# Patient Record
Sex: FEMALE | Race: WHITE | Employment: OTHER | URBAN - METROPOLITAN AREA
[De-identification: names, ages, dates, MRNs, and addresses within clinical notes are randomized per-mention and may not be internally consistent; named-entity substitution may affect disease eponyms.]

---

## 2017-02-09 PROBLEM — D64.9 ANEMIA: Status: ACTIVE | Noted: 2017-02-09

## 2017-05-27 PROBLEM — K86.2 PANCREATIC CYST: Status: ACTIVE | Noted: 2017-05-27

## 2017-05-27 PROBLEM — M54.9 PAIN IN BACK: Chronic | Status: ACTIVE | Noted: 2017-05-27

## 2017-05-28 PROBLEM — D51.9 ANEMIA DUE TO VITAMIN B12 DEFICIENCY: Status: ACTIVE | Noted: 2017-02-09

## 2017-09-26 PROBLEM — M54.9 CHRONIC BACK PAIN: Chronic | Status: ACTIVE | Noted: 2017-09-26

## 2017-09-26 PROBLEM — F41.9 ANXIETY DISORDER: Chronic | Status: ACTIVE | Noted: 2017-09-26

## 2017-09-26 PROBLEM — G89.29 CHRONIC BACK PAIN: Chronic | Status: ACTIVE | Noted: 2017-09-26

## 2017-09-26 PROBLEM — R50.9 ACUTE FEBRILE ILLNESS: Status: ACTIVE | Noted: 2017-09-26

## 2018-03-13 ENCOUNTER — HOSPITAL ENCOUNTER (OUTPATIENT)
Age: 73
Discharge: HOME | End: 2018-03-15

## 2018-03-13 PROCEDURE — 88342 IMHCHEM/IMCYTCHM 1ST ANTB: CPT

## 2018-03-13 PROCEDURE — 88305 TISSUE EXAM BY PATHOLOGIST: CPT

## 2018-06-30 ENCOUNTER — HOSPITAL ENCOUNTER (INPATIENT)
Age: 73
LOS: 4 days | Discharge: HOME | DRG: 871 | End: 2018-07-04
Attending: EMERGENCY MEDICINE | Admitting: INTERNAL MEDICINE
Payer: COMMERCIAL

## 2018-06-30 ENCOUNTER — APPOINTMENT (OUTPATIENT)
Dept: GENERAL RADIOLOGY | Age: 73
DRG: 871 | End: 2018-06-30
Payer: COMMERCIAL

## 2018-06-30 DIAGNOSIS — A41.9 SEPSIS DUE TO PNEUMONIA (HCC): Primary | ICD-10-CM

## 2018-06-30 DIAGNOSIS — J18.9 SEPSIS DUE TO PNEUMONIA (HCC): Primary | ICD-10-CM

## 2018-06-30 PROBLEM — J18.1 LOBAR PNEUMONIA (HCC): Status: ACTIVE | Noted: 2018-06-30

## 2018-06-30 LAB
ALBUMIN SERPL-MCNC: 3.7 G/DL (ref 3.5–5.2)
ALP BLD-CCNC: 59 U/L (ref 35–104)
ALT SERPL-CCNC: 13 U/L (ref 0–32)
ANION GAP SERPL CALCULATED.3IONS-SCNC: 14 MMOL/L (ref 7–16)
AST SERPL-CCNC: 23 U/L (ref 0–31)
BACTERIA: NORMAL /HPF
BASOPHILS ABSOLUTE: 0 E9/L (ref 0–0.2)
BASOPHILS RELATIVE PERCENT: 0.6 % (ref 0–2)
BILIRUB SERPL-MCNC: 0.7 MG/DL (ref 0–1.2)
BILIRUBIN URINE: NEGATIVE
BLOOD, URINE: NEGATIVE
BUN BLDV-MCNC: 17 MG/DL (ref 8–23)
CALCIUM SERPL-MCNC: 8.2 MG/DL (ref 8.6–10.2)
CHLORIDE BLD-SCNC: 101 MMOL/L (ref 98–107)
CLARITY: CLEAR
CO2: 23 MMOL/L (ref 22–29)
COLOR: YELLOW
CREAT SERPL-MCNC: 1 MG/DL (ref 0.5–1)
EOSINOPHILS ABSOLUTE: 0.49 E9/L (ref 0.05–0.5)
EOSINOPHILS RELATIVE PERCENT: 2.6 % (ref 0–6)
GFR AFRICAN AMERICAN: >60
GFR NON-AFRICAN AMERICAN: 55 ML/MIN/1.73
GLUCOSE BLD-MCNC: 94 MG/DL (ref 74–109)
GLUCOSE URINE: NEGATIVE MG/DL
HCT VFR BLD CALC: 37.5 % (ref 34–48)
HEMOGLOBIN: 12.3 G/DL (ref 11.5–15.5)
INFLUENZA A BY PCR: NOT DETECTED
INFLUENZA B BY PCR: NOT DETECTED
KETONES, URINE: NEGATIVE MG/DL
LACTIC ACID: 1.4 MMOL/L (ref 0.5–2.2)
LEUKOCYTE ESTERASE, URINE: ABNORMAL
LIPASE: 43 U/L (ref 13–60)
LYMPHOCYTES ABSOLUTE: 2.27 E9/L (ref 1.5–4)
LYMPHOCYTES RELATIVE PERCENT: 12.1 % (ref 20–42)
MCH RBC QN AUTO: 28.3 PG (ref 26–35)
MCHC RBC AUTO-ENTMCNC: 32.8 % (ref 32–34.5)
MCV RBC AUTO: 86.2 FL (ref 80–99.9)
MONOCYTES ABSOLUTE: 1.89 E9/L (ref 0.1–0.95)
MONOCYTES RELATIVE PERCENT: 10.3 % (ref 2–12)
NEUTROPHILS ABSOLUTE: 14.18 E9/L (ref 1.8–7.3)
NEUTROPHILS RELATIVE PERCENT: 75 % (ref 43–80)
NITRITE, URINE: NEGATIVE
OVALOCYTES: ABNORMAL
PDW BLD-RTO: 15 FL (ref 11.5–15)
PH UA: 6 (ref 5–9)
PLATELET # BLD: 268 E9/L (ref 130–450)
PMV BLD AUTO: 11.1 FL (ref 7–12)
POIKILOCYTES: ABNORMAL
POTASSIUM SERPL-SCNC: 3.4 MMOL/L (ref 3.5–5)
PROTEIN UA: NEGATIVE MG/DL
RBC # BLD: 4.35 E12/L (ref 3.5–5.5)
RBC UA: NORMAL /HPF (ref 0–2)
SCHISTOCYTES: ABNORMAL
SODIUM BLD-SCNC: 138 MMOL/L (ref 132–146)
SPECIFIC GRAVITY UA: <=1.005 (ref 1–1.03)
TOTAL PROTEIN: 6.3 G/DL (ref 6.4–8.3)
TROPONIN: <0.01 NG/ML (ref 0–0.03)
UROBILINOGEN, URINE: 0.2 E.U./DL
WBC # BLD: 18.9 E9/L (ref 4.5–11.5)
WBC UA: NORMAL /HPF (ref 0–5)

## 2018-06-30 PROCEDURE — 83690 ASSAY OF LIPASE: CPT

## 2018-06-30 PROCEDURE — 83605 ASSAY OF LACTIC ACID: CPT

## 2018-06-30 PROCEDURE — 2580000003 HC RX 258: Performed by: PHYSICIAN ASSISTANT

## 2018-06-30 PROCEDURE — 85025 COMPLETE CBC W/AUTO DIFF WBC: CPT

## 2018-06-30 PROCEDURE — 6370000000 HC RX 637 (ALT 250 FOR IP): Performed by: INTERNAL MEDICINE

## 2018-06-30 PROCEDURE — 87502 INFLUENZA DNA AMP PROBE: CPT

## 2018-06-30 PROCEDURE — 87503 INFLUENZA DNA AMP PROB ADDL: CPT

## 2018-06-30 PROCEDURE — 87088 URINE BACTERIA CULTURE: CPT

## 2018-06-30 PROCEDURE — 99285 EMERGENCY DEPT VISIT HI MDM: CPT

## 2018-06-30 PROCEDURE — 6360000002 HC RX W HCPCS: Performed by: PHYSICIAN ASSISTANT

## 2018-06-30 PROCEDURE — 2140000000 HC CCU INTERMEDIATE R&B

## 2018-06-30 PROCEDURE — 87486 CHLMYD PNEUM DNA AMP PROBE: CPT

## 2018-06-30 PROCEDURE — 87040 BLOOD CULTURE FOR BACTERIA: CPT

## 2018-06-30 PROCEDURE — 2500000003 HC RX 250 WO HCPCS: Performed by: PHYSICIAN ASSISTANT

## 2018-06-30 PROCEDURE — 87581 M.PNEUMON DNA AMP PROBE: CPT

## 2018-06-30 PROCEDURE — 6360000002 HC RX W HCPCS: Performed by: INTERNAL MEDICINE

## 2018-06-30 PROCEDURE — 81001 URINALYSIS AUTO W/SCOPE: CPT

## 2018-06-30 PROCEDURE — 2580000003 HC RX 258: Performed by: INTERNAL MEDICINE

## 2018-06-30 PROCEDURE — 36415 COLL VENOUS BLD VENIPUNCTURE: CPT

## 2018-06-30 PROCEDURE — 84484 ASSAY OF TROPONIN QUANT: CPT

## 2018-06-30 PROCEDURE — 80053 COMPREHEN METABOLIC PANEL: CPT

## 2018-06-30 PROCEDURE — 87798 DETECT AGENT NOS DNA AMP: CPT

## 2018-06-30 PROCEDURE — 71045 X-RAY EXAM CHEST 1 VIEW: CPT

## 2018-06-30 PROCEDURE — 96374 THER/PROPH/DIAG INJ IV PUSH: CPT

## 2018-06-30 PROCEDURE — 87450 HC DIRECT STREP B ANTIGEN: CPT

## 2018-06-30 RX ORDER — DILTIAZEM HYDROCHLORIDE 120 MG/1
120 CAPSULE, COATED, EXTENDED RELEASE ORAL 2 TIMES DAILY
Status: DISCONTINUED | OUTPATIENT
Start: 2018-06-30 | End: 2018-07-04 | Stop reason: HOSPADM

## 2018-06-30 RX ORDER — TRAZODONE HYDROCHLORIDE 50 MG/1
75 TABLET ORAL NIGHTLY
Status: DISCONTINUED | OUTPATIENT
Start: 2018-06-30 | End: 2018-07-04 | Stop reason: HOSPADM

## 2018-06-30 RX ORDER — SODIUM CHLORIDE 9 MG/ML
INJECTION, SOLUTION INTRAVENOUS CONTINUOUS
Status: DISCONTINUED | OUTPATIENT
Start: 2018-06-30 | End: 2018-07-02

## 2018-06-30 RX ORDER — BUDESONIDE 0.5 MG/2ML
500 INHALANT ORAL 2 TIMES DAILY
Status: DISCONTINUED | OUTPATIENT
Start: 2018-06-30 | End: 2018-07-04 | Stop reason: HOSPADM

## 2018-06-30 RX ORDER — ASPIRIN 81 MG/1
81 TABLET, CHEWABLE ORAL DAILY
Status: DISCONTINUED | OUTPATIENT
Start: 2018-06-30 | End: 2018-07-04 | Stop reason: HOSPADM

## 2018-06-30 RX ORDER — ONDANSETRON 2 MG/ML
4 INJECTION INTRAMUSCULAR; INTRAVENOUS ONCE
Status: COMPLETED | OUTPATIENT
Start: 2018-06-30 | End: 2018-06-30

## 2018-06-30 RX ORDER — ATORVASTATIN CALCIUM 10 MG/1
10 TABLET, FILM COATED ORAL NIGHTLY
Status: DISCONTINUED | OUTPATIENT
Start: 2018-06-30 | End: 2018-07-04 | Stop reason: HOSPADM

## 2018-06-30 RX ORDER — SODIUM CHLORIDE 0.9 % (FLUSH) 0.9 %
10 SYRINGE (ML) INJECTION EVERY 12 HOURS SCHEDULED
Status: DISCONTINUED | OUTPATIENT
Start: 2018-06-30 | End: 2018-07-04 | Stop reason: HOSPADM

## 2018-06-30 RX ORDER — GABAPENTIN 100 MG/1
200 CAPSULE ORAL NIGHTLY
COMMUNITY
End: 2018-11-15 | Stop reason: SINTOL

## 2018-06-30 RX ORDER — ASCORBIC ACID 500 MG
2000 TABLET ORAL DAILY
Status: ON HOLD | COMMUNITY
End: 2018-07-04

## 2018-06-30 RX ORDER — PREDNISONE 10 MG/1
10 TABLET ORAL DAILY
Status: ON HOLD | COMMUNITY
End: 2018-07-04

## 2018-06-30 RX ORDER — GABAPENTIN 100 MG/1
200 CAPSULE ORAL NIGHTLY
Status: DISCONTINUED | OUTPATIENT
Start: 2018-06-30 | End: 2018-07-04 | Stop reason: HOSPADM

## 2018-06-30 RX ORDER — POTASSIUM CHLORIDE 20 MEQ/1
40 TABLET, EXTENDED RELEASE ORAL DAILY
Status: DISCONTINUED | OUTPATIENT
Start: 2018-06-30 | End: 2018-07-04 | Stop reason: HOSPADM

## 2018-06-30 RX ORDER — IPRATROPIUM BROMIDE AND ALBUTEROL SULFATE 2.5; .5 MG/3ML; MG/3ML
1 SOLUTION RESPIRATORY (INHALATION)
Status: DISCONTINUED | OUTPATIENT
Start: 2018-06-30 | End: 2018-07-04 | Stop reason: HOSPADM

## 2018-06-30 RX ORDER — ACETAMINOPHEN 325 MG/1
650 TABLET ORAL EVERY 4 HOURS PRN
Status: DISCONTINUED | OUTPATIENT
Start: 2018-06-30 | End: 2018-07-04 | Stop reason: HOSPADM

## 2018-06-30 RX ORDER — 0.9 % SODIUM CHLORIDE 0.9 %
1000 INTRAVENOUS SOLUTION INTRAVENOUS ONCE
Status: COMPLETED | OUTPATIENT
Start: 2018-06-30 | End: 2018-06-30

## 2018-06-30 RX ORDER — LORAZEPAM 2 MG/1
2 TABLET ORAL 3 TIMES DAILY
COMMUNITY

## 2018-06-30 RX ORDER — FORMOTEROL FUMARATE 20 UG/2ML
20 SOLUTION RESPIRATORY (INHALATION) EVERY 12 HOURS
Status: DISCONTINUED | OUTPATIENT
Start: 2018-06-30 | End: 2018-07-04 | Stop reason: HOSPADM

## 2018-06-30 RX ORDER — SODIUM CHLORIDE 0.9 % (FLUSH) 0.9 %
10 SYRINGE (ML) INJECTION PRN
Status: DISCONTINUED | OUTPATIENT
Start: 2018-06-30 | End: 2018-07-04 | Stop reason: HOSPADM

## 2018-06-30 RX ORDER — LORAZEPAM 1 MG/1
2 TABLET ORAL 3 TIMES DAILY
Status: DISCONTINUED | OUTPATIENT
Start: 2018-06-30 | End: 2018-07-04 | Stop reason: HOSPADM

## 2018-06-30 RX ORDER — GUAIFENESIN/DEXTROMETHORPHAN 100-10MG/5
5 SYRUP ORAL EVERY 4 HOURS PRN
Status: DISCONTINUED | OUTPATIENT
Start: 2018-06-30 | End: 2018-07-04 | Stop reason: HOSPADM

## 2018-06-30 RX ORDER — ESCITALOPRAM OXALATE 10 MG/1
10 TABLET ORAL NIGHTLY
Status: DISCONTINUED | OUTPATIENT
Start: 2018-06-30 | End: 2018-07-04 | Stop reason: HOSPADM

## 2018-06-30 RX ORDER — FAMOTIDINE 20 MG/1
20 TABLET, FILM COATED ORAL 2 TIMES DAILY
Status: DISCONTINUED | OUTPATIENT
Start: 2018-06-30 | End: 2018-07-04 | Stop reason: HOSPADM

## 2018-06-30 RX ORDER — POTASSIUM CHLORIDE 750 MG/1
10 TABLET, FILM COATED, EXTENDED RELEASE ORAL 2 TIMES DAILY
COMMUNITY

## 2018-06-30 RX ORDER — PREDNISONE 10 MG/1
10 TABLET ORAL DAILY
Status: DISCONTINUED | OUTPATIENT
Start: 2018-06-30 | End: 2018-07-01

## 2018-06-30 RX ADMIN — POTASSIUM CHLORIDE 40 MEQ: 20 TABLET, EXTENDED RELEASE ORAL at 17:53

## 2018-06-30 RX ADMIN — ONDANSETRON 4 MG: 2 INJECTION INTRAMUSCULAR; INTRAVENOUS at 13:40

## 2018-06-30 RX ADMIN — SODIUM CHLORIDE 1000 ML: 9 INJECTION, SOLUTION INTRAVENOUS at 14:25

## 2018-06-30 RX ADMIN — CEFTRIAXONE SODIUM 1 G: 1 INJECTION, POWDER, FOR SOLUTION INTRAMUSCULAR; INTRAVENOUS at 14:20

## 2018-06-30 RX ADMIN — DILTIAZEM HYDROCHLORIDE 120 MG: 120 CAPSULE, COATED, EXTENDED RELEASE ORAL at 20:52

## 2018-06-30 RX ADMIN — DOXYCYCLINE 100 MG: 100 INJECTION, POWDER, LYOPHILIZED, FOR SOLUTION INTRAVENOUS at 15:06

## 2018-06-30 RX ADMIN — LORAZEPAM 2 MG: 1 TABLET ORAL at 20:53

## 2018-06-30 RX ADMIN — SODIUM CHLORIDE 1000 ML: 9 INJECTION, SOLUTION INTRAVENOUS at 13:40

## 2018-06-30 RX ADMIN — FAMOTIDINE 20 MG: 20 TABLET, FILM COATED ORAL at 20:52

## 2018-06-30 RX ADMIN — Medication 10 ML: at 20:53

## 2018-06-30 RX ADMIN — SODIUM CHLORIDE: 9 INJECTION, SOLUTION INTRAVENOUS at 17:32

## 2018-06-30 RX ADMIN — CEFEPIME HYDROCHLORIDE 2 G: 2 INJECTION, POWDER, FOR SOLUTION INTRAVENOUS at 20:34

## 2018-06-30 RX ADMIN — GABAPENTIN 200 MG: 100 CAPSULE ORAL at 20:52

## 2018-06-30 RX ADMIN — ATORVASTATIN CALCIUM 10 MG: 10 TABLET, FILM COATED ORAL at 20:54

## 2018-06-30 RX ADMIN — ESCITALOPRAM OXALATE 10 MG: 10 TABLET, FILM COATED ORAL at 20:52

## 2018-06-30 ASSESSMENT — PAIN SCALES - GENERAL
PAINLEVEL_OUTOF10: 0

## 2018-06-30 NOTE — ED PROVIDER NOTES
12/6/2013, 2/20/2013    [facility] with lens implants    CHOLECYSTECTOMY      JOINT REPLACEMENT  12/16/2016    SI joint fusion Right    OTHER SURGICAL HISTORY  02/09/2017    MRI -     SPINAL FIXATION SURGERY WITH IMPLANT  2013    in [address] Right     UPPER GASTROINTESTINAL ENDOSCOPY  07/10/2017   Social History:  reports that she has never smoked. She has never used smokeless tobacco. She reports that she does not drink alcohol or use drugs. Family History: family history includes Arthritis in her father; Asthma in her daughter; Diabetes in her father; Heart Disease in her father and mother; Hypertension in her father and mother; Other in her mother; Stroke in her mother. Allergies: Percocet [oxycodone-acetaminophen]    Physical Exam           ED Triage Vitals [06/30/18 1303]   BP Temp Temp src Pulse Resp SpO2 Height Weight   94/66 97.9 °F (36.6 °C) -- 119 -- 95 % 5' 4\" (1.626 m) 145 lb (65.8 kg)     Oxygen Saturation Interpretation: Normal.    Constitutional:  Alert, development consistent with age. HEENT:  NC/NT. Airway patent. Neck:  Normal ROM. Supple. Respiratory:  Clear to auscultation and breath sounds equal.  CV:  Regular rate and rhythm, normal heart sounds, without pathological murmurs, ectopy, gallops, or rubs. GI: Abdomen Soft, nontender, good bowel sounds. No firm or pulsatile mass. Integument:  Normal turgor. Warm, dry, without visible rash, unless noted elsewhere. Lymphatic: no lymphadenopathy noted  Neurological:  Oriented. Motor functions intact.     Lab / Imaging Results   (All laboratory and radiology results have been personally reviewed by myself)  Labs:  Results for orders placed or performed during the hospital encounter of 06/30/18   CBC Auto Differential   Result Value Ref Range    WBC 18.9 (H) 4.5 - 11.5 E9/L    RBC 4.35 3.50 - 5.50 E12/L    Hemoglobin 12.3 11.5 - 15.5 g/dL    Hematocrit 37.5 34.0 - 48.0 %    MCV 86.2 80.0 - 99.9 fL    [facility] to previous EKG. ED Course / Medical Decision Making     Medications   doxycycline (VIBRAMYCIN) 100 mg in dextrose 5 % 100 mL IVPB (not administered)   cefTRIAXone (ROCEPHIN) 1 g in dextrose 5 % 50 mL IVPB (vial-mate) (1 g Intravenous New Bag 6/30/18 1420)   0.9 % sodium chloride bolus (1,000 mLs Intravenous New Bag 6/30/18 1425)   0.9 % sodium chloride bolus (0 mLs Intravenous Stopped 6/30/18 1418)   ondansetron (ZOFRAN) injection 4 mg (4 mg Intravenous Given 6/30/18 1340)      Re-Evaluations:  6/30/18      Time: 7045    Patients symptoms are improving slightly. The plan has been discussed. Consultations:             IP CONSULT TO HOSPITALIST I spoke with Dr. Elizabeth Orellana and He'll place his own admission orders. Procedures:   none    MDM:  Patient presents to the emergency department for flulike symptoms, cough, and shortness of breath. Chest x-ray suggests pneumonia. Patient with blood cell count of 18.9 and met sepsis criteria. She received IV doxycycline, ceftriaxone, and IV fluids in the emergency provider. She will be admitted for further evaluation treatment. Counseling:   I have spoken with the patient and SO and discussed todays results, in addition to providing specific details for the plan of care and counseling regarding the diagnosis and prognosis and are agreeable with the plan. Assessment      1. Sepsis due to pneumonia [facility])      This patient's ED course included: a personal history and physicial examination, re-evaluation prior to disposition, IV medications, cardiac monitoring and continuous pulse oximetry    This patient has remained hemodynamically stable, improved and been closely monitored during their ED course. Plan   Admit to telemetry. Patient condition is fair. New Medications     New Prescriptions    No medications on file     Electronically signed by Fadi Tang PA-C   DD: 6/30/18  **This report was transcribed using voice recognition software.  Every effort was

## 2018-07-01 LAB
ANION GAP SERPL CALCULATED.3IONS-SCNC: 9 MMOL/L (ref 7–16)
BASOPHILS ABSOLUTE: 0.06 E9/L (ref 0–0.2)
BASOPHILS RELATIVE PERCENT: 0.5 % (ref 0–2)
BUN BLDV-MCNC: 12 MG/DL (ref 8–23)
CALCIUM SERPL-MCNC: 7.5 MG/DL (ref 8.6–10.2)
CHLORIDE BLD-SCNC: 110 MMOL/L (ref 98–107)
CO2: 22 MMOL/L (ref 22–29)
CREAT SERPL-MCNC: 0.8 MG/DL (ref 0.5–1)
EOSINOPHILS ABSOLUTE: 0.54 E9/L (ref 0.05–0.5)
EOSINOPHILS RELATIVE PERCENT: 4.3 % (ref 0–6)
FILM ARRAY ADENOVIRUS: NORMAL
FILM ARRAY BORDETELLA PERTUSSIS: NORMAL
FILM ARRAY CHLAMYDOPHILIA PNEUMONIAE: NORMAL
FILM ARRAY CORONAVIRUS 229E: NORMAL
FILM ARRAY CORONAVIRUS HKU1: NORMAL
FILM ARRAY CORONAVIRUS NL63: NORMAL
FILM ARRAY CORONAVIRUS OC43: NORMAL
FILM ARRAY INFLUENZA A VIRUS 09H1: NORMAL
FILM ARRAY INFLUENZA A VIRUS H1: NORMAL
FILM ARRAY INFLUENZA A VIRUS H3: NORMAL
FILM ARRAY INFLUENZA A VIRUS: NORMAL
FILM ARRAY INFLUENZA B: NORMAL
FILM ARRAY METAPNEUMOVIRUS: NORMAL
FILM ARRAY MYCOPLASMA PNEUMONIAE: NORMAL
FILM ARRAY PARAINFLUENZA VIRUS 1: NORMAL
FILM ARRAY PARAINFLUENZA VIRUS 2: NORMAL
FILM ARRAY PARAINFLUENZA VIRUS 3: NORMAL
FILM ARRAY PARAINFLUENZA VIRUS 4: NORMAL
FILM ARRAY RESPIRATORY SYNCITIAL VIRUS: NORMAL
FILM ARRAY RHINOVIRUS/ENTEROVIRUS: NORMAL
GFR AFRICAN AMERICAN: >60
GFR NON-AFRICAN AMERICAN: >60 ML/MIN/1.73
GLUCOSE BLD-MCNC: 85 MG/DL (ref 74–109)
HCT VFR BLD CALC: 32.6 % (ref 34–48)
HEMOGLOBIN: 10.3 G/DL (ref 11.5–15.5)
IMMATURE GRANULOCYTES #: 0.1 E9/L
IMMATURE GRANULOCYTES %: 0.8 % (ref 0–5)
L. PNEUMOPHILA SEROGP 1 UR AG: NORMAL
LYMPHOCYTES ABSOLUTE: 2.18 E9/L (ref 1.5–4)
LYMPHOCYTES RELATIVE PERCENT: 17.5 % (ref 20–42)
MCH RBC QN AUTO: 28 PG (ref 26–35)
MCHC RBC AUTO-ENTMCNC: 31.6 % (ref 32–34.5)
MCV RBC AUTO: 88.6 FL (ref 80–99.9)
MONOCYTES ABSOLUTE: 1.15 E9/L (ref 0.1–0.95)
MONOCYTES RELATIVE PERCENT: 9.2 % (ref 2–12)
NEUTROPHILS ABSOLUTE: 8.46 E9/L (ref 1.8–7.3)
NEUTROPHILS RELATIVE PERCENT: 67.7 % (ref 43–80)
PDW BLD-RTO: 15.2 FL (ref 11.5–15)
PLATELET # BLD: 213 E9/L (ref 130–450)
PMV BLD AUTO: 11.7 FL (ref 7–12)
POTASSIUM SERPL-SCNC: 3.8 MMOL/L (ref 3.5–5)
RBC # BLD: 3.68 E12/L (ref 3.5–5.5)
SODIUM BLD-SCNC: 141 MMOL/L (ref 132–146)
STREP PNEUMONIAE ANTIGEN, URINE: NORMAL
WBC # BLD: 12.5 E9/L (ref 4.5–11.5)

## 2018-07-01 PROCEDURE — 85025 COMPLETE CBC W/AUTO DIFF WBC: CPT

## 2018-07-01 PROCEDURE — 80048 BASIC METABOLIC PNL TOTAL CA: CPT

## 2018-07-01 PROCEDURE — 6360000002 HC RX W HCPCS: Performed by: INTERNAL MEDICINE

## 2018-07-01 PROCEDURE — 36415 COLL VENOUS BLD VENIPUNCTURE: CPT

## 2018-07-01 PROCEDURE — 2140000000 HC CCU INTERMEDIATE R&B

## 2018-07-01 PROCEDURE — 6370000000 HC RX 637 (ALT 250 FOR IP): Performed by: INTERNAL MEDICINE

## 2018-07-01 PROCEDURE — 94640 AIRWAY INHALATION TREATMENT: CPT

## 2018-07-01 PROCEDURE — 94664 DEMO&/EVAL PT USE INHALER: CPT

## 2018-07-01 PROCEDURE — 2580000003 HC RX 258

## 2018-07-01 PROCEDURE — 2580000003 HC RX 258: Performed by: INTERNAL MEDICINE

## 2018-07-01 RX ORDER — METHYLPREDNISOLONE SODIUM SUCCINATE 40 MG/ML
40 INJECTION, POWDER, LYOPHILIZED, FOR SOLUTION INTRAMUSCULAR; INTRAVENOUS EVERY 8 HOURS
Status: DISCONTINUED | OUTPATIENT
Start: 2018-07-01 | End: 2018-07-03

## 2018-07-01 RX ORDER — ONDANSETRON 2 MG/ML
4 INJECTION INTRAMUSCULAR; INTRAVENOUS EVERY 6 HOURS PRN
Status: DISCONTINUED | OUTPATIENT
Start: 2018-07-01 | End: 2018-07-04 | Stop reason: HOSPADM

## 2018-07-01 RX ADMIN — Medication 10 ML: at 21:25

## 2018-07-01 RX ADMIN — DILTIAZEM HYDROCHLORIDE 120 MG: 120 CAPSULE, COATED, EXTENDED RELEASE ORAL at 20:31

## 2018-07-01 RX ADMIN — BUDESONIDE 500 MCG: 0.5 SUSPENSION RESPIRATORY (INHALATION) at 08:10

## 2018-07-01 RX ADMIN — FORMOTEROL FUMARATE DIHYDRATE 20 MCG: 20 SOLUTION RESPIRATORY (INHALATION) at 08:10

## 2018-07-01 RX ADMIN — HYDROCORTISONE: 25 CREAM TOPICAL at 20:31

## 2018-07-01 RX ADMIN — GABAPENTIN 200 MG: 100 CAPSULE ORAL at 20:31

## 2018-07-01 RX ADMIN — FAMOTIDINE 20 MG: 20 TABLET, FILM COATED ORAL at 20:31

## 2018-07-01 RX ADMIN — ATORVASTATIN CALCIUM 10 MG: 10 TABLET, FILM COATED ORAL at 20:31

## 2018-07-01 RX ADMIN — METHYLPREDNISOLONE SODIUM SUCCINATE 40 MG: 40 INJECTION, POWDER, LYOPHILIZED, FOR SOLUTION INTRAMUSCULAR; INTRAVENOUS at 16:15

## 2018-07-01 RX ADMIN — PREDNISONE 10 MG: 10 TABLET ORAL at 08:26

## 2018-07-01 RX ADMIN — TRIMETHOBENZAMIDE HYDROCHLORIDE 200 MG: 100 INJECTION INTRAMUSCULAR at 07:58

## 2018-07-01 RX ADMIN — BUDESONIDE 500 MCG: 0.5 SUSPENSION RESPIRATORY (INHALATION) at 21:17

## 2018-07-01 RX ADMIN — FAMOTIDINE 20 MG: 20 TABLET, FILM COATED ORAL at 08:26

## 2018-07-01 RX ADMIN — LORAZEPAM 2 MG: 1 TABLET ORAL at 13:39

## 2018-07-01 RX ADMIN — ASPIRIN 81 MG CHEWABLE TABLET 81 MG: 81 TABLET CHEWABLE at 08:26

## 2018-07-01 RX ADMIN — WATER 1 ML: 1 INJECTION INTRAMUSCULAR; INTRAVENOUS; SUBCUTANEOUS at 16:15

## 2018-07-01 RX ADMIN — SODIUM CHLORIDE: 9 INJECTION, SOLUTION INTRAVENOUS at 12:56

## 2018-07-01 RX ADMIN — CEFEPIME HYDROCHLORIDE 2 G: 2 INJECTION, POWDER, FOR SOLUTION INTRAVENOUS at 08:26

## 2018-07-01 RX ADMIN — SODIUM CHLORIDE: 9 INJECTION, SOLUTION INTRAVENOUS at 06:50

## 2018-07-01 RX ADMIN — DILTIAZEM HYDROCHLORIDE 120 MG: 120 CAPSULE, COATED, EXTENDED RELEASE ORAL at 08:26

## 2018-07-01 RX ADMIN — ENOXAPARIN SODIUM 40 MG: 40 INJECTION SUBCUTANEOUS at 08:25

## 2018-07-01 RX ADMIN — LORAZEPAM 2 MG: 1 TABLET ORAL at 08:26

## 2018-07-01 RX ADMIN — LORAZEPAM 2 MG: 1 TABLET ORAL at 20:31

## 2018-07-01 RX ADMIN — FORMOTEROL FUMARATE DIHYDRATE 20 MCG: 20 SOLUTION RESPIRATORY (INHALATION) at 21:17

## 2018-07-01 RX ADMIN — ESCITALOPRAM OXALATE 10 MG: 10 TABLET, FILM COATED ORAL at 20:31

## 2018-07-01 RX ADMIN — CEFEPIME HYDROCHLORIDE 2 G: 2 INJECTION, POWDER, FOR SOLUTION INTRAVENOUS at 20:30

## 2018-07-01 RX ADMIN — POTASSIUM CHLORIDE 40 MEQ: 20 TABLET, EXTENDED RELEASE ORAL at 08:26

## 2018-07-01 RX ADMIN — IPRATROPIUM BROMIDE AND ALBUTEROL SULFATE 1 AMPULE: 2.5; .5 SOLUTION RESPIRATORY (INHALATION) at 16:16

## 2018-07-01 RX ADMIN — IPRATROPIUM BROMIDE AND ALBUTEROL SULFATE 1 AMPULE: 2.5; .5 SOLUTION RESPIRATORY (INHALATION) at 12:04

## 2018-07-01 ASSESSMENT — PAIN SCALES - GENERAL
PAINLEVEL_OUTOF10: 0
PAINLEVEL_OUTOF10: 0

## 2018-07-01 NOTE — PLAN OF CARE
Problem: Fluid Volume - Deficit:  Goal: Achieves intake and output within specified parameters  Achieves intake and output within specified parameters  Outcome: Met This Shift      Problem: Gas Exchange - Impaired:  Goal: Levels of oxygenation will improve  Levels of oxygenation will improve  Outcome: Met This Shift      Problem: Hyperthermia:  Goal: Ability to maintain a body temperature in the normal range will improve  Ability to maintain a body temperature in the normal range will improve  Outcome: Not Met This Shift

## 2018-07-01 NOTE — PLAN OF CARE
Problem: Falls - Risk of:  Goal: Will remain free from falls  Will remain free from falls   Outcome: Met This Shift  Pt will remain safe on IMCU

## 2018-07-01 NOTE — H&P
[address] [facility]  History and Physical      CHIEF COMPLAINT:  Cough, chills    History of Present Illness: the patient is a 72 y.o. white woman followed by DR Irma Saldaña who presents secondary to cough and chills. She was in her usual state of health until yesterday AM.  She awoke feeing achy and had shaking chills. She felt \"hot and cold\" all day. She had been developing increased wheeze the last few days, but has a history of asthma. She did not feel better despite home inhaler use. She presented to the ER yesterday and was noted to have fever and tachycardia. Imaging demonstrated possible development of bibasilar pneumonia. She was given IV fluids, aerosols and IV antibiotics and admitted for further care. Presently she is awake/alert. No current distress, but she continues to have intermittent rigors. No chest pain/angina. No vomiting but she has been nauseous.         Past Medical History:   Diagnosis Date    Anxiety     Arthritis     Asthma     Dermatitis 02/2017    bilateral legs knees to ankle; [facility]    Fracture 02/2017    \"in Spine\" compression T10 per pt; difficulty laying flat    GERD (gastroesophageal reflux disease)     Hiatal hernia     HTN (hypertension) 4/22/2013    Hyperlipidemia     Pancreatic cyst 5/27/2017         Past Surgical History:   Procedure Laterality Date    BACK SURGERY  8/2014    has screws in back   [address]  12/6/2013, 2/20/2013    [facility] with lens implants    CHOLECYSTECTOMY      JOINT REPLACEMENT  12/16/2016    SI joint fusion Right    OTHER SURGICAL HISTORY  02/09/2017    MRI -     SPINAL FIXATION SURGERY WITH IMPLANT  2013    in [address] Right     UPPER GASTROINTESTINAL ENDOSCOPY  07/10/2017       Medications Prior to Admission:    Prescriptions Prior to Admission: potassium chloride (KLOR-CON) 10 MEQ extended release tablet, Take 10 mEq by mouth daily  gabapentin (NEURONTIN) 100 MG capsule, Take 200 mg by mouth nightly. .  vitamin C (ASCORBIC ACID) 500 MG tablet, Take 2,000 mg by mouth daily  LORazepam (ATIVAN) 2 MG tablet, Take 2 mg by mouth 3 times daily. .  predniSONE (DELTASONE) 10 MG tablet, Take 10 mg by mouth daily  diltiazem (CARDIZEM CD) 240 MG extended release capsule, TAKE 1 CAPSULE TWICE DAILY  aspirin 81 MG tablet, Take 81 mg by mouth daily Pt to hold 5 days prior per Dr. Jovanny Villatoro  escitalopram (LEXAPRO) 10 MG tablet, Take 10 mg by mouth nightly   fluticasone (FLOVENT HFA) 110 MCG/ACT inhaler, Inhale 1 puff into the lungs 2 times daily as needed Instructed to take am of procedure if needed and bring dos  traZODone (DESYREL) 150 MG tablet, Take 75 mg by mouth nightly   famotidine (PEPCID) 20 MG tablet, Take 20 mg by mouth 2 times daily Instructed to take am of procedure  Cholecalciferol (VITAMIN D) 2000 UNITS CAPS capsule, Take 2,000 Units by mouth daily Last dose 7/4/2017  Cyanocobalamin (VITAMIN B-12 IJ), Inject as directed   docusate sodium (RA COL-RITE) 100 MG capsule, Take 100 mg by mouth 2 times daily. atorvastatin (LIPITOR) 10 MG tablet, Take 10 mg by mouth nightly   albuterol (PROVENTIL HFA;VENTOLIN HFA) 108 (90 BASE) MCG/ACT inhaler, Inhale 2 puffs into the lungs 2 times daily as needed Instructed to take am of procedure if needed and bring dos    Note that the patient's home medications were reviewed and the above list is accurate to the best of my knowledge at the time of the exam.    Allergies:    Percocet [oxycodone-acetaminophen]    Social History:    reports that she has never smoked. She has never used smokeless tobacco. She reports that she does not drink alcohol or use drugs. Family History:   family history includes Arthritis in her father; Asthma in her daughter; Diabetes in her father; Heart Disease in her father and mother; Hypertension in her father and mother; Other in her mother; Stroke in her mother.     REVIEW OF SYSTEMS:  As

## 2018-07-02 LAB
ANION GAP SERPL CALCULATED.3IONS-SCNC: 17 MMOL/L (ref 7–16)
BASOPHILS ABSOLUTE: 0.03 E9/L (ref 0–0.2)
BASOPHILS RELATIVE PERCENT: 0.4 % (ref 0–2)
BUN BLDV-MCNC: 7 MG/DL (ref 8–23)
CALCIUM SERPL-MCNC: 8.3 MG/DL (ref 8.6–10.2)
CHLORIDE BLD-SCNC: 106 MMOL/L (ref 98–107)
CO2: 18 MMOL/L (ref 22–29)
CREAT SERPL-MCNC: 0.6 MG/DL (ref 0.5–1)
EOSINOPHILS ABSOLUTE: 0 E9/L (ref 0.05–0.5)
EOSINOPHILS RELATIVE PERCENT: 0 % (ref 0–6)
GFR AFRICAN AMERICAN: >60
GFR NON-AFRICAN AMERICAN: >60 ML/MIN/1.73
GLUCOSE BLD-MCNC: 147 MG/DL (ref 74–109)
HCT VFR BLD CALC: 34.5 % (ref 34–48)
HEMOGLOBIN: 10.9 G/DL (ref 11.5–15.5)
IMMATURE GRANULOCYTES #: 0.09 E9/L
IMMATURE GRANULOCYTES %: 1.3 % (ref 0–5)
LYMPHOCYTES ABSOLUTE: 0.66 E9/L (ref 1.5–4)
LYMPHOCYTES RELATIVE PERCENT: 9.6 % (ref 20–42)
MCH RBC QN AUTO: 27.7 PG (ref 26–35)
MCHC RBC AUTO-ENTMCNC: 31.6 % (ref 32–34.5)
MCV RBC AUTO: 87.8 FL (ref 80–99.9)
MONOCYTES ABSOLUTE: 0.09 E9/L (ref 0.1–0.95)
MONOCYTES RELATIVE PERCENT: 1.3 % (ref 2–12)
NEUTROPHILS ABSOLUTE: 5.99 E9/L (ref 1.8–7.3)
NEUTROPHILS RELATIVE PERCENT: 87.4 % (ref 43–80)
PDW BLD-RTO: 15 FL (ref 11.5–15)
PLATELET # BLD: 218 E9/L (ref 130–450)
PMV BLD AUTO: 11.8 FL (ref 7–12)
POTASSIUM SERPL-SCNC: 3.9 MMOL/L (ref 3.5–5)
RBC # BLD: 3.93 E12/L (ref 3.5–5.5)
SODIUM BLD-SCNC: 141 MMOL/L (ref 132–146)
URINE CULTURE, ROUTINE: NORMAL
WBC # BLD: 6.9 E9/L (ref 4.5–11.5)

## 2018-07-02 PROCEDURE — G8978 MOBILITY CURRENT STATUS: HCPCS

## 2018-07-02 PROCEDURE — 2580000003 HC RX 258: Performed by: INTERNAL MEDICINE

## 2018-07-02 PROCEDURE — 2140000000 HC CCU INTERMEDIATE R&B

## 2018-07-02 PROCEDURE — 36415 COLL VENOUS BLD VENIPUNCTURE: CPT

## 2018-07-02 PROCEDURE — 80048 BASIC METABOLIC PNL TOTAL CA: CPT

## 2018-07-02 PROCEDURE — 85025 COMPLETE CBC W/AUTO DIFF WBC: CPT

## 2018-07-02 PROCEDURE — 6360000002 HC RX W HCPCS: Performed by: INTERNAL MEDICINE

## 2018-07-02 PROCEDURE — 97161 PT EVAL LOW COMPLEX 20 MIN: CPT

## 2018-07-02 PROCEDURE — 94640 AIRWAY INHALATION TREATMENT: CPT

## 2018-07-02 PROCEDURE — G8980 MOBILITY D/C STATUS: HCPCS

## 2018-07-02 PROCEDURE — 6370000000 HC RX 637 (ALT 250 FOR IP): Performed by: INTERNAL MEDICINE

## 2018-07-02 PROCEDURE — G8988 SELF CARE GOAL STATUS: HCPCS

## 2018-07-02 PROCEDURE — G8987 SELF CARE CURRENT STATUS: HCPCS

## 2018-07-02 PROCEDURE — G8989 SELF CARE D/C STATUS: HCPCS

## 2018-07-02 RX ADMIN — METHYLPREDNISOLONE SODIUM SUCCINATE 40 MG: 40 INJECTION, POWDER, LYOPHILIZED, FOR SOLUTION INTRAMUSCULAR; INTRAVENOUS at 16:06

## 2018-07-02 RX ADMIN — DILTIAZEM HYDROCHLORIDE 120 MG: 120 CAPSULE, COATED, EXTENDED RELEASE ORAL at 20:35

## 2018-07-02 RX ADMIN — Medication 10 ML: at 20:35

## 2018-07-02 RX ADMIN — METHYLPREDNISOLONE SODIUM SUCCINATE 40 MG: 40 INJECTION, POWDER, LYOPHILIZED, FOR SOLUTION INTRAMUSCULAR; INTRAVENOUS at 08:47

## 2018-07-02 RX ADMIN — POTASSIUM CHLORIDE 40 MEQ: 20 TABLET, EXTENDED RELEASE ORAL at 08:47

## 2018-07-02 RX ADMIN — LORAZEPAM 2 MG: 1 TABLET ORAL at 14:30

## 2018-07-02 RX ADMIN — FORMOTEROL FUMARATE DIHYDRATE 20 MCG: 20 SOLUTION RESPIRATORY (INHALATION) at 07:48

## 2018-07-02 RX ADMIN — IPRATROPIUM BROMIDE AND ALBUTEROL SULFATE 1 AMPULE: 2.5; .5 SOLUTION RESPIRATORY (INHALATION) at 11:20

## 2018-07-02 RX ADMIN — ESCITALOPRAM OXALATE 10 MG: 10 TABLET, FILM COATED ORAL at 20:35

## 2018-07-02 RX ADMIN — ASPIRIN 81 MG CHEWABLE TABLET 81 MG: 81 TABLET CHEWABLE at 08:47

## 2018-07-02 RX ADMIN — HYDROCORTISONE: 25 CREAM TOPICAL at 20:39

## 2018-07-02 RX ADMIN — CEFEPIME HYDROCHLORIDE 2 G: 2 INJECTION, POWDER, FOR SOLUTION INTRAVENOUS at 20:34

## 2018-07-02 RX ADMIN — IPRATROPIUM BROMIDE AND ALBUTEROL SULFATE 1 AMPULE: 2.5; .5 SOLUTION RESPIRATORY (INHALATION) at 16:18

## 2018-07-02 RX ADMIN — CEFEPIME HYDROCHLORIDE 2 G: 2 INJECTION, POWDER, FOR SOLUTION INTRAVENOUS at 10:45

## 2018-07-02 RX ADMIN — FAMOTIDINE 20 MG: 20 TABLET, FILM COATED ORAL at 08:47

## 2018-07-02 RX ADMIN — BUDESONIDE 500 MCG: 0.5 SUSPENSION RESPIRATORY (INHALATION) at 20:18

## 2018-07-02 RX ADMIN — SODIUM CHLORIDE: 9 INJECTION, SOLUTION INTRAVENOUS at 00:33

## 2018-07-02 RX ADMIN — GUAIFENESIN AND DEXTROMETHORPHAN 5 ML: 100; 10 SYRUP ORAL at 14:30

## 2018-07-02 RX ADMIN — LORAZEPAM 2 MG: 1 TABLET ORAL at 08:47

## 2018-07-02 RX ADMIN — FAMOTIDINE 20 MG: 20 TABLET, FILM COATED ORAL at 20:35

## 2018-07-02 RX ADMIN — DILTIAZEM HYDROCHLORIDE 120 MG: 120 CAPSULE, COATED, EXTENDED RELEASE ORAL at 08:47

## 2018-07-02 RX ADMIN — GUAIFENESIN AND DEXTROMETHORPHAN 5 ML: 100; 10 SYRUP ORAL at 18:41

## 2018-07-02 RX ADMIN — ENOXAPARIN SODIUM 40 MG: 40 INJECTION SUBCUTANEOUS at 08:47

## 2018-07-02 RX ADMIN — LORAZEPAM 2 MG: 1 TABLET ORAL at 20:37

## 2018-07-02 RX ADMIN — BUDESONIDE 500 MCG: 0.5 SUSPENSION RESPIRATORY (INHALATION) at 07:49

## 2018-07-02 RX ADMIN — FORMOTEROL FUMARATE DIHYDRATE 20 MCG: 20 SOLUTION RESPIRATORY (INHALATION) at 20:16

## 2018-07-02 RX ADMIN — METHYLPREDNISOLONE SODIUM SUCCINATE 40 MG: 40 INJECTION, POWDER, LYOPHILIZED, FOR SOLUTION INTRAMUSCULAR; INTRAVENOUS at 00:33

## 2018-07-02 RX ADMIN — GABAPENTIN 200 MG: 100 CAPSULE ORAL at 20:35

## 2018-07-02 RX ADMIN — ATORVASTATIN CALCIUM 10 MG: 10 TABLET, FILM COATED ORAL at 20:35

## 2018-07-02 RX ADMIN — SODIUM CHLORIDE: 9 INJECTION, SOLUTION INTRAVENOUS at 10:48

## 2018-07-02 ASSESSMENT — PAIN SCALES - GENERAL
PAINLEVEL_OUTOF10: 0

## 2018-07-02 NOTE — PROGRESS NOTES
home/rehab/assisted living - phone call      Category Comments  (Include Pharmacist Initials and Date)   Change in Outpatient Medication  (Dosage Form, Route,   Dose, or Frequency) 1. Increase in KCl to 40mEq  2. Decrease diltiazem CD to 120mg PO BID         New Medication Started  1. Acetaminophen 650mg PO PRN   2. Budesonide 500mcg neb 2 times daily    3. Formoterol neb 20mcg 2 times daily    4. duoneb 1 inhalation Q4hrs WA   5. lovenox 40mg subcut daily    6. zofran IV 4mg PRN   7. Cefepime 2grams IVPB Q12hrs   8. Methylprednisolone sodium 40mg IV Q8hrs   9. Robitussin DM PRN   10. Hydrocortisone cream daily          Outpatient Medication Discontinued   (or on Hold During Admission) 1. Other 1. Pharmacist Patient Education:    Pharmacist Education Log:   Date  Person Educated Content of Education and   Printed Materials Provided     (Include Pharmacist Initials)                                 If applicable:   []  Unable to be complete education at this time due to: **       []  Barriers to counseling were identified: **    []  Follow-up education is required: **     []  In addition to the above, the patient was provided with the following additional        compliance tools: **     Documentation of Pharmacist Interventions and Follow-up Plan:   The following Pharmacist Transition of Care Services were completed during the admission:  []  Entire home medication list was reviewed for accuracy (best possible medication        history was obtained)   []  Home medication list was updated or corrected     []  Reviewed and summarized home medication changes and new inpatient         medications    []  Patient education was provided on home medication changes  []  Patient education was provided on new inpatient medications    The following Pharmacist Transition of Care Services were completed as part of the discharge process:  []  Reviewed and/or assisted with discharge medication reconciliation  [] Discharge patient medication education was provided   []  Reviewed the After Visit Summary (AVS) with patient      Additional Interventions:  []  Inpatient prescriber was contacted and the following pharmacy recommendations        were accepted: **    [] Outpatient primary care physician was informed of medication changes that were       made during admission. **    [] [facility] Clinical Pharmacist was contacted to arrange post-discharge       review of medications. **     [] Other - see below:  1.             Pharmacist: Michelle Parra PharmD, [facility]  Date:  7/2/2018 11:06 AM

## 2018-07-03 PROCEDURE — 6360000002 HC RX W HCPCS: Performed by: INTERNAL MEDICINE

## 2018-07-03 PROCEDURE — 2580000003 HC RX 258: Performed by: INTERNAL MEDICINE

## 2018-07-03 PROCEDURE — 2140000000 HC CCU INTERMEDIATE R&B

## 2018-07-03 PROCEDURE — 6370000000 HC RX 637 (ALT 250 FOR IP): Performed by: INTERNAL MEDICINE

## 2018-07-03 PROCEDURE — 94640 AIRWAY INHALATION TREATMENT: CPT

## 2018-07-03 RX ORDER — METHYLPREDNISOLONE SODIUM SUCCINATE 40 MG/ML
40 INJECTION, POWDER, LYOPHILIZED, FOR SOLUTION INTRAMUSCULAR; INTRAVENOUS EVERY 12 HOURS
Status: DISCONTINUED | OUTPATIENT
Start: 2018-07-03 | End: 2018-07-04 | Stop reason: HOSPADM

## 2018-07-03 RX ORDER — ACYCLOVIR 800 MG/1
800 TABLET ORAL
Status: DISCONTINUED | OUTPATIENT
Start: 2018-07-03 | End: 2018-07-04

## 2018-07-03 RX ADMIN — POTASSIUM CHLORIDE 40 MEQ: 20 TABLET, EXTENDED RELEASE ORAL at 08:39

## 2018-07-03 RX ADMIN — GUAIFENESIN AND DEXTROMETHORPHAN 5 ML: 100; 10 SYRUP ORAL at 13:19

## 2018-07-03 RX ADMIN — METHYLPREDNISOLONE SODIUM SUCCINATE 40 MG: 40 INJECTION, POWDER, LYOPHILIZED, FOR SOLUTION INTRAMUSCULAR; INTRAVENOUS at 00:16

## 2018-07-03 RX ADMIN — LORAZEPAM 2 MG: 1 TABLET ORAL at 08:50

## 2018-07-03 RX ADMIN — FORMOTEROL FUMARATE DIHYDRATE 20 MCG: 20 SOLUTION RESPIRATORY (INHALATION) at 21:43

## 2018-07-03 RX ADMIN — LORAZEPAM 2 MG: 1 TABLET ORAL at 13:19

## 2018-07-03 RX ADMIN — FORMOTEROL FUMARATE DIHYDRATE 20 MCG: 20 SOLUTION RESPIRATORY (INHALATION) at 08:32

## 2018-07-03 RX ADMIN — HYDROCORTISONE: 25 CREAM TOPICAL at 21:11

## 2018-07-03 RX ADMIN — TRAZODONE HYDROCHLORIDE 75 MG: 50 TABLET ORAL at 21:16

## 2018-07-03 RX ADMIN — LORAZEPAM 2 MG: 1 TABLET ORAL at 21:16

## 2018-07-03 RX ADMIN — IPRATROPIUM BROMIDE AND ALBUTEROL SULFATE 1 AMPULE: 2.5; .5 SOLUTION RESPIRATORY (INHALATION) at 14:13

## 2018-07-03 RX ADMIN — Medication 10 ML: at 21:23

## 2018-07-03 RX ADMIN — DILTIAZEM HYDROCHLORIDE 120 MG: 120 CAPSULE, COATED, EXTENDED RELEASE ORAL at 21:16

## 2018-07-03 RX ADMIN — GUAIFENESIN AND DEXTROMETHORPHAN 5 ML: 100; 10 SYRUP ORAL at 08:45

## 2018-07-03 RX ADMIN — IPRATROPIUM BROMIDE AND ALBUTEROL SULFATE 1 AMPULE: 2.5; .5 SOLUTION RESPIRATORY (INHALATION) at 21:43

## 2018-07-03 RX ADMIN — CEFEPIME HYDROCHLORIDE 2 G: 2 INJECTION, POWDER, FOR SOLUTION INTRAVENOUS at 08:39

## 2018-07-03 RX ADMIN — HYDROCORTISONE: 25 CREAM TOPICAL at 08:50

## 2018-07-03 RX ADMIN — ASPIRIN 81 MG CHEWABLE TABLET 81 MG: 81 TABLET CHEWABLE at 08:40

## 2018-07-03 RX ADMIN — FAMOTIDINE 20 MG: 20 TABLET, FILM COATED ORAL at 08:39

## 2018-07-03 RX ADMIN — ESCITALOPRAM OXALATE 10 MG: 10 TABLET, FILM COATED ORAL at 21:15

## 2018-07-03 RX ADMIN — ATORVASTATIN CALCIUM 10 MG: 10 TABLET, FILM COATED ORAL at 21:15

## 2018-07-03 RX ADMIN — Medication 10 ML: at 08:40

## 2018-07-03 RX ADMIN — GUAIFENESIN AND DEXTROMETHORPHAN 5 ML: 100; 10 SYRUP ORAL at 21:15

## 2018-07-03 RX ADMIN — METHYLPREDNISOLONE SODIUM SUCCINATE 40 MG: 40 INJECTION, POWDER, FOR SOLUTION INTRAMUSCULAR; INTRAVENOUS at 21:16

## 2018-07-03 RX ADMIN — FAMOTIDINE 20 MG: 20 TABLET, FILM COATED ORAL at 21:15

## 2018-07-03 RX ADMIN — ACYCLOVIR 800 MG: 800 TABLET ORAL at 21:16

## 2018-07-03 RX ADMIN — BUDESONIDE 500 MCG: 0.5 SUSPENSION RESPIRATORY (INHALATION) at 21:42

## 2018-07-03 RX ADMIN — DILTIAZEM HYDROCHLORIDE 120 MG: 120 CAPSULE, COATED, EXTENDED RELEASE ORAL at 08:39

## 2018-07-03 RX ADMIN — BUDESONIDE 500 MCG: 0.5 SUSPENSION RESPIRATORY (INHALATION) at 08:32

## 2018-07-03 RX ADMIN — ENOXAPARIN SODIUM 40 MG: 40 INJECTION SUBCUTANEOUS at 08:39

## 2018-07-03 RX ADMIN — GABAPENTIN 200 MG: 100 CAPSULE ORAL at 21:15

## 2018-07-03 RX ADMIN — CEFEPIME HYDROCHLORIDE 2 G: 2 INJECTION, POWDER, FOR SOLUTION INTRAVENOUS at 21:15

## 2018-07-03 ASSESSMENT — PAIN SCALES - GENERAL
PAINLEVEL_OUTOF10: 0

## 2018-07-03 NOTE — PROGRESS NOTES
(PERFOROMIST) nebulizer solution 20 mcg, 20 mcg, Nebulization, Q12H, Damaris Paz MD, 20 mcg at 07/03/18 0832    budesonide (PULMICORT) nebulizer suspension 500 mcg, 500 mcg, Nebulization, BID, Damaris Paz MD, 500 mcg at 07/03/18 0832    guaiFENesin-dextromethorphan (ROBITUSSIN DM) 100-10 MG/5ML syrup 5 mL, 5 mL, Oral, Q4H PRN, Damaris Paz MD, 5 mL at 07/03/18 1319    cefepime (MAXIPIME) 2 g IVPB minibag, 2 g, Intravenous, Q12H, Damaris Paz MD, Stopped at 07/03/18 1028    Objective:    /70   Pulse 101   Temp 98.2 °F (36.8 °C)   Resp 19   Ht 5' 4\" (1.626 m)   Wt 145 lb (65.8 kg)   SpO2 94%   BMI 24.89 kg/m²     Heart:  Reg  Lungs:  Min wheeze/rhonchi  Abd: bowel sounds present, nontender, nondistended, no masses  Extrem:  No clubbing, cyanosis, or edema    CBC with Differential:    Lab Results   Component Value Date    WBC 6.9 07/02/2018    RBC 3.93 07/02/2018    HGB 10.9 07/02/2018    HCT 34.5 07/02/2018     07/02/2018    MCV 87.8 07/02/2018    MCH 27.7 07/02/2018    MCHC 31.6 07/02/2018    RDW 15.0 07/02/2018    NRBC 0.9 05/28/2017    SEGSPCT 61 04/02/2012    LYMPHOPCT 9.6 07/02/2018    MONOPCT 1.3 07/02/2018    BASOPCT 0.4 07/02/2018    MONOSABS 0.09 07/02/2018    LYMPHSABS 0.66 07/02/2018    EOSABS 0.00 07/02/2018    BASOSABS 0.03 07/02/2018     CMP:    Lab Results   Component Value Date     07/02/2018    K 3.9 07/02/2018     07/02/2018    CO2 18 07/02/2018    BUN 7 07/02/2018    CREATININE 0.6 07/02/2018    GFRAA >60 07/02/2018    LABGLOM >60 07/02/2018    GLUCOSE 147 07/02/2018    GLUCOSE 85 04/02/2012    PROT 6.3 06/30/2018    LABALBU 3.7 06/30/2018    LABALBU 3.6 04/02/2012    CALCIUM 8.3 07/02/2018    BILITOT 0.7 06/30/2018    ALKPHOS 59 06/30/2018    AST 23 06/30/2018    ALT 13 06/30/2018     Warfarin PT/INR:    Lab Results   Component Value Date    INR 1.2 05/28/2017    INR 0.9 09/23/2015    INR 1.0 06/30/2014    PROTIME 13.2 (H) 05/28/2017    PROTIME

## 2018-07-04 VITALS
WEIGHT: 145 LBS | OXYGEN SATURATION: 99 % | DIASTOLIC BLOOD PRESSURE: 78 MMHG | SYSTOLIC BLOOD PRESSURE: 138 MMHG | HEIGHT: 64 IN | RESPIRATION RATE: 16 BRPM | TEMPERATURE: 96.6 F | BODY MASS INDEX: 24.75 KG/M2 | HEART RATE: 106 BPM

## 2018-07-04 PROCEDURE — 2580000003 HC RX 258: Performed by: INTERNAL MEDICINE

## 2018-07-04 PROCEDURE — 6360000002 HC RX W HCPCS: Performed by: INTERNAL MEDICINE

## 2018-07-04 PROCEDURE — 6370000000 HC RX 637 (ALT 250 FOR IP): Performed by: INTERNAL MEDICINE

## 2018-07-04 PROCEDURE — 94640 AIRWAY INHALATION TREATMENT: CPT

## 2018-07-04 RX ORDER — ACYCLOVIR 50 MG/G
OINTMENT TOPICAL
Status: DISCONTINUED | OUTPATIENT
Start: 2018-07-04 | End: 2018-07-04 | Stop reason: CLARIF

## 2018-07-04 RX ORDER — CEFDINIR 300 MG/1
300 CAPSULE ORAL 2 TIMES DAILY
Qty: 14 CAPSULE | Refills: 0 | Status: SHIPPED | OUTPATIENT
Start: 2018-07-04 | End: 2018-07-11

## 2018-07-04 RX ORDER — PREDNISONE 10 MG/1
TABLET ORAL
Qty: 18 TABLET | Refills: 0 | Status: SHIPPED | OUTPATIENT
Start: 2018-07-04 | End: 2018-11-15 | Stop reason: ALTCHOICE

## 2018-07-04 RX ORDER — GUAIFENESIN/DEXTROMETHORPHAN 100-10MG/5
10 SYRUP ORAL 4 TIMES DAILY PRN
Qty: 120 ML | COMMUNITY
Start: 2018-07-04 | End: 2018-07-10

## 2018-07-04 RX ORDER — DILTIAZEM HYDROCHLORIDE 120 MG/1
120 CAPSULE, COATED, EXTENDED RELEASE ORAL 2 TIMES DAILY
Qty: 60 CAPSULE | Refills: 0
Start: 2018-07-04 | End: 2018-07-10 | Stop reason: SDUPTHER

## 2018-07-04 RX ORDER — ACYCLOVIR 50 MG/G
OINTMENT TOPICAL
Qty: 1 TUBE | Refills: 1 | Status: SHIPPED | OUTPATIENT
Start: 2018-07-04 | End: 2018-07-11

## 2018-07-04 RX ORDER — FLUTICASONE PROPIONATE 110 UG/1
1 AEROSOL, METERED RESPIRATORY (INHALATION) 2 TIMES DAILY
Qty: 1 INHALER | Refills: 3
Start: 2018-07-04 | End: 2018-11-15 | Stop reason: ALTCHOICE

## 2018-07-04 RX ORDER — ASCORBIC ACID 500 MG
500 TABLET ORAL DAILY
Qty: 30 TABLET | Refills: 3 | COMMUNITY
Start: 2018-07-04

## 2018-07-04 RX ADMIN — CEFEPIME HYDROCHLORIDE 2 G: 2 INJECTION, POWDER, FOR SOLUTION INTRAVENOUS at 08:04

## 2018-07-04 RX ADMIN — FORMOTEROL FUMARATE DIHYDRATE 20 MCG: 20 SOLUTION RESPIRATORY (INHALATION) at 11:29

## 2018-07-04 RX ADMIN — ACYCLOVIR 800 MG: 800 TABLET ORAL at 00:38

## 2018-07-04 RX ADMIN — LORAZEPAM 2 MG: 1 TABLET ORAL at 08:04

## 2018-07-04 RX ADMIN — BUDESONIDE 500 MCG: 0.5 SUSPENSION RESPIRATORY (INHALATION) at 11:32

## 2018-07-04 RX ADMIN — ENOXAPARIN SODIUM 40 MG: 40 INJECTION SUBCUTANEOUS at 08:03

## 2018-07-04 RX ADMIN — POTASSIUM CHLORIDE 40 MEQ: 20 TABLET, EXTENDED RELEASE ORAL at 08:04

## 2018-07-04 RX ADMIN — METHYLPREDNISOLONE SODIUM SUCCINATE 40 MG: 40 INJECTION, POWDER, FOR SOLUTION INTRAMUSCULAR; INTRAVENOUS at 08:03

## 2018-07-04 RX ADMIN — ASPIRIN 81 MG CHEWABLE TABLET 81 MG: 81 TABLET CHEWABLE at 08:04

## 2018-07-04 RX ADMIN — DILTIAZEM HYDROCHLORIDE 120 MG: 120 CAPSULE, COATED, EXTENDED RELEASE ORAL at 08:04

## 2018-07-04 RX ADMIN — Medication 10 ML: at 08:04

## 2018-07-04 RX ADMIN — FAMOTIDINE 20 MG: 20 TABLET, FILM COATED ORAL at 08:04

## 2018-07-04 ASSESSMENT — PAIN SCALES - GENERAL
PAINLEVEL_OUTOF10: 0
PAINLEVEL_OUTOF10: 0

## 2018-07-04 NOTE — PLAN OF CARE
Problem: Respiratory:  Goal: Ability to maintain normal respiratory secretions will improve  Ability to maintain normal respiratory secretions will improve   Outcome: Met This Shift

## 2018-07-04 NOTE — PROGRESS NOTES
Hospital Medicine    Subjective:  Pt breathing without difficulty wants to go home      Current Facility-Administered Medications:     methylPREDNISolone sodium (SOLU-MEDROL) injection 40 mg, 40 mg, Intravenous, Q12H, Kylie Mueller DO, 40 mg at 07/04/18 0803    ondansetron (ZOFRAN) injection 4 mg, 4 mg, Intravenous, Q6H PRN, Damaris Paz MD    hydrocortisone 2.5 % cream, , Topical, BID, Damaris Paz MD    aspirin chewable tablet 81 mg, 81 mg, Oral, Daily, Damaris Paz MD, 81 mg at 07/04/18 0804    atorvastatin (LIPITOR) tablet 10 mg, 10 mg, Oral, Nightly, Damaris Paz MD, 10 mg at 07/03/18 2115    diltiazem (CARDIZEM CD) extended release capsule 120 mg, 120 mg, Oral, BID, Damaris Paz MD, 120 mg at 07/04/18 0804    escitalopram (LEXAPRO) tablet 10 mg, 10 mg, Oral, Nightly, Damaris Paz MD, 10 mg at 07/03/18 2115    famotidine (PEPCID) tablet 20 mg, 20 mg, Oral, BID, Damaris Paz MD, 20 mg at 07/04/18 9085    gabapentin (NEURONTIN) capsule 200 mg, 200 mg, Oral, Nightly, Damaris Paz MD, 200 mg at 07/03/18 2115    LORazepam (ATIVAN) tablet 2 mg, 2 mg, Oral, TID, Damaris Paz MD, 2 mg at 07/04/18 0804    potassium chloride (KLOR-CON M) extended release tablet 40 mEq, 40 mEq, Oral, Daily, Damaris Paz MD, 40 mEq at 07/04/18 0804    traZODone (DESYREL) tablet 75 mg, 75 mg, Oral, Nightly, Damaris Paz MD, 75 mg at 07/03/18 2116    sodium chloride flush 0.9 % injection 10 mL, 10 mL, Intravenous, 2 times per day, Damaris Paz MD, 10 mL at 07/04/18 0804    sodium chloride flush 0.9 % injection 10 mL, 10 mL, Intravenous, PRN, Damaris Paz MD, 10 mL at 07/01/18 2125    enoxaparin (LOVENOX) injection 40 mg, 40 mg, Subcutaneous, Daily, Damaris Paz MD, 40 mg at 07/04/18 0803    acetaminophen (TYLENOL) tablet 650 mg, 650 mg, Oral, Q4H PRN, Damaris Paz MD    ipratropium-albuterol (DUONEB) nebulizer solution 1 ampule, 1 ampule, Inhalation, Q4H WA, Maria Eugenia Landers MD, 1 ampule at 07/03/18 2143    formoterol (PERFOROMIST) nebulizer solution 20 mcg, 20 mcg, Nebulization, Q12H, Maria Eugenia Landers MD, 20 mcg at 07/03/18 2143    budesonide (PULMICORT) nebulizer suspension 500 mcg, 500 mcg, Nebulization, BID, Maria Eugenia Landers MD, 500 mcg at 07/03/18 2142    guaiFENesin-dextromethorphan (ROBITUSSIN DM) 100-10 MG/5ML syrup 5 mL, 5 mL, Oral, Q4H PRN, Maria Eugenia Landers MD, 5 mL at 07/03/18 2115    cefepime (MAXIPIME) 2 g IVPB minibag, 2 g, Intravenous, Q12H, Maria Eugenia Landers MD, Last Rate: 100 mL/hr at 07/04/18 0804, 2 g at 07/04/18 0804    Objective:    /78   Pulse 106   Temp 96.6 °F (35.9 °C) (Temporal)   Resp 16   Ht 5' 4\" (1.626 m)   Wt 145 lb (65.8 kg)   SpO2 96%   BMI 24.89 kg/m²     Heart:  Reg  Lungs:  CTA bilaterally, no wheeze, rales or rhonchi  Abd: bowel sounds present, nontender, nondistended, no masses  Extrem:  No clubbing, cyanosis, or edema    CBC with Differential:    Lab Results   Component Value Date    WBC 6.9 07/02/2018    RBC 3.93 07/02/2018    HGB 10.9 07/02/2018    HCT 34.5 07/02/2018     07/02/2018    MCV 87.8 07/02/2018    MCH 27.7 07/02/2018    MCHC 31.6 07/02/2018    RDW 15.0 07/02/2018    NRBC 0.9 05/28/2017    SEGSPCT 61 04/02/2012    LYMPHOPCT 9.6 07/02/2018    MONOPCT 1.3 07/02/2018    BASOPCT 0.4 07/02/2018    MONOSABS 0.09 07/02/2018    LYMPHSABS 0.66 07/02/2018    EOSABS 0.00 07/02/2018    BASOSABS 0.03 07/02/2018     CMP:    Lab Results   Component Value Date     07/02/2018    K 3.9 07/02/2018     07/02/2018    CO2 18 07/02/2018    BUN 7 07/02/2018    CREATININE 0.6 07/02/2018    GFRAA >60 07/02/2018    LABGLOM >60 07/02/2018    GLUCOSE 147 07/02/2018    GLUCOSE 85 04/02/2012    PROT 6.3 06/30/2018    LABALBU 3.7 06/30/2018    LABALBU 3.6 04/02/2012    CALCIUM 8.3 07/02/2018    BILITOT 0.7 06/30/2018    ALKPHOS 59 06/30/2018    AST 23 06/30/2018    ALT 13 06/30/2018     Warfarin PT/INR:    Lab Results   Component Value Date    INR 1.2 05/28/2017    INR 0.9 09/23/2015    INR 1.0 06/30/2014    PROTIME 13.2 (H) 05/28/2017    PROTIME 10.1 09/23/2015    PROTIME 10.0 06/30/2014       Assessment:    Principal Problem:    Lobar pneumonia (HCC)  Active Problems:    Asthma    Mixed hyperlipidemia    HTN (hypertension), benign    Vitamin B12 deficiency  Resolved Problems:    * No resolved hospital problems.  *      Plan:  Dc home on po atb steroid taper inhalers outpt f/u        Shweta Coleamn  9:30 AM  7/4/2018

## 2018-07-04 NOTE — PROGRESS NOTES
Pharmacy Note    [facility] 5% [facility]. Per the [address], this medication is non-formulary and not stocked by pharmacy for the reason indicated below. The medication can be reordered at discharge. Medication that lacks necessity during an acute hospital stay:      -  acyclovir topical cream/ointment orders for herpes labialis (cold sores).     Thank you,  Kari Cornejo, PharmD 7/4/2018 8:00 AM

## 2018-07-04 NOTE — DISCHARGE SUMMARY
[address]                   [address] [facility],  [facility]                                 DISCHARGE SUMMARY    PATIENT NAME: Shannan Templeton                    :        1945  MED REC NO:   [id number]                            ROOM:       6420  ACCOUNT NO:   [de-identified]                           ADMIT DATE: 2018  PROVIDER:     Piotr Wayne DO                  DISCHARGE DATE:  2018      DISCHARGE DIAGNOSES:  Pneumonia, sepsis, hyperlipidemia, hypertension,  asthma with acute exacerbation, chronic dermatitis of legs. HOSPITAL COURSE:  The patient is a 79-year-old  female who  presented to the hospital with cough and chills. She was felt to have  pneumonia with acute asthma exacerbation. She was treated with IV  steroids, aerosols, antibiotics. She had improvement in symptoms, and she  was discharged home in stable condition on 2018. DISCHARGE MEDS:  As per discharge med rec which include Zovirax ointment,  Omnicef 300 mg b.i.d. for seven days, Robitussin DM p.r.n., diltiazem,  prednisone taper as directed, albuterol inhaler p.r.n., aspirin, Lexapro,  Pepcid, Flovent inhaler, gabapentin, Lipitor, lorazepam, potassium  chloride, Colace, trazodone, vitamin B12, vitamin C, vitamin D. The patient is instructed to follow up with Dr. Jana Barthel in two days. Call office for appointment.         Cristopher Sharpe DO    D: 2018 9:39:19       T: 2018 9:40:31     [id number]  Job#: [id number]     Doc#: [id number]    CC:  Franklin Servin MD

## 2018-07-05 LAB
BLOOD CULTURE, ROUTINE: NORMAL
CULTURE, BLOOD 2: NORMAL

## 2018-07-07 LAB
EKG ATRIAL RATE: 114 BPM
EKG P AXIS: 34 DEGREES
EKG P-R INTERVAL: 126 MS
EKG Q-T INTERVAL: 334 MS
EKG QRS DURATION: 68 MS
EKG QTC CALCULATION (BAZETT): 460 MS
EKG R AXIS: -8 DEGREES
EKG T AXIS: 51 DEGREES
EKG VENTRICULAR RATE: 114 BPM

## 2018-07-10 ENCOUNTER — OFFICE VISIT (OUTPATIENT)
Dept: CARDIOLOGY CLINIC | Age: 73
End: 2018-07-10
Payer: COMMERCIAL

## 2018-07-10 VITALS
HEART RATE: 83 BPM | SYSTOLIC BLOOD PRESSURE: 112 MMHG | DIASTOLIC BLOOD PRESSURE: 74 MMHG | WEIGHT: 146 LBS | HEIGHT: 64 IN | BODY MASS INDEX: 24.92 KG/M2 | RESPIRATION RATE: 16 BRPM

## 2018-07-10 DIAGNOSIS — I10 HTN (HYPERTENSION), BENIGN: Primary | Chronic | ICD-10-CM

## 2018-07-10 PROCEDURE — 99214 OFFICE O/P EST MOD 30 MIN: CPT | Performed by: INTERNAL MEDICINE

## 2018-07-10 PROCEDURE — 93000 ELECTROCARDIOGRAM COMPLETE: CPT | Performed by: INTERNAL MEDICINE

## 2018-07-10 RX ORDER — DILTIAZEM HYDROCHLORIDE 120 MG/1
120 CAPSULE, COATED, EXTENDED RELEASE ORAL 2 TIMES DAILY
Qty: 180 CAPSULE | Refills: 3 | Status: SHIPPED | OUTPATIENT
Start: 2018-07-10 | End: 2018-12-27 | Stop reason: SDUPTHER

## 2018-07-10 NOTE — PROGRESS NOTES
CHIEF COMPLAINT: SVT/Tachycardia    HISTORY OF PRESENT ILLNESS: Patient is a 72 y.o. female seen at the request of Edie Van MD.      Patient presents in follow up. Some baseline FORD. No CP. Tolerate knee replacement with Dr. Yancy Vera 12/15. Also tolerated spinal surgery with Dr. Aramis Bautista. Past Medical History:   Diagnosis Date    Anxiety     Arthritis     Asthma     Claustrophobia     Dermatitis 02/2017    bilateral legs knees to ankle; [facility]    Fracture 02/2017    \"in Spine\" compression T10 per pt; difficulty laying flat    GERD (gastroesophageal reflux disease)     Hiatal hernia     History of blood transfusion     HTN (hypertension) 4/22/2013    Hyperlipidemia     On aspirin at home     for age per pcp    Pancreatic cyst 5/27/2017    Pneumonia 07/2018    SOB (shortness of breath) 4/22/2013    Tachycardia 04/22/2013    follows with Dr Andrea Tucker       Patient Active Problem List   Diagnosis    Mixed hyperlipidemia    HTN (hypertension), benign    Asthma    Vitamin B12 deficiency    Pancreatic cyst    Anxiety disorder    Chronic back pain    Lobar pneumonia (HCC)       Allergies   Allergen Reactions    Percocet [Oxycodone-Acetaminophen]      \"Makes me go crazy\"       Current Outpatient Prescriptions   Medication Sig Dispense Refill    predniSONE (DELTASONE) 10 MG tablet 3 qd x 3d 2 qd x 3d 1 qd x 3d po 18 tablet 0    acyclovir (ZOVIRAX) 5 % ointment Apply topically 5 times daily.  1 Tube 1    cefdinir (OMNICEF) 300 MG capsule Take 1 capsule by mouth 2 times daily for 7 days 14 capsule 0    diltiazem (CARDIZEM CD) 120 MG extended release capsule Take 1 capsule by mouth 2 times daily 60 capsule 0    vitamin C (ASCORBIC ACID) 500 MG tablet Take 1 tablet by mouth daily 30 tablet 3    fluticasone (FLOVENT HFA) 110 MCG/ACT inhaler Inhale 1 puff into the lungs 2 times daily 1 Inhaler 3    potassium chloride (KLOR-CON) 10 MEQ extended release Arthritis Father     Asthma Daughter      Review of Systems:  Heart: as above   Lungs: as above   Eyes: denies changes in vision or discharge. Ears: denies changes in hearing or pain. Nose: denies epistaxis or masses   Throat: denies sore throat or trouble swallowing. Neuro: denies numbness, tingling, tremors. Skin: denies rashes or itching. : denies hematuria, dysuria   GI: denies vomiting, diarrhea   Psych: denies mood changed, anxiety, depression. all others negative. Physical Exam   /74   Pulse 83   Resp 16   Ht 5' 4\" (1.626 m)   Wt 146 lb (66.2 kg)   BMI 25.06 kg/m²   Constitutional: Oriented to person, place, and time. Well-developed and well-nourished. No distress. Head: Normocephalic and atraumatic. Eyes: EOM are normal. Pupils are equal, round, and reactive to light. Neck: Normal range of motion. Neck supple. No hepatojugular reflux and no JVD present. Carotid bruit is not present. No tracheal deviation present. No thyromegaly present. Cardiovascular: Normal rate, regular rhythm, normal heart sounds and intact distal pulses. Exam reveals no gallop and no friction rub. No murmur heard. Pulmonary/Chest: Effort normal and breath sounds normal. No respiratory distress. No wheezes. No rales. No tenderness. Abdominal: Soft. Bowel sounds are normal. No distension and no mass. No tenderness. No rebound and no guarding. Musculoskeletal: Normal range of motion. No edema and no tenderness. Lymphadenopathy:   No cervical adenopathy. No groin adenopathy. Neurological: Alert and oriented to person, place, and time. Skin: Skin is warm and dry. No rash noted. Not diaphoretic. No erythema. Psychiatric: Normal mood and affect. Behavior is normal.     EKG:  NSR, nonspecific ST and T waves changes.     ASSESSMENT AND PLAN:  Patient Active Problem List   Diagnosis    Mixed hyperlipidemia    HTN (hypertension), benign    Asthma    Vitamin B12 deficiency    Pancreatic cyst  Anxiety disorder    Chronic back pain    Lobar pneumonia ([facility] [address]     1. Tachycardia: Stable. 2. Lipids: Statin. 3. HTN: Observe. Katey Arora D.O.   Cardiologist  Cardiology, [facility]

## 2018-07-18 ENCOUNTER — HOSPITAL ENCOUNTER (OUTPATIENT)
Age: 73
Discharge: HOME | End: 2018-07-20
Payer: COMMERCIAL

## 2018-07-18 ENCOUNTER — HOSPITAL ENCOUNTER (OUTPATIENT)
Dept: GENERAL RADIOLOGY | Age: 73
Discharge: HOME | End: 2018-07-20
Payer: COMMERCIAL

## 2018-07-18 DIAGNOSIS — J18.9 PNEUMONIA DUE TO INFECTIOUS ORGANISM, UNSPECIFIED LATERALITY, UNSPECIFIED PART OF LUNG: ICD-10-CM

## 2018-07-18 PROCEDURE — 71046 X-RAY EXAM CHEST 2 VIEWS: CPT

## 2018-10-31 ENCOUNTER — HOSPITAL ENCOUNTER (OUTPATIENT)
Dept: CT IMAGING | Age: 73
Discharge: HOME | End: 2018-11-02
Payer: COMMERCIAL

## 2018-10-31 DIAGNOSIS — M54.5 LOW BACK PAIN, UNSPECIFIED BACK PAIN LATERALITY, UNSPECIFIED CHRONICITY, WITH SCIATICA PRESENCE UNSPECIFIED: ICD-10-CM

## 2018-10-31 DIAGNOSIS — M53.3 PAIN, COCCYX: ICD-10-CM

## 2018-10-31 DIAGNOSIS — M51.36 DISC DEGENERATION, LUMBAR: ICD-10-CM

## 2018-10-31 PROCEDURE — 72192 CT PELVIS W/O DYE: CPT

## 2018-11-15 ENCOUNTER — HOSPITAL ENCOUNTER (OUTPATIENT)
Age: 73
Discharge: HOME | End: 2018-11-17
Payer: COMMERCIAL

## 2018-11-15 DIAGNOSIS — J45.40 MODERATE PERSISTENT ASTHMA WITHOUT COMPLICATION: Chronic | ICD-10-CM

## 2018-11-15 LAB — EOSINOPHILS ABSOLUTE COUNT: 370 /UL (ref 50–250)

## 2018-11-15 PROCEDURE — 86003 ALLG SPEC IGE CRUDE XTRC EA: CPT

## 2018-11-15 PROCEDURE — 82785 ASSAY OF IGE: CPT

## 2018-11-15 PROCEDURE — 85048 AUTOMATED LEUKOCYTE COUNT: CPT

## 2018-11-19 LAB
Lab: NORMAL
REPORT: NORMAL
THIS TEST SENT TO: NORMAL

## 2018-12-05 ENCOUNTER — HOSPITAL ENCOUNTER (OUTPATIENT)
Dept: NUCLEAR MEDICINE | Age: 73
Discharge: HOME | End: 2018-12-07
Payer: COMMERCIAL

## 2018-12-05 DIAGNOSIS — M53.3 DISORDER OF SACRUM: ICD-10-CM

## 2018-12-05 PROCEDURE — 78306 BONE IMAGING WHOLE BODY: CPT

## 2018-12-05 PROCEDURE — 3430000000 HC RX DIAGNOSTIC RADIOPHARMACEUTICAL: Performed by: RADIOLOGY

## 2018-12-05 PROCEDURE — A9503 TC99M MEDRONATE: HCPCS | Performed by: RADIOLOGY

## 2018-12-05 RX ORDER — TC 99M MEDRONATE 20 MG/10ML
25 INJECTION, POWDER, LYOPHILIZED, FOR SOLUTION INTRAVENOUS
Status: COMPLETED | OUTPATIENT
Start: 2018-12-05 | End: 2018-12-05

## 2018-12-05 RX ADMIN — Medication 25 MILLICURIE: at 09:45

## 2018-12-06 ENCOUNTER — APPOINTMENT (OUTPATIENT)
Dept: CT IMAGING | Age: 73
End: 2018-12-06
Payer: COMMERCIAL

## 2018-12-06 ENCOUNTER — HOSPITAL ENCOUNTER (OUTPATIENT)
Age: 73
Setting detail: OBSERVATION
Discharge: HOME | End: 2018-12-06
Attending: EMERGENCY MEDICINE
Payer: COMMERCIAL

## 2018-12-06 ENCOUNTER — APPOINTMENT (OUTPATIENT)
Dept: GENERAL RADIOLOGY | Age: 73
End: 2018-12-06
Payer: COMMERCIAL

## 2018-12-06 VITALS
TEMPERATURE: 98.6 F | HEART RATE: 91 BPM | WEIGHT: 147 LBS | BODY MASS INDEX: 29.64 KG/M2 | RESPIRATION RATE: 18 BRPM | SYSTOLIC BLOOD PRESSURE: 101 MMHG | DIASTOLIC BLOOD PRESSURE: 59 MMHG | OXYGEN SATURATION: 98 % | HEIGHT: 59 IN

## 2018-12-06 DIAGNOSIS — R68.83 CHILLS: ICD-10-CM

## 2018-12-06 DIAGNOSIS — M79.10 MYALGIA: ICD-10-CM

## 2018-12-06 DIAGNOSIS — R11.11 VOMITING WITHOUT NAUSEA, INTRACTABILITY OF VOMITING NOT SPECIFIED, UNSPECIFIED VOMITING TYPE: Primary | ICD-10-CM

## 2018-12-06 PROBLEM — R00.0 TACHYCARDIA: Chronic | Status: ACTIVE | Noted: 2018-12-06

## 2018-12-06 PROBLEM — J18.1 LOBAR PNEUMONIA (HCC): Status: RESOLVED | Noted: 2018-06-30 | Resolved: 2018-12-06

## 2018-12-06 PROBLEM — R00.0 TACHYCARDIA: Status: ACTIVE | Noted: 2018-12-06

## 2018-12-06 PROBLEM — R11.10 VOMITING: Status: ACTIVE | Noted: 2018-12-06

## 2018-12-06 LAB
ALBUMIN SERPL-MCNC: 3.5 G/DL (ref 3.5–5.2)
ALP BLD-CCNC: 52 U/L (ref 35–104)
ALT SERPL-CCNC: 14 U/L (ref 0–32)
ANION GAP SERPL CALCULATED.3IONS-SCNC: 12 MMOL/L (ref 7–16)
AST SERPL-CCNC: 20 U/L (ref 0–31)
BACTERIA: NORMAL /HPF
BASOPHILS ABSOLUTE: 0.07 E9/L (ref 0–0.2)
BASOPHILS RELATIVE PERCENT: 0.5 % (ref 0–2)
BILIRUB SERPL-MCNC: 0.8 MG/DL (ref 0–1.2)
BILIRUBIN URINE: ABNORMAL
BLOOD, URINE: NEGATIVE
BUN BLDV-MCNC: 11 MG/DL (ref 8–23)
CALCIUM SERPL-MCNC: 8.9 MG/DL (ref 8.6–10.2)
CHLORIDE BLD-SCNC: 107 MMOL/L (ref 98–107)
CLARITY: CLEAR
CO2: 20 MMOL/L (ref 22–29)
COLOR: YELLOW
CREAT SERPL-MCNC: 0.9 MG/DL (ref 0.5–1)
EOSINOPHILS ABSOLUTE: 0.04 E9/L (ref 0.05–0.5)
EOSINOPHILS RELATIVE PERCENT: 0.3 % (ref 0–6)
GFR AFRICAN AMERICAN: >60
GFR NON-AFRICAN AMERICAN: >60 ML/MIN/1.73
GLUCOSE BLD-MCNC: 83 MG/DL (ref 74–99)
GLUCOSE URINE: NEGATIVE MG/DL
HCT VFR BLD CALC: 36.5 % (ref 34–48)
HEMOGLOBIN: 11.6 G/DL (ref 11.5–15.5)
IMMATURE GRANULOCYTES #: 0.07 E9/L
IMMATURE GRANULOCYTES %: 0.5 % (ref 0–5)
KETONES, URINE: 15 MG/DL
LACTIC ACID: 1.6 MMOL/L (ref 0.5–2.2)
LEUKOCYTE ESTERASE, URINE: NEGATIVE
LIPASE: 29 U/L (ref 13–60)
LYMPHOCYTES ABSOLUTE: 1.35 E9/L (ref 1.5–4)
LYMPHOCYTES RELATIVE PERCENT: 9.4 % (ref 20–42)
MCH RBC QN AUTO: 26.5 PG (ref 26–35)
MCHC RBC AUTO-ENTMCNC: 31.8 % (ref 32–34.5)
MCV RBC AUTO: 83.3 FL (ref 80–99.9)
MONOCYTES ABSOLUTE: 0.93 E9/L (ref 0.1–0.95)
MONOCYTES RELATIVE PERCENT: 6.4 % (ref 2–12)
NEUTROPHILS ABSOLUTE: 11.96 E9/L (ref 1.8–7.3)
NEUTROPHILS RELATIVE PERCENT: 82.9 % (ref 43–80)
NITRITE, URINE: NEGATIVE
PDW BLD-RTO: 15 FL (ref 11.5–15)
PH UA: 6 (ref 5–9)
PLATELET # BLD: 284 E9/L (ref 130–450)
PMV BLD AUTO: 11.3 FL (ref 7–12)
POTASSIUM SERPL-SCNC: 4 MMOL/L (ref 3.5–5)
PROTEIN UA: NEGATIVE MG/DL
RBC # BLD: 4.38 E12/L (ref 3.5–5.5)
RBC UA: NORMAL /HPF (ref 0–2)
SODIUM BLD-SCNC: 139 MMOL/L (ref 132–146)
SPECIFIC GRAVITY UA: 1.01 (ref 1–1.03)
TOTAL PROTEIN: 6.3 G/DL (ref 6.4–8.3)
TROPONIN: <0.01 NG/ML (ref 0–0.03)
UROBILINOGEN, URINE: 0.2 E.U./DL
WBC # BLD: 14.4 E9/L (ref 4.5–11.5)
WBC UA: NORMAL /HPF (ref 0–5)

## 2018-12-06 PROCEDURE — 83690 ASSAY OF LIPASE: CPT

## 2018-12-06 PROCEDURE — 93005 ELECTROCARDIOGRAM TRACING: CPT | Performed by: EMERGENCY MEDICINE

## 2018-12-06 PROCEDURE — 99285 EMERGENCY DEPT VISIT HI MDM: CPT

## 2018-12-06 PROCEDURE — 87040 BLOOD CULTURE FOR BACTERIA: CPT

## 2018-12-06 PROCEDURE — 6360000004 HC RX CONTRAST MEDICATION: Performed by: RADIOLOGY

## 2018-12-06 PROCEDURE — 80053 COMPREHEN METABOLIC PANEL: CPT

## 2018-12-06 PROCEDURE — 36415 COLL VENOUS BLD VENIPUNCTURE: CPT

## 2018-12-06 PROCEDURE — 2580000003 HC RX 258: Performed by: EMERGENCY MEDICINE

## 2018-12-06 PROCEDURE — 6370000000 HC RX 637 (ALT 250 FOR IP): Performed by: EMERGENCY MEDICINE

## 2018-12-06 PROCEDURE — 87088 URINE BACTERIA CULTURE: CPT

## 2018-12-06 PROCEDURE — G0378 HOSPITAL OBSERVATION PER HR: HCPCS

## 2018-12-06 PROCEDURE — 84484 ASSAY OF TROPONIN QUANT: CPT

## 2018-12-06 PROCEDURE — 71045 X-RAY EXAM CHEST 1 VIEW: CPT

## 2018-12-06 PROCEDURE — 83605 ASSAY OF LACTIC ACID: CPT

## 2018-12-06 PROCEDURE — 85025 COMPLETE CBC W/AUTO DIFF WBC: CPT

## 2018-12-06 PROCEDURE — 96374 THER/PROPH/DIAG INJ IV PUSH: CPT

## 2018-12-06 PROCEDURE — 96361 HYDRATE IV INFUSION ADD-ON: CPT

## 2018-12-06 PROCEDURE — 6370000000 HC RX 637 (ALT 250 FOR IP): Performed by: INTERNAL MEDICINE

## 2018-12-06 PROCEDURE — 81001 URINALYSIS AUTO W/SCOPE: CPT

## 2018-12-06 PROCEDURE — 6360000002 HC RX W HCPCS: Performed by: EMERGENCY MEDICINE

## 2018-12-06 PROCEDURE — 74177 CT ABD & PELVIS W/CONTRAST: CPT

## 2018-12-06 RX ORDER — FAMOTIDINE 20 MG/1
20 TABLET, FILM COATED ORAL 2 TIMES DAILY
Status: CANCELLED | OUTPATIENT
Start: 2018-12-06

## 2018-12-06 RX ORDER — TRAMADOL HYDROCHLORIDE 50 MG/1
25 TABLET ORAL EVERY 6 HOURS PRN
Status: DISCONTINUED | OUTPATIENT
Start: 2018-12-06 | End: 2018-12-06 | Stop reason: HOSPADM

## 2018-12-06 RX ORDER — ACETAMINOPHEN 500 MG
1000 TABLET ORAL ONCE
Status: COMPLETED | OUTPATIENT
Start: 2018-12-06 | End: 2018-12-06

## 2018-12-06 RX ORDER — PREDNISONE 20 MG/1
20 TABLET ORAL EVERY OTHER DAY
Status: CANCELLED | OUTPATIENT
Start: 2018-12-06

## 2018-12-06 RX ORDER — SODIUM CHLORIDE 0.9 % (FLUSH) 0.9 %
10 SYRINGE (ML) INJECTION PRN
Status: CANCELLED | OUTPATIENT
Start: 2018-12-06

## 2018-12-06 RX ORDER — MONTELUKAST SODIUM 10 MG/1
10 TABLET ORAL NIGHTLY
Status: CANCELLED | OUTPATIENT
Start: 2018-12-06

## 2018-12-06 RX ORDER — SODIUM CHLORIDE 9 MG/ML
INJECTION, SOLUTION INTRAVENOUS ONCE
Status: COMPLETED | OUTPATIENT
Start: 2018-12-06 | End: 2018-12-06

## 2018-12-06 RX ORDER — ACETAMINOPHEN 325 MG/1
650 TABLET ORAL EVERY 4 HOURS PRN
Status: CANCELLED | OUTPATIENT
Start: 2018-12-06

## 2018-12-06 RX ORDER — SODIUM CHLORIDE 0.9 % (FLUSH) 0.9 %
10 SYRINGE (ML) INJECTION EVERY 12 HOURS SCHEDULED
Status: CANCELLED | OUTPATIENT
Start: 2018-12-06

## 2018-12-06 RX ORDER — ONDANSETRON 2 MG/ML
4 INJECTION INTRAMUSCULAR; INTRAVENOUS ONCE
Status: COMPLETED | OUTPATIENT
Start: 2018-12-06 | End: 2018-12-06

## 2018-12-06 RX ORDER — FLUTICASONE FUROATE AND VILANTEROL 100; 25 UG/1; UG/1
1 POWDER RESPIRATORY (INHALATION) DAILY
Status: CANCELLED | OUTPATIENT
Start: 2018-12-06

## 2018-12-06 RX ORDER — ATORVASTATIN CALCIUM 10 MG/1
10 TABLET, FILM COATED ORAL NIGHTLY
Status: CANCELLED | OUTPATIENT
Start: 2018-12-06

## 2018-12-06 RX ORDER — SODIUM CHLORIDE 0.9 % (FLUSH) 0.9 %
10 SYRINGE (ML) INJECTION ONCE
Status: DISCONTINUED | OUTPATIENT
Start: 2018-12-06 | End: 2018-12-06 | Stop reason: HOSPADM

## 2018-12-06 RX ORDER — DILTIAZEM HYDROCHLORIDE 120 MG/1
120 CAPSULE, COATED, EXTENDED RELEASE ORAL 2 TIMES DAILY
Status: CANCELLED | OUTPATIENT
Start: 2018-12-06

## 2018-12-06 RX ORDER — ESCITALOPRAM OXALATE 10 MG/1
10 TABLET ORAL NIGHTLY
Status: CANCELLED | OUTPATIENT
Start: 2018-12-06

## 2018-12-06 RX ORDER — ONDANSETRON 2 MG/ML
4 INJECTION INTRAMUSCULAR; INTRAVENOUS EVERY 6 HOURS PRN
Status: CANCELLED | OUTPATIENT
Start: 2018-12-06

## 2018-12-06 RX ORDER — SODIUM CHLORIDE 9 MG/ML
INJECTION, SOLUTION INTRAVENOUS CONTINUOUS
Status: CANCELLED | OUTPATIENT
Start: 2018-12-06

## 2018-12-06 RX ORDER — POTASSIUM CHLORIDE 750 MG/1
10 TABLET, FILM COATED, EXTENDED RELEASE ORAL DAILY
Status: CANCELLED | OUTPATIENT
Start: 2018-12-06

## 2018-12-06 RX ORDER — ALBUTEROL SULFATE 90 UG/1
2 AEROSOL, METERED RESPIRATORY (INHALATION) EVERY 6 HOURS PRN
Status: CANCELLED | OUTPATIENT
Start: 2018-12-06

## 2018-12-06 RX ORDER — LORAZEPAM 1 MG/1
2 TABLET ORAL 3 TIMES DAILY
Status: CANCELLED | OUTPATIENT
Start: 2018-12-06

## 2018-12-06 RX ADMIN — ACETAMINOPHEN 1000 MG: 500 TABLET ORAL at 13:52

## 2018-12-06 RX ADMIN — ONDANSETRON 4 MG: 2 INJECTION INTRAMUSCULAR; INTRAVENOUS at 13:52

## 2018-12-06 RX ADMIN — SODIUM CHLORIDE: 9 INJECTION, SOLUTION INTRAVENOUS at 13:58

## 2018-12-06 RX ADMIN — TRAMADOL HYDROCHLORIDE 25 MG: 50 TABLET, FILM COATED ORAL at 17:34

## 2018-12-06 RX ADMIN — IOPAMIDOL 110 ML: 755 INJECTION, SOLUTION INTRAVENOUS at 14:45

## 2018-12-06 ASSESSMENT — PAIN DESCRIPTION - PAIN TYPE: TYPE: ACUTE PAIN

## 2018-12-06 ASSESSMENT — PAIN SCALES - GENERAL
PAINLEVEL_OUTOF10: 10

## 2018-12-06 ASSESSMENT — PAIN DESCRIPTION - LOCATION: LOCATION: GENERALIZED

## 2018-12-06 NOTE — Clinical Note
Patient Class: Observation [104]   REQUIRED: Diagnosis: Vomiting [341764]   Estimated Length of Stay: Estimated stay of less than 2 midnights   Future Attending Provider: Sugar Mendez [[id number]]

## 2018-12-07 LAB
EKG ATRIAL RATE: 112 BPM
EKG P AXIS: 46 DEGREES
EKG P-R INTERVAL: 136 MS
EKG Q-T INTERVAL: 320 MS
EKG QRS DURATION: 64 MS
EKG QTC CALCULATION (BAZETT): 436 MS
EKG R AXIS: 5 DEGREES
EKG T AXIS: 38 DEGREES
EKG VENTRICULAR RATE: 112 BPM

## 2018-12-08 LAB — URINE CULTURE, ROUTINE: NORMAL

## 2018-12-11 ENCOUNTER — APPOINTMENT (OUTPATIENT)
Dept: GENERAL RADIOLOGY | Age: 73
End: 2018-12-11
Payer: COMMERCIAL

## 2018-12-11 ENCOUNTER — HOSPITAL ENCOUNTER (EMERGENCY)
Age: 73
Discharge: HOME | End: 2018-12-11
Attending: EMERGENCY MEDICINE
Payer: COMMERCIAL

## 2018-12-11 VITALS
RESPIRATION RATE: 16 BRPM | OXYGEN SATURATION: 99 % | DIASTOLIC BLOOD PRESSURE: 70 MMHG | HEIGHT: 59 IN | HEART RATE: 88 BPM | SYSTOLIC BLOOD PRESSURE: 118 MMHG | BODY MASS INDEX: 30.04 KG/M2 | WEIGHT: 149 LBS | TEMPERATURE: 97.2 F

## 2018-12-11 DIAGNOSIS — R00.2 PALPITATIONS: Primary | ICD-10-CM

## 2018-12-11 DIAGNOSIS — K44.9 HIATAL HERNIA: ICD-10-CM

## 2018-12-11 LAB
ALBUMIN SERPL-MCNC: 3.5 G/DL (ref 3.5–5.2)
ALP BLD-CCNC: 56 U/L (ref 35–104)
ALT SERPL-CCNC: 11 U/L (ref 0–32)
ANION GAP SERPL CALCULATED.3IONS-SCNC: 12 MMOL/L (ref 7–16)
AST SERPL-CCNC: 18 U/L (ref 0–31)
BASOPHILS ABSOLUTE: 0.09 E9/L (ref 0–0.2)
BASOPHILS RELATIVE PERCENT: 1.3 % (ref 0–2)
BILIRUB SERPL-MCNC: 0.4 MG/DL (ref 0–1.2)
BLOOD CULTURE, ROUTINE: NORMAL
BUN BLDV-MCNC: 8 MG/DL (ref 8–23)
CALCIUM SERPL-MCNC: 8.5 MG/DL (ref 8.6–10.2)
CHLORIDE BLD-SCNC: 107 MMOL/L (ref 98–107)
CO2: 22 MMOL/L (ref 22–29)
CREAT SERPL-MCNC: 0.7 MG/DL (ref 0.5–1)
CULTURE, BLOOD 2: NORMAL
EKG ATRIAL RATE: 92 BPM
EKG P AXIS: 64 DEGREES
EKG P-R INTERVAL: 148 MS
EKG Q-T INTERVAL: 362 MS
EKG QRS DURATION: 72 MS
EKG QTC CALCULATION (BAZETT): 447 MS
EKG R AXIS: 45 DEGREES
EKG T AXIS: 46 DEGREES
EKG VENTRICULAR RATE: 92 BPM
EOSINOPHILS ABSOLUTE: 0.12 E9/L (ref 0.05–0.5)
EOSINOPHILS RELATIVE PERCENT: 1.7 % (ref 0–6)
GFR AFRICAN AMERICAN: >60
GFR NON-AFRICAN AMERICAN: >60 ML/MIN/1.73
GLUCOSE BLD-MCNC: 92 MG/DL (ref 74–99)
HCT VFR BLD CALC: 35.3 % (ref 34–48)
HEMOGLOBIN: 11 G/DL (ref 11.5–15.5)
IMMATURE GRANULOCYTES #: 0.07 E9/L
IMMATURE GRANULOCYTES %: 1 % (ref 0–5)
LYMPHOCYTES ABSOLUTE: 2.11 E9/L (ref 1.5–4)
LYMPHOCYTES RELATIVE PERCENT: 29.8 % (ref 20–42)
MAGNESIUM: 1.9 MG/DL (ref 1.6–2.6)
MCH RBC QN AUTO: 25.9 PG (ref 26–35)
MCHC RBC AUTO-ENTMCNC: 31.2 % (ref 32–34.5)
MCV RBC AUTO: 83.3 FL (ref 80–99.9)
MONOCYTES ABSOLUTE: 1.17 E9/L (ref 0.1–0.95)
MONOCYTES RELATIVE PERCENT: 16.5 % (ref 2–12)
NEUTROPHILS ABSOLUTE: 3.51 E9/L (ref 1.8–7.3)
NEUTROPHILS RELATIVE PERCENT: 49.7 % (ref 43–80)
PDW BLD-RTO: 14.6 FL (ref 11.5–15)
PLATELET # BLD: 348 E9/L (ref 130–450)
PMV BLD AUTO: 11.7 FL (ref 7–12)
POTASSIUM SERPL-SCNC: 3.5 MMOL/L (ref 3.5–5)
RBC # BLD: 4.24 E12/L (ref 3.5–5.5)
SODIUM BLD-SCNC: 141 MMOL/L (ref 132–146)
TOTAL PROTEIN: 6.5 G/DL (ref 6.4–8.3)
TROPONIN: <0.01 NG/ML (ref 0–0.03)
TSH SERPL DL<=0.05 MIU/L-ACNC: 3.7 UIU/ML (ref 0.27–4.2)
WBC # BLD: 7.1 E9/L (ref 4.5–11.5)

## 2018-12-11 PROCEDURE — 85025 COMPLETE CBC W/AUTO DIFF WBC: CPT

## 2018-12-11 PROCEDURE — 84443 ASSAY THYROID STIM HORMONE: CPT

## 2018-12-11 PROCEDURE — 84484 ASSAY OF TROPONIN QUANT: CPT

## 2018-12-11 PROCEDURE — 83735 ASSAY OF MAGNESIUM: CPT

## 2018-12-11 PROCEDURE — 36415 COLL VENOUS BLD VENIPUNCTURE: CPT

## 2018-12-11 PROCEDURE — 80053 COMPREHEN METABOLIC PANEL: CPT

## 2018-12-11 PROCEDURE — 71046 X-RAY EXAM CHEST 2 VIEWS: CPT

## 2018-12-11 PROCEDURE — 2580000003 HC RX 258: Performed by: EMERGENCY MEDICINE

## 2018-12-11 PROCEDURE — 93005 ELECTROCARDIOGRAM TRACING: CPT | Performed by: PHYSICIAN ASSISTANT

## 2018-12-11 PROCEDURE — 99285 EMERGENCY DEPT VISIT HI MDM: CPT

## 2018-12-11 RX ORDER — 0.9 % SODIUM CHLORIDE 0.9 %
1000 INTRAVENOUS SOLUTION INTRAVENOUS ONCE
Status: COMPLETED | OUTPATIENT
Start: 2018-12-11 | End: 2018-12-11

## 2018-12-11 RX ADMIN — SODIUM CHLORIDE 1000 ML: 9 INJECTION, SOLUTION INTRAVENOUS at 17:30

## 2018-12-11 ASSESSMENT — ENCOUNTER SYMPTOMS
SHORTNESS OF BREATH: 0
COLOR CHANGE: 0
WHEEZING: 0
VOMITING: 0
CONSTIPATION: 0
DIARRHEA: 0
NAUSEA: 0
COUGH: 0
ABDOMINAL PAIN: 0

## 2018-12-11 ASSESSMENT — PAIN DESCRIPTION - LOCATION: LOCATION: BACK

## 2018-12-11 ASSESSMENT — PAIN SCALES - GENERAL: PAINLEVEL_OUTOF10: 4

## 2018-12-11 ASSESSMENT — PAIN DESCRIPTION - PAIN TYPE: TYPE: CHRONIC PAIN

## 2018-12-11 NOTE — ED NOTES
FIRST PROVIDER CONTACT ASSESSMENT NOTE      Department of Emergency Medicine   12/11/18  12:32 PM    Chief Complaint: Palpitations (STSAES THAT SHE FEELS FLUTTERING IN HER CHEST STARTED TWO NIGHTS AGO. )      History of Present Illness:    Caroline Christian is a 72 y.o. female who presents to the ED by private car for palpitations that started last night. +shortness of breath and dizziness. Focused Screening Exam:  Constitutional:  Alert, appears stated age and is in no distress.       *ALLERGIES*     Percocet [oxycodone-acetaminophen]     ED Triage Vitals [12/11/18 1226]   BP Temp Temp Source Pulse Resp SpO2 Height Weight   96/69 97.2 °F (36.2 °C) Temporal 94 16 98 % 4' 11\" (1.499 m) 149 lb (67.6 kg)        Initial Plan of Care:  Initiate Treatment-Testing, Proceed toTreatment Area When Bed Available for ED Attending/MLP to Continue Care    -----------------END OF FIRST PROVIDER CONTACT ASSESSMENT NOTE--------------  Electronically signed by Duane Chiquito, PA   DD: 12/11/18       Duane Chiquito, PA  12/11/18 3412

## 2018-12-11 NOTE — ED PROVIDER NOTES
(07/10/2017). Social History:  reports that she has never smoked. She has never used smokeless tobacco. She reports that she does not drink alcohol or use drugs. Family History: family history includes Arthritis in her father; Asthma in her daughter; Diabetes in her father; Heart Disease in her father and mother; Hypertension in her father and mother; Other in her mother; Stroke in her mother. The patients home medications have been reviewed.     Allergies: Percocet [oxycodone-acetaminophen]    -------------------------------------------------- RESULTS -------------------------------------------------  Labs:  Results for orders placed or performed during the hospital encounter of 12/11/18   CBC Auto Differential   Result Value Ref Range    WBC 7.1 4.5 - 11.5 E9/L    RBC 4.24 3.50 - 5.50 E12/L    Hemoglobin 11.0 (L) 11.5 - 15.5 g/dL    Hematocrit 35.3 34.0 - 48.0 %    MCV 83.3 80.0 - 99.9 fL    MCH 25.9 (L) 26.0 - 35.0 pg    MCHC 31.2 (L) 32.0 - 34.5 %    RDW 14.6 11.5 - 15.0 fL    Platelets 038 086 - 580 E9/L    MPV 11.7 7.0 - 12.0 fL    Neutrophils % 49.7 43.0 - 80.0 %    Immature Granulocytes % 1.0 0.0 - 5.0 %    Lymphocytes % 29.8 20.0 - 42.0 %    Monocytes % 16.5 (H) 2.0 - 12.0 %    Eosinophils % 1.7 0.0 - 6.0 %    Basophils % 1.3 0.0 - 2.0 %    Neutrophils # 3.51 1.80 - 7.30 E9/L    Immature Granulocytes # 0.07 E9/L    Lymphocytes # 2.11 1.50 - 4.00 E9/L    Monocytes # 1.17 (H) 0.10 - 0.95 E9/L    Eosinophils # 0.12 0.05 - 0.50 E9/L    Basophils # 0.09 0.00 - 0.20 E9/L   Comprehensive Metabolic Panel   Result Value Ref Range    Sodium 141 132 - 146 mmol/L    Potassium 3.5 3.5 - 5.0 mmol/L    Chloride 107 98 - 107 mmol/L    CO2 22 22 - 29 mmol/L    Anion Gap 12 7 - 16 mmol/L    Glucose 92 74 - 99 mg/dL    BUN 8 8 - 23 mg/dL    CREATININE 0.7 0.5 - 1.0 mg/dL    GFR Non-African American >60 >=60 mL/min/1.73    GFR African American >60     Calcium 8.5 (L) 8.6 - 10.2 mg/dL    Total Protein 6.5 6.4 - 8.3

## 2018-12-28 RX ORDER — DILTIAZEM HYDROCHLORIDE 240 MG/1
240 CAPSULE, COATED, EXTENDED RELEASE ORAL 2 TIMES DAILY
Qty: 180 CAPSULE | Refills: 3 | Status: SHIPPED | OUTPATIENT
Start: 2018-12-28 | End: 2019-08-15 | Stop reason: SDUPTHER

## 2019-01-12 ENCOUNTER — HOSPITAL ENCOUNTER (OUTPATIENT)
Age: 74
Discharge: HOME | End: 2019-01-14

## 2019-01-12 LAB
ANION GAP SERPL CALCULATED.3IONS-SCNC: 14 MMOL/L (ref 7–16)
BUN BLDV-MCNC: 14 MG/DL (ref 8–23)
CALCIUM SERPL-MCNC: 9.1 MG/DL (ref 8.6–10.2)
CHLORIDE BLD-SCNC: 102 MMOL/L (ref 98–107)
CO2: 21 MMOL/L (ref 22–29)
CREAT SERPL-MCNC: 0.7 MG/DL (ref 0.5–1)
GFR AFRICAN AMERICAN: >60
GFR NON-AFRICAN AMERICAN: >60 ML/MIN/1.73
GLUCOSE BLD-MCNC: 170 MG/DL (ref 74–99)
HCT VFR BLD CALC: 36.2 % (ref 34–48)
HEMOGLOBIN: 11.1 G/DL (ref 11.5–15.5)
MCH RBC QN AUTO: 26.3 PG (ref 26–35)
MCHC RBC AUTO-ENTMCNC: 30.7 % (ref 32–34.5)
MCV RBC AUTO: 85.8 FL (ref 80–99.9)
PDW BLD-RTO: 14.5 FL (ref 11.5–15)
PLATELET # BLD: 302 E9/L (ref 130–450)
PMV BLD AUTO: 12.6 FL (ref 7–12)
POTASSIUM SERPL-SCNC: 4.6 MMOL/L (ref 3.5–5)
RBC # BLD: 4.22 E12/L (ref 3.5–5.5)
SODIUM BLD-SCNC: 137 MMOL/L (ref 132–146)
WBC # BLD: 12.9 E9/L (ref 4.5–11.5)

## 2019-01-12 PROCEDURE — 85027 COMPLETE CBC AUTOMATED: CPT

## 2019-01-12 PROCEDURE — 80048 BASIC METABOLIC PNL TOTAL CA: CPT

## 2019-08-15 ENCOUNTER — OFFICE VISIT (OUTPATIENT)
Dept: CARDIOLOGY CLINIC | Age: 74
End: 2019-08-15
Payer: COMMERCIAL

## 2019-08-15 VITALS
WEIGHT: 155 LBS | DIASTOLIC BLOOD PRESSURE: 62 MMHG | SYSTOLIC BLOOD PRESSURE: 106 MMHG | BODY MASS INDEX: 28.52 KG/M2 | HEART RATE: 110 BPM | HEIGHT: 62 IN | RESPIRATION RATE: 16 BRPM

## 2019-08-15 DIAGNOSIS — I10 HTN (HYPERTENSION), BENIGN: Primary | Chronic | ICD-10-CM

## 2019-08-15 PROCEDURE — 93000 ELECTROCARDIOGRAM COMPLETE: CPT | Performed by: INTERNAL MEDICINE

## 2019-08-15 PROCEDURE — 99214 OFFICE O/P EST MOD 30 MIN: CPT | Performed by: INTERNAL MEDICINE

## 2019-08-15 RX ORDER — DILTIAZEM HYDROCHLORIDE 240 MG/1
240 CAPSULE, COATED, EXTENDED RELEASE ORAL 2 TIMES DAILY
Qty: 180 CAPSULE | Refills: 3 | Status: SHIPPED
Start: 2019-08-15 | End: 2020-08-10

## 2019-08-15 NOTE — PROGRESS NOTES
vitamin C (ASCORBIC ACID) 500 MG tablet Take 1 tablet by mouth daily 30 tablet 3    potassium chloride (KLOR-CON) 10 MEQ extended release tablet Take 10 mEq by mouth 2 times daily       LORazepam (ATIVAN) 2 MG tablet Take 2 mg by mouth 3 times daily. [place] aspirin 81 MG tablet Take 81 mg by mouth daily Pt to hold 5 days prior per Dr. Sood Post      escitalopram (LEXAPRO) 10 MG tablet Take 10 mg by mouth nightly       famotidine (PEPCID) 20 MG tablet Take 20 mg by mouth 2 times daily Instructed to take am of procedure      Cholecalciferol (VITAMIN D) 2000 UNITS CAPS capsule Take 2,000 Units by mouth daily Last dose 7/4/2017      Cyanocobalamin (VITAMIN B-12 IJ) Inject as directed       atorvastatin (LIPITOR) 10 MG tablet Take 10 mg by mouth nightly        No current facility-administered medications for this visit.         Social History     Socioeconomic History    Marital status:      Spouse name: Not on file    Number of children: Not on file    Years of education: Not on file    Highest education level: Not on file   Occupational History    Occupation: retired-     Social Needs    Financial resource strain: Not on file    Food insecurity:     Worry: Not on file     Inability: Not on file   [place] needs:     Medical: Not on file     Non-medical: Not on file   Tobacco Use    Smoking status: Never Smoker    Smokeless tobacco: Never Used   Substance and Sexual Activity    Alcohol use: No    Drug use: No    Sexual activity: Never   Lifestyle    Physical activity:     Days per week: Not on file     Minutes per session: Not on file    Stress: Not on file   Relationships    Social connections:     Talks on phone: Not on file     Gets together: Not on file     Attends Amish service: Not on file     Active member of club or organization: Not on file     Attends meetings of clubs or organizations: Not on file     Relationship status: Not on file    Intimate

## 2020-04-30 ENCOUNTER — HOSPITAL ENCOUNTER (OUTPATIENT)
Age: 75
Discharge: HOME | End: 2020-05-02
Payer: MEDICARE

## 2020-04-30 PROCEDURE — 87070 CULTURE OTHR SPECIMN AEROBIC: CPT

## 2020-04-30 PROCEDURE — 87205 SMEAR GRAM STAIN: CPT

## 2020-05-01 LAB — GRAM STAIN ORDERABLE: NORMAL

## 2020-05-04 LAB — WOUND/ABSCESS: NORMAL

## 2020-08-10 RX ORDER — DILTIAZEM HYDROCHLORIDE 240 MG/1
CAPSULE, COATED, EXTENDED RELEASE ORAL
Qty: 180 CAPSULE | Refills: 0 | Status: SHIPPED
Start: 2020-08-10 | End: 2020-08-14 | Stop reason: SDUPTHER

## 2020-08-14 RX ORDER — DILTIAZEM HYDROCHLORIDE 240 MG/1
CAPSULE, COATED, EXTENDED RELEASE ORAL
Qty: 180 CAPSULE | Refills: 0 | Status: SHIPPED
Start: 2020-08-14 | End: 2020-09-14 | Stop reason: SDUPTHER

## 2020-09-14 ENCOUNTER — OFFICE VISIT (OUTPATIENT)
Dept: CARDIOLOGY CLINIC | Age: 75
End: 2020-09-14
Payer: MEDICARE

## 2020-09-14 VITALS
WEIGHT: 158.8 LBS | BODY MASS INDEX: 28.14 KG/M2 | DIASTOLIC BLOOD PRESSURE: 60 MMHG | RESPIRATION RATE: 16 BRPM | SYSTOLIC BLOOD PRESSURE: 124 MMHG | HEIGHT: 63 IN | HEART RATE: 85 BPM

## 2020-09-14 PROCEDURE — 93000 ELECTROCARDIOGRAM COMPLETE: CPT | Performed by: INTERNAL MEDICINE

## 2020-09-14 PROCEDURE — 99214 OFFICE O/P EST MOD 30 MIN: CPT | Performed by: INTERNAL MEDICINE

## 2020-09-14 RX ORDER — DILTIAZEM HYDROCHLORIDE 240 MG/1
CAPSULE, COATED, EXTENDED RELEASE ORAL
Qty: 180 CAPSULE | Refills: 3 | Status: SHIPPED
Start: 2020-09-14 | End: 2021-09-20

## 2020-09-14 NOTE — PROGRESS NOTES
CHIEF COMPLAINT: SVT/Tachycardia    HISTORY OF PRESENT ILLNESS: Patient is a 70 y.o. female seen at the request of Shameka Lima MD.      Patient presents in follow up. Some baseline FORD. No CP.     Past Medical History:   Diagnosis Date    Anxiety     Arthritis     Asthma     Claustrophobia     Dermatitis 02/2017    bilateral legs knees to ankle; [facility]    Fracture 02/2017    \"in Spine\" compression T10 per pt; difficulty laying flat    GERD (gastroesophageal reflux disease)     Hiatal hernia     History of blood transfusion     HTN (hypertension) 4/22/2013    Hyperlipidemia     On aspirin at home     for age per pcp    Pancreatic cyst 5/27/2017    Pneumonia 07/2018    SOB (shortness of breath) 4/22/2013    Tachycardia 04/22/2013    follows with Dr Jefe Hodges       Patient Active Problem List   Diagnosis    Mixed hyperlipidemia    HTN (hypertension), benign    Asthma    Vitamin B12 deficiency    Pancreatic cyst    Anxiety disorder    Chronic back pain    Tachycardia    Vomiting       Allergies   Allergen Reactions    Percocet [Oxycodone-Acetaminophen]      \"Makes me go crazy\"       Current Outpatient Medications   Medication Sig Dispense Refill    montelukast (SINGULAIR) 10 MG tablet Take 1 tablet by mouth daily 90 tablet 1    dilTIAZem (CARDIZEM CD) 240 MG extended release capsule TAKE 1 CAPSULE BY MOUTH TWICE DAILY 180 capsule 0    Fluticasone furoate-vilanterol (BREO ELLIPTA) 200-25 MCG/INH AEPB inhaler Inhale 1 puff into the lungs daily 180 each 5    predniSONE (DELTASONE) 10 MG tablet Take 10 mg by mouth daily  1    albuterol sulfate  (90 Base) MCG/ACT inhaler INHALE 2 PUFFS INTO THE LUNGS EVERY 6 HOURS AS NEEDED FOR WHEEZING 54 g 4    denosumab (PROLIA) 60 MG/ML SOLN SC injection Inject 60 mg into the skin every 6 months       dupilumab (DUPIXENT) 300 MG/2ML SOSY injection Inject 300 mg into the skin every 14 days       vitamin C (ASCORBIC ACID) 500 MG tablet Take 1 tablet by mouth daily 30 tablet 3    potassium chloride (KLOR-CON) 10 MEQ extended release tablet Take 10 mEq by mouth 2 times daily       LORazepam (ATIVAN) 2 MG tablet Take 2 mg by mouth 3 times daily. [place]annon [place] aspirin 81 MG tablet Take 81 mg by mouth daily Pt to hold 5 days prior per Dr. Clarisa Oro      escitalopram (LEXAPRO) 10 MG tablet Take 10 mg by mouth nightly       famotidine (PEPCID) 20 MG tablet Take 20 mg by mouth 2 times daily Instructed to take am of procedure      Cholecalciferol (VITAMIN D) 2000 UNITS CAPS capsule Take 2,000 Units by mouth daily Last dose 7/4/2017      Cyanocobalamin (VITAMIN B-12 IJ) Inject as directed       atorvastatin (LIPITOR) 10 MG tablet Take 10 mg by mouth nightly        No current facility-administered medications for this visit.         Social History     Socioeconomic History    Marital status:      Spouse name: Not on file    Number of children: Not on file    Years of education: Not on file    Highest education level: Not on file   Occupational History    Occupation: retired-     Social Needs    Financial resource strain: Not on file    Food insecurity     Worry: Not on file     Inability: Not on file   [employer] needs     Medical: Not on file     Non-medical: Not on file   Tobacco Use    Smoking status: Never Smoker    Smokeless tobacco: Never Used   Substance and Sexual Activity    Alcohol use: No    Drug use: Never    Sexual activity: Not Currently   Lifestyle    Physical activity     Days per week: Not on file     Minutes per session: Not on file    Stress: Not on file   Relationships    Social connections     Talks on phone: Not on file     Gets together: Not on file     Attends Mormon service: Not on file     Active member of club or organization: Not on file     Attends meetings of clubs or organizations: Not on file     Relationship status: Not on file    Intimate partner violence     Fear of current or ex partner: Not on file     Emotionally abused: Not on file     Physically abused: Not on file     Forced sexual activity: Not on file   Other Topics Concern    Not on file   Social History Narrative    ** Merged History Encounter **            Family History   Problem Relation Age of Onset    Stroke Mother     Heart Disease Mother     Hypertension Mother     Other Mother         CHOLECYSTECTOMY; OSTEOPENIA    Heart Disease Father     Hypertension Father     Diabetes Father     Arthritis Father     Asthma Daughter      Review of Systems:  Heart: as above   Lungs: as above   Eyes: denies changes in vision or discharge. Ears: denies changes in hearing or pain. Nose: denies epistaxis or masses   Throat: denies sore throat or trouble swallowing. Neuro: denies numbness, tingling, tremors. Skin: denies rashes or itching. : denies hematuria, dysuria   GI: denies vomiting, diarrhea   Psych: denies mood changed, anxiety, depression. all others negative. Physical Exam   /60   Pulse 85   Resp 16   Ht 5' 3\" (1.6 m)   Wt 158 lb 12.8 oz (72 kg)   BMI 28.13 kg/m²   Constitutional: Oriented to person, place, and time. Well-developed and well-nourished. No distress. Head: Normocephalic and atraumatic. Eyes: EOM are normal. Pupils are equal, round, and reactive to light. Neck: Normal range of motion. Neck supple. No hepatojugular reflux and no JVD present. Carotid bruit is not present. No tracheal deviation present. No thyromegaly present. Cardiovascular: Normal rate, regular rhythm, normal heart sounds and intact distal pulses. Exam reveals no gallop and no friction rub. No murmur heard. Pulmonary/Chest: Effort normal and breath sounds normal. No respiratory distress. No wheezes. No rales. No tenderness. Abdominal: Soft. Bowel sounds are normal. No distension and no mass. No tenderness. No rebound and no guarding.    Musculoskeletal: Normal range of motion. No edema and no tenderness. Lymphadenopathy:   No cervical adenopathy. No groin adenopathy. Neurological: Alert and oriented to person, place, and time. Skin: Skin is warm and dry. No rash noted. Not diaphoretic. No erythema. Psychiatric: Normal mood and affect. Behavior is normal.     EKG:  Sinus rhythm, nonspecific ST and T waves changes. ASSESSMENT AND PLAN:  Patient Active Problem List   Diagnosis    Mixed hyperlipidemia    HTN (hypertension), benign    Asthma    Vitamin B12 deficiency    Pancreatic cyst    Anxiety disorder    Chronic back pain    Tachycardia    Vomiting     1. Tachycardia: Stable. 2. Lipids: Statin. 3. HTN: Observe. 4. CORRINE: CPAP. Shraddha Koroma D.O.   Cardiologist  Cardiology, [facility]

## 2021-09-20 RX ORDER — DILTIAZEM HYDROCHLORIDE 240 MG/1
CAPSULE, COATED, EXTENDED RELEASE ORAL
Qty: 180 CAPSULE | Refills: 0 | Status: SHIPPED
Start: 2021-09-20 | End: 2021-12-03 | Stop reason: SDUPTHER

## 2021-12-03 ENCOUNTER — OFFICE VISIT (OUTPATIENT)
Dept: CARDIOLOGY CLINIC | Age: 76
End: 2021-12-03
Payer: MEDICARE

## 2021-12-03 VITALS
RESPIRATION RATE: 16 BRPM | HEIGHT: 64 IN | BODY MASS INDEX: 26.05 KG/M2 | SYSTOLIC BLOOD PRESSURE: 118 MMHG | DIASTOLIC BLOOD PRESSURE: 64 MMHG | WEIGHT: 152.6 LBS | HEART RATE: 88 BPM

## 2021-12-03 DIAGNOSIS — R00.0 TACHYCARDIA: ICD-10-CM

## 2021-12-03 DIAGNOSIS — E78.2 MIXED HYPERLIPIDEMIA: Primary | ICD-10-CM

## 2021-12-03 DIAGNOSIS — I10 HTN (HYPERTENSION), BENIGN: ICD-10-CM

## 2021-12-03 PROCEDURE — 99214 OFFICE O/P EST MOD 30 MIN: CPT | Performed by: INTERNAL MEDICINE

## 2021-12-03 PROCEDURE — 93000 ELECTROCARDIOGRAM COMPLETE: CPT | Performed by: INTERNAL MEDICINE

## 2021-12-03 RX ORDER — DILTIAZEM HYDROCHLORIDE 240 MG/1
CAPSULE, COATED, EXTENDED RELEASE ORAL
Qty: 180 CAPSULE | Refills: 3 | Status: SHIPPED
Start: 2021-12-03 | End: 2021-12-03 | Stop reason: SDUPTHER

## 2021-12-03 NOTE — PROGRESS NOTES
CHIEF COMPLAINT: SVT/Tachycardia    HISTORY OF PRESENT ILLNESS: Patient is a 78 y.o. female seen at the request of Margreta Landau, MD.      Patient presents in follow up. Some baseline FORD. No CP.     Past Medical History:   Diagnosis Date    Anxiety     Arthritis     Asthma     Claustrophobia     Dermatitis 02/2017    bilateral legs knees to ankle; [facility]    Fracture 02/2017    \"in Spine\" compression T10 per pt; difficulty laying flat    GERD (gastroesophageal reflux disease)     Hiatal hernia     History of blood transfusion     HTN (hypertension) 4/22/2013    Hyperlipidemia     On aspirin at home     for age per pcp    Pancreatic cyst 5/27/2017    Pneumonia 07/2018    Sleep apnea     SOB (shortness of breath) 4/22/2013    Tachycardia 04/22/2013    follows with Dr Jocelyn Gallagher       Patient Active Problem List   Diagnosis    Mixed hyperlipidemia    HTN (hypertension), benign    Asthma    Vitamin B12 deficiency    Pancreatic cyst    Anxiety disorder    Chronic back pain    Tachycardia    Vomiting       Allergies   Allergen Reactions    Percocet [Oxycodone-Acetaminophen]      \"Makes me go crazy\"       Current Outpatient Medications   Medication Sig Dispense Refill    dilTIAZem (CARDIZEM CD) 240 MG extended release capsule TAKE 1 CAPSULE TWICE A  capsule 0    Fluticasone furoate-vilanterol (BREO ELLIPTA) 200-25 MCG/INH AEPB inhaler Inhale 1 puff into the lungs daily 180 each 5    montelukast (SINGULAIR) 10 MG tablet TAKE 1 TABLET DAILY 90 tablet 3    predniSONE (DELTASONE) 10 MG tablet Take 10 mg by mouth daily  1    albuterol sulfate  (90 Base) MCG/ACT inhaler INHALE 2 PUFFS INTO THE LUNGS EVERY 6 HOURS AS NEEDED FOR WHEEZING 54 g 4    denosumab (PROLIA) 60 MG/ML SOLN SC injection Inject 60 mg into the skin every 6 months       dupilumab (DUPIXENT) 300 MG/2ML SOSY injection Inject 300 mg into the skin every 14 days       vitamin C (ASCORBIC ACID) 500 MG tablet Take 1 tablet by mouth daily (Patient taking differently: Take 2,000 mg by mouth daily ) 30 tablet 3    potassium chloride (KLOR-CON) 10 MEQ extended release tablet Take 10 mEq by mouth 2 times daily       LORazepam (ATIVAN) 2 MG tablet Take 2 mg by mouth 3 times daily. [place]ila [place]on aspirin 81 MG tablet Take 81 mg by mouth daily Pt to hold 5 days prior per Dr. Suri Crane      escitalopram (LEXAPRO) 10 MG tablet Take 10 mg by mouth nightly       famotidine (PEPCID) 20 MG tablet Take 20 mg by mouth 2 times daily Instructed to take am of procedure      Cholecalciferol (VITAMIN D) 2000 UNITS CAPS capsule Take 2,000 Units by mouth daily Last dose 7/4/2017      Cyanocobalamin (VITAMIN B-12 IJ) Inject as directed every 3 months Indications: every  month       atorvastatin (LIPITOR) 10 MG tablet Take 10 mg by mouth nightly        No current facility-administered medications for this visit. Social History     Socioeconomic History    Marital status:      Spouse name: Not on file    Number of children: Not on file    Years of education: Not on file    Highest education level: Not on file   Occupational History    Occupation: retired-     Tobacco Use    Smoking status: Never Smoker    Smokeless tobacco: Never Used   Vaping Use    Vaping Use: Never used   Substance and Sexual Activity    Alcohol use: No    Drug use: Never    Sexual activity: Not Currently   Other Topics Concern    Not on file   Social History Narrative    ** Merged History Encounter **          Social Determinants of Health     Financial Resource Strain:     Difficulty of Paying Living Expenses: Not on file   Food Insecurity:     Worried About [address] in the Last Year: Not on file    Low of Food in the Last Year: Not on file   Transportation Needs:     Lack of Transportation (Medical): Not on file    Lack of Transportation (Non-Medical):  Not on file   Physical Activity:     Days of Exercise per Week: Not on file    Minutes of Exercise per Session: Not on file   Stress:     Feeling of Stress : Not on file   Social Connections:     Frequency of Communication with Friends and Family: Not on file    Frequency of Social Gatherings with Friends and Family: Not on file    Attends Tenriism Services: Not on file    Active Member of [address] [place] [address] [employer] or Organizations: Not on file    Attends Club or Organization Meetings: Not on file    Marital Status: Not on file   Intimate Partner Violence:     Fear of Current or Ex-Partner: Not on file    Emotionally Abused: Not on file    Physically Abused: Not on file    Sexually Abused: Not on file   Housing Stability:     Unable to Pay for Housing in the Last Year: Not on file    Number of [place] in the Last Year: Not on file    Unstable Housing in the Last Year: Not on file       Family History   Problem Relation Age of Onset    Stroke Mother     Heart Disease Mother     Hypertension Mother     Other Mother         CHOLECYSTECTOMY; OSTEOPENIA    Heart Disease Father     Hypertension Father     Diabetes Father     Arthritis Father     Asthma Daughter      Review of Systems:  Heart: as above   Lungs: as above   Eyes: denies changes in vision or discharge. Ears: denies changes in hearing or pain. Nose: denies epistaxis or masses   Throat: denies sore throat or trouble swallowing. Neuro: denies numbness, tingling, tremors. Skin: denies rashes or itching. : denies hematuria, dysuria   GI: denies vomiting, diarrhea   Psych: denies mood changed, anxiety, depression. all others negative. Physical Exam   /64   Pulse 88   Resp 16   Ht 5' 4\" (1.626 m)   Wt 152 lb 9.6 oz (69.2 kg)   BMI 26.19 kg/m²   Constitutional: Oriented to person, place, and time. Well-developed and well-nourished. No distress. Head: Normocephalic and atraumatic.    Eyes: EOM are normal. Pupils are equal, round, and reactive to light.   Neck: Normal range of motion. Neck supple. No hepatojugular reflux and no JVD present. Carotid bruit is not present. No tracheal deviation present. No thyromegaly present. Cardiovascular: Normal rate, regular rhythm, normal heart sounds and intact distal pulses. Exam reveals no gallop and no friction rub. No murmur heard. Pulmonary/Chest: Effort normal and breath sounds normal. No respiratory distress. No wheezes. No rales. No tenderness. Abdominal: Soft. Bowel sounds are normal. No distension and no mass. No tenderness. No rebound and no guarding. Musculoskeletal: Normal range of motion. No edema and no tenderness. Lymphadenopathy:   No cervical adenopathy. No groin adenopathy. Neurological: Alert and oriented to person, place, and time. Skin: Skin is warm and dry. No rash noted. Not diaphoretic. No erythema. Psychiatric: Normal mood and affect. Behavior is normal.     EKG:  Sinus rhythm, nonspecific ST and T waves changes. ASSESSMENT AND PLAN:  Patient Active Problem List   Diagnosis    Mixed hyperlipidemia    HTN (hypertension), benign    Asthma    Vitamin B12 deficiency    Pancreatic cyst    Anxiety disorder    Chronic back pain    Tachycardia    Vomiting     1. Tachycardia: Stable. 2. Lipids: Statin. 3. HTN: Observe. 4. CORRINE: CPAP. Pasqual Barthel, D.O.   Cardiologist  Cardiology, [facility]

## 2021-12-04 RX ORDER — DILTIAZEM HYDROCHLORIDE 240 MG/1
CAPSULE, COATED, EXTENDED RELEASE ORAL
Qty: 180 CAPSULE | Refills: 3 | Status: SHIPPED | OUTPATIENT
Start: 2021-12-04

## 2022-08-04 ENCOUNTER — TELEPHONE (OUTPATIENT)
Dept: CARDIOLOGY CLINIC | Age: 77
End: 2022-08-04

## 2022-08-04 ENCOUNTER — TELEPHONE (OUTPATIENT)
Dept: ADMINISTRATIVE | Age: 77
End: 2022-08-04

## 2022-08-04 NOTE — TELEPHONE ENCOUNTER
Patient states that she was seen by pulmonary yesterday, she was told she has an irregular heart rhythm and she needs an EKG, patient states she has been feeling flutter in her chest for about a month, please advise

## 2022-08-04 NOTE — TELEPHONE ENCOUNTER
Pt calling at the request of her Pulmonologist Dr. Kan Rust - she was at his office yesterday and he detected an irregular heart beat - and he suggested she come in to have an EKG. Please return her call with apt/recommendtaions. Thank you.

## 2022-08-05 ENCOUNTER — TELEPHONE (OUTPATIENT)
Dept: CARDIOLOGY CLINIC | Age: 77
End: 2022-08-05

## 2022-08-05 ENCOUNTER — NURSE ONLY (OUTPATIENT)
Dept: CARDIOLOGY CLINIC | Age: 77
End: 2022-08-05
Payer: MEDICARE

## 2022-08-05 DIAGNOSIS — I10 HTN (HYPERTENSION), BENIGN: Primary | ICD-10-CM

## 2022-08-05 PROCEDURE — 93000 ELECTROCARDIOGRAM COMPLETE: CPT | Performed by: INTERNAL MEDICINE

## 2022-08-05 NOTE — PROGRESS NOTES
Patient was seen in the office today for an EKG per     Pt just complaining of a lot of SOB and heart racing in mornings.

## 2022-08-05 NOTE — TELEPHONE ENCOUNTER
DO Norma Huddleston A [facility]  Caller: Unspecified (Today,  1:06 PM)  EKG okay. Could it be her asthma? Make sure she uses her inhalers thru weekend and update us on Monday. If still SOB then OV next week. Jerel Bran D.O. Cardiologist   Cardiology, [address] [place] [address] Rd             Contacted patient with EKG results dn recommendations per Dr. Leah Davis. She verbalized understanding and will contact our office on Monday.

## 2022-11-21 RX ORDER — DILTIAZEM HYDROCHLORIDE 240 MG/1
CAPSULE, COATED, EXTENDED RELEASE ORAL
Qty: 180 CAPSULE | Refills: 0 | Status: SHIPPED | OUTPATIENT
Start: 2022-11-21

## 2022-12-04 ENCOUNTER — APPOINTMENT (OUTPATIENT)
Dept: GENERAL RADIOLOGY | Age: 77
End: 2022-12-04
Payer: MEDICARE

## 2022-12-04 ENCOUNTER — HOSPITAL ENCOUNTER (EMERGENCY)
Age: 77
Discharge: HOME | End: 2022-12-04
Attending: EMERGENCY MEDICINE
Payer: MEDICARE

## 2022-12-04 VITALS
RESPIRATION RATE: 17 BRPM | WEIGHT: 138 LBS | OXYGEN SATURATION: 96 % | HEIGHT: 61 IN | HEART RATE: 88 BPM | DIASTOLIC BLOOD PRESSURE: 72 MMHG | SYSTOLIC BLOOD PRESSURE: 114 MMHG | BODY MASS INDEX: 26.06 KG/M2 | TEMPERATURE: 98.3 F

## 2022-12-04 DIAGNOSIS — S32.010A COMPRESSION FRACTURE OF L1 VERTEBRA, INITIAL ENCOUNTER (HCC): Primary | ICD-10-CM

## 2022-12-04 LAB
ALBUMIN SERPL-MCNC: 3.3 G/DL (ref 3.5–5.2)
ALP BLD-CCNC: 87 U/L (ref 35–104)
ALT SERPL-CCNC: 14 U/L (ref 0–32)
ANION GAP SERPL CALCULATED.3IONS-SCNC: 15 MMOL/L (ref 7–16)
AST SERPL-CCNC: 31 U/L (ref 0–31)
BACTERIA: ABNORMAL /HPF
BASOPHILS ABSOLUTE: 0.12 E9/L (ref 0–0.2)
BASOPHILS RELATIVE PERCENT: 2.1 % (ref 0–2)
BILIRUB SERPL-MCNC: <0.2 MG/DL (ref 0–1.2)
BILIRUBIN URINE: ABNORMAL
BLOOD, URINE: ABNORMAL
BUN BLDV-MCNC: 14 MG/DL (ref 6–23)
CALCIUM SERPL-MCNC: 8.7 MG/DL (ref 8.6–10.2)
CHLORIDE BLD-SCNC: 104 MMOL/L (ref 98–107)
CLARITY: CLEAR
CO2: 20 MMOL/L (ref 22–29)
COLOR: YELLOW
CREAT SERPL-MCNC: 0.7 MG/DL (ref 0.5–1)
EOSINOPHILS ABSOLUTE: 0.25 E9/L (ref 0.05–0.5)
EOSINOPHILS RELATIVE PERCENT: 4.3 % (ref 0–6)
EPITHELIAL CELLS, UA: ABNORMAL /HPF
GFR SERPL CREATININE-BSD FRML MDRD: >60 ML/MIN/1.73
GLUCOSE BLD-MCNC: 64 MG/DL (ref 74–99)
GLUCOSE URINE: NEGATIVE MG/DL
HCT VFR BLD CALC: 35.2 % (ref 34–48)
HEMOGLOBIN: 10.2 G/DL (ref 11.5–15.5)
IMMATURE GRANULOCYTES #: 0.28 E9/L
IMMATURE GRANULOCYTES %: 4.8 % (ref 0–5)
KETONES, URINE: >=80 MG/DL
LACTIC ACID: 1.1 MMOL/L (ref 0.5–2.2)
LEUKOCYTE ESTERASE, URINE: NEGATIVE
LYMPHOCYTES ABSOLUTE: 1.07 E9/L (ref 1.5–4)
LYMPHOCYTES RELATIVE PERCENT: 18.4 % (ref 20–42)
MCH RBC QN AUTO: 24.1 PG (ref 26–35)
MCHC RBC AUTO-ENTMCNC: 29 % (ref 32–34.5)
MCV RBC AUTO: 83.2 FL (ref 80–99.9)
MONOCYTES ABSOLUTE: 0.92 E9/L (ref 0.1–0.95)
MONOCYTES RELATIVE PERCENT: 15.9 % (ref 2–12)
NEUTROPHILS ABSOLUTE: 3.16 E9/L (ref 1.8–7.3)
NEUTROPHILS RELATIVE PERCENT: 54.5 % (ref 43–80)
NITRITE, URINE: NEGATIVE
PDW BLD-RTO: 18.3 FL (ref 11.5–15)
PH UA: 5.5 (ref 5–9)
PLATELET # BLD: 457 E9/L (ref 130–450)
PMV BLD AUTO: 9.9 FL (ref 7–12)
POTASSIUM SERPL-SCNC: 4 MMOL/L (ref 3.5–5)
PROTEIN UA: ABNORMAL MG/DL
RBC # BLD: 4.23 E12/L (ref 3.5–5.5)
RBC UA: ABNORMAL /HPF (ref 0–2)
SODIUM BLD-SCNC: 139 MMOL/L (ref 132–146)
SPECIFIC GRAVITY UA: >=1.03 (ref 1–1.03)
TOTAL PROTEIN: 5.9 G/DL (ref 6.4–8.3)
UROBILINOGEN, URINE: 0.2 E.U./DL
WBC # BLD: 5.8 E9/L (ref 4.5–11.5)
WBC UA: ABNORMAL /HPF (ref 0–5)

## 2022-12-04 PROCEDURE — 2580000003 HC RX 258: Performed by: EMERGENCY MEDICINE

## 2022-12-04 PROCEDURE — 6360000002 HC RX W HCPCS: Performed by: EMERGENCY MEDICINE

## 2022-12-04 PROCEDURE — 73502 X-RAY EXAM HIP UNI 2-3 VIEWS: CPT

## 2022-12-04 PROCEDURE — 96374 THER/PROPH/DIAG INJ IV PUSH: CPT

## 2022-12-04 PROCEDURE — 80053 COMPREHEN METABOLIC PANEL: CPT

## 2022-12-04 PROCEDURE — 99284 EMERGENCY DEPT VISIT MOD MDM: CPT

## 2022-12-04 PROCEDURE — 96375 TX/PRO/DX INJ NEW DRUG ADDON: CPT

## 2022-12-04 PROCEDURE — 81001 URINALYSIS AUTO W/SCOPE: CPT

## 2022-12-04 PROCEDURE — 72100 X-RAY EXAM L-S SPINE 2/3 VWS: CPT

## 2022-12-04 PROCEDURE — 85025 COMPLETE CBC W/AUTO DIFF WBC: CPT

## 2022-12-04 PROCEDURE — 83605 ASSAY OF LACTIC ACID: CPT

## 2022-12-04 RX ORDER — ONDANSETRON 2 MG/ML
4 INJECTION INTRAMUSCULAR; INTRAVENOUS ONCE
Status: COMPLETED | OUTPATIENT
Start: 2022-12-04 | End: 2022-12-04

## 2022-12-04 RX ORDER — ONDANSETRON 4 MG/1
4 TABLET, ORALLY DISINTEGRATING ORAL 3 TIMES DAILY PRN
Qty: 21 TABLET | Refills: 0 | Status: ON HOLD | OUTPATIENT
Start: 2022-12-04

## 2022-12-04 RX ORDER — 0.9 % SODIUM CHLORIDE 0.9 %
500 INTRAVENOUS SOLUTION INTRAVENOUS ONCE
Status: COMPLETED | OUTPATIENT
Start: 2022-12-04 | End: 2022-12-04

## 2022-12-04 RX ORDER — HYDROCODONE BITARTRATE AND ACETAMINOPHEN 5; 325 MG/1; MG/1
1 TABLET ORAL EVERY 4 HOURS PRN
Qty: 18 TABLET | Refills: 0 | Status: SHIPPED | OUTPATIENT
Start: 2022-12-04 | End: 2022-12-07

## 2022-12-04 RX ORDER — FENTANYL CITRATE 50 UG/ML
50 INJECTION, SOLUTION INTRAMUSCULAR; INTRAVENOUS ONCE
Status: COMPLETED | OUTPATIENT
Start: 2022-12-04 | End: 2022-12-04

## 2022-12-04 RX ORDER — MORPHINE SULFATE 4 MG/ML
4 INJECTION, SOLUTION INTRAMUSCULAR; INTRAVENOUS ONCE
Status: COMPLETED | OUTPATIENT
Start: 2022-12-04 | End: 2022-12-04

## 2022-12-04 RX ADMIN — FENTANYL CITRATE 50 MCG: 50 INJECTION INTRAMUSCULAR; INTRAVENOUS at 13:50

## 2022-12-04 RX ADMIN — SODIUM CHLORIDE 500 ML: 9 INJECTION, SOLUTION INTRAVENOUS at 13:48

## 2022-12-04 RX ADMIN — MORPHINE SULFATE 4 MG: 4 INJECTION, SOLUTION INTRAMUSCULAR; INTRAVENOUS at 15:11

## 2022-12-04 RX ADMIN — ONDANSETRON 4 MG: 2 INJECTION INTRAMUSCULAR; INTRAVENOUS at 13:50

## 2022-12-04 ASSESSMENT — PAIN DESCRIPTION - PAIN TYPE
TYPE: ACUTE PAIN
TYPE: CHRONIC PAIN

## 2022-12-04 ASSESSMENT — PAIN SCALES - GENERAL
PAINLEVEL_OUTOF10: 10
PAINLEVEL_OUTOF10: 9
PAINLEVEL_OUTOF10: 10

## 2022-12-04 ASSESSMENT — PAIN DESCRIPTION - DESCRIPTORS: DESCRIPTORS: ACHING

## 2022-12-04 ASSESSMENT — PAIN DESCRIPTION - ORIENTATION
ORIENTATION: LOWER
ORIENTATION: LEFT

## 2022-12-04 ASSESSMENT — PAIN DESCRIPTION - LOCATION
LOCATION: BACK
LOCATION: BACK;ABDOMEN

## 2022-12-04 ASSESSMENT — PAIN DESCRIPTION - FREQUENCY: FREQUENCY: CONTINUOUS

## 2022-12-04 ASSESSMENT — PAIN - FUNCTIONAL ASSESSMENT: PAIN_FUNCTIONAL_ASSESSMENT: 0-10

## 2022-12-04 NOTE — ED PROVIDER NOTES
HPI:  12/4/22,   Time: 4:13 PM JAEL Herndon is a 78 y.o. female presenting to the ED for lower back discomfort as well as difficulty urinating, beginning months ago. The complaint has been persistent, severe in severity, and worsened by changing position. No numbness or weakness in the lower extremities. Patient was recently taken off of fentanyl patches and has been vomiting since that time. ROS:   Pertinent positives and negatives are stated within HPI, all other systems reviewed and are negative.  --------------------------------------------- PAST HISTORY ---------------------------------------------  Past Medical History:  has a past medical history of Anxiety, Arthritis, Asthma, Claustrophobia, Dermatitis, Fracture, GERD (gastroesophageal reflux disease), Hiatal hernia, History of blood transfusion, HTN (hypertension), Hyperlipidemia, On aspirin at home, Pancreatic cyst, Pneumonia, Sleep apnea, SOB (shortness of breath), and Tachycardia. Past Surgical History:  has a past surgical history that includes Cholecystectomy; Cataract removal (12/6/2013, 2/20/2013); back surgery (8/2014); joint replacement (12/16/2016); Total knee arthroplasty (Right); other surgical history (02/09/2017); Spinal fixation surgery with implant (2013); and Upper gastrointestinal endoscopy (07/10/2017). Social History:  reports that she has never smoked. She has never used smokeless tobacco. She reports that she does not drink alcohol and does not use drugs. Family History: family history includes Arthritis in her father; Asthma in her daughter; Diabetes in her father; Heart Disease in her father and mother; Hypertension in her father and mother; Other in her mother; Stroke in her mother. The patients home medications have been reviewed.     Allergies: Percocet [oxycodone-acetaminophen]    -------------------------------------------------- RESULTS -------------------------------------------------  All laboratory and radiology results have been personally reviewed by myself   LABS:  Results for orders placed or performed during the hospital encounter of 12/04/22   CBC with Auto Differential   Result Value Ref Range    WBC 5.8 4.5 - 11.5 E9/L    RBC 4.23 3.50 - 5.50 E12/L    Hemoglobin 10.2 (L) 11.5 - 15.5 g/dL    Hematocrit 35.2 34.0 - 48.0 %    MCV 83.2 80.0 - 99.9 fL    MCH 24.1 (L) 26.0 - 35.0 pg    MCHC 29.0 (L) 32.0 - 34.5 %    RDW 18.3 (H) 11.5 - 15.0 fL    Platelets 103 (H) 068 - 450 E9/L    MPV 9.9 7.0 - 12.0 fL    Neutrophils % 54.5 43.0 - 80.0 %    Immature Granulocytes % 4.8 0.0 - 5.0 %    Lymphocytes % 18.4 (L) 20.0 - 42.0 %    Monocytes % 15.9 (H) 2.0 - 12.0 %    Eosinophils % 4.3 0.0 - 6.0 %    Basophils % 2.1 (H) 0.0 - 2.0 %    Neutrophils Absolute 3.16 1.80 - 7.30 E9/L    Immature Granulocytes # 0.28 E9/L    Lymphocytes Absolute 1.07 (L) 1.50 - 4.00 E9/L    Monocytes Absolute 0.92 0.10 - 0.95 E9/L    Eosinophils Absolute 0.25 0.05 - 0.50 E9/L    Basophils Absolute 0.12 0.00 - 0.20 E9/L   CMP   Result Value Ref Range    Sodium 139 132 - 146 mmol/L    Potassium 4.0 3.5 - 5.0 mmol/L    Chloride 104 98 - 107 mmol/L    CO2 20 (L) 22 - 29 mmol/L    Anion Gap 15 7 - 16 mmol/L    Glucose 64 (L) 74 - 99 mg/dL    BUN 14 6 - 23 mg/dL    Creatinine 0.7 0.5 - 1.0 mg/dL    Est, Glom Filt Rate >60 >=60 mL/min/1.73    Calcium 8.7 8.6 - 10.2 mg/dL    Total Protein 5.9 (L) 6.4 - 8.3 g/dL    Albumin 3.3 (L) 3.5 - 5.2 g/dL    Total Bilirubin <0.2 0.0 - 1.2 mg/dL    Alkaline Phosphatase 87 35 - 104 U/L    ALT 14 0 - 32 U/L    AST 31 0 - 31 U/L   Urinalysis   Result Value Ref Range    Color, UA Yellow Straw/Yellow    Clarity, UA Clear Clear    Glucose, Ur Negative Negative mg/dL    Bilirubin Urine SMALL (A) Negative    Ketones, Urine >=80 (A) Negative mg/dL    Specific Gravity, UA >=1.030 1.005 - 1.030    Blood, Urine TRACE-INTACT Negative    pH, UA 5.5 5.0 - 9.0    Protein, UA TRACE Negative mg/dL    Urobilinogen, Urine 0.2 <2.0 E.U./dL    Nitrite, Urine Negative Negative    Leukocyte Esterase, Urine Negative Negative   Lactic Acid   Result Value Ref Range    Lactic Acid 1.1 0.5 - 2.2 mmol/L   Microscopic Urinalysis   Result Value Ref Range    WBC, UA 1-3 0 - 5 /HPF    RBC, UA 0-1 0 - 2 /HPF    Epithelial Cells, UA FEW /HPF    Bacteria, UA FEW (A) None Seen /HPF       RADIOLOGY:  Interpreted by Radiologist.  XR HIP 2-3 VW W PELVIS LEFT   Final Result   1. No fracture or acute disease. Bilateral hips appear normal for age. No   significant arthritis. 2.  Postsurgical changes, as above. XR LUMBAR SPINE (2-3 VIEWS)   Final Result   1. Mild to moderate compression fracture of the L1 vertebra. This fracture   is new compared to CT exam in 2018 but does not appear acute. Degenerative   grade 1 retrolisthesis of T12 on L1.      2.  T12-L1 through L2-3 degenerative disc disease. 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 3.  L3-4 through L5-S1 surgical fusion and additional postsurgical changes,   as above.             ------------------------- NURSING NOTES AND VITALS REVIEWED ---------------------------   The nursing notes within the ED encounter and vital signs as below have been reviewed. /72   Pulse 88   Temp 98.3 °F (36.8 °C) (Oral)   Resp 17   Ht 5' 1\" (1.549 m)   Wt 138 lb (62.6 kg)   SpO2 96%   BMI 26.07 kg/m²   Oxygen Saturation Interpretation: Normal      ---------------------------------------------------PHYSICAL EXAM--------------------------------------      Constitutional/General: Alert and oriented x3, well appearing, non toxic in NAD  Head: NC/AT  Eyes: PERRL, EOMI  Mouth: Oropharynx clear, handling secretions, no trismus  Neck: Supple, full ROM, no meningeal signs  Pulmonary: Lungs clear to auscultation bilaterally, no wheezes, rales, or rhonchi.  Not in respiratory distress  Cardiovascular:  Regular rate and rhythm, no murmurs, gallops, or rubs. 2+ distal pulses  Abdomen: Soft, non tender, non distended,   Extremities: Moves all extremities x 4. Warm and well perfused tender left hip  Skin: warm and dry without rash  Neurologic: GCS 15, plus strength bilateral lower extremities with normal sensation  Psych: Normal Affect  Back: Tender left paraspinal region    ------------------------------ ED COURSE/MEDICAL DECISION MAKING----------------------  Medications   0.9 % sodium chloride bolus (0 mLs IntraVENous Stopped 12/4/22 1544)   ondansetron (ZOFRAN) injection 4 mg (4 mg IntraVENous Given 12/4/22 1350)   fentaNYL (SUBLIMAZE) injection 50 mcg (50 mcg IntraVENous Given 12/4/22 1350)   morphine sulfate (PF) injection 4 mg (4 mg IntraVENous Given 12/4/22 1511)         Medical Decision Making:    Patient was medicated for pain and improved. She was discussed with Dr. Hui Posada given her compression fracture and will be followed up as an outpatient. Patient was referred back to her pain medication doctor. Patient was given a prescription for Norco and Zofran. Patient was improved at time of discharge    Counseling: The emergency provider has spoken with the patient and family member patient and sister and discussed todays results, in addition to providing specific details for the plan of care and counseling regarding the diagnosis and prognosis. Questions are answered at this time and they are agreeable with the plan.      --------------------------------- IMPRESSION AND DISPOSITION ---------------------------------    IMPRESSION  1.  Compression fracture of L1 vertebra, initial encounter [facility])        DISPOSITION  Disposition: Discharge to home  Patient condition is stable                  Nikki Reilly MD  12/04/22 3773

## 2022-12-04 NOTE — ED NOTES
Department of Emergency Medicine  FIRST PROVIDER TRIAGE NOTE             Independent MLP           12/4/22  10:47 AM EST    Date of Encounter: 12/4/22   MRN: [id number]      HPI: Mercy Price is a 78 y.o. female who presents to the ED for Back Pain (Left lower back pain with urinary difficulty, emesis with increased pain last night )   Left hip/back pain x several weeks. Difficulty urinating x 2-3 days. Nausea and vomiting 2-3 days. ROS: Negative for cp or sob. PE: Gen Appearance/Constitutional: alert  Musculoskeletal: moves all extremities x 4     Initial Plan of Care: All treatment areas with department are currently occupied. Plan to order/Initiate the following while awaiting opening in ED: labs and imaging studies.   Initiate Treatment-Testing, Proceed toTreatment Area When Bed Available for ED Attending/MLP to Continue Care    Electronically signed by JUVENAL Christianson   DD: 12/4/22       Kinjal Christianson  12/04/22 1055

## 2022-12-08 ENCOUNTER — HOSPITAL ENCOUNTER (INPATIENT)
Age: 77
LOS: 7 days | Discharge: OTHER INSTITUTION | End: 2022-12-15
Attending: INTERNAL MEDICINE | Admitting: INTERNAL MEDICINE
Payer: MEDICARE

## 2022-12-08 ENCOUNTER — APPOINTMENT (OUTPATIENT)
Dept: CT IMAGING | Age: 77
End: 2022-12-08
Payer: MEDICARE

## 2022-12-08 DIAGNOSIS — R26.2 AMBULATORY DYSFUNCTION: ICD-10-CM

## 2022-12-08 DIAGNOSIS — S32.010A CLOSED COMPRESSION FRACTURE OF BODY OF L1 VERTEBRA (HCC): ICD-10-CM

## 2022-12-08 DIAGNOSIS — M54.59 INTRACTABLE LOW BACK PAIN: ICD-10-CM

## 2022-12-08 DIAGNOSIS — S22.080A COMPRESSION FRACTURE OF T12 VERTEBRA, INITIAL ENCOUNTER (HCC): Primary | ICD-10-CM

## 2022-12-08 PROBLEM — M48.56XA COMPRESSION FRACTURE OF LUMBAR SPINE, NON-TRAUMATIC, INITIAL ENCOUNTER (HCC): Status: ACTIVE | Noted: 2022-12-08

## 2022-12-08 LAB
ALBUMIN SERPL-MCNC: 3 G/DL (ref 3.5–5.2)
ALP BLD-CCNC: 98 U/L (ref 35–104)
ALT SERPL-CCNC: 13 U/L (ref 0–32)
ANION GAP SERPL CALCULATED.3IONS-SCNC: 14 MMOL/L (ref 7–16)
AST SERPL-CCNC: 30 U/L (ref 0–31)
BACTERIA: ABNORMAL /HPF
BASOPHILS ABSOLUTE: 0.06 E9/L (ref 0–0.2)
BASOPHILS RELATIVE PERCENT: 1 % (ref 0–2)
BILIRUB SERPL-MCNC: <0.2 MG/DL (ref 0–1.2)
BILIRUBIN URINE: ABNORMAL
BLOOD, URINE: NEGATIVE
BUN BLDV-MCNC: 8 MG/DL (ref 6–23)
CALCIUM SERPL-MCNC: 8.7 MG/DL (ref 8.6–10.2)
CHLORIDE BLD-SCNC: 102 MMOL/L (ref 98–107)
CLARITY: ABNORMAL
CO2: 22 MMOL/L (ref 22–29)
COLOR: YELLOW
CREAT SERPL-MCNC: 0.6 MG/DL (ref 0.5–1)
EOSINOPHILS ABSOLUTE: 0.41 E9/L (ref 0.05–0.5)
EOSINOPHILS RELATIVE PERCENT: 7.1 % (ref 0–6)
EPITHELIAL CELLS, UA: ABNORMAL /HPF
GFR SERPL CREATININE-BSD FRML MDRD: >60 ML/MIN/1.73
GLUCOSE BLD-MCNC: 104 MG/DL (ref 74–99)
GLUCOSE URINE: NEGATIVE MG/DL
HCT VFR BLD CALC: 33.6 % (ref 34–48)
HEMOGLOBIN: 10 G/DL (ref 11.5–15.5)
IMMATURE GRANULOCYTES #: 0.19 E9/L
IMMATURE GRANULOCYTES %: 3.3 % (ref 0–5)
INFLUENZA A BY PCR: NOT DETECTED
INFLUENZA B BY PCR: NOT DETECTED
KETONES, URINE: 15 MG/DL
LACTIC ACID, SEPSIS: 1.5 MMOL/L (ref 0.5–1.9)
LACTIC ACID, SEPSIS: 1.5 MMOL/L (ref 0.5–1.9)
LEUKOCYTE ESTERASE, URINE: NEGATIVE
LIPASE: 67 U/L (ref 13–60)
LYMPHOCYTES ABSOLUTE: 1.33 E9/L (ref 1.5–4)
LYMPHOCYTES RELATIVE PERCENT: 22.9 % (ref 20–42)
MAGNESIUM: 1.6 MG/DL (ref 1.6–2.6)
MCH RBC QN AUTO: 23.9 PG (ref 26–35)
MCHC RBC AUTO-ENTMCNC: 29.8 % (ref 32–34.5)
MCV RBC AUTO: 80.2 FL (ref 80–99.9)
MONOCYTES ABSOLUTE: 0.86 E9/L (ref 0.1–0.95)
MONOCYTES RELATIVE PERCENT: 14.8 % (ref 2–12)
NEUTROPHILS ABSOLUTE: 2.95 E9/L (ref 1.8–7.3)
NEUTROPHILS RELATIVE PERCENT: 50.9 % (ref 43–80)
NITRITE, URINE: NEGATIVE
PDW BLD-RTO: 18.7 FL (ref 11.5–15)
PH UA: 5.5 (ref 5–9)
PLATELET # BLD: 510 E9/L (ref 130–450)
PMV BLD AUTO: 10.3 FL (ref 7–12)
POTASSIUM REFLEX MAGNESIUM: 3.5 MMOL/L (ref 3.5–5)
PROTEIN UA: NEGATIVE MG/DL
RBC # BLD: 4.19 E12/L (ref 3.5–5.5)
RBC UA: ABNORMAL /HPF (ref 0–2)
SARS-COV-2, NAAT: NOT DETECTED
SODIUM BLD-SCNC: 138 MMOL/L (ref 132–146)
SPECIFIC GRAVITY UA: >=1.03 (ref 1–1.03)
TOTAL PROTEIN: 6.1 G/DL (ref 6.4–8.3)
TROPONIN, HIGH SENSITIVITY: 26 NG/L (ref 0–9)
TROPONIN, HIGH SENSITIVITY: 29 NG/L (ref 0–9)
UROBILINOGEN, URINE: 0.2 E.U./DL
WBC # BLD: 5.8 E9/L (ref 4.5–11.5)
WBC UA: ABNORMAL /HPF (ref 0–5)

## 2022-12-08 PROCEDURE — 87502 INFLUENZA DNA AMP PROBE: CPT

## 2022-12-08 PROCEDURE — 72131 CT LUMBAR SPINE W/O DYE: CPT

## 2022-12-08 PROCEDURE — 84484 ASSAY OF TROPONIN QUANT: CPT

## 2022-12-08 PROCEDURE — 85025 COMPLETE CBC W/AUTO DIFF WBC: CPT

## 2022-12-08 PROCEDURE — 99285 EMERGENCY DEPT VISIT HI MDM: CPT

## 2022-12-08 PROCEDURE — 36415 COLL VENOUS BLD VENIPUNCTURE: CPT

## 2022-12-08 PROCEDURE — 80053 COMPREHEN METABOLIC PANEL: CPT

## 2022-12-08 PROCEDURE — 81001 URINALYSIS AUTO W/SCOPE: CPT

## 2022-12-08 PROCEDURE — 93005 ELECTROCARDIOGRAM TRACING: CPT | Performed by: NURSE PRACTITIONER

## 2022-12-08 PROCEDURE — 83690 ASSAY OF LIPASE: CPT

## 2022-12-08 PROCEDURE — 2060000000 HC ICU INTERMEDIATE R&B

## 2022-12-08 PROCEDURE — 87635 SARS-COV-2 COVID-19 AMP PRB: CPT

## 2022-12-08 PROCEDURE — 6370000000 HC RX 637 (ALT 250 FOR IP): Performed by: NURSE PRACTITIONER

## 2022-12-08 PROCEDURE — 83735 ASSAY OF MAGNESIUM: CPT

## 2022-12-08 PROCEDURE — 83605 ASSAY OF LACTIC ACID: CPT

## 2022-12-08 RX ORDER — TRAMADOL HYDROCHLORIDE 50 MG/1
50 TABLET ORAL ONCE
Status: COMPLETED | OUTPATIENT
Start: 2022-12-08 | End: 2022-12-08

## 2022-12-08 RX ORDER — TRAMADOL HYDROCHLORIDE 50 MG/1
50 TABLET ORAL EVERY 6 HOURS PRN
Status: DISCONTINUED | OUTPATIENT
Start: 2022-12-08 | End: 2022-12-10 | Stop reason: SDUPTHER

## 2022-12-08 RX ORDER — MORPHINE SULFATE 2 MG/ML
2 INJECTION, SOLUTION INTRAMUSCULAR; INTRAVENOUS EVERY 4 HOURS PRN
Status: DISCONTINUED | OUTPATIENT
Start: 2022-12-08 | End: 2022-12-10

## 2022-12-08 RX ADMIN — TRAMADOL HYDROCHLORIDE 50 MG: 50 TABLET, COATED ORAL at 23:14

## 2022-12-08 ASSESSMENT — LIFESTYLE VARIABLES: HOW OFTEN DO YOU HAVE A DRINK CONTAINING ALCOHOL: NEVER

## 2022-12-08 NOTE — LETTER
[address] Medicaid  CERTIFICATION OF NECESSITY  FOR TRANSPORTATION   BY WHEELCHAIR VAN     Individual Information   1. Name: Jennifer Velazco 2. [place] Medicaid Billing Number:    3. Address: [address] [place] [address]      Transportation Provider Information   4. Provider Name:    5. [place] Medicaid Provider Number:  National Provider Identifier (NPI):      Certification  7. Criteria:  By signing this document, the practitioner certifies that two statements are true:  A. This individual must be accompanied by a mobility-related assistive device from the point of pick-up to the point of drop-off. B. Transport of this individual by standard passenger vehicle or common carrier is precluded or contraindicated. 8. Period Beginning Date: 12/13/22   9. Length  [x] Not more than 1 day(s)  [] One Year     Additional Information Relevant to Certification   10. Comments or Explanations, If Necessary or Appropriate     T12 compression fx, TLSO brace     Certifying Practitioner Information   11. Name of Practitioner: Angelica Adams DO   12. [place] Medicaid Provider Number, If Applicable:  [address] Provider Identifier (NPI):      Signature Information   14. Date of Signature: 12/13/22 15. Name of Person Signing: [facility]   40. Signature and Professional Designation: Electronically signed by CHELSEY Landaverde on 12/13/2022 at 2:20 PM  Discharge planner     [facility] [id number]  Rev. 7/2015    [address] [place] [address] Encounter Date/Time: 12/8/2022 2057    Hospital Account: [de-identified]    MRN: [id number]    Patient: Jennifer Velazco    Contact Serial #: [id number]      ENCOUNTER          Patient Class: I Private Enc? No Unit RM BD: SEYZ 4S PICU 4502/4502-B   Hospital Service:  INM   Encounter DX: Intractable low back bashir*   ADM Provider: Jose Falcon DO   Procedure:     ATT Provider: Jose Falcon DO   REF Provider:        Admission DX: Intractable low back pain, Compression fracture of lumbar spine, non-traumatic, initial encounter [facility]), Compression fracture of T12 vertebra, initial encounter [facility]), Ambulatory dysfunction, Closed compression fracture of body of L1 vertebra ([facility] and DX codes: M54.59, M48.56XA, S22.080A, R26.2, S32.010A      PATIENT                 Name: Radha Dennison : 1945 (77 yrs)   Address: [address] [place] [address] Sex: Female   [place] city: [address] [place] [address]         Marital Status:    Employer: RETIRED         [employer]odoxy: [address]   Primary Care Provider: Andrei Jarquin,Akila         Primary Phone: [phone]   EMERGENCY CONTACT   Contact Name Legal Guardian? Relationship to Patient Home Phone Work Phone   1. Chantelle Stover  2. Gomez Dominguez No  No Child  Spouse [phone]              GUARANTOR            Guarantor: Radha Dennison     : 1945   Address: [address] [place] [address] Sex: Female   [place] [id number]     Relation to Patient: Self       Home Phone: [phone]   Guarantor ID: [id number]       Work Phone:     Guarantor Employer: RETIRED         Status: RETIRED      COVERAGE        PRIMARY INSURANCE   Payor: [facility] MEDICARE Plan: [facility] LOC*   Payor Address: [facility] [id number]       Group Number: [id number] Insurance Type: [address] Name: Dami Murcia : 1945   Subscriber ID: [id number] Pat. Rel. to Sub: Self   SECONDARY INSURANCE   Payor:   Plan:     Payor Address:  ,           Group Number:   Insurance Type:     Subscriber Name:   Subscriber :     Subscriber ID:   Pat.  Rel. to Sub:           CSN: [id number]

## 2022-12-08 NOTE — ED PROVIDER NOTES
ED Attending shared visit  CC: Mattie Hayes [phone]  Department of Emergency Medicine   ED  Encounter Note  Admit Date/RoomTime: 2022  8:57 PM  ED Room: [place] [address]    NAME: Aminata Jasmine  : 1945  MRN: [id number]     Chief Complaint:  Hip Pain (Patient was going to her orthopaedic appointment today but was is too much pain. C/O lower back and left hip pain. ), Fatigue (For 3 days), Nausea (For 3-4 days), and Emesis (For 3 days )    History of Present Illness       Aminata Jasmine is a 78 y.o. old female who presents to the emergency department by private vehicle, for persistent generalized weakness and fatigue that has been ongoing for the last 3 to 4 days. She is also complaining of low back pain and left hip pain. She was evaluated here on  for low back pain and was found to have a new L1 compression fracture without a known cause. She was supposed to go to her orthopedic appointment today but she said she has been too weak to go. She states she was prescribed opioids when she was here and they make her sick. She has been unable to eat anything for the last 3 days. She denies fevers, chest pain, dizziness, shortness of breath. She denies lower extremity numbness/tingling. She denies loss of bowel/bladder control. She continues to ambulate with her walker at home. ROS   Pertinent positives and negatives are stated within HPI, all other systems reviewed and are negative. Past Medical History:  has a past medical history of Anxiety, Arthritis, Asthma, Claustrophobia, Dermatitis, Fracture, GERD (gastroesophageal reflux disease), Hiatal hernia, History of blood transfusion, HTN (hypertension), Hyperlipidemia, On aspirin at home, Pancreatic cyst, Pneumonia, Sleep apnea, SOB (shortness of breath), and Tachycardia. Surgical History:  has a past surgical history that includes Cholecystectomy;  Cataract removal (2013, 2013); back surgery (2014); joint replacement (12/16/2016); Total knee arthroplasty (Right); other surgical history (02/09/2017); Spinal fixation surgery with implant (2013); and Upper gastrointestinal endoscopy (07/10/2017). Social History:  reports that she has never smoked. She has never used smokeless tobacco. She reports that she does not drink alcohol and does not use drugs. Family History: family history includes Arthritis in her father; Asthma in her daughter; Diabetes in her father; Heart Disease in her father and mother; Hypertension in her father and mother; Other in her mother; Stroke in her mother. Allergies: Percocet [oxycodone-acetaminophen]    Physical Exam   Oxygen Saturation Interpretation: Normal.        ED Triage Vitals [12/08/22 1413]   BP Temp Temp Source Heart Rate Resp SpO2 Height Weight   126/80 97.9 °F (36.6 °C) Oral 100 16 99 % 5' 1\" (1.549 m) 150 lb (68 kg)         Constitutional:  Alert, development consistent with age. HEENT:  NC/NT. Airway patent. Neck:  Normal ROM. Supple. Respiratory:  Clear to auscultation and breath sounds equal. Regular, non-labored, no tachypnea. CV:  Regular rate and rhythm, normal heart sounds, without pathological murmurs, ectopy, gallops, or rubs. GI:  AbdomenSoft, nontender, good bowel sounds. No firm or pulsatile mass. Back:  No costovertebral tenderness. Lumbar spine tenderness to palpation. No step offs palpated. Musculoskeletal: No tenderness or edema noted. 5/5 BUE  strengths, 4/5 BLE  strengths. Integument:  Normal turgor. Warm, dry, without visible rash, unless noted elsewhere. Lymphatics: No lymphangitis or adenopathy noted. Neurological:  Oriented. Motor functions intact. GCS 15, alert/oriented x3. Fluent speech. Gait: ambulates with need of walker.     Lab / Imaging Results   (All laboratory and radiology results have been personally reviewed by myself)  Labs:  Results for orders placed or performed during the hospital encounter of 12/08/22 Rapid influenza A/B antigens    Specimen: Nasopharyngeal; Nose   Result Value Ref Range    Influenza A by PCR Not Detected Not Detected    Influenza B by PCR Not Detected Not Detected   COVID-19, Rapid    Specimen: Nasopharyngeal Swab   Result Value Ref Range    SARS-CoV-2, NAAT Not Detected Not Detected   CBC with Auto Differential   Result Value Ref Range    WBC 5.8 4.5 - 11.5 E9/L    RBC 4.19 3.50 - 5.50 E12/L    Hemoglobin 10.0 (L) 11.5 - 15.5 g/dL    Hematocrit 33.6 (L) 34.0 - 48.0 %    MCV 80.2 80.0 - 99.9 fL    MCH 23.9 (L) 26.0 - 35.0 pg    MCHC 29.8 (L) 32.0 - 34.5 %    RDW 18.7 (H) 11.5 - 15.0 fL    Platelets 519 (H) 209 - 450 E9/L    MPV 10.3 7.0 - 12.0 fL    Neutrophils % 50.9 43.0 - 80.0 %    Immature Granulocytes % 3.3 0.0 - 5.0 %    Lymphocytes % 22.9 20.0 - 42.0 %    Monocytes % 14.8 (H) 2.0 - 12.0 %    Eosinophils % 7.1 (H) 0.0 - 6.0 %    Basophils % 1.0 0.0 - 2.0 %    Neutrophils Absolute 2.95 1.80 - 7.30 E9/L    Immature Granulocytes # 0.19 E9/L    Lymphocytes Absolute 1.33 (L) 1.50 - 4.00 E9/L    Monocytes Absolute 0.86 0.10 - 0.95 E9/L    Eosinophils Absolute 0.41 0.05 - 0.50 E9/L    Basophils Absolute 0.06 0.00 - 0.20 E9/L   Comprehensive Metabolic Panel w/ Reflex to MG   Result Value Ref Range    Sodium 138 132 - 146 mmol/L    Potassium reflex Magnesium 3.5 3.5 - 5.0 mmol/L    Chloride 102 98 - 107 mmol/L    CO2 22 22 - 29 mmol/L    Anion Gap 14 7 - 16 mmol/L    Glucose 104 (H) 74 - 99 mg/dL    BUN 8 6 - 23 mg/dL    Creatinine 0.6 0.5 - 1.0 mg/dL    Est, Glom Filt Rate >60 >=60 mL/min/1.73    Calcium 8.7 8.6 - 10.2 mg/dL    Total Protein 6.1 (L) 6.4 - 8.3 g/dL    Albumin 3.0 (L) 3.5 - 5.2 g/dL    Total Bilirubin <0.2 0.0 - 1.2 mg/dL    Alkaline Phosphatase 98 35 - 104 U/L    ALT 13 0 - 32 U/L    AST 30 0 - 31 U/L   Lipase   Result Value Ref Range    Lipase 67 (H) 13 - 60 U/L   Troponin   Result Value Ref Range    Troponin, High Sensitivity 29 (H) 0 - 9 ng/L   Urinalysis with Microscopic Result Value Ref Range    Color, UA Yellow Straw/Yellow    Clarity, UA SL CLOUDY Clear    Glucose, Ur Negative Negative mg/dL    Bilirubin Urine SMALL (A) Negative    Ketones, Urine 15 (A) Negative mg/dL    Specific Gravity, UA >=1.030 1.005 - 1.030    Blood, Urine Negative Negative    pH, UA 5.5 5.0 - 9.0    Protein, UA Negative Negative mg/dL    Urobilinogen, Urine 0.2 <2.0 E.U./dL    Nitrite, Urine Negative Negative    Leukocyte Esterase, Urine Negative Negative    WBC, UA 0-1 0 - 5 /HPF    RBC, UA 0-1 0 - 2 /HPF    Epithelial Cells, UA FEW /HPF    Bacteria, UA FEW (A) None Seen /HPF   Lactate, Sepsis   Result Value Ref Range    Lactic Acid, Sepsis 1.5 0.5 - 1.9 mmol/L   Lactate, Sepsis   Result Value Ref Range    Lactic Acid, Sepsis 1.5 0.5 - 1.9 mmol/L   Magnesium   Result Value Ref Range    Magnesium 1.6 1.6 - 2.6 mg/dL   Troponin   Result Value Ref Range    Troponin, High Sensitivity 26 (H) 0 - 9 ng/L   EKG 12 Lead   Result Value Ref Range    Ventricular Rate 100 BPM    Atrial Rate 100 BPM    P-R Interval 172 ms    QRS Duration 58 ms    Q-T Interval 346 ms    QTc Calculation (Bazett) 446 ms    P Axis 31 degrees    R Axis 19 degrees    T Axis 28 degrees     Imaging: All Radiology results interpreted by Radiologist unless otherwise noted. CT LUMBAR SPINE WO CONTRAST   Final Result   No definite change compared to the prior recent exam on 12/04/2022, again   demonstrating moderate L1 and mild T12 compression fractures. Additional advanced degenerative change, prior fracture status post   vertebroplasty and postsurgical changes, as described above. EKG #1:  Interpreted by emergency department attending physician unless otherwise noted.     12/8/22  Time: 1623    Rhythm: normal sinus   Rate: 100  Axis: normal  Conduction: normal  ST Segments: no acute change  T Waves: no acute change    Clinical Impression: no acute changes  Comparison to Prior tracings:  Today's ECG is unchanged from previous tracings. ED Course / Medical Decision Making     Medications   traMADol (ULTRAM) tablet 50 mg (has no administration in time range)   morphine (PF) injection 2 mg (has no administration in time range)   traMADol (ULTRAM) tablet 50 mg (has no administration in time range)        Re-Evaluations:  12/8/22      Time: 845pm    Patient brought back for repeat troponin. Continues to c/o low back pain and fatigue. Informed her we are waiting for CT scan results. 915pm. Reviewed findings with patient and her daughter. They expressed concerns about ambulating at home and pain control. Discussed admission as an option and they did request to be admitted. 1000pm:  Admitted to Dr. Kasey Weaver. He requested I place a diet order and get the patient started on pain medications for tonight. Consultations:             Dr. Kasey Weaver for admission. Procedures:   none    MDM:  Presented to ER for low back pain, fatigue and unable to sleep due to pain. Unable to control pain at home due to narcotics causing nausea and vomiting. She was evaluated here on 12/4 and found to have lumbar spine compression fractures. No known cause. She was discharged home with Norco and Zofran. Was supposed to f/u with orthopedic surgery today but was unable to due to pain. Came to the ER via EMS. Due to multiple complaints, she was tested for flu and COVID which were negative. Her CBC and CMP were within normal limits as well. Lactate was 1.5. UA was negative for any infectious processes. No concerns for ischemia on her EKG. CT of her lumbar spine was performed that revealed no definite change comparing with XR of lumbar spine on 12/4. Moderate L1 and mild T12 compression fractures. Patient unable to ambulate at home and achieve pain management. Will admit to Dr. Kasey Weaver for pain management and evaluation of her compression fractures.      Plan of Care/Counseling:  STEVE Matute CNP and ED attending reviewed today's visit with the patient and daugher in addition to providing specific details for the plan of care and counseling regarding the diagnosis and prognosis. Questions are answered at this time and are agreeable with the plan. Assessment      1. Compression fracture of T12 vertebra, initial encounter ([facility] [address] [facility]    2. Closed compression fracture of body of L1 vertebra (HCC)    3. Ambulatory dysfunction    4. Intractable low back pain      This patient's ED course included: a personal history and physicial examination and multiple bedside re-evaluations  This patient has remained hemodynamically stable and remained unchanged during their ED course. Plan   Admit to General Medical Floor. Patient condition is stable. New Medications     New Prescriptions    No medications on file     Electronically signed by STEVE Landers CNP   DD: 12/8/22  **This report was transcribed using voice recognition software. Every effort was made to ensure accuracy; however, inadvertent computerized transcription errors may be present.   END OF PROVIDER NOTE       STEVE Landers CNP  12/08/22 2909

## 2022-12-09 PROBLEM — S22.080A COMPRESSION FRACTURE OF T12 VERTEBRA (HCC): Status: ACTIVE | Noted: 2022-12-09

## 2022-12-09 LAB
EKG ATRIAL RATE: 100 BPM
EKG P AXIS: 31 DEGREES
EKG P-R INTERVAL: 172 MS
EKG Q-T INTERVAL: 346 MS
EKG QRS DURATION: 58 MS
EKG QTC CALCULATION (BAZETT): 446 MS
EKG R AXIS: 19 DEGREES
EKG T AXIS: 28 DEGREES
EKG VENTRICULAR RATE: 100 BPM

## 2022-12-09 PROCEDURE — 6360000002 HC RX W HCPCS: Performed by: INTERNAL MEDICINE

## 2022-12-09 PROCEDURE — 99222 1ST HOSP IP/OBS MODERATE 55: CPT | Performed by: NEUROLOGICAL SURGERY

## 2022-12-09 PROCEDURE — 93010 ELECTROCARDIOGRAM REPORT: CPT | Performed by: INTERNAL MEDICINE

## 2022-12-09 PROCEDURE — 2700000000 HC OXYGEN THERAPY PER DAY

## 2022-12-09 PROCEDURE — 6370000000 HC RX 637 (ALT 250 FOR IP): Performed by: INTERNAL MEDICINE

## 2022-12-09 PROCEDURE — 94640 AIRWAY INHALATION TREATMENT: CPT

## 2022-12-09 PROCEDURE — 2580000003 HC RX 258: Performed by: INTERNAL MEDICINE

## 2022-12-09 PROCEDURE — 2060000000 HC ICU INTERMEDIATE R&B

## 2022-12-09 RX ORDER — ARFORMOTEROL TARTRATE 15 UG/2ML
15 SOLUTION RESPIRATORY (INHALATION) 2 TIMES DAILY
Status: DISCONTINUED | OUTPATIENT
Start: 2022-12-09 | End: 2022-12-15 | Stop reason: HOSPADM

## 2022-12-09 RX ORDER — MONTELUKAST SODIUM 10 MG/1
10 TABLET ORAL DAILY
Status: DISCONTINUED | OUTPATIENT
Start: 2022-12-09 | End: 2022-12-15 | Stop reason: HOSPADM

## 2022-12-09 RX ORDER — ENOXAPARIN SODIUM 100 MG/ML
40 INJECTION SUBCUTANEOUS DAILY
Status: DISCONTINUED | OUTPATIENT
Start: 2022-12-09 | End: 2022-12-15 | Stop reason: HOSPADM

## 2022-12-09 RX ORDER — POLYETHYLENE GLYCOL 3350 17 G/17G
17 POWDER, FOR SOLUTION ORAL DAILY PRN
Status: DISCONTINUED | OUTPATIENT
Start: 2022-12-09 | End: 2022-12-15 | Stop reason: HOSPADM

## 2022-12-09 RX ORDER — ALBUTEROL SULFATE 90 UG/1
2 AEROSOL, METERED RESPIRATORY (INHALATION) EVERY 6 HOURS PRN
Status: DISCONTINUED | OUTPATIENT
Start: 2022-12-09 | End: 2022-12-09 | Stop reason: CLARIF

## 2022-12-09 RX ORDER — ONDANSETRON 2 MG/ML
4 INJECTION INTRAMUSCULAR; INTRAVENOUS EVERY 6 HOURS PRN
Status: DISCONTINUED | OUTPATIENT
Start: 2022-12-09 | End: 2022-12-15 | Stop reason: HOSPADM

## 2022-12-09 RX ORDER — FLUTICASONE PROPIONATE 50 MCG
1 SPRAY, SUSPENSION (ML) NASAL DAILY
Status: DISCONTINUED | OUTPATIENT
Start: 2022-12-09 | End: 2022-12-15 | Stop reason: HOSPADM

## 2022-12-09 RX ORDER — FAMOTIDINE 20 MG/1
20 TABLET, FILM COATED ORAL 2 TIMES DAILY
Status: DISCONTINUED | OUTPATIENT
Start: 2022-12-09 | End: 2022-12-15 | Stop reason: HOSPADM

## 2022-12-09 RX ORDER — ONDANSETRON 4 MG/1
4 TABLET, ORALLY DISINTEGRATING ORAL EVERY 8 HOURS PRN
Status: DISCONTINUED | OUTPATIENT
Start: 2022-12-09 | End: 2022-12-15 | Stop reason: HOSPADM

## 2022-12-09 RX ORDER — POTASSIUM CHLORIDE 750 MG/1
10 TABLET, EXTENDED RELEASE ORAL 2 TIMES DAILY
Status: DISCONTINUED | OUTPATIENT
Start: 2022-12-09 | End: 2022-12-15 | Stop reason: HOSPADM

## 2022-12-09 RX ORDER — SODIUM CHLORIDE 9 MG/ML
INJECTION, SOLUTION INTRAVENOUS PRN
Status: DISCONTINUED | OUTPATIENT
Start: 2022-12-09 | End: 2022-12-15 | Stop reason: HOSPADM

## 2022-12-09 RX ORDER — LORAZEPAM 1 MG/1
2 TABLET ORAL 3 TIMES DAILY
Status: DISCONTINUED | OUTPATIENT
Start: 2022-12-09 | End: 2022-12-15 | Stop reason: HOSPADM

## 2022-12-09 RX ORDER — ACETAMINOPHEN 650 MG/1
650 SUPPOSITORY RECTAL EVERY 6 HOURS PRN
Status: DISCONTINUED | OUTPATIENT
Start: 2022-12-09 | End: 2022-12-15 | Stop reason: HOSPADM

## 2022-12-09 RX ORDER — ASPIRIN 81 MG/1
81 TABLET ORAL DAILY
Status: DISCONTINUED | OUTPATIENT
Start: 2022-12-09 | End: 2022-12-15 | Stop reason: HOSPADM

## 2022-12-09 RX ORDER — SODIUM CHLORIDE 0.9 % (FLUSH) 0.9 %
5-40 SYRINGE (ML) INJECTION EVERY 12 HOURS SCHEDULED
Status: DISCONTINUED | OUTPATIENT
Start: 2022-12-09 | End: 2022-12-15 | Stop reason: HOSPADM

## 2022-12-09 RX ORDER — SODIUM CHLORIDE 0.9 % (FLUSH) 0.9 %
5-40 SYRINGE (ML) INJECTION PRN
Status: DISCONTINUED | OUTPATIENT
Start: 2022-12-09 | End: 2022-12-15 | Stop reason: HOSPADM

## 2022-12-09 RX ORDER — ALBUTEROL SULFATE 2.5 MG/3ML
2.5 SOLUTION RESPIRATORY (INHALATION) EVERY 6 HOURS PRN
Status: DISCONTINUED | OUTPATIENT
Start: 2022-12-09 | End: 2022-12-15 | Stop reason: HOSPADM

## 2022-12-09 RX ORDER — ACETAMINOPHEN 325 MG/1
650 TABLET ORAL EVERY 6 HOURS PRN
Status: DISCONTINUED | OUTPATIENT
Start: 2022-12-09 | End: 2022-12-15 | Stop reason: HOSPADM

## 2022-12-09 RX ORDER — PREDNISONE 20 MG/1
20 TABLET ORAL DAILY
Status: DISCONTINUED | OUTPATIENT
Start: 2022-12-09 | End: 2022-12-15 | Stop reason: HOSPADM

## 2022-12-09 RX ORDER — ATORVASTATIN CALCIUM 10 MG/1
10 TABLET, FILM COATED ORAL NIGHTLY
Status: DISCONTINUED | OUTPATIENT
Start: 2022-12-09 | End: 2022-12-15 | Stop reason: HOSPADM

## 2022-12-09 RX ORDER — BUDESONIDE 0.5 MG/2ML
0.5 INHALANT ORAL 2 TIMES DAILY
Status: DISCONTINUED | OUTPATIENT
Start: 2022-12-09 | End: 2022-12-15 | Stop reason: HOSPADM

## 2022-12-09 RX ORDER — DILTIAZEM HYDROCHLORIDE 240 MG/1
240 CAPSULE, COATED, EXTENDED RELEASE ORAL 2 TIMES DAILY
Status: DISCONTINUED | OUTPATIENT
Start: 2022-12-09 | End: 2022-12-15 | Stop reason: HOSPADM

## 2022-12-09 RX ORDER — ESCITALOPRAM OXALATE 10 MG/1
10 TABLET ORAL NIGHTLY
Status: DISCONTINUED | OUTPATIENT
Start: 2022-12-09 | End: 2022-12-15 | Stop reason: HOSPADM

## 2022-12-09 RX ADMIN — MORPHINE SULFATE 2 MG: 2 INJECTION, SOLUTION INTRAMUSCULAR; INTRAVENOUS at 19:22

## 2022-12-09 RX ADMIN — MORPHINE SULFATE 2 MG: 2 INJECTION, SOLUTION INTRAMUSCULAR; INTRAVENOUS at 09:31

## 2022-12-09 RX ADMIN — SODIUM CHLORIDE, PRESERVATIVE FREE 10 ML: 5 INJECTION INTRAVENOUS at 21:49

## 2022-12-09 RX ADMIN — ESCITALOPRAM OXALATE 10 MG: 10 TABLET ORAL at 21:49

## 2022-12-09 RX ADMIN — BUDESONIDE 500 MCG: 0.5 SUSPENSION RESPIRATORY (INHALATION) at 19:54

## 2022-12-09 RX ADMIN — MORPHINE SULFATE 2 MG: 2 INJECTION, SOLUTION INTRAMUSCULAR; INTRAVENOUS at 15:08

## 2022-12-09 RX ADMIN — POTASSIUM CHLORIDE 10 MEQ: 750 TABLET, EXTENDED RELEASE ORAL at 21:49

## 2022-12-09 RX ADMIN — MONTELUKAST 10 MG: 10 TABLET, FILM COATED ORAL at 16:13

## 2022-12-09 RX ADMIN — ATORVASTATIN CALCIUM 10 MG: 10 TABLET, FILM COATED ORAL at 21:49

## 2022-12-09 RX ADMIN — TRAMADOL HYDROCHLORIDE 50 MG: 50 TABLET, COATED ORAL at 08:24

## 2022-12-09 RX ADMIN — LORAZEPAM 2 MG: 1 TABLET ORAL at 21:49

## 2022-12-09 RX ADMIN — FAMOTIDINE 20 MG: 20 TABLET, FILM COATED ORAL at 21:49

## 2022-12-09 RX ADMIN — ARFORMOTEROL TARTRATE 15 MCG: 15 SOLUTION RESPIRATORY (INHALATION) at 19:54

## 2022-12-09 RX ADMIN — ASPIRIN 81 MG: 81 TABLET, COATED ORAL at 16:13

## 2022-12-09 RX ADMIN — MORPHINE SULFATE 2 MG: 2 INJECTION, SOLUTION INTRAMUSCULAR; INTRAVENOUS at 03:32

## 2022-12-09 RX ADMIN — DILTIAZEM HYDROCHLORIDE 240 MG: 240 CAPSULE, COATED, EXTENDED RELEASE ORAL at 21:50

## 2022-12-09 RX ADMIN — ENOXAPARIN SODIUM 40 MG: 100 INJECTION SUBCUTANEOUS at 16:13

## 2022-12-09 RX ADMIN — TRAMADOL HYDROCHLORIDE 50 MG: 50 TABLET, COATED ORAL at 21:49

## 2022-12-09 ASSESSMENT — PAIN DESCRIPTION - ORIENTATION
ORIENTATION: RIGHT;LEFT
ORIENTATION: LOWER
ORIENTATION: LEFT;RIGHT

## 2022-12-09 ASSESSMENT — PAIN DESCRIPTION - DESCRIPTORS
DESCRIPTORS: THROBBING
DESCRIPTORS: THROBBING

## 2022-12-09 ASSESSMENT — PAIN DESCRIPTION - LOCATION
LOCATION: BACK;LEG;HIP
LOCATION: BACK;LEG
LOCATION: BACK

## 2022-12-09 ASSESSMENT — PAIN SCALES - GENERAL
PAINLEVEL_OUTOF10: 10

## 2022-12-09 NOTE — PLAN OF CARE
Problem: Discharge Planning  Goal: Discharge to home or other facility with appropriate resources  Outcome: Progressing  Flowsheets (Taken 12/9/2022 1440)  Discharge to home or other facility with appropriate resources: Identify barriers to discharge with patient and caregiver     Problem: Pain  Goal: Verbalizes/displays adequate comfort level or baseline comfort level  Outcome: Not Progressing     Problem: Skin/Tissue Integrity  Goal: Absence of new skin breakdown  Description: 1. Monitor for areas of redness and/or skin breakdown  2. Assess vascular access sites hourly  3. Every 4-6 hours minimum:  Change oxygen saturation probe site  4. Every 4-6 hours:  If on nasal continuous positive airway pressure, respiratory therapy assess nares and determine need for appliance change or resting period.   Outcome: Progressing     Problem: Safety - Adult  Goal: Free from fall injury  Outcome: Progressing     Problem: Pain  Goal: Verbalizes/displays adequate comfort level or baseline comfort level  Outcome: Not Progressing

## 2022-12-09 NOTE — H&P
[address]                  [address] [facility]  [address]                              HISTORY AND PHYSICAL    PATIENT NAME: Danna Ribeiro                    :        1945  MED REC NO:   [id number]                            ROOM:       [facility]  ACCOUNT NO:   [de-identified]                           ADMIT DATE: 2022  PROVIDER:     Zulema Golden DO    CHIEF COMPLAINT:  Back pain. HISTORY OF PRESENT ILLNESS:  The patient is a 30-year-old   female who presented to the emergency room complaining of pain in low  back x2 weeks. She denies injury. She had onset of pain. She was seen  in the emergency room. She was diagnosed with compression fracture,  discharged home on pain meds. She then had nausea, decreased appetite,  weakness, left hip pain, low back pain. She presented again to the  emergency room and was admitted for further evaluation and treatment. MEDICATIONS PRIOR TO ADMISSION:  Zofran, Cardizem, Flonase, Breo,  Singulair, Ventolin inhaler, prednisone, Dupixent, vitamin C, potassium  chloride, Ativan, aspirin, Lexapro, Pepcid, vitamin D, vitamin B12,  Lipitor. PAST MEDICAL HISTORY:  Anxiety, asthma, dermatitis, elevated  cholesterol, chronic back pain, tachycardia. PAST SURGICAL HISTORY:  Cholecystectomy, back surgery, SI joint surgery,  right total knee replacement, spinal nerve stimulator, bilateral eye  cataract surgery, cholecystectomy. SOCIAL HISTORY:  Denies tobacco or alcohol. REVIEW OF SYSTEMS:  Remarkable for above-stated chief complaint plus  chronic dermatitis changes of the skin. ALLERGIES:  PERCOCET. PRIMARY CARE PHYSICIAN:  Liberty Last MD.    PHYSICAL EXAMINATION:  GENERAL APPEARANCE:  Reveals a 30-year-old  female who is alert  and oriented x3, cooperative, and a good historian.   VITAL SIGNS:  On admission, temperature 97.9, pulse 100, respirations  16, blood pressure 126/80. HEENT:  Head:  Normocephalic, atraumatic. Eyes:  Pupils equal and  reactive to light. Extraocular muscles intact. Fundi not well  visualized. Nose:  No obstruction, polyp or discharge noted. Mouth:   Mucosa without lesion. Pharynx noninjected without exudate. NECK:  Supple. No JVD. No thyromegaly. No carotid bruits. HEART:  Regular rate and rhythm without murmur. LUNGS:  Clear to auscultation bilaterally. ABDOMEN:  Positive bowel sounds. Soft, nontender. No rebound or  guarding. No hepatosplenomegaly. No masses. BACK:  With increased thoracic kyphosis. EXTREMITIES:  With minimal edema in the bilateral lower extremities. LYMPH NODES:  No adenopathy noted. SKIN:  With dermatitis changes of the lower extremities. IMPRESSION:  Intractable back pain, inability to ambulate, vertebral  compression fractures, anxiety disorder, chronic dermatitis, asthma,  elevated cholesterol. PLAN:  Admit. Pain control. PT, OT eval.   for  discharge planning. Neurosurgery to see. Discharge plan home versus  ECF as per the patient's progress.         Marcio Monroy DO    D: 12/09/2022 8:11:13       T: 12/09/2022 8:13:38     [id number]  Job#: [id number]     Doc#: [id number]    CC:

## 2022-12-09 NOTE — ED NOTES
Writer is extended triage nurse, with assessment and treatment for this patient primarily being performed by assigned provider and attending physician.  Extended triage nurse will be performing assigned tasks as directed by the treatment team.         Ramiro Hanks RN  12/08/22 [id number]

## 2022-12-09 NOTE — CONSULTS
[address]                  [address] [facility], [address] [facility]                                  CONSULTATION    PATIENT NAME: Shraddha Li                    :        1945  MED REC NO:   [id number]                            ROOM:       [facility]  ACCOUNT NO:   [de-identified]                           ADMIT DATE: 2022  PROVIDER:     Leni Manuel MD    CONSULT DATE:  2022    REASON FOR CONSULTATION:  Back pain and compression fracture. HISTORY OF PRESENT ILLNESS:  The patient is a 79-year-old lady with a  history of chronic back pain. She has had multiple spinal surgeries and  sacroiliac joint effusion. She states she has a spinal cord stimulator  in place. She presented initially to emergency room five days ago with  worsening back pain and difficulty ambulating. She was treated  expectantly and she was ultimately discharged. However, she  re-presented again to the emergency room yesterday complaining of the  hip pain at this time given her prior history of back pain and some  compression fractures. Neurosurgery service was consulted. At this  time she describes pain as a combination of sharp, stabbing, aching  pain. It is in her back and into her hip. She states that her hip pain  is worse than her back pain. PAST MEDICAL HISTORY:  Positive for anxiety, arthritis, asthma,  claustrophobia, dermatitis, hypertension, obstructive sleep apnea and  tachycardia. PAST SURGICAL HISTORY:  Positive for lumbar fusion, cataract removal,  cholecystectomy, SI joint fusion on the right, total knee arthroplasty  on the right. FAMILY HISTORY:  Positive for hypertension in her mother and father. SOCIAL HISTORY:  Negative for tobacco or alcohol use. ALLERGIES:  Include PERCOCET. REVIEW OF SYSTEMS:  HEENT:  Negative for headache, double vision, blurry  vision. CARDIOVASCULAR:  Negative for chest pain, arrhythmia or  palpitations. RESPIRATORY:  Negative for shortness of breath, asthma,  bronchitis or pneumonia. GASTROINTESTINAL:  Negative for heartburn,  nausea, vomiting, diarrhea, or constipation. GENITOURINARY:  Negative  for hematuria or dysuria. HEMATOLOGIC:  Positive for easy bruising. INFECTIOUS:  Negative for any recent infection. MUSCULOSKELETAL:   Positive for back pain. PSYCHIATRIC:  Positive for some anxiety and  depression. NEUROLOGIC:  Negative for seizure or stroke. ENDOCRINE:   Negative for thyroid disorder or diabetes. PHYSICAL EXAMINATION:  VITAL SIGNS:  She is currently afebrile with a T-current of 36.6 degrees  Celsius, pulse 97, blood pressure 126/70, respiratory rate 18. GENERAL:  She is resting in bed. Appears to be in mild-to-moderate  distress secondary to pain. She appears her stated age. HEENT:  Her head is normocephalic and atraumatic. Pupils are 3-2 mm and  reactive. She has no drainage out of her eyes, ears, nose or throat. SKIN:  Warm and dry. MUSCULOSKELETAL:  She had good range of motion of bilateral upper and  lower extremities with tenderness to palpation along her spine. ABDOMEN:  Again is soft, nontender, nondistended. RESPIRATORY:  She is not using any accessory muscles of respiration. NEUROLOGIC:  Rest of her neurologic exam, she is awake, alert, oriented  x3. Cranial nerves II through XII intact bilaterally. Motor exam  reveals 4+/5 strength in her bilateral upper and lower extremities. Sensation is grossly intact to light touch. Reflexes are 1+ and  symmetric. Toes are going down. LABORATORY DATA:  Sodium 138, potassium 3.5, BUN 8, creatinine 0.6. On  review of imaging, she had a CT scan of her lumbar spine that shows T12  and L1 compression fractures. ASSESSMENT AND PLAN:  The patient is a 79-year-old lady with T12 and L1  compression fractures with increasing back pain. Given the fact that  her back pain is worsened, I am recommending a TLSO brace.   She has been  seen in the past by Dr. Abhilash Black. Once she is discharged from the  hospital she can follow up with Dr. Abhilash Black as it is unclear as to  whether or not Dr. Abhilash Black comes to [facility].         Zain Knight MD    D: 12/09/2022 9:44:30       T: 12/09/2022 9:47:31     [email or web address]  Job#: [id number]     Doc#: [id number]    CC:

## 2022-12-09 NOTE — CARE COORDINATION
CM transition of care. Pt presented to [facility] ER secondary to lower back and hip pain. Admitted inpatient with compression fracture. NS on consult. Pt has  T12 and L1 compression fractures. TLSO brace ordered and delivered. Pt to follow up with Dr Graciela Bennett once she is d/c. Met with pt at bedside to discuss d/c planning. Pt lives alone in a ranch style home, 3 steps to enter, stair lift for the basement. PTA pt reports she is mostly independent. She has several family members that live close and they cook, clean, and do yard work. Her daughter lives in [place] and her son is in the service and currently in [facility]. Pt has a lift chair, cane, rollator, shower seat, toilet riser, life alert, and CPAP. Reports no hx of HHC or JAIR. Pt wants to return to home. Declines JAIR. She is open to [facility] and therapy at home if needed. Pt/Ot evals are ordered to assist with d/c planning. CM/SW to follow. Burnell Goldberg RN CM.

## 2022-12-10 LAB
ANION GAP SERPL CALCULATED.3IONS-SCNC: 13 MMOL/L (ref 7–16)
BUN BLDV-MCNC: 8 MG/DL (ref 6–23)
CALCIUM SERPL-MCNC: 8.2 MG/DL (ref 8.6–10.2)
CHLORIDE BLD-SCNC: 98 MMOL/L (ref 98–107)
CO2: 21 MMOL/L (ref 22–29)
CREAT SERPL-MCNC: 0.6 MG/DL (ref 0.5–1)
FERRITIN: 86 NG/ML
GFR SERPL CREATININE-BSD FRML MDRD: >60 ML/MIN/1.73
GLUCOSE BLD-MCNC: 95 MG/DL (ref 74–99)
HCT VFR BLD CALC: 30.8 % (ref 34–48)
HEMOGLOBIN: 9.2 G/DL (ref 11.5–15.5)
IRON SATURATION: 7 % (ref 15–50)
IRON: 14 MCG/DL (ref 37–145)
MAGNESIUM: 1.4 MG/DL (ref 1.6–2.6)
MCH RBC QN AUTO: 24.2 PG (ref 26–35)
MCHC RBC AUTO-ENTMCNC: 29.9 % (ref 32–34.5)
MCV RBC AUTO: 81.1 FL (ref 80–99.9)
PDW BLD-RTO: 18.2 FL (ref 11.5–15)
PLATELET # BLD: 386 E9/L (ref 130–450)
PMV BLD AUTO: 10.4 FL (ref 7–12)
POTASSIUM SERPL-SCNC: 3.7 MMOL/L (ref 3.5–5)
RBC # BLD: 3.8 E12/L (ref 3.5–5.5)
SODIUM BLD-SCNC: 132 MMOL/L (ref 132–146)
TOTAL IRON BINDING CAPACITY: 200 MCG/DL (ref 250–450)
TSH SERPL DL<=0.05 MIU/L-ACNC: 1.46 UIU/ML (ref 0.27–4.2)
WBC # BLD: 12 E9/L (ref 4.5–11.5)

## 2022-12-10 PROCEDURE — 6370000000 HC RX 637 (ALT 250 FOR IP): Performed by: INTERNAL MEDICINE

## 2022-12-10 PROCEDURE — 84443 ASSAY THYROID STIM HORMONE: CPT

## 2022-12-10 PROCEDURE — 94640 AIRWAY INHALATION TREATMENT: CPT

## 2022-12-10 PROCEDURE — 83540 ASSAY OF IRON: CPT

## 2022-12-10 PROCEDURE — 2060000000 HC ICU INTERMEDIATE R&B

## 2022-12-10 PROCEDURE — 82728 ASSAY OF FERRITIN: CPT

## 2022-12-10 PROCEDURE — 6360000002 HC RX W HCPCS: Performed by: INTERNAL MEDICINE

## 2022-12-10 PROCEDURE — 83550 IRON BINDING TEST: CPT

## 2022-12-10 PROCEDURE — 80048 BASIC METABOLIC PNL TOTAL CA: CPT

## 2022-12-10 PROCEDURE — 83735 ASSAY OF MAGNESIUM: CPT

## 2022-12-10 PROCEDURE — 2700000000 HC OXYGEN THERAPY PER DAY

## 2022-12-10 PROCEDURE — 36415 COLL VENOUS BLD VENIPUNCTURE: CPT

## 2022-12-10 PROCEDURE — 2580000003 HC RX 258: Performed by: INTERNAL MEDICINE

## 2022-12-10 PROCEDURE — 85027 COMPLETE CBC AUTOMATED: CPT

## 2022-12-10 RX ORDER — OXYCODONE HYDROCHLORIDE 5 MG/1
2.5 TABLET ORAL EVERY 6 HOURS PRN
Status: DISCONTINUED | OUTPATIENT
Start: 2022-12-10 | End: 2022-12-13

## 2022-12-10 RX ORDER — MORPHINE SULFATE 2 MG/ML
2 INJECTION, SOLUTION INTRAMUSCULAR; INTRAVENOUS EVERY 4 HOURS PRN
Status: DISCONTINUED | OUTPATIENT
Start: 2022-12-10 | End: 2022-12-13

## 2022-12-10 RX ORDER — OXYCODONE HYDROCHLORIDE 5 MG/1
5 TABLET ORAL EVERY 6 HOURS PRN
Status: DISCONTINUED | OUTPATIENT
Start: 2022-12-10 | End: 2022-12-13

## 2022-12-10 RX ORDER — ACETAMINOPHEN 500 MG
1000 TABLET ORAL EVERY 8 HOURS SCHEDULED
Status: DISCONTINUED | OUTPATIENT
Start: 2022-12-10 | End: 2022-12-13

## 2022-12-10 RX ADMIN — ACETAMINOPHEN 1000 MG: 500 TABLET ORAL at 21:03

## 2022-12-10 RX ADMIN — MORPHINE SULFATE 2 MG: 2 INJECTION, SOLUTION INTRAMUSCULAR; INTRAVENOUS at 13:25

## 2022-12-10 RX ADMIN — PREDNISONE 20 MG: 20 TABLET ORAL at 11:49

## 2022-12-10 RX ADMIN — FAMOTIDINE 20 MG: 20 TABLET, FILM COATED ORAL at 20:58

## 2022-12-10 RX ADMIN — ARFORMOTEROL TARTRATE 15 MCG: 15 SOLUTION RESPIRATORY (INHALATION) at 19:44

## 2022-12-10 RX ADMIN — LORAZEPAM 2 MG: 1 TABLET ORAL at 20:57

## 2022-12-10 RX ADMIN — SODIUM CHLORIDE, PRESERVATIVE FREE 10 ML: 5 INJECTION INTRAVENOUS at 13:27

## 2022-12-10 RX ADMIN — ARFORMOTEROL TARTRATE 15 MCG: 15 SOLUTION RESPIRATORY (INHALATION) at 09:02

## 2022-12-10 RX ADMIN — POTASSIUM CHLORIDE 10 MEQ: 750 TABLET, EXTENDED RELEASE ORAL at 20:58

## 2022-12-10 RX ADMIN — POTASSIUM CHLORIDE 10 MEQ: 750 TABLET, EXTENDED RELEASE ORAL at 11:50

## 2022-12-10 RX ADMIN — ASPIRIN 81 MG: 81 TABLET, COATED ORAL at 11:49

## 2022-12-10 RX ADMIN — ACETAMINOPHEN 1000 MG: 500 TABLET ORAL at 15:41

## 2022-12-10 RX ADMIN — OXYCODONE 5 MG: 5 TABLET ORAL at 15:41

## 2022-12-10 RX ADMIN — DILTIAZEM HYDROCHLORIDE 240 MG: 240 CAPSULE, COATED, EXTENDED RELEASE ORAL at 11:50

## 2022-12-10 RX ADMIN — MORPHINE SULFATE 2 MG: 2 INJECTION, SOLUTION INTRAMUSCULAR; INTRAVENOUS at 08:45

## 2022-12-10 RX ADMIN — MONTELUKAST 10 MG: 10 TABLET, FILM COATED ORAL at 11:50

## 2022-12-10 RX ADMIN — FAMOTIDINE 20 MG: 20 TABLET, FILM COATED ORAL at 11:49

## 2022-12-10 RX ADMIN — ESCITALOPRAM OXALATE 10 MG: 10 TABLET ORAL at 20:58

## 2022-12-10 RX ADMIN — SODIUM CHLORIDE, PRESERVATIVE FREE 10 ML: 5 INJECTION INTRAVENOUS at 03:01

## 2022-12-10 RX ADMIN — SODIUM CHLORIDE, PRESERVATIVE FREE 10 ML: 5 INJECTION INTRAVENOUS at 20:57

## 2022-12-10 RX ADMIN — ENOXAPARIN SODIUM 40 MG: 100 INJECTION SUBCUTANEOUS at 11:48

## 2022-12-10 RX ADMIN — LORAZEPAM 2 MG: 1 TABLET ORAL at 11:50

## 2022-12-10 RX ADMIN — BUDESONIDE 500 MCG: 0.5 SUSPENSION RESPIRATORY (INHALATION) at 09:02

## 2022-12-10 RX ADMIN — SODIUM CHLORIDE, PRESERVATIVE FREE 10 ML: 5 INJECTION INTRAVENOUS at 08:45

## 2022-12-10 RX ADMIN — DILTIAZEM HYDROCHLORIDE 240 MG: 240 CAPSULE, COATED, EXTENDED RELEASE ORAL at 20:58

## 2022-12-10 RX ADMIN — BUDESONIDE 500 MCG: 0.5 SUSPENSION RESPIRATORY (INHALATION) at 19:44

## 2022-12-10 RX ADMIN — ATORVASTATIN CALCIUM 10 MG: 10 TABLET, FILM COATED ORAL at 20:58

## 2022-12-10 RX ADMIN — MORPHINE SULFATE 2 MG: 2 INJECTION, SOLUTION INTRAMUSCULAR; INTRAVENOUS at 03:01

## 2022-12-10 ASSESSMENT — PAIN - FUNCTIONAL ASSESSMENT
PAIN_FUNCTIONAL_ASSESSMENT: PREVENTS OR INTERFERES WITH MANY ACTIVE NOT PASSIVE ACTIVITIES

## 2022-12-10 ASSESSMENT — PAIN DESCRIPTION - ORIENTATION
ORIENTATION: POSTERIOR
ORIENTATION: MID;LOWER

## 2022-12-10 ASSESSMENT — PAIN DESCRIPTION - ONSET
ONSET: ON-GOING

## 2022-12-10 ASSESSMENT — PAIN DESCRIPTION - FREQUENCY
FREQUENCY: CONTINUOUS

## 2022-12-10 ASSESSMENT — PAIN DESCRIPTION - DESCRIPTORS
DESCRIPTORS: ACHING;SHARP;SHOOTING;SORE
DESCRIPTORS: ACHING
DESCRIPTORS: ACHING;SHARP;SHOOTING;SORE

## 2022-12-10 ASSESSMENT — PAIN DESCRIPTION - LOCATION
LOCATION: BACK

## 2022-12-10 ASSESSMENT — PAIN SCALES - GENERAL
PAINLEVEL_OUTOF10: 10
PAINLEVEL_OUTOF10: 10
PAINLEVEL_OUTOF10: 8
PAINLEVEL_OUTOF10: 10
PAINLEVEL_OUTOF10: 5
PAINLEVEL_OUTOF10: 7
PAINLEVEL_OUTOF10: 6
PAINLEVEL_OUTOF10: 10

## 2022-12-10 ASSESSMENT — PAIN DESCRIPTION - PAIN TYPE
TYPE: ACUTE PAIN

## 2022-12-10 NOTE — PROGRESS NOTES
Neurosurgery Attending    Discussed patient's condition with the daughter and She wants her mother to have kyphoplasty. I advised her that we need to try conservative therapy first and she has TLSO brace and pain meds ordered. The patient is know to Dr. Nica Maldonado and I will put in a consult for Dr. Nica Maldonado to see her for continuity.   Will order bone scan to determine acuity of fracture as she cant' have MRI due to spinal cord stimulator    Shakeel Levine MD

## 2022-12-10 NOTE — PROGRESS NOTES
Spoke with nuc med, pt to have bone scan tomorrow, she does not need to be npo and no prep is needed per nuc med.

## 2022-12-10 NOTE — PROGRESS NOTES
Hospitalist Progress Note      Synopsis: Patient admitted on 158/2022  70-year-old  female who presented to the emergency room complaining of pain in low back x2 weeks. She denies injury. She had onset of pain. She was seen in the emergency room. She was diagnosed with compression fracture, discharged home on pain meds. She then had nausea, decreased appetite, weakness, left hip pain, low back pain. She presented again to the emergency room and was admitted for further evaluation and treatment. Subjective    Poorly reports continued back pain has not tried TLSO brace yet  No CP or SOB  No fever or chills   + Uncontrolled pain  No vomiting or diarrhea   No events reported overnight     Exam:  /60   Pulse (!) 112   Temp 99.9 °F (37.7 °C) (Axillary)   Resp 18   Ht 5' 1\" (1.549 m)   Wt 150 lb (68 kg)   SpO2 99%   BMI 28.34 kg/m²   General appearance: No apparent distress, appears stated age and cooperative. HEENT: Pupils equal, round, and reactive to light. Conjunctivae/corneas clear. Neck: Supple. No jugular venous distention. Trachea midline. Respiratory:  Normal respiratory effort. Clear to auscultation, bilaterally without Rales/Wheezes/Rhonchi. Cardiovascular: Regular rate and rhythm with normal S1/S2 without murmurs, rubs or gallops. Abdomen: Soft, non-tender, non-distended with normal bowel sounds. Musculoskeletal: No clubbing, cyanosis or edema bilaterally. Brisk capillary refill. 2+radial pulses.    Skin:  No rashes    Neurologic: awake, alert and following commands    Medications:  Reviewed    Infusion Medications    sodium chloride       Scheduled Medications    aspirin  81 mg Oral Daily    atorvastatin  10 mg Oral Nightly    dilTIAZem  240 mg Oral BID    escitalopram  10 mg Oral Nightly    famotidine  20 mg Oral BID    fluticasone  1 spray Each Nostril Daily    LORazepam  2 mg Oral TID    montelukast  10 mg Oral Daily    potassium chloride  10 mEq Oral BID    predniSONE 20 mg Oral Daily    sodium chloride flush  5-40 mL IntraVENous 2 times per day    enoxaparin  40 mg SubCUTAneous Daily    budesonide  0.5 mg Nebulization BID    Arformoterol Tartrate  15 mcg Nebulization BID     PRN Meds: sodium chloride flush, sodium chloride, ondansetron **OR** ondansetron, polyethylene glycol, acetaminophen **OR** acetaminophen, albuterol, morphine, traMADol    I/O    Intake/Output Summary (Last 24 hours) at 12/10/2022 1128  Last data filed at 12/9/2022 1641  Gross per 24 hour   Intake 120 ml   Output --   Net 120 ml       Labs:   Recent Labs     12/08/22  1605   WBC 5.8   HGB 10.0*   HCT 33.6*   *       Recent Labs     12/08/22  1605      K 3.5      CO2 22   BUN 8   CREATININE 0.6   CALCIUM 8.7       Recent Labs     12/08/22  1605   PROT 6.1*   ALKPHOS 98   ALT 13   AST 30   BILITOT <0.2   LIPASE 67*       No results for input(s): INR in the last 72 hours. No results for input(s): Sangeetha Bigness in the last 72 hours. Chronic labs:  Lab Results   Component Value Date    TSH 3.700 12/11/2018    INR 1.2 05/28/2017       Radiology:  Imaging studies reviewed today. ASSESSMENT:    Principal Problem:    Compression fracture of lumbar spine, non-traumatic, initial encounter ([facility] [address]  Active Problems:    Asthma    Compression fracture of T12 vertebra (HCC)    Mixed hyperlipidemia    HTN (hypertension), benign    Anxiety disorder    Chronic back pain  Resolved Problems:    * No resolved hospital problems. *       PLAN:  Admit  Pain control-medications adjusted  Nsurg consult appreciated-recommending TLSO brace  Medications for other co morbidities cont as appropriate w dosage adjustments as necessary  Diet: ADULT DIET;  Regular  ADULT ORAL NUTRITION SUPPLEMENT; Breakfast, Lunch, Dinner; Standard High Calorie/High Protein Oral Supplement  Code Status: Full Code  PT/OT Eval Status:     DVT Prophylaxis:     Recommended disposition at discharge:  home versus  ECF as per the patient's progress.    +++++++++++++++++++++++++++++++++++++++++++++++++  Ky Young MD   [facility].  +++++++++++++++++++++++++++++++++++++++++++++++++  NOTE: This report was transcribed using voice recognition software. Every effort was made to ensure accuracy; however, inadvertent computerized transcription errors may be present.

## 2022-12-10 NOTE — PROGRESS NOTES
Physical Therapy    PT consult to evaluate/treat received and appreciated. Pt chart reviewed and evaluation attempted. Pt is currently refusing d/t pain. Spent quite a bit of time with patient and family going over spinal precautions, use of TLSO brace, and proper positioning in bed. Pt declines ability to participate. Yelling out and tearful d/t pain with PT even trying to adjust her in bed. Will check back as able. Thank you.         Anna Styles, PT, DPT   [id number]

## 2022-12-11 ENCOUNTER — APPOINTMENT (OUTPATIENT)
Dept: NUCLEAR MEDICINE | Age: 77
End: 2022-12-11
Payer: MEDICARE

## 2022-12-11 PROCEDURE — 94640 AIRWAY INHALATION TREATMENT: CPT

## 2022-12-11 PROCEDURE — 78306 BONE IMAGING WHOLE BODY: CPT | Performed by: NEUROLOGICAL SURGERY

## 2022-12-11 PROCEDURE — 2060000000 HC ICU INTERMEDIATE R&B

## 2022-12-11 PROCEDURE — 2700000000 HC OXYGEN THERAPY PER DAY

## 2022-12-11 PROCEDURE — 6360000002 HC RX W HCPCS: Performed by: INTERNAL MEDICINE

## 2022-12-11 PROCEDURE — A9503 TC99M MEDRONATE: HCPCS | Performed by: RADIOLOGY

## 2022-12-11 PROCEDURE — 78306 BONE IMAGING WHOLE BODY: CPT | Performed by: RADIOLOGY

## 2022-12-11 PROCEDURE — 2580000003 HC RX 258: Performed by: INTERNAL MEDICINE

## 2022-12-11 PROCEDURE — 6370000000 HC RX 637 (ALT 250 FOR IP): Performed by: INTERNAL MEDICINE

## 2022-12-11 PROCEDURE — 3430000000 HC RX DIAGNOSTIC RADIOPHARMACEUTICAL: Performed by: RADIOLOGY

## 2022-12-11 RX ORDER — MAGNESIUM SULFATE IN WATER 40 MG/ML
2000 INJECTION, SOLUTION INTRAVENOUS ONCE
Status: COMPLETED | OUTPATIENT
Start: 2022-12-11 | End: 2022-12-11

## 2022-12-11 RX ORDER — TC 99M MEDRONATE 20 MG/10ML
28 INJECTION, POWDER, LYOPHILIZED, FOR SOLUTION INTRAVENOUS
Status: COMPLETED | OUTPATIENT
Start: 2022-12-11 | End: 2022-12-11

## 2022-12-11 RX ADMIN — TC 99M MEDRONATE 28 MILLICURIE: 20 INJECTION, POWDER, LYOPHILIZED, FOR SOLUTION INTRAVENOUS at 08:39

## 2022-12-11 RX ADMIN — OXYCODONE 5 MG: 5 TABLET ORAL at 13:20

## 2022-12-11 RX ADMIN — LORAZEPAM 2 MG: 1 TABLET ORAL at 21:16

## 2022-12-11 RX ADMIN — POTASSIUM CHLORIDE 10 MEQ: 750 TABLET, EXTENDED RELEASE ORAL at 10:41

## 2022-12-11 RX ADMIN — ARFORMOTEROL TARTRATE 15 MCG: 15 SOLUTION RESPIRATORY (INHALATION) at 19:42

## 2022-12-11 RX ADMIN — SODIUM CHLORIDE, PRESERVATIVE FREE 10 ML: 5 INJECTION INTRAVENOUS at 21:16

## 2022-12-11 RX ADMIN — PREDNISONE 20 MG: 20 TABLET ORAL at 10:41

## 2022-12-11 RX ADMIN — DILTIAZEM HYDROCHLORIDE 240 MG: 240 CAPSULE, COATED, EXTENDED RELEASE ORAL at 21:16

## 2022-12-11 RX ADMIN — OXYCODONE 5 MG: 5 TABLET ORAL at 22:35

## 2022-12-11 RX ADMIN — BUDESONIDE 500 MCG: 0.5 SUSPENSION RESPIRATORY (INHALATION) at 19:42

## 2022-12-11 RX ADMIN — ACETAMINOPHEN 1000 MG: 500 TABLET ORAL at 06:20

## 2022-12-11 RX ADMIN — ATORVASTATIN CALCIUM 10 MG: 10 TABLET, FILM COATED ORAL at 21:16

## 2022-12-11 RX ADMIN — FAMOTIDINE 20 MG: 20 TABLET, FILM COATED ORAL at 10:42

## 2022-12-11 RX ADMIN — SODIUM CHLORIDE, PRESERVATIVE FREE 10 ML: 5 INJECTION INTRAVENOUS at 10:42

## 2022-12-11 RX ADMIN — ESCITALOPRAM OXALATE 10 MG: 10 TABLET ORAL at 21:16

## 2022-12-11 RX ADMIN — LORAZEPAM 2 MG: 1 TABLET ORAL at 16:13

## 2022-12-11 RX ADMIN — MAGNESIUM SULFATE HEPTAHYDRATE 2000 MG: 40 INJECTION, SOLUTION INTRAVENOUS at 16:19

## 2022-12-11 RX ADMIN — ACETAMINOPHEN 1000 MG: 500 TABLET ORAL at 21:16

## 2022-12-11 RX ADMIN — ACETAMINOPHEN 1000 MG: 500 TABLET ORAL at 16:13

## 2022-12-11 RX ADMIN — DILTIAZEM HYDROCHLORIDE 240 MG: 240 CAPSULE, COATED, EXTENDED RELEASE ORAL at 10:41

## 2022-12-11 RX ADMIN — MONTELUKAST 10 MG: 10 TABLET, FILM COATED ORAL at 10:42

## 2022-12-11 RX ADMIN — POTASSIUM CHLORIDE 10 MEQ: 750 TABLET, EXTENDED RELEASE ORAL at 21:16

## 2022-12-11 RX ADMIN — ASPIRIN 81 MG: 81 TABLET, COATED ORAL at 10:41

## 2022-12-11 RX ADMIN — BUDESONIDE 500 MCG: 0.5 SUSPENSION RESPIRATORY (INHALATION) at 08:01

## 2022-12-11 RX ADMIN — ENOXAPARIN SODIUM 40 MG: 100 INJECTION SUBCUTANEOUS at 10:42

## 2022-12-11 RX ADMIN — ARFORMOTEROL TARTRATE 15 MCG: 15 SOLUTION RESPIRATORY (INHALATION) at 08:01

## 2022-12-11 RX ADMIN — FAMOTIDINE 20 MG: 20 TABLET, FILM COATED ORAL at 21:16

## 2022-12-11 RX ADMIN — LORAZEPAM 2 MG: 1 TABLET ORAL at 10:46

## 2022-12-11 ASSESSMENT — PAIN SCALES - GENERAL
PAINLEVEL_OUTOF10: 5
PAINLEVEL_OUTOF10: 6
PAINLEVEL_OUTOF10: 7
PAINLEVEL_OUTOF10: 6
PAINLEVEL_OUTOF10: 10
PAINLEVEL_OUTOF10: 7

## 2022-12-11 ASSESSMENT — PAIN DESCRIPTION - ONSET
ONSET: ON-GOING

## 2022-12-11 ASSESSMENT — PAIN DESCRIPTION - DESCRIPTORS
DESCRIPTORS: SORE;SHARP;ACHING
DESCRIPTORS: ACHING;DISCOMFORT;SORE
DESCRIPTORS: ACHING
DESCRIPTORS: ACHING;DISCOMFORT;SORE
DESCRIPTORS: ACHING
DESCRIPTORS: ACHING;SHARP;SORE

## 2022-12-11 ASSESSMENT — PAIN DESCRIPTION - FREQUENCY
FREQUENCY: CONTINUOUS

## 2022-12-11 ASSESSMENT — PAIN - FUNCTIONAL ASSESSMENT
PAIN_FUNCTIONAL_ASSESSMENT: PREVENTS OR INTERFERES SOME ACTIVE ACTIVITIES AND ADLS

## 2022-12-11 ASSESSMENT — PAIN DESCRIPTION - ORIENTATION
ORIENTATION: POSTERIOR
ORIENTATION: POSTERIOR
ORIENTATION: LEFT
ORIENTATION: RIGHT;LEFT

## 2022-12-11 ASSESSMENT — PAIN DESCRIPTION - PAIN TYPE
TYPE: ACUTE PAIN

## 2022-12-11 ASSESSMENT — PAIN DESCRIPTION - LOCATION
LOCATION: BACK;HIP
LOCATION: BACK
LOCATION: BACK
LOCATION: BACK;HIP
LOCATION: BACK;HIP
LOCATION: HIP

## 2022-12-11 NOTE — PROGRESS NOTES
Physical Therapy    PT consult to evaluate/treat received and appreciated. Pt chart reviewed and evaluation attempted. Pt off unit following three separate attempts at PT evaluation this date. Also of note, pt awaiting results of bone scan and consult to Dr. Deven Anna with questionable surgical intervention. Will check back as able. Thank you.         Patricia Pichardo, PT, DPT   [id number]

## 2022-12-11 NOTE — PROGRESS NOTES
Occupational Therapy  Attempted OT eval 2x this date, however, pt remains off unit for bone scan. Will Await results of Bone scan and any subsequent recommendations from NS prior to engaging pt in upright ax.   Thank you for this referral JOE Howard, OTR/L  # [id number]

## 2022-12-11 NOTE — PROGRESS NOTES
Hospital Medicine    Subjective:  pt states pain better today scheduled for bone scan today      Current Facility-Administered Medications:     magnesium sulfate 2000 mg in 50 mL IVPB premix, 2,000 mg, IntraVENous, Once, Fawn Angel DO    acetaminophen (TYLENOL) tablet 1,000 mg, 1,000 mg, Oral, 3 times per day, Yusuf Bella MD, 1,000 mg at 12/11/22 9296    oxyCODONE (ROXICODONE) immediate release tablet 2.5 mg, 2.5 mg, Oral, Q6H PRN **OR** oxyCODONE (ROXICODONE) immediate release tablet 5 mg, 5 mg, Oral, Q6H PRN, Yusuf Bella MD, 5 mg at 12/10/22 1541    morphine (PF) injection 2 mg, 2 mg, IntraVENous, Q4H PRN, Yusuf Bella MD    aspirin EC tablet 81 mg, 81 mg, Oral, Daily, Ky Rivers, DO, 81 mg at 12/10/22 1149    atorvastatin (LIPITOR) tablet 10 mg, 10 mg, Oral, Nightly, Ky Carballoilling Malmer, DO, 10 mg at 12/10/22 2058    dilTIAZem (CARDIZEM CD) extended release capsule 240 mg, 240 mg, Oral, BID, Ky Hutchinsonmer, DO, 240 mg at 12/10/22 2058    escitalopram (LEXAPRO) tablet 10 mg, 10 mg, Oral, Nightly, Lorosey Hutchinsonmer, DO, 10 mg at 12/10/22 2058    famotidine (PEPCID) tablet 20 mg, 20 mg, Oral, BID, Lorosey Hutchinsonmer, DO, 20 mg at 12/10/22 2058    fluticasone (FLONASE) 50 MCG/ACT nasal spray 1 spray, 1 spray, Each Nostril, Daily, Ky Rivers DO    LORazepam (ATIVAN) tablet 2 mg, 2 mg, Oral, TID, Lorosey Rivers, DO, 2 mg at 12/10/22 2057    montelukast (SINGULAIR) tablet 10 mg, 10 mg, Oral, Daily, Ky Rivers, DO, 10 mg at 12/10/22 1150    potassium chloride (KLOR-CON M) extended release tablet 10 mEq, 10 mEq, Oral, BID, Ky Rivers, DO, 10 mEq at 12/10/22 2058    predniSONE (DELTASONE) tablet 20 mg, 20 mg, Oral, Daily, Ky Rivers, DO, 20 mg at 12/10/22 1149    sodium chloride flush 0.9 % injection 5-40 mL, 5-40 mL, IntraVENous, 2 times per day, Ky Rivers, DO, 10 mL at 12/10/22 2057    sodium chloride flush 0.9 % injection 5-40 mL, 5-40 mL, IntraVENous, PRN, Joana Glassing Malmer, DO, 10 mL at 12/10/22 1327    0.9 % sodium chloride infusion, , IntraVENous, PRN, Joana Glassing Malmer, DO    enoxaparin (LOVENOX) injection 40 mg, 40 mg, SubCUTAneous, Daily, Joana Glassing Malmer, DO, 40 mg at 12/10/22 1148    ondansetron (ZOFRAN-ODT) disintegrating tablet 4 mg, 4 mg, Oral, Q8H PRN **OR** ondansetron (ZOFRAN) injection 4 mg, 4 mg, IntraVENous, Q6H PRN, Joana Glassing Malmer, DO    polyethylene glycol (GLYCOLAX) packet 17 g, 17 g, Oral, Daily PRN, Joana Glassing Malmer, DO    acetaminophen (TYLENOL) tablet 650 mg, 650 mg, Oral, Q6H PRN **OR** acetaminophen (TYLENOL) suppository 650 mg, 650 mg, Rectal, Q6H PRN, Joana Glassing Malmer, DO    budesonide (PULMICORT) nebulizer suspension 500 mcg, 0.5 mg, Nebulization, BID, Joana Glassing Malmer, DO, 500 mcg at 12/11/22 0801    Arformoterol Tartrate (BROVANA) nebulizer solution 15 mcg, 15 mcg, Nebulization, BID, Joana Glassing Malmer, DO, 15 mcg at 12/11/22 0801    albuterol (PROVENTIL) nebulizer solution 2.5 mg, 2.5 mg, Nebulization, Q6H PRN, Joana Glassing Malmer, DO    Objective:    /62   Pulse 80   Temp 98 °F (36.7 °C) (Axillary)   Resp 18   Ht 5' 1\" (1.549 m)   Wt 150 lb (68 kg)   SpO2 93%   BMI 28.34 kg/m²     Heart:  reg  Lungs:  ctab  Abd: + bs soft nontender  Extrem:  min edema legs    CBC with Differential:    Lab Results   Component Value Date/Time    WBC 12.0 12/10/2022 03:24 PM    RBC 3.80 12/10/2022 03:24 PM    HGB 9.2 12/10/2022 03:24 PM    HCT 30.8 12/10/2022 03:24 PM     12/10/2022 03:24 PM    MCV 81.1 12/10/2022 03:24 PM    MCH 24.2 12/10/2022 03:24 PM    MCHC 29.9 12/10/2022 03:24 PM    RDW 18.2 12/10/2022 03:24 PM    NRBC 0.9 05/28/2017 04:10 AM    SEGSPCT 61 04/02/2012 08:30 AM    LYMPHOPCT 22.9 12/08/2022 04:05 PM    MONOPCT 14.8 12/08/2022 04:05 PM    BASOPCT 1.0 12/08/2022 04:05 PM    MONOSABS 0.86 12/08/2022 04:05 PM    LYMPHSABS 1.33 12/08/2022 04:05 PM    EOSABS 0.41 12/08/2022 04:05 PM    BASOSABS 0.06 12/08/2022 04:05 PM CMP:    Lab Results   Component Value Date/Time     12/10/2022 03:24 PM    K 3.7 12/10/2022 03:24 PM    K 3.5 12/08/2022 04:05 PM    CL 98 12/10/2022 03:24 PM    CO2 21 12/10/2022 03:24 PM    BUN 8 12/10/2022 03:24 PM    CREATININE 0.6 12/10/2022 03:24 PM    GFRAA >60 01/12/2019 04:00 AM    LABGLOM >60 12/10/2022 03:24 PM    GLUCOSE 95 12/10/2022 03:24 PM    GLUCOSE 85 04/02/2012 08:30 AM    PROT 6.1 12/08/2022 04:05 PM    LABALBU 3.0 12/08/2022 04:05 PM    LABALBU 3.6 04/02/2012 08:30 AM    CALCIUM 8.2 12/10/2022 03:24 PM    BILITOT <0.2 12/08/2022 04:05 PM    ALKPHOS 98 12/08/2022 04:05 PM    AST 30 12/08/2022 04:05 PM    ALT 13 12/08/2022 04:05 PM     Warfarin PT/INR:    Lab Results   Component Value Date    INR 1.2 05/28/2017    INR 0.9 09/23/2015    INR 1.0 06/30/2014    PROTIME 13.2 (H) 05/28/2017    PROTIME 10.1 09/23/2015    PROTIME 10.0 06/30/2014       Assessment:    Principal Problem:    Compression [facility] of lumbar spine, non[facility]traumatic[facility]  Active Problems:    Asthma    Compression fracture of T12 vertebra (HCC)    Mixed hyperlipidemia    HTN (hypertension), benign    Anxiety disorder    Chronic back pain  Resolved Problems:    * No resolved hospital problems. *      Plan:   For bone scan        Estela Georges DO  10:28 AM  12/11/2022

## 2022-12-12 LAB
ANION GAP SERPL CALCULATED.3IONS-SCNC: 10 MMOL/L (ref 7–16)
BUN BLDV-MCNC: 20 MG/DL (ref 6–23)
CALCIUM SERPL-MCNC: 8.5 MG/DL (ref 8.6–10.2)
CHLORIDE BLD-SCNC: 102 MMOL/L (ref 98–107)
CO2: 23 MMOL/L (ref 22–29)
CREAT SERPL-MCNC: 0.7 MG/DL (ref 0.5–1)
GFR SERPL CREATININE-BSD FRML MDRD: >60 ML/MIN/1.73
GLUCOSE BLD-MCNC: 175 MG/DL (ref 74–99)
MAGNESIUM: 2.5 MG/DL (ref 1.6–2.6)
POTASSIUM SERPL-SCNC: 4.2 MMOL/L (ref 3.5–5)
SODIUM BLD-SCNC: 135 MMOL/L (ref 132–146)

## 2022-12-12 PROCEDURE — 80048 BASIC METABOLIC PNL TOTAL CA: CPT

## 2022-12-12 PROCEDURE — 2580000003 HC RX 258: Performed by: INTERNAL MEDICINE

## 2022-12-12 PROCEDURE — 2060000000 HC ICU INTERMEDIATE R&B

## 2022-12-12 PROCEDURE — 6370000000 HC RX 637 (ALT 250 FOR IP): Performed by: INTERNAL MEDICINE

## 2022-12-12 PROCEDURE — 94640 AIRWAY INHALATION TREATMENT: CPT

## 2022-12-12 PROCEDURE — 2700000000 HC OXYGEN THERAPY PER DAY

## 2022-12-12 PROCEDURE — 6360000002 HC RX W HCPCS: Performed by: INTERNAL MEDICINE

## 2022-12-12 PROCEDURE — 83735 ASSAY OF MAGNESIUM: CPT

## 2022-12-12 PROCEDURE — 36415 COLL VENOUS BLD VENIPUNCTURE: CPT

## 2022-12-12 RX ORDER — DOCUSATE SODIUM 100 MG/1
100 CAPSULE, LIQUID FILLED ORAL 2 TIMES DAILY
Status: DISCONTINUED | OUTPATIENT
Start: 2022-12-12 | End: 2022-12-15 | Stop reason: HOSPADM

## 2022-12-12 RX ADMIN — POTASSIUM CHLORIDE 10 MEQ: 750 TABLET, EXTENDED RELEASE ORAL at 10:31

## 2022-12-12 RX ADMIN — DOCUSATE SODIUM 100 MG: 100 CAPSULE, LIQUID FILLED ORAL at 10:31

## 2022-12-12 RX ADMIN — DILTIAZEM HYDROCHLORIDE 240 MG: 240 CAPSULE, COATED, EXTENDED RELEASE ORAL at 20:43

## 2022-12-12 RX ADMIN — LORAZEPAM 2 MG: 1 TABLET ORAL at 15:20

## 2022-12-12 RX ADMIN — LORAZEPAM 1 MG: 1 TABLET ORAL at 20:27

## 2022-12-12 RX ADMIN — OXYCODONE 2.5 MG: 5 TABLET ORAL at 20:29

## 2022-12-12 RX ADMIN — POTASSIUM CHLORIDE 10 MEQ: 750 TABLET, EXTENDED RELEASE ORAL at 20:27

## 2022-12-12 RX ADMIN — FAMOTIDINE 20 MG: 20 TABLET, FILM COATED ORAL at 10:31

## 2022-12-12 RX ADMIN — ACETAMINOPHEN 1000 MG: 500 TABLET ORAL at 06:07

## 2022-12-12 RX ADMIN — LORAZEPAM 2 MG: 1 TABLET ORAL at 10:31

## 2022-12-12 RX ADMIN — ACETAMINOPHEN 1000 MG: 500 TABLET ORAL at 20:26

## 2022-12-12 RX ADMIN — PREDNISONE 20 MG: 20 TABLET ORAL at 10:31

## 2022-12-12 RX ADMIN — SODIUM CHLORIDE, PRESERVATIVE FREE 10 ML: 5 INJECTION INTRAVENOUS at 20:32

## 2022-12-12 RX ADMIN — BUDESONIDE 500 MCG: 0.5 SUSPENSION RESPIRATORY (INHALATION) at 08:47

## 2022-12-12 RX ADMIN — ACETAMINOPHEN 1000 MG: 500 TABLET ORAL at 15:20

## 2022-12-12 RX ADMIN — ENOXAPARIN SODIUM 40 MG: 100 INJECTION SUBCUTANEOUS at 10:31

## 2022-12-12 RX ADMIN — OXYCODONE 5 MG: 5 TABLET ORAL at 12:36

## 2022-12-12 RX ADMIN — SODIUM CHLORIDE, PRESERVATIVE FREE 10 ML: 5 INJECTION INTRAVENOUS at 10:33

## 2022-12-12 RX ADMIN — ESCITALOPRAM OXALATE 10 MG: 10 TABLET ORAL at 20:32

## 2022-12-12 RX ADMIN — BUDESONIDE 500 MCG: 0.5 SUSPENSION RESPIRATORY (INHALATION) at 19:44

## 2022-12-12 RX ADMIN — MONTELUKAST 10 MG: 10 TABLET, FILM COATED ORAL at 10:31

## 2022-12-12 RX ADMIN — DOCUSATE SODIUM 100 MG: 100 CAPSULE, LIQUID FILLED ORAL at 20:31

## 2022-12-12 RX ADMIN — FAMOTIDINE 20 MG: 20 TABLET, FILM COATED ORAL at 20:29

## 2022-12-12 RX ADMIN — DILTIAZEM HYDROCHLORIDE 240 MG: 240 CAPSULE, COATED, EXTENDED RELEASE ORAL at 10:31

## 2022-12-12 RX ADMIN — FLUTICASONE PROPIONATE 1 SPRAY: 50 SPRAY, METERED NASAL at 10:33

## 2022-12-12 RX ADMIN — ARFORMOTEROL TARTRATE 15 MCG: 15 SOLUTION RESPIRATORY (INHALATION) at 08:47

## 2022-12-12 RX ADMIN — ASPIRIN 81 MG: 81 TABLET, COATED ORAL at 10:31

## 2022-12-12 RX ADMIN — ARFORMOTEROL TARTRATE 15 MCG: 15 SOLUTION RESPIRATORY (INHALATION) at 19:44

## 2022-12-12 RX ADMIN — ATORVASTATIN CALCIUM 10 MG: 10 TABLET, FILM COATED ORAL at 20:29

## 2022-12-12 ASSESSMENT — PAIN DESCRIPTION - ORIENTATION
ORIENTATION: POSTERIOR
ORIENTATION: LOWER
ORIENTATION: POSTERIOR

## 2022-12-12 ASSESSMENT — PAIN DESCRIPTION - LOCATION
LOCATION: BACK
LOCATION: BACK
LOCATION: OTHER (COMMENT)
LOCATION: OTHER (COMMENT)
LOCATION: BACK

## 2022-12-12 ASSESSMENT — PAIN SCALES - GENERAL
PAINLEVEL_OUTOF10: 6
PAINLEVEL_OUTOF10: 7
PAINLEVEL_OUTOF10: 0
PAINLEVEL_OUTOF10: 10
PAINLEVEL_OUTOF10: 10
PAINLEVEL_OUTOF10: 7

## 2022-12-12 ASSESSMENT — PAIN - FUNCTIONAL ASSESSMENT
PAIN_FUNCTIONAL_ASSESSMENT: ACTIVITIES ARE NOT PREVENTED
PAIN_FUNCTIONAL_ASSESSMENT: ACTIVITIES ARE NOT PREVENTED

## 2022-12-12 ASSESSMENT — PAIN DESCRIPTION - DESCRIPTORS
DESCRIPTORS: ACHING
DESCRIPTORS: DISCOMFORT;ACHING
DESCRIPTORS: STABBING
DESCRIPTORS: OTHER (COMMENT)
DESCRIPTORS: STABBING

## 2022-12-12 NOTE — CARE COORDINATION
Spoke with patient, she is asking to get up today with therapy. Attempted to reach Dr. Clementina Lerma, he is out to Dr. Jana Harmon. Spoke with her, she will review bone scan when she gets to her office and let the floor know if patient is able to participate with therapy. Patient is hopeful to return home. Will follow up to confirm plan after therapy evals. For questions I can be reached at [phone].  Susette Closs, [place]

## 2022-12-12 NOTE — PROGRESS NOTES
Comprehensive Nutrition Assessment    Type and Reason for Visit:  Initial, Positive Nutrition Screen    Nutrition Recommendations/Plan:   Continue current diet order   Discontinue ONS Ensure Plus HP  Continue to encourage PO intake > 50% of meals      Malnutrition Assessment:  Malnutrition Status: At risk for malnutrition (12/12/22 1139)    Context:  Chronic Illness     Findings of the 6 clinical characteristics of malnutrition:  Energy Intake:  Mild decrease in energy intake   Weight Loss:  Unable to assess (pt reports weight loss, unable to verify per EMR)     Body Fat Loss:  No significant body fat loss   Muscle Mass Loss:  No significant muscle mass loss   Fluid Accumulation:  Unable to assess    Strength:  Not Performed    Nutrition Assessment:    Pt adm w/ lower back/Lt hip pain x ~2wks and noted fatigue w/ N/V x ~3d pta. PMHx HTN, HLD, HH, pancreatic cyst, GERD. Adm w/ L1/T12 Comp. Frx's, awaiting further neuro planning. Pt at risk d/t noted poor appetite/intake pta as well as on admission, improved and consuming 50-75%. Pt states dislike for ONS, requests they be DC'd, not aggreeable to others currently. Will continue current diet, encourage PO intake, Continue to monitor. Nutrition Related Findings:    abd/GI WDL, pt c/o constipation, missing teeth, A&Ox4, BLE +1 non-pitting, I/O's +1.3L Wound Type: None       Current Nutrition Intake & Therapies:    Average Meal Intake: 51-75% (Pt states she orders too much food, cannot finish tray)  Average Supplements Intake: 0% (Pt does not like ONS drinks, not consuming)  ADULT DIET; Regular    Anthropometric Measures:  Height: 5' 1\" (154.9 cm)  Ideal Body Weight (IBW): 105 lbs (48 kg)    Admission Body Weight: 138 lb (62.6 kg) (actual 12/12)  Current Body Weight: 136 lb 9.6 oz (62 kg) (actual 12/12), 130.1 % IBW.  Weight Source: Bed Scale  Current BMI (kg/m2): 25.8  Usual Body Weight: 152 lb (68.9 kg) (actual 12/3/21 per EMR; ~11% loss x ~1yr, non-significant amount per time frame currently)  Weight Adjustment For: No Adjustment  BMI Categories: Overweight (BMI 25.0-29. 9)    Estimated Daily Nutrient Needs:  Energy Requirements Based On: Formula  Weight Used for Energy Requirements: Current  Energy (kcal/day): 1300-1500kcal/day (MSJ 1.2-1.4)  Weight Used for Protein Requirements: Ideal  Protein (g/day): 65-75g/day ( 1.3-1.5g/kg IBW)  Method Used for Fluid Requirements: 1 ml/kcal  Fluid (ml/day): 1300-1500ml/day or 1ml/kcal    Nutrition Diagnosis:   Inadequate oral intake related to acute injury/trauma as evidenced by intake 51-75%, poor intake prior to admission, weight loss (pt reports 20lb weight loss PTA, unable to verify)    Nutrition Interventions:   Food and/or Nutrient Delivery: Continue Current Diet, Discontinue Oral Nutrition Supplement  Nutrition Education/Counseling: No recommendation at this time  Coordination of Nutrition Care: Continue to monitor while inpatient    Goals:  Goals: PO intake 75% or greater, by next RD assessment      Nutrition Monitoring and Evaluation:   Behavioral-Environmental Outcomes: None Identified  Food/Nutrient Intake Outcomes: Food and Nutrient Intake  Physical Signs/Symptoms Outcomes: Biochemical Data, Chewing or Swallowing, GI Status, Constipation, Fluid Status or Edema, Nutrition Focused Physical Findings, Skin, Weight    Discharge Planning:     Too soon to determine     Basim Young, [address]  Contact: [phone]

## 2022-12-12 NOTE — PROGRESS NOTES
[facility]    Subjective:  pt alert conversive c/o constipation      Current Facility-Administered Medications:     docusate sodium (COLACE) capsule 100 mg, 100 mg, Oral, BID, Michelle Ferraris Malmer, DO    acetaminophen (TYLENOL) tablet 1,000 mg, 1,000 mg, Oral, 3 times per day, Breezy Cao MD, 1,000 mg at 12/12/22 9284    oxyCODONE (ROXICODONE) immediate release tablet 2.5 mg, 2.5 mg, Oral, Q6H PRN **OR** oxyCODONE (ROXICODONE) immediate release tablet 5 mg, 5 mg, Oral, Q6H PRN, Breezy Cao MD, 5 mg at 12/11/22 2235    morphine (PF) injection 2 mg, 2 mg, IntraVENous, Q4H PRN, Breezy Cao MD    aspirin EC tablet 81 mg, 81 mg, Oral, Daily, Michelle Ferraris Malmer, DO, 81 mg at 12/11/22 1041    atorvastatin (LIPITOR) tablet 10 mg, 10 mg, Oral, Nightly, Michelle Ferraris Malmer, DO, 10 mg at 12/11/22 2116    dilTIAZem (CARDIZEM CD) extended release capsule 240 mg, 240 mg, Oral, BID, Michelle Ferraris Malmer, DO, 240 mg at 12/11/22 2116    escitalopram (LEXAPRO) tablet 10 mg, 10 mg, Oral, Nightly, Michelle Ferraris Malmer, DO, 10 mg at 12/11/22 2116    famotidine (PEPCID) tablet 20 mg, 20 mg, Oral, BID, Michelle Ferraris Malmer, DO, 20 mg at 12/11/22 2116    fluticasone (FLONASE) 50 MCG/ACT nasal spray 1 spray, 1 spray, Each Nostril, Daily, Michelle Ferraris Malmer, DO    LORazepam (ATIVAN) tablet 2 mg, 2 mg, Oral, TID, Michelle Ferraris Malmer, DO, 2 mg at 12/11/22 2116    montelukast (SINGULAIR) tablet 10 mg, 10 mg, Oral, Daily, Michelle Ferraris Malmer, DO, 10 mg at 12/11/22 1042    potassium chloride (KLOR-CON M) extended release tablet 10 mEq, 10 mEq, Oral, BID, Michelle Ferraris Malmer, DO, 10 mEq at 12/11/22 2116    predniSONE (DELTASONE) tablet 20 mg, 20 mg, Oral, Daily, Michelle Ferraris Malmer, DO, 20 mg at 12/11/22 1041    sodium chloride flush 0.9 % injection 5-40 mL, 5-40 mL, IntraVENous, 2 times per day, Vitaliy Robbins DO, 10 mL at 12/11/22 2116    sodium chloride flush 0.9 % injection 5-40 mL, 5-40 mL, IntraVENous, PRN, Michelle Ferraris Malmer, DO, 10 mL at 12/10/22 1327    0.9 % sodium chloride infusion, , IntraVENous, PRN, Jv Rivers, DO    enoxaparin (LOVENOX) injection 40 mg, 40 mg, SubCUTAneous, Daily, Jv Rivers, DO, 40 mg at 12/11/22 1042    ondansetron (ZOFRAN-ODT) disintegrating tablet 4 mg, 4 mg, Oral, Q8H PRN **OR** ondansetron (ZOFRAN) injection 4 mg, 4 mg, IntraVENous, Q6H PRN, Jv Rivers, DO    polyethylene glycol (GLYCOLAX) packet 17 g, 17 g, Oral, Daily PRN, Jv Rivers DO    acetaminophen (TYLENOL) tablet 650 mg, 650 mg, Oral, Q6H PRN **OR** acetaminophen (TYLENOL) suppository 650 mg, 650 mg, Rectal, Q6H PRN, Jv Rivers, DO    budesonide (PULMICORT) nebulizer suspension 500 mcg, 0.5 mg, Nebulization, BID, Jv Rivers, DO, 500 mcg at 12/12/22 0847    Arformoterol Tartrate (BROVANA) nebulizer solution 15 mcg, 15 mcg, Nebulization, BID, Jv Rivers, DO, 15 mcg at 12/12/22 0847    albuterol (PROVENTIL) nebulizer solution 2.5 mg, 2.5 mg, Nebulization, Q6H PRN, Jv Rivers, DO    Objective:    /61   Pulse 88   Temp 97.5 °F (36.4 °C) (Oral)   Resp 16   Ht 5' 1\" (1.549 m)   Wt 150 lb (68 kg)   SpO2 94%   BMI 28.34 kg/m²     Heart:  reg  Lungs:  ctab  Abd: + bs soft nontender  Extrem:  edema legs    CBC with Differential:    Lab Results   Component Value Date/Time    WBC 12.0 12/10/2022 03:24 PM    RBC 3.80 12/10/2022 03:24 PM    HGB 9.2 12/10/2022 03:24 PM    HCT 30.8 12/10/2022 03:24 PM     12/10/2022 03:24 PM    MCV 81.1 12/10/2022 03:24 PM    MCH 24.2 12/10/2022 03:24 PM    MCHC 29.9 12/10/2022 03:24 PM    RDW 18.2 12/10/2022 03:24 PM    NRBC 0.9 05/28/2017 04:10 AM    SEGSPCT 61 04/02/2012 08:30 AM    LYMPHOPCT 22.9 12/08/2022 04:05 PM    MONOPCT 14.8 12/08/2022 04:05 PM    BASOPCT 1.0 12/08/2022 04:05 PM    MONOSABS 0.86 12/08/2022 04:05 PM    LYMPHSABS 1.33 12/08/2022 04:05 PM    EOSABS 0.41 12/08/2022 04:05 PM    BASOSABS 0.06 12/08/2022 04:05 PM     CMP:    Lab Results   Component Value Date/Time     12/12/2022 04:53 AM    K 4.2 12/12/2022 04:53 AM    K 3.5 12/08/2022 04:05 PM     12/12/2022 04:53 AM    CO2 23 12/12/2022 04:53 AM    BUN 20 12/12/2022 04:53 AM    CREATININE 0.7 12/12/2022 04:53 AM    GFRAA >60 01/12/2019 04:00 AM    LABGLOM >60 12/12/2022 04:53 AM    GLUCOSE 175 12/12/2022 04:53 AM    GLUCOSE 85 04/02/2012 08:30 AM    PROT 6.1 12/08/2022 04:05 PM    LABALBU 3.0 12/08/2022 04:05 PM    LABALBU 3.6 04/02/2012 08:30 AM    CALCIUM 8.5 12/12/2022 04:53 AM    BILITOT <0.2 12/08/2022 04:05 PM    ALKPHOS 98 12/08/2022 04:05 PM    AST 30 12/08/2022 04:05 PM    ALT 13 12/08/2022 04:05 PM     Warfarin PT/INR:    Lab Results   Component Value Date    INR 1.2 05/28/2017    INR 0.9 09/23/2015    INR 1.0 06/30/2014    PROTIME 13.2 (H) 05/28/2017    PROTIME 10.1 09/23/2015    PROTIME 10.0 06/30/2014       Assessment:    Principal Problem:    Compression fracture of lumbar spine, non-traumatic, initial encounter ([facility] [address]  Active Problems:    Asthma    Compression fracture of T12 vertebra (HCC)    Mixed hyperlipidemia    HTN (hypertension), benign    Anxiety disorder    Chronic back pain  Resolved Problems:    * No resolved hospital problems.  *      Plan:  Await input from neurosurgery re bone scan results pt and ot request clearance from neurosurgery prior to starting therapy [id number] [facility], DO  9:00 AM  12/12/2022

## 2022-12-12 NOTE — PROGRESS NOTES
Occupational Therapy  Attempted OT eval this date, however, awaiting NS recommendations s/p bone scan. TLSO at b/s - will attempt Assessment at a later time.   Thank you for this referral JOE Brian, OTR/L  # [id number]

## 2022-12-12 NOTE — PROGRESS NOTES
Physical Therapy    PT order received and medical chart reviewed 12/12. Awaiting recommendations/POC from Dr. Efren Samayoa regarding T12 and L1 compression fxs. Will re-attempt as able. SW notified. Thank you.     Lucie Worthy, PT, DPT  [id number]

## 2022-12-12 NOTE — CONSULTS
Orthopedic Spine Surgery  Consult Note    CHIEF COMPLAINT:  lower back pain, second opinion    HISTORY OF PRESENT ILLNESS:                The patient is a 78 y.o. female  complaining of chronic lower back patient. Patient is a patient of pain management at [facility], and is uncertain if she has an ongoing pain contract. Presently, patient reports back pain started approximately 2-3 weeks ago. Cannot recall inciting event. Patient initially reported to the emergency department for evaluation but later discharged with the diagnosis of T12 compression. Patient returned a few days ago due to persistent pain. Pain is nonradiating however she reports some numbness along the L3 nerve distribution of the right thigh. She also add left hip pain. Denies recent trauma. Denies saddle paresthesias. Denies acute fever, chills, nausea, vomiting , chest pain and shortness of breath. Patient is a previous patient of Dr. Geoffrey Lopez who performed SI joint fusion in 2016 and treatment of thoracic vertebrae compression fracture in 2017. Patient resides at home alone. Utilize cane and walker for ambulatory assistant. Denies significant past social history. Patient expresses that she would like Dr. Kimberly Robbins to follow up with her and determine definitive management regarding her compression fractures. Bone scan was ordered and TLSO brace recommended momentarily.          Past Medical History:        Diagnosis Date    Anxiety     Arthritis     Asthma     Claustrophobia     Dermatitis 02/2017    bilateral legs knees to ankle; follows with [facility]    Fracture 02/2017    \"in Spine\" compression T10 per pt; difficulty laying flat    GERD (gastroesophageal reflux disease)     Hiatal hernia     History of blood transfusion     HTN (hypertension) 4/22/2013    Hyperlipidemia     On aspirin at home     for age per pcp    Pancreatic cyst 5/27/2017    Pneumonia 07/2018    Sleep apnea     SOB (shortness of breath) 4/22/2013    Tachycardia 04/22/2013    follows with Dr Jj De Souza     Past Surgical History:        Procedure Laterality Date    BACK SURGERY  8/2014    has screws in back    CATARACT REMOVAL  12/6/2013, 2/20/2013    [facility] with lens implants    CHOLECYSTECTOMY      JOINT REPLACEMENT  12/16/2016    SI joint fusion Right    OTHER SURGICAL HISTORY  02/09/2017    MRI -     SPINAL FIXATION SURGERY WITH IMPLANT  2013    in [address] Right     UPPER GASTROINTESTINAL ENDOSCOPY  07/10/2017     Current Medications:   Current Facility-Administered Medications: docusate sodium (COLACE) capsule 100 mg, 100 mg, Oral, BID  acetaminophen (TYLENOL) tablet 1,000 mg, 1,000 mg, Oral, 3 times per day  oxyCODONE (ROXICODONE) immediate release tablet 2.5 mg, 2.5 mg, Oral, Q6H PRN **OR** oxyCODONE (ROXICODONE) immediate release tablet 5 mg, 5 mg, Oral, Q6H PRN  morphine (PF) injection 2 mg, 2 mg, IntraVENous, Q4H PRN  aspirin EC tablet 81 mg, 81 mg, Oral, Daily  atorvastatin (LIPITOR) tablet 10 mg, 10 mg, Oral, Nightly  dilTIAZem (CARDIZEM CD) extended release capsule 240 mg, 240 mg, Oral, BID  escitalopram (LEXAPRO) tablet 10 mg, 10 mg, Oral, Nightly  famotidine (PEPCID) tablet 20 mg, 20 mg, Oral, BID  fluticasone (FLONASE) 50 MCG/ACT nasal spray 1 spray, 1 spray, Each Nostril, Daily  LORazepam (ATIVAN) tablet 2 mg, 2 mg, Oral, TID  montelukast (SINGULAIR) tablet 10 mg, 10 mg, Oral, Daily  potassium chloride (KLOR-CON M) extended release tablet 10 mEq, 10 mEq, Oral, BID  predniSONE (DELTASONE) tablet 20 mg, 20 mg, Oral, Daily  sodium chloride flush 0.9 % injection 5-40 mL, 5-40 mL, IntraVENous, 2 times per day  sodium chloride flush 0.9 % injection 5-40 mL, 5-40 mL, IntraVENous, PRN  0.9 % sodium chloride infusion, , IntraVENous, PRN  enoxaparin (LOVENOX) injection 40 mg, 40 mg, SubCUTAneous, Daily  ondansetron (ZOFRAN-ODT) disintegrating tablet 4 mg, 4 mg, Oral, Q8H PRN **OR** ondansetron (ZOFRAN) injection 4 mg, 4 mg, IntraVENous, Q6H PRN  polyethylene glycol (GLYCOLAX) packet 17 g, 17 g, Oral, Daily PRN  acetaminophen (TYLENOL) tablet 650 mg, 650 mg, Oral, Q6H PRN **OR** acetaminophen (TYLENOL) suppository 650 mg, 650 mg, Rectal, Q6H PRN  budesonide (PULMICORT) nebulizer suspension 500 mcg, 0.5 mg, Nebulization, BID  Arformoterol Tartrate (BROVANA) nebulizer solution 15 mcg, 15 mcg, Nebulization, BID  albuterol (PROVENTIL) nebulizer solution 2.5 mg, 2.5 mg, Nebulization, Q6H PRN  Allergies:  Percocet [oxycodone-acetaminophen]    Social History:   TOBACCO:   reports that she has never smoked. She has never used smokeless tobacco.  ETOH:   reports no history of alcohol use. DRUGS:   reports no history of drug use. Family History:       Problem Relation Age of Onset    Stroke Mother     Heart Disease Mother     Hypertension Mother     Other Mother         CHOLECYSTECTOMY; OSTEOPENIA    Heart Disease Father     Hypertension Father     Diabetes Father     Arthritis Father     Asthma Daughter        REVIEW OF SYSTEMS:  CONSTITUTIONAL:  negative for  fevers, chills  EYES:  negative for blurred vision, visual disturbance  HEENT:  negative for  hearing loss, voice change  RESPIRATORY:  negative for  dyspnea, wheezing  CARDIOVASCULAR:  negative for  chest pain, palpitations  GASTROINTESTINAL:  negative for nausea, vomiting  GENITOURINARY:  negative for frequency, urinary incontinence  HEMATOLOGIC/LYMPHATIC:  negative for bleeding and petechiae  MUSCULOSKELETAL:  positive for  arthralgias and pain  NEUROLOGICAL:  negative for headaches, dizziness  BEHAVIOR/PSYCH:  negative for increased agitation and anxiety      PHYSICAL EXAM:    VITALS:  /63   Pulse 92   Temp 98.4 °F (36.9 °C) (Oral)   Resp 20   Ht 5' 1\" (1.549 m)   Wt 136 lb 9.6 oz (62 kg) Comment: Bed Scale 12/12/22  SpO2 93%   BMI 25.81 kg/m²   CONSTITUTIONAL:  Awake, alert, cooperative  Head: NC/AT  Eyes: EOMI  Neck: Trachea M/L  Chest: Symmetric expansion.   No audible wheezes. No accessory muscle use  Abdomen: Soft, non-tender, non-distended    Bilateral upper Extremity:  No evidence of acute skin injury. Skin intact circumferentially  -ve TTP digits, hand, wrist, forearm, elbow, humerus and shoulder Compartments soft and compressible  Full AROM at shoulder, elbow and wrist.   +AIN/PIN/Ulnar nerve function intact grossly  +Radial pulse, Brisk Cap refill, hand warm and perfused  Sensation intact to touch in radial/ulnar/median nerve distributions to hand    Bilateral lower Extremity:  -ve leg length discrepancy, lower extremities does not appear to be externally rotated  Skin intact with no signs of degloving, perineal hematoma swelling or ecchymosis. No lacerations or abrasions visualized. No flank hematoma noted. -ve Log roll and Heel strike  No Instability with external rotational stress  Compartments soft and compressible, calf non-tender  +DP & PT pulses, Brisk Cap refill, Toes warm and perfused  Sensation grossly intact superficial/deep peroneal,saphenous,sural,tibial n. distributions  +GS/TA/EHL. Spine and Pelvis  Tenderness to palpation to the thoracolumbar junction. -TTP at the sacroiliac joint.  PSIS/SI: +/- tenderness  Passive hip ER/IR without pain  GIOVANNY negative  Motor: Grossly intact Bilateral LE  Sensory Grossly intact Bilateral LE  Negative Hoffmans, No clonus, Negative Babinski    DATA:    CBC:   Lab Results   Component Value Date/Time    WBC 12.0 12/10/2022 03:24 PM    RBC 3.80 12/10/2022 03:24 PM    HGB 9.2 12/10/2022 03:24 PM    HCT 30.8 12/10/2022 03:24 PM    MCV 81.1 12/10/2022 03:24 PM    MCH 24.2 12/10/2022 03:24 PM    MCHC 29.9 12/10/2022 03:24 PM    RDW 18.2 12/10/2022 03:24 PM     12/10/2022 03:24 PM    MPV 10.4 12/10/2022 03:24 PM     PT/INR:    Lab Results   Component Value Date/Time    PROTIME 13.2 05/28/2017 04:10 AM    PROTIME 11.7 04/01/2012 03:16 PM    INR 1.2 05/28/2017 04:10 AM       Radiology  AP pelvis demonstrates s/p previous lumbar fusion with interbody fusion. Associated SI fixation with screw. Joint narrowing at the left hip. Possible osteophytes avulsed at the superior rim of the acetabulum. IMPRESSION:  T12 compression fracture  L1 compression fracture    PLAN:  WBAT - BLE per orthopedic standpoint. Will defer weightbearing status to neurosurgery  Recommend conservative treatment for the time being with TLSO brace until definitive treatment can be determined. Pain control per primary  PT/OT - Short Gait training only initially: 6-10 feet. May can progress as patient builds tolerance. No acute orthopedic intervention at this time  DVT prophylaxis: per admitting service Along with pneumatic compression device  We will follow with serial X-rays, and monitor for occult injuries.   Pt can follow up in office in 3-4 weeks, Call office to schedule an appointment  Review and discuss with Dr. Mariely Vegas

## 2022-12-12 NOTE — PLAN OF CARE
Problem: Discharge Planning  Goal: Discharge to home or other facility with appropriate resources  Outcome: Progressing     Problem: Pain  Goal: Verbalizes/displays adequate comfort level or baseline comfort level  Outcome: Progressing     Problem: Skin/Tissue Integrity  Goal: Absence of new skin breakdown  Description: 1. Monitor for areas of redness and/or skin breakdown  2. Assess vascular access sites hourly  3. Every 4-6 hours minimum:  Change oxygen saturation probe site  4. Every 4-6 hours:  If on nasal continuous positive airway pressure, respiratory therapy assess nares and determine need for appliance change or resting period.   Outcome: Progressing     Problem: Safety - Adult  Goal: Free from fall injury  Outcome: Progressing     Problem: ABCDS Injury Assessment  Goal: Absence of physical injury  Outcome: Progressing     Problem: Nutrition Deficit:  Goal: Optimize nutritional status  12/12/2022 1719 by Florian Lackey RN  Outcome: Progressing  12/12/2022 1548 by Nena Waite RD  Outcome: Progressing

## 2022-12-13 ENCOUNTER — APPOINTMENT (OUTPATIENT)
Dept: GENERAL RADIOLOGY | Age: 77
End: 2022-12-13
Payer: MEDICARE

## 2022-12-13 PROCEDURE — 97535 SELF CARE MNGMENT TRAINING: CPT

## 2022-12-13 PROCEDURE — 97530 THERAPEUTIC ACTIVITIES: CPT

## 2022-12-13 PROCEDURE — 2700000000 HC OXYGEN THERAPY PER DAY

## 2022-12-13 PROCEDURE — 2580000003 HC RX 258: Performed by: INTERNAL MEDICINE

## 2022-12-13 PROCEDURE — 6370000000 HC RX 637 (ALT 250 FOR IP): Performed by: INTERNAL MEDICINE

## 2022-12-13 PROCEDURE — 97161 PT EVAL LOW COMPLEX 20 MIN: CPT

## 2022-12-13 PROCEDURE — 6360000002 HC RX W HCPCS: Performed by: INTERNAL MEDICINE

## 2022-12-13 PROCEDURE — 94640 AIRWAY INHALATION TREATMENT: CPT

## 2022-12-13 PROCEDURE — 71110 X-RAY EXAM RIBS BIL 3 VIEWS: CPT

## 2022-12-13 PROCEDURE — 2060000000 HC ICU INTERMEDIATE R&B

## 2022-12-13 PROCEDURE — 97165 OT EVAL LOW COMPLEX 30 MIN: CPT

## 2022-12-13 RX ORDER — HYDROCODONE BITARTRATE AND ACETAMINOPHEN 5; 325 MG/1; MG/1
1 TABLET ORAL EVERY 6 HOURS PRN
Status: DISCONTINUED | OUTPATIENT
Start: 2022-12-13 | End: 2022-12-15 | Stop reason: HOSPADM

## 2022-12-13 RX ADMIN — SODIUM CHLORIDE, PRESERVATIVE FREE 10 ML: 5 INJECTION INTRAVENOUS at 20:40

## 2022-12-13 RX ADMIN — LORAZEPAM 2 MG: 1 TABLET ORAL at 20:35

## 2022-12-13 RX ADMIN — FLUTICASONE PROPIONATE 1 SPRAY: 50 SPRAY, METERED NASAL at 08:27

## 2022-12-13 RX ADMIN — BUDESONIDE 500 MCG: 0.5 SUSPENSION RESPIRATORY (INHALATION) at 20:15

## 2022-12-13 RX ADMIN — DOCUSATE SODIUM 100 MG: 100 CAPSULE, LIQUID FILLED ORAL at 08:24

## 2022-12-13 RX ADMIN — SODIUM CHLORIDE, PRESERVATIVE FREE 10 ML: 5 INJECTION INTRAVENOUS at 08:28

## 2022-12-13 RX ADMIN — LORAZEPAM 2 MG: 1 TABLET ORAL at 08:24

## 2022-12-13 RX ADMIN — ACETAMINOPHEN 1000 MG: 500 TABLET ORAL at 06:06

## 2022-12-13 RX ADMIN — POTASSIUM CHLORIDE 10 MEQ: 750 TABLET, EXTENDED RELEASE ORAL at 20:36

## 2022-12-13 RX ADMIN — DILTIAZEM HYDROCHLORIDE 240 MG: 240 CAPSULE, COATED, EXTENDED RELEASE ORAL at 08:23

## 2022-12-13 RX ADMIN — FAMOTIDINE 20 MG: 20 TABLET, FILM COATED ORAL at 08:25

## 2022-12-13 RX ADMIN — LORAZEPAM 2 MG: 1 TABLET ORAL at 14:30

## 2022-12-13 RX ADMIN — HYDROCODONE BITARTRATE AND ACETAMINOPHEN 1 TABLET: 5; 325 TABLET ORAL at 23:58

## 2022-12-13 RX ADMIN — FAMOTIDINE 20 MG: 20 TABLET, FILM COATED ORAL at 20:36

## 2022-12-13 RX ADMIN — MONTELUKAST 10 MG: 10 TABLET, FILM COATED ORAL at 08:25

## 2022-12-13 RX ADMIN — DILTIAZEM HYDROCHLORIDE 240 MG: 240 CAPSULE, COATED, EXTENDED RELEASE ORAL at 20:35

## 2022-12-13 RX ADMIN — ESCITALOPRAM OXALATE 10 MG: 10 TABLET ORAL at 20:36

## 2022-12-13 RX ADMIN — ATORVASTATIN CALCIUM 10 MG: 10 TABLET, FILM COATED ORAL at 20:35

## 2022-12-13 RX ADMIN — HYDROCODONE BITARTRATE AND ACETAMINOPHEN 1 TABLET: 5; 325 TABLET ORAL at 10:01

## 2022-12-13 RX ADMIN — ARFORMOTEROL TARTRATE 15 MCG: 15 SOLUTION RESPIRATORY (INHALATION) at 20:15

## 2022-12-13 RX ADMIN — ARFORMOTEROL TARTRATE 15 MCG: 15 SOLUTION RESPIRATORY (INHALATION) at 07:51

## 2022-12-13 RX ADMIN — ASPIRIN 81 MG: 81 TABLET, COATED ORAL at 08:25

## 2022-12-13 RX ADMIN — ENOXAPARIN SODIUM 40 MG: 100 INJECTION SUBCUTANEOUS at 08:26

## 2022-12-13 RX ADMIN — BUDESONIDE 500 MCG: 0.5 SUSPENSION RESPIRATORY (INHALATION) at 07:51

## 2022-12-13 RX ADMIN — DOCUSATE SODIUM 100 MG: 100 CAPSULE, LIQUID FILLED ORAL at 20:42

## 2022-12-13 RX ADMIN — POTASSIUM CHLORIDE 10 MEQ: 750 TABLET, EXTENDED RELEASE ORAL at 08:24

## 2022-12-13 RX ADMIN — PREDNISONE 20 MG: 20 TABLET ORAL at 08:24

## 2022-12-13 RX ADMIN — HYDROCODONE BITARTRATE AND ACETAMINOPHEN 1 TABLET: 5; 325 TABLET ORAL at 16:42

## 2022-12-13 ASSESSMENT — PAIN DESCRIPTION - LOCATION
LOCATION: HIP
LOCATION: OTHER (COMMENT)
LOCATION: HIP

## 2022-12-13 ASSESSMENT — PAIN DESCRIPTION - ORIENTATION
ORIENTATION: LEFT
ORIENTATION: OTHER (COMMENT)
ORIENTATION: LEFT

## 2022-12-13 ASSESSMENT — PAIN DESCRIPTION - DESCRIPTORS
DESCRIPTORS: ACHING;DISCOMFORT

## 2022-12-13 ASSESSMENT — PAIN SCALES - GENERAL
PAINLEVEL_OUTOF10: 10
PAINLEVEL_OUTOF10: 10
PAINLEVEL_OUTOF10: 5

## 2022-12-13 ASSESSMENT — PAIN DESCRIPTION - ONSET: ONSET: ON-GOING

## 2022-12-13 ASSESSMENT — PAIN - FUNCTIONAL ASSESSMENT
PAIN_FUNCTIONAL_ASSESSMENT: PREVENTS OR INTERFERES SOME ACTIVE ACTIVITIES AND ADLS
PAIN_FUNCTIONAL_ASSESSMENT: ACTIVITIES ARE NOT PREVENTED

## 2022-12-13 ASSESSMENT — PAIN DESCRIPTION - FREQUENCY: FREQUENCY: CONTINUOUS

## 2022-12-13 NOTE — PROGRESS NOTES
Hospital Medicine    Subjective:  pt alert conversive oseas diet c/o back pain      Current Facility-Administered Medications:     HYDROcodone-acetaminophen (NORCO) 5-325 MG per tablet 1 tablet, 1 tablet, Oral, Q6H PRN, Ketty Jameson Malmer, DO    docusate sodium (COLACE) capsule 100 mg, 100 mg, Oral, BID, Ketty Jameson Malmer, DO, 100 mg at 12/13/22 9177    acetaminophen (TYLENOL) tablet 1,000 mg, 1,000 mg, Oral, 3 times per day, Marisa Mehta MD, 1,000 mg at 12/13/22 2465    aspirin EC tablet 81 mg, 81 mg, Oral, Daily, Ketty Jameson Malmer, DO, 81 mg at 12/13/22 0825    atorvastatin (LIPITOR) tablet 10 mg, 10 mg, Oral, Nightly, Ketty Jameson Malmer, DO, 10 mg at 12/12/22 2029    dilTIAZem (CARDIZEM CD) extended release capsule 240 mg, 240 mg, Oral, BID, Ketty [place] Malmer, DO, 240 mg at 12/13/22 0325    escitalopram (LEXAPRO) tablet 10 mg, 10 mg, Oral, Nightly, Ktety Jameson Malmer, DO, 10 mg at 12/12/22 2032    famotidine (PEPCID) tablet 20 mg, 20 mg, Oral, BID, Ketty Jameson Malmer, DO, 20 mg at 12/13/22 0825    fluticasone (FLONASE) 50 MCG/ACT nasal spray 1 spray, 1 spray, Each Nostril, Daily, Ketty [place] Malmer, DO, 1 spray at 12/13/22 0827    LORazepam (ATIVAN) tablet 2 mg, 2 mg, Oral, TID, Ketty Jameson Malmer, DO, 2 mg at 12/13/22 0824    montelukast (SINGULAIR) tablet 10 mg, 10 mg, Oral, Daily, Ketty Jameson Malmer, DO, 10 mg at 12/13/22 0825    potassium chloride (KLOR-CON M) extended release tablet 10 mEq, 10 mEq, Oral, BID, Ketty Jameson Malmer, DO, 10 mEq at 12/13/22 9410    predniSONE (DELTASONE) tablet 20 mg, 20 mg, Oral, Daily, Ketty Rivers DO, 20 mg at 12/13/22 3711    sodium chloride flush 0.9 % injection 5-40 mL, 5-40 mL, IntraVENous, 2 times per day, Herson Clifton DO, 10 mL at 12/13/22 5743    sodium chloride flush 0.9 % injection 5-40 mL, 5-40 mL, IntraVENous, PRN, Ketty Rivers DO, 10 mL at 12/10/22 1327    0.9 % sodium chloride infusion, , IntraVENous, PRN, Ketty Rivers DO    enoxaparin (LOVENOX) injection 40 mg, 40 mg, SubCUTAneous, Daily, Joana Glassing Malmer, DO, 40 mg at 12/13/22 0826    ondansetron (ZOFRAN-ODT) disintegrating tablet 4 mg, 4 mg, Oral, Q8H PRN **OR** ondansetron (ZOFRAN) injection 4 mg, 4 mg, IntraVENous, Q6H PRN, Joana Glassing Malmer, DO    polyethylene glycol (GLYCOLAX) packet 17 g, 17 g, Oral, Daily PRN, Joana Glassing Malmer, DO    acetaminophen (TYLENOL) tablet 650 mg, 650 mg, Oral, Q6H PRN **OR** acetaminophen (TYLENOL) suppository 650 mg, 650 mg, Rectal, Q6H PRN, Joana Glassing Malmer, DO    budesonide (PULMICORT) nebulizer suspension 500 mcg, 0.5 mg, Nebulization, BID, Joana Glassing Malmer, DO, 500 mcg at 12/13/22 0751    Arformoterol Tartrate (BROVANA) nebulizer solution 15 mcg, 15 mcg, Nebulization, BID, Joana Glassing Malmer, DO, 15 mcg at 12/13/22 0751    albuterol (PROVENTIL) nebulizer solution 2.5 mg, 2.5 mg, Nebulization, Q6H PRN, Joana Glassing Malmer, DO    Objective:    /61   Pulse 97   Temp 98.1 °F (36.7 °C) (Oral)   Resp 14   Ht 5' 1\" (1.549 m)   Wt 136 lb 9.6 oz (62 kg) Comment: Bed Scale 12/12/22  SpO2 93%   BMI 25.81 kg/m²     Heart:  reg  Lungs:  ctab  Abd: + bs soft nontender  Extrem:  edema legs    CBC with Differential:    Lab Results   Component Value Date/Time    WBC 12.0 12/10/2022 03:24 PM    RBC 3.80 12/10/2022 03:24 PM    HGB 9.2 12/10/2022 03:24 PM    HCT 30.8 12/10/2022 03:24 PM     12/10/2022 03:24 PM    MCV 81.1 12/10/2022 03:24 PM    MCH 24.2 12/10/2022 03:24 PM    MCHC 29.9 12/10/2022 03:24 PM    RDW 18.2 12/10/2022 03:24 PM    NRBC 0.9 05/28/2017 04:10 AM    SEGSPCT 61 04/02/2012 08:30 AM    LYMPHOPCT 22.9 12/08/2022 04:05 PM    MONOPCT 14.8 12/08/2022 04:05 PM    BASOPCT 1.0 12/08/2022 04:05 PM    MONOSABS 0.86 12/08/2022 04:05 PM    LYMPHSABS 1.33 12/08/2022 04:05 PM    EOSABS 0.41 12/08/2022 04:05 PM    BASOSABS 0.06 12/08/2022 04:05 PM     CMP:    Lab Results   Component Value Date/Time     12/12/2022 04:53 AM    K 4.2 12/12/2022 04:53 AM    K 3.5 12/08/2022 04:05 PM     12/12/2022 04:53 AM    CO2 23 12/12/2022 04:53 AM    BUN 20 12/12/2022 04:53 AM    CREATININE 0.7 12/12/2022 04:53 AM    GFRAA >60 01/12/2019 04:00 AM    LABGLOM >60 12/12/2022 04:53 AM    GLUCOSE 175 12/12/2022 04:53 AM    GLUCOSE 85 04/02/2012 08:30 AM    PROT 6.1 12/08/2022 04:05 PM    LABALBU 3.0 12/08/2022 04:05 PM    LABALBU 3.6 04/02/2012 08:30 AM    CALCIUM 8.5 12/12/2022 04:53 AM    BILITOT <0.2 12/08/2022 04:05 PM    ALKPHOS 98 12/08/2022 04:05 PM    AST 30 12/08/2022 04:05 PM    ALT 13 12/08/2022 04:05 PM     Warfarin PT/INR:    Lab Results   Component Value Date    INR 1.2 05/28/2017    INR 0.9 09/23/2015    INR 1.0 06/30/2014    PROTIME 13.2 (H) 05/28/2017    PROTIME 10.1 09/23/2015    PROTIME 10.0 06/30/2014       Assessment:    Principal Problem:    Compression fracture of lumbar spine, non-traumatic, initial encounter ([facility] [address]  Active Problems:    Asthma    Compression fracture of T12 vertebra (HCC)    Mixed hyperlipidemia    HTN (hypertension), benign    Anxiety disorder    Chronic back pain  Resolved Problems:    * No resolved hospital problems.  *      Plan:  Increase activity dc planning xray ribs pt/ot eval check pulsox on rm air        Jacquiline Medici, DO  8:42 AM  12/13/2022

## 2022-12-13 NOTE — CARE COORDINATION
Spoke with patient after PT/OT. She feels too weak to return home. Discussed JAIR while on speaker phone with daughter. Choiced for [facility], referral pending, 2. [facility]. Called [facility] as well, they are willing to accept if [facility] cannot. Will have facility start auth once OT note available. Pasrr and ambulette on soft chart. For questions I can be reached at [phone].  [facility]

## 2022-12-13 NOTE — PROGRESS NOTES
[address]      [id number]                                                  Patient Name: Colby Spencer  MRN: [id number]  : 1945  Room: [address] [place] [address]    Evaluating OT: Florian RennerJOE, OTR/L  # [id number]    Referring Provider:  Bradford Díaz DO  Specific Provider Orders:  Henrietta Stanley and Treat\"  22    Diagnosis: Intractable low back pain [M54.59]  Compression fracture of lumbar spine, non-traumatic, initial encounter [facility]) [M48.56XA]  Compression fracture of T12 vertebra, initial encounter ([facility] [address] [A88.750D]  Ambulatory dysfunction [R26.2]  Closed compression fracture of body of L1 vertebra ([facility] [address] [S32.010A]    Pt was admitted w/ LBP, Left Hip Pain, Fatigue, Nausea/Vomiting. Found w/ T12, L1 Compression Fracture    Pertinent Medical History:  Pt has a past medical history of Anxiety, Arthritis, Asthma, Claustrophobia, Dermatitis, Fracture, GERD (gastroesophageal reflux disease), Hiatal hernia, History of blood transfusion, HTN (hypertension), Hyperlipidemia, On aspirin at home, Pancreatic cyst, Pneumonia, Sleep apnea, SOB (shortness of breath), and Tachycardia.,  has a past surgical history that includes Cholecystectomy; Cataract removal (2013, 2013); back surgery (2014); joint replacement (2016); Total knee arthroplasty (Right); other surgical history (2017); Spinal fixation surgery with implant (); and Upper gastrointestinal endoscopy (07/10/2017). Surgeries this admission: None     Precautions:  Fall Risk  Spinal Precautions - Soft TLSO when > 45*  WBAT Roge LE per Ortho  \"Short Gait training only initially: 6-10 feet. Can progress as patient builds tolerance. \"      Assessment of current deficits   [x] Functional mobility  [x]ADLs  [x] Strength               []Cognition   [x] Functional transfers   [x] IADLs         [x] Safety Awareness   [x]Endurance   [] Fine Coordination              [x] Balance     [] Vision/perception   []Sensation    []Gross Motor Coordination  [] ROM  [] Delirium                  [] Motor Control       OT PLAN OF CARE   OT POC based on physician orders, patient diagnosis and results of clinical assessment    Frequency/Duration 1-3 days/wk for 2 weeks PRN   Specific OT Treatment to include:     * Instruction/training on adapted ADL techniques and AE recommendations to increase functional independence within precautions       * Training on energy conservation strategies, correct breathing pattern and techniques to improve independence/tolerance for self-care routine  * Functional transfer/mobility training/DME recommendations for increased independence, safety, and fall prevention  * Patient/Family education to increase follow through with safety techniques and functional independence  * Recommendation of environmental modifications for increased safety with functional transfers/mobility and ADLs  * Cognitive retraining/development of therapeutic activities to improve problem solving, judgement, memory, and attention for increased safety/participation in ADL/IADL tasks  * Therapeutic exercise to improve motor endurance, ROM, and functional strength for ADLs/functional transfers  * Therapeutic activities to facilitate/challenge dynamic balance, stand tolerance for increased safety and independence with ADLs  * Therapeutic activities to facilitate gross/fine motor skills for increased independence with ADLs  * Neuro-muscular re-education: facilitation of righting/equilibrium reactions  * Positioning to improve skin integrity, interaction with environment and functional independence  * Delirium prevention/treatment  * Manual techniques for edema management  Other:    Recommended Adaptive Equipment: TBD as pt progresses - Adaptive equipment, BSC      Home Living:  Pt lives alone in a 1-story house.   Bed/bath on the main floor.  Stair Lift To Vincent Garcia. Bathroom setup:  Tub-Shower, Standard-height Commode   Equipment owned:  [facility], [employer], [employer]), Tub Bench, Raised Commode seat w/ hand-rails, Life-Alert/Emergency Call System    Available Family Assist:  Dtr lives out of [place];  son is in the [employer] and is currently overseas; Pt reported Niece and Sister can provide assist PRN - not 24/7, Hands-on Assist    Prior Level of Function:  Pt reported being IND with all ADL, Most IADLs - Meal Prep, Cleaning/Laudry - Family provides assist carrying Laundry/Groceries up/down steps/ IADLs. Uses Rollator for ambulation. Driving:  Yes - Shopping, outings, errands  Occupation:  None reported    Pain Level:  3/10 L Hip Pain, LBP;  Relief w/ Rest and Repositioning, Nsg Notified   Additional Complaints:  Mild Dizziness/lightheadedness w/ initial transfer to sitting/standing    Cognition: A & O x 4   Able to Follow Multi-Step Commands INDly   Memory:  good    Sequencing:  good    Problem solving:  good    Judgement/safety:  good   Additional Comments:  Pt was pleasant and cooperative.       Vitals/Lab Values:  WFL Room air         Functional Assessment:  AM-PAC Daily Activity Raw Score: 14/24     Initial Eval Status  Date: 12-13-22   Treatment Status  Date: STGs = LTGs  Time frame: 10-14 days   Feeding IND after set up      NA   Grooming Min A/Set up    Min A for hair-care, Able to wash hands/face - seated in chair    Unable to tolerate ambulating to or standing at sink    Mod I  Standing at the TapFwd Max A    Min A to don/doff gown in supine, Max A to don TLSO in supine  Pt ed/demo for safe techs w/in spinal precautions to don Brace    Min A     LB Dressing Max A    Donning socks seated EOB  Mod A to don pants in supine - within Spinal Prec  Pt ed for safe/adaptive techs, use of adaptive equip    Min A     Bathing NT    Pt ed re: Sponge bathing in supine w/o brace vs use of Tub Bench IF NS permits pt to doff brace for bathing while seated - will clarify w/ NS    Min A      Toileting Max A    Simulated - seated/standing    Min A     Bed Mobility  Rolling to facilitate Functional Ax: Min A  Supine to sit: Mod A   Sit to supine:  NT     VCs for safety/log-rolling/adherence to Spinal Precautions    Supine to sit: IND  Sit to supine: IND     Functional Transfers Mod A - EOB  Max A - Armed Chair    Pt ed for safety/hand placement    SUP     Functional Mobility Min A w/ [employer]    Short distances at b/s, limited by weakness/pain, Mild SOB  Pt ed for safety/improved safety awareness, walker safety    SUP     Balance Sitting:     Static:  SUP EOB, Remote SUP in chair    Dynamic:  Close SUP w/ functional ax EOB/Chair     Standing:     Static:  Min A w/ [employer]    Dynamic:  Min A w/ functional ax/mobility w/ [employer]    Sitting:     Static:  IND    Dynamic:  IND w/ functional ax    Standing:     Static:  SUP w/ AD PRN    Dynamic:  SUP w/ functional ax/mobility w/ AD PRN   Activity Tolerance Fair    Able to tolerate extended session - light ax seated EOB ~ 20 mins, Chair extended time, Standing ~ 1 min + 3 mins w/ seated rest break b/t trials    Fair(+)   Visual/  Perceptual    Hearing: WNL   Glasses: Yes - present    WFL   Hearing Aids:  No               Hand Dominance: Right   AROM Strength Additional Info:    RUE  WFL WFL Good ;   Good FMC/dexterity noted during ADL tasks     [facility] WFL Good ;    Good FMC/dexterity noted during ADL tasks       Sensation:  Denies numbness or tingling Roge UEs   Tone: WFL Roge UEs   Edema: None Noted Roge UEs     Comments: Upon arrival, patient was found in supine. She was agreeable to participate in therapeutic ax. No Family present during session. Received permission from RN prior to engaging pt in OT services. Educated pt on role of OT services. At the end of the session, patient was properly positioned in b/s chair. Call light and phone within reach, all lines and tubes intact.   Oriented pt to call bell. Made all appropriate Environmental Modifications to facilitate pt's level of IND and safety. All needs met. RN made aware of pt's position in chair/performance w/ therapy. Overall patient demonstrated decreased independence and safety during completion of ADL/functional transfer/mobility tasks. Pt would benefit from continued skilled OT to increase safety and independence with completion of ADL/IADL tasks for functional independence and quality of life. Treatment: OT treatment provided this date includes:   Instruction/training on safety and adapted techniques for completion of ADLs, use of DME/AD/Adaptive equip: within spinal precautions/donning-doffing TLSO   Instruction/training on safe functional mobility/transfer techniques, use of DME/AD: within spinal precautions   Instruction/training on energy conservation techs (EC)/Pursed-Lip Breathing (PLB)/work simplification for completion of ADLs:     Neuromuscular Reeducation to facilitate balance/righting reactions for increased function with ADLs:    Skilled positioning/alignment for Pain Mgmt, to maximize Pt's safety and ability to safely and INDly interact w/ his/her environment, maximize respiratory status  Activity tolerance - Sitting/Standing to improve endurance w/ functional ax   Cognitive retraining -  Cues for safety/safety awareness, sequencing, problem solving;  educated on Spinal Precautions    Skilled monitoring of Vitals during session and pt's response to tx ax      Pt/family ed re: benefits of participate in post-acute therapy program;      Consulted RN, SW/CM, Family, PT - Session completed in collaboration w/ PT for pt's safety    Made all appropriate Environmental Modifications to facilitate pt's level of IND and safety. Recommendations for Continued Participation in OT services during Hospitalization and at D/C - SNF    Pt and/or Family verbalized/demonstrated a Good understanding of education provided.   Will Review PRN. Rehab Potential: Good for established goals     Patient / Family Goal: Not stated at this time      Patient and/or family were instructed on functional diagnosis, prognosis/goals and OT plan of care. Demonstrated Good understanding. Eval Complexity: Low    Time In: 1120  Time Out: 1220  Total Treatment Time: 45 minutes    Min Units   OT Eval Low 97165  X  1   OT Eval Medium 79668      OT Eval High 94895      OT Re-Eval [id number]       Therapeutic Ex 62656       Therapeutic Activities 01317       ADL/Self Care 54168  45  3   Orthotic Management 80589       Manual 73535     Neuro Re-Ed 09371       Non-Billable Time              Evaluation Time additionally includes thorough review of current medical information, gathering information on past medical history/social history and prior level of function, completion of standardized testing/informal observation of tasks, assessment of data and education on plan of care and goals.             JOE Rodriguez, OTR/L  # [id number]

## 2022-12-13 NOTE — PROGRESS NOTES
Physical Therapy    Initial Assessment     Name: Xu Perry  : 1945  MRN: [id number]      Date of Service: 2022    Evaluating PT: Lucie Worthy PT, DPT [id number]      Room #:  7673/2218-W  Diagnosis:  Intractable low back pain [M54.59]  Compression fracture of lumbar spine, non-traumatic, initial encounter ([facility] [address] [M48.56XA]  [facility] T[facility], [facility] [B48.971L]  Ambulatory dysfunction [R26.2]  Closed compression fracture of body of L1 vertebra ([facility] [address] [W97.741Z]  PMHx/PSHx: Anxiety, asthma, tachycardia, hiatal hernia, OA  Precautions:  Fall risk, T12 and L1 compression fxs, spinal neutrality, soft TLSO    SUBJECTIVE:    Pt lives alone in a single story house with 3 stair(s) and 2 rail(s) to enter. Stair lift to basement laundry. Pt ambulated with rollator prior to admission. Pt owns x2 rollators, a [address], and a cane. OBJECTIVE:   Initial Evaluation  Date: 22 Treatment Date: Short Term/ Long Term   Goals   AM-PAC 6 Clicks 77/31     Was pt agreeable to Eval/treatment? Yes     Does pt have pain? 3/10 L hip pain; lower back pain with activity     Bed Mobility  Rolling: Min A  Supine to sit: Mod A  Sit to supine: NT  Scooting: Mod A to EOB  Rolling: Supervision  Supine to sit: Supervision  Sit to supine: Supervision  Scooting: Supervision   Transfers Sit to stand: Mod A from EOB, Max A from chair  Stand to sit: Mod A  Stand pivot: Min A with [address]  Sit to stand: SBA  Stand to sit: SBA  Stand pivot: SBA with [address]   Ambulation   4 feet forward/backward with [address] with Min A  >50 feet with [address] with SBA   Stair negotiation: ascended and descended NT  3 step(s) with 1 rail(s) with SBA   ROM BUE: Refer to OT note  BLE: WFL     Strength BUE: Refer to OT note  BLE: NT     Balance Sitting EOB: SBA  Dynamic Standing: Min A with [address]  Sitting EOB: Independent   Dynamic Standing: SBA with [address]     Pt is A & O x: 4 to person, place, month/year, and situation.   Sensation: Pt denies numbness and tingling of extremities. Edema: Unremarkable. Patient education  Pt educated on spinal neutrality. Patient response to education:   Pt verbalized understanding Pt demonstrated skill Pt requires further education in this area   Yes With cues Yes      ASSESSMENT:    Conditions Requiring Skilled Therapeutic Intervention:    [x]Decreased strength     []Decreased ROM  [x]Decreased functional mobility  [x]Decreased balance   [x]Decreased endurance   [x]Decreased posture  []Decreased sensation  []Decreased coordination   []Decreased vision  [x]Decreased safety awareness   [x]Increased pain       Comments:    Pt was in bed upon room entry; agreeable to PT evaluation. Pt was pleasant and cooperative. Pt was assisted with donning of TLSO while supine in bed. Pt does well with rolling. Pt used log roll during supine to sit transfer and required assistance for trunk mobility. Sitting balance was Fair. O2 sat was 95% on 2 L O2/min. Pt was removed from O2 for activity and saturation levels ranged from 93-95% on RA. Pt completed stand pivot to chair. Posture was flexed. Steps were small but fairly steady. Pt denied pain when standing. Pt was seated to rest. Pt then completed transfer from chair with increased assistance required. Pt ambulated several feet forward. Pt was limited by back pain and requested to sit. Pt was seated in chair. Pain improved with sitting. Discussed pt's performance and recommendation for rehab at discharge. Pt was reluctant but eventually agreeable to discuss with family. All questions and concerns were addressed. Pt verbalized understanding to call for assistance when she wishes to return to bed. Pt was left in chair with all needs met at conclusion of session. RN notified. Treatment:  Patient practiced and was instructed in the following treatment:    Therapeutic activities:  Bed mobility: Pt was cued for log roll during supine to sit transfer.   Transfers: Pt was cued for hand placement during sit <> stand transfers. Pt completed multiple transfers from various surfaces (EOB x1, chair x1). Ambulation: Pt ambulated with [employer] for short distance. Pt was cued for [employer] technique and posture. Vitals and symptoms were closely monitored throughout session. Education: Pt was educated on spinal neutrality. Pt's/family goals:  1. To return home. Prognosis is Good for reaching above PT goals. Patient and or family understand(s) diagnosis, prognosis, and plan of care. Yes. PHYSICAL THERAPY PLAN OF CARE:    PT POC is established based on physician order and patient diagnosis     Referring provider/PT Order:    Start   Ordering Provider    12/09/22 1515  PT eval and treat  Start:  12/09/22 1515,   End:  12/09/22 1515,   ONE TIME,   Standing Count:  1 Occurrences,   R         Aylin Rivers,       Diagnosis:  Intractable low back pain [M54.59]  Compression fracture of lumbar spine, non-traumatic, initial encounter ([facility] [address] [M48.56XA]  Compression fracture of T12 vertebra, initial encounter ([address] [Q33.915M]  Ambulatory dysfunction [R26.2]  Closed compression fracture of body of L1 vertebra ([address] [S32.010A]  Specific instructions for next treatment:  Progress activity.     Current Treatment Recommendations:     [x] Strengthening to improve independence with functional mobility   [x] ROM to improve independence with functional mobility   [x] Balance Training to improve static/dynamic balance and to reduce fall risk  [x] Endurance Training to improve activity tolerance during functional mobility   [x] Transfer Training to improve safety and independence with all functional transfers   [x] Gait Training to improve gait mechanics, endurance and assess need for appropriate assistive device  [x] Stair Training in preparation for safe discharge home and/or into the community   [x] Positioning to prevent skin breakdown and contractures  [x] Safety and Education Training   [x] Patient/Caregiver Education   [] HEP  [] Other     PT long term treatment goals are located in above grid    Frequency of treatments: 2-5x/week x 1-2 weeks. Time in  1130  Time out  1215    Total Treatment Time  23 minutes     Evaluation Time includes thorough review of current medical information, gathering information on past medical history/social history and prior level of function, completion of standardized testing/informal observation of tasks, assessment of data and education on plan of care and goals.     CPT codes:  [x] Low Complexity PT evaluation 70156  [] Moderate Complexity PT evaluation 16285  [] High Complexity PT evaluation 15182  [] PT Re-evaluation 21662  [] Gait training 37158 0 minutes  [] Manual therapy 29671 0 minutes  [x] Therapeutic activities 22448 23 minutes  [] Therapeutic exercises 57428 0 minutes  [] Neuromuscular reeducation 44690 0 minutes     Kat Diaz, PT, DPT  [id number]

## 2022-12-13 NOTE — PROGRESS NOTES
Daughter and patient at bedside inquiring about bone scan results/cxr results. Page was sent out to Ortho resident Dr. Adriana Goodman. Per Dr. Adriana Goodman, bone scan was ordered by neurosurgery and that team needs to explain the results of the bone scan. Page was then sent out to Dr. Sonal Jackson who is covering for Dr. Bryanna Flanagan. Per Dr. Sonal Jackson, pt follows with Dr. Lyndsey Hanson and bone scan was ordered to determine acuity of the compression fx. Dr. Sonal Jackson is not currently at the hospital at this time and away from a computer to review results. Dr. Sonal Jackson asked to reach out to attending on case if they are able to review and discuss results with patient, and if not, may contact Andra Licea NP or Justin Nguyen NP tomorrow to review and discuss with family results of bone scan. Page sent to Dr. Tyrell Otriz.

## 2022-12-14 ENCOUNTER — APPOINTMENT (OUTPATIENT)
Dept: CT IMAGING | Age: 77
End: 2022-12-14
Payer: MEDICARE

## 2022-12-14 PROCEDURE — 97535 SELF CARE MNGMENT TRAINING: CPT

## 2022-12-14 PROCEDURE — 2060000000 HC ICU INTERMEDIATE R&B

## 2022-12-14 PROCEDURE — 97530 THERAPEUTIC ACTIVITIES: CPT

## 2022-12-14 PROCEDURE — 94640 AIRWAY INHALATION TREATMENT: CPT

## 2022-12-14 PROCEDURE — 2580000003 HC RX 258: Performed by: INTERNAL MEDICINE

## 2022-12-14 PROCEDURE — 6360000002 HC RX W HCPCS: Performed by: INTERNAL MEDICINE

## 2022-12-14 PROCEDURE — 6360000004 HC RX CONTRAST MEDICATION: Performed by: RADIOLOGY

## 2022-12-14 PROCEDURE — 99232 SBSQ HOSP IP/OBS MODERATE 35: CPT | Performed by: NEUROLOGICAL SURGERY

## 2022-12-14 PROCEDURE — 71270 CT THORAX DX C-/C+: CPT

## 2022-12-14 PROCEDURE — 71270 CT THORAX DX C-/C+: CPT | Performed by: RADIOLOGY

## 2022-12-14 PROCEDURE — 74178 CT ABD&PLV WO CNTR FLWD CNTR: CPT | Performed by: RADIOLOGY

## 2022-12-14 PROCEDURE — 74178 CT ABD&PLV WO CNTR FLWD CNTR: CPT

## 2022-12-14 PROCEDURE — 6370000000 HC RX 637 (ALT 250 FOR IP): Performed by: INTERNAL MEDICINE

## 2022-12-14 RX ADMIN — LORAZEPAM 2 MG: 1 TABLET ORAL at 14:05

## 2022-12-14 RX ADMIN — SODIUM CHLORIDE, PRESERVATIVE FREE 10 ML: 5 INJECTION INTRAVENOUS at 20:16

## 2022-12-14 RX ADMIN — DOCUSATE SODIUM 100 MG: 100 CAPSULE, LIQUID FILLED ORAL at 20:15

## 2022-12-14 RX ADMIN — ASPIRIN 81 MG: 81 TABLET, COATED ORAL at 10:27

## 2022-12-14 RX ADMIN — ARFORMOTEROL TARTRATE 15 MCG: 15 SOLUTION RESPIRATORY (INHALATION) at 20:06

## 2022-12-14 RX ADMIN — ARFORMOTEROL TARTRATE 15 MCG: 15 SOLUTION RESPIRATORY (INHALATION) at 07:29

## 2022-12-14 RX ADMIN — HYDROCODONE BITARTRATE AND ACETAMINOPHEN 1 TABLET: 5; 325 TABLET ORAL at 06:08

## 2022-12-14 RX ADMIN — IOPAMIDOL 75 ML: 755 INJECTION, SOLUTION INTRAVENOUS at 21:20

## 2022-12-14 RX ADMIN — DILTIAZEM HYDROCHLORIDE 240 MG: 240 CAPSULE, COATED, EXTENDED RELEASE ORAL at 10:27

## 2022-12-14 RX ADMIN — LORAZEPAM 2 MG: 1 TABLET ORAL at 10:27

## 2022-12-14 RX ADMIN — ESCITALOPRAM OXALATE 10 MG: 10 TABLET ORAL at 20:15

## 2022-12-14 RX ADMIN — SODIUM CHLORIDE, PRESERVATIVE FREE 10 ML: 5 INJECTION INTRAVENOUS at 10:26

## 2022-12-14 RX ADMIN — ENOXAPARIN SODIUM 40 MG: 100 INJECTION SUBCUTANEOUS at 10:26

## 2022-12-14 RX ADMIN — BUDESONIDE 500 MCG: 0.5 SUSPENSION RESPIRATORY (INHALATION) at 20:06

## 2022-12-14 RX ADMIN — HYDROCODONE BITARTRATE AND ACETAMINOPHEN 1 TABLET: 5; 325 TABLET ORAL at 21:41

## 2022-12-14 RX ADMIN — DILTIAZEM HYDROCHLORIDE 240 MG: 240 CAPSULE, COATED, EXTENDED RELEASE ORAL at 20:15

## 2022-12-14 RX ADMIN — PREDNISONE 20 MG: 20 TABLET ORAL at 10:27

## 2022-12-14 RX ADMIN — FAMOTIDINE 20 MG: 20 TABLET, FILM COATED ORAL at 20:16

## 2022-12-14 RX ADMIN — POTASSIUM CHLORIDE 10 MEQ: 750 TABLET, EXTENDED RELEASE ORAL at 10:27

## 2022-12-14 RX ADMIN — FLUTICASONE PROPIONATE 1 SPRAY: 50 SPRAY, METERED NASAL at 10:30

## 2022-12-14 RX ADMIN — ATORVASTATIN CALCIUM 10 MG: 10 TABLET, FILM COATED ORAL at 20:16

## 2022-12-14 RX ADMIN — LORAZEPAM 2 MG: 1 TABLET ORAL at 20:16

## 2022-12-14 RX ADMIN — DOCUSATE SODIUM 100 MG: 100 CAPSULE, LIQUID FILLED ORAL at 10:27

## 2022-12-14 RX ADMIN — BUDESONIDE 500 MCG: 0.5 SUSPENSION RESPIRATORY (INHALATION) at 07:29

## 2022-12-14 RX ADMIN — POTASSIUM CHLORIDE 10 MEQ: 750 TABLET, EXTENDED RELEASE ORAL at 20:16

## 2022-12-14 RX ADMIN — HYDROCODONE BITARTRATE AND ACETAMINOPHEN 1 TABLET: 5; 325 TABLET ORAL at 14:05

## 2022-12-14 RX ADMIN — MONTELUKAST 10 MG: 10 TABLET, FILM COATED ORAL at 10:27

## 2022-12-14 RX ADMIN — FAMOTIDINE 20 MG: 20 TABLET, FILM COATED ORAL at 10:27

## 2022-12-14 ASSESSMENT — PAIN SCALES - GENERAL
PAINLEVEL_OUTOF10: 10
PAINLEVEL_OUTOF10: 8
PAINLEVEL_OUTOF10: 0
PAINLEVEL_OUTOF10: 8
PAINLEVEL_OUTOF10: 8

## 2022-12-14 ASSESSMENT — PAIN DESCRIPTION - DESCRIPTORS
DESCRIPTORS: ACHING;DISCOMFORT
DESCRIPTORS: ACHING;DISCOMFORT;JABBING
DESCRIPTORS: ACHING;DISCOMFORT;SORE

## 2022-12-14 ASSESSMENT — PAIN DESCRIPTION - ORIENTATION
ORIENTATION: LEFT
ORIENTATION: LEFT
ORIENTATION: MID
ORIENTATION: LEFT
ORIENTATION: LEFT

## 2022-12-14 ASSESSMENT — PAIN DESCRIPTION - PAIN TYPE
TYPE: ACUTE PAIN

## 2022-12-14 ASSESSMENT — PAIN DESCRIPTION - FREQUENCY
FREQUENCY: CONTINUOUS

## 2022-12-14 ASSESSMENT — PAIN DESCRIPTION - ONSET
ONSET: ON-GOING

## 2022-12-14 ASSESSMENT — PAIN - FUNCTIONAL ASSESSMENT
PAIN_FUNCTIONAL_ASSESSMENT: PREVENTS OR INTERFERES SOME ACTIVE ACTIVITIES AND ADLS
PAIN_FUNCTIONAL_ASSESSMENT: ACTIVITIES ARE NOT PREVENTED
PAIN_FUNCTIONAL_ASSESSMENT: PREVENTS OR INTERFERES SOME ACTIVE ACTIVITIES AND ADLS
PAIN_FUNCTIONAL_ASSESSMENT: PREVENTS OR INTERFERES SOME ACTIVE ACTIVITIES AND ADLS

## 2022-12-14 ASSESSMENT — PAIN DESCRIPTION - LOCATION
LOCATION: HIP

## 2022-12-14 NOTE — PROGRESS NOTES
Hospital Medicine    Subjective:  pt seen this am alert conversive sat up in chair yesterday      Current Facility-Administered Medications:     HYDROcodone-acetaminophen (NORCO) 5-325 MG per tablet 1 tablet, 1 tablet, Oral, Q6H PRN, Michelle Ferraris Malmer, DO, 1 tablet at 12/14/22 1405    docusate sodium (COLACE) capsule 100 mg, 100 mg, Oral, BID, Michelle Ferraris Malmer, DO, 100 mg at 12/14/22 1027    aspirin EC tablet 81 mg, 81 mg, Oral, Daily, Michelle Ferraris Malmer, DO, 81 mg at 12/14/22 1027    atorvastatin (LIPITOR) tablet 10 mg, 10 mg, Oral, Nightly, Michelle Ferraris Malmer, DO, 10 mg at 12/13/22 2035    dilTIAZem (CARDIZEM CD) extended release capsule 240 mg, 240 mg, Oral, BID, Michelle Ferraris Malmer, DO, 240 mg at 12/14/22 1027    escitalopram (LEXAPRO) tablet 10 mg, 10 mg, Oral, Nightly, Michelle Ferraris Malmer, DO, 10 mg at 12/13/22 2036    famotidine (PEPCID) tablet 20 mg, 20 mg, Oral, BID, Michelle Ferraris Malmer, DO, 20 mg at 12/14/22 1027    fluticasone (FLONASE) 50 MCG/ACT nasal spray 1 spray, 1 spray, Each Nostril, Daily, Michelle Ferraris Malmer, DO, 1 spray at 12/14/22 1030    LORazepam (ATIVAN) tablet 2 mg, 2 mg, Oral, TID, Michelle Ferraris Malmer, DO, 2 mg at 12/14/22 1405    montelukast (SINGULAIR) tablet 10 mg, 10 mg, Oral, Daily, Michelle Ferraris Malmer, DO, 10 mg at 12/14/22 1027    potassium chloride (KLOR-CON M) extended release tablet 10 mEq, 10 mEq, Oral, BID, Michelle Ferraris Malmer, DO, 10 mEq at 12/14/22 1027    predniSONE (DELTASONE) tablet 20 mg, 20 mg, Oral, Daily, Michelle Ferraris Malmer, DO, 20 mg at 12/14/22 1027    sodium chloride flush 0.9 % injection 5-40 mL, 5-40 mL, IntraVENous, 2 times per day, Vitaliy Robbins, DO, 10 mL at 12/14/22 1026    sodium chloride flush 0.9 % injection 5-40 mL, 5-40 mL, IntraVENous, PRN, Michelle Rivers DO, 10 mL at 12/10/22 1327    0.9 % sodium chloride infusion, , IntraVENous, PRN, Michelle Rivers,     enoxaparin (LOVENOX) injection 40 mg, 40 mg, SubCUTAneous, Daily, Michelle Rivers DO, 40 mg at 12/14/22 1026 ondansetron (ZOFRAN-ODT) disintegrating tablet 4 mg, 4 mg, Oral, Q8H PRN **OR** ondansetron (ZOFRAN) injection 4 mg, 4 mg, IntraVENous, Q6H PRN, Librado Rivers DO    polyethylene glycol (GLYCOLAX) packet 17 g, 17 g, Oral, Daily PRN, Librado Rivers DO    acetaminophen (TYLENOL) tablet 650 mg, 650 mg, Oral, Q6H PRN **OR** acetaminophen (TYLENOL) suppository 650 mg, 650 mg, Rectal, Q6H PRN, Librado Rivers DO    budesonide (PULMICORT) nebulizer suspension 500 mcg, 0.5 mg, Nebulization, BID, Librado Rivers DO, 500 mcg at 12/14/22 8542    Arformoterol Tartrate (BROVANA) nebulizer solution 15 mcg, 15 mcg, Nebulization, BID, Librado Rivers DO, 15 mcg at 12/14/22 0729    albuterol (PROVENTIL) nebulizer solution 2.5 mg, 2.5 mg, Nebulization, Q6H PRN, Librado Rivers DO    Objective:    /65   Pulse (!) 107   Temp 98.7 °F (37.1 °C)   Resp 26   Ht 5' 1\" (1.549 m)   Wt 136 lb 9.6 oz (62 kg) Comment: Bed Scale 12/12/22  SpO2 96%   BMI 25.81 kg/m²     Heart:  reg  Lungs:  ctab  Abd: + bs soft nontender  Extrem:  edema legs    CBC with Differential:    Lab Results   Component Value Date/Time    WBC 12.0 12/10/2022 03:24 PM    RBC 3.80 12/10/2022 03:24 PM    HGB 9.2 12/10/2022 03:24 PM    HCT 30.8 12/10/2022 03:24 PM     12/10/2022 03:24 PM    MCV 81.1 12/10/2022 03:24 PM    MCH 24.2 12/10/2022 03:24 PM    MCHC 29.9 12/10/2022 03:24 PM    RDW 18.2 12/10/2022 03:24 PM    NRBC 0.9 05/28/2017 04:10 AM    SEGSPCT 61 04/02/2012 08:30 AM    LYMPHOPCT 22.9 12/08/2022 04:05 PM    MONOPCT 14.8 12/08/2022 04:05 PM    BASOPCT 1.0 12/08/2022 04:05 PM    MONOSABS 0.86 12/08/2022 04:05 PM    LYMPHSABS 1.33 12/08/2022 04:05 PM    EOSABS 0.41 12/08/2022 04:05 PM    BASOSABS 0.06 12/08/2022 04:05 PM     CMP:    Lab Results   Component Value Date/Time     12/12/2022 04:53 AM    K 4.2 12/12/2022 04:53 AM    K 3.5 12/08/2022 04:05 PM     12/12/2022 04:53 AM    CO2 23 12/12/2022 04:53 AM    BUN 20 12/12/2022 04:53 AM    CREATININE 0.7 12/12/2022 04:53 AM    GFRAA >60 01/12/2019 04:00 AM    LABGLOM >60 12/12/2022 04:53 AM    GLUCOSE 175 12/12/2022 04:53 AM    GLUCOSE 85 04/02/2012 08:30 AM    PROT 6.1 12/08/2022 04:05 PM    LABALBU 3.0 12/08/2022 04:05 PM    LABALBU 3.6 04/02/2012 08:30 AM    CALCIUM 8.5 12/12/2022 04:53 AM    BILITOT <0.2 12/08/2022 04:05 PM    ALKPHOS 98 12/08/2022 04:05 PM    AST 30 12/08/2022 04:05 PM    ALT 13 12/08/2022 04:05 PM     Warfarin PT/INR:    Lab Results   Component Value Date    INR 1.2 05/28/2017    INR 0.9 09/23/2015    INR 1.0 06/30/2014    PROTIME 13.2 (H) 05/28/2017    PROTIME 10.1 09/23/2015    PROTIME 10.0 06/30/2014       Assessment:    Principal Problem:    Compression fracture of lumbar spine, non-traumatic, initial encounter ([facility] [address]  Active Problems:    Asthma    Compression fracture of T12 vertebra (HCC)    Mixed hyperlipidemia    HTN (hypertension), benign    Anxiety disorder    Chronic back pain  Resolved Problems:    * No resolved hospital problems.  *      Plan:  Discussed plan in detail with pt and pts daughter dc planning to rehab / discussed case with neurosurgery appreciate input plan ct chest/abd/pelvis        Carlos Donaldson DO  5:45 PM  12/14/2022

## 2022-12-14 NOTE — PROGRESS NOTES
Pt has an abdominal CT that needs done, pt last ate at 1500, CT scheduled for 1900, pt instructed to not eat or drink until that time.

## 2022-12-14 NOTE — CARE COORDINATION
[facility], they attempted to start precert and had trouble verifying benefits. Card faxed this am and they will attempt again today. Pasrr and ambulette on soft chart. Updated patient on acceptance. Precert pending for [employer]. For questions I can be reached at [phone].  [place], [place]

## 2022-12-14 NOTE — PROGRESS NOTES
Facility-Administered Medications: HYDROcodone-acetaminophen (NORCO) 5-325 MG per tablet 1 tablet, 1 tablet, Oral, Q6H PRN  docusate sodium (COLACE) capsule 100 mg, 100 mg, Oral, BID  aspirin EC tablet 81 mg, 81 mg, Oral, Daily  atorvastatin (LIPITOR) tablet 10 mg, 10 mg, Oral, Nightly  dilTIAZem (CARDIZEM CD) extended release capsule 240 mg, 240 mg, Oral, BID  escitalopram (LEXAPRO) tablet 10 mg, 10 mg, Oral, Nightly  famotidine (PEPCID) tablet 20 mg, 20 mg, Oral, BID  fluticasone (FLONASE) 50 MCG/ACT nasal spray 1 spray, 1 spray, Each Nostril, Daily  LORazepam (ATIVAN) tablet 2 mg, 2 mg, Oral, TID  montelukast (SINGULAIR) tablet 10 mg, 10 mg, Oral, Daily  potassium chloride (KLOR-CON M) extended release tablet 10 mEq, 10 mEq, Oral, BID  predniSONE (DELTASONE) tablet 20 mg, 20 mg, Oral, Daily  sodium chloride flush 0.9 % injection 5-40 mL, 5-40 mL, IntraVENous, 2 times per day  sodium chloride flush 0.9 % injection 5-40 mL, 5-40 mL, IntraVENous, PRN  0.9 % sodium chloride infusion, , IntraVENous, PRN  enoxaparin (LOVENOX) injection 40 mg, 40 mg, SubCUTAneous, Daily  ondansetron (ZOFRAN-ODT) disintegrating tablet 4 mg, 4 mg, Oral, Q8H PRN **OR** ondansetron (ZOFRAN) injection 4 mg, 4 mg, IntraVENous, Q6H PRN  polyethylene glycol (GLYCOLAX) packet 17 g, 17 g, Oral, Daily PRN  acetaminophen (TYLENOL) tablet 650 mg, 650 mg, Oral, Q6H PRN **OR** acetaminophen (TYLENOL) suppository 650 mg, 650 mg, Rectal, Q6H PRN  budesonide (PULMICORT) nebulizer suspension 500 mcg, 0.5 mg, Nebulization, BID  Arformoterol Tartrate (BROVANA) nebulizer solution 15 mcg, 15 mcg, Nebulization, BID  albuterol (PROVENTIL) nebulizer solution 2.5 mg, 2.5 mg, Nebulization, Q6H PRN    ASSESSMENT:   T12 L1 compression fx    PLAN:  I discussed the NM findings with both the patient and her daughter Sandra Newman at bedside. While likely related to her osteoporosis, given her rib findings will order CT C/A/P to r/o metastatic lesion.   D/w IM team  Continue TLSO    Thank you so much for allowing us to participate in the care of this patient.       Electronically signed by Felicitas Liao MD on 12/14/2022 at 1:32 PM

## 2022-12-14 NOTE — ACP (ADVANCE CARE PLANNING)
Advance Care Planning   Healthcare Decision Maker:    Primary Decision Maker: Chantelle Stover - Child - [phone]    Secondary Decision Maker: Gomez Dominguez - Spouse - [phone]    Secondary Decision Maker: DominguezGomez - Susanne - [phone]    Click here to complete Healthcare Decision Makers including selection of the Healthcare Decision Maker Relationship (ie \"Primary\").

## 2022-12-14 NOTE — PROGRESS NOTES
Occupational Therapy  OCCUPATIONAL THERAPY BEDSIDE TREATMENT NOTE   [address]     VVRV:  Patient Name: Ida Vernon  MRN: [id number]  : 1945  Room: [address] [place] [address]       Evaluating OT: Kendell Kussmaul, MOT, OTR/L  # [id number]     Referring Provider:  Ana Paula Leigh DO  Specific Provider Orders:  Aissatou Dominguez and Treat\"  22     Diagnosis: Intractable low back pain [M54.59]  Compression fracture of lumbar spine, non-traumatic, initial encounter [facility]) [M48.56XA]  Compression fracture of T12 vertebra, initial encounter ([facility] [address] [E41.274Y]  Ambulatory dysfunction [R26.2]  Closed compression fracture of body of L1 vertebra ([facility] [address] [S32.010A]     Pt was admitted w/ LBP, Left Hip Pain, Fatigue, Nausea/Vomiting. Found w/ T12, L1 Compression Fracture     Pertinent Medical History:  Pt has a past medical history of Anxiety, Arthritis, Asthma, Claustrophobia, Dermatitis, Fracture, GERD (gastroesophageal reflux disease), Hiatal hernia, History of blood transfusion, HTN (hypertension), Hyperlipidemia, On aspirin at home, Pancreatic cyst, Pneumonia, Sleep apnea, SOB (shortness of breath), and Tachycardia.,  has a past surgical history that includes Cholecystectomy; Cataract removal (2013, 2013); back surgery (2014); joint replacement (2016); Total knee arthroplasty (Right); other surgical history (2017); Spinal fixation surgery with implant (); and Upper gastrointestinal endoscopy (07/10/2017).      Surgeries this admission: None      Precautions:  Fall Risk  Spinal Precautions - Soft TLSO when > 45*  WBAT Roge LE per Ortho     Assessment of current deficits   [x] Functional mobility             [x]ADLs           [x] Strength                  []Cognition   [x] Functional transfers           [x] IADLs         [x] Safety Awareness   [x]Endurance   [] Fine Coordination              [x] Balance     [] Vision/perception    []Sensation     []Gross Motor Coordination  [] ROM           [] Delirium                  [] Motor Control         OT PLAN OF CARE   OT POC based on physician orders, patient diagnosis and results of clinical assessment     Frequency/Duration 1-3 days/wk for 2 weeks PRN   Specific OT Treatment to include:      * Instruction/training on adapted ADL techniques and AE recommendations to increase functional independence within precautions       * Training on energy conservation strategies, correct breathing pattern and techniques to improve independence/tolerance for self-care routine  * Functional transfer/mobility training/DME recommendations for increased independence, safety, and fall prevention  * Patient/Family education to increase follow through with safety techniques and functional independence  * Recommendation of environmental modifications for increased safety with functional transfers/mobility and ADLs  * Cognitive retraining/development of therapeutic activities to improve problem solving, judgement, memory, and attention for increased safety/participation in ADL/IADL tasks  * Therapeutic exercise to improve motor endurance, ROM, and functional strength for ADLs/functional transfers  * Therapeutic activities to facilitate/challenge dynamic balance, stand tolerance for increased safety and independence with ADLs  * Therapeutic activities to facilitate gross/fine motor skills for increased independence with ADLs  * Neuro-muscular re-education: facilitation of righting/equilibrium reactions  * Positioning to improve skin integrity, interaction with environment and functional independence  * Delirium prevention/treatment  * Manual techniques for edema management  Other:     Recommended Adaptive Equipment: TBD as pt progresses - Adaptive equipment, BSC     Home Living:  Pt lives alone in a 1-story house. Bed/bath on the main floor. Stair Lift To Vincent Garcia.    Bathroom setup:  Tub-Shower, [id number] [address] owned:  [facility], [employer], [employer]), Tub Bench, Raised Commode seat w/ hand-rails, Life-Alert/Emergency Call System     Available Family Assist:  Dtr lives out of [place];  son is in the [employer] and is currently overseas; Pt reported Niece and Sister can provide assist PRN - not 24/7, Hands-on Assist     Prior Level of Function:  Pt reported being IND with all ADL, Most IADLs - Meal Prep, Cleaning/Laudry - Family provides assist carrying Laundry/Groceries up/down steps/ IADLs. Uses Rollator for ambulation. Driving:  Yes - Shopping, outings, errands  Occupation:  None reported     Pain Level:  3/10 L Hip Pain, LBP;  Relief w/ Rest and Repositioning, Nsg Notified   Additional Complaints:  Mild Dizziness/lightheadedness w/ initial transfer to sitting/standing     Cognition: A & O x 4   Able to Follow Multi-Step Commands INDly              Memory:  good               Sequencing:  good               Problem solving:  good               Judgement/safety:  good   Additional Comments:  Pt was pleasant and cooperative. Vitals/Lab Values:  WFL Room air                 Functional Assessment:  AM-PAC Daily Activity Raw Score: 15/24       Initial Eval Status  Date: 12-13-22    Treatment Status  Date: 12/14/22 STGs = LTGs  Time frame: 10-14 days   Feeding IND after set up        Indep NA   Grooming Min A/Set up     Min A for hair-care, Able to wash hands/face - seated in chair     Unable to tolerate ambulating to or standing at sink     SBA;    Pt completed washing face and combing hair while sitting up in the chair.     Mod I  Standing at the Sink   UB Dressing Max A     Min A to don/doff gown in supine, Max A to don TLSO in supine  Pt ed/demo for safe techs w/in spinal precautions to don Brace     Min A - to arleen/doff gown     Max A - to arleen TLSO  Min A      LB Dressing Max A     Donning socks seated EOB  Mod A to don pants in supine - within Spinal Prec  Pt ed for safe/adaptive techs, use of adaptive equip     Dep A    To perform donning of socks while in supine with assistance. Min A      Bathing NT     Pt ed re: Sponge bathing in supine w/o brace vs use of Tub Bench IF NS permits pt to doff brace for bathing while seated - will clarify w/ NS     Max A ; To simulate bathing tasks with therapist to complete LB. Min A       Toileting Max A     Simulated - seated/standing     Min A; To transfer from sit to stand from commode with verbal prompting and use of grab bar. Min A      Bed Mobility  Rolling to facilitate Functional Ax: Min A  Supine to sit: Mod A   Sit to supine:  NT      VCs for safety/log-rolling/adherence to Spinal Precautions     Rolling: Min A   Sup to sit: Mod A  Sit to stand: Mod A from bed and Min A from commode. Supine to sit: IND  Sit to supine: IND      Functional Transfers Mod A - EOB  Max A - Armed Chair     Pt ed for safety/hand placement    Min /Mod A with verbal prompting for hand placement. SUP      Functional Mobility Min A w/ [employer]     Short distances at b/s, limited by weakness/pain, Mild SOB  Pt ed for safety/improved safety awareness, walker safety    Min A with w/w     To perform ambulation to/from bathroom and to/from doorway with use of w/w and verbal prompting for proper posture. Pt requires rest breaks in between ambulation.   SUP      Balance Sitting:     Static:  SUP EOB, Remote SUP in chair    Dynamic:  Close SUP w/ functional ax EOB/Chair      Standing:     Static:  Min A w/ [employer]    Dynamic:  Min A w/ functional ax/mobility w/ [employer]     Sitting: Sup      Standing: Min A w/w Sitting:     Static:  IND    Dynamic:  IND w/ functional ax     Standing:     Static:  SUP w/ AD PRN    Dynamic:  SUP w/ functional ax/mobility w/ AD PRN   Activity Tolerance Fair     Able to tolerate extended session - light ax seated EOB ~ 20 mins, Chair extended time, Standing ~ 1 min + 3 mins w/ seated rest break b/t trials    Fair  Fair(+) Visual/  Perceptual     Hearing: WNL   Glasses: Yes - present     WFL   Hearing Aids:  No     WNL                  Hand Dominance: Right    AROM Strength Additional Info:    RUE  WFL WFL Good ;   Good FMC/dexterity noted during ADL tasks      [facility] WFL Good ;    Good FMC/dexterity noted during ADL tasks         Sensation:  Denies numbness or tingling Roge UEs   Tone: WFL Roge UEs   Edema: None Noted Roge UEs      Comments: Upon arrival, patient was found in supine. She was agreeable to participate in therapeutic ax. No Family present during beginning of session and daughter came into session after ambulating to the bathroom. Received permission from RN prior to engaging pt in OT services. Educated pt on role of OT services. At the end of the session, patient was properly positioned in b/s chair. Call light and phone within reach, all lines and tubes intact. Oriented pt to call bell. Made all appropriate Environmental Modifications to facilitate pt's level of IND and safety. All needs met. Daughter in room when leaving patient up in the chair. Overall patient demonstrated improved independence and safety during completion of ADL/functional transfer/mobility tasks. Pt would benefit from continued skilled OT to increase safety and independence with completion of ADL/IADL tasks for functional independence and quality of life.       Time In: 1150  Time Out: 1215  Total Treatment Time: 25 minutes    Min Units   OT Eval Low 17975      OT Eval Medium 84924       OT Eval High 30608       OT Re-Eval [id number]       Therapeutic Ex 97068       Therapeutic Activities 49133 13 6    ADL/Self Care 23027 13 1   Orthotic Management 35048       Manual 33754       Neuro Re-Ed 39302       Non-Billable Time          Isa Ferguson [address], [address]

## 2022-12-15 VITALS
DIASTOLIC BLOOD PRESSURE: 66 MMHG | HEART RATE: 98 BPM | BODY MASS INDEX: 25.79 KG/M2 | HEIGHT: 61 IN | TEMPERATURE: 98.3 F | RESPIRATION RATE: 16 BRPM | SYSTOLIC BLOOD PRESSURE: 120 MMHG | OXYGEN SATURATION: 92 % | WEIGHT: 136.6 LBS

## 2022-12-15 LAB — SARS-COV-2, NAAT: NOT DETECTED

## 2022-12-15 PROCEDURE — 87635 SARS-COV-2 COVID-19 AMP PRB: CPT

## 2022-12-15 PROCEDURE — 2580000003 HC RX 258: Performed by: INTERNAL MEDICINE

## 2022-12-15 PROCEDURE — 6370000000 HC RX 637 (ALT 250 FOR IP): Performed by: INTERNAL MEDICINE

## 2022-12-15 PROCEDURE — 94640 AIRWAY INHALATION TREATMENT: CPT

## 2022-12-15 PROCEDURE — 99232 SBSQ HOSP IP/OBS MODERATE 35: CPT | Performed by: NEUROLOGICAL SURGERY

## 2022-12-15 PROCEDURE — 6360000002 HC RX W HCPCS: Performed by: INTERNAL MEDICINE

## 2022-12-15 RX ORDER — PSEUDOEPHEDRINE HCL 30 MG
100 TABLET ORAL 2 TIMES DAILY
Qty: 60 CAPSULE | Refills: 0 | COMMUNITY
Start: 2022-12-15

## 2022-12-15 RX ORDER — POLYETHYLENE GLYCOL 3350 17 G/17G
17 POWDER, FOR SOLUTION ORAL DAILY PRN
Qty: 527 G | Refills: 0
Start: 2022-12-15 | End: 2023-01-14

## 2022-12-15 RX ORDER — HYDROCODONE BITARTRATE AND ACETAMINOPHEN 5; 325 MG/1; MG/1
1 TABLET ORAL EVERY 6 HOURS PRN
Qty: 12 TABLET | Refills: 0 | Status: SHIPPED | OUTPATIENT
Start: 2022-12-15 | End: 2022-12-18

## 2022-12-15 RX ADMIN — ASPIRIN 81 MG: 81 TABLET, COATED ORAL at 09:35

## 2022-12-15 RX ADMIN — SODIUM CHLORIDE, PRESERVATIVE FREE 10 ML: 5 INJECTION INTRAVENOUS at 09:34

## 2022-12-15 RX ADMIN — LORAZEPAM 2 MG: 1 TABLET ORAL at 09:35

## 2022-12-15 RX ADMIN — HYDROCODONE BITARTRATE AND ACETAMINOPHEN 1 TABLET: 5; 325 TABLET ORAL at 15:12

## 2022-12-15 RX ADMIN — POTASSIUM CHLORIDE 10 MEQ: 750 TABLET, EXTENDED RELEASE ORAL at 09:36

## 2022-12-15 RX ADMIN — DOCUSATE SODIUM 100 MG: 100 CAPSULE, LIQUID FILLED ORAL at 09:35

## 2022-12-15 RX ADMIN — BUDESONIDE 500 MCG: 0.5 SUSPENSION RESPIRATORY (INHALATION) at 07:36

## 2022-12-15 RX ADMIN — ARFORMOTEROL TARTRATE 15 MCG: 15 SOLUTION RESPIRATORY (INHALATION) at 07:36

## 2022-12-15 RX ADMIN — LORAZEPAM 2 MG: 1 TABLET ORAL at 15:12

## 2022-12-15 RX ADMIN — DILTIAZEM HYDROCHLORIDE 240 MG: 240 CAPSULE, COATED, EXTENDED RELEASE ORAL at 09:35

## 2022-12-15 RX ADMIN — ENOXAPARIN SODIUM 40 MG: 100 INJECTION SUBCUTANEOUS at 09:35

## 2022-12-15 RX ADMIN — HYDROCODONE BITARTRATE AND ACETAMINOPHEN 1 TABLET: 5; 325 TABLET ORAL at 08:17

## 2022-12-15 RX ADMIN — MONTELUKAST 10 MG: 10 TABLET, FILM COATED ORAL at 09:36

## 2022-12-15 RX ADMIN — FAMOTIDINE 20 MG: 20 TABLET, FILM COATED ORAL at 09:36

## 2022-12-15 RX ADMIN — PREDNISONE 20 MG: 20 TABLET ORAL at 09:36

## 2022-12-15 ASSESSMENT — PAIN DESCRIPTION - DESCRIPTORS
DESCRIPTORS: ACHING;DISCOMFORT;JABBING
DESCRIPTORS: ACHING;DISCOMFORT;JABBING

## 2022-12-15 ASSESSMENT — PAIN DESCRIPTION - FREQUENCY
FREQUENCY: CONTINUOUS
FREQUENCY: CONTINUOUS

## 2022-12-15 ASSESSMENT — PAIN DESCRIPTION - PAIN TYPE
TYPE: CHRONIC PAIN
TYPE: CHRONIC PAIN

## 2022-12-15 ASSESSMENT — PAIN DESCRIPTION - LOCATION
LOCATION: HIP
LOCATION: HIP

## 2022-12-15 ASSESSMENT — PAIN DESCRIPTION - ONSET
ONSET: ON-GOING
ONSET: ON-GOING

## 2022-12-15 ASSESSMENT — PAIN SCALES - GENERAL
PAINLEVEL_OUTOF10: 0
PAINLEVEL_OUTOF10: 10
PAINLEVEL_OUTOF10: 0
PAINLEVEL_OUTOF10: 10

## 2022-12-15 ASSESSMENT — PAIN DESCRIPTION - ORIENTATION
ORIENTATION: LEFT
ORIENTATION: LEFT

## 2022-12-15 ASSESSMENT — PAIN - FUNCTIONAL ASSESSMENT
PAIN_FUNCTIONAL_ASSESSMENT: PREVENTS OR INTERFERES SOME ACTIVE ACTIVITIES AND ADLS
PAIN_FUNCTIONAL_ASSESSMENT: PREVENTS OR INTERFERES SOME ACTIVE ACTIVITIES AND ADLS

## 2022-12-15 NOTE — PROGRESS NOTES
Nsx Staff:  Updated patient's daughter re: CT chest abd and pelvis. Cleared for d/c from a Nsx standpoint.

## 2022-12-15 NOTE — CARE COORDINATION
[facility] available today if stable for discharge. Will need covid on day of discharge. Pasrr and ambulette on soft chart. For questions I can be reached at [phone].  [place], [place]

## 2022-12-15 NOTE — PROGRESS NOTES
Hospital Medicine    Subjective:  pt seen this afternoon alert conversive      Current Facility-Administered Medications:     HYDROcodone-acetaminophen (NORCO) 5-325 MG per tablet 1 tablet, 1 tablet, Oral, Q6H PRN, Lourena Chatters Malmer, DO, 1 tablet at 12/15/22 1512    docusate sodium (COLACE) capsule 100 mg, 100 mg, Oral, BID, Lourena Chatters Malmer, DO, 100 mg at 12/15/22 9834    aspirin EC tablet 81 mg, 81 mg, Oral, Daily, Lourena Chatters Malmer, DO, 81 mg at 12/15/22 0935    atorvastatin (LIPITOR) tablet 10 mg, 10 mg, Oral, Nightly, Lourena Chatters Malmer, DO, 10 mg at 12/14/22 2016    dilTIAZem (CARDIZEM CD) extended release capsule 240 mg, 240 mg, Oral, BID, Lourena Chatters Malmer, DO, 240 mg at 12/15/22 0935    escitalopram (LEXAPRO) tablet 10 mg, 10 mg, Oral, Nightly, Lourena Chatters Malmer, DO, 10 mg at 12/14/22 2015    famotidine (PEPCID) tablet 20 mg, 20 mg, Oral, BID, Lourena Chatters Malmer, DO, 20 mg at 12/15/22 0936    fluticasone (FLONASE) 50 MCG/ACT nasal spray 1 spray, 1 spray, Each Nostril, Daily, Lourena Chatters Malmer, DO, 1 spray at 12/14/22 1030    LORazepam (ATIVAN) tablet 2 mg, 2 mg, Oral, TID, Lourena Chatters Malmer, DO, 2 mg at 12/15/22 1512    montelukast (SINGULAIR) tablet 10 mg, 10 mg, Oral, Daily, Lourena Chatters Malmer, DO, 10 mg at 12/15/22 0936    potassium chloride (KLOR-CON M) extended release tablet 10 mEq, 10 mEq, Oral, BID, Lourena Chatters Malmer, DO, 10 mEq at 12/15/22 5858    predniSONE (DELTASONE) tablet 20 mg, 20 mg, Oral, Daily, Lourena Chatters Malmer, DO, 20 mg at 12/15/22 0584    sodium chloride flush 0.9 % injection 5-40 mL, 5-40 mL, IntraVENous, 2 times per day, Yancy Pepper, DO, 10 mL at 12/15/22 0934    sodium chloride flush 0.9 % injection 5-40 mL, 5-40 mL, IntraVENous, PRN, Nohemi Rivers DO, 10 mL at 12/10/22 1327    0.9 % sodium chloride infusion, , IntraVENous, PRN, Nohemi Rivers DO    enoxaparin (LOVENOX) injection 40 mg, 40 mg, SubCUTAneous, Daily, Nohemi Rivers DO, 40 mg at 12/15/22 0935    ondansetron (ZOFRAN-ODT) disintegrating tablet 4 mg, 4 mg, Oral, Q8H PRN **OR** ondansetron (ZOFRAN) injection 4 mg, 4 mg, IntraVENous, Q6H PRN, Claria Nim Malmer, DO    polyethylene glycol (GLYCOLAX) packet 17 g, 17 g, Oral, Daily PRN, Claria Nim Malmer, DO    acetaminophen (TYLENOL) tablet 650 mg, 650 mg, Oral, Q6H PRN **OR** acetaminophen (TYLENOL) suppository 650 mg, 650 mg, Rectal, Q6H PRN, Claria Nim Malmer, DO    budesonide (PULMICORT) nebulizer suspension 500 mcg, 0.5 mg, Nebulization, BID, Claria Nim Malmer, DO, 500 mcg at 12/15/22 3998    Arformoterol Tartrate (BROVANA) nebulizer solution 15 mcg, 15 mcg, Nebulization, BID, Claria Nim Malmer, DO, 15 mcg at 12/15/22 0736    albuterol (PROVENTIL) nebulizer solution 2.5 mg, 2.5 mg, Nebulization, Q6H PRN, Claria Nim Malmer, DO    Current Outpatient Medications:     HYDROcodone-acetaminophen (NORCO) 5-325 MG per tablet, Take 1 tablet by mouth every 6 hours as needed for Pain for up to 3 days. , Disp: 12 tablet, Rfl: 0    docusate sodium (COLACE, DULCOLAX) 100 MG CAPS, Take 100 mg by mouth 2 times daily, Disp: 60 capsule, Rfl: 0    polyethylene glycol (GLYCOLAX) 17 g packet, Take 17 g by mouth daily as needed for Constipation, Disp: 527 g, Rfl: 0    ondansetron (ZOFRAN-ODT) 4 MG disintegrating tablet, Take 1 tablet by mouth 3 times daily as needed for Nausea or Vomiting, Disp: 21 tablet, Rfl: 0    dilTIAZem (CARDIZEM CD) 240 MG extended release capsule, TAKE 1 CAPSULE TWICE A DAY, Disp: 180 capsule, Rfl: 0    fluticasone (FLONASE) 50 MCG/ACT nasal spray, USE 1 SPRAY IN EACH NOSTRIL DAILY, Disp: 16 g, Rfl: 5    Fluticasone furoate-vilanterol (BREO ELLIPTA) 200-25 MCG/INH AEPB inhaler, Inhale 1 puff into the lungs daily, Disp: 180 each, Rfl: 5    montelukast (SINGULAIR) 10 MG tablet, TAKE 1 TABLET DAILY, Disp: 90 tablet, Rfl: 3    albuterol sulfate HFA (VENTOLIN HFA) 108 (90 Base) MCG/ACT inhaler, Inhale 2 puffs into the lungs every 6 hours as needed for Wheezing, Disp: 18 g, Rfl: 3    predniSONE (DELTASONE) 10 MG tablet, Take 20 mg by mouth in the morning., Disp: , Rfl: 1    dupilumab (DUPIXENT) 300 MG/2ML SOSY injection, Inject 300 mg into the skin every 14 days , Disp: , Rfl:     vitamin C (ASCORBIC ACID) 500 MG tablet, Take 1 tablet by mouth daily, Disp: 30 tablet, Rfl: 3    potassium chloride (KLOR-CON) 10 MEQ extended release tablet, Take 10 mEq by mouth 2 times daily , Disp: , Rfl:     LORazepam (ATIVAN) 2 MG tablet, Take 2 mg by mouth 3 times daily. ., Disp: , Rfl:     aspirin 81 MG tablet, Take 81 mg by mouth daily Pt to hold 5 days prior per Dr. Kavitha Amaro, Disp: , Rfl:     escitalopram (LEXAPRO) 10 MG tablet, Take 10 mg by mouth nightly , Disp: , Rfl:     famotidine (PEPCID) 20 MG tablet, Take 20 mg by mouth 2 times daily Instructed to take am of procedure, Disp: , Rfl:     Cholecalciferol (VITAMIN D) 2000 UNITS CAPS capsule, Take 2,000 Units by mouth daily Last dose 7/4/2017, Disp: , Rfl:     Cyanocobalamin (VITAMIN B-12 IJ), Inject as directed every 3 months Indications: every  month , Disp: , Rfl:     atorvastatin (LIPITOR) 10 MG tablet, Take 10 mg by mouth nightly , Disp: , Rfl:     Objective:    /66   Pulse 98   Temp 98.3 °F (36.8 °C) (Oral)   Resp 16   Ht 5' 1\" (1.549 m)   Wt 136 lb 9.6 oz (62 kg) Comment: Bed Scale 12/12/22  SpO2 92%   BMI 25.81 kg/m²     Heart:  reg  Lungs:  ctab  Abd: + bs soft nontender  Extrem:  edema legs    CBC with Differential:    Lab Results   Component Value Date/Time    WBC 12.0 12/10/2022 03:24 PM    RBC 3.80 12/10/2022 03:24 PM    HGB 9.2 12/10/2022 03:24 PM    HCT 30.8 12/10/2022 03:24 PM     12/10/2022 03:24 PM    MCV 81.1 12/10/2022 03:24 PM    MCH 24.2 12/10/2022 03:24 PM    MCHC 29.9 12/10/2022 03:24 PM    RDW 18.2 12/10/2022 03:24 PM    NRBC 0.9 05/28/2017 04:10 AM    SEGSPCT 61 04/02/2012 08:30 AM    LYMPHOPCT 22.9 12/08/2022 04:05 PM    MONOPCT 14.8 12/08/2022 04:05 PM    BASOPCT 1.0 12/08/2022 04:05 PM    MONOSABS 0.86 12/08/2022 04:05 PM    LYMPHSABS 1.33 12/08/2022 04:05 PM    EOSABS 0.41 12/08/2022 04:05 PM    BASOSABS 0.06 12/08/2022 04:05 PM     CMP:    Lab Results   Component Value Date/Time     12/12/2022 04:53 AM    K 4.2 12/12/2022 04:53 AM    K 3.5 12/08/2022 04:05 PM     12/12/2022 04:53 AM    CO2 23 12/12/2022 04:53 AM    BUN 20 12/12/2022 04:53 AM    CREATININE 0.7 12/12/2022 04:53 AM    GFRAA >60 01/12/2019 04:00 AM    LABGLOM >60 12/12/2022 04:53 AM    GLUCOSE 175 12/12/2022 04:53 AM    GLUCOSE 85 04/02/2012 08:30 AM    PROT 6.1 12/08/2022 04:05 PM    LABALBU 3.0 12/08/2022 04:05 PM    LABALBU 3.6 04/02/2012 08:30 AM    CALCIUM 8.5 12/12/2022 04:53 AM    BILITOT <0.2 12/08/2022 04:05 PM    ALKPHOS 98 12/08/2022 04:05 PM    AST 30 12/08/2022 04:05 PM    ALT 13 12/08/2022 04:05 PM     Warfarin PT/INR:    Lab Results   Component Value Date    INR 1.2 05/28/2017    INR 0.9 09/23/2015    INR 1.0 06/30/2014    PROTIME 13.2 (H) 05/28/2017    PROTIME 10.1 09/23/2015    PROTIME 10.0 06/30/2014       Assessment:    Principal Problem:    Compression fracture of lumbar spine, non-traumatic, initial encounter ([facility] [address]  Active Problems:    Asthma    Compression fracture of T12 vertebra (HCC)    Mixed hyperlipidemia    HTN (hypertension), benign    Anxiety disorder    Chronic back pain  Resolved Problems:    * No resolved hospital problems.  *      Plan:  Dc to rehab        [address], DO  6:18 PM  12/15/2022

## 2022-12-15 NOTE — PROGRESS NOTES
Devorah Shaikh called and transport set up for 1600 for her to go to Health system.  Electronically signed by Kelly Doyle RN on 12/15/2022 at 2:29 PM

## 2022-12-15 NOTE — DISCHARGE INSTR - COC
Continuity of Care Form    Patient Name: Marshall Estrella   :  1945  MRN:  [id number]    Admit date:  2022  Discharge date:  12/15/2022    Code Status Order: Full Code   Advance Directives:     Admitting Physician:  Mannie Brunson DO  PCP: Zeenat Coffey MD    Discharging Nurse: [facility] Unit/Room#: 6353/9255-F  Discharging Unit Phone Number: [phone]    Emergency Contact:   Extended Emergency Contact Information  Primary Emergency Contact: Chantelle Stover  Home Phone: [phone]  Relation: Child  Preferred language: English   needed? No  Secondary Emergency Contact: Gomez Dominguez  Address: [address], [address] Phone: [phone]  Relation: Spouse   needed?  No    Past Surgical History:  Past Surgical History:   Procedure Laterality Date    BACK SURGERY  2014    has screws in back    CATARACT REMOVAL  2013, 2013    [facility] with lens implants    CHOLECYSTECTOMY      JOINT REPLACEMENT  2016    SI joint fusion Right    OTHER SURGICAL HISTORY  2017    MRI -     SPINAL FIXATION SURGERY WITH IMPLANT      in [address] Right     UPPER GASTROINTESTINAL ENDOSCOPY  07/10/2017       Immunization History:   Immunization History   Administered Date(s) Administered    COVID-19, PFIZER GRAY top, DO NOT Dilute, (age 15 y+), IM, 30 mcg/0.3 mL 2022    COVID-19, PFIZER PURPLE top, DILUTE for use, (age 15 y+), 30mcg/0.3mL 2021, 2021, 2021    Influenza A ([id number]) Vaccine PF IM 10/22/2009    Influenza Vaccine, unspecified formulation 10/01/2016    Influenza Virus Vaccine 10/28/2019    Influenza, FLUARIX, FLULAVAL, FLUZONE (age 10 mo+) AND AFLURIA, (age 1 y+), PF, 0.5mL 10/01/2017, 10/18/2018    Pneumococcal Conjugate Vaccine 10/01/2016    Tdap (Boostrix, Adacel) 2020       Active Problems:  Patient Active Problem List   Diagnosis Code    Mixed hyperlipidemia E78.2    HTN (hypertension), benign I10    Asthma J45.909    Vitamin B12 deficiency E53.8    Pancreatic cyst K86.2    Anxiety disorder F41.9    Chronic back pain M54.9, G89.29    Tachycardia R00.0    Vomiting R11.10    Compression fracture of lumbar spine, non-traumatic, initial encounter ([facility] [address] M48.56XA    Compression fracture of T12 vertebra ([facility] [address] S22.080A       Isolation/Infection:   Isolation            No Isolation          Patient Infection Status       Infection Onset Added Last Indicated Last Indicated By Review Planned Expiration Resolved Resolved By    None active    Resolved    COVID-19 (Rule Out) 12/08/22 12/08/22 12/08/22 COVID-19, Rapid (Ordered)   12/08/22 Rule-Out Test Resulted            Nurse Assessment:  Last Vital Signs: /66   Pulse 98   Temp 98.3 °F (36.8 °C) (Oral)   Resp 16   Ht 5' 1\" (1.549 m)   Wt 136 lb 9.6 oz (62 kg) Comment: Bed Scale 12/12/22  SpO2 92%   BMI 25.81 kg/m²     Last documented pain score (0-10 scale): Pain Level: 10  Last Weight:   Wt Readings from Last 1 Encounters:   12/12/22 136 lb 9.6 oz (62 kg)     Mental Status:  oriented and alert    IV Access:  - None    Nursing Mobility/ADLs:  Walking   Assisted  Transfer  Assisted  Bathing  Assisted  Dressing  Assisted  Toileting  Assisted  Feeding  Assisted  Med Admin  Assisted  Med Delivery   whole    Wound Care Documentation and Therapy:        Elimination:  Continence: Bowel: Yes  Bladder: Yes  Urinary Catheter: None   Colostomy/Ileostomy/Ileal Conduit: No       Date of Last BM: 12/14/2022    Intake/Output Summary (Last 24 hours) at 12/15/2022 1435  Last data filed at 12/15/2022 0918  Gross per 24 hour   Intake 360 ml   Output --   Net 360 ml     I/O last 3 completed shifts: In: 65 [P.O.:660]  Out: -     Safety Concerns:      At Risk for Falls    Impairments/Disabilities:      Compression fractures in back, needs TSLO brace when up    Nutrition Therapy:  Current Nutrition Therapy:   - Oral Diet:  General    Routes of Feeding: Oral  Liquids: No Restrictions  Daily Fluid Restriction: no  Last Modified Barium Swallow with Video (Video Swallowing Test): not done    Treatments at the Time of Hospital Discharge:   Respiratory Treatments: see MAR  Oxygen Therapy:  is not on home oxygen therapy. Ventilator:    - No ventilator support    Rehab Therapies: Physical Therapy and Occupational Therapy  Weight Bearing Status/Restrictions: No weight bearing restrictions  Other Medical Equipment (for information only, NOT a DME order):  wheelchair, walker, bath bench, bedside commode, hospital bed, and braces tslo  Other Treatments: N/A    Patient's personal belongings (please select all that are sent with patient):  Adebayo    RN SIGNATURE:  {Esignature:[id number]}    CASE MANAGEMENT/SOCIAL WORK SECTION    Inpatient Status Date: ***    Readmission Risk Assessment Score:  Readmission Risk              Risk of Unplanned Readmission:  14           Discharging to Facility/ Agency   Name:   Address:  Phone:  Fax:    Dialysis Facility (if applicable)   Name:  Address:  Dialysis Schedule:  Phone:  Fax:    / signature: {Esignature:[id number]}    PHYSICIAN SECTION    Prognosis: {Prognosis:4531011998}    Condition at Discharge: [facility] Patient Condition:432299370}    Rehab Potential (if transferring to Rehab): {Prognosis:0556743345}    Recommended Labs or Other Treatments After Discharge: ***    Physician Certification: I certify the above information and transfer of Aminata Jasmine  is necessary for the continuing treatment of the diagnosis listed and that she requires {Admit to Appropriate Level of Care:24423} for {GREATER/LESS:338493127} 30 days.      Update Admission H&P: [facility]}    PHYSICIAN SIGNATURE:  {Esignature:[id number]}

## 2022-12-16 LAB
ALBUMIN SERPL-MCNC: 3 G/DL (ref 3.5–5.2)
ALP BLD-CCNC: 69 U/L (ref 35–104)
ALT SERPL-CCNC: 15 U/L (ref 0–32)
ANION GAP SERPL CALCULATED.3IONS-SCNC: 14 MMOL/L (ref 7–16)
AST SERPL-CCNC: 20 U/L (ref 0–31)
BILIRUB SERPL-MCNC: 0.3 MG/DL (ref 0–1.2)
BUN BLDV-MCNC: 12 MG/DL (ref 6–23)
CALCIUM SERPL-MCNC: 8.8 MG/DL (ref 8.6–10.2)
CHLORIDE BLD-SCNC: 107 MMOL/L (ref 98–107)
CHOLESTEROL, TOTAL: 134 MG/DL (ref 0–199)
CO2: 22 MMOL/L (ref 22–29)
CREAT SERPL-MCNC: 0.6 MG/DL (ref 0.5–1)
GFR SERPL CREATININE-BSD FRML MDRD: >60 ML/MIN/1.73
GLUCOSE BLD-MCNC: 67 MG/DL (ref 74–99)
HCT VFR BLD CALC: 30.7 % (ref 34–48)
HDLC SERPL-MCNC: 44 MG/DL
HEMOGLOBIN: 9.3 G/DL (ref 11.5–15.5)
LDL CHOLESTEROL CALCULATED: 58 MG/DL (ref 0–99)
MCH RBC QN AUTO: 23.9 PG (ref 26–35)
MCHC RBC AUTO-ENTMCNC: 30.3 % (ref 32–34.5)
MCV RBC AUTO: 78.9 FL (ref 80–99.9)
PDW BLD-RTO: 18.3 FL (ref 11.5–15)
PLATELET # BLD: 729 E9/L (ref 130–450)
PMV BLD AUTO: 10.9 FL (ref 7–12)
POTASSIUM SERPL-SCNC: 4.2 MMOL/L (ref 3.5–5)
RBC # BLD: 3.89 E12/L (ref 3.5–5.5)
SODIUM BLD-SCNC: 143 MMOL/L (ref 132–146)
TOTAL PROTEIN: 5.7 G/DL (ref 6.4–8.3)
TRIGL SERPL-MCNC: 162 MG/DL (ref 0–149)
VITAMIN D 25-HYDROXY: 43 NG/ML (ref 30–100)
VLDLC SERPL CALC-MCNC: 32 MG/DL
WBC # BLD: 9.2 E9/L (ref 4.5–11.5)

## 2022-12-20 DIAGNOSIS — M48.56XA COMPRESSION FRACTURE OF LUMBAR SPINE, NON-TRAUMATIC, INITIAL ENCOUNTER (HCC): ICD-10-CM

## 2022-12-20 DIAGNOSIS — S22.080D COMPRESSION FRACTURE OF T12 VERTEBRA WITH ROUTINE HEALING, SUBSEQUENT ENCOUNTER: Primary | ICD-10-CM

## 2022-12-20 NOTE — ADT AUTH CERT
Musculoskeletal Disease GRG - Care Day 7 (12/14/2022) by Madina Wang RN       Review Status Review Entered   Completed 12/16/2022 1704       Created By   Madina Wang RN      Criteria Review      Care Day: 7 Care Date: 12/14/2022 Level of Care: Intermediate Care    Guideline Day 2    Clinical Status    ( ) * No ICU or intermediate care needs    12/16/2022 5:04 PM EST by Ian Carter      resp therapy  pain management    Interventions    (X) Transition to oral routes    12/16/2022 5:04 PM EST by Brand Gallery      HYDROcodone-acetaminophen (Gogo Jacinto) 5-325 MG per tablet 1 tablet q6hrs PRN PO x3    12/16/2022 5:03 PM EST by Brand Gallery      LORazepam (ATIVAN) tablet 2 mg TID PO   montelukast (SINGULAIR) tablet 10 mg QD PO   potassium chloride (KLOR-CON M) extended release tablet 10 mEq BID PO  predniSONE (DELTASONE) tablet 20 mg QD PO    12/16/2022 5:02 PM EST by Brand Gallery      aspirin EC tablet 81 mg QD PO   atorvastatin (LIPITOR) tablet 10 mg qhs PO   dilTIAZem (CARDIZEM CD) extended release capsule 240 mg BID PO   docusate sodium (COLACE) capsule 100 mg BID PO   famotidine (PEPCID) tablet 20 mg BID PO    * Milestone   Additional Notes   DATE: 12/14 day 7         PERTINENT UPDATES:   Tachypneic, on bronchodilators   Occasionally hypotensive   CT chest and abdomen done, showing bilateral pleural effusion and paraesophageal hernia   Complained of hip pain (8-10/10)   Awaiting pre-cert to Lesly Maurice         VITALS:   BP   : 95/50 (MAP 65)   RI   : 107   RR   : 20-26   Temp : 98.7   SpO2 : 93% RA          ABNL/PERTINENT LABS/RADIOLOGY/DIAGNOSTIC STUDIES:   CT CHEST W WO CONTRAST    Impression   No mediastinal adenopathy or suspicious pulmonary nodules. Small bilateral pleural effusions with posterior lower lobe compressive   atelectasis. Moderate paraesophageal hiatal hernia. Bony degenerative changes, as detailed above.       CT ABDOMEN PELVIS W WO CONTRAST    Impression Moderate paraesophageal hiatal hernia. Moderate-severe sigmoid diverticulosis; no evidence for diverticulitis. Cholecystectomy. Small bilateral inguinal hernias with fat with trapped fluid on the right. Small bilateral pleural effusions with bibasilar compressive atelectasis. Bony degenerative changes and lumbar spine postoperative changes, as detailed above. No convincing evidence for metastatic disease. PHYSICAL EXAM:   Extrem:  edema legs         MD CONSULTS/ASSESSMENT AND PLAN:   NEUROSURGERY NOTES   ASSESSMENT:    · T12 L1 compression fx       PLAN:   · I discussed the NM findings with both the patient and her daughter  at bedside. While likely related to her osteoporosis, given her rib findings will order CT C/A/P to r/o metastatic lesion. D/w IM team   · Continue TLSO         IM NOTES   Assessment:     Compression fracture of lumbar spine, non-traumatic, initial encounter      Asthma     Compression fracture of T12 vertebra     Mixed hyperlipidemia     HTN (hypertension), benign     Anxiety disorder     Chronic back pain        Plan:   Discussed plan in detail with pt and pts daughter dc planning to rehab / discussed case with neurosurgery appreciate input plan ct chest/abd/pelvis         MEDICATIONS:   Arformoterol Tartrate (BROVANA) nebulizer solution 15 mcg BID NEB   budesonide (PULMICORT) nebulizer suspension 500 mcg BID NEB    enoxaparin (LOVENOX) injection 40 mg QD SC   predniSONE (DELTASONE) tablet 20 mg QD PO         PT/OT/SLP/CM ASSESSMENT OR NOTES:   CM [facility] accepted, they attempted to start precert and had trouble verifying benefits. Card faxed this am and they will attempt again today. Pasrr and ambulette on soft chart. Updated patient on acceptance.         Precert pending for Young Magana         OT NOTES   AM-PAC Daily Activity Raw Score: 15/24   Pain Level:  3/10 L Hip Pain, LBP;  Relief w/ Rest and Repositioning, Nsg Notified    Overall patient demonstrated improved independence and safety during completion of ADL/functional transfer/mobility tasks. Pt would benefit from continued skilled OT to increase safety and independence with completion of ADL/IADL tasks for functional independence and quality of life.                  Musculoskeletal Disease GRG - Care Day 4 (12/11/2022) by Brenda Ochoa RN       Review Status Review Entered   Completed 12/16/2022 1701       Created By   Brenda Ochoa RN      Criteria Review      Care Day: 4 Care Date: 12/11/2022 Level of Care: Intermediate Care    Guideline Day 2    Clinical Status    ( ) * No ICU or intermediate care needs    12/16/2022 5:01 PM EST by Modesta Ramesh      resp therapy  electrolyte replacement    Interventions    (X) Inpatient interventions continue    12/16/2022 4:57 PM EST by Modesta Ramesh      magnesium sulfate 2000 mg in 50 mL IVPB premix ONCE IV    (X) Transition to oral routes    12/16/2022 4:58 PM EST by Modesta Ramesh      oxyCODONE (ROXICODONE) immediate release tablet 5 mg q6hrs PRN PO x2    12/16/2022 4:57 PM EST by Modesta Ramesh      LORazepam (ATIVAN) tablet 2 mg TID PO   montelukast (SINGULAIR) tablet 10 mg QD PO   potassium chloride (KLOR-CON M) extended release tablet 10 mEq BID PO   predniSONE (DELTASONE) tablet 20 mg QD PO    12/16/2022 4:57 PM EST by Modesta Ramesh      acetaminophen (TYLENOL) tablet 1,000 mg TID PO   aspirin EC tablet 81 mg QD PO    atorvastatin (LIPITOR) tablet 10 mg qhs PO   dilTIAZem (CARDIZEM CD) extended release capsule 240 mg BID PO   famotidine (PEPCID) tablet 20 mg BID PO    * Milestone   Additional Notes   DATE: 12/11 day 4         PERTINENT UPDATES:   On 2L O2 per NC   Back and hip pain (5-10/10)   Whole body bone scan done shows possible fractures in sacrum and lumbar spine, new tracer uptakes concerning for metastatic disease         VITALS:   BP   : 112/69   MN   : 93   RR   : 18   Temp : 98   SpO2 : 93% at 2L O2 per NC ABNL/PERTINENT LABS/RADIOLOGY/DIAGNOSTIC STUDIES:   NM BONE SCAN WHOLE BODY    Impression   When compared to previous there is new tracer uptake involving the   right ribs and possibly the soft tissues on the right chest, tracer   uptake is also noted involving the posterior ribs, the appearance is   concerning for metastatic disease. These findings are new in the   interval.   Tracer uptake is noted at the level of the sacrum and lumbar spine   suspicious for fractures. Underlying metastatic disease is not   excluded. These findings are new in the interval.          PHYSICAL EXAM:   Extremities:  min edema legs         MD CONSULTS/ASSESSMENT AND PLAN:   IM NOTES   Assessment:     Compression fracture of lumbar spine, non-traumatic, initial encounter     Asthma     Compression fracture of T12 vertebra     Mixed hyperlipidemia     HTN (hypertension), benign     Anxiety disorder     Chronic back pain       Plan:    For bone scan         MEDICATIONS:   Arformoterol Tartrate (BROVANA) nebulizer solution 15 mcg BID NEB   budesonide (PULMICORT) nebulizer suspension 500 mcg BID NEB   enoxaparin (LOVENOX) injection 40 mg QD SC

## 2022-12-27 ENCOUNTER — OFFICE VISIT (OUTPATIENT)
Dept: NEUROSURGERY | Age: 77
End: 2022-12-27
Payer: MEDICARE

## 2022-12-27 ENCOUNTER — HOSPITAL ENCOUNTER (OUTPATIENT)
Dept: GENERAL RADIOLOGY | Age: 77
Discharge: HOME | End: 2022-12-29
Payer: MEDICARE

## 2022-12-27 ENCOUNTER — HOSPITAL ENCOUNTER (OUTPATIENT)
Age: 77
Discharge: HOME | End: 2022-12-29
Payer: MEDICARE

## 2022-12-27 VITALS
SYSTOLIC BLOOD PRESSURE: 104 MMHG | HEART RATE: 96 BPM | DIASTOLIC BLOOD PRESSURE: 62 MMHG | OXYGEN SATURATION: 97 % | RESPIRATION RATE: 16 BRPM | TEMPERATURE: 97.3 F

## 2022-12-27 DIAGNOSIS — S32.000A COMPRESSION FRACTURE OF LUMBAR VERTEBRA, UNSPECIFIED LUMBAR VERTEBRAL LEVEL, INITIAL ENCOUNTER (HCC): Primary | ICD-10-CM

## 2022-12-27 DIAGNOSIS — S22.080D COMPRESSION FRACTURE OF T12 VERTEBRA WITH ROUTINE HEALING, SUBSEQUENT ENCOUNTER: ICD-10-CM

## 2022-12-27 DIAGNOSIS — M48.56XA COMPRESSION FRACTURE OF LUMBAR SPINE, NON-TRAUMATIC, INITIAL ENCOUNTER (HCC): ICD-10-CM

## 2022-12-27 PROCEDURE — 3078F DIAST BP <80 MM HG: CPT | Performed by: NEUROLOGICAL SURGERY

## 2022-12-27 PROCEDURE — 3074F SYST BP LT 130 MM HG: CPT | Performed by: NEUROLOGICAL SURGERY

## 2022-12-27 PROCEDURE — 72100 X-RAY EXAM L-S SPINE 2/3 VWS: CPT

## 2022-12-27 PROCEDURE — 1123F ACP DISCUSS/DSCN MKR DOCD: CPT | Performed by: NEUROLOGICAL SURGERY

## 2022-12-27 PROCEDURE — 99212 OFFICE O/P EST SF 10 MIN: CPT

## 2022-12-27 PROCEDURE — 72072 X-RAY EXAM THORAC SPINE 3VWS: CPT

## 2022-12-27 PROCEDURE — 99213 OFFICE O/P EST LOW 20 MIN: CPT | Performed by: NEUROLOGICAL SURGERY

## 2022-12-27 RX ORDER — HYDROCODONE BITARTRATE AND ACETAMINOPHEN 5; 325 MG/1; MG/1
1 TABLET ORAL EVERY 4 HOURS PRN
Qty: 42 TABLET | Refills: 0 | Status: SHIPPED | OUTPATIENT
Start: 2022-12-27 | End: 2023-01-03

## 2022-12-27 NOTE — PROGRESS NOTES
Patient is here for follow up from a hospital consult for: lumbar compression fracture. She is doing okay and the pain is getting better. She also has a spinal cord stimulator in place and it is a [employer] system. The battery is depleted. Physical exam  Alert and Oriented X3  PERRLA, EOMI  HA 5/5  Sensation intact to LT and PP  Reflexes are 2+ and symmetric    A/P: patient is here for follow up for: compression fracture and spinal cord stimulator battery depletion. Her x-rays look good. The fractures are healing. Continue TLSO brace. I will schedule her for a battery replacement on 2/22/23.   She will see me the week before with x-rays for her compression fracture    Citlali Montilla MD

## 2022-12-28 ENCOUNTER — OUTSIDE SERVICES (OUTPATIENT)
Dept: PRIMARY CARE CLINIC | Age: 77
End: 2022-12-28

## 2022-12-28 DIAGNOSIS — E53.8 VITAMIN B12 DEFICIENCY: ICD-10-CM

## 2022-12-28 DIAGNOSIS — I10 HTN (HYPERTENSION), BENIGN: ICD-10-CM

## 2022-12-28 DIAGNOSIS — S22.080D COMPRESSION FRACTURE OF T12 VERTEBRA WITH ROUTINE HEALING, SUBSEQUENT ENCOUNTER: Primary | ICD-10-CM

## 2022-12-28 DIAGNOSIS — F41.9 ANXIETY DISORDER, UNSPECIFIED TYPE: ICD-10-CM

## 2022-12-28 DIAGNOSIS — R53.1 GENERAL WEAKNESS: ICD-10-CM

## 2022-12-28 DIAGNOSIS — R26.2 DIFFICULTY IN WALKING: ICD-10-CM

## 2022-12-28 DIAGNOSIS — Z91.81 RISK FOR FALLS: ICD-10-CM

## 2022-12-28 DIAGNOSIS — E78.2 MIXED HYPERLIPIDEMIA: ICD-10-CM

## 2022-12-31 ENCOUNTER — OUTSIDE SERVICES (OUTPATIENT)
Dept: PRIMARY CARE CLINIC | Age: 77
End: 2022-12-31

## 2022-12-31 DIAGNOSIS — I10 HTN (HYPERTENSION), BENIGN: ICD-10-CM

## 2022-12-31 DIAGNOSIS — Z91.81 RISK FOR FALLS: ICD-10-CM

## 2022-12-31 DIAGNOSIS — F41.9 ANXIETY DISORDER, UNSPECIFIED TYPE: ICD-10-CM

## 2022-12-31 DIAGNOSIS — R26.2 DIFFICULTY IN WALKING: ICD-10-CM

## 2022-12-31 DIAGNOSIS — R53.1 GENERAL WEAKNESS: ICD-10-CM

## 2022-12-31 DIAGNOSIS — E78.2 MIXED HYPERLIPIDEMIA: ICD-10-CM

## 2022-12-31 DIAGNOSIS — S22.080D COMPRESSION FRACTURE OF T12 VERTEBRA WITH ROUTINE HEALING, SUBSEQUENT ENCOUNTER: Primary | ICD-10-CM

## 2023-01-03 ENCOUNTER — TELEPHONE (OUTPATIENT)
Dept: CARDIOLOGY CLINIC | Age: 78
End: 2023-01-03

## 2023-01-03 NOTE — TELEPHONE ENCOUNTER
Home health nurse states that when she saw patient this morning in consult she noticed patient had an irregular heart beat, please advise

## 2023-01-03 NOTE — TELEPHONE ENCOUNTER
Patient is due to a follow up. Arrange one please when able. We can check an EKG then. Sheryl Lucas D.O.   Cardiologist  Cardiology, [address]

## 2023-01-05 NOTE — TELEPHONE ENCOUNTER
We had a cancellation on 1/6  so I called the patient and offered her an appt. But she declined due to an appt. With PCP and said she will just wait until Feb. And to keep her on the cancellation list. Home health nurse Giacomo Cerda was notified.

## 2023-01-09 ENCOUNTER — TELEPHONE (OUTPATIENT)
Dept: CARDIOLOGY CLINIC | Age: 78
End: 2023-01-09

## 2023-01-09 NOTE — TELEPHONE ENCOUNTER
EKG reviewed. Poor quality, but looks okay. In sinus. Ana Zapien D.O.   Cardiologist  Cardiology, [address] [facility]

## 2023-01-09 NOTE — TELEPHONE ENCOUNTER
Patient tested COVID+ on 1/7, she had an ekg on 1/6 at [facility] office and they faxed it for review, please advise

## 2023-01-12 ENCOUNTER — HOSPITAL ENCOUNTER (EMERGENCY)
Age: 78
Discharge: HOME | End: 2023-01-12
Attending: STUDENT IN AN ORGANIZED HEALTH CARE EDUCATION/TRAINING PROGRAM
Payer: MEDICARE

## 2023-01-12 ENCOUNTER — APPOINTMENT (OUTPATIENT)
Dept: CT IMAGING | Age: 78
End: 2023-01-12
Payer: MEDICARE

## 2023-01-12 ENCOUNTER — APPOINTMENT (OUTPATIENT)
Dept: GENERAL RADIOLOGY | Age: 78
End: 2023-01-12
Payer: MEDICARE

## 2023-01-12 VITALS
DIASTOLIC BLOOD PRESSURE: 74 MMHG | TEMPERATURE: 98.5 F | WEIGHT: 136.6 LBS | HEIGHT: 61 IN | OXYGEN SATURATION: 97 % | HEART RATE: 93 BPM | RESPIRATION RATE: 14 BRPM | BODY MASS INDEX: 25.79 KG/M2 | SYSTOLIC BLOOD PRESSURE: 110 MMHG

## 2023-01-12 DIAGNOSIS — U07.1 COVID: Primary | ICD-10-CM

## 2023-01-12 DIAGNOSIS — R19.7 DIARRHEA, UNSPECIFIED TYPE: ICD-10-CM

## 2023-01-12 DIAGNOSIS — R11.0 NAUSEA: ICD-10-CM

## 2023-01-12 LAB
ALBUMIN SERPL-MCNC: 3.1 G/DL (ref 3.5–5.2)
ALP BLD-CCNC: 79 U/L (ref 35–104)
ALT SERPL-CCNC: 10 U/L (ref 0–32)
ANION GAP SERPL CALCULATED.3IONS-SCNC: 15 MMOL/L (ref 7–16)
AST SERPL-CCNC: 22 U/L (ref 0–31)
BASOPHILS ABSOLUTE: 0.06 E9/L (ref 0–0.2)
BASOPHILS RELATIVE PERCENT: 0.7 % (ref 0–2)
BILIRUB SERPL-MCNC: 0.4 MG/DL (ref 0–1.2)
BUN BLDV-MCNC: 11 MG/DL (ref 6–23)
CALCIUM SERPL-MCNC: 8.6 MG/DL (ref 8.6–10.2)
CHLORIDE BLD-SCNC: 102 MMOL/L (ref 98–107)
CO2: 21 MMOL/L (ref 22–29)
CREAT SERPL-MCNC: 0.7 MG/DL (ref 0.5–1)
D DIMER: 623 NG/ML DDU
EOSINOPHILS ABSOLUTE: 0.3 E9/L (ref 0.05–0.5)
EOSINOPHILS RELATIVE PERCENT: 3.6 % (ref 0–6)
GFR SERPL CREATININE-BSD FRML MDRD: >60 ML/MIN/1.73
GLUCOSE BLD-MCNC: 91 MG/DL (ref 74–99)
HCT VFR BLD CALC: 34.7 % (ref 34–48)
HEMOGLOBIN: 10.5 G/DL (ref 11.5–15.5)
IMMATURE GRANULOCYTES #: 0.19 E9/L
IMMATURE GRANULOCYTES %: 2.3 % (ref 0–5)
INFLUENZA A BY PCR: NOT DETECTED
INFLUENZA B BY PCR: NOT DETECTED
LACTIC ACID: 1.4 MMOL/L (ref 0.5–2.2)
LIPASE: 18 U/L (ref 13–60)
LYMPHOCYTES ABSOLUTE: 1.66 E9/L (ref 1.5–4)
LYMPHOCYTES RELATIVE PERCENT: 19.9 % (ref 20–42)
MCH RBC QN AUTO: 24.6 PG (ref 26–35)
MCHC RBC AUTO-ENTMCNC: 30.3 % (ref 32–34.5)
MCV RBC AUTO: 81.3 FL (ref 80–99.9)
MONOCYTES ABSOLUTE: 1.1 E9/L (ref 0.1–0.95)
MONOCYTES RELATIVE PERCENT: 13.2 % (ref 2–12)
NEUTROPHILS ABSOLUTE: 5.04 E9/L (ref 1.8–7.3)
NEUTROPHILS RELATIVE PERCENT: 60.3 % (ref 43–80)
PDW BLD-RTO: 19.7 FL (ref 11.5–15)
PLATELET # BLD: 398 E9/L (ref 130–450)
PMV BLD AUTO: 10 FL (ref 7–12)
POTASSIUM SERPL-SCNC: 3.4 MMOL/L (ref 3.5–5)
RBC # BLD: 4.27 E12/L (ref 3.5–5.5)
SARS-COV-2, NAAT: DETECTED
SODIUM BLD-SCNC: 138 MMOL/L (ref 132–146)
TOTAL PROTEIN: 6 G/DL (ref 6.4–8.3)
WBC # BLD: 8.4 E9/L (ref 4.5–11.5)

## 2023-01-12 PROCEDURE — 96375 TX/PRO/DX INJ NEW DRUG ADDON: CPT

## 2023-01-12 PROCEDURE — 85025 COMPLETE CBC W/AUTO DIFF WBC: CPT

## 2023-01-12 PROCEDURE — 93005 ELECTROCARDIOGRAM TRACING: CPT

## 2023-01-12 PROCEDURE — 87502 INFLUENZA DNA AMP PROBE: CPT

## 2023-01-12 PROCEDURE — 85378 FIBRIN DEGRADE SEMIQUANT: CPT

## 2023-01-12 PROCEDURE — 96374 THER/PROPH/DIAG INJ IV PUSH: CPT

## 2023-01-12 PROCEDURE — 6370000000 HC RX 637 (ALT 250 FOR IP)

## 2023-01-12 PROCEDURE — 71275 CT ANGIOGRAPHY CHEST: CPT

## 2023-01-12 PROCEDURE — 71045 X-RAY EXAM CHEST 1 VIEW: CPT

## 2023-01-12 PROCEDURE — 83690 ASSAY OF LIPASE: CPT

## 2023-01-12 PROCEDURE — 80053 COMPREHEN METABOLIC PANEL: CPT

## 2023-01-12 PROCEDURE — 87635 SARS-COV-2 COVID-19 AMP PRB: CPT

## 2023-01-12 PROCEDURE — 99285 EMERGENCY DEPT VISIT HI MDM: CPT

## 2023-01-12 PROCEDURE — 96372 THER/PROPH/DIAG INJ SC/IM: CPT

## 2023-01-12 PROCEDURE — 83605 ASSAY OF LACTIC ACID: CPT

## 2023-01-12 PROCEDURE — 2580000003 HC RX 258

## 2023-01-12 PROCEDURE — 6360000004 HC RX CONTRAST MEDICATION: Performed by: RADIOLOGY

## 2023-01-12 PROCEDURE — 6360000002 HC RX W HCPCS

## 2023-01-12 RX ORDER — ONDANSETRON 4 MG/1
4 TABLET, ORALLY DISINTEGRATING ORAL 3 TIMES DAILY PRN
Qty: 9 TABLET | Refills: 0 | Status: SHIPPED | OUTPATIENT
Start: 2023-01-12 | End: 2023-01-15

## 2023-01-12 RX ORDER — ONDANSETRON 2 MG/ML
4 INJECTION INTRAMUSCULAR; INTRAVENOUS ONCE
Status: COMPLETED | OUTPATIENT
Start: 2023-01-12 | End: 2023-01-12

## 2023-01-12 RX ORDER — SODIUM CHLORIDE 9 MG/ML
INJECTION, SOLUTION INTRAVENOUS ONCE
Status: COMPLETED | OUTPATIENT
Start: 2023-01-12 | End: 2023-01-12

## 2023-01-12 RX ORDER — FENTANYL CITRATE 50 UG/ML
25 INJECTION, SOLUTION INTRAMUSCULAR; INTRAVENOUS ONCE
Status: COMPLETED | OUTPATIENT
Start: 2023-01-12 | End: 2023-01-12

## 2023-01-12 RX ORDER — POTASSIUM CHLORIDE 20 MEQ/1
40 TABLET, EXTENDED RELEASE ORAL ONCE
Status: COMPLETED | OUTPATIENT
Start: 2023-01-12 | End: 2023-01-12

## 2023-01-12 RX ADMIN — FENTANYL CITRATE 25 MCG: 50 INJECTION INTRAMUSCULAR; INTRAVENOUS at 15:55

## 2023-01-12 RX ADMIN — TRIMETHOBENZAMIDE HYDROCHLORIDE 200 MG: 100 INJECTION INTRAMUSCULAR at 20:18

## 2023-01-12 RX ADMIN — ONDANSETRON 4 MG: 2 INJECTION INTRAMUSCULAR; INTRAVENOUS at 15:55

## 2023-01-12 RX ADMIN — POTASSIUM CHLORIDE 40 MEQ: 1500 TABLET, EXTENDED RELEASE ORAL at 20:18

## 2023-01-12 RX ADMIN — IOPAMIDOL 65 ML: 755 INJECTION, SOLUTION INTRAVENOUS at 17:54

## 2023-01-12 RX ADMIN — SODIUM CHLORIDE: 9 INJECTION, SOLUTION INTRAVENOUS at 19:06

## 2023-01-12 ASSESSMENT — PAIN SCALES - GENERAL
PAINLEVEL_OUTOF10: 4
PAINLEVEL_OUTOF10: 9

## 2023-01-12 ASSESSMENT — PAIN DESCRIPTION - LOCATION: LOCATION: BACK

## 2023-01-12 ASSESSMENT — ENCOUNTER SYMPTOMS
RHINORRHEA: 0
SORE THROAT: 0
WHEEZING: 0
SHORTNESS OF BREATH: 1
DIARRHEA: 0
NAUSEA: 0
EYE PAIN: 0
COUGH: 1

## 2023-01-12 ASSESSMENT — PAIN - FUNCTIONAL ASSESSMENT
PAIN_FUNCTIONAL_ASSESSMENT: 0-10
PAIN_FUNCTIONAL_ASSESSMENT: NONE - DENIES PAIN

## 2023-01-12 NOTE — ED PROVIDER NOTES
[facility] ENCOUNTER        Pt Name: Joe Engel  MRN: [id number]  [place] 1945  Date of evaluation: 1/12/2023  Provider: Phillip Rob MD  PCP: Gurwinder Keene MD  Note Started: 3:36 PM EST 1/12/23    CHIEF COMPLAINT       Chief Complaint   Patient presents with    Muscle Pain    Nausea & Vomiting    Diarrhea    Generalized Body Aches     Tested positive for COVID Sunday,  states not getting better  on paxlovid        HISTORY OF PRESENT ILLNESS: 1 or more Elements     History from : Patient and Family Sister    Limitations to history : None    Joe Engel is a 78 y.o. female with a past medical history of OA, asthma, hyperlipidemia, hypertension who presents with shortness of breath. Patient was COVID-positive on 1/8, was given Paxlovid. Patient reports progressively worsening, complaining of nausea, with dry-heaving, and diarrhea episodes. Sister was present at bedside who reports patient did not finish Paxlovid medication, she has 2 more days of it. Patient was recently discharged home from rehab and seen in a back brace for compression fracture. Patient reports of some chest discomfort. Patient reports of multiple episodes of diarrhea, states has not taken antibiotics recently. Patient reports she has decreased appetite past week. Patient has inhalers at home for asthma, however reports has not increased frequency of use. Patient denies fevers, lightheadedness, dizziness, syncopal episodes, chest pain, chest palpitations. Nursing Notes were all reviewed and agreed with or any disagreements were addressed in the HPI. REVIEW OF SYSTEMS :      Review of Systems   Constitutional:  Positive for activity change and chills. Negative for fever. HENT:  Negative for congestion, rhinorrhea and sore throat. Eyes:  Negative for pain and visual disturbance.    Respiratory:  Positive for cough and shortness of breath. Negative for wheezing. Cardiovascular:  Negative for chest pain and palpitations. Gastrointestinal:  Negative for diarrhea and nausea. Genitourinary:  Negative for dysuria and hematuria. Musculoskeletal:  Negative for arthralgias and myalgias. Skin:  Negative for rash. Neurological:  Negative for dizziness and headaches. Positives and Pertinent negatives as per HPI.      SURGICAL HISTORY     Past Surgical History:   Procedure Laterality Date    BACK SURGERY  8/2014    has screws in back    CATARACT REMOVAL  12/6/2013, 2/20/2013    [facility] with lens implants    CHOLECYSTECTOMY      JOINT REPLACEMENT  12/16/2016    SI joint fusion Right    OTHER SURGICAL HISTORY  02/09/2017    MRI -     SPINAL FIXATION SURGERY WITH IMPLANT  2013    in [address] Right     UPPER GASTROINTESTINAL ENDOSCOPY  07/10/2017       CURRENTMEDICATIONS       Previous Medications    ALBUTEROL SULFATE HFA (VENTOLIN HFA) 108 (90 BASE) MCG/ACT INHALER    Inhale 2 puffs into the lungs every 6 hours as needed for Wheezing    ASPIRIN 81 MG TABLET    Take 81 mg by mouth daily Pt to hold 5 days prior per Dr. Hwang Rastafari    ATORVASTATIN (LIPITOR) 10 MG TABLET    Take 10 mg by mouth nightly     CHOLECALCIFEROL (VITAMIN D) 2000 UNITS CAPS CAPSULE    Take 2,000 Units by mouth daily Last dose 7/4/2017    CYANOCOBALAMIN (VITAMIN B-12 IJ)    Inject as directed every 3 months Indications: every  month     DILTIAZEM (CARDIZEM CD) 240 MG EXTENDED RELEASE CAPSULE    TAKE 1 CAPSULE TWICE A DAY    DOCUSATE SODIUM (COLACE, DULCOLAX) 100 MG CAPS    Take 100 mg by mouth 2 times daily    DUPILUMAB (DUPIXENT) 300 MG/2ML SOSY INJECTION    Inject 300 mg into the skin every 14 days     ESCITALOPRAM (LEXAPRO) 10 MG TABLET    Take 10 mg by mouth nightly     FAMOTIDINE (PEPCID) 20 MG TABLET    Take 20 mg by mouth 2 times daily Instructed to take am of procedure    FLUTICASONE (FLONASE) 50 MCG/ACT NASAL SPRAY    USE 1 SPRAY IN EACH NOSTRIL DAILY    FLUTICASONE FUROATE-VILANTEROL (BREO ELLIPTA) 200-25 MCG/INH AEPB INHALER    Inhale 1 puff into the lungs daily    LORAZEPAM (ATIVAN) 2 MG TABLET    Take 2 mg by mouth 3 times daily. Makayla Ardon MONTELUKAST (SINGULAIR) 10 MG TABLET    TAKE 1 TABLET DAILY    POLYETHYLENE GLYCOL (GLYCOLAX) 17 G PACKET    Take 17 g by mouth daily as needed for Constipation    POTASSIUM CHLORIDE (KLOR-CON) 10 MEQ EXTENDED RELEASE TABLET    Take 10 mEq by mouth 2 times daily     PREDNISONE (DELTASONE) 10 MG TABLET    Take 20 mg by mouth in the morning. VITAMIN C (ASCORBIC ACID) 500 MG TABLET    Take 1 tablet by mouth daily       ALLERGIES     Percocet [oxycodone-acetaminophen]    FAMILYHISTORY       Family History   Problem Relation Age of Onset    Stroke Mother     Heart Disease Mother     Hypertension Mother     Other Mother         CHOLECYSTECTOMY; OSTEOPENIA    Heart Disease Father     Hypertension Father     Diabetes Father     Arthritis Father     Asthma Daughter         SOCIAL HISTORY       Social History     Tobacco Use    Smoking status: Never    Smokeless tobacco: Never   Vaping Use    Vaping Use: Never used   Substance Use Topics    Alcohol use: No    Drug use: Never       SCREENINGS        [place] Coma Scale  Eye Opening: Spontaneous  Best Verbal Response: Oriented  Best Motor Response: Obeys commands  [place] Coma Scale Score: 15                CIWA Assessment  BP: 98/76  Heart Rate: 95           PHYSICAL EXAM  1 or more Elements     ED Triage Vitals [01/12/23 1524]   BP Temp Temp src Heart Rate Resp SpO2 Height Weight   101/84 98.8 °F (37.1 °C) -- (!) 108 16 97 % 5' 1\" (1.549 m) 136 lb 9.6 oz (62 kg)       Physical Exam  Vitals and nursing note reviewed. Constitutional:       General: She is not in acute distress. Appearance: Normal appearance. HENT:      Head: Normocephalic and atraumatic. Eyes:      General:         Right eye: No discharge. Left eye: No discharge. Cardiovascular:      Rate and Rhythm: Normal rate and regular rhythm. Heart sounds: No murmur heard. Pulmonary:      Effort: Pulmonary effort is normal. No respiratory distress. Breath sounds: Rales present. No wheezing. Abdominal:      General: Bowel sounds are normal.      Palpations: Abdomen is soft. Tenderness: There is no abdominal tenderness. Musculoskeletal:         General: No swelling. Normal range of motion. Right lower leg: No edema. Left lower leg: No edema. Skin:     General: Skin is warm and dry. Neurological:      General: No focal deficit present. Mental Status: She is oriented to person, place, and time. DIAGNOSTIC RESULTS   LABS:    Labs Reviewed   COVID-19, RAPID - Abnormal; Notable for the following components:       Result Value    SARS-CoV-2, NAAT DETECTED (*)     All other components within normal limits   COMPREHENSIVE METABOLIC PANEL - Abnormal; Notable for the following components:    Potassium 3.4 (*)     CO2 21 (*)     Total Protein 6.0 (*)     Albumin 3.1 (*)     All other components within normal limits   CBC WITH AUTO DIFFERENTIAL - Abnormal; Notable for the following components:    Hemoglobin 10.5 (*)     MCH 24.6 (*)     MCHC 30.3 (*)     RDW 19.7 (*)     Lymphocytes % 19.9 (*)     Monocytes % 13.2 (*)     Monocytes Absolute 1.10 (*)     All other components within normal limits   RAPID INFLUENZA A/B ANTIGENS   LIPASE   LACTIC ACID   D-DIMER, QUANTITATIVE       When ordered only abnormal lab results are displayed. All other labs were within normal range or not returned as of this dictation. EKG:     Sinus tachycardia  EKG #1:  Interpreted by emergency department attending physician unless otherwise noted.     1/12/23  Time: 1820    Rhythm: sinus tachycardia  Rate: 108  Axis: left  Conduction: normal  ST Segments: no acute change, no ischemic changes,   Qtc - 477  T Waves: no acute change    Clinical Impression: sinus tachycardia  Comparison to Prior tracings: There are no significant changes when compared to prior tracings. RADIOLOGY:   Non-plain film images such as CT, Ultrasound and MRI are read by the radiologist. Plain radiographic images are visualized and preliminarily interpreted by the ED Provider with the below findings:    Preliminary imaging reviewed's, confirmed by radiology. Interpretation per the Radiologist below, if available at the time of this note:    CTA CHEST W CONTRAST   Final Result   1. No pulmonary embolism   2. Subtle ground-glass and irregular opacities are present in the inferior   aspect of lingula and right and left lower lobes which could indicate   pneumonia with areas of subsegmental atelectasis and scarring. Continued   follow-up could be helpful for further evaluation. 3. New compression fracture involving T5 of approximately 15%. XR CHEST PORTABLE   Final Result   Shallow inspiration. Enlarged cardiac silhouette and hiatal hernia. Left   basilar atelectasis. No results found. No results found. PROCEDURES   Unless otherwise noted below, none     EKG 12 Lead    Date/Time: 1/12/2023 5:29 PM  Performed by: Marshall Singh MD  Authorized by: Marshall Singh MD       CRITICAL CARE TIME       PAST MEDICAL HISTORY      has a past medical history of Anxiety, Arthritis, Asthma, Claustrophobia, Dermatitis (02/2017), Fracture (02/2017), GERD (gastroesophageal reflux disease), Hiatal hernia, History of blood transfusion, HTN (hypertension) (4/22/2013), Hyperlipidemia, On aspirin at home, Pancreatic cyst (5/27/2017), Pneumonia (07/2018), Sleep apnea, SOB (shortness of breath) (4/22/2013), and Tachycardia (04/22/2013).      EMERGENCY DEPARTMENT COURSE and DIFFERENTIAL DIAGNOSIS/MDM:   Vitals:    Vitals:    01/12/23 1946 01/12/23 2041 01/12/23 2105 01/12/23 2113   BP: 129/63 98/81 98/76    Pulse:    95   Resp:       Temp:       SpO2: 97% 100% 96%    Weight:       Height: Patient was given the following medications:  Medications   ondansetron (ZOFRAN) injection 4 mg (4 mg IntraVENous Given 1/12/23 1555)   fentaNYL (SUBLIMAZE) injection 25 mcg (25 mcg IntraVENous Given 1/12/23 1555)   potassium chloride (KLOR-CON M) extended release tablet 40 mEq (40 mEq Oral Given 1/12/23 2018)   0.9 % sodium chloride infusion (0 mL/hr IntraVENous Stopped 1/12/23 2036)   iopamidol (ISOVUE-370) 76 % injection 65 mL (65 mLs IntraVENous Given 1/12/23 1754)   trimethobenzamide (TIGAN) injection 200 mg (200 mg IntraMUSCular Given 1/12/23 2018)       ED Course as of 01/12/23 2123   Thu Jan 12, 2023   1604 ATTENDING PROVIDER ATTESTATION:     I have personally performed and/or participated in the history, exam, medical decision making, and procedures and agree with all pertinent clinical information unless otherwise noted. I have also reviewed and agree with the past medical, family and social history unless otherwise noted. I have discussed this patient in detail with the resident, and provided the instruction and education regarding the patient. My findings/plan: This is a 44-year-old female with history of compression fractures, HTN, HLD who presents for evaluation of fatigue nausea, vomiting, diarrhea. Patient notes her symptoms started about 5 days ago when she tested positive at home for [place]. Notes that she started on Paxil bid today would be the fourth day however she did not take it secondary to all the diarrhea. Denies any blood in her stool. Notes on occasion she will feel little dizzy if she stands up too quickly. Denies any chest discomfort or shortness of breath. She does have history of asthma but has not been requiring using her inhaler at all. On exam she is lying bed no acute distress. Heart regular rate and rhythm  Lungs consultation bilaterally anteriorly. Abdomen soft, nondistended, nontender.   Of note the patient did request looking at her buttock as she was having some discomfort, there are multiple external nonthrombosed hemorrhoids noted. Plan for labs, imaging, supportive care. [BB]      ED Course User Index  [BB] Calvin Kaba DO        [unfilled]    Chronic Conditions: OA, Asthma, HLD, HTN    CONSULTS: (Who and What was discussed)  None      CC/HPI Summary, DDx, ED Course, and Reassessment: 81-year-old female coming in for evaluation of shortness of breath. Differentials include COVID, influenza, PE, MI, pneumonia, to name a few. Patient was COVID-positive for the past week, was positive in the ER. Patient reported of multiple diarrhea episodes, but without recent use of antibiotic use was not worked up for C. difficile. This is most likely a complication of COVID. Patient was also found to have slight hypokalemia and low CO2, likely to due to the diarrhea. Patient was given pain medication, antiemetics, and fluids. D-dimer was elevated at 623, chest x-ray initially showed basilar atelectasis. CTA of the chest with contrast showed no concern for pulmonary embolism. Patient improved throughout ED course, we discussed anticipation of sending her home, patient and sister verbalized understanding with plan going forward. Patient was discharged home in stable condition    Disposition Considerations (tests considered but not done, Shared Decision Making, Pt Expectation of Test or Tx.):       I am the Primary Clinician of Record. FINAL IMPRESSION      1. COVID    2. Diarrhea, unspecified type Stable   3. Nausea Stable         DISPOSITION/PLAN     DISPOSITION Decision To Discharge 01/12/2023 08:53:12 PM      PATIENT REFERRED TO:  No follow-up provider specified.     DISCHARGE MEDICATIONS:  New Prescriptions    ONDANSETRON (ZOFRAN-ODT) 4 MG DISINTEGRATING TABLET    Place 1 tablet under the tongue 3 times daily as needed for Nausea or Vomiting       DISCONTINUED MEDICATIONS:  Discontinued Medications    ONDANSETRON (ZOFRAN-ODT) 4 MG DISINTEGRATING TABLET    Take 1 tablet by mouth 3 times daily as needed for Nausea or Vomiting              (Please note that portions of this note were completed with a voice recognition program.  Efforts were made to edit the dictations but occasionally words are mis-transcribed.)    Mundo Rodriguez MD (electronically signed)          Mundo Rodriguez MD  Resident  01/12/23 6534

## 2023-01-13 LAB
EKG ATRIAL RATE: 108 BPM
EKG P AXIS: 43 DEGREES
EKG P-R INTERVAL: 164 MS
EKG Q-T INTERVAL: 356 MS
EKG QRS DURATION: 60 MS
EKG QTC CALCULATION (BAZETT): 477 MS
EKG R AXIS: 7 DEGREES
EKG T AXIS: 14 DEGREES
EKG VENTRICULAR RATE: 108 BPM

## 2023-01-13 PROCEDURE — 93010 ELECTROCARDIOGRAM REPORT: CPT | Performed by: INTERNAL MEDICINE

## 2023-01-20 ENCOUNTER — TELEPHONE (OUTPATIENT)
Dept: NEUROSURGERY | Age: 78
End: 2023-01-20

## 2023-01-20 NOTE — TELEPHONE ENCOUNTER
Pt called worried about the new leg cramps she is experiencing. She is worried they are due to her back problems. She wants to know if there is something we can do for that. Asked if she had contacted her PCP regarding this, she has not. She saw Dr Malissa Eason for a compression fracture and is having her SCS battery replaced 2/22/2023. Pt can be reached at [phone].

## 2023-01-25 ENCOUNTER — PREP FOR PROCEDURE (OUTPATIENT)
Dept: NEUROSURGERY | Age: 78
End: 2023-01-25

## 2023-01-25 PROBLEM — T85.192A FAILED SPINAL CORD STIMULATOR (HCC): Status: ACTIVE | Noted: 2023-01-25

## 2023-01-26 ENCOUNTER — PREP FOR PROCEDURE (OUTPATIENT)
Dept: NEUROSURGERY | Age: 78
End: 2023-01-26

## 2023-01-26 DIAGNOSIS — Z01.818 PRE-OP TESTING: Primary | ICD-10-CM

## 2023-01-26 RX ORDER — SODIUM CHLORIDE 9 MG/ML
INJECTION, SOLUTION INTRAVENOUS CONTINUOUS
Status: CANCELLED | OUTPATIENT
Start: 2023-01-26

## 2023-01-26 RX ORDER — SODIUM CHLORIDE 0.9 % (FLUSH) 0.9 %
5-40 SYRINGE (ML) INJECTION EVERY 12 HOURS SCHEDULED
Status: CANCELLED | OUTPATIENT
Start: 2023-01-26

## 2023-01-26 RX ORDER — SODIUM CHLORIDE 0.9 % (FLUSH) 0.9 %
5-40 SYRINGE (ML) INJECTION PRN
Status: CANCELLED | OUTPATIENT
Start: 2023-01-26

## 2023-01-26 RX ORDER — SODIUM CHLORIDE 9 MG/ML
INJECTION, SOLUTION INTRAVENOUS PRN
Status: CANCELLED | OUTPATIENT
Start: 2023-01-26

## 2023-01-28 ENCOUNTER — APPOINTMENT (OUTPATIENT)
Dept: GENERAL RADIOLOGY | Age: 78
DRG: 872 | End: 2023-01-28
Payer: MEDICARE

## 2023-01-28 ENCOUNTER — HOSPITAL ENCOUNTER (EMERGENCY)
Age: 78
Discharge: HOME | DRG: 872 | End: 2023-01-28
Attending: EMERGENCY MEDICINE
Payer: MEDICARE

## 2023-01-28 ENCOUNTER — APPOINTMENT (OUTPATIENT)
Dept: ULTRASOUND IMAGING | Age: 78
DRG: 872 | End: 2023-01-28
Payer: MEDICARE

## 2023-01-28 ENCOUNTER — APPOINTMENT (OUTPATIENT)
Dept: CT IMAGING | Age: 78
DRG: 872 | End: 2023-01-28
Payer: MEDICARE

## 2023-01-28 VITALS
SYSTOLIC BLOOD PRESSURE: 105 MMHG | TEMPERATURE: 97.5 F | HEART RATE: 108 BPM | WEIGHT: 128 LBS | HEIGHT: 64 IN | RESPIRATION RATE: 16 BRPM | BODY MASS INDEX: 21.85 KG/M2 | DIASTOLIC BLOOD PRESSURE: 59 MMHG | OXYGEN SATURATION: 96 %

## 2023-01-28 DIAGNOSIS — R60.0 BILATERAL LEG EDEMA: Primary | ICD-10-CM

## 2023-01-28 DIAGNOSIS — R53.1 GENERAL WEAKNESS: ICD-10-CM

## 2023-01-28 LAB
ACANTHOCYTES: ABNORMAL
ALBUMIN SERPL-MCNC: 2.8 G/DL (ref 3.5–5.2)
ALP BLD-CCNC: 109 U/L (ref 35–104)
ALT SERPL-CCNC: 6 U/L (ref 0–32)
ANION GAP SERPL CALCULATED.3IONS-SCNC: 13 MMOL/L (ref 7–16)
ANISOCYTOSIS: ABNORMAL
AST SERPL-CCNC: 22 U/L (ref 0–31)
BASOPHILS ABSOLUTE: 0.12 E9/L (ref 0–0.2)
BASOPHILS RELATIVE PERCENT: 1.5 % (ref 0–2)
BILIRUB SERPL-MCNC: 0.3 MG/DL (ref 0–1.2)
BUN BLDV-MCNC: 9 MG/DL (ref 6–23)
BURR CELLS: ABNORMAL
CALCIUM SERPL-MCNC: 8.7 MG/DL (ref 8.6–10.2)
CHLORIDE BLD-SCNC: 103 MMOL/L (ref 98–107)
CO2: 20 MMOL/L (ref 22–29)
CREAT SERPL-MCNC: 0.6 MG/DL (ref 0.5–1)
D DIMER: 563 NG/ML DDU
EOSINOPHILS ABSOLUTE: 0.27 E9/L (ref 0.05–0.5)
EOSINOPHILS RELATIVE PERCENT: 3.4 % (ref 0–6)
GFR SERPL CREATININE-BSD FRML MDRD: >60 ML/MIN/1.73
GLUCOSE BLD-MCNC: 69 MG/DL (ref 74–99)
HCT VFR BLD CALC: 34.2 % (ref 34–48)
HEMOGLOBIN: 10.4 G/DL (ref 11.5–15.5)
HYPOCHROMIA: ABNORMAL
IMMATURE GRANULOCYTES #: 0.2 E9/L
IMMATURE GRANULOCYTES %: 2.5 % (ref 0–5)
LACTIC ACID: 1.4 MMOL/L (ref 0.5–2.2)
LYMPHOCYTES ABSOLUTE: 2.07 E9/L (ref 1.5–4)
LYMPHOCYTES RELATIVE PERCENT: 25.8 % (ref 20–42)
MAGNESIUM: 1.6 MG/DL (ref 1.6–2.6)
MCH RBC QN AUTO: 25.9 PG (ref 26–35)
MCHC RBC AUTO-ENTMCNC: 30.4 % (ref 32–34.5)
MCV RBC AUTO: 85.1 FL (ref 80–99.9)
MONOCYTES ABSOLUTE: 1.57 E9/L (ref 0.1–0.95)
MONOCYTES RELATIVE PERCENT: 19.6 % (ref 2–12)
NEUTROPHILS ABSOLUTE: 3.78 E9/L (ref 1.8–7.3)
NEUTROPHILS RELATIVE PERCENT: 47.2 % (ref 43–80)
OVALOCYTES: ABNORMAL
PDW BLD-RTO: 20.2 FL (ref 11.5–15)
PLATELET # BLD: 301 E9/L (ref 130–450)
PMV BLD AUTO: 10.7 FL (ref 7–12)
POIKILOCYTES: ABNORMAL
POLYCHROMASIA: ABNORMAL
POTASSIUM SERPL-SCNC: 4.1 MMOL/L (ref 3.5–5)
PRO-BNP: 316 PG/ML (ref 0–450)
RBC # BLD: 4.02 E12/L (ref 3.5–5.5)
SODIUM BLD-SCNC: 136 MMOL/L (ref 132–146)
TOTAL PROTEIN: 5.7 G/DL (ref 6.4–8.3)
TROPONIN, HIGH SENSITIVITY: 33 NG/L (ref 0–9)
TROPONIN, HIGH SENSITIVITY: 35 NG/L (ref 0–9)
TSH SERPL DL<=0.05 MIU/L-ACNC: 4.73 UIU/ML (ref 0.27–4.2)
WBC # BLD: 8 E9/L (ref 4.5–11.5)

## 2023-01-28 PROCEDURE — 94664 DEMO&/EVAL PT USE INHALER: CPT

## 2023-01-28 PROCEDURE — 93970 EXTREMITY STUDY: CPT

## 2023-01-28 PROCEDURE — 99285 EMERGENCY DEPT VISIT HI MDM: CPT

## 2023-01-28 PROCEDURE — 83735 ASSAY OF MAGNESIUM: CPT

## 2023-01-28 PROCEDURE — 84484 ASSAY OF TROPONIN QUANT: CPT

## 2023-01-28 PROCEDURE — 6360000004 HC RX CONTRAST MEDICATION: Performed by: RADIOLOGY

## 2023-01-28 PROCEDURE — 85025 COMPLETE CBC W/AUTO DIFF WBC: CPT

## 2023-01-28 PROCEDURE — 71275 CT ANGIOGRAPHY CHEST: CPT

## 2023-01-28 PROCEDURE — 6370000000 HC RX 637 (ALT 250 FOR IP): Performed by: EMERGENCY MEDICINE

## 2023-01-28 PROCEDURE — 94640 AIRWAY INHALATION TREATMENT: CPT

## 2023-01-28 PROCEDURE — 83880 ASSAY OF NATRIURETIC PEPTIDE: CPT

## 2023-01-28 PROCEDURE — 84443 ASSAY THYROID STIM HORMONE: CPT

## 2023-01-28 PROCEDURE — 93005 ELECTROCARDIOGRAM TRACING: CPT | Performed by: EMERGENCY MEDICINE

## 2023-01-28 PROCEDURE — 85378 FIBRIN DEGRADE SEMIQUANT: CPT

## 2023-01-28 PROCEDURE — 80053 COMPREHEN METABOLIC PANEL: CPT

## 2023-01-28 PROCEDURE — 71045 X-RAY EXAM CHEST 1 VIEW: CPT

## 2023-01-28 PROCEDURE — 83605 ASSAY OF LACTIC ACID: CPT

## 2023-01-28 RX ORDER — ACETAMINOPHEN 325 MG/1
650 TABLET ORAL ONCE
Status: COMPLETED | OUTPATIENT
Start: 2023-01-28 | End: 2023-01-28

## 2023-01-28 RX ORDER — IPRATROPIUM BROMIDE AND ALBUTEROL SULFATE 2.5; .5 MG/3ML; MG/3ML
3 SOLUTION RESPIRATORY (INHALATION) ONCE
Status: COMPLETED | OUTPATIENT
Start: 2023-01-28 | End: 2023-01-28

## 2023-01-28 RX ADMIN — IOPAMIDOL 75 ML: 755 INJECTION, SOLUTION INTRAVENOUS at 14:58

## 2023-01-28 RX ADMIN — ACETAMINOPHEN 650 MG: 325 TABLET ORAL at 12:23

## 2023-01-28 RX ADMIN — IPRATROPIUM BROMIDE AND ALBUTEROL SULFATE 3 AMPULE: .5; 2.5 SOLUTION RESPIRATORY (INHALATION) at 12:34

## 2023-01-28 ASSESSMENT — PAIN SCALES - GENERAL: PAINLEVEL_OUTOF10: 5

## 2023-01-28 NOTE — ED NOTES
Discharge instructions given. Patient verbalizes understanding. No other noted or stated problems at this time. Patient will follow up with primary care.      Tia Maldonado RN  01/28/23 1161

## 2023-01-28 NOTE — ED PROVIDER NOTES
[address]        Pt Name: Margaret Skinner  MRN: [id number]  [place] 1945  Date of evaluation: 1/28/2023  Provider: Dora Smart DO  PCP: Armen Lr MD  Note Started: 11:47 AM EST 1/28/23    CHIEF COMPLAINT       Chief Complaint   Patient presents with    Wound Check     Open wounds both lower legs. Chills    Leg Swelling     Both lower extremities and hands       HISTORY OF PRESENT ILLNESS: 1 or more Elements   History From: Patient and sister    Limitations to history : None    Margaret Child is a 78 y.o. female who presents with worsening fatigue, open wounds in her bilateral lower extremities, swelling in her lower extremities. She has not been having chest pain or shortness of breath, she has sacral pain under her right breast.  No recent injuries although she does have a compression fracture of her spine that she is seeing neurosurgery for and wearing a TSLO brace. No new numbness, weakness or tingling. Denies fevers or chills. She was recently prescribed Keflex for her bilateral lower extremity wounds. No other acute complaints. Nursing Notes were all reviewed and agreed with or any disagreements were addressed in the HPI. REVIEW OF SYSTEMS :      Positives and Pertinent negatives as per HPI.      SURGICAL HISTORY     Past Surgical History:   Procedure Laterality Date    BACK SURGERY  8/2014    has screws in back    CATARACT REMOVAL  12/6/2013, 2/20/2013    [facility] with lens implants    CHOLECYSTECTOMY      JOINT REPLACEMENT  12/16/2016    SI joint fusion Right    OTHER SURGICAL HISTORY  02/09/2017    MRI -     SPINAL FIXATION SURGERY WITH IMPLANT  2013    in [address] Right     UPPER GASTROINTESTINAL ENDOSCOPY  07/10/2017       [address]       Discharge Medication List as of 1/28/2023  4:37 PM        CONTINUE these medications which have NOT CHANGED    Details   docusate sodium (COLACE, DULCOLAX) 100 MG CAPS Take 100 mg by mouth 2 times daily, Disp-60 capsule, R-0OTC      dilTIAZem (CARDIZEM CD) 240 MG extended release capsule TAKE 1 CAPSULE TWICE A DAY, Disp-180 capsule, R-0Normal      fluticasone (FLONASE) 50 MCG/ACT nasal spray USE 1 SPRAY IN EACH NOSTRIL DAILY, Disp-16 g, R-5Normal      Fluticasone furoate-vilanterol (BREO ELLIPTA) 200-25 MCG/INH AEPB inhaler Inhale 1 puff into the lungs daily, Disp-180 each, R-5Normal      montelukast (SINGULAIR) 10 MG tablet TAKE 1 TABLET DAILY, Disp-90 tablet, R-3Normal      albuterol sulfate HFA (VENTOLIN HFA) 108 (90 Base) MCG/ACT inhaler Inhale 2 puffs into the lungs every 6 hours as needed for Wheezing, Disp-18 g, R-3Normal      predniSONE (DELTASONE) 10 MG tablet Take 20 mg by mouth in the morning., R-1Historical Med      dupilumab (DUPIXENT) 300 MG/2ML SOSY injection Inject 300 mg into the skin every 14 days Historical Med      vitamin C (ASCORBIC ACID) 500 MG tablet Take 1 tablet by mouth daily, Disp-30 tablet, R-3OTC      potassium chloride (KLOR-CON) 10 MEQ extended release tablet Take 10 mEq by mouth 2 times daily Historical Med      LORazepam (ATIVAN) 2 MG tablet Take 2 mg by mouth 3 times daily. Blake Roger Historical Med      aspirin 81 MG tablet Take 81 mg by mouth daily Pt to hold 5 days prior per Dr. Lacie Mejia Med      escitalopram (LEXAPRO) 10 MG tablet Take 10 mg by mouth nightly Historical Med      famotidine (PEPCID) 20 MG tablet Take 20 mg by mouth 2 times daily Instructed to take am of procedureHistorical Med      Cholecalciferol (VITAMIN D) 2000 UNITS CAPS capsule Take 2,000 Units by mouth daily Last dose 7/4/2017Historical Med      Cyanocobalamin (VITAMIN B-12 IJ) Inject as directed every 3 months Indications: every  month Historical Med      atorvastatin (LIPITOR) 10 MG tablet Take 10 mg by mouth nightly Historical Med             ALLERGIES     Percocet [oxycodone-acetaminophen]    FAMILYHISTORY       Family History   Problem Relation Age of Onset    Stroke Mother     Heart Disease Mother     Hypertension Mother     Other Mother         CHOLECYSTECTOMY; OSTEOPENIA    Heart Disease Father     Hypertension Father     Diabetes Father     Arthritis Father     Asthma Daughter         SOCIAL HISTORY       Social History     Tobacco Use    Smoking status: Never    Smokeless tobacco: Never   Vaping Use    Vaping Use: Never used   Substance Use Topics    Alcohol use: No    Drug use: Never       SCREENINGS        B[place] Coma Scale  Eye Opening: Spontaneous  Best Verbal Response: Oriented  Best Motor Response: Obeys commands  Brainard Coma Scale Score: 15                CIWA Assessment  BP: (!) 105/59  Heart Rate: (!) 108           PHYSICAL EXAM  1 or more Elements     ED Triage Vitals [01/28/23 1120]   BP Temp Temp Source Heart Rate Resp SpO2 Height Weight   97/74 97.5 °F (36.4 °C) Oral (!) 106 14 100 % 5' 4\" (1.626 m) 128 lb (58.1 kg)       Constitutional/General: Alert and oriented x3  Head: Normocephalic and atraumatic  Eyes: PERRL, EOMI, conjunctiva normal, sclera non icteric  ENT:  Oropharynx clear, handling secretions, no trismus, no asymmetry of the posterior oropharynx or uvular edema  Neck: Supple, full ROM, no stridor, no meningeal signs  Respiratory: Lungs clear to auscultation bilaterally, wheezing bilaterally cardiovascular: Tachycardic rate. Regular rhythm. No murmurs, no gallops, no rubs. 2+ distal pulses. Equal extremity pulses. Chest: No chest wall tenderness  GI:  Abdomen Soft, Non tender, Non distended. +BS. No rebound, guarding, or rigidity. No pulsatile masses. Musculoskeletal: Moves all extremities x 4. Warm and well perfused, no clubbing, no cyanosis, bilateral pitting edema. Capillary refill <3 seconds  Integument: skin warm and dry.   Open blisters bilateral lower extremities, minimally erythematous, no purulence, no necrosis, most are scabbed over refer to image  Neurologic: GCS 15, no focal deficits, symmetric strength 5/5 in the upper and lower extremities bilaterally  Psychiatric: Normal Affect            DIAGNOSTIC RESULTS   LABS:    Labs Reviewed   CBC WITH AUTO DIFFERENTIAL - Abnormal; Notable for the following components:       Result Value    Hemoglobin 10.4 (*)     MCH 25.9 (*)     MCHC 30.4 (*)     RDW 20.2 (*)     Monocytes % 19.6 (*)     Monocytes Absolute 1.57 (*)     All other components within normal limits   COMPREHENSIVE METABOLIC PANEL - Abnormal; Notable for the following components:    CO2 20 (*)     Glucose 69 (*)     Total Protein 5.7 (*)     Albumin 2.8 (*)     Alkaline Phosphatase 109 (*)     All other components within normal limits   TROPONIN - Abnormal; Notable for the following components:    Troponin, High Sensitivity 35 (*)     All other components within normal limits   TSH - Abnormal; Notable for the following components:    TSH 4.730 (*)     All other components within normal limits   TROPONIN - Abnormal; Notable for the following components:    Troponin, High Sensitivity 33 (*)     All other components within normal limits   LACTIC ACID   MAGNESIUM   BRAIN NATRIURETIC PEPTIDE   D-DIMER, QUANTITATIVE       As interpreted by me, the above displayed labs are abnormal. All other labs obtained during this visit were within normal range or not returned as of this dictation.       EKG Interpretation  Interpreted by emergency department resident physician, Miguel Ángel Díaz, DO  Rate: 106  Rhythm: Sinus  Interpretation: no acute changes, no ST elevations, normal axis, nonspecific ST changes, QTC 43  Comparison: stable as compared to patient's most recent EKG 1/12/2023      RADIOLOGY:   Non-plain film images such as CT, Ultrasound and MRI are read by the radiologist. Plain radiographic images are visualized and preliminarily interpreted by the ED Provider with the below findings:    Interpretation per the Radiologist below, if available at the time of this note:    CTA PULMONARY W CONTRAST   Final Result   1. No evidence of pulmonary embolism. 2. Small left pleural effusion which has decreased in size. 3. Bibasilar opacities likely related to subsegmental atelectasis. 4. Redemonstration of T5 compression fracture. If clinically warranted, MRI   may be helpful for further evaluation. 5. Chronic compression fractures with kyphoplasty as described above. US DUP LOWER EXTREMITIES BILATERAL VENOUS   Final Result   No evidence of DVT in either lower extremity given the slight limitation in   evaluation of the calf venous vessels. XR CHEST PORTABLE   Final Result   No acute process. No results found. No results found. PROCEDURES   Unless otherwise noted below, none    PAST MEDICAL HISTORY/Chronic Conditions Affecting Care      has a past medical history of Anxiety, Arthritis, Asthma, Claustrophobia, Dermatitis (02/2017), Fracture (02/2017), GERD (gastroesophageal reflux disease), Hiatal hernia, History of blood transfusion, HTN (hypertension) (4/22/2013), Hyperlipidemia, On aspirin at home, Pancreatic cyst (5/27/2017), Pneumonia (07/2018), Sleep apnea, SOB (shortness of breath) (4/22/2013), and Tachycardia (04/22/2013).      EMERGENCY DEPARTMENT COURSE    Vitals:    Vitals:    01/28/23 1120 01/28/23 1427   BP: 97/74 (!) 105/59   Pulse: (!) 106 (!) 108   Resp: 14 16   Temp: 97.5 °F (36.4 °C)    TempSrc: Oral    SpO2: 100% 96%   Weight: 128 lb (58.1 kg)    Height: 5' 4\" (1.626 m)        Patient was given the following medications:  Medications   acetaminophen (TYLENOL) tablet 650 mg (650 mg Oral Given 1/28/23 1223)   ipratropium-albuterol (DUONEB) nebulizer solution 3 ampule (3 ampules Inhalation Given 1/28/23 1234)   iopamidol (ISOVUE-370) 76 % injection 75 mL (75 mLs IntraVENous Given 1/28/23 4968)       Medical Decision Making/Differential Diagnosis:    CC/HPI Summary, Social Determinants of health, Records Reviewed, DDx, testing done/not done, ED Course, Reassessment, disposition considerations/shared decision making with patient, consults, disposition:        CC/HPI Summary, DDx, ED Course, Reassessment, Tests Considered, Patient expectation:   53-year-old female past medical history of asthma, hyperlipidemia, hypertension, recent compression fracture of her lumbar spine and T12 presenting today with concern for swelling in her legs and concern for cellulitis in her bilateral lower extremities. She was recently started on Keflex by her PCP and has been taking it for the past few days. She is not been having fevers or chills. There is no chest pain or worsening shortness of breath. She does not ambulate much and does have help at home. She does not want any more help at this time. Physical exam demonstrating frail woman and TSLO brace with bilateral pitting edema, generalized wheezing noted on her pulmonary exam although there are no obvious crackles. Differential diagnosis to include but not limited to acute on chronic respiratory failure, acute asthma exacerbation, pneumonia, viral illness, sepsis, pulmonary embolism. Symptomatically managed with duo nebs and Tylenol p.o. on arrival for symptomatic management. EKG demonstrates an tachycardia unchanged from patient's baseline, lab work with no leukocytosis, no electrolyte abnormalities, delta troponin unremarkable. Lactic acid within normal limits. Chest x-ray without evidence of acute pneumothorax or other acute process, ultrasound of bilateral lower extremities not evidence of DVT and pulmonary CTA with no evidence of pulmonary embolism. She does have a small pleural effusion that has decreased in size compared to priors.   Shared decision-making discussion with patient and her sister, patient does not want to be admitted to the hospital when she has been feeling increasingly weak and already has home health care, she will be staying with her sister for the next several days until they figure out other arrangements and she gets her strength back. They were given very strict return precautions and will continue to have outpatient follow-up with neurosurgery as well as her PCP for    ED Course as of 01/29/23 0806   Sat Jan 28, 2023   1203 I spoke to patient as well as her sister. There is plans this week for hospital bed be delivered the patient's sister's house that she can move in with her. Patient understands is difficult for her to be on her own at this point is ambulation with a walker is difficult. She has been out of rehab for the past month. She does not want to go back into inpatient rehab but is having increasing difficulties ambulating on her own. [NR]   8487 Discussed with patient and her sister, with shared decision making she does want to go home. She notes that she is able to ambulate with a walker and was doing so this morning. She has a follow-up with her cardiologist on February 7. She opts to continue taking the Keflex for the supposed cellulitis in her bilateral lower extremities. Given strict return precautions and verbalized understanding. [MM]      ED Course User Index  [KK] Arcenio Quinones MD  [MM] Andrei Garcia DO      I am the Primary Clinician of Record. CONSULTS: (Who and What was discussed)  None    I am the Primary Clinician of Record. FINAL IMPRESSION      1. Bilateral leg edema    2. General weakness          DISPOSITION/PLAN     DISPOSITION Decision To Discharge 01/28/2023 04:37:05 PM      PATIENT REFERRED TO:  Frida Saenz MD  [address] [address]  [phone]    Call   follow up    DISCHARGE MEDICATIONS:  Discharge Medication List as of 1/28/2023  4:37 PM             (Please note that portions of this note were completed with a voice recognition program.  Efforts were made to edit the dictations but occasionally words are mis-transcribed. )    Andrei Garcia DO (electronically signed)            Leti Tatum DO  Resident  01/29/23 0142

## 2023-01-29 LAB
EKG ATRIAL RATE: 106 BPM
EKG P AXIS: 29 DEGREES
EKG P-R INTERVAL: 174 MS
EKG Q-T INTERVAL: 364 MS
EKG QRS DURATION: 70 MS
EKG QTC CALCULATION (BAZETT): 483 MS
EKG R AXIS: 29 DEGREES
EKG T AXIS: 32 DEGREES
EKG VENTRICULAR RATE: 106 BPM

## 2023-01-29 PROCEDURE — 93010 ELECTROCARDIOGRAM REPORT: CPT | Performed by: INTERNAL MEDICINE

## 2023-01-31 ENCOUNTER — APPOINTMENT (OUTPATIENT)
Dept: GENERAL RADIOLOGY | Age: 78
DRG: 872 | End: 2023-01-31
Payer: MEDICARE

## 2023-01-31 ENCOUNTER — APPOINTMENT (OUTPATIENT)
Dept: CT IMAGING | Age: 78
DRG: 872 | End: 2023-01-31
Payer: MEDICARE

## 2023-01-31 ENCOUNTER — HOSPITAL ENCOUNTER (INPATIENT)
Age: 78
LOS: 4 days | Discharge: NURSING, REHAB OR LONG-TERM CARE | DRG: 872 | End: 2023-02-04
Attending: EMERGENCY MEDICINE | Admitting: STUDENT IN AN ORGANIZED HEALTH CARE EDUCATION/TRAINING PROGRAM
Payer: MEDICARE

## 2023-01-31 DIAGNOSIS — L03.119 CELLULITIS OF LOWER EXTREMITY, UNSPECIFIED LATERALITY: ICD-10-CM

## 2023-01-31 DIAGNOSIS — A41.9 SEPTICEMIA (HCC): Primary | ICD-10-CM

## 2023-01-31 DIAGNOSIS — R62.7 FAILURE TO THRIVE IN ADULT: ICD-10-CM

## 2023-01-31 PROBLEM — L03.90 CELLULITIS: Status: ACTIVE | Noted: 2023-01-31

## 2023-01-31 LAB
ALBUMIN SERPL-MCNC: 2.8 G/DL (ref 3.5–5.2)
ALP BLD-CCNC: 112 U/L (ref 35–104)
ALT SERPL-CCNC: 17 U/L (ref 0–32)
ANION GAP SERPL CALCULATED.3IONS-SCNC: 16 MMOL/L (ref 7–16)
ANISOCYTOSIS: ABNORMAL
AST SERPL-CCNC: 51 U/L (ref 0–31)
ATYPICAL LYMPHOCYTE RELATIVE PERCENT: 0.9 % (ref 0–4)
BACTERIA: ABNORMAL /HPF
BASOPHILS ABSOLUTE: 0 E9/L (ref 0–0.2)
BASOPHILS RELATIVE PERCENT: 0 % (ref 0–2)
BILIRUB SERPL-MCNC: 0.4 MG/DL (ref 0–1.2)
BILIRUBIN URINE: ABNORMAL
BLOOD, URINE: NEGATIVE
BUN BLDV-MCNC: 13 MG/DL (ref 6–23)
BURR CELLS: ABNORMAL
CALCIUM SERPL-MCNC: 8.6 MG/DL (ref 8.6–10.2)
CHLORIDE BLD-SCNC: 98 MMOL/L (ref 98–107)
CLARITY: CLEAR
CO2: 19 MMOL/L (ref 22–29)
COLOR: YELLOW
CREAT SERPL-MCNC: 0.9 MG/DL (ref 0.5–1)
EOSINOPHILS ABSOLUTE: 0 E9/L (ref 0.05–0.5)
EOSINOPHILS RELATIVE PERCENT: 0 % (ref 0–6)
GFR SERPL CREATININE-BSD FRML MDRD: >60 ML/MIN/1.73
GLUCOSE BLD-MCNC: 65 MG/DL (ref 74–99)
GLUCOSE URINE: NEGATIVE MG/DL
HCT VFR BLD CALC: 34 % (ref 34–48)
HEMOGLOBIN: 10.5 G/DL (ref 11.5–15.5)
INFLUENZA A BY PCR: NOT DETECTED
INFLUENZA B BY PCR: NOT DETECTED
KETONES, URINE: 15 MG/DL
LACTIC ACID: 1.9 MMOL/L (ref 0.5–2.2)
LEUKOCYTE ESTERASE, URINE: NEGATIVE
LYMPHOCYTES ABSOLUTE: 0.62 E9/L (ref 1.5–4)
LYMPHOCYTES RELATIVE PERCENT: 3.5 % (ref 20–42)
MCH RBC QN AUTO: 26.3 PG (ref 26–35)
MCHC RBC AUTO-ENTMCNC: 30.9 % (ref 32–34.5)
MCV RBC AUTO: 85 FL (ref 80–99.9)
MONOCYTES ABSOLUTE: 0.92 E9/L (ref 0.1–0.95)
MONOCYTES RELATIVE PERCENT: 6.1 % (ref 2–12)
NEUTROPHILS ABSOLUTE: 13.86 E9/L (ref 1.8–7.3)
NEUTROPHILS RELATIVE PERCENT: 89.5 % (ref 43–80)
NITRITE, URINE: NEGATIVE
NUCLEATED RED BLOOD CELLS: 0 /100 WBC
OVALOCYTES: ABNORMAL
PDW BLD-RTO: 19.9 FL (ref 11.5–15)
PH UA: 5.5 (ref 5–9)
PLATELET # BLD: 317 E9/L (ref 130–450)
PMV BLD AUTO: 11.4 FL (ref 7–12)
POIKILOCYTES: ABNORMAL
POLYCHROMASIA: ABNORMAL
POTASSIUM SERPL-SCNC: 4.6 MMOL/L (ref 3.5–5)
PROTEIN UA: NEGATIVE MG/DL
RBC # BLD: 4 E12/L (ref 3.5–5.5)
RBC UA: ABNORMAL /HPF (ref 0–2)
SARS-COV-2, NAAT: NOT DETECTED
SCHISTOCYTES: ABNORMAL
SODIUM BLD-SCNC: 133 MMOL/L (ref 132–146)
SPECIFIC GRAVITY UA: 1.02 (ref 1–1.03)
TEAR DROP CELLS: ABNORMAL
TOTAL PROTEIN: 5.7 G/DL (ref 6.4–8.3)
TROPONIN, HIGH SENSITIVITY: 21 NG/L (ref 0–9)
TROPONIN, HIGH SENSITIVITY: 29 NG/L (ref 0–9)
TSH SERPL DL<=0.05 MIU/L-ACNC: 5.64 UIU/ML (ref 0.27–4.2)
UROBILINOGEN, URINE: 0.2 E.U./DL
WBC # BLD: 15.4 E9/L (ref 4.5–11.5)
WBC UA: ABNORMAL /HPF (ref 0–5)

## 2023-01-31 PROCEDURE — 87150 DNA/RNA AMPLIFIED PROBE: CPT

## 2023-01-31 PROCEDURE — 87502 INFLUENZA DNA AMP PROBE: CPT

## 2023-01-31 PROCEDURE — 36415 COLL VENOUS BLD VENIPUNCTURE: CPT

## 2023-01-31 PROCEDURE — 96374 THER/PROPH/DIAG INJ IV PUSH: CPT

## 2023-01-31 PROCEDURE — 87635 SARS-COV-2 COVID-19 AMP PRB: CPT

## 2023-01-31 PROCEDURE — 96375 TX/PRO/DX INJ NEW DRUG ADDON: CPT

## 2023-01-31 PROCEDURE — 99285 EMERGENCY DEPT VISIT HI MDM: CPT

## 2023-01-31 PROCEDURE — 71045 X-RAY EXAM CHEST 1 VIEW: CPT

## 2023-01-31 PROCEDURE — 84443 ASSAY THYROID STIM HORMONE: CPT

## 2023-01-31 PROCEDURE — 6360000002 HC RX W HCPCS: Performed by: EMERGENCY MEDICINE

## 2023-01-31 PROCEDURE — 81001 URINALYSIS AUTO W/SCOPE: CPT

## 2023-01-31 PROCEDURE — 93005 ELECTROCARDIOGRAM TRACING: CPT | Performed by: EMERGENCY MEDICINE

## 2023-01-31 PROCEDURE — 85025 COMPLETE CBC W/AUTO DIFF WBC: CPT

## 2023-01-31 PROCEDURE — 87040 BLOOD CULTURE FOR BACTERIA: CPT

## 2023-01-31 PROCEDURE — 2580000003 HC RX 258: Performed by: EMERGENCY MEDICINE

## 2023-01-31 PROCEDURE — 1200000000 HC SEMI PRIVATE

## 2023-01-31 PROCEDURE — 80053 COMPREHEN METABOLIC PANEL: CPT

## 2023-01-31 PROCEDURE — 83605 ASSAY OF LACTIC ACID: CPT

## 2023-01-31 PROCEDURE — 71045 X-RAY EXAM CHEST 1 VIEW: CPT | Performed by: RADIOLOGY

## 2023-01-31 PROCEDURE — 84484 ASSAY OF TROPONIN QUANT: CPT

## 2023-01-31 RX ORDER — 0.9 % SODIUM CHLORIDE 0.9 %
1000 INTRAVENOUS SOLUTION INTRAVENOUS ONCE
Status: COMPLETED | OUTPATIENT
Start: 2023-01-31 | End: 2023-02-01

## 2023-01-31 RX ORDER — METOCLOPRAMIDE HYDROCHLORIDE 5 MG/ML
10 INJECTION INTRAMUSCULAR; INTRAVENOUS ONCE
Status: COMPLETED | OUTPATIENT
Start: 2023-01-31 | End: 2023-01-31

## 2023-01-31 RX ORDER — ONDANSETRON 2 MG/ML
4 INJECTION INTRAMUSCULAR; INTRAVENOUS ONCE
Status: COMPLETED | OUTPATIENT
Start: 2023-01-31 | End: 2023-01-31

## 2023-01-31 RX ORDER — DEXTROSE MONOHYDRATE 25 G/50ML
25 INJECTION, SOLUTION INTRAVENOUS ONCE
Status: DISCONTINUED | OUTPATIENT
Start: 2023-01-31 | End: 2023-01-31 | Stop reason: CLARIF

## 2023-01-31 RX ORDER — 0.9 % SODIUM CHLORIDE 0.9 %
500 INTRAVENOUS SOLUTION INTRAVENOUS ONCE
Status: COMPLETED | OUTPATIENT
Start: 2023-01-31 | End: 2023-01-31

## 2023-01-31 RX ADMIN — ONDANSETRON 4 MG: 2 INJECTION INTRAMUSCULAR; INTRAVENOUS at 22:50

## 2023-01-31 RX ADMIN — METOCLOPRAMIDE 10 MG: 5 INJECTION, SOLUTION INTRAMUSCULAR; INTRAVENOUS at 23:21

## 2023-01-31 RX ADMIN — SODIUM CHLORIDE 500 ML: 9 INJECTION, SOLUTION INTRAVENOUS at 20:24

## 2023-01-31 RX ADMIN — SODIUM CHLORIDE 1000 ML: 9 INJECTION, SOLUTION INTRAVENOUS at 23:18

## 2023-01-31 RX ADMIN — DEXTROSE MONOHYDRATE 250 ML: 100 INJECTION, SOLUTION INTRAVENOUS at 23:22

## 2023-01-31 RX ADMIN — WATER 2000 MG: 1 INJECTION INTRAMUSCULAR; INTRAVENOUS; SUBCUTANEOUS at 23:41

## 2023-01-31 ASSESSMENT — LIFESTYLE VARIABLES
HOW MANY STANDARD DRINKS CONTAINING ALCOHOL DO YOU HAVE ON A TYPICAL DAY: PATIENT DOES NOT DRINK
HOW OFTEN DO YOU HAVE A DRINK CONTAINING ALCOHOL: NEVER

## 2023-01-31 ASSESSMENT — PAIN - FUNCTIONAL ASSESSMENT: PAIN_FUNCTIONAL_ASSESSMENT: NONE - DENIES PAIN

## 2023-02-01 LAB
ANION GAP SERPL CALCULATED.3IONS-SCNC: 11 MMOL/L (ref 7–16)
ANISOCYTOSIS: ABNORMAL
ANTISTREPTOLYSIN-O: <20 IU/ML (ref 0–200)
BASOPHILS ABSOLUTE: 0 E9/L (ref 0–0.2)
BASOPHILS RELATIVE PERCENT: 0 % (ref 0–2)
BUN BLDV-MCNC: 10 MG/DL (ref 6–23)
BURR CELLS: ABNORMAL
CALCIUM SERPL-MCNC: 7.5 MG/DL (ref 8.6–10.2)
CHLORIDE BLD-SCNC: 102 MMOL/L (ref 98–107)
CO2: 18 MMOL/L (ref 22–29)
CREAT SERPL-MCNC: 0.7 MG/DL (ref 0.5–1)
EKG ATRIAL RATE: 120 BPM
EKG P AXIS: 32 DEGREES
EKG P-R INTERVAL: 168 MS
EKG Q-T INTERVAL: 316 MS
EKG QRS DURATION: 72 MS
EKG QTC CALCULATION (BAZETT): 446 MS
EKG R AXIS: 25 DEGREES
EKG T AXIS: 45 DEGREES
EKG VENTRICULAR RATE: 120 BPM
EOSINOPHILS ABSOLUTE: 0.12 E9/L (ref 0.05–0.5)
EOSINOPHILS RELATIVE PERCENT: 0.9 % (ref 0–6)
GFR SERPL CREATININE-BSD FRML MDRD: >60 ML/MIN/1.73
GLUCOSE BLD-MCNC: 69 MG/DL (ref 74–99)
HCT VFR BLD CALC: 30 % (ref 34–48)
HEMOGLOBIN: 9.2 G/DL (ref 11.5–15.5)
HYPOCHROMIA: ABNORMAL
LACTIC ACID: 1.2 MMOL/L (ref 0.5–2.2)
LYMPHOCYTES ABSOLUTE: 0.95 E9/L (ref 1.5–4)
LYMPHOCYTES RELATIVE PERCENT: 6.9 % (ref 20–42)
MCH RBC QN AUTO: 26.3 PG (ref 26–35)
MCHC RBC AUTO-ENTMCNC: 30.7 % (ref 32–34.5)
MCV RBC AUTO: 85.7 FL (ref 80–99.9)
MONOCYTES ABSOLUTE: 0.4 E9/L (ref 0.1–0.95)
MONOCYTES RELATIVE PERCENT: 2.6 % (ref 2–12)
MYELOCYTE PERCENT: 0.9 % (ref 0–0)
NEUTROPHILS ABSOLUTE: 12.15 E9/L (ref 1.8–7.3)
NEUTROPHILS RELATIVE PERCENT: 88.7 % (ref 43–80)
NUCLEATED RED BLOOD CELLS: 0 /100 WBC
OVALOCYTES: ABNORMAL
PDW BLD-RTO: 20 FL (ref 11.5–15)
PLATELET # BLD: 310 E9/L (ref 130–450)
PMV BLD AUTO: 11.6 FL (ref 7–12)
POIKILOCYTES: ABNORMAL
POLYCHROMASIA: ABNORMAL
POTASSIUM REFLEX MAGNESIUM: 3.7 MMOL/L (ref 3.5–5)
PROCALCITONIN: 2.35 NG/ML (ref 0–0.08)
PROCALCITONIN: 2.76 NG/ML (ref 0–0.08)
RBC # BLD: 3.5 E12/L (ref 3.5–5.5)
SCHISTOCYTES: ABNORMAL
SEDIMENTATION RATE, ERYTHROCYTE: 42 MM/HR (ref 0–20)
SODIUM BLD-SCNC: 131 MMOL/L (ref 132–146)
T4 FREE: 0.99 NG/DL (ref 0.93–1.7)
WBC # BLD: 13.5 E9/L (ref 4.5–11.5)

## 2023-02-01 PROCEDURE — 51798 US URINE CAPACITY MEASURE: CPT

## 2023-02-01 PROCEDURE — 6370000000 HC RX 637 (ALT 250 FOR IP): Performed by: INTERNAL MEDICINE

## 2023-02-01 PROCEDURE — 6370000000 HC RX 637 (ALT 250 FOR IP): Performed by: STUDENT IN AN ORGANIZED HEALTH CARE EDUCATION/TRAINING PROGRAM

## 2023-02-01 PROCEDURE — 51701 INSERT BLADDER CATHETER: CPT

## 2023-02-01 PROCEDURE — 97161 PT EVAL LOW COMPLEX 20 MIN: CPT

## 2023-02-01 PROCEDURE — 2580000003 HC RX 258: Performed by: INTERNAL MEDICINE

## 2023-02-01 PROCEDURE — 83605 ASSAY OF LACTIC ACID: CPT

## 2023-02-01 PROCEDURE — 6360000002 HC RX W HCPCS: Performed by: STUDENT IN AN ORGANIZED HEALTH CARE EDUCATION/TRAINING PROGRAM

## 2023-02-01 PROCEDURE — 86060 ANTISTREPTOLYSIN O TITER: CPT

## 2023-02-01 PROCEDURE — 87081 CULTURE SCREEN ONLY: CPT

## 2023-02-01 PROCEDURE — 80048 BASIC METABOLIC PNL TOTAL CA: CPT

## 2023-02-01 PROCEDURE — 99223 1ST HOSP IP/OBS HIGH 75: CPT | Performed by: STUDENT IN AN ORGANIZED HEALTH CARE EDUCATION/TRAINING PROGRAM

## 2023-02-01 PROCEDURE — 85651 RBC SED RATE NONAUTOMATED: CPT

## 2023-02-01 PROCEDURE — 86140 C-REACTIVE PROTEIN: CPT

## 2023-02-01 PROCEDURE — 97165 OT EVAL LOW COMPLEX 30 MIN: CPT

## 2023-02-01 PROCEDURE — 97535 SELF CARE MNGMENT TRAINING: CPT

## 2023-02-01 PROCEDURE — 1200000000 HC SEMI PRIVATE

## 2023-02-01 PROCEDURE — 85025 COMPLETE CBC W/AUTO DIFF WBC: CPT

## 2023-02-01 PROCEDURE — 2580000003 HC RX 258: Performed by: STUDENT IN AN ORGANIZED HEALTH CARE EDUCATION/TRAINING PROGRAM

## 2023-02-01 PROCEDURE — 36415 COLL VENOUS BLD VENIPUNCTURE: CPT

## 2023-02-01 PROCEDURE — 97530 THERAPEUTIC ACTIVITIES: CPT

## 2023-02-01 PROCEDURE — 6360000002 HC RX W HCPCS: Performed by: INTERNAL MEDICINE

## 2023-02-01 PROCEDURE — 84145 PROCALCITONIN (PCT): CPT

## 2023-02-01 PROCEDURE — C9113 INJ PANTOPRAZOLE SODIUM, VIA: HCPCS | Performed by: INTERNAL MEDICINE

## 2023-02-01 PROCEDURE — 93010 ELECTROCARDIOGRAM REPORT: CPT | Performed by: INTERNAL MEDICINE

## 2023-02-01 PROCEDURE — 84439 ASSAY OF FREE THYROXINE: CPT

## 2023-02-01 RX ORDER — SODIUM CHLORIDE 0.9 % (FLUSH) 0.9 %
5-40 SYRINGE (ML) INJECTION EVERY 12 HOURS SCHEDULED
Status: DISCONTINUED | OUTPATIENT
Start: 2023-02-01 | End: 2023-02-02

## 2023-02-01 RX ORDER — ACETAMINOPHEN 325 MG/1
650 TABLET ORAL EVERY 6 HOURS PRN
Status: DISCONTINUED | OUTPATIENT
Start: 2023-02-01 | End: 2023-02-04 | Stop reason: HOSPADM

## 2023-02-01 RX ORDER — ONDANSETRON 2 MG/ML
4 INJECTION INTRAMUSCULAR; INTRAVENOUS EVERY 6 HOURS PRN
Status: DISCONTINUED | OUTPATIENT
Start: 2023-02-01 | End: 2023-02-04 | Stop reason: HOSPADM

## 2023-02-01 RX ORDER — MIDODRINE HYDROCHLORIDE 5 MG/1
2.5 TABLET ORAL
Status: DISCONTINUED | OUTPATIENT
Start: 2023-02-01 | End: 2023-02-02

## 2023-02-01 RX ORDER — SODIUM CHLORIDE 9 MG/ML
INJECTION, SOLUTION INTRAVENOUS PRN
Status: DISCONTINUED | OUTPATIENT
Start: 2023-02-01 | End: 2023-02-04 | Stop reason: HOSPADM

## 2023-02-01 RX ORDER — ENOXAPARIN SODIUM 100 MG/ML
40 INJECTION SUBCUTANEOUS DAILY
Status: DISCONTINUED | OUTPATIENT
Start: 2023-02-01 | End: 2023-02-04 | Stop reason: HOSPADM

## 2023-02-01 RX ORDER — POLYETHYLENE GLYCOL 3350 17 G/17G
17 POWDER, FOR SOLUTION ORAL DAILY PRN
Status: DISCONTINUED | OUTPATIENT
Start: 2023-02-01 | End: 2023-02-04

## 2023-02-01 RX ORDER — LORAZEPAM 1 MG/1
2 TABLET ORAL 3 TIMES DAILY
Status: DISCONTINUED | OUTPATIENT
Start: 2023-02-01 | End: 2023-02-04 | Stop reason: HOSPADM

## 2023-02-01 RX ORDER — ATORVASTATIN CALCIUM 10 MG/1
10 TABLET, FILM COATED ORAL NIGHTLY
Status: DISCONTINUED | OUTPATIENT
Start: 2023-02-01 | End: 2023-02-04 | Stop reason: HOSPADM

## 2023-02-01 RX ORDER — ACETAMINOPHEN 650 MG/1
650 SUPPOSITORY RECTAL EVERY 6 HOURS PRN
Status: DISCONTINUED | OUTPATIENT
Start: 2023-02-01 | End: 2023-02-04 | Stop reason: HOSPADM

## 2023-02-01 RX ORDER — 0.9 % SODIUM CHLORIDE 0.9 %
500 INTRAVENOUS SOLUTION INTRAVENOUS ONCE
Status: COMPLETED | OUTPATIENT
Start: 2023-02-01 | End: 2023-02-01

## 2023-02-01 RX ORDER — FAMOTIDINE 20 MG/1
20 TABLET, FILM COATED ORAL 2 TIMES DAILY
Status: DISCONTINUED | OUTPATIENT
Start: 2023-02-01 | End: 2023-02-01 | Stop reason: DRUGHIGH

## 2023-02-01 RX ORDER — ALBUTEROL SULFATE 90 UG/1
2 AEROSOL, METERED RESPIRATORY (INHALATION) EVERY 6 HOURS PRN
Status: DISCONTINUED | OUTPATIENT
Start: 2023-02-01 | End: 2023-02-01 | Stop reason: CLARIF

## 2023-02-01 RX ORDER — ALBUTEROL SULFATE 2.5 MG/3ML
2.5 SOLUTION RESPIRATORY (INHALATION) EVERY 6 HOURS PRN
Status: DISCONTINUED | OUTPATIENT
Start: 2023-02-01 | End: 2023-02-04 | Stop reason: HOSPADM

## 2023-02-01 RX ORDER — DILTIAZEM HYDROCHLORIDE 180 MG/1
180 CAPSULE, COATED, EXTENDED RELEASE ORAL 2 TIMES DAILY
Status: DISCONTINUED | OUTPATIENT
Start: 2023-02-01 | End: 2023-02-02

## 2023-02-01 RX ORDER — ESCITALOPRAM OXALATE 10 MG/1
10 TABLET ORAL NIGHTLY
Status: DISCONTINUED | OUTPATIENT
Start: 2023-02-01 | End: 2023-02-04 | Stop reason: HOSPADM

## 2023-02-01 RX ORDER — SODIUM CHLORIDE 9 MG/ML
INJECTION, SOLUTION INTRAVENOUS CONTINUOUS
Status: DISCONTINUED | OUTPATIENT
Start: 2023-02-01 | End: 2023-02-04 | Stop reason: HOSPADM

## 2023-02-01 RX ORDER — CHOLECALCIFEROL (VITAMIN D3) 50 MCG
2000 TABLET ORAL DAILY
Status: DISCONTINUED | OUTPATIENT
Start: 2023-02-01 | End: 2023-02-04 | Stop reason: HOSPADM

## 2023-02-01 RX ORDER — ONDANSETRON 4 MG/1
4 TABLET, ORALLY DISINTEGRATING ORAL EVERY 8 HOURS PRN
Status: DISCONTINUED | OUTPATIENT
Start: 2023-02-01 | End: 2023-02-04 | Stop reason: HOSPADM

## 2023-02-01 RX ORDER — DOCUSATE SODIUM 100 MG/1
100 CAPSULE, LIQUID FILLED ORAL 2 TIMES DAILY
Status: DISCONTINUED | OUTPATIENT
Start: 2023-02-01 | End: 2023-02-04 | Stop reason: HOSPADM

## 2023-02-01 RX ORDER — SODIUM CHLORIDE 0.9 % (FLUSH) 0.9 %
5-40 SYRINGE (ML) INJECTION PRN
Status: DISCONTINUED | OUTPATIENT
Start: 2023-02-01 | End: 2023-02-04 | Stop reason: HOSPADM

## 2023-02-01 RX ORDER — FAMOTIDINE 20 MG/1
20 TABLET, FILM COATED ORAL DAILY
Status: DISCONTINUED | OUTPATIENT
Start: 2023-02-01 | End: 2023-02-01

## 2023-02-01 RX ORDER — PANTOPRAZOLE SODIUM 40 MG/10ML
40 INJECTION, POWDER, LYOPHILIZED, FOR SOLUTION INTRAVENOUS
Status: DISCONTINUED | OUTPATIENT
Start: 2023-02-01 | End: 2023-02-04 | Stop reason: HOSPADM

## 2023-02-01 RX ORDER — LOPERAMIDE HYDROCHLORIDE 2 MG/1
2 CAPSULE ORAL 4 TIMES DAILY PRN
Status: DISCONTINUED | OUTPATIENT
Start: 2023-02-01 | End: 2023-02-04

## 2023-02-01 RX ORDER — ASCORBIC ACID 500 MG
500 TABLET ORAL DAILY
Status: DISCONTINUED | OUTPATIENT
Start: 2023-02-01 | End: 2023-02-04 | Stop reason: HOSPADM

## 2023-02-01 RX ADMIN — WATER 2000 MG: 1 INJECTION INTRAMUSCULAR; INTRAVENOUS; SUBCUTANEOUS at 23:42

## 2023-02-01 RX ADMIN — SODIUM CHLORIDE 500 ML: 9 INJECTION, SOLUTION INTRAVENOUS at 14:57

## 2023-02-01 RX ADMIN — ONDANSETRON 4 MG: 2 INJECTION INTRAMUSCULAR; INTRAVENOUS at 09:32

## 2023-02-01 RX ADMIN — WATER 2000 MG: 1 INJECTION INTRAMUSCULAR; INTRAVENOUS; SUBCUTANEOUS at 15:20

## 2023-02-01 RX ADMIN — OTC TOPICAL ANALGESIC DRUG PRODUCTS: .005; .005 LOTION TOPICAL at 17:39

## 2023-02-01 RX ADMIN — LORAZEPAM 2 MG: 1 TABLET ORAL at 09:21

## 2023-02-01 RX ADMIN — Medication 2000 UNITS: at 09:21

## 2023-02-01 RX ADMIN — WATER 2000 MG: 1 INJECTION INTRAMUSCULAR; INTRAVENOUS; SUBCUTANEOUS at 06:25

## 2023-02-01 RX ADMIN — SODIUM CHLORIDE: 9 INJECTION, SOLUTION INTRAVENOUS at 02:22

## 2023-02-01 RX ADMIN — ACETAMINOPHEN 650 MG: 325 TABLET ORAL at 11:28

## 2023-02-01 RX ADMIN — HYDROCORTISONE SODIUM SUCCINATE 100 MG: 100 INJECTION, POWDER, FOR SOLUTION INTRAMUSCULAR; INTRAVENOUS at 18:27

## 2023-02-01 RX ADMIN — LORAZEPAM 2 MG: 1 TABLET ORAL at 20:04

## 2023-02-01 RX ADMIN — SODIUM CHLORIDE, PRESERVATIVE FREE 10 ML: 5 INJECTION INTRAVENOUS at 20:08

## 2023-02-01 RX ADMIN — PETROLATUM: 420 OINTMENT TOPICAL at 17:39

## 2023-02-01 RX ADMIN — LOPERAMIDE HYDROCHLORIDE 2 MG: 2 CAPSULE ORAL at 11:28

## 2023-02-01 RX ADMIN — Medication 500 MG: at 09:22

## 2023-02-01 RX ADMIN — MIDODRINE HYDROCHLORIDE 2.5 MG: 5 TABLET ORAL at 18:27

## 2023-02-01 RX ADMIN — LORAZEPAM 2 MG: 1 TABLET ORAL at 15:20

## 2023-02-01 RX ADMIN — FAMOTIDINE 20 MG: 20 TABLET, FILM COATED ORAL at 09:21

## 2023-02-01 RX ADMIN — ESCITALOPRAM OXALATE 10 MG: 10 TABLET ORAL at 20:04

## 2023-02-01 RX ADMIN — DOCUSATE SODIUM 100 MG: 100 CAPSULE, LIQUID FILLED ORAL at 09:22

## 2023-02-01 RX ADMIN — ENOXAPARIN SODIUM 40 MG: 100 INJECTION SUBCUTANEOUS at 09:21

## 2023-02-01 RX ADMIN — ATORVASTATIN CALCIUM 10 MG: 10 TABLET, FILM COATED ORAL at 20:05

## 2023-02-01 RX ADMIN — VANCOMYCIN HYDROCHLORIDE 1500 MG: 10 INJECTION, POWDER, LYOPHILIZED, FOR SOLUTION INTRAVENOUS at 12:50

## 2023-02-01 RX ADMIN — PANTOPRAZOLE SODIUM 40 MG: 40 INJECTION, POWDER, FOR SOLUTION INTRAVENOUS at 17:40

## 2023-02-01 RX ADMIN — DILTIAZEM HYDROCHLORIDE 180 MG: 180 CAPSULE, COATED, EXTENDED RELEASE ORAL at 09:21

## 2023-02-01 ASSESSMENT — PAIN DESCRIPTION - DESCRIPTORS: DESCRIPTORS: ACHING;DISCOMFORT;CRAMPING

## 2023-02-01 ASSESSMENT — PAIN SCALES - GENERAL
PAINLEVEL_OUTOF10: 0
PAINLEVEL_OUTOF10: 0
PAINLEVEL_OUTOF10: 4
PAINLEVEL_OUTOF10: 0

## 2023-02-01 ASSESSMENT — PAIN DESCRIPTION - ORIENTATION: ORIENTATION: RIGHT;LEFT

## 2023-02-01 ASSESSMENT — PAIN DESCRIPTION - LOCATION: LOCATION: LEG

## 2023-02-01 NOTE — PLAN OF CARE
Problem: Discharge Planning  Goal: Discharge to home or other facility with appropriate resources  2/1/2023 1152 by Perry Estrada RN  Outcome: Progressing     Problem: Skin/Tissue Integrity  Goal: Absence of new skin breakdown  Description: 1. Monitor for areas of redness and/or skin breakdown  2. Assess vascular access sites hourly  3. Every 4-6 hours minimum:  Change oxygen saturation probe site  4. Every 4-6 hours:  If on nasal continuous positive airway pressure, respiratory therapy assess nares and determine need for appliance change or resting period.   2/1/2023 1152 by Perry Estrada RN  Outcome: Progressing     Problem: Safety - Adult  Goal: Free from fall injury  2/1/2023 1152 by Perry Estrada RN  Outcome: Progressing

## 2023-02-01 NOTE — PROGRESS NOTES
[facility] Progress Note    Admitting Date and Time: 1/31/2023  7:29 PM  Admit Dx: Cellulitis [L03.90]  Septicemia ([address] [A41.9]  Failure to thrive in adult [R62.7]  Cellulitis of lower extremity, unspecified laterality [G15.303]    Night time hospitalist did billing for today. No billing will take place. Assessment and plan as follows. Assessment:    Principal Problem:    Cellulitis  Resolved Problems:    * No resolved hospital problems. *      Plan:  Bilateral lower extremity cellulitis - Patient was discharged from ER 1/28 with Keflex without improvement. Procal, ESR, and ASO pending. MRSA nares swab pending. Monitor CBC/BMP daily. Pain control PRN. Continue Ancef and add Vancomycin. Dehydration - continue IVF. Monitor CBC/CMP. Monitor urine output. HLD - continue statin. GERD - Continue Pepcid. Sepsis secondary to #1 and 2- tachycardia, leukocytosis, nausea and vomiting on presentation. Continue Po Diltiazem. Leukocytosis may be secondary  to recent use of steroids. Continue Ancef, Vancomycin added. IVF. Zofran and Tigan PRN. WBC 8 > 15.4 > 13.5. rapid flu and COVID negative. Blood cultures pending. T12/L1 compression fracture - has TLSO brace for compression fracture. Follows with neurosurgery outpatient. Depression - continue Lexapro and Ativan.      CODE: full  DVT prophylaxis: Lovenox     Electronically signed by STEVE Stearns NP on 2/1/2023 at 8:24 AM

## 2023-02-01 NOTE — CARE COORDINATION
Social Work/Discharge Planning:  Met with patient and completed initial assessment. Explained Social Work role and discussed transition of care/discharge planning. She lives with her sister in a one story house. PTA patient uses a wheeled walker. She had a recent snf stay at [facility]. She is active with [facility]. Patient PCP is Dr. Alonza Brunner and pharmacy for new medications is [facility]. Patient is agreeable to rehab and prefers [facility]. Provided patient with snf choice list.  Referral made to Lora Beverly with [facility] and facility will review patient information. Will continue to follow and assist with discharge planning.   Electronically signed by CHELSEY Freedman on 2/1/2023 at 3:36 PM

## 2023-02-01 NOTE — PROGRESS NOTES
Occupational Therapy  OCCUPATIONAL THERAPY INITIAL EVALUATION  [facility] [address]    Date: 2023     Patient Name: Krys Villarreal  MRN: [id number]  : 1945  Room: [address] [place] [address]    Evaluating OT: Onesimo MICHAEL Sussyfrida Jaimes, OTR/L - [id number]    Referring Provider: David Lujan MD  Specific Provider Orders/Date: \"OT eval and treat\" - 2023    Diagnosis: Cellulitis [L03.90]  Septicemia ([facility] [address] [A41.9]  Failure to thrive in adult [R62.7]  Cellulitis of lower extremity, unspecified laterality [L03.119]      Pertinent Medical History: recent T12, L1 fractures (2022), HTN, sleep apnea, claustrophobia, anxiety, arthritis     Precautions: fall risk, TLSO, bed/chair alarms, skin integrity    Assessment of Current Deficits:    [x] Functional mobility   [x]ADLs  [x] Strength               [x]Cognition   [x] Functional transfers   [x] IADLs         [x] Safety Awareness   [x]Endurance   [] Fine Coordination              [x] Balance      [] Vision/perception   [x]Sensation    []Gross Motor Coordination  [] ROM  [] Delirium                   [] Motor Control     OT PLAN OF CARE   OT POC is based on physician orders, patient diagnosis, and results of clinical assessment.   Frequency/Duration 2-5 days/week for 2 weeks PRN   Specific OT Treatment Interventions to Include:   * Instruction/training on adapted ADL techniques and AE recommendations to increase functional independence within precautions       * Training on energy conservation strategies, correct breathing pattern and techniques to improve independence/tolerance for self-care routine  * Functional transfer/mobility training/DME recommendations for increased independence, safety, and fall prevention  * Patient/Family education to increase follow through with safety techniques and functional independence  * Recommendation of environmental modifications for increased safety with functional transfers/mobility and ADLs  * Therapeutic exercise to improve motor endurance, ROM, and functional strength for ADLs/functional transfers  * Therapeutic activities to facilitate/challenge dynamic balance, stand tolerance for increased safety and independence with ADLs  * Neuro-muscular re-education: facilitation of righting/equilibrium reactions, midline orientation, scapular stability/mobility, normalization of muscle tone, and facilitation of volitional active controled movement  * Positioning to improve skin integrity, interaction with environment and functional independence    Recommended Adaptive Equipment: TBD     Home Living: Patient typically lives alone in a one-floor setup, but had been staying with her sister (one-floor plan) following recent D/C from SNF/[facility].  Bathroom Setup: walk-in shower (with seat, grab bars, and handheld shower head) - at patient's sister's home  Equipment Owned: walker, wheelchair, BSC    Prior Level of Function (PLOF): Per patient and patient's sister, patient was needing assistance with ADLs. Patient was needing assistance with functional transfers/mobility (with walker); wheelchair follow had been provided by patient's sister recently.    Pain Level: Patient reported experiencing pain in her back and B UEs, but did not rate her pain.  Cognition: Patient alert and oriented grossly. WFL command follow demonstrated. Patient cooperative with encouragement from family members.  Memory: Fair  Sequencing: Fair  Problem Solving: Fair  Judgement/Safety: Impaired    Functional Assessment:  AM-PAC Daily Activity Raw Score: 12/24   Initial Eval Status  Date: 2/1/2023 Treatment Status  Date:  Short Term Goals = Long Term Goals   Feeding SBA  Setup   Grooming Mod A  SBA (seated)   UB Dressing Max A needed to don TLSO while seated at EOB; patient and patient's sister noted that they have been donning the TLSO while sitting upright.  SBA   LB Dressing Max A to don socks.  Mod A -  with use of AE, as needed/appropriate   Bathing Max A  Mod A - with use of AE/DME, as needed/appropriate   Toileting Dependent for hygiene following evidence of bowel incontinence at bed-level at start of this session. Mod A   Bed Mobility  Supine-to-Sit: Max Ax2  Log Rolling: Max A  Cues for initiation and problem solving. Min A in order to maximize patient's independence/participation with ADLs, re-positioning, and other functional tasks. Functional Transfers Sit-to-Stand: Mod Ax2   from EOB  Cues given for proper hand placement. Min A   Functional Mobility Min A (with walker) for few small steps from EOB to bedside chair. Unsteadiness demonstrated. Increased time and assistance with management of walker needed. SBA with functional mobility (with device, as needed/appropriate) in order to maximize independence with ADLs/IADLs and other functional tasks. Balance Sitting: Fair- (at EOB)  R lateral lean noted. Standing: Fair- (with walker)  Fair+ dynamic standing balance during completion of ADLs/IADLs and other functional tasks. Activity Tolerance Limited secondary to pain. Patient will demonstrate Good understanding and consistent implementation of energy conservation techniques and work simplification techniques into ADL/IADL routines. Visual/  Perceptual WFL grossly  Patient wears reading glasses. N/A   B UE Strength B Shoulders: 2-/5 grossly  Distal B UEs: 3/5 to 3+/5 grossly  Patient will demonstrate 4/5 B UE strength in order to maximize independence with ADLs/IADLs and functional transfers. Additional Long-Term Goal: Patient will increase functional independence to PLOF in order to allow patient to live in least restrictive environment.       ROM: Additional Information:    R UE  0 - 45 degrees of active shoulder flexion; distal AROM WFL    L UE <30 degrees of active shoulder flexion; distal AROM WFL    Hand Dominance: Right    Hearing: WFL  Sensation: Patient reported that her L hand frequently falls asleep when asked if she experiences numbness/tingling in B UEs. Tone: WFL  Edema: No    Comments: RN approved patient's participation in [address] [place] [address] activities. Upon arrival, patient supine in bed. At end of session, patient seated in bedside chair with call light and phone within reach, chair alarm activated, family members present, nursing staff notified, and all lines and tubes intact. Patient would benefit from continued skilled OT to increase safety and independence with completion of ADL/IADL tasks for functional independence and quality of life. Treatment: OT treatment provided this date included:   Instruction/training on safety and adapted techniques for completion of ADLs. Instruction/training on safe functional mobility/transfer techniques. Patient education provided regardin) importance of having staff assistance with ADLs and other OOB activities to prevent falls/injury during hospitalization, 2) techniques to maximize safety with management of walker. Patient indicated [address] understanding. Further skilled OT treatment indicated to increase patient's safety and independence with completion of ADL/IADL tasks in order to maximize patient's functional independence and quality of life. Rehab Potential: Good for established goals. Patient / Family Goal: Patient's sister indicated that patient will be going to a SNF/[facility] upon discharge. Patient and/or family were instructed on functional diagnosis, prognosis/goals, and OT plan of care. Patient indicated [address] understanding.     Eval Complexity: Low    Time In: 1630  Time Out: 1655  Total Treatment Time: 10 minutes      Minutes Units   OT Eval Low 26711 15 1   OT Eval Medium 66135     OT Eval High 10617     OT Re-Eval [id number]     Therapeutic Ex 84111     Therapeutic Activities 21851     ADL/Self Care 20788 10 1   Orthotic Management 45541     Neuro Re-Ed 30746     Non-Billable Time N/A ---     Evaluation time includes thorough review of current medical information, gathering information on past medical history/social history and prior level of function, completion of standardized testing/informal observation of tasks, assessment of data, and education on plan of care and goals. Lauren L. Marylen Diones, OTR/L  License Number: [id number]

## 2023-02-01 NOTE — PROGRESS NOTES
-Consulted to dose vancomycin for skin and soft tissue infection for 7 days.  -Will order loading dose of vancomycin 1500 mg IV x 1 now followed by standing order of vancomycin 1000 mg IV q24h to begin on 2/2 at 0700.  -Projected AUC/ANURAG 443.  -Will monitor renal function and steady state vancomycin level when appropriate.

## 2023-02-01 NOTE — DISCHARGE INSTR - COC
Continuity of Care Form    Patient Name: Pk Briscoe   :  1945  MRN:  [id number]    Admit date:  2023  Discharge date:  23      Code Status Order: Full Code   Advance Directives:     Admitting Physician:  Juanita Garcia MD  PCP: Sanford Tovar MD    Discharging Nurse: Sharlene Aschoff, [address] [place] [address] Unit/Room#: 0641/5036-Q  Discharging Unit Phone Number: [phone]    Emergency Contact:   Extended Emergency Contact Information  Primary Emergency Contact: Chantelle Stover  Home Phone: [phone]  Relation: Child  Preferred language: English   needed? No  Secondary Emergency Contact: DominguezGomez  Address: [address] [facility], [address] [place] [address] Phone: [phone]  Relation: Spouse   needed?  No    Past Surgical History:  Past Surgical History:   Procedure Laterality Date    BACK SURGERY  2014    has screws in back    CATARACT REMOVAL  2013, 2013    [facility] with lens implants    CHOLECYSTECTOMY      JOINT REPLACEMENT  2016    SI joint fusion Right    OTHER SURGICAL HISTORY  2017    MRI -     SPINAL FIXATION SURGERY WITH IMPLANT      in [address] Right     UPPER GASTROINTESTINAL ENDOSCOPY  07/10/2017       Immunization History:   Immunization History   Administered Date(s) Administered    COVID-19, PFIZER GRAY top, DO NOT Dilute, (age 15 y+), IM, 30 mcg/0.3 mL 2022    COVID-19, PFIZER PURPLE top, DILUTE for use, (age 15 y+), 30mcg/0.3mL 2021, 2021, 2021    Influenza A (F5L9-14) Vaccine PF IM 10/22/2009    Influenza Vaccine, unspecified formulation 10/01/2016    Influenza Virus Vaccine 10/28/2019    Influenza, FLUARIX, FLULAVAL, FLUZONE (age 10 mo+) AND AFLURIA, (age 1 y+), PF, 0.5mL 10/01/2017, 10/18/2018    Pneumococcal Conjugate Vaccine 10/01/2016    Tdap (Boostrix, Adacel) 2020       Active Problems:  Patient Active Problem List   Diagnosis Code    Mixed hyperlipidemia E78.2    HTN (hypertension), benign I10    Asthma J45.909    Vitamin B12 deficiency E53.8    Pancreatic cyst K86.2    Anxiety disorder F41.9    Chronic back pain M54.9, G89.29    Tachycardia R00.0    Vomiting R11.10    Compression fracture of lumbar spine, non-traumatic, initial encounter ([facility]) M48.56XA    Compression fracture of T12 vertebra ([facility] [address] S22.080A    Failed spinal cord stimulator ([facility] [address] 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    Cellulitis L03.90       Isolation/Infection:   Isolation            No Isolation          Patient Infection Status       Infection Onset Added Last Indicated Last Indicated By Review Planned Expiration Resolved Resolved By    None active    Resolved    COVID-19 (Rule Out) 23 COVID-19, Rapid (Ordered)   23 Rule-Out Test Resulted    COVID-19 23 COVID-19, Rapid   23     COVID-19 (Rule Out) 22 COVID-19, Rapid (Ordered)   22 Rule-Out Test Resulted            Nurse Assessment:  Last Vital Signs: BP (!) 78/44   Pulse 97   Temp 97.5 °F (36.4 °C) (Oral)   Resp 20   Ht 5' 4\" (1.626 m)   Wt 137 lb 3.2 oz (62.2 kg)   SpO2 93%   BMI 23.55 kg/m²     Last documented pain score (0-10 scale): Pain Level: 4  Last Weight:   Wt Readings from Last 1 Encounters:   23 137 lb 3.2 oz (62.2 kg)     Mental Status:  oriented    IV Access:  - None    Nursing Mobility/ADLs:  Walking   Assisted  Transfer  Assisted  Bathing  Assisted  Dressing  Assisted  Toileting  Assisted  Feeding  Independent  Med Admin  Assisted  Med Delivery   whole    Wound Care Documentation and Therapy:        Elimination:  Continence: Bowel: Yes  Bladder: ***  Urinary Catheter:  Insertion Date: 23    Colostomy/Ileostomy/Ileal Conduit: No       Date of Last BM: 2-3-23    Intake/Output Summary (Last 24 hours) at 2023 1541  Last data filed at 2023 1321  Gross per 24 hour   Intake --   Output 400 ml Net -400 ml     No intake/output data recorded. Safety Concerns: At Risk for Falls    Impairments/Disabilities:      Back brace    Nutrition Therapy:  Current Nutrition Therapy:   - Oral Diet:  General    Routes of Feeding: Oral  Liquids: Thin Liquids  Daily Fluid Restriction: no  Last Modified Barium Swallow with Video (Video Swallowing Test): not done    Treatments at the Time of Hospital Discharge:   Respiratory Treatments: none  Oxygen Therapy:  is not on home oxygen therapy. Ventilator:    - No ventilator support    Rehab Therapies: Physical Therapy  Weight Bearing Status/Restrictions: No weight bearing restrictions  Other Medical Equipment (for information only, NOT a DME order): BACK BRACE  Other Treatments: ***    Patient's personal belongings (please select all that are sent with patient):  Family bringing to facility    RN SIGNATURE:  Electronically signed by Luis Miguel Jeffries RN on 2/4/23 at 1:27 PM EST    CASE MANAGEMENT/SOCIAL WORK SECTION    Inpatient Status Date: 1/31/2023    Readmission Risk Assessment Score:  Readmission Risk              Risk of Unplanned Readmission:  23           Discharging to Facility/ Agency   Name: [facility]  Address: [place] [address]  Phone: [phone]  Fax: [phone]    Dialysis Facility (if applicable)   Name:  Address:  Dialysis Schedule:  Phone:  Fax:    / signature: Electronically signed by CHELSEY Pickens on 2/1/23 at 3:41 PM EST    PHYSICIAN SECTION    Prognosis: {Prognosis:3465298082}    Condition at Discharge: [address] Patient Condition:[id number]}    Rehab Potential (if transferring to Rehab): {Prognosis:5642188190}    Recommended Labs or Other Treatments After Discharge: ***    Physician Certification: I certify the above information and transfer of Marcel Rodarte  is necessary for the continuing treatment of the diagnosis listed and that she requires [place] for less 30 days. Update Admission H&P: {[facility]9299[facility]909}    PHYSICIAN SIGNATURE:  {Esignature:[id number]}

## 2023-02-01 NOTE — PROGRESS NOTES
Notified Dr. Nadiya Lara, covering for Dr. Radha Garcia, of patients repeat BP and bladder scan results. Orders placed.

## 2023-02-01 NOTE — H&P
[facility] History and Physical      CHIEF COMPLAINT: Cellulitis    History of Present Illness: 79-year-old female with a past medical history of anxiety, GERD, T12 and L1 compression fractures, and hypertension presents emergency department for generalized weakness and fatigue. Patient has a history of open wounds in her bilateral lower extremities. Patient states that she has had more pain and swelling for a few days. Patient was seen in the emergency department a few days ago and discharged for similar presentation. Patient presents with a TSLO brace for compression fracture of her spine. Patient does follow with neurosurgery outpatient. Patient denies any new numbness, weakness, tingling, bowel incontinence or bladder incontinence.     Informant(s) for H&P: Patient    REVIEW OF SYSTEMS:  A comprehensive review of systems was negative except for: what is in the HPI      PMH:  Past Medical History:   Diagnosis Date    Anxiety     Arthritis     Asthma     Claustrophobia     Dermatitis 02/2017    bilateral legs knees to ankle; follows with [facility]    Fracture 02/2017    \"in Spine\" compression T10 per pt; difficulty laying flat    GERD (gastroesophageal reflux disease)     Hiatal hernia     History of blood transfusion     HTN (hypertension) 4/22/2013    Hyperlipidemia     On aspirin at home     for age per pcp    Pancreatic cyst 5/27/2017    Pneumonia 07/2018    Sleep apnea     SOB (shortness of breath) 4/22/2013    Tachycardia 04/22/2013    follows with Dr Rene Arevalo       Surgical History:  Past Surgical History:   Procedure Laterality Date    BACK SURGERY  8/2014    has screws in back    CATARACT REMOVAL  12/6/2013, 2/20/2013    [facility] with lens implants    CHOLECYSTECTOMY      JOINT REPLACEMENT  12/16/2016    SI joint fusion Right    OTHER SURGICAL HISTORY  02/09/2017    MRI -     SPINAL FIXATION SURGERY WITH IMPLANT  2013    in [address] [facility] ARTHROPLASTY Right     UPPER GASTROINTESTINAL ENDOSCOPY  07/10/2017       Medications Prior to Admission:    Prior to Admission medications    Medication Sig Start Date End Date Taking? Authorizing Provider   docusate sodium (COLACE, DULCOLAX) 100 MG CAPS Take 100 mg by mouth 2 times daily 12/15/22   Shila Rivers DO   dilTIAZem (CARDIZEM CD) 240 MG extended release capsule TAKE 1 CAPSULE TWICE A DAY 11/21/22   Connor Hauser DO   fluticasone (FLONASE) 50 MCG/ACT nasal spray USE 1 SPRAY IN EACH NOSTRIL DAILY 6/27/22   Brianna Talladega, APRN - CNP   Fluticasone furoate-vilanterol (BREO ELLIPTA) 200-25 MCG/INH AEPB inhaler Inhale 1 puff into the lungs daily 1/10/22   Brianna Talladega, APRN - CNP   montelukast (SINGULAIR) 10 MG tablet TAKE 1 TABLET DAILY 1/10/22   Brianna Talladega, APRN - CNP   albuterol sulfate HFA (VENTOLIN HFA) 108 (90 Base) MCG/ACT inhaler Inhale 2 puffs into the lungs every 6 hours as needed for Wheezing 1/10/22   Brianna Talladega, APRN - CNP   predniSONE (DELTASONE) 10 MG tablet Take 20 mg by mouth in the morning. 9/25/19   Historical Provider, MD   dupilumab (DUPIXENT) 300 MG/2ML SOSY injection Inject 300 mg into the skin every 14 days     Historical Provider, MD   vitamin C (ASCORBIC ACID) 500 MG tablet Take 1 tablet by mouth daily 7/4/18   Shila Rivers,    potassium chloride (KLOR-CON) 10 MEQ extended release tablet Take 10 mEq by mouth 2 times daily     Historical Provider, MD   LORazepam (ATIVAN) 2 MG tablet Take 2 mg by mouth 3 times daily. Marvel Hidden     Historical Provider, MD   aspirin 81 MG tablet Take 81 mg by mouth daily Pt to hold 5 days prior per Dr. Mary Vieira Provider, MD   escitalopram (LEXAPRO) 10 MG tablet Take 10 mg by mouth nightly  11/26/13   Historical Provider, MD   famotidine (PEPCID) 20 MG tablet Take 20 mg by mouth 2 times daily Instructed to take am of procedure    Historical Provider, MD   Cholecalciferol (VITAMIN D) 2000 UNITS CAPS capsule Take 2,000 Units by mouth daily Last dose 7/4/2017    Historical Provider, MD   Cyanocobalamin (VITAMIN B-12 IJ) Inject as directed every 3 months Indications: every  month     Historical Provider, MD   atorvastatin (LIPITOR) 10 MG tablet Take 10 mg by mouth nightly     Historical Provider, MD       Allergies:    Percocet [oxycodone-acetaminophen]    Social History:    reports that she has never smoked. She has never used smokeless tobacco. She reports that she does not drink alcohol and does not use drugs. Family History:   family history includes Arthritis in her father; Asthma in her daughter; Diabetes in her father; Heart Disease in her father and mother; Hypertension in her father and mother; Other in her mother; Stroke in her mother.        PHYSICAL EXAM:  Vitals:  /67   Pulse (!) 131   Temp 97.8 °F (36.6 °C) (Oral)   Resp 18   Ht 5' 4\" (1.626 m)   Wt 128 lb (58.1 kg)   SpO2 94%   BMI 21.97 kg/m²     General Appearance: alert and oriented to person, place and time and in no acute distress  Skin: warm and dry  Head: normocephalic and atraumatic  Eyes: pupils equal, round, and reactive to light, extraocular eye movements intact, conjunctivae normal  Neck: neck supple and non tender without mass   Pulmonary/Chest: clear to auscultation bilaterally- no wheezes, rales or rhonchi, normal air movement, no respiratory distress  Cardiovascular: normal rate, normal S1 and S2 and no carotid bruits  Abdomen: soft, non-tender, non-distended, normal bowel sounds, no masses or organomegaly  Extremities: Bilateral lower extremity erythema and 1+ pitting edema no cyanosis, no clubbing and several open wounds to bilateral lower extremity  Neurologic: no cranial nerve deficit and speech normal        LABS:  Recent Labs     01/31/23 2017      K 4.6   CL 98   CO2 19*   BUN 13   CREATININE 0.9   GLUCOSE 65*   CALCIUM 8.6       Recent Labs     01/31/23 2017   WBC 15.4*   RBC 4.00   HGB 10.5*   HCT 34.0   MCV 85.0   MCH 26.3 MCHC 30.9*   RDW 19.9*      MPV 11.4       No results for input(s): POCGLU in the last 72 hours. CBC:   Lab Results   Component Value Date/Time    WBC 15.4 01/31/2023 08:17 PM    RBC 4.00 01/31/2023 08:17 PM    HGB 10.5 01/31/2023 08:17 PM    HCT 34.0 01/31/2023 08:17 PM    MCV 85.0 01/31/2023 08:17 PM    MCH 26.3 01/31/2023 08:17 PM    MCHC 30.9 01/31/2023 08:17 PM    RDW 19.9 01/31/2023 08:17 PM     01/31/2023 08:17 PM    MPV 11.4 01/31/2023 08:17 PM     BMP:    Lab Results   Component Value Date/Time     01/31/2023 08:17 PM    K 4.6 01/31/2023 08:17 PM    K 3.5 12/08/2022 04:05 PM    CL 98 01/31/2023 08:17 PM    CO2 19 01/31/2023 08:17 PM    BUN 13 01/31/2023 08:17 PM    LABALBU 2.8 01/31/2023 08:17 PM    LABALBU 3.6 04/02/2012 08:30 AM    CREATININE 0.9 01/31/2023 08:17 PM    CALCIUM 8.6 01/31/2023 08:17 PM    GFRAA >60 01/12/2019 04:00 AM    LABGLOM >60 01/31/2023 08:17 PM    GLUCOSE 65 01/31/2023 08:17 PM    GLUCOSE 85 04/02/2012 08:30 AM       Radiology:   XR CHEST PORTABLE   Final Result   No acute process. Moderate hiatal hernia. Stable exam.             EKG:     ASSESSMENT:      Principal Problem:    Cellulitis  Resolved Problems:    * No resolved hospital problems. *      PLAN:    1. Bilateral lower extremity cellulitis-continue Ancef. Patient was discharged on Keflex from the ED 2 days ago. Pending procalcitonin. Monitor CBC and BMP daily. Pain control as needed. 2.  Generalized weakness-PT OT evaluation. Pain control as needed. Placement likely. 3.  Dehydration-continue IV fluid resuscitation. Monitor CBC and BMP daily. Monitor urine output. 4.  Hyperlipidemia-continue statin. 5.  GERD-continue Pepcid. 6.  Tachycardia-continue p.o. diltiazem. Patient states that she has chronic tachycardia in the past and was prescribed diltiazem. 7.  Leukocytosis-likely secondary to 1. Patient denies recent use of steroids. Continue IV antibiotics.   8.  T12 and L1 compression fracture. Follows with neurosurgery. Presents with DLCO brace. Pain control as needed. 9.  Nausea and vomiting-continue Tigan as needed. Zofran as needed. Code Status: Full code  DVT prophylaxis: Lovenox      NOTE: This report was transcribed using voice recognition software. Every effort was made to ensure accuracy; however, inadvertent computerized transcription errors may be present.   Electronically signed by Dasha Cates MD on 2/1/2023 at 12:50 AM

## 2023-02-01 NOTE — PROGRESS NOTES
Physical Therapy  Facility/Department: [address]  Physical Therapy Initial Assessment    Name: Ana Linder  : 1945  MRN: [id number]  Date of Service: 2023      Patient Diagnosis(es): The primary encounter diagnosis was Septicemia [facility]). Diagnoses of Cellulitis of lower extremity, unspecified laterality and Failure to thrive in adult were also pertinent to this visit. Past Medical History:  has a past medical history of Anxiety, Arthritis, Asthma, Claustrophobia, Dermatitis, Fracture, GERD (gastroesophageal reflux disease), Hiatal hernia, History of blood transfusion, HTN (hypertension), Hyperlipidemia, On aspirin at home, Pancreatic cyst, Pneumonia, Sleep apnea, SOB (shortness of breath), and Tachycardia. Past Surgical History:  has a past surgical history that includes Cholecystectomy; Cataract removal (2013, 2013); back surgery (2014); joint replacement (2016); Total knee arthroplasty (Right); other surgical history (2017); Spinal fixation surgery with implant (); and Upper gastrointestinal endoscopy (07/10/2017). Evaluating Therapist: Juanjose Law PT    Room #:  7429/2867-P  Diagnosis:  Cellulitis [L03.90]  Septicemia ([facility] [address] [A41.9]  Failure to thrive in adult [R62.7]  Cellulitis of lower extremity, unspecified laterality [F70.792]  PMHx/PSHx:  T 12 L 1 compression fx in december, open wounds B LEs, spinal cord stimulator  Precautions:  falls, alarm, soft TLSO      Social:  Pt lives sister in a 1 floor plan ambulates with ww. Reports has not been able to manage at home. Initial Evaluation  Date: 23 Treatment      Short Term/ Long Term   Goals   Was pt agreeable to Eval/treatment? yes     Does pt have pain?        Bed Mobility  Rolling: max assist  Supine to sit: max assist  Sit to supine: NT  Scooting: max assist  Min assist   Transfers Sit to stand: max assist  Stand to sit: max assist  Stand pivot: max assist  Min assit   Ambulation    Pt unable  25 feet with ww with mod assist   Stair Negotiation  Ascended and descended  NT      LE strength     3/5    3+/5   balance      poor     AM-PAC Raw score               10/24         Pt is alert and Oriented   LE ROM: WFL  Edema: none  Endurance: poor  Chair alarm: yes     ASSESSMENT:    Pt displays functional ability as noted in the objective portion of this evaluation. Patient education  Pt educated on PT objectives    Patient response to education:   Pt verbalized understanding Pt demonstrated skill Pt requires further education in this area   yes           Comments:  Pt reports that at home she has been donning her brace seated edge of bed. Pt assisted to edge of bed with maintaining spinal neutrality to don brace as this is what she was doing at [facility]. Poor fit of brace when donned seated. Pt also incontinent of bowl while seated edge of bed. Dependent for hygiene. Pt with kyphotic posture in standing flexed at trunk and hips. Unable to ambulate. Max assist to transfer to chair. Educated pt that brace should be donned in bed to ensure proper fit of brace. Conditions Requiring Skilled Therapeutic Intervention:    [x]Decreased strength     []Decreased ROM  [x]Decreased functional mobility  [x]Decreased balance   [x]Decreased endurance   []Decreased posture  []Decreased sensation  []Decreased coordination   []Decreased vision  [x]Decreased safety awareness   []Increased pain       Patient and or family understand(s) diagnosis, prognosis, and plan of care.     Prognosis is fair for reaching above PT goals    PHYSICAL THERAPY PLAN OF CARE:    PT POC is established based on physician order and patient diagnosis     Referring provider/PT Order: Manford Cranker, MD/ PT eval and treat      Current Treatment Recommendations:     [x] Strengthening to improve independence with functional mobility   [] ROM to improve independence with functional mobility   [x] Balance Training to improve static/dynamic balance and to reduce fall risk  [x] Endurance Training to improve activity tolerance during functional mobility   [x] Transfer Training to improve safety and independence with all functional transfers   [x] Gait Training to improve gait mechanics, endurance and assess need for appropriate assistive device  [] Stair Training in preparation for safe discharge home and/or into the community   [] Positioning to prevent skin breakdown and contractures  [x] Safety and Education Training   [x] Patient/Caregiver Education   [] HEP  [] Other     PT long term treatment goals are located in above grid    Frequency of treatments: 2-5x/week x 7 days. Time in  0935  Time out  1000    Total Treatment Time  10 minutes     Evaluation Time includes thorough review of current medical information, gathering information on past medical history/social history and prior level of function, completion of standardized testing/informal observation of tasks, assessment of data and education on plan of care and goals.       CPT codes:  [x] Low Complexity PT evaluation 01207  [] Moderate Complexity PT evaluation 35343  [] High Complexity PT evaluation 77740  [] PT Re-evaluation 47942  [] Gait training 72801 minutes  [] Manual therapy 17724 minutes  [x] Therapeutic activities 65082 10 minutes  [] Therapeutic exercises 32644 minutes  [] Neuromuscular reeducation 78075 minutes     [facility] PSYCHIATRY PT 600181 Hyperbilirubinemia

## 2023-02-01 NOTE — ED PROVIDER NOTES
[address]        Pt Name: Ida Vernon  MRN: [id number]  [place] 1945  Date of evaluation: 1/31/2023  Provider: Sherrie Medeiros DO  PCP: Kee Nicholson MD  Note Started: 8:00 PM EST 1/31/23    CHIEF COMPLAINT       Chief Complaint   Patient presents with    Fatigue    Diarrhea       HISTORY OF PRESENT ILLNESS: 1 or more Elements   History From: Patient and sister    Limitations to history : None    Ida Vernon is a 78 y.o. female who presents with worsening fatigue, diarrhea, lack of appetite. She has not eaten anything for the past several days. She was recently seen in the emergency department for increasing weakness, shared decision-making discussion had occurred at that time with the options of coming into the hospital to go to rehab or moving in with her sister for more help. The patient is adamantly not wanting to go to rehab at that time and decision was made to go home and live with her sister. Those arrangements have not been working well as patient is becoming increasingly more tired and fatigued and has not been eating anything. She is not having chest pain, abdominal pain, she does think that she is having increasing swelling in her bilateral lower extremities. Has not been associated with fevers or chills, denies cough or congestion. No other acute complaints today. Nursing Notes were all reviewed and agreed with or any disagreements were addressed in the HPI. REVIEW OF SYSTEMS :      Positives and Pertinent negatives as per HPI.      SURGICAL HISTORY     Past Surgical History:   Procedure Laterality Date    BACK SURGERY  8/2014    has screws in back    CATARACT REMOVAL  12/6/2013, 2/20/2013    [facility] with lens implants    CHOLECYSTECTOMY      JOINT REPLACEMENT  12/16/2016    SI joint fusion Right    OTHER SURGICAL HISTORY  02/09/2017    MRI -     SPINAL FIXATION SURGERY WITH IMPLANT  2013    in [address] Right     UPPER GASTROINTESTINAL ENDOSCOPY  07/10/2017       CURRENTMEDICATIONS       Previous Medications    ALBUTEROL SULFATE HFA (VENTOLIN HFA) 108 (90 BASE) MCG/ACT INHALER    Inhale 2 puffs into the lungs every 6 hours as needed for Wheezing    ASPIRIN 81 MG TABLET    Take 81 mg by mouth daily Pt to hold 5 days prior per Dr. Maria Esther Weber    ATORVASTATIN (LIPITOR) 10 MG TABLET    Take 10 mg by mouth nightly     CHOLECALCIFEROL (VITAMIN D) 2000 UNITS CAPS CAPSULE    Take 2,000 Units by mouth daily Last dose 7/4/2017    CYANOCOBALAMIN (VITAMIN B-12 IJ)    Inject as directed every 3 months Indications: every  month     DILTIAZEM (CARDIZEM CD) 240 MG EXTENDED RELEASE CAPSULE    TAKE 1 CAPSULE TWICE A DAY    DOCUSATE SODIUM (COLACE, DULCOLAX) 100 MG CAPS    Take 100 mg by mouth 2 times daily    DUPILUMAB (DUPIXENT) 300 MG/2ML SOSY INJECTION    Inject 300 mg into the skin every 14 days     ESCITALOPRAM (LEXAPRO) 10 MG TABLET    Take 10 mg by mouth nightly     FAMOTIDINE (PEPCID) 20 MG TABLET    Take 20 mg by mouth 2 times daily Instructed to take am of procedure    FLUTICASONE (FLONASE) 50 MCG/ACT NASAL SPRAY    USE 1 SPRAY IN EACH NOSTRIL DAILY    FLUTICASONE FUROATE-VILANTEROL (BREO ELLIPTA) 200-25 MCG/INH AEPB INHALER    Inhale 1 puff into the lungs daily    LORAZEPAM (ATIVAN) 2 MG TABLET    Take 2 mg by mouth 3 times daily. Marc Valenzuela MONTELUKAST (SINGULAIR) 10 MG TABLET    TAKE 1 TABLET DAILY    POTASSIUM CHLORIDE (KLOR-CON) 10 MEQ EXTENDED RELEASE TABLET    Take 10 mEq by mouth 2 times daily     PREDNISONE (DELTASONE) 10 MG TABLET    Take 20 mg by mouth in the morning.     VITAMIN C (ASCORBIC ACID) 500 MG TABLET    Take 1 tablet by mouth daily       ALLERGIES     Percocet [oxycodone-acetaminophen]    FAMILYHISTORY       Family History   Problem Relation Age of Onset    Stroke Mother     Heart Disease Mother     Hypertension Mother     Other Mother CHOLECYSTECTOMY; OSTEOPENIA    Heart Disease Father     Hypertension Father     Diabetes Father     Arthritis Father     Asthma Daughter         SOCIAL HISTORY       Social History     Tobacco Use    Smoking status: Never    Smokeless tobacco: Never   Vaping Use    Vaping Use: Never used   Substance Use Topics    Alcohol use: No    Drug use: Never       SCREENINGS        [place] Coma Scale  Eye Opening: Spontaneous  Best Verbal Response: Oriented  Best Motor Response: Obeys commands  [place] Coma Scale Score: 15                CIWA Assessment  BP: 119/67  Heart Rate: (!) 131           PHYSICAL EXAM  1 or more Elements     ED Triage Vitals [01/31/23 1937]   BP Temp Temp Source Heart Rate Resp SpO2 Height Weight   119/67 97.8 °F (36.6 °C) Oral (!) 122 16 97 % 5' 4\" (1.626 m) 128 lb (58.1 kg)     Constitutional/General: Alert and oriented x3  Head: Normocephalic and atraumatic  Eyes: PERRL, EOMI, conjunctiva normal, sclera non icteric  ENT:  Oropharynx clear, handling secretions, no trismus, no asymmetry of the posterior oropharynx or uvular edema  Neck: Supple, full ROM, no stridor, no meningeal signs  Respiratory: Lungs clear to auscultation bilaterally, no wheezes, rales, or rhonchi. Not in respiratory distress  Cardiovascular:  Regular rate. Regular rhythm. No murmurs, no gallops, no rubs. 2+ distal pulses. Equal extremity pulses. Chest: No chest wall tenderness  GI:  Abdomen Soft, Non tender, Non distended. No rebound, guarding, or rigidity. Musculoskeletal: Moves all extremities x 4. Warm and well perfused, no clubbing, no cyanosis, pitting edema bilateral lower extremities. TSLO brace in place secondary to compression fractures  Integument: skin warm and dry. No rashes.   Scattered blisters on bilateral lower extremities  Neurologic: GCS 15, no focal deficits, symmetric strength 5/5 in the upper and lower extremities bilaterally  Psychiatric: Normal Affect      DIAGNOSTIC RESULTS   LABS:    Labs Reviewed COMPREHENSIVE METABOLIC PANEL - Abnormal; Notable for the following components:       Result Value    CO2 19 (*)     Glucose 65 (*)     Total Protein 5.7 (*)     Albumin 2.8 (*)     Alkaline Phosphatase 112 (*)     AST 51 (*)     All other components within normal limits   CBC WITH AUTO DIFFERENTIAL - Abnormal; Notable for the following components:    WBC 15.4 (*)     Hemoglobin 10.5 (*)     MCHC 30.9 (*)     RDW 19.9 (*)     Neutrophils % 89.5 (*)     Lymphocytes % 3.5 (*)     Neutrophils Absolute 13.86 (*)     Lymphocytes Absolute 0.62 (*)     Eosinophils Absolute 0.00 (*)     All other components within normal limits   TROPONIN - Abnormal; Notable for the following components:    Troponin, High Sensitivity 21 (*)     All other components within normal limits   TSH - Abnormal; Notable for the following components:    TSH 5.640 (*)     All other components within normal limits   URINALYSIS WITH MICROSCOPIC - Abnormal; Notable for the following components:    Bilirubin Urine SMALL (*)     Ketones, Urine 15 (*)     All other components within normal limits   TROPONIN - Abnormal; Notable for the following components:    Troponin, High Sensitivity 29 (*)     All other components within normal limits   COVID-19, RAPID   RAPID INFLUENZA A/B ANTIGENS   CULTURE, BLOOD 1   CULTURE, BLOOD 2   LACTIC ACID   PROCALCITONIN       As interpreted by me, the above displayed labs are abnormal. All other labs obtained during this visit were within normal range or not returned as of this dictation. RADIOLOGY:   Non-plain film images such as CT, Ultrasound and MRI are read by the radiologist. Plain radiographic images are visualized and preliminarily interpreted by the ED Provider with the below findings:    Interpretation per the Radiologist below, if available at the time of this note:    XR CHEST PORTABLE   Final Result   No acute process. Moderate hiatal hernia.       Stable exam.           XR CHEST PORTABLE    Result Date: 1/28/2023  EXAMINATION: ONE XRAY VIEW OF THE CHEST 1/28/2023 11:58 am COMPARISON: Previous CT scan of 01/12/2023 HISTORY: ORDERING SYSTEM PROVIDED HISTORY: shortness of breath TECHNOLOGIST PROVIDED HISTORY: Reason for exam:->shortness of breath FINDINGS: The lungs are without acute focal process. There is no effusion or pneumothorax. The cardiomediastinal silhouette is without acute process. The osseous structures are without acute process. No acute process. CTA PULMONARY W CONTRAST    Result Date: 1/28/2023  EXAMINATION: CTA OF THE CHEST 1/28/2023 2:59 pm TECHNIQUE: CTA of the chest was performed after the administration of intravenous contrast.  Multiplanar reformatted images are provided for review. MIP images are provided for review. Automated exposure control, iterative reconstruction, and/or weight based adjustment of the mA/kV was utilized to reduce the radiation dose to as low as reasonably achievable. COMPARISON: None. HISTORY: ORDERING SYSTEM PROVIDED HISTORY: r/o PE TECHNOLOGIST PROVIDED HISTORY: Reason for exam:->r/o PE Decision Support Exception - unselect if not a suspected or confirmed emergency medical condition->Emergency Medical Condition (MA) FINDINGS: No filling defect in the pulmonary arteries to suggest pulmonary arterial embolism. The heart is normal in size. No pericardial effusion. There is a small left pleural effusion. Opacities are present in right and left lower lobes likely related to subsegmental atelectasis. No obvious airspace opacity. No pneumothorax. Large hiatal hernia with adjacent compressive atelectasis in medial aspect of right and left lower lobes. View of the upper abdomen is grossly unremarkable. Redemonstration of T5 compression fracture. Chronic compression fractures are seen at levels of T9 and T10 with evidence of kyphoplasty. Severe degenerative endplate changes are seen at T12/L1, and visualized L1/L2.      1. No evidence of pulmonary embolism. 2. Small left pleural effusion which has decreased in size. 3. Bibasilar opacities likely related to subsegmental atelectasis. 4. Redemonstration of T5 compression fracture. If clinically warranted, MRI may be helpful for further evaluation. 5. Chronic compression fractures with kyphoplasty as described above. US DUP LOWER EXTREMITIES BILATERAL VENOUS    Result Date: 1/28/2023  EXAMINATION: DUPLEX VENOUS ULTRASOUND OF THE BILATERAL LOWER EXTREMITIES1/28/2023 1:42 pm TECHNIQUE: Duplex ultrasound using B-mode/gray scaled imaging, Doppler spectral analysis and color flow Doppler was obtained of the deep venous structures of the lower bilateral extremities. COMPARISON: None. HISTORY: ORDERING SYSTEM PROVIDED HISTORY: r/o DVT TECHNOLOGIST PROVIDED HISTORY: Reason for exam:->r/o DVT What reading provider will be dictating this exam?->CRC FINDINGS: The visualized veins of the bilateral lower extremities are patent and free of echogenic thrombus. The veins demonstrate good compressibility with normal color flow study and spectral analysis. Somewhat limited evaluation of the calf veins secondary to open sores. Left popliteal fossa cyst (Baker's cyst measuring maximum dimension of 2.5 cm. No evidence of DVT in either lower extremity given the slight limitation in evaluation of the calf venous vessels. No results found. PROCEDURES   Unless otherwise noted below, none    PAST MEDICAL HISTORY/Chronic Conditions Affecting Care      has a past medical history of Anxiety, Arthritis, Asthma, Claustrophobia, Dermatitis (02/2017), Fracture (02/2017), GERD (gastroesophageal reflux disease), Hiatal hernia, History of blood transfusion, HTN (hypertension) (4/22/2013), Hyperlipidemia, On aspirin at home, Pancreatic cyst (5/27/2017), Pneumonia (07/2018), Sleep apnea, SOB (shortness of breath) (4/22/2013), and Tachycardia (04/22/2013).      EMERGENCY DEPARTMENT COURSE    Vitals:    Vitals:    01/31/23 1937 01/31/23 2335   BP: 119/67    Pulse: (!) 122 (!) 131   Resp: 16 18   Temp: 97.8 °F (36.6 °C)    TempSrc: Oral    SpO2: 97% 94%   Weight: 128 lb (58.1 kg)    Height: 5' 4\" (1.626 m)        Patient was given the following medications:  Medications   0.9 % sodium chloride bolus (1,000 mLs IntraVENous New Bag 1/31/23 2318)   iopamidol (ISOVUE-370) 76 % injection 75 mL (has no administration in time range)   trimethobenzamide (TIGAN) injection 200 mg (has no administration in time range)   ceFAZolin (ANCEF) 2,000 mg in sterile water 20 mL IV syringe (has no administration in time range)   0.9 % sodium chloride infusion (has no administration in time range)   sodium chloride flush 0.9 % injection 5-40 mL (has no administration in time range)   sodium chloride flush 0.9 % injection 5-40 mL (has no administration in time range)   0.9 % sodium chloride infusion (has no administration in time range)   enoxaparin (LOVENOX) injection 40 mg (has no administration in time range)   ondansetron (ZOFRAN-ODT) disintegrating tablet 4 mg (has no administration in time range)     Or   ondansetron (ZOFRAN) injection 4 mg (has no administration in time range)   polyethylene glycol (GLYCOLAX) packet 17 g (has no administration in time range)   acetaminophen (TYLENOL) tablet 650 mg (has no administration in time range)     Or   acetaminophen (TYLENOL) suppository 650 mg (has no administration in time range)   0.9 % sodium chloride bolus (0 mLs IntraVENous Stopped 1/31/23 2241)   ondansetron (ZOFRAN) injection 4 mg (4 mg IntraVENous Given 1/31/23 2250)   metoclopramide (REGLAN) injection 10 mg (10 mg IntraVENous Given 1/31/23 2321)   dextrose bolus 10% 250 mL (0 mLs IntraVENous Stopped 1/31/23 2352)   ceFAZolin (ANCEF) 2,000 mg in sterile water 20 mL IV syringe (2,000 mg IntraVENous Given 1/31/23 2341)       Medical Decision Making/Differential Diagnosis:  CC/HPI Summary, Social Determinants of health, Records Reviewed, DDx, testing done/not done, ED Course, Reassessment, disposition considerations/shared decision making with patient, consults, disposition:        CC/HPI Summary, DDx, ED Course, Reassessment, Tests Considered, Patient expectation:   51-year-old female past medical history of hypertension, acid reflux, hyperlipidemia, compression fractures and a back brace presenting today with worsening fatigue, diarrhea, lack of appetite. Nothing is made it better or worse, it is not associated with abdominal pain or fevers. Been seen in the emergency department on 1/28 for worsening weakness as well, shared decision making had occurred where patient decided to go home and she did not want to come into the hospital go to rehab. There have been no acute pathology at that time. On arrival to emergency department here today she is hemodynamically stable but tachycardic. Physical exam demonstrating frail patient in no acute distress and nontoxic-appearing. Differential diagnosis to include but not limited to sepsis, UTI, hypothyroidism, electrolyte abnormality, failure to thrive. EKG with no acute rhythm changes, demonstrating sinus tachycardia. Patient was given 2 fluid boluses with mild improvement in her symptoms. Also manage symptomatically with IV Zofran. Patient was still vomiting after given juice for findings of hypoglycemia and vomited following that, decided to give amp of D50 and IV Reglan with improvement in her symptoms. Lab work demonstrating leukocytosis of 15.4, essentially doubled from last time she was in the emergency department, kidney and liver function unremarkable, no evidence of UTI, delta troponin unremarkable, viral testing unremarkable, no lactic acidosis. Chest x-ray without acute process. With her worsening erythema and tenderness and evidence of increasing leukocytosis, patient will be admitted for sepsis secondary to cellulitis of her bilateral lower extremities.   She was given IV Ancef in the emergency department. Plan for likely placement following and she is unable to take care of herself. Discussed with internal medicine hospitalist and he will set the patient for admission. ED Course as of 02/01/23 0103   Tue Jan 31, 2023   2305 EKG: This EKG is signed and interpreted by the EP. Time: 23:03  Rate: 120  Rhythm: Sinus  Interpretation: sinus tachycardia  Comparison: changes compared to previous EKG     [CF]   2312 Patient still vomiting following Zofran, will trial Reglan and give D50 as she is unable to tolerate p.o. trace glucose in that capacity. Giving second fluid bolus and she is persistently tachycardic. [MM]   5707 Discussed with Dr. Luis Yun, internal medicine hospitalist, he will accept the patient for admission. [MM]      ED Course User Index  [CF] Sari Joya DO  [MM] Priscilla Manzano DO      CONSULTS: (Who and What was discussed)  Internal medicine    FINAL IMPRESSION      1. Septicemia ([address]    2. Cellulitis of lower extremity, unspecified laterality    3. Failure to thrive in adult          DISPOSITION/PLAN     DISPOSITION Admitted 02/01/2023 12:29:54 AM         (Please note that portions of this note were completed with a voice recognition program.  Efforts were made to edit the dictations but occasionally words are mis-transcribed. )    Priscilla Manzano DO (electronically signed)            Priscilla Manzano DO  Resident  02/01/23 0399

## 2023-02-01 NOTE — PROGRESS NOTES
Wound / ostomy dept consulted for this Pt admitted with cellulitis. History includes: pancreatic cyst, HLD, HTN and compression fracture. The Pt has scratch marks on both lower legs. She states that she scratches because they are always itchy. Recommend Sarna lotion topically. Buttock is intact. She is incontinent of urine. Recommend Aquaphor topically. Heels are intact.

## 2023-02-02 PROBLEM — R62.7 FAILURE TO THRIVE IN ADULT: Status: ACTIVE | Noted: 2023-02-02

## 2023-02-02 PROBLEM — A41.9 SEPTICEMIA (HCC): Status: ACTIVE | Noted: 2023-02-02

## 2023-02-02 LAB
ACINETOBACTER CALCOAC BAUMANNII COMPLEX BY PCR: NOT DETECTED
ANION GAP SERPL CALCULATED.3IONS-SCNC: 17 MMOL/L (ref 7–16)
BACTEROIDES FRAGILIS BY PCR: NOT DETECTED
BASOPHILS ABSOLUTE: 0.09 E9/L (ref 0–0.2)
BASOPHILS RELATIVE PERCENT: 0.7 % (ref 0–2)
BOTTLE TYPE: ABNORMAL
BUN BLDV-MCNC: 10 MG/DL (ref 6–23)
C-REACTIVE PROTEIN: 24.1 MG/DL (ref 0–0.4)
CALCIUM SERPL-MCNC: 7.6 MG/DL (ref 8.6–10.2)
CANDIDA ALBICANS BY PCR: NOT DETECTED
CANDIDA AURIS BY PCR: NOT DETECTED
CANDIDA GLABRATA BY PCR: NOT DETECTED
CANDIDA KRUSEI BY PCR: NOT DETECTED
CANDIDA PARAPSILOSIS BY PCR: NOT DETECTED
CANDIDA TROPICALIS BY PCR: NOT DETECTED
CHLORIDE BLD-SCNC: 106 MMOL/L (ref 98–107)
CO2: 14 MMOL/L (ref 22–29)
CREAT SERPL-MCNC: 0.6 MG/DL (ref 0.5–1)
CRYPTOCOCCUS NEOFORMANS/GATTII BY PCR: NOT DETECTED
CULTURE, BLOOD 2: NORMAL
ENTEROBACTER CLOACAE COMPLEX BY PCR: NOT DETECTED
ENTEROBACTERALES BY PCR: NOT DETECTED
ENTEROCOCCUS FAECALIS BY PCR: NOT DETECTED
ENTEROCOCCUS FAECIUM BY PCR: NOT DETECTED
EOSINOPHILS ABSOLUTE: 0 E9/L (ref 0.05–0.5)
EOSINOPHILS RELATIVE PERCENT: 0 % (ref 0–6)
ESCHERICHIA COLI BY PCR: NOT DETECTED
GFR SERPL CREATININE-BSD FRML MDRD: >60 ML/MIN/1.73
GLUCOSE BLD-MCNC: 230 MG/DL (ref 74–99)
HAEMOPHILUS INFLUENZAE BY PCR: NOT DETECTED
HCT VFR BLD CALC: 34.6 % (ref 34–48)
HEMOGLOBIN: 9.9 G/DL (ref 11.5–15.5)
IMMATURE GRANULOCYTES #: 0.19 E9/L
IMMATURE GRANULOCYTES %: 1.5 % (ref 0–5)
KLEBSIELLA AEROGENES BY PCR: NOT DETECTED
KLEBSIELLA OXYTOCA BY PCR: NOT DETECTED
KLEBSIELLA PNEUMONIAE GROUP BY PCR: NOT DETECTED
LISTERIA MONOCYTOGENES BY PCR: NOT DETECTED
LYMPHOCYTES ABSOLUTE: 1.01 E9/L (ref 1.5–4)
LYMPHOCYTES RELATIVE PERCENT: 7.9 % (ref 20–42)
MCH RBC QN AUTO: 25.8 PG (ref 26–35)
MCHC RBC AUTO-ENTMCNC: 28.6 % (ref 32–34.5)
MCV RBC AUTO: 90.3 FL (ref 80–99.9)
MONOCYTES ABSOLUTE: 0.71 E9/L (ref 0.1–0.95)
MONOCYTES RELATIVE PERCENT: 5.5 % (ref 2–12)
MRSA CULTURE ONLY: NORMAL
NEISSERIA MENINGITIDIS BY PCR: NOT DETECTED
NEUTROPHILS ABSOLUTE: 10.81 E9/L (ref 1.8–7.3)
NEUTROPHILS RELATIVE PERCENT: 84.4 % (ref 43–80)
ORDER NUMBER: ABNORMAL
PDW BLD-RTO: 20.3 FL (ref 11.5–15)
PLATELET # BLD: 370 E9/L (ref 130–450)
PMV BLD AUTO: 11.8 FL (ref 7–12)
POTASSIUM SERPL-SCNC: 3.9 MMOL/L (ref 3.5–5)
PROTEUS SPECIES BY PCR: NOT DETECTED
PSEUDOMONAS AERUGINOSA BY PCR: NOT DETECTED
RBC # BLD: 3.83 E12/L (ref 3.5–5.5)
SALMONELLA SPECIES BY PCR: NOT DETECTED
SERRATIA MARCESCENS BY PCR: NOT DETECTED
SODIUM BLD-SCNC: 137 MMOL/L (ref 132–146)
SOURCE OF BLOOD CULTURE: ABNORMAL
STAPHYLOCOCCUS AUREUS BY PCR: NOT DETECTED
STAPHYLOCOCCUS EPIDERMIDIS BY PCR: NOT DETECTED
STAPHYLOCOCCUS LUGDUNENSIS BY PCR: NOT DETECTED
STAPHYLOCOCCUS SPECIES BY PCR: DETECTED
STENOTROPHOMONAS MALTOPHILIA BY PCR: NOT DETECTED
STREPTOCOCCUS AGALACTIAE BY PCR: NOT DETECTED
STREPTOCOCCUS PNEUMONIAE BY PCR: NOT DETECTED
STREPTOCOCCUS PYOGENES  BY PCR: NOT DETECTED
STREPTOCOCCUS SPECIES BY PCR: NOT DETECTED
WBC # BLD: 12.8 E9/L (ref 4.5–11.5)

## 2023-02-02 PROCEDURE — 36415 COLL VENOUS BLD VENIPUNCTURE: CPT

## 2023-02-02 PROCEDURE — 6370000000 HC RX 637 (ALT 250 FOR IP): Performed by: STUDENT IN AN ORGANIZED HEALTH CARE EDUCATION/TRAINING PROGRAM

## 2023-02-02 PROCEDURE — 6370000000 HC RX 637 (ALT 250 FOR IP): Performed by: INTERNAL MEDICINE

## 2023-02-02 PROCEDURE — 80048 BASIC METABOLIC PNL TOTAL CA: CPT

## 2023-02-02 PROCEDURE — 2580000003 HC RX 258: Performed by: INTERNAL MEDICINE

## 2023-02-02 PROCEDURE — C9113 INJ PANTOPRAZOLE SODIUM, VIA: HCPCS | Performed by: INTERNAL MEDICINE

## 2023-02-02 PROCEDURE — 99232 SBSQ HOSP IP/OBS MODERATE 35: CPT | Performed by: NURSE PRACTITIONER

## 2023-02-02 PROCEDURE — 6360000002 HC RX W HCPCS: Performed by: INTERNAL MEDICINE

## 2023-02-02 PROCEDURE — 85025 COMPLETE CBC W/AUTO DIFF WBC: CPT

## 2023-02-02 PROCEDURE — 2580000003 HC RX 258: Performed by: STUDENT IN AN ORGANIZED HEALTH CARE EDUCATION/TRAINING PROGRAM

## 2023-02-02 PROCEDURE — 1200000000 HC SEMI PRIVATE

## 2023-02-02 PROCEDURE — 6360000002 HC RX W HCPCS: Performed by: STUDENT IN AN ORGANIZED HEALTH CARE EDUCATION/TRAINING PROGRAM

## 2023-02-02 PROCEDURE — 87040 BLOOD CULTURE FOR BACTERIA: CPT

## 2023-02-02 RX ORDER — SODIUM CHLORIDE 9 MG/ML
INJECTION, SOLUTION INTRAVENOUS CONTINUOUS
Status: DISCONTINUED | OUTPATIENT
Start: 2023-02-02 | End: 2023-02-02

## 2023-02-02 RX ORDER — SODIUM CHLORIDE 0.9 % (FLUSH) 0.9 %
5-40 SYRINGE (ML) INJECTION EVERY 12 HOURS SCHEDULED
Status: DISCONTINUED | OUTPATIENT
Start: 2023-02-02 | End: 2023-02-02

## 2023-02-02 RX ORDER — SODIUM CHLORIDE 0.9 % (FLUSH) 0.9 %
5-40 SYRINGE (ML) INJECTION PRN
Status: DISCONTINUED | OUTPATIENT
Start: 2023-02-02 | End: 2023-02-02

## 2023-02-02 RX ORDER — DILTIAZEM HYDROCHLORIDE 120 MG/1
120 CAPSULE, COATED, EXTENDED RELEASE ORAL 2 TIMES DAILY
Status: DISCONTINUED | OUTPATIENT
Start: 2023-02-02 | End: 2023-02-04 | Stop reason: HOSPADM

## 2023-02-02 RX ORDER — SODIUM CHLORIDE 9 MG/ML
INJECTION, SOLUTION INTRAVENOUS PRN
Status: DISCONTINUED | OUTPATIENT
Start: 2023-02-02 | End: 2023-02-02

## 2023-02-02 RX ADMIN — WATER 2000 MG: 1 INJECTION INTRAMUSCULAR; INTRAVENOUS; SUBCUTANEOUS at 06:11

## 2023-02-02 RX ADMIN — LORAZEPAM 2 MG: 1 TABLET ORAL at 09:31

## 2023-02-02 RX ADMIN — Medication 2000 UNITS: at 09:31

## 2023-02-02 RX ADMIN — ATORVASTATIN CALCIUM 10 MG: 10 TABLET, FILM COATED ORAL at 20:40

## 2023-02-02 RX ADMIN — LORAZEPAM 2 MG: 1 TABLET ORAL at 20:40

## 2023-02-02 RX ADMIN — OTC TOPICAL ANALGESIC DRUG PRODUCTS: .005; .005 LOTION TOPICAL at 09:31

## 2023-02-02 RX ADMIN — DILTIAZEM HYDROCHLORIDE 120 MG: 120 CAPSULE, COATED, EXTENDED RELEASE ORAL at 20:40

## 2023-02-02 RX ADMIN — SODIUM CHLORIDE: 9 INJECTION, SOLUTION INTRAVENOUS at 06:25

## 2023-02-02 RX ADMIN — ENOXAPARIN SODIUM 40 MG: 100 INJECTION SUBCUTANEOUS at 09:31

## 2023-02-02 RX ADMIN — ESCITALOPRAM OXALATE 10 MG: 10 TABLET ORAL at 20:40

## 2023-02-02 RX ADMIN — MIDODRINE HYDROCHLORIDE 2.5 MG: 5 TABLET ORAL at 09:31

## 2023-02-02 RX ADMIN — PANTOPRAZOLE SODIUM 40 MG: 40 INJECTION, POWDER, FOR SOLUTION INTRAVENOUS at 06:11

## 2023-02-02 RX ADMIN — MIDODRINE HYDROCHLORIDE 2.5 MG: 5 TABLET ORAL at 12:54

## 2023-02-02 RX ADMIN — PETROLATUM: 420 OINTMENT TOPICAL at 09:31

## 2023-02-02 RX ADMIN — ACETAMINOPHEN 650 MG: 325 TABLET ORAL at 22:18

## 2023-02-02 RX ADMIN — Medication 500 MG: at 09:31

## 2023-02-02 RX ADMIN — LORAZEPAM 2 MG: 1 TABLET ORAL at 14:20

## 2023-02-02 RX ADMIN — WATER 2000 MG: 1 INJECTION INTRAMUSCULAR; INTRAVENOUS; SUBCUTANEOUS at 14:20

## 2023-02-02 RX ADMIN — VANCOMYCIN HYDROCHLORIDE 1000 MG: 1 INJECTION, POWDER, LYOPHILIZED, FOR SOLUTION INTRAVENOUS at 06:25

## 2023-02-02 RX ADMIN — DILTIAZEM HYDROCHLORIDE 180 MG: 180 CAPSULE, COATED, EXTENDED RELEASE ORAL at 09:31

## 2023-02-02 RX ADMIN — PANTOPRAZOLE SODIUM 40 MG: 40 INJECTION, POWDER, FOR SOLUTION INTRAVENOUS at 17:38

## 2023-02-02 ASSESSMENT — PAIN DESCRIPTION - LOCATION: LOCATION: BACK

## 2023-02-02 ASSESSMENT — PAIN SCALES - GENERAL
PAINLEVEL_OUTOF10: 3
PAINLEVEL_OUTOF10: 0
PAINLEVEL_OUTOF10: 0

## 2023-02-02 NOTE — PLAN OF CARE
Problem: Discharge Planning  Goal: Discharge to home or other facility with appropriate resources  2/2/2023 0049 by Shital Fletcher RN  Outcome: Progressing  2/1/2023 1152 by Carlos Chris RN  Outcome: Progressing     Problem: Skin/Tissue Integrity  Goal: Absence of new skin breakdown  Description: 1. Monitor for areas of redness and/or skin breakdown  2. Assess vascular access sites hourly  3. Every 4-6 hours minimum:  Change oxygen saturation probe site  4. Every 4-6 hours:  If on nasal continuous positive airway pressure, respiratory therapy assess nares and determine need for appliance change or resting period.   2/2/2023 0049 by Shital Fletcher RN  Outcome: Progressing  2/1/2023 1152 by Carlos Chris RN  Outcome: Progressing     Problem: Safety - Adult  Goal: Free from fall injury  2/2/2023 0049 by Shital Fletcher RN  Outcome: Progressing  2/1/2023 1152 by Carlos Chris RN  Outcome: Progressing

## 2023-02-02 NOTE — PROGRESS NOTES
[facility] Progress Note    Admitting Date and Time: 1/31/2023  7:29 PM  Admit Dx: Cellulitis [L03.90]  Septicemia ([address] [A41.9]  Failure to thrive in adult [R62.7]  Cellulitis of lower extremity, unspecified laterality [L03.119]    Subjective:  Patient is being followed for Cellulitis [L03.90]  Septicemia ([address] [A41.9]  Failure to thrive in adult [R62.7]  Cellulitis of lower extremity, unspecified laterality [V71.012]     PT awake and alert- seen sitting up in bed attempting to take a picture of her lunch to send to her kids to show that she ate  Feels ok- biggest complaint is \" urination issue\"  Reporting trouble urinating yesterday. Bladder scan 400 and required straight cath  Reporting urinary incontinence today  Legs feel better       ROS: denies fever, chills, cp, sob, n/v, HA unless stated above.       atorvastatin  10 mg Oral Nightly    vitamin D  2,000 Units Oral Daily    dilTIAZem  180 mg Oral BID    [Held by provider] docusate sodium  100 mg Oral BID    escitalopram  10 mg Oral Nightly    LORazepam  2 mg Oral TID    vitamin C  500 mg Oral Daily    ceFAZolin  2,000 mg IntraVENous Q8H    sodium chloride flush  5-40 mL IntraVENous 2 times per day    enoxaparin  40 mg SubCUTAneous Daily    pantoprazole  40 mg IntraVENous BID AC    vancomycin  1,000 mg IntraVENous Q24H    camphor-menthol   Topical Daily    white petrolatum   Topical BID    midodrine  2.5 mg Oral TID WC     trimethobenzamide, 200 mg, Q6H PRN  sodium chloride flush, 5-40 mL, PRN  sodium chloride, , PRN  ondansetron, 4 mg, Q8H PRN   Or  ondansetron, 4 mg, Q6H PRN  polyethylene glycol, 17 g, Daily PRN  acetaminophen, 650 mg, Q6H PRN   Or  acetaminophen, 650 mg, Q6H PRN  albuterol, 2.5 mg, Q6H PRN  loperamide, 2 mg, 4x Daily PRN  iopamidol, 75 mL, ONCE PRN         Objective:    /65   Pulse (!) 107   Temp 98 °F (36.7 °C) (Oral)   Resp 16   Ht 5' 4\" (1.626 m)   Wt 142 lb 8 oz (64.6 kg)   SpO2 100%   BMI 24.46 kg/m² General Appearance: alert and oriented to person, place and time and in no acute distress  Skin: warm and dry  Head: normocephalic and atraumatic  Neck: neck supple and non tender without mass   Pulmonary/Chest: clear to auscultation bilaterally-  Cardiovascular: normal rate, normal S1 and S2 and no carotid bruits  Abdomen: soft, non-tender, non-distended, normal bowel sounds, no masses or organomegaly  Extremities: no cyanosis, no clubbing and lower ext erythema/ scratches/   Neurologic: speech normal         Recent Labs     01/31/23 2017 02/01/23  0910    131*   K 4.6 3.7   CL 98 102   CO2 19* 18*   BUN 13 10   CREATININE 0.9 0.7   GLUCOSE 65* 69*   CALCIUM 8.6 7.5*       Recent Labs     01/31/23 2017 02/01/23  0910   WBC 15.4* 13.5*   RBC 4.00 3.50   HGB 10.5* 9.2*   HCT 34.0 30.0*   MCV 85.0 85.7   MCH 26.3 26.3   MCHC 30.9* 30.7*   RDW 19.9* 20.0*    310   MPV 11.4 11.6           Assessment:    Principal Problem:    Cellulitis  Resolved Problems:    * No resolved hospital problems. *      Plan:  1. Bilateral lower extremity cellulitis: pt presented to the ER with generalized weakness and fatigue. Reporting open wounds to lower extremities with associated pain and swelling. She was seen in ER on 1/28- 3 days earlier for similar symptoms. Prior to that had seen PCP in office and was started on keflex without improvement. Follow inflammatory markers. MRSA nares swab pending. Continue IV ancef/ IV vancomycin. 2. Sepsis related to above: POA- pt with tachycardia and leukocytosis on arrival. Continue antibiotics and monitor. Procal elevated     3. Dehydration: continue IVF- decrease rate to 50/ hr and monitor BP    4. Recent T12/ L1 compression fracture: has TLSO- following with neurosurgery outpt    4. Depression/ anxiety: continue lexapro and ativan    5. H/o tachycardia: on cardizem- monitor    6. H/o sleep apnea    7.  Hypotension with h/o HTN: midodrine started due to hypotension last night- on cardizem - monitor bp     8. Deconditioning: am pac 10-social work looking into JAIR with pt    9. Acidosis; monitor    10. Leukocytosis: trending down- monitor     11. + BC 1 out of 2 bottles. Follow - likely contaminant. Repeat Blood cultures    12. Urinary retention: monitor bladder scan. UA clean. Decrease IVF and monitor       Dispo: JAIR on dc         Time spent reviewing chart, clinical exam, discussing case and answering questions with staff/consultants/patient/family = 20 minutes         NOTE: This report was transcribed using voice recognition software. Every effort was made to ensure accuracy; however, inadvertent computerized transcription errors may be present.   Electronically signed by JOSEFA Garcia on 2/2/2023 at 8:26 AM

## 2023-02-02 NOTE — PLAN OF CARE
Problem: Discharge Planning  Goal: Discharge to home or other facility with appropriate resources  2/2/2023 1830 by Julien Angelo, RN  Outcome: Progressing     Problem: Skin/Tissue Integrity  Goal: Absence of new skin breakdown  Description: 1. Monitor for areas of redness and/or skin breakdown  2. Assess vascular access sites hourly  3. Every 4-6 hours minimum:  Change oxygen saturation probe site  4. Every 4-6 hours:  If on nasal continuous positive airway pressure, respiratory therapy assess nares and determine need for appliance change or resting period.   2/2/2023 1830 by Julien Angelo, RN  Outcome: Progressing     Problem: Safety - Adult  Goal: Free from fall injury  2/2/2023 1830 by Julien Angelo, RN  Outcome: Progressing

## 2023-02-02 NOTE — PROGRESS NOTES
-Continue vancomycin 1000 mg IV q24h. -Will monitor renal function and steady state vancomycin level when appropriate.

## 2023-02-02 NOTE — PLAN OF CARE
Problem: Discharge Planning  Goal: Discharge to home or other facility with appropriate resources  2/2/2023 1009 by Jose Manuel Reyes RN  Outcome: Progressing     Problem: Skin/Tissue Integrity  Goal: Absence of new skin breakdown  Description: 1. Monitor for areas of redness and/or skin breakdown  2. Assess vascular access sites hourly  3. Every 4-6 hours minimum:  Change oxygen saturation probe site  4. Every 4-6 hours:  If on nasal continuous positive airway pressure, respiratory therapy assess nares and determine need for appliance change or resting period.   2/2/2023 1009 by Jose Manuel Reyes RN  Outcome: Progressing     Problem: Safety - Adult  Goal: Free from fall injury  2/2/2023 1009 by Jose Manuel Reeys RN  Outcome: Progressing

## 2023-02-02 NOTE — CARE COORDINATION
CASE MANAGEMENT. Jairo Montanez Received auth for [facility]. Good through Sat 2/4/23. Gamal Ro NP updated. States patient not ready today. Awaiting bld cx results. Hopefully patient will be ready tomorrow. Aundrea with the facility notified. Need Covid. N 16 in epic. Forms in chart. Elen FRANCISCO. Will follow.

## 2023-02-02 NOTE — CARE COORDINATION
CASE MANAGEMENT. ... [facility] accepted patient and will start precert. Need COVID. N 16 in epic. Forms in chart. Need MARYAM. Patient updated. Per her request I updated her sister Jone Malik [phone]. Will follow.

## 2023-02-03 LAB
ANION GAP SERPL CALCULATED.3IONS-SCNC: 8 MMOL/L (ref 7–16)
BASOPHILS ABSOLUTE: 0.12 E9/L (ref 0–0.2)
BASOPHILS RELATIVE PERCENT: 1.1 % (ref 0–2)
BLOOD CULTURE, ROUTINE: ABNORMAL
BLOOD CULTURE, ROUTINE: ABNORMAL
BUN BLDV-MCNC: 10 MG/DL (ref 6–23)
CALCIUM SERPL-MCNC: 7.7 MG/DL (ref 8.6–10.2)
CHLORIDE BLD-SCNC: 111 MMOL/L (ref 98–107)
CO2: 20 MMOL/L (ref 22–29)
CREAT SERPL-MCNC: 0.6 MG/DL (ref 0.5–1)
EOSINOPHILS ABSOLUTE: 0.46 E9/L (ref 0.05–0.5)
EOSINOPHILS RELATIVE PERCENT: 4.2 % (ref 0–6)
GFR SERPL CREATININE-BSD FRML MDRD: >60 ML/MIN/1.73
GLUCOSE BLD-MCNC: 88 MG/DL (ref 74–99)
HCT VFR BLD CALC: 25.2 % (ref 34–48)
HEMOGLOBIN: 8 G/DL (ref 11.5–15.5)
IMMATURE GRANULOCYTES #: 0.24 E9/L
IMMATURE GRANULOCYTES %: 2.2 % (ref 0–5)
LYMPHOCYTES ABSOLUTE: 2.06 E9/L (ref 1.5–4)
LYMPHOCYTES RELATIVE PERCENT: 18.7 % (ref 20–42)
MAGNESIUM: 1.8 MG/DL (ref 1.6–2.6)
MCH RBC QN AUTO: 26.3 PG (ref 26–35)
MCHC RBC AUTO-ENTMCNC: 31.7 % (ref 32–34.5)
MCV RBC AUTO: 82.9 FL (ref 80–99.9)
MONOCYTES ABSOLUTE: 1.42 E9/L (ref 0.1–0.95)
MONOCYTES RELATIVE PERCENT: 12.9 % (ref 2–12)
NEUTROPHILS ABSOLUTE: 6.73 E9/L (ref 1.8–7.3)
NEUTROPHILS RELATIVE PERCENT: 60.9 % (ref 43–80)
ORGANISM: ABNORMAL
PDW BLD-RTO: 19.8 FL (ref 11.5–15)
PLATELET # BLD: 370 E9/L (ref 130–450)
PMV BLD AUTO: 11.3 FL (ref 7–12)
POTASSIUM SERPL-SCNC: 3.4 MMOL/L (ref 3.5–5)
RBC # BLD: 3.04 E12/L (ref 3.5–5.5)
SODIUM BLD-SCNC: 139 MMOL/L (ref 132–146)
WBC # BLD: 11 E9/L (ref 4.5–11.5)

## 2023-02-03 PROCEDURE — 2580000003 HC RX 258: Performed by: NURSE PRACTITIONER

## 2023-02-03 PROCEDURE — 6370000000 HC RX 637 (ALT 250 FOR IP): Performed by: NURSE PRACTITIONER

## 2023-02-03 PROCEDURE — C9113 INJ PANTOPRAZOLE SODIUM, VIA: HCPCS | Performed by: INTERNAL MEDICINE

## 2023-02-03 PROCEDURE — 80048 BASIC METABOLIC PNL TOTAL CA: CPT

## 2023-02-03 PROCEDURE — 99232 SBSQ HOSP IP/OBS MODERATE 35: CPT | Performed by: NURSE PRACTITIONER

## 2023-02-03 PROCEDURE — 6370000000 HC RX 637 (ALT 250 FOR IP): Performed by: INTERNAL MEDICINE

## 2023-02-03 PROCEDURE — 6360000002 HC RX W HCPCS: Performed by: STUDENT IN AN ORGANIZED HEALTH CARE EDUCATION/TRAINING PROGRAM

## 2023-02-03 PROCEDURE — 2580000003 HC RX 258: Performed by: INTERNAL MEDICINE

## 2023-02-03 PROCEDURE — 6360000002 HC RX W HCPCS: Performed by: INTERNAL MEDICINE

## 2023-02-03 PROCEDURE — 36415 COLL VENOUS BLD VENIPUNCTURE: CPT

## 2023-02-03 PROCEDURE — 6370000000 HC RX 637 (ALT 250 FOR IP): Performed by: STUDENT IN AN ORGANIZED HEALTH CARE EDUCATION/TRAINING PROGRAM

## 2023-02-03 PROCEDURE — 85025 COMPLETE CBC W/AUTO DIFF WBC: CPT

## 2023-02-03 PROCEDURE — 2580000003 HC RX 258: Performed by: STUDENT IN AN ORGANIZED HEALTH CARE EDUCATION/TRAINING PROGRAM

## 2023-02-03 PROCEDURE — 51798 US URINE CAPACITY MEASURE: CPT

## 2023-02-03 PROCEDURE — 1200000000 HC SEMI PRIVATE

## 2023-02-03 PROCEDURE — 83735 ASSAY OF MAGNESIUM: CPT

## 2023-02-03 RX ORDER — DOXYCYCLINE HYCLATE 100 MG/1
100 CAPSULE ORAL EVERY 12 HOURS SCHEDULED
Status: DISCONTINUED | OUTPATIENT
Start: 2023-02-03 | End: 2023-02-04 | Stop reason: HOSPADM

## 2023-02-03 RX ORDER — PSEUDOEPHEDRINE HCL 30 MG
100 TABLET ORAL 2 TIMES DAILY PRN
DISCHARGE
Start: 2023-02-03

## 2023-02-03 RX ORDER — CEPHALEXIN 500 MG/1
500 CAPSULE ORAL EVERY 6 HOURS SCHEDULED
Status: DISCONTINUED | OUTPATIENT
Start: 2023-02-03 | End: 2023-02-04 | Stop reason: HOSPADM

## 2023-02-03 RX ORDER — POTASSIUM CHLORIDE 20 MEQ/1
20 TABLET, EXTENDED RELEASE ORAL ONCE
Status: COMPLETED | OUTPATIENT
Start: 2023-02-03 | End: 2023-02-03

## 2023-02-03 RX ADMIN — DOXYCYCLINE HYCLATE 100 MG: 100 CAPSULE ORAL at 15:56

## 2023-02-03 RX ADMIN — LORAZEPAM 2 MG: 1 TABLET ORAL at 09:57

## 2023-02-03 RX ADMIN — ATORVASTATIN CALCIUM 10 MG: 10 TABLET, FILM COATED ORAL at 21:56

## 2023-02-03 RX ADMIN — PANTOPRAZOLE SODIUM 40 MG: 40 INJECTION, POWDER, FOR SOLUTION INTRAVENOUS at 06:06

## 2023-02-03 RX ADMIN — Medication 2000 UNITS: at 09:57

## 2023-02-03 RX ADMIN — Medication 500 MG: at 09:58

## 2023-02-03 RX ADMIN — PANTOPRAZOLE SODIUM 40 MG: 40 INJECTION, POWDER, FOR SOLUTION INTRAVENOUS at 15:59

## 2023-02-03 RX ADMIN — PETROLATUM: 420 OINTMENT TOPICAL at 11:28

## 2023-02-03 RX ADMIN — SODIUM CHLORIDE, PRESERVATIVE FREE 10 ML: 5 INJECTION INTRAVENOUS at 15:59

## 2023-02-03 RX ADMIN — ENOXAPARIN SODIUM 40 MG: 100 INJECTION SUBCUTANEOUS at 09:59

## 2023-02-03 RX ADMIN — CEPHALEXIN 500 MG: 500 CAPSULE ORAL at 21:56

## 2023-02-03 RX ADMIN — POTASSIUM CHLORIDE 20 MEQ: 1500 TABLET, EXTENDED RELEASE ORAL at 09:57

## 2023-02-03 RX ADMIN — ESCITALOPRAM OXALATE 10 MG: 10 TABLET ORAL at 21:56

## 2023-02-03 RX ADMIN — SODIUM CHLORIDE, PRESERVATIVE FREE 10 ML: 5 INJECTION INTRAVENOUS at 15:52

## 2023-02-03 RX ADMIN — LORAZEPAM 2 MG: 1 TABLET ORAL at 21:56

## 2023-02-03 RX ADMIN — PETROLATUM: 420 OINTMENT TOPICAL at 16:14

## 2023-02-03 RX ADMIN — LORAZEPAM 2 MG: 1 TABLET ORAL at 15:56

## 2023-02-03 RX ADMIN — VANCOMYCIN HYDROCHLORIDE 1000 MG: 1 INJECTION, POWDER, LYOPHILIZED, FOR SOLUTION INTRAVENOUS at 11:28

## 2023-02-03 RX ADMIN — DILTIAZEM HYDROCHLORIDE 120 MG: 120 CAPSULE, COATED, EXTENDED RELEASE ORAL at 09:57

## 2023-02-03 RX ADMIN — ACETAMINOPHEN 650 MG: 325 TABLET ORAL at 15:56

## 2023-02-03 RX ADMIN — SODIUM CHLORIDE: 9 INJECTION, SOLUTION INTRAVENOUS at 16:09

## 2023-02-03 RX ADMIN — CEPHALEXIN 500 MG: 500 CAPSULE ORAL at 15:56

## 2023-02-03 RX ADMIN — OTC TOPICAL ANALGESIC DRUG PRODUCTS: .005; .005 LOTION TOPICAL at 10:00

## 2023-02-03 ASSESSMENT — PAIN DESCRIPTION - LOCATION: LOCATION: BACK

## 2023-02-03 ASSESSMENT — PAIN SCALES - GENERAL
PAINLEVEL_OUTOF10: 0
PAINLEVEL_OUTOF10: 3

## 2023-02-03 ASSESSMENT — PAIN - FUNCTIONAL ASSESSMENT: PAIN_FUNCTIONAL_ASSESSMENT: PREVENTS OR INTERFERES SOME ACTIVE ACTIVITIES AND ADLS

## 2023-02-03 ASSESSMENT — PAIN DESCRIPTION - DESCRIPTORS: DESCRIPTORS: ACHING

## 2023-02-03 NOTE — PROGRESS NOTES
[facility] Progress Note    Admitting Date and Time: 1/31/2023  7:29 PM  Admit Dx: Cellulitis [L03.90]  Septicemia ([address] [A41.9]  Failure to thrive in adult [R62.7]  Cellulitis of lower extremity, unspecified laterality [L03.119]    Subjective:  Patient is being followed for Cellulitis [L03.90]  Septicemia ([address] [A41.9]  Failure to thrive in adult [R62.7]  Cellulitis of lower extremity, unspecified laterality [Z68.744]       Patient awake and alert resting in bed in no acute distress  Still with issues urinating  Seen while on bedside commode and only few drops or urine   Bladder scan after voiding high 300s    Called later in afternoon by nursing that pt able to void 100cc- however bladder scan after 390  Legs better       ROS: denies fever, chills, cp, sob, n/v, HA unless stated above.       doxycycline hyclate  100 mg Oral 2 times per day    cephALEXin  500 mg Oral 4 times per day    dilTIAZem  120 mg Oral BID    atorvastatin  10 mg Oral Nightly    vitamin D  2,000 Units Oral Daily    [Held by provider] docusate sodium  100 mg Oral BID    escitalopram  10 mg Oral Nightly    LORazepam  2 mg Oral TID    vitamin C  500 mg Oral Daily    enoxaparin  40 mg SubCUTAneous Daily    pantoprazole  40 mg IntraVENous BID AC    camphor-menthol   Topical Daily    white petrolatum   Topical BID     trimethobenzamide, 200 mg, Q6H PRN  sodium chloride flush, 5-40 mL, PRN  sodium chloride, , PRN  ondansetron, 4 mg, Q8H PRN   Or  ondansetron, 4 mg, Q6H PRN  polyethylene glycol, 17 g, Daily PRN  acetaminophen, 650 mg, Q6H PRN   Or  acetaminophen, 650 mg, Q6H PRN  albuterol, 2.5 mg, Q6H PRN  loperamide, 2 mg, 4x Daily PRN  iopamidol, 75 mL, ONCE PRN         Objective:    BP (!) 104/56   Pulse (!) 104   Temp 98.4 °F (36.9 °C) (Oral)   Resp 16   Ht 5' 4\" (1.626 m)   Wt 149 lb (67.6 kg)   SpO2 94%   BMI 25.58 kg/m²   General Appearance: alert and oriented to person, place and time and in no acute distress  Skin: warm and dry  Head: normocephalic and atraumatic  Neck: neck supple and non tender without mass   Pulmonary/Chest: clear to auscultation bilaterally-  Cardiovascular: normal rate, normal S1 and S2 and no carotid bruits  Abdomen: soft, non-tender, non-distended, normal bowel sounds, no masses or organomegaly  Extremities: no cyanosis, no clubbing and lower ext erythema/ scratches/   Neurologic: speech normal            Recent Labs     02/01/23  0910 02/02/23 0923 02/03/23  0443   * 137 139   K 3.7 3.9 3.4*    106 111*   CO2 18* 14* 20*   BUN 10 10 10   CREATININE 0.7 0.6 0.6   GLUCOSE 69* 230* 88   CALCIUM 7.5* 7.6* 7.7*       Recent Labs     02/01/23  0910 02/02/23  0923 02/03/23  0443   WBC 13.5* 12.8* 11.0   RBC 3.50 3.83 3.04*   HGB 9.2* 9.9* 8.0*   HCT 30.0* 34.6 25.2*   MCV 85.7 90.3 82.9   MCH 26.3 25.8* 26.3   MCHC 30.7* 28.6* 31.7*   RDW 20.0* 20.3* 19.8*    370 370   MPV 11.6 11.8 11.3         Assessment:    Principal Problem:    Cellulitis  Active Problems:    Septicemia (HCC)    Failure to thrive in adult  Resolved Problems:    * No resolved hospital problems. *      Plan:  1. Bilateral lower extremity cellulitis: pt presented to the ER with generalized weakness and fatigue. Reporting open wounds to lower extremities with associated pain and swelling. She was seen in ER on 1/28- 3 days earlier for similar symptoms. Prior to that had seen PCP in office and was started on keflex without improvement. Follow inflammatory markers. MRSA nares swab pending. Transition IV ancef/ IV vancomycin to po doxycyline/ po keflex. Overall improved. 2. Sepsis related to above: POA- pt with tachycardia and leukocytosis on arrival. Continue antibiotics and monitor. Procal elevated      3. Dehydration: continue IVF- decrease rate to 50/ hr and monitor BP     4. Recent T12/ L1 compression fracture: has TLSO- following with neurosurgery outpt     4.  Depression/ anxiety: continue lexapro and ativan 5. H/o tachycardia: on cardizem- monitor     6. H/o sleep apnea     7. Hypotension with h/o HTN: midodrine stopped. / cardizem dose decreased. 8. Deconditioning: am pac 10-plans for JAIR on discharge. 9. Acidosis; monitor     10. Leukocytosis: trending down- monitor      11. + BC 1 out of 2 bottles. Follow - likely contaminant. Repeat Blood cultures     12. Urinary retention: monitor bladder scan. UA clean. Refusing meza. Consult urology. Dispo: JAIR on dc. Hopeful DC in the next 24 hours. Time spent reviewing chart, clinical exam, discussing case and answering questions with staff/consultants/patient/family = 20 minutes       NOTE: This report was transcribed using voice recognition software. Every effort was made to ensure accuracy; however, inadvertent computerized transcription errors may be present.   Electronically signed by JOSEFA Sales on 2/3/2023 at 2:06 PM

## 2023-02-03 NOTE — PATIENT CARE CONFERENCE
P Quality Flow/Interdisciplinary Rounds Progress Note        Quality Flow Rounds held on February 3, 2023    Disciplines Attending:  Bedside Nurse, , , and Nursing Unit Leadership    Ravinder Grady was admitted on 1/31/2023  7:29 PM    Anticipated Discharge Date:       Disposition:    Ruperto Score:  Ruperto Scale Score: 16    Readmission Risk              Risk of Unplanned Readmission:  23           Discussed patient goal for the day, patient clinical progression, and barriers to discharge.   The following Goal(s) of the Day/Commitment(s) have been identified:  Labs - Report Results      Alisha Phillips RN  February 3, 2023

## 2023-02-03 NOTE — PROGRESS NOTES
Physical Therapy    Pt refused Rx, was upset about the attempt, states too tired, wants to stay in bed. Wishes respected. Will follow on another date/time.   Matthew Reis PTA [id number]

## 2023-02-03 NOTE — CARE COORDINATION
Social Work/Discharge Planning:  Called Toshia with [facility] and confirmed patient pre-cert is good until tomorrow. Rebecca Cosby states patient must admit before midnight 2/4 or she will need a new pre-cert.  [id number] completed. Will continue to follow.   Electronically signed by CHELSEY Palma on 2/3/2023 at 2:51 PM

## 2023-02-03 NOTE — PROGRESS NOTES
Patient voided 100 mL. Bladder scan indicated PVR of 389 mL. Advised patient order for meza catheter. She adamantly refused, even after education. Will continue to monitor.

## 2023-02-03 NOTE — PROGRESS NOTES
-Renal function stable. -Will order steady state vancomycin level with morning labs on 2/4, continuing vancomycin 1000 mg IV q24h at this time.  -Hold VANCOMYCIN IF VANCOMYCIN LEVEL IS GREATER THAN 20 MCG/ML.  -Will follow-up vancomycin level to determine further vancomycin dosing plans.

## 2023-02-03 NOTE — PROGRESS NOTES
Wound / ostomy dept follow-up for leg. The scratches on both legs have improved. Pt admits to less itching. Will cont current POC.

## 2023-02-03 NOTE — CONSULTS
2/3/2023 3:08 PM  Service: Urology  Group: [facility] ([facility])    Janusz Lema  [id number]     Chief Complaint: Retention     History of Present Illness:   The patient is a 78 y.o. female patient who presents with cellulitis  She was seen by me in the past  She does have some TULIO  She was not able to void  She voided 100 cc  She did have a   She was offered a meza  She refused  She was given meds for OAB in the past  She has not had a cysto  She has not seen any gross hematuria  She is non-ambulatory for about 2 months  She does go to rehab    Past Medical History:   Diagnosis Date    Anxiety     Arthritis     Asthma     Claustrophobia     Dermatitis 02/2017    bilateral legs knees to ankle; follows with [facility]    Fracture 02/2017    \"in Spine\" compression T10 per pt; difficulty laying flat    GERD (gastroesophageal reflux disease)     Hiatal hernia     History of blood transfusion     HTN (hypertension) 4/22/2013    Hyperlipidemia     On aspirin at home     for age per pcp    Pancreatic cyst 5/27/2017    Pneumonia 07/2018    Sleep apnea     SOB (shortness of breath) 4/22/2013    Tachycardia 04/22/2013    follows with Dr Sneha Muniz       Past Surgical History:   Procedure Laterality Date    BACK SURGERY  8/2014    has screws in back    CATARACT REMOVAL  12/6/2013, 2/20/2013    [facility] with lens implants    CHOLECYSTECTOMY      JOINT REPLACEMENT  12/16/2016    SI joint fusion Right    OTHER SURGICAL HISTORY  02/09/2017    MRI -     SPINAL FIXATION SURGERY WITH IMPLANT  2013    in [address] Right     UPPER GASTROINTESTINAL ENDOSCOPY  07/10/2017       Medications Prior to Admission:    Medications Prior to Admission: [DISCONTINUED] docusate sodium (COLACE, DULCOLAX) 100 MG CAPS, Take 100 mg by mouth 2 times daily  dilTIAZem (CARDIZEM CD) 240 MG extended release capsule, TAKE 1 CAPSULE TWICE A DAY  fluticasone (FLONASE) 50 MCG/ACT nasal spray, USE 1 SPRAY IN EACH NOSTRIL DAILY  Fluticasone furoate-vilanterol (BREO ELLIPTA) 200-25 MCG/INH AEPB inhaler, Inhale 1 puff into the lungs daily  montelukast (SINGULAIR) 10 MG tablet, TAKE 1 TABLET DAILY  albuterol sulfate HFA (VENTOLIN HFA) 108 (90 Base) MCG/ACT inhaler, Inhale 2 puffs into the lungs every 6 hours as needed for Wheezing  [DISCONTINUED] predniSONE (DELTASONE) 10 MG tablet, Take 20 mg by mouth in the morning. dupilumab (DUPIXENT) 300 MG/2ML SOSY injection, Inject 300 mg into the skin every 14 days   vitamin C (ASCORBIC ACID) 500 MG tablet, Take 1 tablet by mouth daily  potassium chloride (KLOR-CON) 10 MEQ extended release tablet, Take 10 mEq by mouth 2 times daily   LORazepam (ATIVAN) 2 MG tablet, Take 2 mg by mouth 3 times daily. Celestbethanie Valenzuela aspirin 81 MG tablet, Take 81 mg by mouth daily Pt to hold 5 days prior per Dr. Maria Esther Weber  escitalopram (LEXAPRO) 10 MG tablet, Take 10 mg by mouth nightly   famotidine (PEPCID) 20 MG tablet, Take 20 mg by mouth 2 times daily Instructed to take am of procedure  Cholecalciferol (VITAMIN D) 2000 UNITS CAPS capsule, Take 2,000 Units by mouth daily Last dose 7/4/2017  Cyanocobalamin (VITAMIN B-12 IJ), Inject as directed every 3 months Indications: every  month   atorvastatin (LIPITOR) 10 MG tablet, Take 10 mg by mouth nightly     Allergies:    Percocet [oxycodone-acetaminophen]    Social History:    reports that she has never smoked. She has never used smokeless tobacco. She reports that she does not drink alcohol and does not use drugs. Family History:   Non-contributory to this urological problem  family history includes Arthritis in her father; Asthma in her daughter; Diabetes in her father; Heart Disease in her father and mother; Hypertension in her father and mother; Other in her mother; Stroke in her mother.     Review of Systems:  Respiratory: negative for cough and hemoptysis  Cardiovascular: negative for chest pain and dyspnea  Gastrointestinal: negative for abdominal pain, diarrhea, nausea and vomiting  Derm: negative for rash and skin lesion(s)  Neurological: negative for seizures and tremors  Endocrine: negative for diabetic symptoms including polydipsia and polyuria  : As above in the HPI, otherwise negative  All other reviews are negative    Physical Exam:   Vitals: BP (!) 104/56   Pulse (!) 104   Temp 98.4 °F (36.9 °C) (Oral)   Resp 16   Ht 5' 4\" (1.626 m)   Wt 149 lb (67.6 kg)   SpO2 94%   BMI 25.58 kg/m²   General:  Awake, alert, oriented X 3. Well developed, well nourished, well groomed. No apparent distress. HEENT:  Normocephalic, atraumatic. Pupils equal, round. No scleral icterus. No conjunctival injection. Normal lips, teeth, and gums. No nasal discharge. Neck:  Supple, no masses. Heart:  RRR  Lungs:  No audible wheezing. Respirations symmetric and non-labored. Abdomen:  soft, nontender, no masses, no organomegaly, no peritoneal signs  Extremities:  No clubbing, cyanosis, or edema  Skin:  Warm and dry, no open lesions or rashes  Neuro:  Cranial nerves 2-12 intact, no focal deficits  Rectal: deferred  Genitalia:  Brewer no    Labs:   Recent Labs     02/01/23  0910 02/02/23  0923 02/03/23  0443   WBC 13.5* 12.8* 11.0   RBC 3.50 3.83 3.04*   HGB 9.2* 9.9* 8.0*   HCT 30.0* 34.6 25.2*   MCV 85.7 90.3 82.9   MCH 26.3 25.8* 26.3   MCHC 30.7* 28.6* 31.7*   RDW 20.0* 20.3* 19.8*    370 370   MPV 11.6 11.8 11.3       Recent Labs     02/01/23  0910 02/02/23  0923 02/03/23  0443   CREATININE 0.7 0.6 0.6     Moderate paraesophageal hiatal hernia. Moderate-severe sigmoid diverticulosis; no evidence for diverticulitis. Cholecystectomy. Small bilateral inguinal hernias with fat with trapped fluid on the right. Small bilateral pleural effusions with bibasilar compressive atelectasis. Bony degenerative changes and lumbar spine postoperative changes, as detailed   above.        No convincing evidence for metastatic disease. Images:              Assessment: Tracie Goodman 78 y.o. female     Retention  ? Constipation as etilogy    Plan:     Will review outpatient chart  CT was reviewed  PVR was done and increased   All options were discussed  Recommend a meza vs CIC, refused  Recommend a good bowel regimen   Will need outpatient eval   No family is present       Nasrin Stoner, DO   [facility]

## 2023-02-04 VITALS
RESPIRATION RATE: 18 BRPM | BODY MASS INDEX: 25.95 KG/M2 | HEART RATE: 111 BPM | DIASTOLIC BLOOD PRESSURE: 55 MMHG | OXYGEN SATURATION: 95 % | WEIGHT: 152 LBS | TEMPERATURE: 99 F | SYSTOLIC BLOOD PRESSURE: 108 MMHG | HEIGHT: 64 IN

## 2023-02-04 LAB
ACANTHOCYTES: ABNORMAL
ANION GAP SERPL CALCULATED.3IONS-SCNC: 8 MMOL/L (ref 7–16)
ANISOCYTOSIS: ABNORMAL
BASOPHILS ABSOLUTE: 0.08 E9/L (ref 0–0.2)
BASOPHILS RELATIVE PERCENT: 0.9 % (ref 0–2)
BUN BLDV-MCNC: 7 MG/DL (ref 6–23)
BURR CELLS: ABNORMAL
CALCIUM SERPL-MCNC: 7.8 MG/DL (ref 8.6–10.2)
CHLORIDE BLD-SCNC: 114 MMOL/L (ref 98–107)
CO2: 21 MMOL/L (ref 22–29)
CREAT SERPL-MCNC: 0.6 MG/DL (ref 0.5–1)
EOSINOPHILS ABSOLUTE: 0.61 E9/L (ref 0.05–0.5)
EOSINOPHILS RELATIVE PERCENT: 6.9 % (ref 0–6)
GFR SERPL CREATININE-BSD FRML MDRD: >60 ML/MIN/1.73
GLUCOSE BLD-MCNC: 69 MG/DL (ref 74–99)
HCT VFR BLD CALC: 29 % (ref 34–48)
HEMOGLOBIN: 8.8 G/DL (ref 11.5–15.5)
HYPOCHROMIA: ABNORMAL
LYMPHOCYTES ABSOLUTE: 1.69 E9/L (ref 1.5–4)
LYMPHOCYTES RELATIVE PERCENT: 19.1 % (ref 20–42)
MCH RBC QN AUTO: 26 PG (ref 26–35)
MCHC RBC AUTO-ENTMCNC: 30.3 % (ref 32–34.5)
MCV RBC AUTO: 85.8 FL (ref 80–99.9)
MONOCYTES ABSOLUTE: 1.07 E9/L (ref 0.1–0.95)
MONOCYTES RELATIVE PERCENT: 12.2 % (ref 2–12)
NEUTROPHILS ABSOLUTE: 5.43 E9/L (ref 1.8–7.3)
NEUTROPHILS RELATIVE PERCENT: 60.9 % (ref 43–80)
NUCLEATED RED BLOOD CELLS: 0 /100 WBC
OVALOCYTES: ABNORMAL
PDW BLD-RTO: 20.2 FL (ref 11.5–15)
PLATELET # BLD: 391 E9/L (ref 130–450)
PMV BLD AUTO: 11.5 FL (ref 7–12)
POIKILOCYTES: ABNORMAL
POLYCHROMASIA: ABNORMAL
POTASSIUM SERPL-SCNC: 3.7 MMOL/L (ref 3.5–5)
PROCALCITONIN: 0.73 NG/ML (ref 0–0.08)
RBC # BLD: 3.38 E12/L (ref 3.5–5.5)
SARS-COV-2, NAAT: NOT DETECTED
SODIUM BLD-SCNC: 143 MMOL/L (ref 132–146)
TARGET CELLS: ABNORMAL
WBC # BLD: 8.9 E9/L (ref 4.5–11.5)

## 2023-02-04 PROCEDURE — 6370000000 HC RX 637 (ALT 250 FOR IP): Performed by: INTERNAL MEDICINE

## 2023-02-04 PROCEDURE — 80048 BASIC METABOLIC PNL TOTAL CA: CPT

## 2023-02-04 PROCEDURE — 84145 PROCALCITONIN (PCT): CPT

## 2023-02-04 PROCEDURE — 87635 SARS-COV-2 COVID-19 AMP PRB: CPT

## 2023-02-04 PROCEDURE — 36415 COLL VENOUS BLD VENIPUNCTURE: CPT

## 2023-02-04 PROCEDURE — 6360000002 HC RX W HCPCS: Performed by: STUDENT IN AN ORGANIZED HEALTH CARE EDUCATION/TRAINING PROGRAM

## 2023-02-04 PROCEDURE — C9113 INJ PANTOPRAZOLE SODIUM, VIA: HCPCS | Performed by: INTERNAL MEDICINE

## 2023-02-04 PROCEDURE — 51798 US URINE CAPACITY MEASURE: CPT

## 2023-02-04 PROCEDURE — 6370000000 HC RX 637 (ALT 250 FOR IP): Performed by: NURSE PRACTITIONER

## 2023-02-04 PROCEDURE — 99239 HOSP IP/OBS DSCHRG MGMT >30: CPT | Performed by: INTERNAL MEDICINE

## 2023-02-04 PROCEDURE — 6360000002 HC RX W HCPCS: Performed by: INTERNAL MEDICINE

## 2023-02-04 PROCEDURE — 85025 COMPLETE CBC W/AUTO DIFF WBC: CPT

## 2023-02-04 PROCEDURE — 2580000003 HC RX 258: Performed by: NURSE PRACTITIONER

## 2023-02-04 PROCEDURE — 6370000000 HC RX 637 (ALT 250 FOR IP): Performed by: STUDENT IN AN ORGANIZED HEALTH CARE EDUCATION/TRAINING PROGRAM

## 2023-02-04 RX ORDER — DILTIAZEM HYDROCHLORIDE 120 MG/1
120 CAPSULE, COATED, EXTENDED RELEASE ORAL 2 TIMES DAILY
Qty: 30 CAPSULE | Refills: 3 | DISCHARGE
Start: 2023-02-04

## 2023-02-04 RX ORDER — DOXYCYCLINE HYCLATE 100 MG/1
100 CAPSULE ORAL EVERY 12 HOURS SCHEDULED
Qty: 10 CAPSULE | Refills: 0 | DISCHARGE
Start: 2023-02-04 | End: 2023-02-09

## 2023-02-04 RX ORDER — CEPHALEXIN 500 MG/1
500 CAPSULE ORAL 4 TIMES DAILY
DISCHARGE
Start: 2023-02-04 | End: 2023-02-09

## 2023-02-04 RX ORDER — POLYETHYLENE GLYCOL 3350 17 G/17G
17 POWDER, FOR SOLUTION ORAL DAILY
Status: DISCONTINUED | OUTPATIENT
Start: 2023-02-04 | End: 2023-02-04 | Stop reason: HOSPADM

## 2023-02-04 RX ORDER — POLYETHYLENE GLYCOL 3350 17 G/17G
17 POWDER, FOR SOLUTION ORAL DAILY
Qty: 527 G | Refills: 0 | DISCHARGE
Start: 2023-02-05 | End: 2023-03-08

## 2023-02-04 RX ADMIN — PANTOPRAZOLE SODIUM 40 MG: 40 INJECTION, POWDER, FOR SOLUTION INTRAVENOUS at 06:07

## 2023-02-04 RX ADMIN — ONDANSETRON 4 MG: 2 INJECTION INTRAMUSCULAR; INTRAVENOUS at 09:37

## 2023-02-04 RX ADMIN — LORAZEPAM 2 MG: 1 TABLET ORAL at 08:52

## 2023-02-04 RX ADMIN — Medication 2000 UNITS: at 08:52

## 2023-02-04 RX ADMIN — LORAZEPAM 2 MG: 1 TABLET ORAL at 13:37

## 2023-02-04 RX ADMIN — ACETAMINOPHEN 650 MG: 650 SUPPOSITORY RECTAL at 11:10

## 2023-02-04 RX ADMIN — PETROLATUM: 420 OINTMENT TOPICAL at 08:53

## 2023-02-04 RX ADMIN — DILTIAZEM HYDROCHLORIDE 120 MG: 120 CAPSULE, COATED, EXTENDED RELEASE ORAL at 08:52

## 2023-02-04 RX ADMIN — CEPHALEXIN 500 MG: 500 CAPSULE ORAL at 08:52

## 2023-02-04 RX ADMIN — DOXYCYCLINE HYCLATE 100 MG: 100 CAPSULE ORAL at 08:51

## 2023-02-04 RX ADMIN — CEPHALEXIN 500 MG: 500 CAPSULE ORAL at 04:07

## 2023-02-04 RX ADMIN — SODIUM CHLORIDE: 9 INJECTION, SOLUTION INTRAVENOUS at 11:16

## 2023-02-04 RX ADMIN — DOCUSATE SODIUM 100 MG: 100 CAPSULE, LIQUID FILLED ORAL at 08:52

## 2023-02-04 RX ADMIN — Medication 500 MG: at 08:52

## 2023-02-04 RX ADMIN — OTC TOPICAL ANALGESIC DRUG PRODUCTS: .005; .005 LOTION TOPICAL at 08:53

## 2023-02-04 RX ADMIN — ENOXAPARIN SODIUM 40 MG: 100 INJECTION SUBCUTANEOUS at 08:52

## 2023-02-04 ASSESSMENT — PAIN DESCRIPTION - DESCRIPTORS: DESCRIPTORS: SHARP;SHOOTING;SORE

## 2023-02-04 ASSESSMENT — PAIN SCALES - GENERAL
PAINLEVEL_OUTOF10: 10
PAINLEVEL_OUTOF10: 9

## 2023-02-04 ASSESSMENT — PAIN DESCRIPTION - ORIENTATION: ORIENTATION: RIGHT;LEFT

## 2023-02-04 ASSESSMENT — PAIN - FUNCTIONAL ASSESSMENT: PAIN_FUNCTIONAL_ASSESSMENT: PREVENTS OR INTERFERES SOME ACTIVE ACTIVITIES AND ADLS

## 2023-02-04 ASSESSMENT — PAIN DESCRIPTION - LOCATION: LOCATION: LEG;BACK

## 2023-02-04 NOTE — PROGRESS NOTES
Pt voided 100 ml    PVR is 685 ml    RN and Dr. Dmitriy Medina educate patient regarding getting a meza inserted. Patient still refused at this time. Will continue to follow.     Electronically signed by Luis Miguel Jeffries RN on 2/4/2023 at 9:29 AM

## 2023-02-04 NOTE — PROGRESS NOTES
Patient voided 150mL in bedpan. Bladder scan done. PVR is 624mL.     Electronically signed by Walker Aschoff, RN on 2/4/2023 at 2:55 AM

## 2023-02-04 NOTE — PROGRESS NOTES
Patient for DC today to [facility]. [facility]  time of 1800. Electronically signed by Carlee Henley RN on 2/4/2023 at 12:59 PM      [facility] able to transport patient at 14:30. Transport changed to their service. [facility] cancelled. Notified [facility] of planned DC and  time.  Family at bedside and made aware of DC    Electronically signed by Carlee Henley RN on 2/4/2023 at 1:08 PM'

## 2023-02-04 NOTE — DISCHARGE SUMMARY
[facility] Physician Discharge Summary       [facility] at [address] [facility] [address]  [phone]        Satya Altamirano, [address] [facility]  [phone]    Schedule an appointment as soon as possible for a visit  Make an appointment in 1 week to go over hospitalization, medications and follow up. Activity level: As tolerated   Dispo: Vicenta Le   Condition on discharge: Stable     Patient ID:  Janusz Lema  [id number]  83 y.o.  1945    Admit date: 1/31/2023    Discharge date and time:  2/4/2023  12:38 PM    Admission Diagnoses: Principal Problem:    Cellulitis  Active Problems:    Septicemia ([address]    Failure to thrive in adult  Resolved Problems:    * No resolved hospital problems. *      Discharge Diagnoses: Principal Problem:    Cellulitis  Active Problems:    Septicemia ([address]    Failure to thrive in adult  Resolved Problems:    * No resolved hospital problems. *      Consults:  IP CONSULT TO PHARMACY  IP CONSULT TO IV TEAM  IP CONSULT TO UROLOGY  IP CONSULT TO [employer] [facility] Course:   Patient Janusz Lema is a 78 y.o. presented with Cellulitis [L03.90]  Septicemia ([address] [A41.9]  Failure to thrive in adult [R62.7]  Cellulitis of lower extremity, unspecified laterality [P45538]    74-year-old female with a past medical history of anxiety, GERD, T12 and L1 compression fractures, and hypertension presents emergency department for generalized weakness and fatigue. Patient has a history of open wounds in her bilateral lower extremities. Patient states that she has had more pain and swelling for a few days. Patient was seen in the emergency department a few days ago and discharged for similar presentation. Patient presents with a TSLO brace for compression fracture of her spine. Patient does follow with neurosurgery outpatient.       The following problems were addressed during hospitalization:    1. Bilateral lower extremity cellulitis: pt presented to the ER with generalized weakness and fatigue. Reporting open wounds to lower extremities with associated pain and swelling. She was seen in ER on 1/28- 3 days earlier for similar symptoms and was given keflex without improvement. MRSA nares swab NEGATIVE. Transition IV ancef/ IV vancomycin to po doxycyline/ po keflex. Overall improved. 2. Sepsis related to above: POA- pt with tachycardia and leukocytosis on arrival. Continue antibiotics and monitor. Procal elevated      3. Dehydration resolved with hydration     4. Recent T12/ L1 compression fracture: has TLSO- following with neurosurgery outpt     4. Depression/ anxiety: continue lexapro and ativan     5. H/o tachycardia: on cardizem- monitor     6. H/o sleep apnea     7. Hypotension with h/o HTN: midodrine stopped. / cardizem dose decreased. 8. Deconditioning: SNF on discharge      9. Leukocytosis RESOLVED     11. CONS + BC 1 out of 2 bottles. Contaminant. 12. Urinary retention: urology following. Asked multiple times for patient to have meza due to increased PVR but she refused. Patient now ok with meza which she should maintain at nursing home and follow up with urology outpatient. Patient is hemodynamically and medically stable for discharge. She should follow up with PCP on discharge.      Discharge Exam:    General Appearance: alert and oriented to person, place and time and in no acute distress  Skin: warm and dry  Head: normocephalic and atraumatic  Eyes: pupils equal, round, and reactive to light, extraocular eye movements intact, conjunctivae normal  Neck: neck supple and non tender without mass   Pulmonary/Chest: clear to auscultation bilaterally- no wheezes, rales or rhonchi, normal air movement, no respiratory distress  Cardiovascular: normal rate, normal S1 and S2 and no carotid bruits  Abdomen: soft, non-tender, non-distended, normal bowel sounds, no masses or organomegaly  Extremities: no cyanosis, no clubbing and no edema - cellulitis noted to Ble - trace edema with erythema  Neurologic: no cranial nerve deficit and speech normal  Brewer     I/O last 3 completed shifts: In: 360 [P.O.:360]  Out: 565 [Urine:565]  I/O this shift:  In: -   Out: 1285 [IQDMR:8774]      LABS:  Recent Labs     02/02/23 0923 02/03/23 0443 02/04/23  0150    139 143   K 3.9 3.4* 3.7    111* 114*   CO2 14* 20* 21*   BUN 10 10 7   CREATININE 0.6 0.6 0.6   GLUCOSE 230* 88 69*   CALCIUM 7.6* 7.7* 7.8*       Recent Labs     02/02/23 0923 02/03/23 0443 02/04/23  0150   WBC 12.8* 11.0 8.9   RBC 3.83 3.04* 3.38*   HGB 9.9* 8.0* 8.8*   HCT 34.6 25.2* 29.0*   MCV 90.3 82.9 85.8   MCH 25.8* 26.3 26.0   MCHC 28.6* 31.7* 30.3*   RDW 20.3* 19.8* 20.2*    370 391   MPV 11.8 11.3 11.5       No results for input(s): POCGLU in the last 72 hours. Imaging:  XR CHEST PORTABLE    Result Date: 1/31/2023  EXAMINATION: ONE XRAY VIEW OF THE CHEST 1/31/2023 7:00 pm COMPARISON: CT chest, 01/28/2023; chest one view, 01/28/2023 HISTORY: ORDERING SYSTEM PROVIDED HISTORY: weakness TECHNOLOGIST PROVIDED HISTORY: Reason for exam:->weakness FINDINGS: Lines, tubes, and devices: None. Lungs and pleura: No focal consolidation. No pleural effusion. No pneumothorax. Cardiomediastinal silhouette: Normal cardiomediastinal silhouette. There is stable moderate hiatal hernia. Bones and soft tissues: The bones are osteopenic. There is stable bilateral shoulder joint rotator cuff disease. .  There is indwelling epidural catheter superimposed over the mediastinum. No acute process. Moderate hiatal hernia. Stable exam.       Patient Instructions:      Medication List        START taking these medications      camphor-menthol 0.5-0.5 % lotion  Commonly known as: SARNA  Apply topically as needed.      cephALEXin 500 MG capsule  Commonly known as: KEFLEX  Take 1 capsule by mouth 4 times daily for 5 days doxycycline hyclate 100 MG capsule  Commonly known as: VIBRAMYCIN  Take 1 capsule by mouth every 12 hours for 5 days     polyethylene glycol 17 g packet  Commonly known as: GLYCOLAX  Take 17 g by mouth daily  Start taking on: February 5, 2023     white petrolatum Oint ointment  Apply topically 2 times daily            CHANGE how you take these medications      dilTIAZem 120 MG extended release capsule  Commonly known as: CARDIZEM CD  Take 1 capsule by mouth in the morning and at bedtime  What changed:   medication strength  See the new instructions.      docusate 100 MG Caps  Commonly known as: COLACE, DULCOLAX  Take 100 mg by mouth 2 times daily as needed for Constipation  What changed:   when to take this  reasons to take this            CONTINUE taking these medications      albuterol sulfate  (90 Base) MCG/ACT inhaler  Commonly known as: Ventolin HFA  Inhale 2 puffs into the lungs every 6 hours as needed for Wheezing     aspirin 81 MG tablet     atorvastatin 10 MG tablet  Commonly known as: LIPITOR     dupilumab 300 MG/2ML Sosy injection  Commonly known as: DUPIXENT     escitalopram 10 MG tablet  Commonly known as: LEXAPRO     famotidine 20 MG tablet  Commonly known as: PEPCID     fluticasone 50 MCG/ACT nasal spray  Commonly known as: FLONASE  USE 1 SPRAY IN EACH NOSTRIL DAILY     Fluticasone furoate-vilanterol 200-25 MCG/INH Aepb inhaler  Commonly known as: BREO ELLIPTA  Inhale 1 puff into the lungs daily     LORazepam 2 MG tablet  Commonly known as: ATIVAN     montelukast 10 MG tablet  Commonly known as: SINGULAIR  TAKE 1 TABLET DAILY     VITAMIN B-12 IJ     vitamin C 500 MG tablet  Commonly known as: ASCORBIC ACID  Take 1 tablet by mouth daily     vitamin D 50 MCG (2000 UT) Caps capsule            STOP taking these medications      potassium chloride 10 MEQ extended release tablet  Commonly known as: KLOR-CON     predniSONE 10 MG tablet  Commonly known as: Salome Reese               Where to Get Your Medications        Information about where to get these medications is not yet available    Ask your nurse or doctor about these medications  camphor-menthol 0.5-0.5 % lotion  cephALEXin 500 MG capsule  dilTIAZem 120 MG extended release capsule  docusate 100 MG Caps  doxycycline hyclate 100 MG capsule  polyethylene glycol 17 g packet  white petrolatum Oint ointment       Note that more than 30 minutes was spent in preparing discharge papers, discussing discharge with patient, medication review, etc.    Signed:  Electronically signed by STEVE Zazueta NP on 2/4/2023 at 12:38 PM

## 2023-02-04 NOTE — PROGRESS NOTES
RN called miguel a dove spoke with nurse Latha Garcia, gave nurse to nurse report. All questions answered. Notified of time . AVS faxed to [phone] as requested.     Electronically signed by Lior Lindquist RN on 2/4/2023 at 1:58 PM

## 2023-02-05 LAB
BLOOD CULTURE, ROUTINE: NORMAL
CULTURE, BLOOD 2: NORMAL

## 2023-02-06 ENCOUNTER — PREP FOR PROCEDURE (OUTPATIENT)
Dept: NEUROSURGERY | Age: 78
End: 2023-02-06

## 2023-02-06 DIAGNOSIS — Z01.818 PRE-OP TESTING: Primary | ICD-10-CM

## 2023-02-06 LAB
ALBUMIN SERPL-MCNC: 2.3 G/DL (ref 3.5–5.2)
ALP BLD-CCNC: 84 U/L (ref 35–104)
ALT SERPL-CCNC: <5 U/L (ref 0–32)
ANION GAP SERPL CALCULATED.3IONS-SCNC: 10 MMOL/L (ref 7–16)
AST SERPL-CCNC: 27 U/L (ref 0–31)
BILIRUB SERPL-MCNC: 0.3 MG/DL (ref 0–1.2)
BLOOD CULTURE, ROUTINE: ABNORMAL
BUN BLDV-MCNC: 6 MG/DL (ref 6–23)
CALCIUM SERPL-MCNC: 7.6 MG/DL (ref 8.6–10.2)
CHLORIDE BLD-SCNC: 102 MMOL/L (ref 98–107)
CHOLESTEROL, TOTAL: 81 MG/DL (ref 0–199)
CO2: 22 MMOL/L (ref 22–29)
CREAT SERPL-MCNC: 0.6 MG/DL (ref 0.5–1)
CULTURE, BLOOD 2: NORMAL
GFR SERPL CREATININE-BSD FRML MDRD: >60 ML/MIN/1.73
GLUCOSE BLD-MCNC: 63 MG/DL (ref 74–99)
HCT VFR BLD CALC: 28 % (ref 34–48)
HDLC SERPL-MCNC: 23 MG/DL
HEMOGLOBIN: 8.7 G/DL (ref 11.5–15.5)
INFLUENZA A BY PCR: NOT DETECTED
INFLUENZA B BY PCR: NOT DETECTED
LDL CHOLESTEROL CALCULATED: 38 MG/DL (ref 0–99)
MCH RBC QN AUTO: 26.4 PG (ref 26–35)
MCHC RBC AUTO-ENTMCNC: 31.1 % (ref 32–34.5)
MCV RBC AUTO: 85.1 FL (ref 80–99.9)
ORGANISM: ABNORMAL
PDW BLD-RTO: 20.5 FL (ref 11.5–15)
PLATELET # BLD: 417 E9/L (ref 130–450)
PMV BLD AUTO: 11.7 FL (ref 7–12)
POTASSIUM SERPL-SCNC: 3.6 MMOL/L (ref 3.5–5)
PRO-BNP: 406 PG/ML (ref 0–450)
RBC # BLD: 3.29 E12/L (ref 3.5–5.5)
SODIUM BLD-SCNC: 134 MMOL/L (ref 132–146)
TOTAL PROTEIN: 4.8 G/DL (ref 6.4–8.3)
TRIGL SERPL-MCNC: 102 MG/DL (ref 0–149)
VITAMIN D 25-HYDROXY: 42 NG/ML (ref 30–100)
VLDLC SERPL CALC-MCNC: 20 MG/DL
WBC # BLD: 13.3 E9/L (ref 4.5–11.5)

## 2023-02-06 RX ORDER — SODIUM CHLORIDE 9 MG/ML
INJECTION, SOLUTION INTRAVENOUS PRN
Status: CANCELLED | OUTPATIENT
Start: 2023-02-06

## 2023-02-06 RX ORDER — SODIUM CHLORIDE 9 MG/ML
INJECTION, SOLUTION INTRAVENOUS CONTINUOUS
Status: CANCELLED | OUTPATIENT
Start: 2023-02-06

## 2023-02-06 RX ORDER — SODIUM CHLORIDE 0.9 % (FLUSH) 0.9 %
5-40 SYRINGE (ML) INJECTION PRN
Status: CANCELLED | OUTPATIENT
Start: 2023-02-06

## 2023-02-06 RX ORDER — SODIUM CHLORIDE 0.9 % (FLUSH) 0.9 %
5-40 SYRINGE (ML) INJECTION EVERY 12 HOURS SCHEDULED
Status: CANCELLED | OUTPATIENT
Start: 2023-02-06

## 2023-02-07 ENCOUNTER — OFFICE VISIT (OUTPATIENT)
Dept: CARDIOLOGY CLINIC | Age: 78
End: 2023-02-07
Payer: MEDICARE

## 2023-02-07 VITALS
BODY MASS INDEX: 21.85 KG/M2 | HEART RATE: 113 BPM | DIASTOLIC BLOOD PRESSURE: 52 MMHG | SYSTOLIC BLOOD PRESSURE: 114 MMHG | RESPIRATION RATE: 18 BRPM | WEIGHT: 128 LBS | HEIGHT: 64 IN

## 2023-02-07 DIAGNOSIS — I10 HTN (HYPERTENSION), BENIGN: Primary | ICD-10-CM

## 2023-02-07 DIAGNOSIS — R06.02 SOB (SHORTNESS OF BREATH): ICD-10-CM

## 2023-02-07 DIAGNOSIS — I50.33 ACUTE ON CHRONIC DIASTOLIC CONGESTIVE HEART FAILURE (HCC): ICD-10-CM

## 2023-02-07 PROCEDURE — 3074F SYST BP LT 130 MM HG: CPT | Performed by: INTERNAL MEDICINE

## 2023-02-07 PROCEDURE — 3078F DIAST BP <80 MM HG: CPT | Performed by: INTERNAL MEDICINE

## 2023-02-07 PROCEDURE — 99214 OFFICE O/P EST MOD 30 MIN: CPT | Performed by: INTERNAL MEDICINE

## 2023-02-07 PROCEDURE — 93000 ELECTROCARDIOGRAM COMPLETE: CPT | Performed by: INTERNAL MEDICINE

## 2023-02-07 PROCEDURE — 1123F ACP DISCUSS/DSCN MKR DOCD: CPT | Performed by: INTERNAL MEDICINE

## 2023-02-07 RX ORDER — POTASSIUM CHLORIDE 750 MG/1
10 TABLET, EXTENDED RELEASE ORAL DAILY
Qty: 90 TABLET | Refills: 3 | Status: SHIPPED | OUTPATIENT
Start: 2023-02-07

## 2023-02-07 RX ORDER — SENNOSIDES 8.6 MG
650 CAPSULE ORAL PRN
COMMUNITY

## 2023-02-07 RX ORDER — FUROSEMIDE 20 MG/1
20 TABLET ORAL DAILY
Qty: 90 TABLET | Refills: 3 | Status: SHIPPED | OUTPATIENT
Start: 2023-02-07

## 2023-02-07 NOTE — PROGRESS NOTES
CHIEF COMPLAINT: SVT/Tachycardia    HISTORY OF PRESENT ILLNESS: Patient is a 70 y.o. female seen at the request of Jere Marcial MD.      Patient presents in follow up. Recent admission fo cellulitis/sepsis. Some baseline FORD. No CP.     Past Medical History:   Diagnosis Date    Anxiety     Arthritis     Asthma     Claustrophobia     Dermatitis 02/2017    bilateral legs knees to ankle; follows with [facility]    Fracture 02/2017    \"in Spine\" compression T10 per pt; difficulty laying flat    GERD (gastroesophageal reflux disease)     Hiatal hernia     History of blood transfusion     HTN (hypertension) 4/22/2013    Hyperlipidemia     On aspirin at home     for age per pcp    Pancreatic cyst 5/27/2017    Pneumonia 07/2018    Sleep apnea     SOB (shortness of breath) 4/22/2013    Tachycardia 04/22/2013    follows with Dr El Santana       Patient Active Problem List   Diagnosis    Mixed hyperlipidemia    HTN (hypertension), benign    Asthma    Vitamin B12 deficiency    Pancreatic cyst    Anxiety disorder    Chronic back pain    Tachycardia    Vomiting    Compression fracture of lumbar spine, non-traumatic, initial encounter ([facility] [address]    Compression fracture of T12 vertebra (HCC)    Failed spinal cord stimulator (HCC)    Cellulitis    Septicemia (HCC)    Failure to thrive in adult       Allergies   Allergen Reactions    Percocet [Oxycodone-Acetaminophen]      \"Makes me go crazy\"       Current Outpatient Medications   Medication Sig Dispense Refill    acetaminophen (TYLENOL 8 HOUR) 650 MG extended release tablet Take 650 mg by mouth as needed for Pain      dilTIAZem (CARDIZEM CD) 120 MG extended release capsule Take 1 capsule by mouth in the morning and at bedtime 30 capsule 3    cephALEXin (KEFLEX) 500 MG capsule Take 1 capsule by mouth 4 times daily for 5 days      polyethylene glycol (GLYCOLAX) 17 g packet Take 17 g by mouth daily 527 g 0    doxycycline hyclate (VIBRAMYCIN) 100 MG capsule Take 1 capsule by mouth every 12 hours for 5 days 10 capsule 0    white petrolatum OINT ointment Apply topically 2 times daily  0    camphor-menthol (SARNA) 0.5-0.5 % lotion Apply topically as needed. 4    docusate sodium (COLACE, DULCOLAX) 100 MG CAPS Take 100 mg by mouth 2 times daily as needed for Constipation      fluticasone (FLONASE) 50 MCG/ACT nasal spray USE 1 SPRAY IN EACH NOSTRIL DAILY 16 g 5    Fluticasone furoate-vilanterol (BREO ELLIPTA) 200-25 MCG/INH AEPB inhaler Inhale 1 puff into the lungs daily 180 each 5    montelukast (SINGULAIR) 10 MG tablet TAKE 1 TABLET DAILY 90 tablet 3    albuterol sulfate HFA (VENTOLIN HFA) 108 (90 Base) MCG/ACT inhaler Inhale 2 puffs into the lungs every 6 hours as needed for Wheezing 18 g 3    vitamin C (ASCORBIC ACID) 500 MG tablet Take 1 tablet by mouth daily 30 tablet 3    LORazepam (ATIVAN) 2 MG tablet Take 2 mg by mouth 3 times daily. Jack Sexton aspirin 81 MG tablet Take 81 mg by mouth daily Pt to hold 5 days prior per Dr. Kayden Lopez      escitalopram (LEXAPRO) 10 MG tablet Take 10 mg by mouth nightly       famotidine (PEPCID) 20 MG tablet Take 20 mg by mouth 2 times daily Instructed to take am of procedure      atorvastatin (LIPITOR) 10 MG tablet Take 10 mg by mouth nightly       dupilumab (DUPIXENT) 300 MG/2ML SOSY injection Inject 300 mg into the skin every 14 days  (Patient not taking: Reported on 2/7/2023)      Cholecalciferol (VITAMIN D) 2000 UNITS CAPS capsule Take 2,000 Units by mouth daily Last dose 7/4/2017 (Patient not taking: Reported on 2/7/2023)      Cyanocobalamin (VITAMIN B-12 IJ) Inject as directed every 3 months Indications: every  month  (Patient not taking: Reported on 2/7/2023)       No current facility-administered medications for this visit.        Social History     Socioeconomic History    Marital status:      Spouse name: Not on file    Number of children: Not on file    Years of education: Not on file    Highest education level: Not on file Occupational History    Occupation: retired-     Tobacco Use    Smoking status: Never    Smokeless tobacco: Never   Vaping Use    Vaping Use: Never used   Substance and Sexual Activity    Alcohol use: No    Drug use: Never    Sexual activity: Not Currently   Other Topics Concern    Not on file   Social History Narrative    ** Merged History Encounter **          Social Determinants of Health     Financial Resource Strain: Not on file   Food Insecurity: Not on file   Transportation Needs: Not on file   Physical Activity: Not on file   Stress: Not on file   Social Connections: Not on file   Intimate Partner Violence: Not on file   Housing Stability: Not on file       Family History   Problem Relation Age of Onset    Stroke Mother     Heart Disease Mother     Hypertension Mother     Other Mother         CHOLECYSTECTOMY; OSTEOPENIA    Heart Disease Father     Hypertension Father     Diabetes Father     Arthritis Father     Asthma Daughter      Review of Systems:  Heart: as above   Lungs: as above   Eyes: denies changes in vision or discharge. Ears: denies changes in hearing or pain. Nose: denies epistaxis or masses   Throat: denies sore throat or trouble swallowing. Neuro: denies numbness, tingling, tremors. Skin: denies rashes or itching. : denies hematuria, dysuria   GI: denies vomiting, diarrhea   Psych: denies mood changed, anxiety, depression. all others negative. Physical Exam   BP (!) 114/52   Pulse (!) 113   Resp 18   Ht 5' 4\" (1.626 m)   Wt 128 lb (58.1 kg)   BMI 21.97 kg/m²   Constitutional: Oriented to person, place, and time. Well-developed and well-nourished. No distress. Head: Normocephalic and atraumatic. Eyes: EOM are normal. Pupils are equal, round, and reactive to light. Neck: Normal range of motion. Neck supple. No hepatojugular reflux and no JVD present. Carotid bruit is not present. No tracheal deviation present. No thyromegaly present.    Cardiovascular: Normal rate, regular rhythm, normal heart sounds and intact distal pulses. Exam reveals no gallop and no friction rub. No murmur heard. Pulmonary/Chest: Effort normal and breath sounds normal. No respiratory distress. No wheezes. No rales. No tenderness. Abdominal: Soft. Bowel sounds are normal. No distension and no mass. No tenderness. No rebound and no guarding. Musculoskeletal: Normal range of motion. No edema and no tenderness. Lymphadenopathy:   No cervical adenopathy. No groin adenopathy. Neurological: Alert and oriented to person, place, and time. Skin: Skin is warm and dry. No rash noted. Not diaphoretic. No erythema. Psychiatric: Normal mood and affect. Behavior is normal.     EKG:  Sinus tachycardia, nonspecific ST and T waves changes. Echo Summary 5/28/2017:   Ejection fraction is visually estimated at 70%. No regional wall motion abnormalities seen. There is doppler evidence of stage I diastolic dysfunction. Normal right ventricle structure and function. TAPSE is 2.1 cm. Left atrial volume index of 38 ml per meters squared BSA. Physiologic and/or trace mitral regurgitation is present. Physiologic and/or trace tricuspid regurgitation. RVSP is 34 mmHg. Stress Summary 5/28/2017:  No evidence of stress-induced left ventricular myocardial ischemia. ASSESSMENT AND PLAN:  Patient Active Problem List   Diagnosis    Mixed hyperlipidemia    HTN (hypertension), benign    Asthma    Vitamin B12 deficiency    Pancreatic cyst    Anxiety disorder    Chronic back pain    Tachycardia    Vomiting    Compression fracture of lumbar spine, non-traumatic, initial encounter ([address]    Compression fracture of T12 vertebra ([address]    Failed spinal cord stimulator (HCC)    Cellulitis    Septicemia (HCC)    Failure to thrive in adult     1. SOB/FORD/Edema:     Echo. Try low dose of lasix. 2. Tachycardia: Stable. 3. Lipids: Statin. 4. HTN: Observe. 5. CORRINE: CPAP.     6. Recent ID issues: Per PCP. 7. Anemia: Follow labs. Michael Peng D.O.   Cardiologist  Cardiology, [address] [facility]

## 2023-02-08 LAB
ANION GAP SERPL CALCULATED.3IONS-SCNC: 11 MMOL/L (ref 7–16)
BUN BLDV-MCNC: 8 MG/DL (ref 6–23)
CALCIUM SERPL-MCNC: 7.4 MG/DL (ref 8.6–10.2)
CHLORIDE BLD-SCNC: 101 MMOL/L (ref 98–107)
CO2: 23 MMOL/L (ref 22–29)
CREAT SERPL-MCNC: 0.7 MG/DL (ref 0.5–1)
GFR SERPL CREATININE-BSD FRML MDRD: >60 ML/MIN/1.73
GLUCOSE BLD-MCNC: 53 MG/DL (ref 74–99)
HCT VFR BLD CALC: 24.7 % (ref 34–48)
HEMOGLOBIN: 7.7 G/DL (ref 11.5–15.5)
INFLUENZA A BY PCR: NOT DETECTED
INFLUENZA B BY PCR: NOT DETECTED
MCH RBC QN AUTO: 26.1 PG (ref 26–35)
MCHC RBC AUTO-ENTMCNC: 31.2 % (ref 32–34.5)
MCV RBC AUTO: 83.7 FL (ref 80–99.9)
PDW BLD-RTO: 20.3 FL (ref 11.5–15)
PLATELET # BLD: 323 E9/L (ref 130–450)
PMV BLD AUTO: 11.7 FL (ref 7–12)
POTASSIUM SERPL-SCNC: 3.4 MMOL/L (ref 3.5–5)
RBC # BLD: 2.95 E12/L (ref 3.5–5.5)
SODIUM BLD-SCNC: 135 MMOL/L (ref 132–146)
WBC # BLD: 9 E9/L (ref 4.5–11.5)

## 2023-02-09 ENCOUNTER — OUTSIDE SERVICES (OUTPATIENT)
Dept: PRIMARY CARE CLINIC | Age: 78
End: 2023-02-09

## 2023-02-09 DIAGNOSIS — R62.7 FAILURE TO THRIVE IN ADULT: ICD-10-CM

## 2023-02-09 DIAGNOSIS — R53.1 GENERAL WEAKNESS: ICD-10-CM

## 2023-02-09 DIAGNOSIS — I10 HTN (HYPERTENSION), BENIGN: ICD-10-CM

## 2023-02-09 DIAGNOSIS — F41.9 ANXIETY DISORDER, UNSPECIFIED TYPE: ICD-10-CM

## 2023-02-09 DIAGNOSIS — S22.080D COMPRESSION FRACTURE OF T12 VERTEBRA WITH ROUTINE HEALING, SUBSEQUENT ENCOUNTER: ICD-10-CM

## 2023-02-09 DIAGNOSIS — R26.2 DIFFICULTY IN WALKING: ICD-10-CM

## 2023-02-09 DIAGNOSIS — L03.119 CELLULITIS OF LOWER EXTREMITY, UNSPECIFIED LATERALITY: ICD-10-CM

## 2023-02-09 DIAGNOSIS — T85.192D FAILURE OF SPINAL CORD STIMULATOR, SUBSEQUENT ENCOUNTER: ICD-10-CM

## 2023-02-09 DIAGNOSIS — A41.9 SEPTICEMIA (HCC): Primary | ICD-10-CM

## 2023-02-09 DIAGNOSIS — J45.40 MODERATE PERSISTENT ASTHMA WITHOUT COMPLICATION: ICD-10-CM

## 2023-02-09 LAB
BLOOD CULTURE, ROUTINE: NORMAL
CULTURE, BLOOD 2: NORMAL
HCT VFR BLD CALC: 26 % (ref 34–48)
HEMOGLOBIN: 8.3 G/DL (ref 11.5–15.5)

## 2023-02-13 ENCOUNTER — OUTSIDE SERVICES (OUTPATIENT)
Dept: PRIMARY CARE CLINIC | Age: 78
End: 2023-02-13

## 2023-02-13 DIAGNOSIS — F41.9 ANXIETY DISORDER, UNSPECIFIED TYPE: ICD-10-CM

## 2023-02-13 DIAGNOSIS — R53.1 GENERAL WEAKNESS: ICD-10-CM

## 2023-02-13 DIAGNOSIS — J45.40 MODERATE PERSISTENT ASTHMA WITHOUT COMPLICATION: ICD-10-CM

## 2023-02-13 DIAGNOSIS — I10 HTN (HYPERTENSION), BENIGN: ICD-10-CM

## 2023-02-13 DIAGNOSIS — T85.192D FAILURE OF SPINAL CORD STIMULATOR, SUBSEQUENT ENCOUNTER: ICD-10-CM

## 2023-02-13 DIAGNOSIS — R33.9 URINE RETENTION: ICD-10-CM

## 2023-02-13 DIAGNOSIS — L03.119 CELLULITIS OF LOWER EXTREMITY, UNSPECIFIED LATERALITY: ICD-10-CM

## 2023-02-13 DIAGNOSIS — S22.080D COMPRESSION FRACTURE OF T12 VERTEBRA WITH ROUTINE HEALING, SUBSEQUENT ENCOUNTER: ICD-10-CM

## 2023-02-13 DIAGNOSIS — R62.7 FAILURE TO THRIVE IN ADULT: ICD-10-CM

## 2023-02-13 DIAGNOSIS — A41.9 SEPTICEMIA (HCC): Primary | ICD-10-CM

## 2023-02-13 DIAGNOSIS — R26.2 DIFFICULTY IN WALKING: ICD-10-CM

## 2023-02-13 DIAGNOSIS — M48.56XA COMPRESSION FRACTURE OF LUMBAR SPINE, NON-TRAUMATIC, INITIAL ENCOUNTER (HCC): Primary | ICD-10-CM

## 2023-02-13 LAB
ALBUMIN SERPL-MCNC: 2.5 G/DL (ref 3.5–5.2)
ALP BLD-CCNC: 70 U/L (ref 35–104)
ALT SERPL-CCNC: 6 U/L (ref 0–32)
ANION GAP SERPL CALCULATED.3IONS-SCNC: 10 MMOL/L (ref 7–16)
ANISOCYTOSIS: ABNORMAL
AST SERPL-CCNC: 20 U/L (ref 0–31)
BACTERIA: ABNORMAL /HPF
BASOPHILS ABSOLUTE: 0.02 E9/L (ref 0–0.2)
BASOPHILS RELATIVE PERCENT: 0.2 % (ref 0–2)
BILIRUB SERPL-MCNC: <0.2 MG/DL (ref 0–1.2)
BILIRUBIN URINE: NEGATIVE
BLOOD, URINE: ABNORMAL
BUN BLDV-MCNC: 12 MG/DL (ref 6–23)
BURR CELLS: ABNORMAL
CALCIUM SERPL-MCNC: 7.7 MG/DL (ref 8.6–10.2)
CHLORIDE BLD-SCNC: 106 MMOL/L (ref 98–107)
CLARITY: CLEAR
CO2: 24 MMOL/L (ref 22–29)
COLOR: YELLOW
CREAT SERPL-MCNC: 0.5 MG/DL (ref 0.5–1)
EOSINOPHILS ABSOLUTE: 0.07 E9/L (ref 0.05–0.5)
EOSINOPHILS RELATIVE PERCENT: 0.8 % (ref 0–6)
GFR SERPL CREATININE-BSD FRML MDRD: >60 ML/MIN/1.73
GLUCOSE BLD-MCNC: 64 MG/DL (ref 74–99)
GLUCOSE URINE: NEGATIVE MG/DL
HCT VFR BLD CALC: 27.8 % (ref 34–48)
HEMOGLOBIN: 8.6 G/DL (ref 11.5–15.5)
IMMATURE GRANULOCYTES #: 0.29 E9/L
IMMATURE GRANULOCYTES %: 3.3 % (ref 0–5)
INR BLD: 1.1
KETONES, URINE: NEGATIVE MG/DL
LEUKOCYTE ESTERASE, URINE: ABNORMAL
LYMPHOCYTES ABSOLUTE: 2.4 E9/L (ref 1.5–4)
LYMPHOCYTES RELATIVE PERCENT: 26.9 % (ref 20–42)
MCH RBC QN AUTO: 25.9 PG (ref 26–35)
MCHC RBC AUTO-ENTMCNC: 30.9 % (ref 32–34.5)
MCV RBC AUTO: 83.7 FL (ref 80–99.9)
MONOCYTES ABSOLUTE: 1.17 E9/L (ref 0.1–0.95)
MONOCYTES RELATIVE PERCENT: 13.1 % (ref 2–12)
NEUTROPHILS ABSOLUTE: 4.97 E9/L (ref 1.8–7.3)
NEUTROPHILS RELATIVE PERCENT: 55.7 % (ref 43–80)
NITRITE, URINE: NEGATIVE
OVALOCYTES: ABNORMAL
PDW BLD-RTO: 20.9 FL (ref 11.5–15)
PH UA: 6.5 (ref 5–9)
PLATELET # BLD: 352 E9/L (ref 130–450)
PMV BLD AUTO: 12.7 FL (ref 7–12)
POIKILOCYTES: ABNORMAL
POTASSIUM SERPL-SCNC: 3.7 MMOL/L (ref 3.5–5)
PROTEIN UA: NEGATIVE MG/DL
PROTHROMBIN TIME: 11.8 SEC (ref 9.3–12.4)
RBC # BLD: 3.32 E12/L (ref 3.5–5.5)
RBC UA: ABNORMAL /HPF (ref 0–2)
SODIUM BLD-SCNC: 140 MMOL/L (ref 132–146)
SPECIFIC GRAVITY UA: 1.01 (ref 1–1.03)
TARGET CELLS: ABNORMAL
TOTAL PROTEIN: 4.7 G/DL (ref 6.4–8.3)
UROBILINOGEN, URINE: 0.2 E.U./DL
WBC # BLD: 8.9 E9/L (ref 4.5–11.5)
WBC UA: ABNORMAL /HPF (ref 0–5)

## 2023-02-14 ENCOUNTER — OFFICE VISIT (OUTPATIENT)
Dept: NEUROSURGERY | Age: 78
End: 2023-02-14
Payer: MEDICARE

## 2023-02-14 ENCOUNTER — HOSPITAL ENCOUNTER (OUTPATIENT)
Dept: GENERAL RADIOLOGY | Age: 78
Discharge: HOME | End: 2023-02-16
Payer: MEDICARE

## 2023-02-14 ENCOUNTER — HOSPITAL ENCOUNTER (OUTPATIENT)
Age: 78
Discharge: HOME | End: 2023-02-16
Payer: MEDICARE

## 2023-02-14 VITALS
RESPIRATION RATE: 16 BRPM | WEIGHT: 128 LBS | BODY MASS INDEX: 21.85 KG/M2 | DIASTOLIC BLOOD PRESSURE: 62 MMHG | SYSTOLIC BLOOD PRESSURE: 99 MMHG | HEIGHT: 64 IN | OXYGEN SATURATION: 98 % | TEMPERATURE: 97.1 F | HEART RATE: 98 BPM

## 2023-02-14 DIAGNOSIS — S32.000D COMPRESSION FRACTURE OF LUMBAR VERTEBRA WITH ROUTINE HEALING, UNSPECIFIED LUMBAR VERTEBRAL LEVEL, SUBSEQUENT ENCOUNTER: Primary | ICD-10-CM

## 2023-02-14 DIAGNOSIS — M48.56XA COMPRESSION FRACTURE OF LUMBAR SPINE, NON-TRAUMATIC, INITIAL ENCOUNTER (HCC): ICD-10-CM

## 2023-02-14 PROCEDURE — 99212 OFFICE O/P EST SF 10 MIN: CPT | Performed by: NEUROLOGICAL SURGERY

## 2023-02-14 PROCEDURE — 3078F DIAST BP <80 MM HG: CPT | Performed by: NEUROLOGICAL SURGERY

## 2023-02-14 PROCEDURE — 72100 X-RAY EXAM L-S SPINE 2/3 VWS: CPT

## 2023-02-14 PROCEDURE — 1123F ACP DISCUSS/DSCN MKR DOCD: CPT | Performed by: NEUROLOGICAL SURGERY

## 2023-02-14 PROCEDURE — 99212 OFFICE O/P EST SF 10 MIN: CPT

## 2023-02-14 PROCEDURE — 3074F SYST BP LT 130 MM HG: CPT | Performed by: NEUROLOGICAL SURGERY

## 2023-02-14 NOTE — PROGRESS NOTES
Patient is here for follow up from a hospital consult for: lumbar compression fracture. She is doing okay and the pain is getting better. Physical exam  Alert and Oriented X3  PERRLA, EOMI  HA 5/5  Sensation intact to LT and PP  Reflexes are 2+ and symmetric     A/P: patient is here for follow up for: compression fracture and her x-rays look good. The fractures are healing. D/C TLSO brace. Continue with PT.   Will schedule for SCS battery change    Sp Landaverde MD

## 2023-02-15 LAB — URINE CULTURE, ROUTINE: NORMAL

## 2023-02-16 ENCOUNTER — OUTSIDE SERVICES (OUTPATIENT)
Dept: PRIMARY CARE CLINIC | Age: 78
End: 2023-02-16

## 2023-02-16 DIAGNOSIS — R62.7 FAILURE TO THRIVE IN ADULT: ICD-10-CM

## 2023-02-16 DIAGNOSIS — J45.40 MODERATE PERSISTENT ASTHMA WITHOUT COMPLICATION: ICD-10-CM

## 2023-02-16 DIAGNOSIS — S22.080D COMPRESSION FRACTURE OF T12 VERTEBRA WITH ROUTINE HEALING, SUBSEQUENT ENCOUNTER: ICD-10-CM

## 2023-02-16 DIAGNOSIS — L03.119 CELLULITIS OF LOWER EXTREMITY, UNSPECIFIED LATERALITY: ICD-10-CM

## 2023-02-16 DIAGNOSIS — R53.1 GENERAL WEAKNESS: ICD-10-CM

## 2023-02-16 DIAGNOSIS — R33.9 URINE RETENTION: ICD-10-CM

## 2023-02-16 DIAGNOSIS — A41.9 SEPTICEMIA (HCC): Primary | ICD-10-CM

## 2023-02-16 DIAGNOSIS — R26.2 DIFFICULTY IN WALKING: ICD-10-CM

## 2023-02-16 DIAGNOSIS — F41.9 ANXIETY DISORDER, UNSPECIFIED TYPE: ICD-10-CM

## 2023-02-16 DIAGNOSIS — I10 HTN (HYPERTENSION), BENIGN: ICD-10-CM

## 2023-02-16 DIAGNOSIS — T85.192D FAILURE OF SPINAL CORD STIMULATOR, SUBSEQUENT ENCOUNTER: ICD-10-CM

## 2023-02-20 ENCOUNTER — TELEPHONE (OUTPATIENT)
Dept: OTHER | Facility: CLINIC | Age: 78
End: 2023-02-20

## 2023-02-20 ENCOUNTER — APPOINTMENT (OUTPATIENT)
Dept: GENERAL RADIOLOGY | Age: 78
DRG: 698 | End: 2023-02-20
Payer: MEDICARE

## 2023-02-20 ENCOUNTER — HOSPITAL ENCOUNTER (INPATIENT)
Age: 78
LOS: 8 days | Discharge: HOME HEALTH | DRG: 698 | End: 2023-02-28
Attending: EMERGENCY MEDICINE | Admitting: INTERNAL MEDICINE
Payer: MEDICARE

## 2023-02-20 ENCOUNTER — APPOINTMENT (OUTPATIENT)
Dept: CT IMAGING | Age: 78
DRG: 698 | End: 2023-02-20
Payer: MEDICARE

## 2023-02-20 DIAGNOSIS — A41.9 SEPTICEMIA (HCC): Primary | ICD-10-CM

## 2023-02-20 PROBLEM — R65.21 SEPTIC SHOCK (HCC): Status: ACTIVE | Noted: 2023-02-20

## 2023-02-20 PROBLEM — N30.00 ACUTE CYSTITIS WITHOUT HEMATURIA: Status: ACTIVE | Noted: 2023-02-20

## 2023-02-20 LAB
ALBUMIN SERPL-MCNC: 2.4 G/DL (ref 3.5–5.2)
ALBUMIN SERPL-MCNC: 2.6 G/DL (ref 3.5–5.2)
ALP BLD-CCNC: 107 U/L (ref 35–104)
ALP BLD-CCNC: 95 U/L (ref 35–104)
ALT SERPL-CCNC: 10 U/L (ref 0–32)
ALT SERPL-CCNC: 12 U/L (ref 0–32)
ANION GAP SERPL CALCULATED.3IONS-SCNC: 12 MMOL/L (ref 7–16)
ANION GAP SERPL CALCULATED.3IONS-SCNC: 12 MMOL/L (ref 7–16)
ANISOCYTOSIS: ABNORMAL
AST SERPL-CCNC: 31 U/L (ref 0–31)
AST SERPL-CCNC: 34 U/L (ref 0–31)
BACTERIA: ABNORMAL /HPF
BASOPHILS ABSOLUTE: 0.06 E9/L (ref 0–0.2)
BASOPHILS RELATIVE PERCENT: 0.8 % (ref 0–2)
BILIRUB SERPL-MCNC: 0.4 MG/DL (ref 0–1.2)
BILIRUB SERPL-MCNC: 0.5 MG/DL (ref 0–1.2)
BILIRUBIN URINE: ABNORMAL
BLOOD, URINE: ABNORMAL
BUN BLDV-MCNC: 8 MG/DL (ref 6–23)
BUN BLDV-MCNC: 9 MG/DL (ref 6–23)
BURR CELLS: ABNORMAL
CALCIUM SERPL-MCNC: 7.5 MG/DL (ref 8.6–10.2)
CALCIUM SERPL-MCNC: 8.2 MG/DL (ref 8.6–10.2)
CHLORIDE BLD-SCNC: 100 MMOL/L (ref 98–107)
CHLORIDE BLD-SCNC: 101 MMOL/L (ref 98–107)
CLARITY: ABNORMAL
CO2: 24 MMOL/L (ref 22–29)
CO2: 26 MMOL/L (ref 22–29)
COLOR: YELLOW
CREAT SERPL-MCNC: 0.6 MG/DL (ref 0.5–1)
CREAT SERPL-MCNC: 0.7 MG/DL (ref 0.5–1)
EOSINOPHILS ABSOLUTE: 0.52 E9/L (ref 0.05–0.5)
EOSINOPHILS RELATIVE PERCENT: 6.8 % (ref 0–6)
GFR SERPL CREATININE-BSD FRML MDRD: >60 ML/MIN/1.73
GFR SERPL CREATININE-BSD FRML MDRD: >60 ML/MIN/1.73
GLUCOSE BLD-MCNC: 47 MG/DL (ref 74–99)
GLUCOSE BLD-MCNC: 70 MG/DL (ref 74–99)
GLUCOSE URINE: NEGATIVE MG/DL
HCT VFR BLD CALC: 28.5 % (ref 34–48)
HCT VFR BLD CALC: 31.8 % (ref 34–48)
HEMOGLOBIN: 8.9 G/DL (ref 11.5–15.5)
HEMOGLOBIN: 9.7 G/DL (ref 11.5–15.5)
IMMATURE GRANULOCYTES #: 0.07 E9/L
IMMATURE GRANULOCYTES %: 0.9 % (ref 0–5)
INFLUENZA A BY PCR: NOT DETECTED
INFLUENZA B BY PCR: NOT DETECTED
KETONES, URINE: 15 MG/DL
LACTIC ACID: 1.5 MMOL/L (ref 0.5–2.2)
LEUKOCYTE ESTERASE, URINE: ABNORMAL
LIPASE: 11 U/L (ref 13–60)
LYMPHOCYTES ABSOLUTE: 1.74 E9/L (ref 1.5–4)
LYMPHOCYTES RELATIVE PERCENT: 22.7 % (ref 20–42)
MAGNESIUM: 1.3 MG/DL (ref 1.6–2.6)
MCH RBC QN AUTO: 26.7 PG (ref 26–35)
MCH RBC QN AUTO: 27 PG (ref 26–35)
MCHC RBC AUTO-ENTMCNC: 30.5 % (ref 32–34.5)
MCHC RBC AUTO-ENTMCNC: 31.2 % (ref 32–34.5)
MCV RBC AUTO: 86.4 FL (ref 80–99.9)
MCV RBC AUTO: 87.6 FL (ref 80–99.9)
METER GLUCOSE: 98 MG/DL (ref 74–99)
MONOCYTES ABSOLUTE: 1.64 E9/L (ref 0.1–0.95)
MONOCYTES RELATIVE PERCENT: 21.4 % (ref 2–12)
NEUTROPHILS ABSOLUTE: 3.64 E9/L (ref 1.8–7.3)
NEUTROPHILS RELATIVE PERCENT: 47.4 % (ref 43–80)
NITRITE, URINE: NEGATIVE
OVALOCYTES: ABNORMAL
PDW BLD-RTO: 21.4 FL (ref 11.5–15)
PDW BLD-RTO: 21.8 FL (ref 11.5–15)
PH UA: 7 (ref 5–9)
PLATELET # BLD: 326 E9/L (ref 130–450)
PLATELET # BLD: 326 E9/L (ref 130–450)
PMV BLD AUTO: 11.9 FL (ref 7–12)
PMV BLD AUTO: 12.2 FL (ref 7–12)
POIKILOCYTES: ABNORMAL
POLYCHROMASIA: ABNORMAL
POTASSIUM REFLEX MAGNESIUM: 2.7 MMOL/L (ref 3.5–5)
POTASSIUM SERPL-SCNC: 3.4 MMOL/L (ref 3.5–5)
PROTEIN UA: 100 MG/DL
RBC # BLD: 3.3 E12/L (ref 3.5–5.5)
RBC # BLD: 3.63 E12/L (ref 3.5–5.5)
RBC UA: ABNORMAL /HPF (ref 0–2)
SARS-COV-2, NAAT: NOT DETECTED
SCHISTOCYTES: ABNORMAL
SODIUM BLD-SCNC: 137 MMOL/L (ref 132–146)
SODIUM BLD-SCNC: 138 MMOL/L (ref 132–146)
SPECIFIC GRAVITY UA: 1.01 (ref 1–1.03)
TARGET CELLS: ABNORMAL
TOTAL PROTEIN: 4.9 G/DL (ref 6.4–8.3)
TOTAL PROTEIN: 5.3 G/DL (ref 6.4–8.3)
TROPONIN, HIGH SENSITIVITY: 68 NG/L (ref 0–9)
TROPONIN, HIGH SENSITIVITY: 73 NG/L (ref 0–9)
UROBILINOGEN, URINE: 1 E.U./DL
WBC # BLD: 10.3 E9/L (ref 4.5–11.5)
WBC # BLD: 7.7 E9/L (ref 4.5–11.5)
WBC UA: ABNORMAL /HPF (ref 0–5)

## 2023-02-20 PROCEDURE — 96375 TX/PRO/DX INJ NEW DRUG ADDON: CPT

## 2023-02-20 PROCEDURE — 80053 COMPREHEN METABOLIC PANEL: CPT

## 2023-02-20 PROCEDURE — 83690 ASSAY OF LIPASE: CPT

## 2023-02-20 PROCEDURE — 85025 COMPLETE CBC W/AUTO DIFF WBC: CPT

## 2023-02-20 PROCEDURE — 99285 EMERGENCY DEPT VISIT HI MDM: CPT

## 2023-02-20 PROCEDURE — 93005 ELECTROCARDIOGRAM TRACING: CPT

## 2023-02-20 PROCEDURE — 87635 SARS-COV-2 COVID-19 AMP PRB: CPT

## 2023-02-20 PROCEDURE — 99222 1ST HOSP IP/OBS MODERATE 55: CPT | Performed by: INTERNAL MEDICINE

## 2023-02-20 PROCEDURE — 2000000000 HC ICU R&B

## 2023-02-20 PROCEDURE — 81001 URINALYSIS AUTO W/SCOPE: CPT

## 2023-02-20 PROCEDURE — 96366 THER/PROPH/DIAG IV INF ADDON: CPT

## 2023-02-20 PROCEDURE — 74176 CT ABD & PELVIS W/O CONTRAST: CPT

## 2023-02-20 PROCEDURE — 36415 COLL VENOUS BLD VENIPUNCTURE: CPT

## 2023-02-20 PROCEDURE — 36556 INSERT NON-TUNNEL CV CATH: CPT

## 2023-02-20 PROCEDURE — 87088 URINE BACTERIA CULTURE: CPT

## 2023-02-20 PROCEDURE — 6370000000 HC RX 637 (ALT 250 FOR IP): Performed by: EMERGENCY MEDICINE

## 2023-02-20 PROCEDURE — 6360000002 HC RX W HCPCS

## 2023-02-20 PROCEDURE — 83605 ASSAY OF LACTIC ACID: CPT

## 2023-02-20 PROCEDURE — 2580000003 HC RX 258: Performed by: EMERGENCY MEDICINE

## 2023-02-20 PROCEDURE — 71045 X-RAY EXAM CHEST 1 VIEW: CPT

## 2023-02-20 PROCEDURE — 96367 TX/PROPH/DG ADDL SEQ IV INF: CPT

## 2023-02-20 PROCEDURE — 96365 THER/PROPH/DIAG IV INF INIT: CPT

## 2023-02-20 PROCEDURE — 96360 HYDRATION IV INFUSION INIT: CPT

## 2023-02-20 PROCEDURE — 87106 FUNGI IDENTIFICATION YEAST: CPT

## 2023-02-20 PROCEDURE — 87502 INFLUENZA DNA AMP PROBE: CPT

## 2023-02-20 PROCEDURE — 2580000003 HC RX 258

## 2023-02-20 PROCEDURE — 51702 INSERT TEMP BLADDER CATH: CPT

## 2023-02-20 PROCEDURE — 84484 ASSAY OF TROPONIN QUANT: CPT

## 2023-02-20 PROCEDURE — 6360000002 HC RX W HCPCS: Performed by: EMERGENCY MEDICINE

## 2023-02-20 PROCEDURE — 71250 CT THORAX DX C-: CPT

## 2023-02-20 PROCEDURE — 83735 ASSAY OF MAGNESIUM: CPT

## 2023-02-20 PROCEDURE — 82962 GLUCOSE BLOOD TEST: CPT

## 2023-02-20 PROCEDURE — 0202U NFCT DS 22 TRGT SARS-COV-2: CPT

## 2023-02-20 PROCEDURE — 87040 BLOOD CULTURE FOR BACTERIA: CPT

## 2023-02-20 RX ORDER — POTASSIUM CHLORIDE 20 MEQ/1
40 TABLET, EXTENDED RELEASE ORAL ONCE
Status: COMPLETED | OUTPATIENT
Start: 2023-02-20 | End: 2023-02-20

## 2023-02-20 RX ORDER — ACETAMINOPHEN 325 MG/1
650 TABLET ORAL EVERY 4 HOURS PRN
Status: DISCONTINUED | OUTPATIENT
Start: 2023-02-20 | End: 2023-02-28 | Stop reason: HOSPADM

## 2023-02-20 RX ORDER — ACETAMINOPHEN 325 MG/1
650 TABLET ORAL EVERY 6 HOURS PRN
Status: CANCELLED | OUTPATIENT
Start: 2023-02-20

## 2023-02-20 RX ORDER — FAMOTIDINE 20 MG/1
20 TABLET, FILM COATED ORAL 2 TIMES DAILY
Status: DISCONTINUED | OUTPATIENT
Start: 2023-02-21 | End: 2023-02-22

## 2023-02-20 RX ORDER — ATORVASTATIN CALCIUM 10 MG/1
10 TABLET, FILM COATED ORAL NIGHTLY
Status: DISCONTINUED | OUTPATIENT
Start: 2023-02-21 | End: 2023-02-28 | Stop reason: HOSPADM

## 2023-02-20 RX ORDER — POLYETHYLENE GLYCOL 3350 17 G/17G
17 POWDER, FOR SOLUTION ORAL DAILY
Status: DISCONTINUED | OUTPATIENT
Start: 2023-02-21 | End: 2023-02-22

## 2023-02-20 RX ORDER — SENNA PLUS 8.6 MG/1
1 TABLET ORAL NIGHTLY
Status: CANCELLED | OUTPATIENT
Start: 2023-02-21

## 2023-02-20 RX ORDER — ASPIRIN 81 MG/1
81 TABLET ORAL DAILY
Status: DISCONTINUED | OUTPATIENT
Start: 2023-02-21 | End: 2023-02-28 | Stop reason: HOSPADM

## 2023-02-20 RX ORDER — ACETAMINOPHEN 500 MG
1000 TABLET ORAL ONCE
Status: DISCONTINUED | OUTPATIENT
Start: 2023-02-20 | End: 2023-02-21

## 2023-02-20 RX ORDER — MAGNESIUM SULFATE IN WATER 40 MG/ML
2000 INJECTION, SOLUTION INTRAVENOUS ONCE
Status: COMPLETED | OUTPATIENT
Start: 2023-02-20 | End: 2023-02-20

## 2023-02-20 RX ORDER — ACETAMINOPHEN 650 MG/1
650 SUPPOSITORY RECTAL EVERY 6 HOURS PRN
Status: CANCELLED | OUTPATIENT
Start: 2023-02-20

## 2023-02-20 RX ORDER — LEVALBUTEROL INHALATION SOLUTION 0.63 MG/3ML
0.63 SOLUTION RESPIRATORY (INHALATION) EVERY 8 HOURS PRN
Status: DISCONTINUED | OUTPATIENT
Start: 2023-02-20 | End: 2023-02-24

## 2023-02-20 RX ORDER — MONTELUKAST SODIUM 10 MG/1
10 TABLET ORAL DAILY
Status: DISCONTINUED | OUTPATIENT
Start: 2023-02-21 | End: 2023-02-28 | Stop reason: HOSPADM

## 2023-02-20 RX ORDER — 0.9 % SODIUM CHLORIDE 0.9 %
30 INTRAVENOUS SOLUTION INTRAVENOUS ONCE
Status: COMPLETED | OUTPATIENT
Start: 2023-02-20 | End: 2023-02-20

## 2023-02-20 RX ORDER — ESCITALOPRAM OXALATE 10 MG/1
10 TABLET ORAL NIGHTLY
Status: DISCONTINUED | OUTPATIENT
Start: 2023-02-21 | End: 2023-02-28 | Stop reason: HOSPADM

## 2023-02-20 RX ORDER — LORAZEPAM 1 MG/1
2 TABLET ORAL 3 TIMES DAILY
Status: DISCONTINUED | OUTPATIENT
Start: 2023-02-21 | End: 2023-02-21

## 2023-02-20 RX ORDER — DOCUSATE SODIUM 100 MG/1
100 CAPSULE, LIQUID FILLED ORAL 2 TIMES DAILY PRN
Status: DISCONTINUED | OUTPATIENT
Start: 2023-02-20 | End: 2023-02-21

## 2023-02-20 RX ORDER — 0.9 % SODIUM CHLORIDE 0.9 %
1000 INTRAVENOUS SOLUTION INTRAVENOUS ONCE
Status: COMPLETED | OUTPATIENT
Start: 2023-02-20 | End: 2023-02-21

## 2023-02-20 RX ORDER — DEXTROSE MONOHYDRATE 100 MG/ML
INJECTION, SOLUTION INTRAVENOUS CONTINUOUS
Status: DISCONTINUED | OUTPATIENT
Start: 2023-02-20 | End: 2023-02-21

## 2023-02-20 RX ORDER — CODEINE PHOSPHATE AND GUAIFENESIN 10; 100 MG/5ML; MG/5ML
5 SOLUTION ORAL 3 TIMES DAILY PRN
Status: DISCONTINUED | OUTPATIENT
Start: 2023-02-20 | End: 2023-02-24

## 2023-02-20 RX ORDER — FENTANYL CITRATE 50 UG/ML
25 INJECTION, SOLUTION INTRAMUSCULAR; INTRAVENOUS
Status: DISCONTINUED | OUTPATIENT
Start: 2023-02-20 | End: 2023-02-21

## 2023-02-20 RX ORDER — POTASSIUM CHLORIDE 7.45 MG/ML
10 INJECTION INTRAVENOUS
Status: DISPENSED | OUTPATIENT
Start: 2023-02-20 | End: 2023-02-21

## 2023-02-20 RX ADMIN — MAGNESIUM SULFATE HEPTAHYDRATE 2000 MG: 40 INJECTION, SOLUTION INTRAVENOUS at 20:51

## 2023-02-20 RX ADMIN — POTASSIUM CHLORIDE 10 MEQ: 7.46 INJECTION, SOLUTION INTRAVENOUS at 22:39

## 2023-02-20 RX ADMIN — POTASSIUM CHLORIDE 40 MEQ: 1500 TABLET, EXTENDED RELEASE ORAL at 20:50

## 2023-02-20 RX ADMIN — DEXTROSE MONOHYDRATE: 100 INJECTION, SOLUTION INTRAVENOUS at 19:00

## 2023-02-20 RX ADMIN — SODIUM CHLORIDE 1000 ML: 9 INJECTION, SOLUTION INTRAVENOUS at 20:40

## 2023-02-20 RX ADMIN — PIPERACILLIN AND TAZOBACTAM 4500 MG: 4; .5 INJECTION, POWDER, FOR SOLUTION INTRAVENOUS at 19:07

## 2023-02-20 RX ADMIN — SODIUM CHLORIDE 1743 ML: 9 INJECTION, SOLUTION INTRAVENOUS at 17:44

## 2023-02-20 ASSESSMENT — PAIN SCALES - GENERAL: PAINLEVEL_OUTOF10: 0

## 2023-02-20 ASSESSMENT — LIFESTYLE VARIABLES
HOW OFTEN DO YOU HAVE A DRINK CONTAINING ALCOHOL: NEVER
HOW MANY STANDARD DRINKS CONTAINING ALCOHOL DO YOU HAVE ON A TYPICAL DAY: PATIENT DOES NOT DRINK

## 2023-02-20 ASSESSMENT — ENCOUNTER SYMPTOMS
SORE THROAT: 0
SHORTNESS OF BREATH: 0
DIARRHEA: 0
ABDOMINAL PAIN: 0
TROUBLE SWALLOWING: 0
NAUSEA: 1
COUGH: 0
COLOR CHANGE: 0
CONSTIPATION: 0
VOMITING: 0

## 2023-02-20 ASSESSMENT — PAIN - FUNCTIONAL ASSESSMENT
PAIN_FUNCTIONAL_ASSESSMENT: NONE - DENIES PAIN
PAIN_FUNCTIONAL_ASSESSMENT: NONE - DENIES PAIN
PAIN_FUNCTIONAL_ASSESSMENT: 0-10
PAIN_FUNCTIONAL_ASSESSMENT: NONE - DENIES PAIN
PAIN_FUNCTIONAL_ASSESSMENT: NONE - DENIES PAIN

## 2023-02-20 NOTE — ED PROVIDER NOTES
[facility] 2W ICU  EMERGENCY DEPARTMENT ENCOUNTER        Pt Name: Haritha Dominguez  MRN: [id number]  Birthdate 1945  Date of evaluation: 2/20/2023  Provider: Keli Fowler MD  PCP: Bryan Sepulveda MD  Note Started: 5:10 PM EST 2/20/23    CHIEF COMPLAINT       Chief Complaint   Patient presents with    Fever     Sent from [facility] for nausea/vomiting.   EMS reports the PT was hypotensive at 80/45 with a temp of 102F.    Nausea    Emesis    Cough       HISTORY OF PRESENT ILLNESS: 1 or more Elements     History from : Patient and EMS    Limitations to history : None    Haritha Dominguez is a 78 y.o. female who presents from [facility], [facility] due to hypotension and fever. She states that she has not been feeling well since discharge from the hospital on 2/4/23. She was admitted for sepsis secondary to cellulitis and was discharged on Doxycycline and Keflex. Urology followed for PVR and patient was placed on Brewer and continues to have Brewer. Patient states that she has continued to have these antibiotics however after they did cause GI issues such as diarrhea. Has had poor appetite since discharge. Does state that she feels warms. Denies sweats, chest pain, SOB, abdominal pain, nausea.     Nursing Notes were all reviewed and agreed with or any disagreements were addressed in the HPI.    REVIEW OF SYSTEMS :      Review of Systems   Constitutional:  Positive for activity change, appetite change, fatigue and fever.   HENT:  Negative for sore throat and trouble swallowing.    Respiratory:  Negative for cough and shortness of breath.    Cardiovascular:  Negative for chest pain, palpitations and leg swelling.   Gastrointestinal:  Positive for nausea. Negative for abdominal pain, constipation, diarrhea and vomiting.   Genitourinary:  Negative for difficulty urinating and dysuria.   Musculoskeletal:  Negative for arthralgias and myalgias.   Skin:  Negative for color change and rash.   Neurological:  Negative for  dizziness and headaches. Psychiatric/Behavioral:  Negative for confusion and decreased concentration. Positives and Pertinent negatives as per HPI. SURGICAL HISTORY     Past Surgical History:   Procedure Laterality Date    BACK SURGERY  8/2014    has screws in back    CATARACT REMOVAL  12/6/2013, 2/20/2013    [facility] with lens implants    CHOLECYSTECTOMY      JOINT REPLACEMENT  12/16/2016    SI joint fusion Right    OTHER SURGICAL HISTORY  02/09/2017    MRI -     SPINAL FIXATION SURGERY WITH IMPLANT  2013    in [address] Right     UPPER GASTROINTESTINAL ENDOSCOPY  07/10/2017       [address]       Current Discharge Medication List        CONTINUE these medications which have NOT CHANGED    Details   doxycycline hyclate (VIBRAMYCIN) 100 MG capsule Take 100 mg by mouth 2 times daily      guaiFENesin-codeine (TUSSI-ORGANIDIN NR) 100-10 MG/5ML syrup Take 5 mLs by mouth 3 times daily as needed for Cough. acetaminophen (TYLENOL 8 HOUR) 650 MG extended release tablet Take 650 mg by mouth as needed for Pain      furosemide (LASIX) 20 MG tablet Take 1 tablet by mouth daily  Qty: 90 tablet, Refills: 3      potassium chloride (KLOR-CON M) 10 MEQ extended release tablet Take 1 tablet by mouth daily  Qty: 90 tablet, Refills: 3      dilTIAZem (CARDIZEM CD) 120 MG extended release capsule Take 1 capsule by mouth in the morning and at bedtime  Qty: 30 capsule, Refills: 3      polyethylene glycol (GLYCOLAX) 17 g packet Take 17 g by mouth daily  Qty: 527 g, Refills: 0      white petrolatum OINT ointment Apply topically 2 times daily  Refills: 0      camphor-menthol (SARNA) 0.5-0.5 % lotion Apply topically as needed.   Refills: 4      docusate sodium (COLACE, DULCOLAX) 100 MG CAPS Take 100 mg by mouth 2 times daily as needed for Constipation      fluticasone (FLONASE) 50 MCG/ACT nasal spray USE 1 SPRAY IN EACH NOSTRIL DAILY  Qty: 16 g, Refills: 5      Fluticasone furoate-vilanterol (BREO ELLIPTA) 200-25 MCG/INH AEPB inhaler Inhale 1 puff into the lungs daily  Qty: 180 each, Refills: 5    Associated Diagnoses: Moderate persistent asthma without complication      montelukast (SINGULAIR) 10 MG tablet TAKE 1 TABLET DAILY  Qty: 90 tablet, Refills: 3    Associated Diagnoses: Seasonal allergies      albuterol sulfate HFA (VENTOLIN HFA) 108 (90 Base) MCG/ACT inhaler Inhale 2 puffs into the lungs every 6 hours as needed for Wheezing  Qty: 18 g, Refills: 3      vitamin C (ASCORBIC ACID) 500 MG tablet Take 1 tablet by mouth daily  Qty: 30 tablet, Refills: 3      LORazepam (ATIVAN) 2 MG tablet Take 2 mg by mouth 3 times daily. Marian Rodriguez aspirin 81 MG tablet Take 81 mg by mouth daily Pt to hold 5 days prior per Dr. Juanito Lake      escitalopram (LEXAPRO) 10 MG tablet Take 10 mg by mouth nightly     Associated Diagnoses: Tachycardia      famotidine (PEPCID) 20 MG tablet Take 20 mg by mouth 2 times daily Instructed to take am of procedure    Associated Diagnoses: HTN (hypertension); Tachycardia;  Asthma; SOB (shortness of breath)      atorvastatin (LIPITOR) 10 MG tablet Take 10 mg by mouth nightly              ALLERGIES     Percocet [oxycodone-acetaminophen]    FAMILYHISTORY       Family History   Problem Relation Age of Onset    Stroke Mother     Heart Disease Mother     Hypertension Mother     Other Mother         CHOLECYSTECTOMY; OSTEOPENIA    Heart Disease Father     Hypertension Father     Diabetes Father     Arthritis Father     Asthma Daughter         SOCIAL HISTORY       Social History     Tobacco Use    Smoking status: Never    Smokeless tobacco: Never   Vaping Use    Vaping Use: Never used   Substance Use Topics    Alcohol use: No    Drug use: Never       SCREENINGS        Mary Grace Coma Scale  Eye Opening: Spontaneous  Best Verbal Response: Oriented  Best Motor Response: Obeys commands  Mary Grace Coma Scale Score: 15                CIWA Assessment  BP: (!) 83/46  Heart Rate: 98 PHYSICAL EXAM  1 or more Elements     ED Triage Vitals   BP Temp Temp src Pulse Resp SpO2 Height Weight   -- -- -- -- -- -- -- --       Physical Exam  Constitutional:       Appearance: Normal appearance. She is ill-appearing. HENT:      Head: Normocephalic and atraumatic. Mouth/Throat:      Mouth: Mucous membranes are moist.   Eyes:      Extraocular Movements: Extraocular movements intact. Conjunctiva/sclera: Conjunctivae normal.   Cardiovascular:      Rate and Rhythm: Regular rhythm. Tachycardia present. Pulses: Normal pulses. Heart sounds: Normal heart sounds. No murmur heard. Pulmonary:      Effort: Pulmonary effort is normal.      Breath sounds: No stridor. No wheezing. Abdominal:      General: Abdomen is flat. Bowel sounds are normal.      Palpations: Abdomen is soft. Tenderness: There is no abdominal tenderness. Musculoskeletal:         General: No swelling. Normal range of motion. Cervical back: Normal range of motion and neck supple. Skin:     General: Skin is warm and dry. Comments: Well healing lesions at B/L LE. Dry skin. Neurological:      General: No focal deficit present. Mental Status: She is alert and oriented to person, place, and time.    Psychiatric:         Mood and Affect: Mood normal.         Behavior: Behavior normal.       DIAGNOSTIC RESULTS   LABS:    Labs Reviewed   CBC WITH AUTO DIFFERENTIAL - Abnormal; Notable for the following components:       Result Value    RBC 3.30 (*)     Hemoglobin 8.9 (*)     Hematocrit 28.5 (*)     MCHC 31.2 (*)     RDW 21.4 (*)     Monocytes % 21.4 (*)     Eosinophils % 6.8 (*)     Monocytes Absolute 1.64 (*)     Eosinophils Absolute 0.52 (*)     All other components within normal limits   COMPREHENSIVE METABOLIC PANEL W/ REFLEX TO MG FOR LOW K - Abnormal; Notable for the following components:    Potassium reflex Magnesium 2.7 (*)     Glucose 47 (*)     Calcium 7.5 (*)     Total Protein 4.9 (*)     Albumin 2.4 (*)     All other components within normal limits    Narrative:     Varun Agudelo tel. [phone],  [facility] Chemistry results called to and read back by Edwina Haq RN,  02/20/2023 18:42, by MECHELLE   LIPASE - Abnormal; Notable for the following components:    Lipase 11 (*)     All other components within normal limits    Narrative:     Varun Agudelo tel. [phone],  [facility] Chemistry results called to and read back by Edwina Haq RN,  02/20/2023 18:42, by MECHELLE   TROPONIN - Abnormal; Notable for the following components:    Troponin, High Sensitivity 68 (*)     All other components within normal limits   URINALYSIS WITH MICROSCOPIC - Abnormal; Notable for the following components:    Bilirubin Urine SMALL (*)     Ketones, Urine 15 (*)     Blood, Urine LARGE (*)     Protein,  (*)     Leukocyte Esterase, Urine MODERATE (*)     WBC, UA 10-20 (*)     RBC, UA 10-20 (*)     Bacteria, UA FEW (*)     All other components within normal limits   MAGNESIUM - Abnormal; Notable for the following components:    Magnesium 1.3 (*)     All other components within normal limits    Narrative:     CONRADO LOPES tel. [phone],  C[facility] Chemistry results called to and read back by Edwina Haq RN,  02/20/2023 18:42, by MECHELLE   TROPONIN - Abnormal; Notable for the following components:    Troponin, High Sensitivity 73 (*)     All other components within normal limits   CBC WITH AUTO DIFFERENTIAL - Abnormal; Notable for the following components:    RBC 3.15 (*)     Hemoglobin 8.4 (*)     Hematocrit 27.2 (*)     MCHC 30.9 (*)     RDW 21.5 (*)     Neutrophils % 34.9 (*)     Monocytes % 24.9 (*)     Eosinophils % 10.7 (*)     Monocytes Absolute 1.66 (*)     Eosinophils Absolute 0.71 (*)     All other components within normal limits   COMPREHENSIVE METABOLIC PANEL W/ REFLEX TO MG FOR LOW K - Abnormal; Notable for the following components:    CO2 21 (*)     Calcium 6.7 (*)     Total Protein 4.8 (*)     Albumin 2.2 (*) All other components within normal limits   PROCALCITONIN - Abnormal; Notable for the following components:    Procalcitonin 3.20 (*)     All other components within normal limits   COMPREHENSIVE METABOLIC PANEL - Abnormal; Notable for the following components:    BUN 5 (*)     Calcium 6.9 (*)     Total Protein 4.3 (*)     Albumin 2.1 (*)     All other components within normal limits   CBC WITH AUTO DIFFERENTIAL - Abnormal; Notable for the following components:    RBC 2.97 (*)     Hemoglobin 7.8 (*)     Hematocrit 25.4 (*)     MCHC 30.7 (*)     RDW 21.5 (*)     Neutrophils % 37.3 (*)     Monocytes % 21.5 (*)     Eosinophils % 13.3 (*)     Monocytes Absolute 1.38 (*)     Eosinophils Absolute 0.85 (*)     All other components within normal limits   C-REACTIVE PROTEIN - Abnormal; Notable for the following components:    CRP 12.8 (*)     All other components within normal limits   SEDIMENTATION RATE - Abnormal; Notable for the following components:    Sed Rate 26 (*)     All other components within normal limits   CORTISOL TOTAL - Abnormal; Notable for the following components:    Cortisol 2.31 (*)     All other components within normal limits   POCT GLUCOSE - Abnormal; Notable for the following components:    Meter Glucose 61 (*)     All other components within normal limits   POCT GLUCOSE - Abnormal; Notable for the following components:    Meter Glucose 73 (*)     All other components within normal limits   POCT GLUCOSE - Abnormal; Notable for the following components:    Meter Glucose 103 (*)     All other components within normal limits   COVID-19, RAPID   RAPID INFLUENZA A/B ANTIGENS   RESPIRATORY PANEL, MOLECULAR, WITH COVID-19   CULTURE, BLOOD 2   CULTURE, BLOOD 1   CULTURE, URINE   CULTURE, MRSA, SCREENING   LACTIC ACID   MAGNESIUM   PHOSPHORUS   LACTIC ACID   MAGNESIUM   PHOSPHORUS   CORTISOL TOTAL   POCT GLUCOSE   POCT GLUCOSE   POCT GLUCOSE   POCT GLUCOSE   POCT GLUCOSE   POCT GLUCOSE   POCT GLUCOSE   POCT GLUCOSE       When ordered only abnormal lab results are displayed. All other labs were within normal range or not returned as of this dictation. RADIOLOGY:   Non-plain film images such as CT, Ultrasound and MRI are read by the radiologist. Plain radiographic images are visualized and preliminarily interpreted by the ED Provider with the below findings:    Interpretation per the Radiologist below, if available at the time of this note:    CT ABDOMEN PELVIS WO CONTRAST Additional Contrast? None   Final Result   No urinary tract stones or hydronephrosis. Brewer catheter in a decompressed   urinary bladder. Diffuse colonic fecal retention. Cholecystectomy. CT CHEST WO CONTRAST   Final Result   Small left pleural effusion with adjacent atelectasis. No evidence for pneumonia. Hiatal hernia. XR CHEST PORTABLE   Final Result   Moderate sized hiatus hernia. Mild bronchovascular prominence, this could   represent subsegmental atelectatic changes or aspiration pneumonia. XR LUMBAR SPINE (2-3 VIEWS)    Result Date: 2/15/2023  EXAMINATION: TWO XRAY VIEWS OF THE LUMBAR SPINE 2/14/2023 11:37 am COMPARISON: Lumbar spine x-ray 12/27/2022 and CT lumbar spine 12/08/2022 HISTORY: ORDERING SYSTEM PROVIDED HISTORY: Compression fracture of lumbar spine, non-traumatic, initial encounter [facility]) TECHNOLOGIST PROVIDED HISTORY: What reading provider will be dictating this exam?->CRC FINDINGS: No significant change from prior. Generalized bony demineralization and fine bony detail is partly limited in this regard. Mild lumbar levoscoliosis. L3-4 through L5-S1 posterior surgical fusion utilizing pedicle screws and connecting rods. Posterolateral bony fusion and Interbody fusion implants of the same levels. Single surgical screw arthrodesis of the right SI joint. The left SI joint is not widened. Fixation hardware appears intact and interbody implants show no migration.  Bony sclerosis and moderate compression deformity of the L1 vertebra. Additional mild compression deformity of the T12 inferior endplate. L4 level vertebroplasty. No acute fracture seen. Marked narrowing of the L2-3 disc space. Metallic generator at the right buttock and dorsal column neurostimulator electrodes in place. Brewer catheter in place. Postsurgical and chronic changes, as above. No significant change from prior. No acute disease. No results found. PROCEDURES   Unless otherwise noted below, none     Procedures    CRITICAL CARE TIME   See attending note. PAST MEDICAL HISTORY      has a past medical history of Anxiety, Arthritis, Asthma, Claustrophobia, Dermatitis (02/2017), Fracture (02/2017), GERD (gastroesophageal reflux disease), Hiatal hernia, History of blood transfusion, HTN (hypertension) (4/22/2013), Hyperlipidemia, On aspirin at home, Pancreatic cyst (5/27/2017), Pneumonia (07/2018), Sleep apnea, SOB (shortness of breath) (4/22/2013), and Tachycardia (04/22/2013).      EMERGENCY DEPARTMENT COURSE and DIFFERENTIAL DIAGNOSIS/MDM:   Vitals:    Vitals:    02/21/23 1430 02/21/23 1500 02/21/23 1545 02/21/23 1550   BP: 110/81 (!) 83/46     Pulse: (!) 107 98     Resp: 19 27     Temp: (!) 100.6 °F (38.1 °C)      TempSrc: Bladder      SpO2: 94% 94% (!) 86% 97%   Weight:       Height:           Patient was given the following medications:  Medications   potassium chloride 10 mEq/100 mL IVPB (Peripheral Line) ( IntraVENous Rate/Dose Verify 2/21/23 0295)   sodium chloride flush 0.9 % injection 5-40 mL (10 mLs IntraVENous Given 2/21/23 2764)   sodium chloride flush 0.9 % injection 5-40 mL (has no administration in time range)   0.9 % sodium chloride infusion (has no administration in time range)   enoxaparin (LOVENOX) injection 40 mg (40 mg SubCUTAneous Given 2/21/23 2830)   polyethylene glycol (GLYCOLAX) packet 17 g (has no administration in time range)   acetaminophen (TYLENOL) tablet 650 mg (650 mg Oral Given 2/21/23 1430)   aspirin EC tablet 81 mg (81 mg Oral Given 2/21/23 0933)   atorvastatin (LIPITOR) tablet 10 mg (0 mg Oral Held 2/21/23 0145)   escitalopram (LEXAPRO) tablet 10 mg (0 mg Oral Held 2/21/23 0145)   famotidine (PEPCID) tablet 20 mg (20 mg Oral Given 2/21/23 0932)   polyethylene glycol (GLYCOLAX) packet 17 g (17 g Oral Not Given 2/21/23 1106)   montelukast (SINGULAIR) tablet 10 mg (10 mg Oral Given 2/21/23 0932)   guaiFENesin-codeine (GUAIFENESIN AC) 100-10 MG/5ML liquid 5 mL (5 mLs Oral Given 2/21/23 1052)   levalbuterol (XOPENEX) nebulization 0.63 mg (has no administration in time range)   thiamine (B-1) injection 100 mg (0 mg IntraVENous Held 2/21/23 0145)   sennosides-docusate sodium (SENOKOT-S) 8.6-50 MG tablet 1 tablet (1 tablet Oral Not Given 2/21/23 1106)   dextrose 5 % in lactated ringers infusion ( IntraVENous New Bag 2/21/23 1308)   potassium chloride 20 mEq/50 mL IVPB (Central Line) (has no administration in time range)   trimethobenzamide (TIGAN) injection 200 mg (has no administration in time range)   piperacillin-tazobactam (ZOSYN) 4,500 mg in sodium chloride 0.9 % 100 mL IVPB (vial-mate) (0 mg IntraVENous Stopped 2/21/23 1649)   midodrine (PROAMATINE) tablet 10 mg (10 mg Oral Given 2/21/23 1617)   0.9 % sodium chloride bolus (has no administration in time range)   miconazole nitrate 2 % ointment ( Topical Given 2/21/23 1434)   miconazole (MICOTIN) 2 % powder ( Topical Given 2/21/23 1434)   LORazepam (ATIVAN) tablet 1 mg (has no administration in time range)   0.9 % sodium chloride bolus (0 mLs IntraVENous Stopped 2/20/23 1851)   potassium chloride (KLOR-CON M) extended release tablet 40 mEq (40 mEq Oral Given 2/20/23 2050)   0.9 % sodium chloride bolus (0 mLs IntraVENous Stopped 2/21/23 0123)   magnesium sulfate 2000 mg in 50 mL IVPB premix (0 mg IntraVENous Stopped 2/20/23 2204)   vancomycin 1000 mg IVPB in 250 mL NS addavial (0 mg IntraVENous Stopped 2/21/23 0547)   magnesium sulfate 2000 mg in 50 mL IVPB premix (0 mg IntraVENous Stopped 2/21/23 0412)   0.9 % sodium chloride bolus (0 mLs IntraVENous Stopped 2/21/23 1206)   cosyntropin (CORTROSYN) injection 250 mcg (250 mcg IntraVENous Given 2/21/23 1207)   0.9 % sodium chloride bolus (0 mLs IntraVENous Stopped 2/21/23 1649)   hydrocortisone sodium succinate PF (SOLU-CORTEF) injection 100 mg (100 mg IntraVENous Given 2/21/23 1617)     ED Course as of 02/21/23 1725   Mon Feb 20, 2023 2220 PROCEDURE  2/20/23       Time: 2200    CENTRAL LINE INSERTION  Risks, benefits and alternatives (for applicable procedures below) described. Performed By: Baltazar Smith DO and EM Attending Physician. Indication: centrally administered medications. Informed consent: Verbal consent obtained. The patient was counseled regarding the procedure in person, it's indications, risks, potential complications and alternatives and any questions were answered. Verbal consent was obtained. Procedure: After routine sterile preparation, local anesthesia obtained by infiltration using 1% Lidocaine without epinephrine. A right 3-Lumen 7F Central Venous Catheter was placed by femoral vein approach and secured by standard fashion. Ultrasound Guidance:   used. Number of Attempts: 1  Post-procedure Findings: A post procedural chest x-ray  was not indicated. Patient tolerated the procedure well. [DT]   9660 Discussed case with Dr. Tatum Jasmine, except the patient the ICU with the patient being 84/41 hypotensive, place central line see procedure note. Discussed case with Dr. Elicia Razo who will admit the patient.  [DT]      ED Course User Index  [DT] Baltazar Smith DO        CONSULTS: (Who and What was discussed)  IP CONSULT TO CRITICAL CARE  IP CONSULT TO INFECTIOUS DISEASES    Discussion with Other Profesionals : Admitting Team   and Consultant ICU      Records Reviewed : Inpatient Notes  , Outpatient Notes  , and Outpatient Labs & Studies      CC/HPI Summary, DDx, ED Course, and Reassessment:     [facility] ED due to hypotension and fever. BP per EMS was 90s/60s. Patient arrived with BP 99/51, BP 89/43 after sepsis bolus and patient given additional fluids. Febrile with temperature 100.9F. Treated with Tylenol and repeat temperature 98.7F. CBC did not reveal leukocytosis however can be due to inability to mount a response. K 2.7 pt ordered Klor con 40. Magnesium 1.3 and given supplementation. Glucose 47 and pt was given D10 infusion. Lipase 11. Blood cx x 2 drawn. UA positive for LE, WBC, RBC, few bacteria. Patient given Zosyn x1. Due to persistent hypotension central line was placed for start of pressors. CXR revealed moderate-sized hiatus hernia, mild bronchovascular prominence, this could represent subsegmental atelectatic changes or aspiration pneumonia. Case discussed with attending and Intensivist and patient was admitted to ICU      Disposition Considerations (tests considered but not done, Shared Decision Making, Pt Expectation of Test or Tx.):     Patient admitted to ICU. FINAL IMPRESSION      1. Septicemia [facility])          DISPOSITION/PLAN     DISPOSITION Admitted 02/20/2023 10:56:03 PM      PATIENT REFERRED TO:  No follow-up provider specified.     DISCHARGE MEDICATIONS:  Current Discharge Medication List          DISCONTINUED MEDICATIONS:  Current Discharge Medication List                 (Please note that portions of this note were completed with a voice recognition program.  Efforts were made to edit the dictations but occasionally words are mis-transcribed.)    Josefina Gramajo MD (electronically signed)           Josefina Gramajo MD  Resident  02/21/23 1980

## 2023-02-21 ENCOUNTER — TELEPHONE (OUTPATIENT)
Dept: NEUROSURGERY | Age: 78
End: 2023-02-21

## 2023-02-21 LAB
ADENOVIRUS BY PCR: NOT DETECTED
ALBUMIN SERPL-MCNC: 2.1 G/DL (ref 3.5–5.2)
ALBUMIN SERPL-MCNC: 2.2 G/DL (ref 3.5–5.2)
ALP BLD-CCNC: 81 U/L (ref 35–104)
ALP BLD-CCNC: 85 U/L (ref 35–104)
ALT SERPL-CCNC: 10 U/L (ref 0–32)
ALT SERPL-CCNC: 9 U/L (ref 0–32)
ANION GAP SERPL CALCULATED.3IONS-SCNC: 7 MMOL/L (ref 7–16)
ANION GAP SERPL CALCULATED.3IONS-SCNC: 9 MMOL/L (ref 7–16)
ANISOCYTOSIS: ABNORMAL
ANISOCYTOSIS: ABNORMAL
AST SERPL-CCNC: 28 U/L (ref 0–31)
AST SERPL-CCNC: 30 U/L (ref 0–31)
BASOPHILS ABSOLUTE: 0.06 E9/L (ref 0–0.2)
BASOPHILS ABSOLUTE: 0.07 E9/L (ref 0–0.2)
BASOPHILS RELATIVE PERCENT: 0.9 % (ref 0–2)
BASOPHILS RELATIVE PERCENT: 1.1 % (ref 0–2)
BILIRUB SERPL-MCNC: 0.4 MG/DL (ref 0–1.2)
BILIRUB SERPL-MCNC: 0.5 MG/DL (ref 0–1.2)
BORDETELLA PARAPERTUSSIS BY PCR: NOT DETECTED
BORDETELLA PERTUSSIS BY PCR: NOT DETECTED
BUN BLDV-MCNC: 5 MG/DL (ref 6–23)
BUN BLDV-MCNC: 6 MG/DL (ref 6–23)
BURR CELLS: ABNORMAL
BURR CELLS: ABNORMAL
C-REACTIVE PROTEIN: 12.8 MG/DL (ref 0–0.4)
CALCIUM SERPL-MCNC: 6.7 MG/DL (ref 8.6–10.2)
CALCIUM SERPL-MCNC: 6.9 MG/DL (ref 8.6–10.2)
CHLAMYDOPHILIA PNEUMONIAE BY PCR: NOT DETECTED
CHLORIDE BLD-SCNC: 105 MMOL/L (ref 98–107)
CHLORIDE BLD-SCNC: 106 MMOL/L (ref 98–107)
CO2: 21 MMOL/L (ref 22–29)
CO2: 22 MMOL/L (ref 22–29)
CORONAVIRUS 229E BY PCR: NOT DETECTED
CORONAVIRUS HKU1 BY PCR: NOT DETECTED
CORONAVIRUS NL63 BY PCR: NOT DETECTED
CORONAVIRUS OC43 BY PCR: NOT DETECTED
CORTISOL TOTAL: 2.31 MCG/DL (ref 2.68–18.4)
CORTISOL TOTAL: 8.74 MCG/DL (ref 2.68–18.4)
CREAT SERPL-MCNC: 0.6 MG/DL (ref 0.5–1)
CREAT SERPL-MCNC: 0.6 MG/DL (ref 0.5–1)
EKG ATRIAL RATE: 105 BPM
EKG P-R INTERVAL: 112 MS
EKG Q-T INTERVAL: 476 MS
EKG QRS DURATION: 80 MS
EKG QTC CALCULATION (BAZETT): 623 MS
EKG R AXIS: 0 DEGREES
EKG T AXIS: 32 DEGREES
EKG VENTRICULAR RATE: 103 BPM
EOSINOPHILS ABSOLUTE: 0.71 E9/L (ref 0.05–0.5)
EOSINOPHILS ABSOLUTE: 0.85 E9/L (ref 0.05–0.5)
EOSINOPHILS RELATIVE PERCENT: 10.7 % (ref 0–6)
EOSINOPHILS RELATIVE PERCENT: 13.3 % (ref 0–6)
GFR SERPL CREATININE-BSD FRML MDRD: >60 ML/MIN/1.73
GFR SERPL CREATININE-BSD FRML MDRD: >60 ML/MIN/1.73
GLUCOSE BLD-MCNC: 83 MG/DL (ref 74–99)
GLUCOSE BLD-MCNC: 86 MG/DL (ref 74–99)
HCT VFR BLD CALC: 25.4 % (ref 34–48)
HCT VFR BLD CALC: 27.2 % (ref 34–48)
HEMOGLOBIN: 7.8 G/DL (ref 11.5–15.5)
HEMOGLOBIN: 8.4 G/DL (ref 11.5–15.5)
HUMAN METAPNEUMOVIRUS BY PCR: NOT DETECTED
HUMAN RHINOVIRUS/ENTEROVIRUS BY PCR: NOT DETECTED
HYPOCHROMIA: ABNORMAL
HYPOCHROMIA: ABNORMAL
IMMATURE GRANULOCYTES #: 0.06 E9/L
IMMATURE GRANULOCYTES #: 0.06 E9/L
IMMATURE GRANULOCYTES %: 0.9 % (ref 0–5)
IMMATURE GRANULOCYTES %: 0.9 % (ref 0–5)
INFLUENZA A BY PCR: NOT DETECTED
INFLUENZA B BY PCR: NOT DETECTED
LACTIC ACID: 1.6 MMOL/L (ref 0.5–2.2)
LYMPHOCYTES ABSOLUTE: 1.67 E9/L (ref 1.5–4)
LYMPHOCYTES ABSOLUTE: 1.83 E9/L (ref 1.5–4)
LYMPHOCYTES RELATIVE PERCENT: 26.1 % (ref 20–42)
LYMPHOCYTES RELATIVE PERCENT: 27.5 % (ref 20–42)
MAGNESIUM: 1.9 MG/DL (ref 1.6–2.6)
MAGNESIUM: 2.2 MG/DL (ref 1.6–2.6)
MCH RBC QN AUTO: 26.3 PG (ref 26–35)
MCH RBC QN AUTO: 26.7 PG (ref 26–35)
MCHC RBC AUTO-ENTMCNC: 30.7 % (ref 32–34.5)
MCHC RBC AUTO-ENTMCNC: 30.9 % (ref 32–34.5)
MCV RBC AUTO: 85.5 FL (ref 80–99.9)
MCV RBC AUTO: 86.3 FL (ref 80–99.9)
METER GLUCOSE: 103 MG/DL (ref 74–99)
METER GLUCOSE: 131 MG/DL (ref 74–99)
METER GLUCOSE: 174 MG/DL (ref 74–99)
METER GLUCOSE: 61 MG/DL (ref 74–99)
METER GLUCOSE: 73 MG/DL (ref 74–99)
MONOCYTES ABSOLUTE: 1.38 E9/L (ref 0.1–0.95)
MONOCYTES ABSOLUTE: 1.66 E9/L (ref 0.1–0.95)
MONOCYTES RELATIVE PERCENT: 21.5 % (ref 2–12)
MONOCYTES RELATIVE PERCENT: 24.9 % (ref 2–12)
MYCOPLASMA PNEUMONIAE BY PCR: NOT DETECTED
NEUTROPHILS ABSOLUTE: 2.33 E9/L (ref 1.8–7.3)
NEUTROPHILS ABSOLUTE: 2.39 E9/L (ref 1.8–7.3)
NEUTROPHILS RELATIVE PERCENT: 34.9 % (ref 43–80)
NEUTROPHILS RELATIVE PERCENT: 37.3 % (ref 43–80)
OVALOCYTES: ABNORMAL
OVALOCYTES: ABNORMAL
PARAINFLUENZA VIRUS 1 BY PCR: NOT DETECTED
PARAINFLUENZA VIRUS 2 BY PCR: NOT DETECTED
PARAINFLUENZA VIRUS 3 BY PCR: NOT DETECTED
PARAINFLUENZA VIRUS 4 BY PCR: NOT DETECTED
PDW BLD-RTO: 21.5 FL (ref 11.5–15)
PDW BLD-RTO: 21.5 FL (ref 11.5–15)
PHOSPHORUS: 2.9 MG/DL (ref 2.5–4.5)
PHOSPHORUS: 3.2 MG/DL (ref 2.5–4.5)
PLATELET # BLD: 276 E9/L (ref 130–450)
PLATELET # BLD: 277 E9/L (ref 130–450)
PMV BLD AUTO: 10.7 FL (ref 7–12)
PMV BLD AUTO: 11 FL (ref 7–12)
POIKILOCYTES: ABNORMAL
POIKILOCYTES: ABNORMAL
POTASSIUM REFLEX MAGNESIUM: 3.5 MMOL/L (ref 3.5–5)
POTASSIUM SERPL-SCNC: 3.7 MMOL/L (ref 3.5–5)
PROCALCITONIN: 3.2 NG/ML (ref 0–0.08)
RBC # BLD: 2.97 E12/L (ref 3.5–5.5)
RBC # BLD: 3.15 E12/L (ref 3.5–5.5)
RESPIRATORY SYNCYTIAL VIRUS BY PCR: NOT DETECTED
SARS-COV-2, PCR: NOT DETECTED
SCHISTOCYTES: ABNORMAL
SEDIMENTATION RATE, ERYTHROCYTE: 26 MM/HR (ref 0–20)
SODIUM BLD-SCNC: 135 MMOL/L (ref 132–146)
SODIUM BLD-SCNC: 135 MMOL/L (ref 132–146)
TARGET CELLS: ABNORMAL
TOTAL PROTEIN: 4.3 G/DL (ref 6.4–8.3)
TOTAL PROTEIN: 4.8 G/DL (ref 6.4–8.3)
WBC # BLD: 6.4 E9/L (ref 4.5–11.5)
WBC # BLD: 6.7 E9/L (ref 4.5–11.5)

## 2023-02-21 PROCEDURE — 2500000003 HC RX 250 WO HCPCS: Performed by: INTERNAL MEDICINE

## 2023-02-21 PROCEDURE — 51702 INSERT TEMP BLADDER CATH: CPT

## 2023-02-21 PROCEDURE — 6370000000 HC RX 637 (ALT 250 FOR IP): Performed by: INTERNAL MEDICINE

## 2023-02-21 PROCEDURE — 2580000003 HC RX 258: Performed by: NURSE PRACTITIONER

## 2023-02-21 PROCEDURE — 85651 RBC SED RATE NONAUTOMATED: CPT

## 2023-02-21 PROCEDURE — 82962 GLUCOSE BLOOD TEST: CPT

## 2023-02-21 PROCEDURE — 84100 ASSAY OF PHOSPHORUS: CPT

## 2023-02-21 PROCEDURE — 6360000002 HC RX W HCPCS: Performed by: NURSE PRACTITIONER

## 2023-02-21 PROCEDURE — 36415 COLL VENOUS BLD VENIPUNCTURE: CPT

## 2023-02-21 PROCEDURE — 85025 COMPLETE CBC W/AUTO DIFF WBC: CPT

## 2023-02-21 PROCEDURE — 2580000003 HC RX 258: Performed by: INTERNAL MEDICINE

## 2023-02-21 PROCEDURE — 6360000002 HC RX W HCPCS: Performed by: EMERGENCY MEDICINE

## 2023-02-21 PROCEDURE — 02HV33Z INSERTION OF INFUSION DEVICE INTO SUPERIOR VENA CAVA, PERCUTANEOUS APPROACH: ICD-10-PCS | Performed by: INTERNAL MEDICINE

## 2023-02-21 PROCEDURE — 6360000002 HC RX W HCPCS: Performed by: INTERNAL MEDICINE

## 2023-02-21 PROCEDURE — 6360000002 HC RX W HCPCS

## 2023-02-21 PROCEDURE — 99233 SBSQ HOSP IP/OBS HIGH 50: CPT | Performed by: FAMILY MEDICINE

## 2023-02-21 PROCEDURE — 87081 CULTURE SCREEN ONLY: CPT

## 2023-02-21 PROCEDURE — 84145 PROCALCITONIN (PCT): CPT

## 2023-02-21 PROCEDURE — 83605 ASSAY OF LACTIC ACID: CPT

## 2023-02-21 PROCEDURE — 2000000000 HC ICU R&B

## 2023-02-21 PROCEDURE — 93010 ELECTROCARDIOGRAM REPORT: CPT | Performed by: INTERNAL MEDICINE

## 2023-02-21 PROCEDURE — 82533 TOTAL CORTISOL: CPT

## 2023-02-21 PROCEDURE — 80053 COMPREHEN METABOLIC PANEL: CPT

## 2023-02-21 PROCEDURE — 2580000003 HC RX 258: Performed by: EMERGENCY MEDICINE

## 2023-02-21 PROCEDURE — 83735 ASSAY OF MAGNESIUM: CPT

## 2023-02-21 PROCEDURE — 86140 C-REACTIVE PROTEIN: CPT

## 2023-02-21 PROCEDURE — 36592 COLLECT BLOOD FROM PICC: CPT

## 2023-02-21 RX ORDER — SODIUM CHLORIDE 9 MG/ML
INJECTION, SOLUTION INTRAVENOUS CONTINUOUS
Status: DISCONTINUED | OUTPATIENT
Start: 2023-02-21 | End: 2023-02-21

## 2023-02-21 RX ORDER — 0.9 % SODIUM CHLORIDE 0.9 %
500 INTRAVENOUS SOLUTION INTRAVENOUS ONCE AS NEEDED
Status: DISCONTINUED | OUTPATIENT
Start: 2023-02-21 | End: 2023-02-28 | Stop reason: HOSPADM

## 2023-02-21 RX ORDER — MAGNESIUM SULFATE IN WATER 40 MG/ML
2000 INJECTION, SOLUTION INTRAVENOUS ONCE
Status: COMPLETED | OUTPATIENT
Start: 2023-02-21 | End: 2023-02-21

## 2023-02-21 RX ORDER — LORAZEPAM 1 MG/1
1 TABLET ORAL 3 TIMES DAILY
Status: DISCONTINUED | OUTPATIENT
Start: 2023-02-21 | End: 2023-02-28 | Stop reason: HOSPADM

## 2023-02-21 RX ORDER — SODIUM CHLORIDE 0.9 % (FLUSH) 0.9 %
5-40 SYRINGE (ML) INJECTION EVERY 12 HOURS SCHEDULED
Status: DISCONTINUED | OUTPATIENT
Start: 2023-02-21 | End: 2023-02-28 | Stop reason: HOSPADM

## 2023-02-21 RX ORDER — MAGNESIUM SULFATE IN WATER 40 MG/ML
2000 INJECTION, SOLUTION INTRAVENOUS ONCE
Status: DISCONTINUED | OUTPATIENT
Start: 2023-02-21 | End: 2023-02-21

## 2023-02-21 RX ORDER — 0.9 % SODIUM CHLORIDE 0.9 %
500 INTRAVENOUS SOLUTION INTRAVENOUS ONCE
Status: COMPLETED | OUTPATIENT
Start: 2023-02-21 | End: 2023-02-21

## 2023-02-21 RX ORDER — SODIUM CHLORIDE 0.9 % (FLUSH) 0.9 %
5-40 SYRINGE (ML) INJECTION PRN
Status: DISCONTINUED | OUTPATIENT
Start: 2023-02-21 | End: 2023-02-28 | Stop reason: HOSPADM

## 2023-02-21 RX ORDER — THIAMINE HYDROCHLORIDE 100 MG/ML
100 INJECTION, SOLUTION INTRAMUSCULAR; INTRAVENOUS DAILY
Status: DISCONTINUED | OUTPATIENT
Start: 2023-02-21 | End: 2023-02-22

## 2023-02-21 RX ORDER — MIDODRINE HYDROCHLORIDE 5 MG/1
10 TABLET ORAL
Status: DISCONTINUED | OUTPATIENT
Start: 2023-02-21 | End: 2023-02-22

## 2023-02-21 RX ORDER — COSYNTROPIN 0.25 MG/ML
250 INJECTION, POWDER, FOR SOLUTION INTRAMUSCULAR; INTRAVENOUS ONCE
Status: COMPLETED | OUTPATIENT
Start: 2023-02-21 | End: 2023-02-21

## 2023-02-21 RX ORDER — POTASSIUM CHLORIDE 29.8 MG/ML
20 INJECTION INTRAVENOUS PRN
Status: DISCONTINUED | OUTPATIENT
Start: 2023-02-21 | End: 2023-02-25

## 2023-02-21 RX ORDER — SODIUM CHLORIDE 9 MG/ML
INJECTION, SOLUTION INTRAVENOUS PRN
Status: DISCONTINUED | OUTPATIENT
Start: 2023-02-21 | End: 2023-02-28 | Stop reason: HOSPADM

## 2023-02-21 RX ORDER — ONDANSETRON 2 MG/ML
4 INJECTION INTRAMUSCULAR; INTRAVENOUS EVERY 6 HOURS PRN
Status: DISCONTINUED | OUTPATIENT
Start: 2023-02-21 | End: 2023-02-21

## 2023-02-21 RX ORDER — ACETAMINOPHEN 325 MG/1
650 TABLET ORAL ONCE
Status: DISCONTINUED | OUTPATIENT
Start: 2023-02-21 | End: 2023-02-21

## 2023-02-21 RX ORDER — ONDANSETRON 4 MG/1
4 TABLET, ORALLY DISINTEGRATING ORAL EVERY 8 HOURS PRN
Status: DISCONTINUED | OUTPATIENT
Start: 2023-02-21 | End: 2023-02-21

## 2023-02-21 RX ORDER — POTASSIUM CHLORIDE 29.8 MG/ML
20 INJECTION INTRAVENOUS
Status: DISCONTINUED | OUTPATIENT
Start: 2023-02-21 | End: 2023-02-21

## 2023-02-21 RX ORDER — ENOXAPARIN SODIUM 100 MG/ML
40 INJECTION SUBCUTANEOUS DAILY
Status: DISCONTINUED | OUTPATIENT
Start: 2023-02-21 | End: 2023-02-28 | Stop reason: HOSPADM

## 2023-02-21 RX ORDER — SENNA AND DOCUSATE SODIUM 50; 8.6 MG/1; MG/1
1 TABLET, FILM COATED ORAL 2 TIMES DAILY
Status: DISCONTINUED | OUTPATIENT
Start: 2023-02-21 | End: 2023-02-22

## 2023-02-21 RX ORDER — POLYETHYLENE GLYCOL 3350 17 G/17G
17 POWDER, FOR SOLUTION ORAL DAILY PRN
Status: DISCONTINUED | OUTPATIENT
Start: 2023-02-21 | End: 2023-02-28 | Stop reason: HOSPADM

## 2023-02-21 RX ORDER — DEXTROSE, SODIUM CHLORIDE, SODIUM LACTATE, POTASSIUM CHLORIDE, AND CALCIUM CHLORIDE 5; .6; .31; .03; .02 G/100ML; G/100ML; G/100ML; G/100ML; G/100ML
INJECTION, SOLUTION INTRAVENOUS CONTINUOUS
Status: ACTIVE | OUTPATIENT
Start: 2023-02-21 | End: 2023-02-23

## 2023-02-21 RX ADMIN — PIPERACILLIN AND TAZOBACTAM 4500 MG: 4; .5 INJECTION, POWDER, FOR SOLUTION INTRAVENOUS at 12:07

## 2023-02-21 RX ADMIN — VANCOMYCIN HYDROCHLORIDE 1000 MG: 1 INJECTION, POWDER, LYOPHILIZED, FOR SOLUTION INTRAVENOUS at 04:43

## 2023-02-21 RX ADMIN — ASPIRIN 81 MG: 81 TABLET, COATED ORAL at 09:33

## 2023-02-21 RX ADMIN — MONTELUKAST SODIUM 10 MG: 10 TABLET ORAL at 09:32

## 2023-02-21 RX ADMIN — MICONAZOLE NITRATE: 2 OINTMENT TOPICAL at 21:49

## 2023-02-21 RX ADMIN — POTASSIUM CHLORIDE 10 MEQ: 7.46 INJECTION, SOLUTION INTRAVENOUS at 01:22

## 2023-02-21 RX ADMIN — LORAZEPAM 1 MG: 1 TABLET ORAL at 21:49

## 2023-02-21 RX ADMIN — FENTANYL CITRATE 25 MCG: 50 INJECTION INTRAMUSCULAR; INTRAVENOUS at 00:13

## 2023-02-21 RX ADMIN — SODIUM CHLORIDE 500 ML: 9 INJECTION, SOLUTION INTRAVENOUS at 11:05

## 2023-02-21 RX ADMIN — SODIUM CHLORIDE, SODIUM LACTATE, POTASSIUM CHLORIDE, CALCIUM CHLORIDE AND DEXTROSE MONOHYDRATE: 5; 600; 310; 30; 20 INJECTION, SOLUTION INTRAVENOUS at 13:08

## 2023-02-21 RX ADMIN — HYDROCORTISONE SODIUM SUCCINATE 100 MG: 100 INJECTION, POWDER, FOR SOLUTION INTRAMUSCULAR; INTRAVENOUS at 16:17

## 2023-02-21 RX ADMIN — ESCITALOPRAM 10 MG: 10 TABLET, FILM COATED ORAL at 21:49

## 2023-02-21 RX ADMIN — ATORVASTATIN CALCIUM 10 MG: 10 TABLET, FILM COATED ORAL at 21:49

## 2023-02-21 RX ADMIN — FAMOTIDINE 20 MG: 20 TABLET, FILM COATED ORAL at 21:49

## 2023-02-21 RX ADMIN — PIPERACILLIN AND TAZOBACTAM 4500 MG: 4; .5 INJECTION, POWDER, FOR SOLUTION INTRAVENOUS at 02:03

## 2023-02-21 RX ADMIN — POTASSIUM CHLORIDE 10 MEQ: 7.46 INJECTION, SOLUTION INTRAVENOUS at 02:14

## 2023-02-21 RX ADMIN — MAGNESIUM SULFATE HEPTAHYDRATE 2000 MG: 40 INJECTION, SOLUTION INTRAVENOUS at 01:58

## 2023-02-21 RX ADMIN — PIPERACILLIN AND TAZOBACTAM 4500 MG: 4; .5 INJECTION, POWDER, FOR SOLUTION INTRAVENOUS at 06:07

## 2023-02-21 RX ADMIN — MIDODRINE HYDROCHLORIDE 10 MG: 5 TABLET ORAL at 10:52

## 2023-02-21 RX ADMIN — PIPERACILLIN AND TAZOBACTAM 4500 MG: 4; .5 INJECTION, POWDER, FOR SOLUTION INTRAVENOUS at 20:22

## 2023-02-21 RX ADMIN — MIDODRINE HYDROCHLORIDE 10 MG: 5 TABLET ORAL at 16:17

## 2023-02-21 RX ADMIN — ACETAMINOPHEN 650 MG: 325 TABLET ORAL at 14:30

## 2023-02-21 RX ADMIN — ACETAMINOPHEN 650 MG: 325 TABLET ORAL at 04:38

## 2023-02-21 RX ADMIN — ENOXAPARIN SODIUM 40 MG: 100 INJECTION SUBCUTANEOUS at 09:30

## 2023-02-21 RX ADMIN — MICONAZOLE NITRATE: 2 OINTMENT TOPICAL at 14:34

## 2023-02-21 RX ADMIN — FAMOTIDINE 20 MG: 20 TABLET, FILM COATED ORAL at 09:32

## 2023-02-21 RX ADMIN — SODIUM CHLORIDE, SODIUM LACTATE, POTASSIUM CHLORIDE, CALCIUM CHLORIDE AND DEXTROSE MONOHYDRATE: 5; 600; 310; 30; 20 INJECTION, SOLUTION INTRAVENOUS at 02:40

## 2023-02-21 RX ADMIN — COSYNTROPIN 250 MCG: 0.25 INJECTION, POWDER, LYOPHILIZED, FOR SOLUTION INTRAVENOUS at 12:07

## 2023-02-21 RX ADMIN — LORAZEPAM 2 MG: 1 TABLET ORAL at 10:52

## 2023-02-21 RX ADMIN — MICONAZOLE NITRATE: 20 POWDER TOPICAL at 14:34

## 2023-02-21 RX ADMIN — SODIUM CHLORIDE, PRESERVATIVE FREE 10 ML: 5 INJECTION INTRAVENOUS at 09:33

## 2023-02-21 RX ADMIN — SODIUM CHLORIDE 500 ML: 9 INJECTION, SOLUTION INTRAVENOUS at 15:44

## 2023-02-21 RX ADMIN — MICONAZOLE NITRATE: 20 POWDER TOPICAL at 21:49

## 2023-02-21 RX ADMIN — GUAIFENESIN AND CODEINE PHOSPHATE 5 ML: 10; 100 LIQUID ORAL at 10:52

## 2023-02-21 RX ADMIN — SODIUM CHLORIDE, SODIUM LACTATE, POTASSIUM CHLORIDE, CALCIUM CHLORIDE AND DEXTROSE MONOHYDRATE: 5; 600; 310; 30; 20 INJECTION, SOLUTION INTRAVENOUS at 22:31

## 2023-02-21 ASSESSMENT — PAIN DESCRIPTION - ORIENTATION: ORIENTATION: LEFT;RIGHT

## 2023-02-21 ASSESSMENT — PAIN SCALES - GENERAL
PAINLEVEL_OUTOF10: 0
PAINLEVEL_OUTOF10: 3

## 2023-02-21 ASSESSMENT — PAIN DESCRIPTION - LOCATION: LOCATION: LEG

## 2023-02-21 ASSESSMENT — PAIN DESCRIPTION - DESCRIPTORS: DESCRIPTORS: DULL;DISCOMFORT

## 2023-02-21 NOTE — CONSULTS
Critical Care Admit/Consult Note     Patient - Maricarmen Mehta   MRN -  [id number]   Acct # - [de-identified]   - 1945      Date of Admission -  2023  5:09 PM  Date of evaluation -  2023   Hospital Day - 0    ADMIT/CONSULT DETAILS     Reason for Admit/Consult   Sepsis    Consulting Sarah العلي DO  Primary Care Physician - Karla Jones MD         HPI   Ms. Dominguez is admitted from [facility] with nausea and vomiting. On EMS arrival she was found to be hypotensive 80/45 and febrile 102F. She was recently admitted from  -  with a BLE cellulitis treated with ancef/vanc transitioned to doxy/keflex. Urology was following for increased PVR that led to meza placement that was maintained on discharge. Today in the ED she reports n/v with poor appetite, diarrhea, and cough. She was febrile 100.9, tachycardic 110s, and hypotensive with BP 89/43. She was given sepsis resuscitation in the ED and started on zosyn for presumed sepsis. Blood cultures were sent and UA is suggestive of UTI. She was hypoglycemic and started on D10 in the ED. CT Chest and AP were done and unremarkable for acute findings. She is admitted to the ICU for management of her sepsis, UTI.       Past Medical History         Diagnosis Date    Anxiety     Arthritis     Asthma     Claustrophobia     Dermatitis 2017    bilateral legs knees to ankle; follows with [facility]    Fracture 2017    \"in Spine\" compression T10 per pt; difficulty laying flat    GERD (gastroesophageal reflux disease)     Hiatal hernia     History of blood transfusion     HTN (hypertension) 2013    Hyperlipidemia     On aspirin at home     for age per pcp    Pancreatic cyst 2017    Pneumonia 2018    Sleep apnea     SOB (shortness of breath) 2013    Tachycardia 2013    follows with Dr Aman Shepherd        Past Surgical History           Procedure Laterality Date    BACK SURGERY 2014    has screws in back    CATARACT REMOVAL  2013, 2013    [facility] with lens implants    CHOLECYSTECTOMY      JOINT REPLACEMENT  2016    SI joint fusion Right    OTHER SURGICAL HISTORY  2017    MRI -     SPINAL FIXATION SURGERY WITH IMPLANT  2013    in [address] Right     UPPER GASTROINTESTINAL ENDOSCOPY  07/10/2017     Current Medications   Current Medications    acetaminophen  1,000 mg Oral Once    piperacillin-tazobactam  4,500 mg IntraVENous Q6H    potassium chloride  10 mEq IntraVENous Q1H       IV Drips/Infusions   dextrose 100 mL/hr at 23 1900     Home Medications  Not in a hospital admission. Diet/Nutrition   No diet orders on file    Allergies   Percocet [oxycodone-acetaminophen]    Social History   Tobacco   reports that she has never smoked. She has never used smokeless tobacco.    Alcohol     reports no history of alcohol use. Family History         Problem Relation Age of Onset    Stroke Mother     Heart Disease Mother     Hypertension Mother     Other Mother         CHOLECYSTECTOMY; OSTEOPENIA    Heart Disease Father     Hypertension Father     Diabetes Father     Arthritis Father     Asthma Daughter      Lines and Devices   CVC    Mechanical Ventilation Data   VENT SETTINGS (Comprehensive)     Additional Respiratory Assessments  Heart Rate: 98  Resp: 16  SpO2: 94 %    ABG  No results found for: PH, PCO2, PO2, HCO3, O2SAT  No results found for: IFIO2, MODE, SETTIDVOL, SETPEEP        Vitals    height is 5' 4\" (1.626 m) and weight is 114 lb (51.7 kg). Her temperature is 98.7 °F (37.1 °C). Her blood pressure is 93/64 and her pulse is 98. Her respiration is 16 and oxygen saturation is 94%.        Temperature Range: Temp: 98.7 °F (37.1 °C) Temp  Av.8 °F (37.7 °C)  Min: 98.7 °F (37.1 °C)  Max: 100.9 °F (38.3 °C)  BP Range:  Systolic (47MSR), OFW:68 , Min:81 , WZF:56     Diastolic (46HMI), TYN:62, Min:42, Max:64    Pulse Range: Pulse  Av.4  Min: 94  Max: 114  Respiration Range: Resp  Av.9  Min: 16  Max: 24  Current Pulse Ox[de-identified]  SpO2: 94 %  24HR Pulse Ox Range:  SpO2  Av.7 %  Min: 91 %  Max: 96 %  Oxygen Amount and Delivery:        I/O (24 Hours)    Patient Vitals for the past 8 hrs:   BP Temp Temp src Pulse Resp SpO2 Height Weight   23 2312 93/64 -- -- 98 16 94 % -- --   23 2228 (!) 95/54 -- -- 94 24 94 % -- --   23 (!) 81/42 -- -- 94 19 91 % -- --   23 -- -- -- (!) 101 24 92 % -- --   23 90/61 -- -- -- 22 95 % -- --   23 191 (!) 89/43 -- -- 99 20 92 % -- --   23 (!) 94/44 98.7 °F (37.1 °C) -- 100 22 94 % -- --   23 1813 (!) 98/52 -- -- (!) 103 16 96 % -- --   23 1723 (!) 99/51 (!) 100.9 °F (38.3 °C) Oral (!) 114 16 95 % 5' 4\" (1.626 m) 114 lb (51.7 kg)     No intake or output data in the 24 hours ending 23  No intake/output data recorded.      Patient Vitals for the past 96 hrs (Last 3 readings):   Weight   23 1723 114 lb (51.7 kg)         Drains/Tubes Outputs  Brewer  Exam   PHYSICAL EXAM:  Vitals:  /67   Pulse (!) 107   Temp 98.7 °F (37.1 °C)   Resp 16   Ht 5' 4\" (1.626 m)   Wt 114 lb (51.7 kg)   SpO2 94%   BMI 19.57 kg/m²     General Appearance: alert and oriented to person, place and time and in no acute distress  Skin: warm and dry, BLE erythema with areas of scabbed wounds non-draining on each leg  Head: normocephalic and atraumatic  Eyes: pupils equal, round, and reactive to light, extraocular eye movements intact, conjunctivae normal  Neck: neck supple and non tender without mass   Pulmonary/Chest: clear to auscultation bilaterally- no wheezes, rales or rhonchi, normal air movement, no respiratory distress, on 2L NC  Cardiovascular: -110s, normal S1 and S2 and no carotid bruits  Abdomen: soft, non-tender, non-distended, normal bowel sounds, no masses or organomegaly  Extremities: no cyanosis, no clubbing and no edema  Neurologic: no cranial nerve deficit and speech normal      Data   Old records and images have been reviewed    Lab Results   CBC     Lab Results   Component Value Date/Time    WBC 7.7 02/20/2023 05:43 PM    RBC 3.30 02/20/2023 05:43 PM    HGB 8.9 02/20/2023 05:43 PM    HCT 28.5 02/20/2023 05:43 PM     02/20/2023 05:43 PM    MCV 86.4 02/20/2023 05:43 PM    MCH 27.0 02/20/2023 05:43 PM    MCHC 31.2 02/20/2023 05:43 PM    RDW 21.4 02/20/2023 05:43 PM    NRBC 0.0 02/04/2023 01:50 AM    SEGSPCT 61 04/02/2012 08:30 AM    LYMPHOPCT 22.7 02/20/2023 05:43 PM    MONOPCT 21.4 02/20/2023 05:43 PM    MYELOPCT 0.9 02/01/2023 09:10 AM    BASOPCT 0.8 02/20/2023 05:43 PM    MONOSABS 1.64 02/20/2023 05:43 PM    LYMPHSABS 1.74 02/20/2023 05:43 PM    EOSABS 0.52 02/20/2023 05:43 PM    BASOSABS 0.06 02/20/2023 05:43 PM       BMP   Lab Results   Component Value Date/Time     02/20/2023 05:43 PM    K 2.7 02/20/2023 05:43 PM     02/20/2023 05:43 PM    CO2 24 02/20/2023 05:43 PM    BUN 8 02/20/2023 05:43 PM    CREATININE 0.6 02/20/2023 05:43 PM    GLUCOSE 47 02/20/2023 05:43 PM    GLUCOSE 85 04/02/2012 08:30 AM    CALCIUM 7.5 02/20/2023 05:43 PM       LFTS  Lab Results   Component Value Date/Time    ALKPHOS 95 02/20/2023 05:43 PM    ALT 12 02/20/2023 05:43 PM    AST 31 02/20/2023 05:43 PM    PROT 4.9 02/20/2023 05:43 PM    BILITOT 0.5 02/20/2023 05:43 PM    BILIDIR 0.2 04/01/2012 04:20 PM    LABALBU 2.4 02/20/2023 05:43 PM    LABALBU 3.6 04/02/2012 08:30 AM       INR  No results for input(s): PROTIME, INR in the last 72 hours. APTT  No results for input(s): APTT in the last 72 hours. Lactic Acid  Lab Results   Component Value Date/Time    LACTA 1.5 02/20/2023 05:43 PM    LACTA 1.2 02/01/2023 09:10 AM    LACTA 1.9 01/31/2023 08:17 PM        BNP   No results for input(s): BNP in the last 72 hours. Cultures     No results for input(s): BC in the last 72 hours.   No results for input(s): Terrie Qureshi in the last 72 hours. No results for input(s): LABURIN in the last 72 hours. Radiology   CXR  Impression:   02/20/23 1753     Moderate sized hiatus hernia. Mild bronchovascular prominence, this could   represent subsegmental atelectatic changes or aspiration pneumonia. CT Scans  Impression:  CT CHEST WO CONTRAST 02/20/23 2026     Small left pleural effusion with adjacent atelectasis. No evidence for pneumonia. Hiatal hernia. Impression:  CT ABDOMEN PELVIS WO CONTRAST 02/20/23 2036     No urinary tract stones or hydronephrosis. Meza catheter in a decompressed   urinary bladder. Diffuse colonic fecal retention. Cholecystectomy. SYSTEMS ASSESSMENT    Neuro   # Hx T12 and L1 compression fx with chronic back pain  - Followed by NS  - Uses TLSO brace  - Hx failed spinal cord stimulator    # Hx depression, anxiety  - On Lexapro, ativan    Respiratory   # No acute issues    Cardiovascular   # Lactate normal  # s/p sepsis resuscitation - volume responsive, no pressor requirement  # AM cortisol ordered    Gastrointestinal   # Constipation  - Senna daily    Renal   # Increased PVR with chronic meza   - Meza left in from discharge  - Followed by Dr. Goodman Reading   # Sepsis  - UA with 10-20 WBC/RBC, mod LE, + bacteria  - On zosyn - vanc x 1  - Consult ID with recurrent infections  - Check crp/esr, procal, blood/urine cx, viral panel  - Check C Diff with diarrhea and recent abx    Hematology/Oncology   # No acute issues    Endocrine   # Hypoglycemia  - On D10W - decrease rate, add D5LR  - Trend POCT Q4    Social/Spiritual/DNR/Other   Code: Full  DVT ppx: lovenox  GI ppx: Pepcid    \"Thank you for asking us to see this complex patient. \"      Department of Internal Medicine  Division of Pulmonary, Critical Care and Sleep Medicine  ICU Attending Addendum    Attending Physician Statement  Rebekah Bella was seen, examined and discussed with the multi-disciplinary ICU team during rounds. I have personally seen and examined the patient and the key elements of the encounter were performed by me. The medications & laboratory data was discussed and adjusted where necessary. The radiographic images were reviewed either as a group or with radiologist if felt dis-concordant with the exam or history. The above findings were corroborated, plans confirmed and changes made if needed. Family is updated at the bedside if available. Key issues of the case were discussed among consultants. Critical Care time is documented if appropriate. Changes to the notes are place in italicized print. Briefly,   Sepsis, UTI  H/o indwelling cath  ATBx, cultures  ID consult  PCT in am    Hypotension  IVF, bolus and gtt  Low dose midodrine    Low BS  Poor oral intake  Cosyntropin test, r/o Adrenal insuff                All other information is noted in the above team note.

## 2023-02-21 NOTE — PROGRESS NOTES
Critical Care Team - Daily Progress Note      Date and time: 2/21/2023 7:23 AM  Patient's name:  Vivienne Ewing  Medical Record Number: [id number]  Patient's account/billing number: [de-identified]  Patient's YOB: 1945  Age: 70 y.o. Date of Admission: 2/20/2023  5:09 PM  Length of stay during current admission: 1      Primary Care Physician: Owen Diaz MD  ICU Attending Physician: Dr. Sharla Burroughs Status: Full Code    Reason for ICU admission: Hypotension      SUBJECTIVE:     OVERNIGHT EVENTS:      02/21: Pt complaining about having nausea and mentioned that she did throw up last night also 2-3 times. She was on 3 L NC which was weaned down to 2 L and pt is doing fine. Will wean down more. Intake/Output:   No intake/output data recorded. I/O last 3 completed shifts: In: 1242.9 [I.V.:345.7; IV Piggyback:897.2]  Out: 373 [Urine:675]    Awake and following commands: Yes  Current Ventilation: - No ventilator support  Sedation: none  Paralyzed: No  Vasopressors: None    ROS:  Unless stated above, ROS is otherwise negative. Initial HPI + past overnight events: Ms. Mariana Perez is admitted from [facility] with nausea and vomiting. On EMS arrival she was found to be hypotensive 80/45 and febrile 102F. She was recently admitted from 1/31 - 2/4 with a BLE cellulitis treated with ancef/vanc transitioned to doxy/keflex. Urology was following for increased PVR that led to meza placement that was maintained on discharge. Today in the ED she reports n/v with poor appetite, diarrhea, and cough. She was febrile 100.9, tachycardic 110s, and hypotensive with BP 89/43. She was given sepsis resuscitation in the ED and started on zosyn for presumed sepsis. Blood cultures were sent and UA is suggestive of UTI. She was hypoglycemic and started on D10 in the ED. CT Chest and AP were done and unremarkable for acute findings. She is admitted to the ICU for management of her sepsis, UTI. OBJECTIVE:     VITAL SIGNS:  BP (!) 81/48   Pulse 95   Temp 100.3 °F (37.9 °C) (Oral)   Resp 24   Ht 5' 4\" (1.626 m)   Wt 130 lb 11.7 oz (59.3 kg)   SpO2 96%   BMI 22.44 kg/m²   Tmax over 24 hours:  Temp (24hrs), Av.9 °F (37.7 °C), Min:98.7 °F (37.1 °C), Max:100.9 °F (38.3 °C)      Patient Vitals for the past 6 hrs:   BP Temp Temp src Pulse Resp SpO2 Height Weight   23 0700 (!) 81/48 -- -- 95 24 96 % -- --   23 0600 (!) 65/45 -- -- 98 25 96 % -- --   23 0500 (!) 92/59 -- -- (!) 104 25 96 % -- --   23 0400 111/64 100.3 °F (37.9 °C) Oral (!) 107 18 98 % -- --   23 0300 112/64 -- -- (!) 102 16 97 % -- --   23 0200 (!) 97/52 99.6 °F (37.6 °C) Oral (!) 104 17 100 % 5' 4\" (1.626 m) 130 lb 11.7 oz (59.3 kg)         Intake/Output Summary (Last 24 hours) at 2023 0723  Last data filed at 2023 0612  Gross per 24 hour   Intake 1242.89 ml   Output 675 ml   Net 567.89 ml     Wt Readings from Last 2 Encounters:   23 130 lb 11.7 oz (59.3 kg)   23 128 lb (58.1 kg)     Body mass index is 22.44 kg/m². Physical Exam  Constitutional:       General: She is not in acute distress. Appearance: Normal appearance. HENT:      Head: Normocephalic and atraumatic. Mouth/Throat:      Mouth: Mucous membranes are moist.      Pharynx: Oropharynx is clear. Eyes:      Extraocular Movements: Extraocular movements intact. Conjunctiva/sclera: Conjunctivae normal.   Cardiovascular:      Rate and Rhythm: Normal rate and regular rhythm. Pulses: Normal pulses. Heart sounds: Normal heart sounds. No murmur heard. Pulmonary:      Effort: Pulmonary effort is normal.      Breath sounds: Normal breath sounds. No wheezing. Abdominal:      General: There is no distension. Tenderness: There is no abdominal tenderness. Musculoskeletal:      Cervical back: Normal range of motion. Right lower leg: No edema. Left lower leg: No edema.    Skin: General: Skin is warm and dry. Neurological:      General: No focal deficit present. Mental Status: She is alert and oriented to person, place, and time. Psychiatric:         Attention and Perception: Attention normal.         Mood and Affect: Mood normal.         MEDICATIONS:    Scheduled Meds:   sodium chloride flush  5-40 mL IntraVENous 2 times per day    enoxaparin  40 mg SubCUTAneous Daily    acetaminophen  650 mg Oral Once    thiamine  100 mg IntraVENous Daily    sennosides-docusate sodium  1 tablet Oral BID    acetaminophen  1,000 mg Oral Once    piperacillin-tazobactam  4,500 mg IntraVENous Q6H    aspirin  81 mg Oral Daily    atorvastatin  10 mg Oral Nightly    escitalopram  10 mg Oral Nightly    famotidine  20 mg Oral BID    LORazepam  2 mg Oral TID    polyethylene glycol  17 g Oral Daily    montelukast  10 mg Oral Daily     Continuous Infusions:   sodium chloride      dextrose 5% in lactated ringers 100 mL/hr at 02/21/23 0612    dextrose 30 mL/hr at 02/21/23 0215     PRN Meds:   sodium chloride flush, 5-40 mL, PRN  sodium chloride, , PRN  polyethylene glycol, 17 g, Daily PRN  potassium chloride, 20 mEq, PRN  acetaminophen, 650 mg, Q4H PRN  guaiFENesin-codeine, 5 mL, TID PRN  levalbuterol, 0.63 mg, Q8H PRN          VENT SETTINGS (Comprehensive) (if applicable): Additional Respiratory Assessments  Heart Rate: 95  Resp: 24  SpO2: 96 %    Arterial Blood Gas 2/21/2023  No results for input(s): PH, PCO2, PO2, HCO3, BE, O2SAT in the last 72 hours.       Laboratory findings:    Complete Blood Count:   Recent Labs     02/20/23  1743 02/21/23  0130 02/21/23  0430   WBC 7.7 6.7 6.4   HGB 8.9* 8.4* 7.8*   HCT 28.5* 27.2* 25.4*    276 277        Last 3 Blood Glucose:   Recent Labs     02/20/23  1743 02/21/23  0130 02/21/23  0430   GLUCOSE 47* 86 83        PT/INR:    Lab Results   Component Value Date/Time    PROTIME 11.8 02/13/2023 05:57 AM    PROTIME 11.7 04/01/2012 03:16 PM    INR 1.1 02/13/2023 05:57 AM     PTT:    Lab Results   Component Value Date/Time    APTT 37.6 05/28/2017 04:10 AM       Comprehensive Metabolic Profile:   Recent Labs     02/20/23  1743 02/21/23  0130 02/21/23  0430    135 135   K 2.7* 3.5 3.7    105 106   CO2 24 21* 22   BUN 8 6 5*   CREATININE 0.6 0.6 0.6   GLUCOSE 47* 86 83   CALCIUM 7.5* 6.7* 6.9*   PROT 4.9* 4.8* 4.3*   LABALBU 2.4* 2.2* 2.1*   BILITOT 0.5 0.5 0.4   ALKPHOS 95 85 81   AST 31 30 28   ALT 12 10 9      Magnesium:   Lab Results   Component Value Date/Time    MG 2.2 02/21/2023 04:30 AM     Phosphorus:   Lab Results   Component Value Date/Time    PHOS 2.9 02/21/2023 04:30 AM     Ionized Calcium: No results found for: CAION     Urinalysis:     Troponin: No results for input(s): TROPONINI in the last 72 hours. Radiology/Imaging:   CXR       Impression:   02/20/23 1753     Moderate sized hiatus hernia. Mild bronchovascular prominence, this could   represent subsegmental atelectatic changes or aspiration pneumonia. CT Scans       Impression:  CT CHEST WO CONTRAST 02/20/23 2026     Small left pleural effusion with adjacent atelectasis. No evidence for pneumonia. Hiatal hernia. Impression:  CT ABDOMEN PELVIS WO CONTRAST 02/20/23 2036     No urinary tract stones or hydronephrosis. Brewer catheter in a decompressed   urinary bladder. Diffuse colonic fecal retention. Cholecystectomy.         ASSESSMENT:     Patient Active Problem List    Diagnosis Date Noted    Asthma 04/02/2012    Septic shock ([address] 02/20/2023    Acute cystitis without hematuria 02/20/2023    Septicemia ([address] 02/02/2023    Failure to thrive in adult 02/02/2023    Cellulitis 01/31/2023    Compression fracture of T12 vertebra ([address] 12/09/2022    Compression fracture of lumbar spine, non-traumatic, initial encounter ([address] 12/08/2022    Failed spinal cord stimulator ([address] 01/25/2023    Tachycardia 12/06/2018    Vomiting 12/06/2018    Anxiety disorder 09/26/2017    Chronic back pain 09/26/2017    Pancreatic cyst 05/27/2017    HTN (hypertension), benign 04/02/2012    Vitamin B12 deficiency 04/02/2012    Mixed hyperlipidemia 04/01/2012         PLAN:        Neuro   # Hx T12 and L1 compression fx with chronic back pain  - Followed by NS  - Uses TLSO brace  - Hx failed spinal cord stimulator     # Hx depression, anxiety  - On Lexapro, ativan     Respiratory   # No acute issues     Cardiovascular   # s/p sepsis resuscitation - volume responsive, no pressor requirement  -500 NS bolus. -Midodrine 10 mg TID     Gastrointestinal   # Constipation  - Senna daily     Renal   # Increased PVR with chronic meza   - Meza left in from discharge  - Followed by Dr. Tasha Ricardo   # Sepsis  - UA with 10-20 WBC/RBC, mod LE, + bacteria  - On zosyn   - Consult ID with recurrent infections  -Pro zaki 3.20  - Check crp/esr,, blood/urine cx, viral panel  - Check C Diff with diarrhea and recent abx     Hematology/Oncology   # No acute issues     Endocrine   # Hypoglycemia  - On D10W - decrease rate, add D5LR  - Trend POCT Q4    #adrenal insufficiency?  -Check AM cortisol and check after giving a dose of Cosyntropin. Social/Spiritual/DNR/Other   Code: Full  DVT ppx: lovenox  GI ppx: Pepcid    Patient remains critically ill and requiring ICU level care.     Victoriano Paz MD  7:23 AM  02/21/23

## 2023-02-21 NOTE — H&P
[facility] History and Physical        Chief Complaint:  sepsis  History of Present Illness   The patient is a 70 y.o. female  - sent in from [facility] BP low and fever   past medical history of Depression/ anxiety: on lexapro and ativan   H/o tachycardia: on cardizem--Hypotension with h/o HTN:recently midodrine stopped. / cardizem dose decreased  Hx fo CORRINE, T12 and L1 compression fractures, chronic back pain, spinal cord stimulator --sp TSLO brace (now [facility])) for compression fracture of her spine. +urinary retntion. Hx of UTI and LE cellulitis and hypertension --  Sp abx recently for b/l LE ellultis/secondary to scratching. DCd on keflex and doxy--continued to have these antibiotics however after they did cause GI issues such as diarrhea. Has had poor appetite since discharge    Reports not been feeling well since discharge from the hospital on 2/4/23. Sent in with +n/v with poor appetite, diarrhea,   Low K+ , low mg 1.3-   Feversihc/hills temp 100.9, UTI? Also cough:  cxr:  hx of vomitus, possible aspiration  \"  Mild bronchovascular prominence, this could   represent subsegmental atelectatic changes or aspiration pneumonia. Long hx of tachycardic 110s,   Recurrent hypotensive with BP 89/43. In ER     Hx of urinary retention  +pyruia suspected UTI-- sp  sepsis resuscitation in the ED and started on zosyn for presumed sepsis- ER placed R femoral TLC- considering levophed and ICU     Glucose 47 in ER, risk for refeeding syndrome  ? adrenal insufficiency, low BP and in ER hypoglycemic and started on D10 in the ED    Now complaining of exac of back pain, s/p R TLC also bothering groin  Requesting pain meds in ER- focused such her only complaint to me      Last admission from our group DC summary on 2/4:  \". .admitted to bilateral lower extremities cellulitis. Likely due to scratching as she has multiple scratch marks/scars on her bilateral shins.   She was placed on Vanco and Zosyn which were later switched to doxycycline and cephalexin.  Patient had issues with urinary retention, urology consulted, and patient finally agreed to do placing a Brewer catheter.  Leukocytosis and cellulitis improving.  She will be discharged to SNF in stable condition\"      Admitted downtown in DEC:  \"Diagnostic evaluation revealed compression fractures of the spine.  She  was admitted to the hospital.  She was seen by Ortho and Neurosurgery,  who recommended a back brace.  She was treated for pain.  She was  discharged to rehab in stable condition on 12/15/2022.\"  - hx taken from the   REVIEW OF SYSTEMS:  no fevers, chills, cp, sob, n/v, ha, vision/hearing changes, wt changes, hot/cold flashes, other open skin lesions, diarrhea, constipation, dysuria/hematuria unless noted in HPI. Complete ROS performed with the patient and is otherwise negative.    Past Medical History:      Diagnosis Date    Anxiety     Arthritis     Asthma     Claustrophobia     Dermatitis 02/2017    bilateral legs knees to ankle; follows with [facility]    Fracture 02/2017    \"in Spine\" compression T10 per pt; difficulty laying flat    GERD (gastroesophageal reflux disease)     Hiatal hernia     History of blood transfusion     HTN (hypertension) 4/22/2013    Hyperlipidemia     On aspirin at home     for age per pcp    Pancreatic cyst 5/27/2017    Pneumonia 07/2018    Sleep apnea     SOB (shortness of breath) 4/22/2013    Tachycardia 04/22/2013    follows with Dr NUPUR Chavez       Past Surgical History:        Procedure Laterality Date    BACK SURGERY  8/2014    has screws in back    CATARACT REMOVAL  12/6/2013, 2/20/2013    [facility] with lens implants    CHOLECYSTECTOMY      JOINT REPLACEMENT  12/16/2016    SI joint fusion Right    OTHER SURGICAL HISTORY  02/09/2017    MRI -     SPINAL FIXATION SURGERY WITH IMPLANT  2013    in [place]    TOTAL KNEE ARTHROPLASTY Right     UPPER GASTROINTESTINAL ENDOSCOPY  07/10/2017       Home  Medications:  Prior to Admission medications    Medication Sig Start Date End Date Taking? Authorizing Provider   doxycycline hyclate (VIBRAMYCIN) 100 MG capsule Take 100 mg by mouth 2 times daily    Historical Provider, MD   guaiFENesin-codeine (TUSSI-ORGANIDIN NR) 100-10 MG/5ML syrup Take 5 mLs by mouth 3 times daily as needed for Cough. Historical Provider, MD   acetaminophen (TYLENOL 8 HOUR) 650 MG extended release tablet Take 650 mg by mouth as needed for Pain    Historical Provider, MD   furosemide (LASIX) 20 MG tablet Take 1 tablet by mouth daily 2/7/23   Jun Jimenez DO   potassium chloride (KLOR-CON M) 10 MEQ extended release tablet Take 1 tablet by mouth daily 2/7/23   Jun Jimenez DO   dilTIAZem (CARDIZEM CD) 120 MG extended release capsule Take 1 capsule by mouth in the morning and at bedtime 2/4/23   STEVE Knox NP   polyethylene glycol (GLYCOLAX) 17 g packet Take 17 g by mouth daily 2/5/23 3/8/23  STEVE Knox NP   white petrolatum OINT ointment Apply topically 2 times daily 2/3/23   JOSEFA Hopkins   camphor-menthol [facility]) 0.5-0.5 % lotion Apply topically as needed.  2/4/23   JOSEFA Hopkins   docusate sodium (COLACE, DULCOLAX) 100 MG CAPS Take 100 mg by mouth 2 times daily as needed for Constipation 2/3/23   JOSEFA Hopkins   fluticasone (FLONASE) 50 MCG/ACT nasal spray USE 1 SPRAY IN EACH NOSTRIL DAILY 6/27/22   STEVE Ryan CNP   Fluticasone furoate-vilanterol (BREO ELLIPTA) 200-25 MCG/INH AEPB inhaler Inhale 1 puff into the lungs daily 1/10/22   STEVE Ryan CNP   montelukast (SINGULAIR) 10 MG tablet TAKE 1 TABLET DAILY 1/10/22   STEVE Ryan CNP   albuterol sulfate HFA (VENTOLIN HFA) 108 (90 Base) MCG/ACT inhaler Inhale 2 puffs into the lungs every 6 hours as needed for Wheezing 1/10/22   STEVE Ryan - CNP   vitamin C (ASCORBIC ACID) 500 MG tablet Take 1 tablet by mouth daily 7/4/18   Avi Lawton DO Tita   LORazepam (ATIVAN) 2 MG tablet Take 2 mg by mouth 3 times daily. Divina Screen Historical Provider, MD   aspirin 81 MG tablet Take 81 mg by mouth daily Pt to hold 5 days prior per Dr. Barragan Citlalli Provider, MD   escitalopram (LEXAPRO) 10 MG tablet Take 10 mg by mouth nightly  11/26/13   Historical Provider, MD   famotidine (PEPCID) 20 MG tablet Take 20 mg by mouth 2 times daily Instructed to take am of procedure    Historical Provider, MD   atorvastatin (LIPITOR) 10 MG tablet Take 10 mg by mouth nightly     Historical Provider, MD       Allergies:  Percocet [oxycodone-acetaminophen]    Social History:   TOBACCO:   reports that she has never smoked. She has never used smokeless tobacco.  ETOH:   reports no history of alcohol use. Family History:       Problem Relation Age of Onset    Stroke Mother     Heart Disease Mother     Hypertension Mother     Other Mother         CHOLECYSTECTOMY; OSTEOPENIA    Heart Disease Father     Hypertension Father     Diabetes Father     Arthritis Father     Asthma Daughter       Or deferred/otherwise considered non contributory to current admission  PHYSICAL EXAM:    VS: BP 93/64   Pulse 98   Temp 98.7 °F (37.1 °C)   Resp 16   Ht 5' 4\" (1.626 m)   Wt 114 lb (51.7 kg)   SpO2 94%   BMI 19.57 kg/m²     General Appearance:    ++mod acute distress. Psych:  HEENT:    A.O. As per HPI details  NC/AT, PERRL, no pallor no icterus, lips/ext mucous membrane grossly dry   Neck:   Supple, trachea midline, no obvious JVD   Resp:     dec\  BS  No wheezes, No rhonchi   Chest wall:    No tenderness or deformity   Heart:    Regular rate and rhythm, S1 and S2 normal, no rub or gallop. Abdomen:     Soft, non-tender, bowel sounds active    no suspicious obvious masses/organomegaly   Genitalia & Rectal:    Deferred.    Extremities x4:   Extremities R TLC groin placed   atraumatic, no cyanosis, no clubbing   Musculoskeletal:      NO active synovitis or swollen b/l wrists, 2-5 MCPs examined   Skin:   Skin color, texture, turgor fairlydryhealing lesions on legs   Lymph nodes:   Cervical nodes grossly normal   Neurologic:  .unalbe to test, back pain, weak     LABS:  CBC:   Lab Results   Component Value Date/Time    WBC 7.7 02/20/2023 05:43 PM    RBC 3.30 02/20/2023 05:43 PM    HGB 8.9 02/20/2023 05:43 PM    HCT 28.5 02/20/2023 05:43 PM     02/20/2023 05:43 PM    MCV 86.4 02/20/2023 05:43 PM     BMP:    Lab Results   Component Value Date/Time     02/20/2023 05:43 PM    K 2.7 02/20/2023 05:43 PM     02/20/2023 05:43 PM    CO2 24 02/20/2023 05:43 PM    BUN 8 02/20/2023 05:43 PM    CREATININE 0.6 02/20/2023 05:43 PM    GLUCOSE 47 02/20/2023 05:43 PM    GLUCOSE 85 04/02/2012 08:30 AM    CALCIUM 7.5 02/20/2023 05:43 PM     Hepatic Function Panel:    Lab Results   Component Value Date/Time    ALKPHOS 95 02/20/2023 05:43 PM    AST 31 02/20/2023 05:43 PM    ALT 12 02/20/2023 05:43 PM    PROT 4.9 02/20/2023 05:43 PM    LABALBU 2.4 02/20/2023 05:43 PM    LABALBU 3.6 04/02/2012 08:30 AM    BILITOT 0.5 02/20/2023 05:43 PM     Magnesium:    Lab Results   Component Value Date/Time    MG 1.3 02/20/2023 05:43 PM       PT/INR:    Lab Results   Component Value Date/Time    PROTIME 11.8 02/13/2023 05:57 AM    PROTIME 11.7 04/01/2012 03:16 PM    INR 1.1 02/13/2023 05:57 AM     U/A:   Lab Results   Component Value Date/Time    LEUKOCYTESUR MODERATE 02/20/2023 06:11 PM    PHUR 7.0 02/20/2023 06:11 PM    WBCUA 10-20 02/20/2023 06:11 PM    WBCUA 1-3 04/01/2012 03:16 PM    RBCUA 10-20 02/20/2023 06:11 PM    RBCUA NONE 04/01/2012 03:16 PM    BACTERIA FEW 02/20/2023 06:11 PM    SPECGRAV 1.010 02/20/2023 06:11 PM    BLOODU LARGE 02/20/2023 06:11 PM    GLUCOSEU Negative 02/20/2023 06:11 PM    GLUCOSEU NEGATIVE 04/01/2012 03:16 PM     ABG:  No results found for: PHART, IAH2OZZ, PO2ART, Y4NGVJUJ, HKF1MAQ, BEART  TSH:    Lab Results   Component Value Date/Time    TSH 5.640 01/31/2023 08:17 PM     Cardiac Enzymes:   Lab Results   Component Value Date    CKTOTAL 35 04/01/2012    CKTOTAL 52 02/04/2012    CKTOTAL 37 01/20/2011    CKMB <0.2 04/01/2012    CKMB <0.2 02/04/2012    CKMB <0.2 01/20/2011    TROPONINI <0.01 12/11/2018    TROPONINI <0.01 12/06/2018    TROPONINI <0.01 06/30/2018       Radiology: CT ABDOMEN PELVIS WO CONTRAST Additional Contrast? None    Result Date: 2/20/2023  EXAMINATION: CT OF THE ABDOMEN AND PELVIS WITHOUT CONTRAST 2/20/2023 7:41 pm TECHNIQUE: CT of the abdomen and pelvis was performed without the administration of intravenous contrast. Multiplanar reformatted images are provided for review. Automated exposure control, iterative reconstruction, and/or weight based adjustment of the mA/kV was utilized to reduce the radiation dose to as low as reasonably achievable. COMPARISON: None. HISTORY: ORDERING SYSTEM PROVIDED HISTORY: UTI rule out obstructive uropathy TECHNOLOGIST PROVIDED HISTORY: Reason for exam:->UTI rule out obstructive uropathy Additional Contrast?->None Decision Support Exception - unselect if not a suspected or confirmed emergency medical condition->Emergency Medical Condition (MA) FINDINGS: Lower Chest: Small left pleural effusion with adjacent atelectasis. Organs: And the liver, spleen, adrenal glands, kidneys, pancreas are normal. Cholecystectomy. GI/Bowel: Diffuse colonic fecal retention. Normal small bowel and appendix. Pelvis: Brewer catheter in a decompressed urinary bladder. Peritoneum/Retroperitoneum: No free fluid or free air. Bones/Soft Tissues: Posterior spinal fixation hardware lumbar spine. Degenerative changes thoracolumbar spine. Degenerative changes involving both hip joints. No urinary tract stones or hydronephrosis. Brewer catheter in a decompressed urinary bladder. Diffuse colonic fecal retention. Cholecystectomy.      CT CHEST WO CONTRAST    Result Date: 2/20/2023  EXAMINATION: CT OF THE CHEST WITHOUT CONTRAST 2/20/2023 7:41 pm TECHNIQUE: CT of the chest was performed without the administration of intravenous contrast. Multiplanar reformatted images are provided for review. Automated exposure control, iterative reconstruction, and/or weight based adjustment of the mA/kV was utilized to reduce the radiation dose to as low as reasonably achievable. COMPARISON: None. HISTORY: ORDERING SYSTEM PROVIDED HISTORY: sob, fever, ?pneumonia, ?covid-19 TECHNOLOGIST PROVIDED HISTORY: Reason for exam:->sob, fever, ?pneumonia, ?covid-19 FINDINGS: Mediastinum: Thoracic aorta, heart and pericardium are normal.  Calcified plaque involving coronary arteries. No significant mediastinal adenopathy. Lungs/pleura:   No evidence for pneumonia. Small left pleural effusion with adjacent atelectasis. No pneumothorax. Upper Abdomen: Moderate-sized hiatal hernia. Cholecystectomy. Colonic fecal retention. Soft Tissues/Bones:   Degenerative changes thoracic spine with kyphosis and multilevel vertebroplasties. Chronic appearing compression deformities lower thoracic spine. Neurostimulator terminates at the level the midthoracic spine. Small left pleural effusion with adjacent atelectasis. No evidence for pneumonia. Hiatal hernia. XR CHEST PORTABLE    Result Date: 2/20/2023  EXAMINATION: ONE XRAY VIEW OF THE CHEST 2/20/2023 5:50 pm COMPARISON: January 31, 2023 HISTORY: ORDERING SYSTEM PROVIDED HISTORY: coarse breath sounds TECHNOLOGIST PROVIDED HISTORY: Reason for exam:->coarse breath sounds FINDINGS: Poor inspiratory effort is seen. The cardiomediastinal silhouette is prominent in size. Peribronchial cuffing bilateral hilar prominence, prominence of the bronchovascular and interstitial lung markings visualized most prominent in the left lower lung field, mild haziness overlying the left costophrenic angle, the right costophrenic angle is clear with no evidence of pleural fluid. No evidence of pneumothorax is seen. Biapical prominence suggestive of COPD changes.  Moderate sized hiatus hernia is seen. Degenerative bone changes seen. Moderate sized hiatus hernia. Mild bronchovascular prominence, this could represent subsegmental atelectatic changes or aspiration pneumonia. EKG:  Sinus tachycardia  Low voltage QRS  Cannot rule out Anterior infarct (cited on or before 12-JAN-2023)  Abnormal ECG  When compared with ECG of 31-JAN-2023 23:03,  Non-specific change in ST segment in Inferior leads  T wave inversion now evident in Lateral leads   Assessment & Plan   ACTIVE hospital problems being addressed/reassessed for this admission:  Principal Problem:    Septic shock ([address]  Active Problems:    Asthma    Acute cystitis without hematuria    HTN (hypertension), benign    Anxiety disorder    Chronic back pain  Resolved Problems:    * No resolved hospital problems. *    Code status/DVT prophylaxis and PLAN --see orders   Note extensive time spent coordinating care between ER docs, ER and floor nurses, and transitioning care over to day providers  Plan of care/ clinical impressions/communication specifics detailed below:    70 y.o. female  - sent in from [facility] BP low and fever   past medical history of Depression/ anxiety: on lexapro and ativan   H/o tachycardia: on cardizem--Hypotension with h/o HTN:recently midodrine stopped. / cardizem dose decreased  Hx fo CORRINE, T12 and L1 compression fractures, chronic back pain, spinal cord stimulator --sp TSLO brace (now [facility])) for compression fracture of her spine. +urinary retntion. Hx of UTI and LE cellulitis and hypertension --  Sp abx recently for b/l LE ellultis/secondary to scratching. DCd on keflex and doxy--continued to have these antibiotics however after they did cause GI issues such as diarrhea. Has had poor appetite since discharge    Reports not been feeling well since discharge from the hospital on 2/4/23. Sent in with +n/v with poor appetite, diarrhea,   Low K+ , low mg 1.3-   Feversihc/hills temp 100.9, UTI?   Also cough: cxr:  hx of vomitus, possible aspiration  \"  Mild bronchovascular prominence, this could   represent subsegmental atelectatic changes or aspiration pneumonia. Long hx of tachycardic 110s,   Recurrent hypotensive with BP 89/43. In ER     Hx of urinary retention  +pyruia suspected UTI-- sp  sepsis resuscitation in the ED and started on zosyn for presumed sepsis- ER placed R femoral TLC- considering levophed and ICU   Ekg new T wave inversions  Trop 73- rule out strain type 2 myocardial injury      Glucose 47 in ER, risk for refeeding syndrome  ? adrenal insufficiency, low BP and in ER hypoglycemic and started on D10 in the ED    Now complaining of exac of back pain, s/p R TLC also bothering groin  Requesting pain meds in ER- focused such her only complaint to me  Will trial iv opiods low dose (hx morphine makes her crazy)  Anxiety high dose chronic benzos - will continue    Hgb   8.9  chronic ACD- this is baseline    Last admission from our group DC summary on 2/4:  \". .admitted to bilateral lower extremities cellulitis. Likely due to scratching as she has multiple scratch marks/scars on her bilateral shins. She was placed on Vanco and Zosyn which were later switched to doxycycline and cephalexin. Patient had issues with urinary retention, urology consulted, and patient finally agreed to do placing a Brewer catheter.      Coco Calderon MD   Night Officer, overnight admitting doctor at [facility] call day time doctor   for questions after 7:30am    Covering for [facility] Service  If Qs please call [phone]  Electronically signed by Logan Vazquez MD on 2/20/2023 at 11:43 PM

## 2023-02-21 NOTE — TELEPHONE ENCOUNTER
[facility] contacted Dr. Vern Hanson to inform of 30 day readmission risk. 's attempt to contact Dr. Vern Hanson was unsuccessful.      Call Back: If you need to call back to inform of disposition you can contact me at [phone]

## 2023-02-21 NOTE — PROGRESS NOTES
[facility] Progress Note    Admitting Date and Time: 2/20/2023  5:09 PM  Admit Dx: Septic shock ([facility] [A41.9, R65.21]    Subjective:  Patient is being followed for Septic shock ([facility] [A41.9, R65.21]   Pt in no pain. Admitted for Sepsis. Has a Spinal Stimulator with no battery life. Dr. Semaj Venegas placed a H & P for battery replacement at [facility] tomorrow. Patient septic with fever, hypotension and cellulitis of lower extremities. Procalcitonin and Sed rate elevated. ID consulted for antibiotic recommendations. Had been on IV Zosyn and Vancomycin. Had Doxycycline as an outpatient. Currently on IV Zosyn. UA with 10-20 wbc/hpf, moderate bacteria with moderate leukocyte esterase. Per RN: No new concerns.     ROS: denies fever, chills, cp, sob, n/v, HA unless stated above.      sodium chloride flush  5-40 mL IntraVENous 2 times per day    enoxaparin  40 mg SubCUTAneous Daily    acetaminophen  650 mg Oral Once    thiamine  100 mg IntraVENous Daily    sennosides-docusate sodium  1 tablet Oral BID    acetaminophen  1,000 mg Oral Once    piperacillin-tazobactam  4,500 mg IntraVENous Q6H    aspirin  81 mg Oral Daily    atorvastatin  10 mg Oral Nightly    escitalopram  10 mg Oral Nightly    famotidine  20 mg Oral BID    LORazepam  2 mg Oral TID    polyethylene glycol  17 g Oral Daily    montelukast  10 mg Oral Daily     sodium chloride flush, 5-40 mL, PRN  sodium chloride, , PRN  polyethylene glycol, 17 g, Daily PRN  potassium chloride, 20 mEq, PRN  trimethobenzamide, 200 mg, Q6H PRN  acetaminophen, 650 mg, Q4H PRN  guaiFENesin-codeine, 5 mL, TID PRN  levalbuterol, 0.63 mg, Q8H PRN         Objective:    BP (!) 84/44   Pulse 97   Temp 98.1 °F (36.7 °C) (Oral)   Resp 23   Ht 5' 4\" (1.626 m)   Wt 130 lb 11.7 oz (59.3 kg)   SpO2 98%   BMI 22.44 kg/m²     General Appearance: alert and oriented to person, place and time and in no acute distress  Skin: warm and dry  Head: normocephalic and atraumatic  Eyes: pupils equal, round, and reactive to light, extraocular eye movements intact, conjunctivae normal  Neck: neck supple and non tender without mass   Pulmonary/Chest: clear to auscultation bilaterally- no wheezes, rales or rhonchi, normal air movement, no respiratory distress  Cardiovascular: normal rate, normal S1 and S2 and no carotid bruits  Abdomen: soft, less distended, normal bowel sounds, no masses or organomegaly  Extremities: no cyanosis, no clubbing and 1+ edema. Mild rash on lower legs. Erythema present and exfoliation noted. Neurologic: no cranial nerve deficit and speech normal  Following commands    Recent Labs     02/20/23 1743 02/21/23  0130 02/21/23  0430    135 135   K 2.7* 3.5 3.7    105 106   CO2 24 21* 22   BUN 8 6 5*   CREATININE 0.6 0.6 0.6   GLUCOSE 47* 86 83   CALCIUM 7.5* 6.7* 6.9*       Recent Labs     02/20/23 1743 02/21/23  0130 02/21/23  0430   WBC 7.7 6.7 6.4   RBC 3.30* 3.15* 2.97*   HGB 8.9* 8.4* 7.8*   HCT 28.5* 27.2* 25.4*   MCV 86.4 86.3 85.5   MCH 27.0 26.7 26.3   MCHC 31.2* 30.9* 30.7*   RDW 21.4* 21.5* 21.5*    276 277   MPV 11.9 10.7 11.0       Radiology:   CT ABDOMEN PELVIS WO CONTRAST Additional Contrast? None    Result Date: 2/20/2023  EXAMINATION: CT OF THE ABDOMEN AND PELVIS WITHOUT CONTRAST 2/20/2023 7:41 pm TECHNIQUE: CT of the abdomen and pelvis was performed without the administration of intravenous contrast. Multiplanar reformatted images are provided for review. Automated exposure control, iterative reconstruction, and/or weight based adjustment of the mA/kV was utilized to reduce the radiation dose to as low as reasonably achievable. COMPARISON: None.  HISTORY: ORDERING SYSTEM PROVIDED HISTORY: UTI rule out obstructive uropathy TECHNOLOGIST PROVIDED HISTORY: Reason for exam:->UTI rule out obstructive uropathy Additional Contrast?->None Decision Support Exception - unselect if not a suspected or confirmed emergency medical condition->Emergency Medical Condition (MA) FINDINGS: Lower Chest: Small left pleural effusion with adjacent atelectasis. Organs: And the liver, spleen, adrenal glands, kidneys, pancreas are normal. Cholecystectomy. GI/Bowel: Diffuse colonic fecal retention. Normal small bowel and appendix. Pelvis: Brewer catheter in a decompressed urinary bladder. Peritoneum/Retroperitoneum: No free fluid or free air. Bones/Soft Tissues: Posterior spinal fixation hardware lumbar spine. Degenerative changes thoracolumbar spine. Degenerative changes involving both hip joints. No urinary tract stones or hydronephrosis. Brewer catheter in a decompressed urinary bladder. Diffuse colonic fecal retention. Cholecystectomy. CT CHEST WO CONTRAST    Result Date: 2/20/2023  EXAMINATION: CT OF THE CHEST WITHOUT CONTRAST 2/20/2023 7:41 pm TECHNIQUE: CT of the chest was performed without the administration of intravenous contrast. Multiplanar reformatted images are provided for review. Automated exposure control, iterative reconstruction, and/or weight based adjustment of the mA/kV was utilized to reduce the radiation dose to as low as reasonably achievable. COMPARISON: None. HISTORY: ORDERING SYSTEM PROVIDED HISTORY: sob, fever, ?pneumonia, ?covid-19 TECHNOLOGIST PROVIDED HISTORY: Reason for exam:->sob, fever, ?pneumonia, ?covid-19 FINDINGS: Mediastinum: Thoracic aorta, heart and pericardium are normal.  Calcified plaque involving coronary arteries. No significant mediastinal adenopathy. Lungs/pleura:   No evidence for pneumonia. Small left pleural effusion with adjacent atelectasis. No pneumothorax. Upper Abdomen: Moderate-sized hiatal hernia. Cholecystectomy. Colonic fecal retention. Soft Tissues/Bones:   Degenerative changes thoracic spine with kyphosis and multilevel vertebroplasties. Chronic appearing compression deformities lower thoracic spine. Neurostimulator terminates at the level the midthoracic spine.      Small left pleural effusion with adjacent atelectasis. No evidence for pneumonia. Hiatal hernia. XR CHEST PORTABLE    Result Date: 2/20/2023  EXAMINATION: ONE XRAY VIEW OF THE CHEST 2/20/2023 5:50 pm COMPARISON: January 31, 2023 HISTORY: ORDERING SYSTEM PROVIDED HISTORY: coarse breath sounds TECHNOLOGIST PROVIDED HISTORY: Reason for exam:->coarse breath sounds FINDINGS: Poor inspiratory effort is seen. The cardiomediastinal silhouette is prominent in size. Peribronchial cuffing bilateral hilar prominence, prominence of the bronchovascular and interstitial lung markings visualized most prominent in the left lower lung field, mild haziness overlying the left costophrenic angle, the right costophrenic angle is clear with no evidence of pleural fluid. No evidence of pneumothorax is seen. Biapical prominence suggestive of COPD changes. Moderate sized hiatus hernia is seen. Degenerative bone changes seen. Moderate sized hiatus hernia. Mild bronchovascular prominence, this could represent subsegmental atelectatic changes or aspiration pneumonia. Assessment:    Principal Problem:    Septic shock (HCC)  Active Problems:    Asthma    Acute cystitis without hematuria    HTN (hypertension), benign    Anxiety disorder    Chronic back pain  Resolved Problems:    * No resolved hospital problems. *      Plan:  1. Sepsis with Hypotension/Septic Shock - On IV Zosyn. ID consulted. Critical care consult. Procalcitonin 3.2. Source does not appear to be legs. Pyuria present. Await urine culture results. Has a Spinal stimulator which needs battery replacement. On hold now until condition stabilizes. 2.    Hyperlipidemia - Lipid panel in am.  On statin. LFTs normal.  Continue same. Last Lipid panel on 2/6/23 was at goal except for HDL which was low (81/102/23/38). 3.  Hypoalbuminemia - albumin 2.1; total protein 4.3.  4.  Prolonged QT - QTc 623 on 2/20/23.   Keep electrolytes Mg2+ > 2 and K+ >4.  5.  Anemia of Inflammation - Hgb 7.8.  Monitor for bleeding.  Keep Hgb >8.  6.  Ambulatory dysfunction - needs a battery replacement for her spinal stimulstor.    NOTE: This report was transcribed using voice recognition software. Every effort was made to ensure accuracy; however, inadvertent computerized transcription errors may be present.    Electronically signed by Pia Jimenez MD on 2/21/2023 at 8:42 AM

## 2023-02-21 NOTE — CONSULTS
[address] [place] [address] [facility]    Consultation Note     Admit Date: 2/20/2023  5:09 PM    Reason for Consult:   sepsis    Attending Physician:  Mari Sarmiento MD     Chief Complaint: weakness, chills. HISTORY OF PRESENT ILLNESS:   The patient is a 70 y.o.  female known to the Infectious Diseases service. The patient was treated for possible left knee septic arthritis with IV cefazolin. She has hx of chronic back pain with spinal cord stimulator. Presented with chills and weakness from NH. No abdominal pain or Sob or cough. Since admission, pt is febrile to 100.9, tachycardic intermittently, hypotensive, not on any pressors. On 2L NC. Labs showed white count is normal. Hgb is 7.8, platelet count is 937. Lfts normal. BMP is normal. Pct is 3.2, RVP negative. Blood cxin process. MRSA NS negative. UA showed mild pyuria. CT chest and A/P did not show any sign of infectious cause. Patient got a dose of IV vancomycin and on IV Zosyn and I got consulted for further recommendations.     Past Medical History:        Diagnosis Date    Anxiety     Arthritis     Asthma     Claustrophobia     Dermatitis 02/2017    bilateral legs knees to ankle; follows with [facility]    Fracture 02/2017    \"in Spine\" compression T10 per pt; difficulty laying flat    GERD (gastroesophageal reflux disease)     Hiatal hernia     History of blood transfusion     HTN (hypertension) 4/22/2013    Hyperlipidemia     On aspirin at home     for age per pcp    Pancreatic cyst 5/27/2017    Pneumonia 07/2018    Sleep apnea     SOB (shortness of breath) 4/22/2013    Tachycardia 04/22/2013    follows with Dr Meenakshi Patton     Past Surgical History:        Procedure Laterality Date    BACK SURGERY  8/2014    has screws in back    CATARACT REMOVAL  12/6/2013, 2/20/2013    [facility] with lens implants    CHOLECYSTECTOMY      JOINT REPLACEMENT  12/16/2016    SI joint fusion Right    OTHER SURGICAL HISTORY  02/09/2017 MRI -     SPINAL FIXATION SURGERY WITH IMPLANT  2013    in [address] Right     UPPER GASTROINTESTINAL ENDOSCOPY  07/10/2017     Current Medications:   Scheduled Meds:   sodium chloride flush  5-40 mL IntraVENous 2 times per day    enoxaparin  40 mg SubCUTAneous Daily    acetaminophen  650 mg Oral Once    thiamine  100 mg IntraVENous Daily    sennosides-docusate sodium  1 tablet Oral BID    piperacillin-tazobactam  4,500 mg IntraVENous Q8H    acetaminophen  1,000 mg Oral Once    aspirin  81 mg Oral Daily    atorvastatin  10 mg Oral Nightly    escitalopram  10 mg Oral Nightly    famotidine  20 mg Oral BID    LORazepam  2 mg Oral TID    polyethylene glycol  17 g Oral Daily    montelukast  10 mg Oral Daily     Continuous Infusions:   sodium chloride      dextrose 5% in lactated ringers 100 mL/hr at 02/21/23 0612    dextrose 30 mL/hr at 02/21/23 0215     PRN Meds:sodium chloride flush, sodium chloride, polyethylene glycol, potassium chloride, trimethobenzamide, acetaminophen, guaiFENesin-codeine, levalbuterol    Allergies:  Percocet [oxycodone-acetaminophen]    Social History:   Social History     Socioeconomic History    Marital status:    Occupational History    Occupation: retired-     Tobacco Use    Smoking status: Never    Smokeless tobacco: Never   Vaping Use    Vaping Use: Never used   Substance and Sexual Activity    Alcohol use: No    Drug use: Never    Sexual activity: Not Currently   Social History Narrative    ** Merged History Encounter **            Family History:       Problem Relation Age of Onset    Stroke Mother     Heart Disease Mother     Hypertension Mother     Other Mother         CHOLECYSTECTOMY; OSTEOPENIA    Heart Disease Father     Hypertension Father     Diabetes Father     Arthritis Father     Asthma Daughter    . Otherwise non-pertinent to the chief complaint. REVIEW OF SYSTEMS:    As mentioned in HPI, all other systems negative. PHYSICAL EXAM:    Vitals:    BP (!) 84/44   Pulse 97   Temp 98.1 °F (36.7 °C) (Oral)   Resp 23   Ht 5' 4\" (1.626 m)   Wt 130 lb 11.7 oz (59.3 kg)   SpO2 98%   BMI 22.44 kg/m²   Constitutional: The patient is awake, alert, and oriented. Ill appeating  Skin: Warm and dry. No jaundice. HEENT: Eyes show round, and reactive pupils. Moist mucous membranes, no ulcerations, no thrush. Neck: Supple to movements. No lymphadenopathy. Chest: clear to auscultation  Cardiovascular: S1 and S2 are rhythmic and regular. No murmurs appreciated. Abdomen: Positive bowel sounds to auscultation. Benign to palpation. No masses felt. No hepatosplenomegaly. Genitourinary: meza in place  Extremities: No clubbing, no cyanosis, no edema. Musculoskeletal: bilateral LE scratches with chronic pinkish appearance. No sign of cellulitis. Neurological: alert, oriented x 3.  No gross motor abnormalities  Lines: peripheral, right fem CVC      CBC+dif:  Recent Labs     02/20/23  1743 02/21/23  0130 02/21/23  0430   WBC 7.7 6.7 6.4   HGB 8.9* 8.4* 7.8*   HCT 28.5* 27.2* 25.4*   MCV 86.4 86.3 85.5    276 277   NEUTROABS 3.64 2.33 2.39     Lab Results   Component Value Date    CRP 12.8 (H) 02/21/2023    CRP 24.1 (H) 02/01/2023    CRP 7.8 (H) 09/30/2017     No results found for: [facility]  Lab Results   Component Value Date    SEDRATE 26 (H) 02/21/2023    SEDRATE 42 (H) 02/01/2023    SEDRATE 40 (H) 09/30/2017     Lab Results   Component Value Date    ALT 9 02/21/2023    AST 28 02/21/2023    ALKPHOS 81 02/21/2023    BILITOT 0.4 02/21/2023     Lab Results   Component Value Date/Time     02/21/2023 04:30 AM    K 3.7 02/21/2023 04:30 AM    K 3.5 02/21/2023 01:30 AM     02/21/2023 04:30 AM    CO2 22 02/21/2023 04:30 AM    BUN 5 02/21/2023 04:30 AM    CREATININE 0.6 02/21/2023 04:30 AM    GFRAA >60 01/12/2019 04:00 AM    LABGLOM >60 02/21/2023 04:30 AM    GLUCOSE 83 02/21/2023 04:30 AM    GLUCOSE 85 04/02/2012 08:30 AM PROT 4.3 02/21/2023 04:30 AM    LABALBU 2.1 02/21/2023 04:30 AM    LABALBU 3.6 04/02/2012 08:30 AM    CALCIUM 6.9 02/21/2023 04:30 AM    BILITOT 0.4 02/21/2023 04:30 AM    ALKPHOS 81 02/21/2023 04:30 AM    AST 28 02/21/2023 04:30 AM    ALT 9 02/21/2023 04:30 AM       Lab Results   Component Value Date/Time    PROTIME 11.8 02/13/2023 05:57 AM    PROTIME 11.7 04/01/2012 03:16 PM    INR 1.1 02/13/2023 05:57 AM       Lab Results   Component Value Date/Time    TSH 5.640 01/31/2023 08:17 PM       Lab Results   Component Value Date/Time    COLORU Yellow 02/20/2023 06:11 PM    PHUR 7.0 02/20/2023 06:11 PM    WBCUA 10-20 02/20/2023 06:11 PM    WBCUA 1-3 04/01/2012 03:16 PM    RBCUA 10-20 02/20/2023 06:11 PM    RBCUA NONE 04/01/2012 03:16 PM    MUCUS Present 12/26/2016 03:17 PM    BACTERIA FEW 02/20/2023 06:11 PM    CLARITYU SL CLOUDY 02/20/2023 06:11 PM    SPECGRAV 1.010 02/20/2023 06:11 PM    LEUKOCYTESUR MODERATE 02/20/2023 06:11 PM    UROBILINOGEN 1.0 02/20/2023 06:11 PM    BILIRUBINUR SMALL 02/20/2023 06:11 PM    BILIRUBINUR NEGATIVE 04/01/2012 03:16 PM    BLOODU LARGE 02/20/2023 06:11 PM    GLUCOSEU Negative 02/20/2023 06:11 PM    GLUCOSEU NEGATIVE 04/01/2012 03:16 PM    AMORPHOUS MODERATE 02/04/2012 01:45 PM       No results found for: AOC9FGI, BEART, X0YUBQQO, PHART, THGBART, FCR9SXE, PO2ART, GZB9AZP  Radiology:  CT ABDOMEN PELVIS WO CONTRAST Additional Contrast? None   Final Result   No urinary tract stones or hydronephrosis. Brewer catheter in a decompressed   urinary bladder. Diffuse colonic fecal retention. Cholecystectomy. CT CHEST WO CONTRAST   Final Result   Small left pleural effusion with adjacent atelectasis. No evidence for pneumonia. Hiatal hernia. XR CHEST PORTABLE   Final Result   Moderate sized hiatus hernia. Mild bronchovascular prominence, this could   represent subsegmental atelectatic changes or aspiration pneumonia.              Microbiology:  Reviewed all the cultures in epic from 2018 (resp, urine and blood ) have always been negative  Blood cx 2/20: in process  RVP negative    Assessment:  Sepsis/ likely complicated UTI: no other source was found. She had soft stool on my exam. Unlikely this is C.diff. Elevated procalcitonin:  Bilateral LE scratches: no sign of acute infection. Plan:    Cont IV Zosyn pending all the cultures. Monitor labs  Will follow with you    Thank you for having us see this patient in consultation. I will be discussing this case with the treating physicians.     Electronically signed by Yajaira Nieto MD on 2/21/2023 at 9:51 AM

## 2023-02-21 NOTE — PLAN OF CARE
Problem: Discharge Planning  Goal: Discharge to home or other facility with appropriate resources  Outcome: Progressing     Problem: Pain  Goal: Verbalizes/displays adequate comfort level or baseline comfort level  Outcome: Progressing     Problem: Skin/Tissue Integrity  Goal: Absence of new skin breakdown  Description: 1. Monitor for areas of redness and/or skin breakdown  2. Assess vascular access sites hourly  3. Every 4-6 hours minimum:  Change oxygen saturation probe site  4. Every 4-6 hours:  If on nasal continuous positive airway pressure, respiratory therapy assess nares and determine need for appliance change or resting period.   Outcome: Progressing       Problem: Safety - Adult  Goal: Free from fall injury  Outcome: Progressing

## 2023-02-21 NOTE — PROGRESS NOTES
Patient admitted from ER room 19 to ICU room 205, with the following belongings bag, phone, phone , shirt, and black stylist for phone, placed on monitor, patient oriented to room and unit visiting hours. Patient guide at bedside, reviewed patient rights and responsibilities. MRSA nasal swab obtained. Bed alarm on. Call light within reach.

## 2023-02-21 NOTE — ED NOTES
Pt initially c/o n/v upon first arriving but pt sts nausea has subsided and was able to swallow K+ pills in halves in pudding     Chiquita Dimas RN  02/20/23 2124

## 2023-02-21 NOTE — CARE COORDINATION
Case Management Assessment  Initial Evaluation    Mrs. Dominguez was admitted from [facility] snf due to hypotension & febrile . She has a hx +covid , BLE cellulitis, chronic back pain, spinal cord stimulator. ID was consulted due to sepsis/likely UTI & started patient on IV Zosyn pending cultures. Social work met with Mrs. Dominguez and spoke with her daughter Lv Fulton who resides in [place]. Chantelle's [address] her daughter reside on the same street with patient & assist with all discharge planning. Lv Fulton will be in town this Saturday to help out. In the meantime Mrs. Dominguez & Lv Fulton advised that they do not wish to return to [address], however, they want to discuss the discharge process further with Hellen Abdul to confirm [address] vs snf. Mrs. Dominguez resides alone in a ranch home and uses a walker & wheelchair. Her PCP is Dr. Lisa Crain and she uses [employer] system master on [employer]. Social work attempted to reach Hellen Abdul unsuccessfully today. Date/Time of Evaluation: 2/21/2023 2:49 PM  Assessment Completed by: CHELSEY Andrade    If patient is discharged prior to next notation, then this note serves as note for discharge by case management. Patient Name: Buddy Stack                   YOB: 1945  Diagnosis: Septic shock ([facility] [address] [A41.9, R65.21]                   Date / Time: 2/20/2023  5:09 PM    Patient Admission Status: Inpatient   Readmission Risk (Low < 19, Mod (19-27), High > 27): Readmission Risk Score: 26.8    Current PCP: Devon Childs MD  PCP verified by ? Yes    Chart Reviewed: Yes      History Provided by: Patient, Child/Family  Patient Orientation: Alert and Oriented, Person, Place, Situation    Patient Cognition: Alert    Hospitalization in the last 30 days (Readmission):  Yes    If yes, Readmission Assessment in  Navigator will be completed.     Advance Directives:      Code Status: Full Code   Patient's Primary Decision Maker is:      Primary Decision Maker: Chantelle Stover - Child - [phone]    Secondary Decision Maker: Gomez Dominguez - Spouse - [phone]    Secondary Decision Maker: Gomez Dominguez - Child - [phone]    Discharge Planning:    Patient lives with: alone Type of Home: ranch  Primary Care Giver: Family  Patient Support Systems include: Children, Family Members   Current Financial resources: n/a  Current community resources: n/a  Current services prior to admission: admitted from snf            Current DME: wheeled walker & wheelchair            Type of Home Care services:  nursing, therapy, aide care, labs? Wound care? ADLS  Prior functional level: Assistance with the following:, Bathing, Dressing, Toileting, Cooking, Housework, Shopping, Mobility  Current functional level: Assistance with the following:, Bathing, Dressing, Toileting, Mobility, Shopping, Housework, Cooking    PT AM-PAC:   /24  OT AM-PAC:   /24    Family can provide assistance at DC: Other (comment) (need to confirm with patient's sister Ana Rosa Canterll.)  Would you like Case Management to discuss the discharge plan with any other family members/significant others, and if so, who? Patient's sister Ana Rosa Cantrell or daughter Rajat Gain to Return to Present Housing: Yes  Other Identified Issues/Barriers to RETURNING to current housing: n/a  Potential Assistance needed at discharge: [facility] vs snf            Potential DME: 02, nebulizer, hospital bed, 3-1c ommode?   Patient expects to discharge to: prefers home  Plan for transportation at discharge: family    Financial    Payor: Ivanna Bustillo / Plan: Luis Manuel Peter [facility] Jayesh Barry 8 / Product Type: *No Product type* /     Does insurance require precert for SNF: Yes    Potential assistance Purchasing Medications: n/a  Meds-to-Beds request: yes      Bailee Delcid [address] Titi Martin, Cecilia [address] [place] [id number]  Phone: [phone] Fax: [phone] [facility], [address] [place] [address] [phone] - F [phone]  [facility] [address]  Phone: [phone] Fax: Hannah Burciaga, [address] - P [phone] - F [phone]  [address] [facility]  Phone: [phone] Fax: [phone]    [facility] [address] [facility] 40  400 [facility]. [address] [facility] [address]  Phone: [phone] Fax: [phone]      Notes:    Factors facilitating achievement of predicted outcomes: Family support    Barriers to discharge: Decreased endurance and weakness      The Plan for Transition of Care is related to the following treatment goals of Septic shock ([facility] [address] [A41.9, D80.70]    IF APPLICABLE: The Patient and/or patient representative Sweetie Dewitt and her family were provided with a choice of provider and agrees with the discharge plan. Freedom of choice list with basic dialogue that supports the patient's individualized plan of care/goals and shares the quality data associated with the providers was provided to:     Patient Representative Name: daughter Obdulia Jalloh & patient's sister Henok Machado. The Patient and/or Patient Representative Agree with the Discharge Plan?       Benjamin Teresa, [place]  Case Management Department  Ph: [phone] Fax: [phone]

## 2023-02-22 LAB
ALBUMIN SERPL-MCNC: 2 G/DL (ref 3.5–5.2)
ALP BLD-CCNC: 82 U/L (ref 35–104)
ALT SERPL-CCNC: 9 U/L (ref 0–32)
ANION GAP SERPL CALCULATED.3IONS-SCNC: 4 MMOL/L (ref 7–16)
ANION GAP SERPL CALCULATED.3IONS-SCNC: 6 MMOL/L (ref 7–16)
ANISOCYTOSIS: ABNORMAL
AST SERPL-CCNC: 24 U/L (ref 0–31)
BASOPHILS ABSOLUTE: 0.02 E9/L (ref 0–0.2)
BASOPHILS RELATIVE PERCENT: 0.7 % (ref 0–2)
BILIRUB SERPL-MCNC: 0.3 MG/DL (ref 0–1.2)
BUN BLDV-MCNC: 3 MG/DL (ref 6–23)
BUN BLDV-MCNC: 4 MG/DL (ref 6–23)
BURR CELLS: ABNORMAL
CALCIUM SERPL-MCNC: 7 MG/DL (ref 8.6–10.2)
CALCIUM SERPL-MCNC: 7.3 MG/DL (ref 8.6–10.2)
CHLORIDE BLD-SCNC: 110 MMOL/L (ref 98–107)
CHLORIDE BLD-SCNC: 114 MMOL/L (ref 98–107)
CO2: 21 MMOL/L (ref 22–29)
CO2: 23 MMOL/L (ref 22–29)
CREAT SERPL-MCNC: 0.5 MG/DL (ref 0.5–1)
CREAT SERPL-MCNC: 0.5 MG/DL (ref 0.5–1)
EOSINOPHILS ABSOLUTE: 0 E9/L (ref 0.05–0.5)
EOSINOPHILS RELATIVE PERCENT: 0 % (ref 0–6)
GFR SERPL CREATININE-BSD FRML MDRD: >60 ML/MIN/1.73
GFR SERPL CREATININE-BSD FRML MDRD: >60 ML/MIN/1.73
GLUCOSE BLD-MCNC: 171 MG/DL (ref 74–99)
GLUCOSE BLD-MCNC: 198 MG/DL (ref 74–99)
HCT VFR BLD CALC: 27.5 % (ref 34–48)
HEMOGLOBIN: 8.4 G/DL (ref 11.5–15.5)
HYPOCHROMIA: ABNORMAL
IMMATURE GRANULOCYTES #: 0.02 E9/L
IMMATURE GRANULOCYTES %: 0.7 % (ref 0–5)
LYMPHOCYTES ABSOLUTE: 0.89 E9/L (ref 1.5–4)
LYMPHOCYTES RELATIVE PERCENT: 29 % (ref 20–42)
MAGNESIUM: 1.7 MG/DL (ref 1.6–2.6)
MAGNESIUM: 2.1 MG/DL (ref 1.6–2.6)
MCH RBC QN AUTO: 26.1 PG (ref 26–35)
MCHC RBC AUTO-ENTMCNC: 30.5 % (ref 32–34.5)
MCV RBC AUTO: 85.4 FL (ref 80–99.9)
METER GLUCOSE: 116 MG/DL (ref 74–99)
METER GLUCOSE: 134 MG/DL (ref 74–99)
METER GLUCOSE: 149 MG/DL (ref 74–99)
MONOCYTES ABSOLUTE: 0.16 E9/L (ref 0.1–0.95)
MONOCYTES RELATIVE PERCENT: 5.2 % (ref 2–12)
MRSA CULTURE ONLY: NORMAL
NEUTROPHILS ABSOLUTE: 1.98 E9/L (ref 1.8–7.3)
NEUTROPHILS RELATIVE PERCENT: 64.4 % (ref 43–80)
OVALOCYTES: ABNORMAL
PDW BLD-RTO: 21.7 FL (ref 11.5–15)
PHOSPHORUS: 3 MG/DL (ref 2.5–4.5)
PLATELET # BLD: 271 E9/L (ref 130–450)
PMV BLD AUTO: 11.5 FL (ref 7–12)
POIKILOCYTES: ABNORMAL
POTASSIUM REFLEX MAGNESIUM: 7.5 MMOL/L (ref 3.5–5)
POTASSIUM SERPL-SCNC: 3.2 MMOL/L (ref 3.5–5)
POTASSIUM SERPL-SCNC: 3.3 MMOL/L (ref 3.5–5)
POTASSIUM SERPL-SCNC: 4.3 MMOL/L (ref 3.5–5)
PROCALCITONIN: 1.1 NG/ML (ref 0–0.08)
RBC # BLD: 3.22 E12/L (ref 3.5–5.5)
SCHISTOCYTES: ABNORMAL
SODIUM BLD-SCNC: 139 MMOL/L (ref 132–146)
SODIUM BLD-SCNC: 139 MMOL/L (ref 132–146)
TARGET CELLS: ABNORMAL
TOTAL PROTEIN: 4.3 G/DL (ref 6.4–8.3)
WBC # BLD: 3.1 E9/L (ref 4.5–11.5)

## 2023-02-22 PROCEDURE — 6370000000 HC RX 637 (ALT 250 FOR IP): Performed by: NURSE PRACTITIONER

## 2023-02-22 PROCEDURE — 80053 COMPREHEN METABOLIC PANEL: CPT

## 2023-02-22 PROCEDURE — 84145 PROCALCITONIN (PCT): CPT

## 2023-02-22 PROCEDURE — 6360000002 HC RX W HCPCS: Performed by: INTERNAL MEDICINE

## 2023-02-22 PROCEDURE — 84132 ASSAY OF SERUM POTASSIUM: CPT

## 2023-02-22 PROCEDURE — 36592 COLLECT BLOOD FROM PICC: CPT

## 2023-02-22 PROCEDURE — 83735 ASSAY OF MAGNESIUM: CPT

## 2023-02-22 PROCEDURE — 82962 GLUCOSE BLOOD TEST: CPT

## 2023-02-22 PROCEDURE — 2060000000 HC ICU INTERMEDIATE R&B

## 2023-02-22 PROCEDURE — 2580000003 HC RX 258: Performed by: NURSE PRACTITIONER

## 2023-02-22 PROCEDURE — 6370000000 HC RX 637 (ALT 250 FOR IP): Performed by: STUDENT IN AN ORGANIZED HEALTH CARE EDUCATION/TRAINING PROGRAM

## 2023-02-22 PROCEDURE — 6370000000 HC RX 637 (ALT 250 FOR IP): Performed by: INTERNAL MEDICINE

## 2023-02-22 PROCEDURE — 80048 BASIC METABOLIC PNL TOTAL CA: CPT

## 2023-02-22 PROCEDURE — 92610 EVALUATE SWALLOWING FUNCTION: CPT | Performed by: SPEECH-LANGUAGE PATHOLOGIST

## 2023-02-22 PROCEDURE — 36415 COLL VENOUS BLD VENIPUNCTURE: CPT

## 2023-02-22 PROCEDURE — 93005 ELECTROCARDIOGRAM TRACING: CPT | Performed by: INTERNAL MEDICINE

## 2023-02-22 PROCEDURE — 85025 COMPLETE CBC W/AUTO DIFF WBC: CPT

## 2023-02-22 PROCEDURE — 99233 SBSQ HOSP IP/OBS HIGH 50: CPT | Performed by: FAMILY MEDICINE

## 2023-02-22 PROCEDURE — 92526 ORAL FUNCTION THERAPY: CPT | Performed by: SPEECH-LANGUAGE PATHOLOGIST

## 2023-02-22 PROCEDURE — 6360000002 HC RX W HCPCS: Performed by: NURSE PRACTITIONER

## 2023-02-22 PROCEDURE — 84100 ASSAY OF PHOSPHORUS: CPT

## 2023-02-22 PROCEDURE — 2580000003 HC RX 258: Performed by: INTERNAL MEDICINE

## 2023-02-22 RX ORDER — HYDROCORTISONE 20 MG/1
20 TABLET ORAL EVERY MORNING
Status: DISCONTINUED | OUTPATIENT
Start: 2023-02-22 | End: 2023-02-28 | Stop reason: HOSPADM

## 2023-02-22 RX ORDER — POTASSIUM CHLORIDE 20 MEQ/1
40 TABLET, EXTENDED RELEASE ORAL ONCE
Status: DISCONTINUED | OUTPATIENT
Start: 2023-02-22 | End: 2023-02-22

## 2023-02-22 RX ORDER — LANOLIN ALCOHOL/MO/W.PET/CERES
100 CREAM (GRAM) TOPICAL DAILY
Status: DISCONTINUED | OUTPATIENT
Start: 2023-02-23 | End: 2023-02-28 | Stop reason: HOSPADM

## 2023-02-22 RX ORDER — PROCHLORPERAZINE EDISYLATE 5 MG/ML
10 INJECTION INTRAMUSCULAR; INTRAVENOUS EVERY 6 HOURS PRN
Status: DISCONTINUED | OUTPATIENT
Start: 2023-02-22 | End: 2023-02-28 | Stop reason: HOSPADM

## 2023-02-22 RX ORDER — MIDODRINE HYDROCHLORIDE 5 MG/1
5 TABLET ORAL
Status: DISCONTINUED | OUTPATIENT
Start: 2023-02-22 | End: 2023-02-28 | Stop reason: HOSPADM

## 2023-02-22 RX ORDER — MAGNESIUM SULFATE IN WATER 40 MG/ML
4000 INJECTION, SOLUTION INTRAVENOUS ONCE
Status: COMPLETED | OUTPATIENT
Start: 2023-02-22 | End: 2023-02-22

## 2023-02-22 RX ORDER — MAGNESIUM SULFATE IN WATER 40 MG/ML
2000 INJECTION, SOLUTION INTRAVENOUS PRN
Status: DISCONTINUED | OUTPATIENT
Start: 2023-02-22 | End: 2023-02-28 | Stop reason: HOSPADM

## 2023-02-22 RX ORDER — PANTOPRAZOLE SODIUM 40 MG/1
40 TABLET, DELAYED RELEASE ORAL
Status: DISCONTINUED | OUTPATIENT
Start: 2023-02-22 | End: 2023-02-28 | Stop reason: HOSPADM

## 2023-02-22 RX ORDER — FLUCONAZOLE 100 MG/1
200 TABLET ORAL DAILY
Status: DISCONTINUED | OUTPATIENT
Start: 2023-02-22 | End: 2023-02-28 | Stop reason: HOSPADM

## 2023-02-22 RX ORDER — POTASSIUM CHLORIDE 29.8 MG/ML
20 INJECTION INTRAVENOUS
Status: COMPLETED | OUTPATIENT
Start: 2023-02-22 | End: 2023-02-22

## 2023-02-22 RX ORDER — HYDROCORTISONE 5 MG/1
10 TABLET ORAL NIGHTLY
Status: DISCONTINUED | OUTPATIENT
Start: 2023-02-22 | End: 2023-02-28 | Stop reason: HOSPADM

## 2023-02-22 RX ADMIN — PIPERACILLIN AND TAZOBACTAM 4500 MG: 4; .5 INJECTION, POWDER, FOR SOLUTION INTRAVENOUS at 19:49

## 2023-02-22 RX ADMIN — POTASSIUM CHLORIDE 20 MEQ: 29.8 INJECTION, SOLUTION INTRAVENOUS at 13:30

## 2023-02-22 RX ADMIN — FAMOTIDINE 20 MG: 20 TABLET, FILM COATED ORAL at 09:21

## 2023-02-22 RX ADMIN — MICONAZOLE NITRATE: 2 OINTMENT TOPICAL at 09:25

## 2023-02-22 RX ADMIN — MIDODRINE HYDROCHLORIDE 5 MG: 5 TABLET ORAL at 11:23

## 2023-02-22 RX ADMIN — ESCITALOPRAM 10 MG: 10 TABLET, FILM COATED ORAL at 20:38

## 2023-02-22 RX ADMIN — PIPERACILLIN AND TAZOBACTAM 4500 MG: 4; .5 INJECTION, POWDER, FOR SOLUTION INTRAVENOUS at 11:23

## 2023-02-22 RX ADMIN — LORAZEPAM 1 MG: 1 TABLET ORAL at 14:35

## 2023-02-22 RX ADMIN — SODIUM CHLORIDE, SODIUM LACTATE, POTASSIUM CHLORIDE, CALCIUM CHLORIDE AND DEXTROSE MONOHYDRATE: 5; 600; 310; 30; 20 INJECTION, SOLUTION INTRAVENOUS at 09:36

## 2023-02-22 RX ADMIN — POTASSIUM CHLORIDE 20 MEQ: 29.8 INJECTION, SOLUTION INTRAVENOUS at 16:49

## 2023-02-22 RX ADMIN — HYDROCORTISONE 10 MG: 5 TABLET ORAL at 20:38

## 2023-02-22 RX ADMIN — LORAZEPAM 1 MG: 1 TABLET ORAL at 09:21

## 2023-02-22 RX ADMIN — MICONAZOLE NITRATE: 2 OINTMENT TOPICAL at 20:39

## 2023-02-22 RX ADMIN — PIPERACILLIN AND TAZOBACTAM 4500 MG: 4; .5 INJECTION, POWDER, FOR SOLUTION INTRAVENOUS at 04:28

## 2023-02-22 RX ADMIN — MICONAZOLE NITRATE: 20 POWDER TOPICAL at 09:25

## 2023-02-22 RX ADMIN — SODIUM CHLORIDE, PRESERVATIVE FREE 10 ML: 5 INJECTION INTRAVENOUS at 20:38

## 2023-02-22 RX ADMIN — MICONAZOLE NITRATE: 20 POWDER TOPICAL at 20:39

## 2023-02-22 RX ADMIN — DOCUSATE SODIUM 50 MG AND SENNOSIDES 8.6 MG 1 TABLET: 8.6; 5 TABLET, FILM COATED ORAL at 09:20

## 2023-02-22 RX ADMIN — POTASSIUM CHLORIDE 20 MEQ: 29.8 INJECTION, SOLUTION INTRAVENOUS at 06:02

## 2023-02-22 RX ADMIN — POTASSIUM CHLORIDE 20 MEQ: 29.8 INJECTION, SOLUTION INTRAVENOUS at 15:38

## 2023-02-22 RX ADMIN — LORAZEPAM 1 MG: 1 TABLET ORAL at 20:38

## 2023-02-22 RX ADMIN — POTASSIUM CHLORIDE 20 MEQ: 29.8 INJECTION, SOLUTION INTRAVENOUS at 05:19

## 2023-02-22 RX ADMIN — ENOXAPARIN SODIUM 40 MG: 100 INJECTION SUBCUTANEOUS at 09:32

## 2023-02-22 RX ADMIN — ATORVASTATIN CALCIUM 10 MG: 10 TABLET, FILM COATED ORAL at 20:38

## 2023-02-22 RX ADMIN — MAGNESIUM SULFATE HEPTAHYDRATE 4000 MG: 40 INJECTION, SOLUTION INTRAVENOUS at 09:43

## 2023-02-22 RX ADMIN — GUAIFENESIN AND CODEINE PHOSPHATE 5 ML: 10; 100 LIQUID ORAL at 18:24

## 2023-02-22 RX ADMIN — MONTELUKAST SODIUM 10 MG: 10 TABLET ORAL at 09:21

## 2023-02-22 RX ADMIN — MIDODRINE HYDROCHLORIDE 10 MG: 5 TABLET ORAL at 09:21

## 2023-02-22 RX ADMIN — SODIUM CHLORIDE, PRESERVATIVE FREE 10 ML: 5 INJECTION INTRAVENOUS at 09:24

## 2023-02-22 RX ADMIN — ASPIRIN 81 MG: 81 TABLET, COATED ORAL at 09:21

## 2023-02-22 RX ADMIN — THIAMINE HYDROCHLORIDE 100 MG: 100 INJECTION, SOLUTION INTRAMUSCULAR; INTRAVENOUS at 09:20

## 2023-02-22 RX ADMIN — POTASSIUM CHLORIDE 20 MEQ: 29.8 INJECTION, SOLUTION INTRAVENOUS at 14:37

## 2023-02-22 RX ADMIN — PANTOPRAZOLE SODIUM 40 MG: 40 TABLET, DELAYED RELEASE ORAL at 14:35

## 2023-02-22 RX ADMIN — FLUCONAZOLE 200 MG: 100 TABLET ORAL at 15:33

## 2023-02-22 RX ADMIN — MIDODRINE HYDROCHLORIDE 5 MG: 5 TABLET ORAL at 15:33

## 2023-02-22 RX ADMIN — HYDROCORTISONE 20 MG: 20 TABLET ORAL at 11:32

## 2023-02-22 ASSESSMENT — PAIN SCALES - GENERAL
PAINLEVEL_OUTOF10: 0
PAINLEVEL_OUTOF10: 0

## 2023-02-22 NOTE — PROGRESS NOTES
Department of Internal Medicine  Division of Pulmonary, Critical Care and Sleep Medicine  ICU Attending Addendum     Attending Physician Statement  Gena Coronel was seen, examined and discussed with the multi-disciplinary ICU team during rounds. I have personally seen and examined the patient and the key elements of the encounter were performed by me. The medications & laboratory data was discussed and adjusted where necessary. The radiographic images were reviewed either as a group or with radiologist if felt dis-concordant with the exam or history. The above findings were corroborated, plans confirmed and changes made if needed. Family is updated at the bedside if available. Key issues of the case were discussed among consultants. Critical Care time is documented if appropriate. Changes to the notes are place in italicized print. Briefly,   Sepsis, UTI  H/o indwelling cath  ATBx, cultures  ID consult, on Zosyn#2  PCT to follow     Hypotension  improved  IVF, will cut down   Low dose midodrine, will cut down  Cosyntropin test +  with multiple readmissions associated with \"sepsis and low BP\", will re-start oral steroids. May need Life long treatment due to Adrenal Insufficiency. Used prednisone 5-10 mg po daily \" for years\" until 12/2022.      Low BS  Poor oral intake, improved    Ok to d/c ICU, if K WNL

## 2023-02-22 NOTE — PLAN OF CARE
Problem: Discharge Planning  Goal: Discharge to home or other facility with appropriate resources  2/22/2023 0021 by Noe Ellison RN  Outcome: Progressing     Problem: Pain  Goal: Verbalizes/displays adequate comfort level or baseline comfort level  2/22/2023 0021 by Noe Ellison RN  Outcome: Progressing     Problem: Skin/Tissue Integrity  Goal: Absence of new skin breakdown  Description: 1. Monitor for areas of redness and/or skin breakdown  2. Assess vascular access sites hourly  3. Every 4-6 hours minimum:  Change oxygen saturation probe site  4. Every 4-6 hours:  If on nasal continuous positive airway pressure, respiratory therapy assess nares and determine need for appliance change or resting period.   2/22/2023 0021 by Noe Ellison RN  Outcome: Progressing     Problem: Safety - Adult  Goal: Free from fall injury  2/22/2023 0021 by Noe Ellison RN  Outcome: Progressing

## 2023-02-22 NOTE — PROGRESS NOTES
SPEECH/LANGUAGE PATHOLOGY  CLINICAL ASSESSMENT OF SWALLOWING FUNCTION   and PLAN OF CARE    PATIENT NAME:  Kimber Dumont  (female)     MRN:  [id number]    :  1945  (70 y.o.)  STATUS:  Inpatient: Room 0205/0205-A    TODAY'S DATE:  2023  REFERRING PROVIDER:   Susie Ortega MD  SPECIFIC PROVIDER ORDER: SLP eval and treat Date of order:  23  REASON FOR REFERRAL: chronic cough -- may have silent reflux due to [place]   EVALUATING THERAPIST: Sammi Homans, SLP                 RESULTS:    DYSPHAGIA DIAGNOSIS:   Clinical indicators of normal swallow function     No signs of aspiration were noted during this evaluation however, silent aspiration cannot be ruled out at bedside. If silent aspiration is suspected, a Videofluoroscopic Study of Swallowing (MBS) is recommended and requires a physician order. DIET RECOMMENDATIONS:  Regular consistency solids (IDDSI level 7) with  thin liquids (IDDSI level 0), MEDICATION ADMINISTRATION, and Per patient choice/nursing judgement     FEEDING RECOMMENDATIONS:     Assistance level:  No assistance needed      Compensatory strategies recommended: Small bites/sips      Discussed recommendations with nursing and/or faxed report to referring provider: Yes    SPEECH THERAPY  PLAN OF CARE   The dysphagia POC is established based on physician order, dysphagia diagnosis and results of clinical assessment     Skilled SLP intervention for dysphagia management on acute care up to 5 x per week until goals met, pt plateaus in function and/or discharged from hospital    Conditions Requiring Skilled Therapeutic Intervention for dysphagia:    Patient is performing below functional baseline d/t  current acute condition, respiratory compromise, multiple medications, and/or increased dependency upon caregivers.   Underlying moist cough decreases ability to determine presence or absence of clinical indicators of dysphagia    Specific dysphagia interventions to include:     MBSS to fully assess oropharyngeal swallow function and to assist in determining the least restrictive PO diet to maintain adequate nutrition/hydration   compensatory swallowing strategies to improve airway protection and swallow function. ongoing mealtime assessment to provide diet modification and compensatory strategy implementation to minimize risk of aspiration associated with PO intake    Specific instructions for next treatment:  ongoing PO analysis to upgrade diet and evaluate tolerance of current PO recommendation and initiate instruction of compensatory strategies  Patient Treatment Goals:    Short Term Goals:  Pt will participate in MBSS to fully assess oropharyngeal swallow function and to assist in determining the least restrictive PO diet to maintain adequate nutrition/hydration   Pt will implement identified compensatory swallowing strategies on 90% of opportunities or greater to improve airway protection and swallow function. Long Term Goals:   Pt will maintain adequate nutrition/hydration via PO intake of the least restrictive oral diet with implementation of safe swallow/ compensatory strategies and decrease signs/symptoms of aspiration to less than 1 x/day.    OTHER RECOMMENDATIONS:  A Video Swallow Study (MBSS) is recommended and requires a physician order      Patient/family Goal:    To determine cause of underlying cough    Plan of care discussed with Patient   The Patient understand(s) the diagnosis, prognosis and plan of care     Rehabilitation Potential/Prognosis: good                    ADMITTING DIAGNOSIS: Septic shock ([facility] [address] [A41.9, R65.21]    VISIT DIAGNOSIS:      PATIENT REPORT/COMPLAINT: denies difficulty swallowing  occasional cough  RN cleared patient for participation in assessment     yes and meal tray present during evaluation     PRIOR LEVEL OF SWALLOW FUNCTION:    PAST HISTORY OF DYSPHAGIA?: none reported    Home diet: Regular consistency solids (IDDSI level 7) with  thin liquids (IDDSI level 0)  Current Diet Order:  ADULT DIET; Regular    PROCEDURE:  Consistencies Administered During the Evaluation   Liquids: thin liquid   Solids:  pureed foods, soft solid foods, and solid foods      Method of Intake:   cup, straw, spoon  Self fed      Position:   Seated, upright    CLINICAL ASSESSMENT:  Oral Stage:       Pt is able to achieve adequate cohesive bolus prep as evidenced by satisfactory mastication patterns, timely A-P bolus propulsion, and minimal oral residuals. Pharyngeal Stage:    No signs of aspiration were noted during this evaluation however, silent aspiration cannot be ruled out at bedside. If silent aspiration is suspected, a Videofluoroscopic Study of Swallowing (MBS) is recommended and requires a physician order. Cognition:   Within functional limits for this exam    Oral Peripheral Examination   Adequate lingual/labial strength     Current Respiratory Status    room air     Parameters of Speech Production  Respiration:  Adequate for speech production  Quality:   Within functional limits  Intensity: Within functional limits    Volitional Swallow: present     Volitional Cough:   present     Pain: No pain reported. EDUCATION:   The Speech Language Pathologist (SLP) completed education regarding results of evaluation and that intervention is warranted at this time. Learner: Patient  Education: Reviewed results and recommendations of this evaluation  Evaluation of Education:  Duane Aden understanding    This plan may be re-evaluated and revised as warranted. Evaluation Time includes thorough review of current medical information, gathering information on past medical history/social history and prior level of function, completion of standardized testing/informal observation of tasks, assessment of data and education on plan of care and goals. INTERVENTION     Speech Pathologist (SLP) completed education with the patient regarding type of swallowing impairment. Reviewed current solid/liquid consistency diet recommendations and discussed compensatory strategies to ensure safe PO intake. Reviewed aspiration precautions. Discussed possible MBSS with patient. Encouraged patient to engage SLP in unstructured Q&A session relative to identified deficit areas; indicated understanding of all information provided via satisfactory verbal response. [x]The admitting diagnosis and active problem list, have been reviewed prior to initiation of this evaluation. ACTIVE PROBLEM LIST:   Patient Active Problem List   Diagnosis    Mixed hyperlipidemia    HTN (hypertension), benign    Asthma    Vitamin B12 deficiency    Pancreatic cyst    Anxiety disorder    Chronic back pain    Tachycardia    Vomiting    Compression fracture of lumbar spine, non-traumatic, initial encounter ([facility] [address]    Compression fracture of T12 vertebra (HCC)    Failed spinal cord stimulator (HCC)    Cellulitis    Septicemia (HCC)    Failure to thrive in adult    Septic shock (HCC)    Acute cystitis without hematuria         CPT code:  96306  bedside swallow eval, 98203 dysphagia therapy    Main Anderson.  Cecilia GRIGGS [facility]. [id number]

## 2023-02-22 NOTE — PROGRESS NOTES
Infectious Disease  Progress Note  [facility]    Chief Complaint: sepsis    Subjective:  she is feeling better except cough. No fever overnight. Not on any pressors. On 1l NC. No diarrhea. Scheduled Meds:   magnesium sulfate  4,000 mg IntraVENous Once    midodrine  5 mg Oral TID WC    hydrocortisone  20 mg Oral QAM    And    hydrocortisone  10 mg Oral Nightly    [START ON 2/23/2023] thiamine  100 mg Oral Daily    pantoprazole  40 mg Oral BID AC    potassium chloride  40 mEq Oral Once    fluconazole  200 mg Oral Daily    sodium chloride flush  5-40 mL IntraVENous 2 times per day    enoxaparin  40 mg SubCUTAneous Daily    sennosides-docusate sodium  1 tablet Oral BID    piperacillin-tazobactam  4,500 mg IntraVENous Q8H    miconazole nitrate   Topical BID    miconazole   Topical BID    LORazepam  1 mg Oral TID    aspirin  81 mg Oral Daily    atorvastatin  10 mg Oral Nightly    escitalopram  10 mg Oral Nightly    polyethylene glycol  17 g Oral Daily    montelukast  10 mg Oral Daily     Continuous Infusions:   sodium chloride      dextrose 5% in lactated ringers 50 mL/hr at 02/22/23 1126     PRN Meds:magnesium sulfate, prochlorperazine, sodium chloride flush, sodium chloride, polyethylene glycol, potassium chloride, sodium chloride, acetaminophen, guaiFENesin-codeine, levalbuterol    ROS:  As mentioned in subjective, all other systems negative      /77   Pulse 96   Temp 98.8 °F (37.1 °C) (Bladder)   Resp 21   Ht 5' 4\" (1.626 m)   Wt 131 lb 9.8 oz (59.7 kg)   SpO2 98%   BMI 22.59 kg/m²     Physical Exam  Constitutional: The patient is awake, alert, and oriented. looks better today. Skin: Warm and dry. No jaundice. HEENT: Eyes show round, and reactive pupils. Moist mucous membranes, no ulcerations, no thrush. Neck: Supple to movements. No lymphadenopathy. Chest: clear to auscultation  Cardiovascular: S1 and S2 are rhythmic and regular. No murmurs appreciated.    Abdomen: Positive bowel sounds to auscultation. Benign to palpation. No masses felt. No hepatosplenomegaly. Genitourinary: meza in place  Extremities: No clubbing, no cyanosis, no edema. Musculoskeletal: bilateral LE scratches with chronic pinkish appearance. No sign of cellulitis. Neurological: alert, oriented x 3. No gross motor abnormalities  Lines: peripheral, right fem CVC       Labs, Cultures reviewed  Radiology reviewed    Microbiology:  Reviewed all the cultures in epic from 2018 (resp, urine and blood ) have always been negative  Blood cx 2/20: negative so far  Urine cx 2/20: >[id number] yeast   RVP negative     Assessment:  Sepsis/ likely complicated UTI: no other source was found. Elevated procalcitonin:  Bilateral LE scratches: no sign of acute infection. Plan:    Cont IV Zosyn   Added PO fluconazole for complicated UTI.   Monitor labs  Will follow with you       Electronically signed by Blain Boas, MD on 2/22/2023 at 12:11 PM

## 2023-02-22 NOTE — PROGRESS NOTES
[facility] Progress Note    Admitting Date and Time: 2/20/2023  5:09 PM  Admit Dx: Septic shock ([facility] [address] [A41.9, R65.21]    Subjective:  Patient is being followed for Septic shock ([facility] [address] [A41.9, R65.21]   Pt feels cough is a problem; has had for 4 months. Has hiatal hernia. Reporting mucus that is green colored. Per RN: no new concerns. As above.   On IV Zosyn    ROS: denies fever, chills, cp, sob, n/v, HA unless stated above.      magnesium sulfate  4,000 mg IntraVENous Once    potassium chloride  40 mEq Oral Once    pantoprazole (PROTONIX) 40 mg injection  40 mg IntraVENous Q12H    sodium chloride flush  5-40 mL IntraVENous 2 times per day    enoxaparin  40 mg SubCUTAneous Daily    thiamine  100 mg IntraVENous Daily    sennosides-docusate sodium  1 tablet Oral BID    piperacillin-tazobactam  4,500 mg IntraVENous Q8H    midodrine  10 mg Oral TID WC    miconazole nitrate   Topical BID    miconazole   Topical BID    LORazepam  1 mg Oral TID    aspirin  81 mg Oral Daily    atorvastatin  10 mg Oral Nightly    escitalopram  10 mg Oral Nightly    polyethylene glycol  17 g Oral Daily    montelukast  10 mg Oral Daily     magnesium sulfate, 2,000 mg, PRN  prochlorperazine, 10 mg, Q6H PRN  sodium chloride flush, 5-40 mL, PRN  sodium chloride, , PRN  polyethylene glycol, 17 g, Daily PRN  potassium chloride, 20 mEq, PRN  sodium chloride, 500 mL, Once PRN  acetaminophen, 650 mg, Q4H PRN  guaiFENesin-codeine, 5 mL, TID PRN  levalbuterol, 0.63 mg, Q8H PRN         Objective:    /71   Pulse (!) 103   Temp 98.4 °F (36.9 °C) (Bladder)   Resp 20   Ht 5' 4\" (1.626 m)   Wt 131 lb 9.8 oz (59.7 kg)   SpO2 98%   BMI 22.59 kg/m²     General Appearance: alert and oriented to person, place and time and in no acute distress  Skin: warm and dry  Head: normocephalic and atraumatic  Eyes: pupils equal, round, and reactive to light, extraocular eye movements intact, conjunctivae normal.  Infraorbital edema present  Neck: neck supple and non tender without mass   Pulmonary/Chest: clear to auscultation bilaterally- no wheezes, rales or rhonchi, normal air movement, no respiratory distress  Cardiovascular: normal rate, normal S1 and S2 and no carotid bruits  Abdomen: soft, non-tender, non-distended, normal bowel sounds, no masses or organomegaly  Extremities: no cyanosis, no clubbing and no edema  Neurologic: no cranial nerve deficit and speech normal        Recent Labs     02/21/23 0130 02/21/23 0430 02/22/23 0413    135 139   K 3.5 3.7 3.2*    106 110*   CO2 21* 22 23   BUN 6 5* 3*   CREATININE 0.6 0.6 0.5   GLUCOSE 86 83 198*   CALCIUM 6.7* 6.9* 7.3*       Recent Labs     02/21/23 0130 02/21/23 0430 02/22/23 0413   WBC 6.7 6.4 3.1*   RBC 3.15* 2.97* 3.22*   HGB 8.4* 7.8* 8.4*   HCT 27.2* 25.4* 27.5*   MCV 86.3 85.5 85.4   MCH 26.7 26.3 26.1   MCHC 30.9* 30.7* 30.5*   RDW 21.5* 21.5* 21.7*    277 271   MPV 10.7 11.0 11.5       Radiology:   CT ABDOMEN PELVIS WO CONTRAST Additional Contrast? None    Result Date: 2/20/2023  EXAMINATION: CT OF THE ABDOMEN AND PELVIS WITHOUT CONTRAST 2/20/2023 7:41 pm TECHNIQUE: CT of the abdomen and pelvis was performed without the administration of intravenous contrast. Multiplanar reformatted images are provided for review. Automated exposure control, iterative reconstruction, and/or weight based adjustment of the mA/kV was utilized to reduce the radiation dose to as low as reasonably achievable. COMPARISON: None. HISTORY: ORDERING SYSTEM PROVIDED HISTORY: UTI rule out obstructive uropathy TECHNOLOGIST PROVIDED HISTORY: Reason for exam:->UTI rule out obstructive uropathy Additional Contrast?->None Decision Support Exception - unselect if not a suspected or confirmed emergency medical condition->Emergency Medical Condition (MA) FINDINGS: Lower Chest: Small left pleural effusion with adjacent atelectasis. Organs:  And the liver, spleen, adrenal glands, kidneys, pancreas are normal. Cholecystectomy. GI/Bowel: Diffuse colonic fecal retention. Normal small bowel and appendix. Pelvis: Brewer catheter in a decompressed urinary bladder. Peritoneum/Retroperitoneum: No free fluid or free air. Bones/Soft Tissues: Posterior spinal fixation hardware lumbar spine. Degenerative changes thoracolumbar spine. Degenerative changes involving both hip joints. No urinary tract stones or hydronephrosis. Brewer catheter in a decompressed urinary bladder. Diffuse colonic fecal retention. Cholecystectomy. CT CHEST WO CONTRAST    Result Date: 2/20/2023  EXAMINATION: CT OF THE CHEST WITHOUT CONTRAST 2/20/2023 7:41 pm TECHNIQUE: CT of the chest was performed without the administration of intravenous contrast. Multiplanar reformatted images are provided for review. Automated exposure control, iterative reconstruction, and/or weight based adjustment of the mA/kV was utilized to reduce the radiation dose to as low as reasonably achievable. COMPARISON: None. HISTORY: ORDERING SYSTEM PROVIDED HISTORY: sob, fever, ?pneumonia, ?covid-19 TECHNOLOGIST PROVIDED HISTORY: Reason for exam:->sob, fever, ?pneumonia, ?covid-19 FINDINGS: Mediastinum: Thoracic aorta, heart and pericardium are normal.  Calcified plaque involving coronary arteries. No significant mediastinal adenopathy. Lungs/pleura:   No evidence for pneumonia. Small left pleural effusion with adjacent atelectasis. No pneumothorax. Upper Abdomen: Moderate-sized hiatal hernia. Cholecystectomy. Colonic fecal retention. Soft Tissues/Bones:   Degenerative changes thoracic spine with kyphosis and multilevel vertebroplasties. Chronic appearing compression deformities lower thoracic spine. Neurostimulator terminates at the level the midthoracic spine. Small left pleural effusion with adjacent atelectasis. No evidence for pneumonia. Hiatal hernia.      XR CHEST PORTABLE    Result Date: 2/20/2023  EXAMINATION: ONE XRAY VIEW OF THE CHEST 2/20/2023 5:50 pm COMPARISON: January 31, 2023 HISTORY: ORDERING SYSTEM PROVIDED HISTORY: coarse breath sounds TECHNOLOGIST PROVIDED HISTORY: Reason for exam:->coarse breath sounds FINDINGS: Poor inspiratory effort is seen. The cardiomediastinal silhouette is prominent in size. Peribronchial cuffing bilateral hilar prominence, prominence of the bronchovascular and interstitial lung markings visualized most prominent in the left lower lung field, mild haziness overlying the left costophrenic angle, the right costophrenic angle is clear with no evidence of pleural fluid. No evidence of pneumothorax is seen. Biapical prominence suggestive of COPD changes. Moderate sized hiatus hernia is seen. Degenerative bone changes seen. Moderate sized hiatus hernia. Mild bronchovascular prominence, this could represent subsegmental atelectatic changes or aspiration pneumonia. Assessment:    Principal Problem:    Septic shock (HCC)  Active Problems:    Asthma    Acute cystitis without hematuria    HTN (hypertension), benign    Anxiety disorder    Chronic back pain  Resolved Problems:    * No resolved hospital problems. *      Plan:  1. Sepsis with Hypotension/Septic Shock - On IV Zosyn. ID consulted. Continue same. Awaiting culture results. No growth thus far. 2.    Hyperlipidemia - Lipid panel in am.  On statin. LFTs normal.  Continue same. Last Lipid panel on 2/6/23 was at goal except for HDL which was low (81/102/23/38). 3.  Hypoalbuminemia - albumin 2.0; total protein 4.3.  4.  Prolonged QT - QTc 623 on 2/20/23. Keep electrolytes Mg2+ > 2 and K+ >4.  5.  Anemia of Inflammation - Hgb 8.4. Monitor for bleeding. Keep Hgb >8.  6.  Ambulatory dysfunction - needs a battery replacement for her spinal stimulstor. 7.   Chronic cough - may have silent reflux due to [facility]. DC Pepcid. Protonix 40 mg IV q 12 hr.  Speech Therapy to evaluate for aspiration. 8.  Hypomagnesemia - Mg2+ 1.7.   Mg2+ replacement therapy ordered. Trend and treat accordingly. 9.  Hypoalbuminemia -  albumin 2.0; total protein 4.3. Continue ultrafiltration on TTS and HD on MWF. Renal involved. Multifactorial.  Supportive care. NOTE: This report was transcribed using voice recognition software. Every effort was made to ensure accuracy; however, inadvertent computerized transcription errors may be present.     Electronically signed by Wilbur Vinson MD on 2/22/2023 at 9:36 AM

## 2023-02-22 NOTE — PROGRESS NOTES
Critical Care Team - Daily Progress Note      Date and time: 2/22/2023 12:24 PM  Patient's name:  Bobby Banuelos  Medical Record Number: [id number]  Patient's account/billing number: [de-identified]  Patient's YOB: 1945  Age: 70 y.o. Date of Admission: 2/20/2023  5:09 PM  Length of stay during current admission: 2      Primary Care Physician: Jorge Weiss MD  ICU Attending Physician: Dr. Janae Garcia Status: Full Code    Reason for ICU admission: Hypotension      SUBJECTIVE:     OVERNIGHT EVENTS:          2/22: No acute overnight events. Pt complains of some cough, however states did not keep her up and she slept well. Complains of some edema in L hand. No other complaints. Cosyntropin test evident of adrenal insufficiency. Pt relates having taken prednisone for yrs, stopping just recently. Add hydrocortisone PO. BP's have been stable - dec midodrine. K 7.5 on bmp - felt to be error - repeat was 3.3. Downgrade from icu today. 02/21: Pt complaining about having nausea and mentioned that she did throw up last night also 2-3 times. She was on 3 L NC which was weaned down to 2 L and pt is doing fine. Will wean down more. Intake/Output:   I/O this shift:  In: -   Out: 65 [Urine:65]  I/O last 3 completed shifts: In: 4504.7 [P.O.:480; I.V.:2740.4; IV Piggyback:1284.3]  Out: 1150 [Urine:1150]    Awake and following commands: Yes  Current Ventilation: - No ventilator support  Sedation: none  Paralyzed: No  Vasopressors: None    ROS:  Unless stated above, ROS is otherwise negative. Initial HPI + past overnight events: Ms. Brittney Lindquist is admitted from [facility] with nausea and vomiting. On EMS arrival she was found to be hypotensive 80/45 and febrile 102F. She was recently admitted from 1/31 - 2/4 with a BLE cellulitis treated with ancef/vanc transitioned to doxy/keflex. Urology was following for increased PVR that led to meza placement that was maintained on discharge.   Today in the ED she reports n/v with poor appetite, diarrhea, and cough. She was febrile 100.9, tachycardic 110s, and hypotensive with BP 89/43. She was given sepsis resuscitation in the ED and started on zosyn for presumed sepsis. Blood cultures were sent and UA is suggestive of UTI. She was hypoglycemic and started on D10 in the ED. CT Chest and AP were done and unremarkable for acute findings. She is admitted to the ICU for management of her sepsis, UTI. OBJECTIVE:     VITAL SIGNS:  /77   Pulse 96   Temp 98.8 °F (37.1 °C) (Bladder)   Resp 21   Ht 5' 4\" (1.626 m)   Wt 131 lb 9.8 oz (59.7 kg)   SpO2 98%   BMI 22.59 kg/m²   Tmax over 24 hours:  Temp (24hrs), Av.7 °F (37.1 °C), Min:97.5 °F (36.4 °C), Max:100.6 °F (38.1 °C)      Patient Vitals for the past 6 hrs:   BP Temp Temp src Pulse Resp SpO2   23 1127 124/77 98.8 °F (37.1 °C) Bladder 96 21 98 %   23 0803 104/71 98.4 °F (36.9 °C) Bladder (!) 103 20 98 %         Intake/Output Summary (Last 24 hours) at 2023 1224  Last data filed at 2023 1100  Gross per 24 hour   Intake 3261.82 ml   Output 450 ml   Net 2811.82 ml     Wt Readings from Last 2 Encounters:   23 131 lb 9.8 oz (59.7 kg)   23 128 lb (58.1 kg)     Body mass index is 22.59 kg/m². Physical Exam  Constitutional:       General: She is not in acute distress. Appearance: Normal appearance. HENT:      Head: Normocephalic and atraumatic. Mouth/Throat:      Mouth: Mucous membranes are moist.      Pharynx: Oropharynx is clear. Eyes:      Extraocular Movements: Extraocular movements intact. Conjunctiva/sclera: Conjunctivae normal.   Cardiovascular:      Rate and Rhythm: Normal rate and regular rhythm. Pulses: Normal pulses. Heart sounds: Normal heart sounds. No murmur heard. Pulmonary:      Effort: Pulmonary effort is normal. No respiratory distress. Breath sounds: Normal breath sounds. No stridor. No wheezing, rhonchi or rales. Abdominal:      General: There is no distension. Tenderness: There is no abdominal tenderness. Musculoskeletal:      Cervical back: Normal range of motion. Right lower leg: No edema. Left lower leg: No edema. Comments: Mild edema left hand   Skin:     General: Skin is warm and dry. Neurological:      General: No focal deficit present. Mental Status: She is alert and oriented to person, place, and time. Psychiatric:         Attention and Perception: Attention normal.         Mood and Affect: Mood normal.         MEDICATIONS:    Scheduled Meds:   magnesium sulfate  4,000 mg IntraVENous Once    midodrine  5 mg Oral TID WC    hydrocortisone  20 mg Oral QAM    And    hydrocortisone  10 mg Oral Nightly    [START ON 2/23/2023] thiamine  100 mg Oral Daily    pantoprazole  40 mg Oral BID AC    potassium chloride  40 mEq Oral Once    fluconazole  200 mg Oral Daily    sodium chloride flush  5-40 mL IntraVENous 2 times per day    enoxaparin  40 mg SubCUTAneous Daily    sennosides-docusate sodium  1 tablet Oral BID    piperacillin-tazobactam  4,500 mg IntraVENous Q8H    miconazole nitrate   Topical BID    miconazole   Topical BID    LORazepam  1 mg Oral TID    aspirin  81 mg Oral Daily    atorvastatin  10 mg Oral Nightly    escitalopram  10 mg Oral Nightly    polyethylene glycol  17 g Oral Daily    montelukast  10 mg Oral Daily     Continuous Infusions:   sodium chloride      dextrose 5% in lactated ringers 50 mL/hr at 02/22/23 1126     PRN Meds:   magnesium sulfate, 2,000 mg, PRN  prochlorperazine, 10 mg, Q6H PRN  sodium chloride flush, 5-40 mL, PRN  sodium chloride, , PRN  polyethylene glycol, 17 g, Daily PRN  potassium chloride, 20 mEq, PRN  sodium chloride, 500 mL, Once PRN  acetaminophen, 650 mg, Q4H PRN  guaiFENesin-codeine, 5 mL, TID PRN  levalbuterol, 0.63 mg, Q8H PRN        VENT SETTINGS (Comprehensive) (if applicable):      Additional Respiratory Assessments  Heart Rate: 96  Resp: 21  SpO2: 98 %    Arterial Blood Gas 2/22/2023  No results for input(s): PH, PCO2, PO2, HCO3, BE, O2SAT in the last 72 hours. Laboratory findings:    Complete Blood Count:   Recent Labs     02/21/23  0130 02/21/23 0430 02/22/23 0413   WBC 6.7 6.4 3.1*   HGB 8.4* 7.8* 8.4*   HCT 27.2* 25.4* 27.5*    277 271        Last 3 Blood Glucose:   Recent Labs     02/21/23  0430 02/22/23 0413 02/22/23  0945   GLUCOSE 83 198* 171*        PT/INR:    Lab Results   Component Value Date/Time    PROTIME 11.8 02/13/2023 05:57 AM    PROTIME 11.7 04/01/2012 03:16 PM    INR 1.1 02/13/2023 05:57 AM     PTT:    Lab Results   Component Value Date/Time    APTT 37.6 05/28/2017 04:10 AM       Comprehensive Metabolic Profile:   Recent Labs     02/21/23 0430 02/22/23 0413 02/22/23  0945 02/22/23  1055    139 139  --    K 3.7 3.2* 7.5* 3.3*    110* 114*  --    CO2 22 23 21*  --    BUN 5* 3* 4*  --    CREATININE 0.6 0.5 0.5  --    GLUCOSE 83 198* 171*  --    CALCIUM 6.9* 7.3* 7.0*  --    PROT 4.3* 4.3*  --   --    LABALBU 2.1* 2.0*  --   --    BILITOT 0.4 0.3  --   --    ALKPHOS 81 82  --   --    AST 28 24  --   --    ALT 9 9  --   --       Magnesium:   Lab Results   Component Value Date/Time    MG 2.1 02/22/2023 10:55 AM     Phosphorus:   Lab Results   Component Value Date/Time    PHOS 3.0 02/22/2023 04:13 AM     Ionized Calcium: No results found for: CAION     Urinalysis:     Troponin: No results for input(s): TROPONINI in the last 72 hours. Radiology/Imaging:   CXR       Impression:   02/20/23 1753     Moderate sized hiatus hernia. Mild bronchovascular prominence, this could   represent subsegmental atelectatic changes or aspiration pneumonia. CT Scans       Impression:  CT CHEST WO CONTRAST 02/20/23 2026     Small left pleural effusion with adjacent atelectasis. No evidence for pneumonia. Hiatal hernia.             Impression:  CT ABDOMEN PELVIS WO CONTRAST 02/20/23 2036     No urinary tract stones or hydronephrosis. Meza catheter in a decompressed   urinary bladder. Diffuse colonic fecal retention. Cholecystectomy.         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    Tachycardia 12/06/2018    Vomiting 12/06/2018    Anxiety disorder 09/26/2017    Chronic back pain 09/26/2017    Pancreatic cyst 05/27/2017    HTN (hypertension), benign 04/02/2012    Vitamin B12 deficiency 04/02/2012    Mixed hyperlipidemia 04/01/2012         PLAN:        Neuro   - Alert and Oriented  - No sedation    # Hx chronic back pain 2/2 to prev T12 and L1 comp fxs   - Followed by NS  - Uses TLSO brace  - Hx failed spinal cord stimulator     # Hx depression, anxiety  - Lexapro, ativan      Respiratory   # Chronic Cough   - Guaifenesin tid PRN     Cardiovascular   # s/p sepsis resuscitation - volume responsive, no pressor requirement - improving  -Midodrine 5 mg TID    # Hx HLD  - Lipitor qd     Gastrointestinal   # Hx of Constipation  - Senna / glycolax qd    # Hx of GERD  - Pepcid qd     Renal   # Increased PVR with chronic meza   - Meza left in from discharge  - Followed by Dr. Zahra Carmona    - replace lytes PRN     Infectious Disease   # UroSepsis  - UA with 10-20 WBC/RBC, mod LE, + bacteria  - Zosyn day 2  - Pro zaki 3.20 - repeat pending  - Urine cx + yeast  - Blood Cx neg / Viral panel neg  - ID consulted     Hematology/Oncology   # Normocytic Anemia  - Hgb stable  - Monitor CBC     Endocrine   # Hypoglycemia  - On D10W - decrease rate, add D5LR  - Trend POCT Q4    #adrenal insufficiency  - cosyntropin test indicative of insufficiency  - 2/2 to chronic steroid use (was on prednisone for years)  - hydrocortisone PO     Social/Spiritual/DNR/Other   Code: Full  DVT ppx: lovenox  GI ppx: Pepcid    Patient downgrading from icu today if bed available.      Lalo Cabrera DO  12:24 PM  02/22/23

## 2023-02-23 ENCOUNTER — APPOINTMENT (OUTPATIENT)
Dept: GENERAL RADIOLOGY | Age: 78
DRG: 698 | End: 2023-02-23
Payer: MEDICARE

## 2023-02-23 LAB
ALBUMIN SERPL-MCNC: 2.1 G/DL (ref 3.5–5.2)
ALP BLD-CCNC: 72 U/L (ref 35–104)
ALT SERPL-CCNC: 8 U/L (ref 0–32)
ANION GAP SERPL CALCULATED.3IONS-SCNC: 8 MMOL/L (ref 7–16)
ANISOCYTOSIS: ABNORMAL
AST SERPL-CCNC: 20 U/L (ref 0–31)
BASOPHILS ABSOLUTE: 0.02 E9/L (ref 0–0.2)
BASOPHILS RELATIVE PERCENT: 0.4 % (ref 0–2)
BILIRUB SERPL-MCNC: 0.4 MG/DL (ref 0–1.2)
BUN BLDV-MCNC: 5 MG/DL (ref 6–23)
BURR CELLS: ABNORMAL
CALCIUM SERPL-MCNC: 7.1 MG/DL (ref 8.6–10.2)
CHLORIDE BLD-SCNC: 107 MMOL/L (ref 98–107)
CO2: 23 MMOL/L (ref 22–29)
CREAT SERPL-MCNC: 0.5 MG/DL (ref 0.5–1)
EKG ATRIAL RATE: 104 BPM
EKG P AXIS: 42 DEGREES
EKG P-R INTERVAL: 170 MS
EKG Q-T INTERVAL: 340 MS
EKG QRS DURATION: 60 MS
EKG QTC CALCULATION (BAZETT): 447 MS
EKG R AXIS: 16 DEGREES
EKG T AXIS: 7 DEGREES
EKG VENTRICULAR RATE: 104 BPM
EOSINOPHILS ABSOLUTE: 0.01 E9/L (ref 0.05–0.5)
EOSINOPHILS RELATIVE PERCENT: 0.2 % (ref 0–6)
GFR SERPL CREATININE-BSD FRML MDRD: >60 ML/MIN/1.73
GLUCOSE BLD-MCNC: 102 MG/DL (ref 74–99)
HCT VFR BLD CALC: 24 % (ref 34–48)
HEMOGLOBIN: 7.8 G/DL (ref 11.5–15.5)
HYPOCHROMIA: ABNORMAL
IMMATURE GRANULOCYTES #: 0.03 E9/L
IMMATURE GRANULOCYTES %: 0.6 % (ref 0–5)
LYMPHOCYTES ABSOLUTE: 1.31 E9/L (ref 1.5–4)
LYMPHOCYTES RELATIVE PERCENT: 25.3 % (ref 20–42)
MAGNESIUM: 2 MG/DL (ref 1.6–2.6)
MCH RBC QN AUTO: 27.6 PG (ref 26–35)
MCHC RBC AUTO-ENTMCNC: 32.5 % (ref 32–34.5)
MCV RBC AUTO: 84.8 FL (ref 80–99.9)
METER GLUCOSE: 110 MG/DL (ref 74–99)
METER GLUCOSE: 112 MG/DL (ref 74–99)
METER GLUCOSE: 89 MG/DL (ref 74–99)
MONOCYTES ABSOLUTE: 0.81 E9/L (ref 0.1–0.95)
MONOCYTES RELATIVE PERCENT: 15.6 % (ref 2–12)
NEUTROPHILS ABSOLUTE: 3 E9/L (ref 1.8–7.3)
NEUTROPHILS RELATIVE PERCENT: 57.9 % (ref 43–80)
ORGANISM: ABNORMAL
OVALOCYTES: ABNORMAL
PDW BLD-RTO: 21.5 FL (ref 11.5–15)
PHOSPHORUS: 1.9 MG/DL (ref 2.5–4.5)
PLATELET # BLD: 263 E9/L (ref 130–450)
PMV BLD AUTO: 11.5 FL (ref 7–12)
POIKILOCYTES: ABNORMAL
POLYCHROMASIA: ABNORMAL
POTASSIUM SERPL-SCNC: 3.1 MMOL/L (ref 3.5–5)
RBC # BLD: 2.83 E12/L (ref 3.5–5.5)
SCHISTOCYTES: ABNORMAL
SODIUM BLD-SCNC: 138 MMOL/L (ref 132–146)
TARGET CELLS: ABNORMAL
TOTAL PROTEIN: 4.4 G/DL (ref 6.4–8.3)
URINE CULTURE, ROUTINE: ABNORMAL
WBC # BLD: 5.2 E9/L (ref 4.5–11.5)

## 2023-02-23 PROCEDURE — 93010 ELECTROCARDIOGRAM REPORT: CPT | Performed by: INTERNAL MEDICINE

## 2023-02-23 PROCEDURE — 82962 GLUCOSE BLOOD TEST: CPT

## 2023-02-23 PROCEDURE — 6370000000 HC RX 637 (ALT 250 FOR IP): Performed by: INTERNAL MEDICINE

## 2023-02-23 PROCEDURE — 6360000002 HC RX W HCPCS: Performed by: INTERNAL MEDICINE

## 2023-02-23 PROCEDURE — 83735 ASSAY OF MAGNESIUM: CPT

## 2023-02-23 PROCEDURE — 92526 ORAL FUNCTION THERAPY: CPT | Performed by: SPEECH-LANGUAGE PATHOLOGIST

## 2023-02-23 PROCEDURE — 99233 SBSQ HOSP IP/OBS HIGH 50: CPT | Performed by: FAMILY MEDICINE

## 2023-02-23 PROCEDURE — 2500000003 HC RX 250 WO HCPCS: Performed by: FAMILY MEDICINE

## 2023-02-23 PROCEDURE — 2580000003 HC RX 258: Performed by: INTERNAL MEDICINE

## 2023-02-23 PROCEDURE — 36415 COLL VENOUS BLD VENIPUNCTURE: CPT

## 2023-02-23 PROCEDURE — 94664 DEMO&/EVAL PT USE INHALER: CPT

## 2023-02-23 PROCEDURE — 2500000003 HC RX 250 WO HCPCS: Performed by: RADIOLOGY

## 2023-02-23 PROCEDURE — 36592 COLLECT BLOOD FROM PICC: CPT

## 2023-02-23 PROCEDURE — 94640 AIRWAY INHALATION TREATMENT: CPT

## 2023-02-23 PROCEDURE — 80053 COMPREHEN METABOLIC PANEL: CPT

## 2023-02-23 PROCEDURE — 92611 MOTION FLUOROSCOPY/SWALLOW: CPT | Performed by: SPEECH-LANGUAGE PATHOLOGIST

## 2023-02-23 PROCEDURE — 2580000003 HC RX 258: Performed by: NURSE PRACTITIONER

## 2023-02-23 PROCEDURE — 2060000000 HC ICU INTERMEDIATE R&B

## 2023-02-23 PROCEDURE — 6370000000 HC RX 637 (ALT 250 FOR IP)

## 2023-02-23 PROCEDURE — 74230 X-RAY XM SWLNG FUNCJ C+: CPT

## 2023-02-23 PROCEDURE — 85025 COMPLETE CBC W/AUTO DIFF WBC: CPT

## 2023-02-23 PROCEDURE — 2580000003 HC RX 258: Performed by: FAMILY MEDICINE

## 2023-02-23 PROCEDURE — 6360000002 HC RX W HCPCS: Performed by: NURSE PRACTITIONER

## 2023-02-23 PROCEDURE — 84100 ASSAY OF PHOSPHORUS: CPT

## 2023-02-23 PROCEDURE — 6370000000 HC RX 637 (ALT 250 FOR IP): Performed by: STUDENT IN AN ORGANIZED HEALTH CARE EDUCATION/TRAINING PROGRAM

## 2023-02-23 RX ORDER — BENZONATATE 100 MG/1
100 CAPSULE ORAL 3 TIMES DAILY PRN
Status: DISCONTINUED | OUTPATIENT
Start: 2023-02-23 | End: 2023-02-24

## 2023-02-23 RX ADMIN — SODIUM PHOSPHATE, MONOBASIC, MONOHYDRATE AND SODIUM PHOSPHATE, DIBASIC, ANHYDROUS 9.42 MMOL: 276; 142 INJECTION, SOLUTION INTRAVENOUS at 11:55

## 2023-02-23 RX ADMIN — SODIUM CHLORIDE, SODIUM LACTATE, POTASSIUM CHLORIDE, CALCIUM CHLORIDE AND DEXTROSE MONOHYDRATE: 5; 600; 310; 30; 20 INJECTION, SOLUTION INTRAVENOUS at 02:44

## 2023-02-23 RX ADMIN — MICONAZOLE NITRATE: 2 OINTMENT TOPICAL at 20:48

## 2023-02-23 RX ADMIN — GUAIFENESIN AND CODEINE PHOSPHATE 5 ML: 10; 100 LIQUID ORAL at 00:22

## 2023-02-23 RX ADMIN — BARIUM SULFATE 10 ML: 400 PASTE ORAL at 14:03

## 2023-02-23 RX ADMIN — MIDODRINE HYDROCHLORIDE 5 MG: 5 TABLET ORAL at 08:27

## 2023-02-23 RX ADMIN — BENZONATATE 100 MG: 100 CAPSULE ORAL at 00:37

## 2023-02-23 RX ADMIN — LORAZEPAM 1 MG: 1 TABLET ORAL at 20:48

## 2023-02-23 RX ADMIN — ASPIRIN 81 MG: 81 TABLET, COATED ORAL at 08:26

## 2023-02-23 RX ADMIN — LEVALBUTEROL HYDROCHLORIDE 0.63 MG: 0.63 SOLUTION RESPIRATORY (INHALATION) at 05:18

## 2023-02-23 RX ADMIN — LORAZEPAM 1 MG: 1 TABLET ORAL at 14:34

## 2023-02-23 RX ADMIN — BARIUM SULFATE 70 G: 0.81 POWDER, FOR SUSPENSION ORAL at 14:03

## 2023-02-23 RX ADMIN — ATORVASTATIN CALCIUM 10 MG: 10 TABLET, FILM COATED ORAL at 20:48

## 2023-02-23 RX ADMIN — PANTOPRAZOLE SODIUM 40 MG: 40 TABLET, DELAYED RELEASE ORAL at 05:35

## 2023-02-23 RX ADMIN — LORAZEPAM 1 MG: 1 TABLET ORAL at 08:26

## 2023-02-23 RX ADMIN — MICONAZOLE NITRATE: 20 POWDER TOPICAL at 20:48

## 2023-02-23 RX ADMIN — MIDODRINE HYDROCHLORIDE 5 MG: 5 TABLET ORAL at 11:46

## 2023-02-23 RX ADMIN — LEVALBUTEROL HYDROCHLORIDE 0.63 MG: 0.63 SOLUTION RESPIRATORY (INHALATION) at 14:57

## 2023-02-23 RX ADMIN — PANTOPRAZOLE SODIUM 40 MG: 40 TABLET, DELAYED RELEASE ORAL at 16:08

## 2023-02-23 RX ADMIN — GUAIFENESIN AND CODEINE PHOSPHATE 5 ML: 10; 100 LIQUID ORAL at 20:48

## 2023-02-23 RX ADMIN — ENOXAPARIN SODIUM 40 MG: 100 INJECTION SUBCUTANEOUS at 08:27

## 2023-02-23 RX ADMIN — POTASSIUM CHLORIDE 20 MEQ: 29.8 INJECTION, SOLUTION INTRAVENOUS at 05:37

## 2023-02-23 RX ADMIN — SODIUM CHLORIDE, PRESERVATIVE FREE 10 ML: 5 INJECTION INTRAVENOUS at 20:48

## 2023-02-23 RX ADMIN — PIPERACILLIN AND TAZOBACTAM 4500 MG: 4; .5 INJECTION, POWDER, FOR SOLUTION INTRAVENOUS at 04:44

## 2023-02-23 RX ADMIN — Medication 100 MG: at 08:26

## 2023-02-23 RX ADMIN — FLUCONAZOLE 200 MG: 100 TABLET ORAL at 14:54

## 2023-02-23 RX ADMIN — HYDROCORTISONE 20 MG: 20 TABLET ORAL at 08:27

## 2023-02-23 RX ADMIN — POTASSIUM CHLORIDE 20 MEQ: 29.8 INJECTION, SOLUTION INTRAVENOUS at 06:22

## 2023-02-23 RX ADMIN — SODIUM CHLORIDE, PRESERVATIVE FREE 10 ML: 5 INJECTION INTRAVENOUS at 08:30

## 2023-02-23 RX ADMIN — MONTELUKAST SODIUM 10 MG: 10 TABLET ORAL at 08:26

## 2023-02-23 RX ADMIN — BARIUM SULFATE 120 ML: 400 SUSPENSION ORAL at 14:03

## 2023-02-23 RX ADMIN — HYDROCORTISONE 10 MG: 5 TABLET ORAL at 21:48

## 2023-02-23 RX ADMIN — MICONAZOLE NITRATE: 20 POWDER TOPICAL at 08:29

## 2023-02-23 RX ADMIN — ESCITALOPRAM 10 MG: 10 TABLET, FILM COATED ORAL at 20:48

## 2023-02-23 ASSESSMENT — PAIN SCALES - GENERAL
PAINLEVEL_OUTOF10: 0
PAINLEVEL_OUTOF10: 0

## 2023-02-23 NOTE — PROGRESS NOTES
Infectious Disease  Progress Note  [facility]    Chief Complaint: sepsis    Subjective:  she is feeling better . Cough is better after breathing treatment. No fever. Is constipated, getting dulcolax. Scheduled Meds:   midodrine  5 mg Oral TID WC    hydrocortisone  20 mg Oral QAM    And    hydrocortisone  10 mg Oral Nightly    thiamine  100 mg Oral Daily    pantoprazole  40 mg Oral BID AC    fluconazole  200 mg Oral Daily    sodium chloride flush  5-40 mL IntraVENous 2 times per day    enoxaparin  40 mg SubCUTAneous Daily    piperacillin-tazobactam  4,500 mg IntraVENous Q8H    miconazole nitrate   Topical BID    miconazole   Topical BID    LORazepam  1 mg Oral TID    aspirin  81 mg Oral Daily    atorvastatin  10 mg Oral Nightly    escitalopram  10 mg Oral Nightly    montelukast  10 mg Oral Daily     Continuous Infusions:   sodium chloride      dextrose 5% in lactated ringers Stopped (02/23/23 0842)     PRN Meds:benzonatate, sodium phosphate IVPB **OR** sodium phosphate IVPB, magnesium sulfate, prochlorperazine, sodium chloride flush, sodium chloride, polyethylene glycol, potassium chloride, sodium chloride, acetaminophen, guaiFENesin-codeine, levalbuterol    ROS:  As mentioned in subjective, all other systems negative      /82   Pulse (!) 104   Temp 99.1 °F (37.3 °C) (Bladder)   Resp (!) 35   Ht 5' 4\" (1.626 m)   Wt 129 lb 13.6 oz (58.9 kg)   SpO2 95%   BMI 22.29 kg/m²     Physical Exam  Constitutional: The patient is awake, alert, and oriented. looks better. Skin: Warm and dry. No jaundice. HEENT: Eyes show round, and reactive pupils. Moist mucous membranes, no ulcerations, no thrush. Neck: Supple to movements. No lymphadenopathy. Chest: clear to auscultation  Cardiovascular: S1 and S2 are rhythmic and regular. No murmurs appreciated. Abdomen: Positive bowel sounds to auscultation. Benign to palpation. No masses felt. No hepatosplenomegaly.   Genitourinary: meza in place  Extremities: No clubbing, no cyanosis, no edema. Musculoskeletal: bilateral LE scratches with chronic pinkish appearance. No sign of cellulitis. Neurological: alert, oriented x 3. No gross motor abnormalities  Lines: peripheral, right fem CVC       Labs, Cultures reviewed  Radiology reviewed    Microbiology:  Reviewed all the cultures in epic from 2018 (resp, urine and blood ) have always been negative  Blood cx 2/20: negative so far  Urine cx 2/20: >[id number] yeast   RVP negative     Assessment:  Sepsis/ likely complicated UTI: no other source was found. Elevated procalcitonin:  Bilateral LE scratches: no sign of acute infection.       Plan:    stopped IV Zosyn   Continue PO fluconazole x 7 days total.  Monitor labs  Will follow with you       Electronically signed by Mabel Gilliland MD on 2/23/2023 at 10:28 AM

## 2023-02-23 NOTE — PROGRESS NOTES
Physician Progress Note      Radha Barrera  CSN #:                  [id number]  :                       1945  ADMIT DATE:       2023 5:09 PM  [address] [place] DATE:  RESPONDING  PROVIDER #:        Jodi Jolley MD          QUERY TEXT:    Dr. Jennie Garsia,    Patient admitted with UTI and noted to have a chronic indwelling urinary   catheter. If possible, please document in the progress notes and discharge   summary if you are evaluating and/or treating any of the following: The medical record reflects the following:  Risk Factors: Chronic meza d/t PVR  Clinical Indicators: UA+, per ED \"patient was placed on Meza and continues to   have Meza\", the critical care progress note states \"Sepsis, UTI H/o   indwelling cath\"  Treatment: ID Consult, IV Zosyn, IV Vancomycin, PO Fluconazole    Thank you,  Kenrick Stearns RN  Clinical Documentation Integrity  [email or web address]  Options provided:  -- UTI due to chronic indwelling urinary catheter  -- UTI not due to indwelling urinary catheter  -- Other - I will add my own diagnosis  -- Disagree - Not applicable / Not valid  -- Disagree - Clinically unable to determine / Unknown  -- Refer to Clinical Documentation Reviewer    PROVIDER RESPONSE TEXT:    UTI is due to the chronic indwelling urinary catheter.     Query created by: Xuan Flor on 2023 3:10 PM      Electronically signed by:  Jodi Jolley MD 2023 7:32 PM

## 2023-02-23 NOTE — PROGRESS NOTES
SPEECH/LANGUAGE PATHOLOGY  VIDEOFLUOROSCOPIC STUDY OF SWALLOWING (MBS)   and PLAN OF CARE    PATIENT NAME:  Deshawn Acevedo  (female)     MRN:  [id number]    :  1945  (70 y.o.)  STATUS:  Inpatient: Room 0205/0205-A    TODAY'S DATE:  2023  REFERRING PROVIDER:   23 0830    SLP video swallow  Start:  23,   End:  23,   ONE TIME,   Standing Count:  1 OccurrencesBALJEET MD   REASON FOR REFERRAL: further assess pharyngeal swallow   EVALUATING THERAPIST: AUBRIE Medley      RESULTS:      DYSPHAGIA DIAGNOSIS:  mild pharyngeal phase dysphagia including deep laryngeal penetration with thin liquid per straw    DIET RECOMMENDATIONS:  Regular consistency solids (IDDSI level 7) with thin liquids (IDDSI level 0)-NO STRAWS    MEDICATION ADMINISTRATION: Per patient choice/nursing judgement    FEEDING RECOMMENDATIONS:    Assistance level:  No assistance needed     Compensatory strategies recommended: Small bites/sips and No straw     Discussed recommendations with nursing and/or faxed report to referring provider: Yes    Laryngeal Penetration and Aspiration:  Penetration WITHOUT aspiration was observed in today's study with  thin liquid    SPEECH THERAPY  PLAN OF CARE   The dysphagia POC is established based on physician order and dysphagia diagnosis    Skilled SLP intervention for dysphagia management on acute care up to 5 x per week until goals met, pt plateaus in function and/or discharged from hospital      Conditions Requiring Skilled Therapeutic Intervention for dysphagia:    Patient is performing below functional baseline d/t  current acute condition, Multiple diagnoses, multiple medications, and increased dependency upon caregivers.   swallow triggered once bolus head entered the laryngeal vestibule which increases risk of aspiration   impaired laryngeal closure resulting in contrast entering the laryngeal vestibule    SPECIFIC DYSPHAGIA INTERVENTIONS TO INCLUDE: compensatory swallowing strategies to improve airway protection and swallow function. exercises to target laryngeal elevation     Specific instructions for next treatment:  initiate instruction of therapeutic exercises  and initiate instruction of compensatory strategies  Treatment Goals:    Short Term Goals:  Pt will implement identified compensatory swallowing strategies on 90% of opportunities or greater to improve airway protection and swallow function. On follow up MBSS will improve laryngeal closure as evidenced by decreased bolus entry into laryngeal vestibule when compared to prior MBSS     Long Term Goals:   Pt will maintain adequate nutrition/hydration via PO intake of the least restrictive oral diet with implementation of safe swallow/ compensatory strategies and decrease signs/symptoms of aspiration to less than 1 x/day. Pt will improve oropharyngeal swallow function to ensure airway protection during PO intake to maintain adequate nutrition/hydration and decrease signs/symptoms of aspiration to less than 1 x/day. Patient/family Goal:    Did not state. Will further assess during treatment. Plan of care discussed with Patient   The Patient understand(s) the diagnosis, prognosis and plan of care     Rehabilitation Potential/Prognosis: fair                      ADMITTING DIAGNOSIS: Sept[facility] shock ([facility] [A41.9, R65.21]     VISIT DIAGNOSIS:         PATIENT REPORT/COMPLAINT: hard to swallow \"rough\" good    PRIOR LEVEL OF SWALLOW FUNCTION:    Past History of Dysphagia?:  none reported    Home diet: Regular consistency solids (IDDSI level 7) with  thin liquids (IDDSI level 0)  Current Diet Order:  ADULT DIET;  Regular    PROCEDURE:  Consistencies Administered During the Evaluation   Liquids: thin liquid and nectar thick liquid   Solids:  pureed foods and solid foods      Method of Intake:   cup, straw, spoon  Self fed, Fed by clinician      Position:   Seated, upright, Lateral plane    INSTRUMENTAL ASSESSMENT:    ORAL PREP/ ORAL PHASE:    The oral stage of swallowing was within functional limits for consistencies administered  Impaired oral initiation noted, appeared volitional due to dislike of barium     PHARYNGEAL PHASE:     ONSET TIME       Delayed initiation of the pharyngeal swallow was noted with swallow reflex triggered at the level of the vallecula        PHARYNGEAL RESIDUALS        Vallecula/Pharyngeal Wall           No significant residuals were noted in the vallecula      Pyriform Sinuses      No significant residuals were noted in the pyriform sinuses     LARYNGEAL PENETRATION   Laryngeal penetration occurred in the absence of aspiration DURING the swallow for thin liquid due to  inadequate laryngeal closure which cleared from the laryngeal vestibule spontaneously (transient).  Laryngeal penetration was mild and occurred only with use of a straw   an absent cough/throat clear was noted    ASPIRATION  Aspiration  was not present during this evaluation however there is high risk for aspiration  of thin liquid per straw due to laryngeal penetration    PENETRATION-ASPIRATION SCALE (PAS):  THIN 2 = Material enters the airway, remains above vocal folds, and is ejected from the airway   MILDLY THICK 1 = Material does not enter the airway  MODERATELY THICK item not administered  PUREE 1 = Material does not enter the airway  HARD SOLID 1 = Material does not enter the airway       COMPENSATORY STRATEGIES    Compensatory strategies that were beneficial included Small bites/sips and No straw      STRUCTURAL/FUNCTIONAL ANOMALIES   No structural/functional anomalies were noted    CERVICAL ESOPHAGEAL STAGE :     The cervical esophagus appeared adequate          ___________    Cognition:   Within functional limits for this exam    Oral Peripheral Examination   Adequate lingual/labial strength     Parameters of Speech Production  Respiration:  Adequate for speech production  Quality:   Within functional limits  Intensity: Within functional limits    Pain: No pain reported. EDUCATION:   The Speech Language Pathologist (SLP) completed education regarding results of evaluation and that intervention is warranted at this time. Learner: Patient  Education: Reviewed results and recommendations of this evaluation  Evaluation of Education:  Deri  understanding    This plan may be re-evaluated and revised as warranted. Evaluation Time includes thorough review of current medical information, gathering information on past medical history/social history and prior level of function, completion of standardized testing/informal observation of tasks, assessment of data and education on plan of care and goals. INTERVENTION    Pt/family educated on above results and plan of care. Pt/family trained on compensatory strategies for safe swallow and signs and symptoms of aspiration (throat clearing, coughing/choking, wet vocal quality. , etc.) and encouraged to notify staff if observed. Handouts provided as warranted. Pt/family encouraged to engage in question/answer session. All questions answered and pt/family verbalized understanding of above. [x]The admitting diagnosis and active problem list, have been reviewed prior to initiation of this evaluation. CPT Code: 66933  dysphagia study, 68735 dysphagia therapy     ACTIVE PROBLEM LIST:   Patient Active Problem List   Diagnosis    Mixed hyperlipidemia    HTN (hypertension), benign    Asthma    Vitamin B12 deficiency    Pancreatic cyst    Anxiety disorder    Chronic back pain    Tachycardia    Vomiting    Compression fracture of lumbar spine, non-traumatic, initial encounter ([address]    Compression fracture of T12 vertebra ([address]    Failed spinal cord stimulator (HCC)    Cellulitis    Septicemia (HCC)    Failure to thrive in adult    Septic shock (HCC)    Acute cystitis without hematuria       Jeff BUCKNER CCC/SLP [id number]  Speech-Language Pathologist

## 2023-02-23 NOTE — PLAN OF CARE
Problem: Discharge Planning  Goal: Discharge to home or other facility with appropriate resources  Outcome: Progressing     Problem: Pain  Goal: Verbalizes/displays adequate comfort level or baseline comfort level  2/23/2023 0003 by Ann Uriarte RN  Outcome: Progressing     Problem: Skin/Tissue Integrity  Goal: Absence of new skin breakdown  Description: 1. Monitor for areas of redness and/or skin breakdown  2. Assess vascular access sites hourly  3. Every 4-6 hours minimum:  Change oxygen saturation probe site  4. Every 4-6 hours:  If on nasal continuous positive airway pressure, respiratory therapy assess nares and determine need for appliance change or resting period.   2/23/2023 0003 by Ann Uriarte RN  Outcome: Progressing    Problem: Safety - Adult  Goal: Free from fall injury  2/23/2023 0003 by Ann Uriarte RN  Outcome: Progressing

## 2023-02-23 NOTE — PLAN OF CARE
Problem: Pain  Goal: Verbalizes/displays adequate comfort level or baseline comfort level  Outcome: Progressing     Problem: Skin/Tissue Integrity  Goal: Absence of new skin breakdown  Description: 1. Monitor for areas of redness and/or skin breakdown  2. Assess vascular access sites hourly  3. Every 4-6 hours minimum:  Change oxygen saturation probe site  4. Every 4-6 hours:  If on nasal continuous positive airway pressure, respiratory therapy assess nares and determine need for appliance change or resting period.   Outcome: Progressing     Problem: Safety - Adult  Goal: Free from fall injury  Outcome: Progressing  Flowsheets (Taken 2/22/2023 1100)  Free From Fall Injury: Based on caregiver fall risk screen, instruct family/caregiver to ask for assistance with transferring infant if caregiver noted to have fall risk factors

## 2023-02-24 LAB
ALBUMIN SERPL-MCNC: 2.4 G/DL (ref 3.5–5.2)
ALP BLD-CCNC: 73 U/L (ref 35–104)
ALT SERPL-CCNC: 8 U/L (ref 0–32)
ANION GAP SERPL CALCULATED.3IONS-SCNC: 8 MMOL/L (ref 7–16)
ANISOCYTOSIS: ABNORMAL
AST SERPL-CCNC: 18 U/L (ref 0–31)
BASOPHILS ABSOLUTE: 0.03 E9/L (ref 0–0.2)
BASOPHILS RELATIVE PERCENT: 0.4 % (ref 0–2)
BILIRUB SERPL-MCNC: 0.4 MG/DL (ref 0–1.2)
BUN BLDV-MCNC: 4 MG/DL (ref 6–23)
BURR CELLS: ABNORMAL
CALCIUM SERPL-MCNC: 7.5 MG/DL (ref 8.6–10.2)
CHLORIDE BLD-SCNC: 107 MMOL/L (ref 98–107)
CO2: 26 MMOL/L (ref 22–29)
CREAT SERPL-MCNC: 0.5 MG/DL (ref 0.5–1)
EOSINOPHILS ABSOLUTE: 0.07 E9/L (ref 0.05–0.5)
EOSINOPHILS RELATIVE PERCENT: 1 % (ref 0–6)
GFR SERPL CREATININE-BSD FRML MDRD: >60 ML/MIN/1.73
GLUCOSE BLD-MCNC: 77 MG/DL (ref 74–99)
HCT VFR BLD CALC: 25.8 % (ref 34–48)
HEMOGLOBIN: 8 G/DL (ref 11.5–15.5)
HYPOCHROMIA: ABNORMAL
IMMATURE GRANULOCYTES #: 0.02 E9/L
IMMATURE GRANULOCYTES %: 0.3 % (ref 0–5)
LYMPHOCYTES ABSOLUTE: 1.4 E9/L (ref 1.5–4)
LYMPHOCYTES RELATIVE PERCENT: 20.8 % (ref 20–42)
MAGNESIUM: 1.9 MG/DL (ref 1.6–2.6)
MCH RBC QN AUTO: 26.3 PG (ref 26–35)
MCHC RBC AUTO-ENTMCNC: 31 % (ref 32–34.5)
MCV RBC AUTO: 84.9 FL (ref 80–99.9)
METER GLUCOSE: 108 MG/DL (ref 74–99)
METER GLUCOSE: 111 MG/DL (ref 74–99)
METER GLUCOSE: 72 MG/DL (ref 74–99)
METER GLUCOSE: 85 MG/DL (ref 74–99)
MONOCYTES ABSOLUTE: 1.1 E9/L (ref 0.1–0.95)
MONOCYTES RELATIVE PERCENT: 16.4 % (ref 2–12)
NEUTROPHILS ABSOLUTE: 4.1 E9/L (ref 1.8–7.3)
NEUTROPHILS RELATIVE PERCENT: 61.1 % (ref 43–80)
OVALOCYTES: ABNORMAL
PDW BLD-RTO: 21.7 FL (ref 11.5–15)
PHOSPHORUS: 2.5 MG/DL (ref 2.5–4.5)
PLATELET # BLD: 281 E9/L (ref 130–450)
PMV BLD AUTO: 12 FL (ref 7–12)
POIKILOCYTES: ABNORMAL
POTASSIUM SERPL-SCNC: 2.7 MMOL/L (ref 3.5–5)
RBC # BLD: 3.04 E12/L (ref 3.5–5.5)
SODIUM BLD-SCNC: 141 MMOL/L (ref 132–146)
TARGET CELLS: ABNORMAL
TOTAL PROTEIN: 4.7 G/DL (ref 6.4–8.3)
WBC # BLD: 6.7 E9/L (ref 4.5–11.5)

## 2023-02-24 PROCEDURE — 6360000002 HC RX W HCPCS: Performed by: NURSE PRACTITIONER

## 2023-02-24 PROCEDURE — 85025 COMPLETE CBC W/AUTO DIFF WBC: CPT

## 2023-02-24 PROCEDURE — 2580000003 HC RX 258: Performed by: NURSE PRACTITIONER

## 2023-02-24 PROCEDURE — 83735 ASSAY OF MAGNESIUM: CPT

## 2023-02-24 PROCEDURE — 99233 SBSQ HOSP IP/OBS HIGH 50: CPT | Performed by: FAMILY MEDICINE

## 2023-02-24 PROCEDURE — 84100 ASSAY OF PHOSPHORUS: CPT

## 2023-02-24 PROCEDURE — 6370000000 HC RX 637 (ALT 250 FOR IP): Performed by: INTERNAL MEDICINE

## 2023-02-24 PROCEDURE — 6360000002 HC RX W HCPCS: Performed by: FAMILY MEDICINE

## 2023-02-24 PROCEDURE — 6370000000 HC RX 637 (ALT 250 FOR IP): Performed by: STUDENT IN AN ORGANIZED HEALTH CARE EDUCATION/TRAINING PROGRAM

## 2023-02-24 PROCEDURE — 97161 PT EVAL LOW COMPLEX 20 MIN: CPT

## 2023-02-24 PROCEDURE — 6370000000 HC RX 637 (ALT 250 FOR IP)

## 2023-02-24 PROCEDURE — 6370000000 HC RX 637 (ALT 250 FOR IP): Performed by: FAMILY MEDICINE

## 2023-02-24 PROCEDURE — 82962 GLUCOSE BLOOD TEST: CPT

## 2023-02-24 PROCEDURE — 80053 COMPREHEN METABOLIC PANEL: CPT

## 2023-02-24 PROCEDURE — 36415 COLL VENOUS BLD VENIPUNCTURE: CPT

## 2023-02-24 PROCEDURE — 2060000000 HC ICU INTERMEDIATE R&B

## 2023-02-24 PROCEDURE — 97165 OT EVAL LOW COMPLEX 30 MIN: CPT

## 2023-02-24 PROCEDURE — 94640 AIRWAY INHALATION TREATMENT: CPT

## 2023-02-24 RX ORDER — CODEINE PHOSPHATE AND GUAIFENESIN 10; 100 MG/5ML; MG/5ML
10 SOLUTION ORAL 3 TIMES DAILY PRN
Status: DISCONTINUED | OUTPATIENT
Start: 2023-02-24 | End: 2023-02-28 | Stop reason: HOSPADM

## 2023-02-24 RX ORDER — POTASSIUM CHLORIDE 20 MEQ/1
40 TABLET, EXTENDED RELEASE ORAL
Status: COMPLETED | OUTPATIENT
Start: 2023-02-24 | End: 2023-02-24

## 2023-02-24 RX ORDER — LEVALBUTEROL INHALATION SOLUTION 0.63 MG/3ML
0.63 SOLUTION RESPIRATORY (INHALATION) EVERY 8 HOURS
Status: DISCONTINUED | OUTPATIENT
Start: 2023-02-24 | End: 2023-02-28 | Stop reason: HOSPADM

## 2023-02-24 RX ORDER — MAGNESIUM SULFATE 1 G/100ML
1000 INJECTION INTRAVENOUS ONCE
Status: COMPLETED | OUTPATIENT
Start: 2023-02-24 | End: 2023-02-24

## 2023-02-24 RX ORDER — BENZONATATE 100 MG/1
200 CAPSULE ORAL 3 TIMES DAILY PRN
Status: DISCONTINUED | OUTPATIENT
Start: 2023-02-24 | End: 2023-02-28 | Stop reason: HOSPADM

## 2023-02-24 RX ORDER — FLUCONAZOLE 200 MG/1
200 TABLET ORAL DAILY
Qty: 5 TABLET | Refills: 0 | Status: SHIPPED | OUTPATIENT
Start: 2023-02-24 | End: 2023-02-28 | Stop reason: SDUPTHER

## 2023-02-24 RX ADMIN — ENOXAPARIN SODIUM 40 MG: 100 INJECTION SUBCUTANEOUS at 08:59

## 2023-02-24 RX ADMIN — Medication 100 MG: at 09:00

## 2023-02-24 RX ADMIN — MONTELUKAST SODIUM 10 MG: 10 TABLET ORAL at 09:00

## 2023-02-24 RX ADMIN — POTASSIUM CHLORIDE 40 MEQ: 1500 TABLET, EXTENDED RELEASE ORAL at 04:31

## 2023-02-24 RX ADMIN — HYDROCORTISONE 10 MG: 5 TABLET ORAL at 21:35

## 2023-02-24 RX ADMIN — MIDODRINE HYDROCHLORIDE 5 MG: 5 TABLET ORAL at 12:12

## 2023-02-24 RX ADMIN — MICONAZOLE NITRATE: 2 OINTMENT TOPICAL at 09:01

## 2023-02-24 RX ADMIN — LORAZEPAM 1 MG: 1 TABLET ORAL at 21:35

## 2023-02-24 RX ADMIN — FLUCONAZOLE 200 MG: 100 TABLET ORAL at 14:58

## 2023-02-24 RX ADMIN — MIDODRINE HYDROCHLORIDE 5 MG: 5 TABLET ORAL at 09:01

## 2023-02-24 RX ADMIN — ATORVASTATIN CALCIUM 10 MG: 10 TABLET, FILM COATED ORAL at 21:35

## 2023-02-24 RX ADMIN — ESCITALOPRAM 10 MG: 10 TABLET, FILM COATED ORAL at 21:35

## 2023-02-24 RX ADMIN — ASPIRIN 81 MG: 81 TABLET, COATED ORAL at 09:00

## 2023-02-24 RX ADMIN — PANTOPRAZOLE SODIUM 40 MG: 40 TABLET, DELAYED RELEASE ORAL at 06:30

## 2023-02-24 RX ADMIN — HYDROCORTISONE 20 MG: 20 TABLET ORAL at 09:00

## 2023-02-24 RX ADMIN — BENZONATATE 200 MG: 100 CAPSULE ORAL at 12:12

## 2023-02-24 RX ADMIN — LORAZEPAM 1 MG: 1 TABLET ORAL at 13:20

## 2023-02-24 RX ADMIN — SODIUM CHLORIDE, PRESERVATIVE FREE 10 ML: 5 INJECTION INTRAVENOUS at 09:01

## 2023-02-24 RX ADMIN — GUAIFENESIN AND CODEINE PHOSPHATE 10 ML: 10; 100 LIQUID ORAL at 21:39

## 2023-02-24 RX ADMIN — MAGNESIUM SULFATE HEPTAHYDRATE 1000 MG: 1 INJECTION, SOLUTION INTRAVENOUS at 09:25

## 2023-02-24 RX ADMIN — LEVALBUTEROL HYDROCHLORIDE 0.63 MG: 0.63 SOLUTION RESPIRATORY (INHALATION) at 16:13

## 2023-02-24 RX ADMIN — LORAZEPAM 1 MG: 1 TABLET ORAL at 09:00

## 2023-02-24 RX ADMIN — MICONAZOLE NITRATE: 20 POWDER TOPICAL at 09:00

## 2023-02-24 RX ADMIN — MICONAZOLE NITRATE: 2 OINTMENT TOPICAL at 21:34

## 2023-02-24 RX ADMIN — PANTOPRAZOLE SODIUM 40 MG: 40 TABLET, DELAYED RELEASE ORAL at 16:44

## 2023-02-24 RX ADMIN — MIDODRINE HYDROCHLORIDE 5 MG: 5 TABLET ORAL at 16:49

## 2023-02-24 RX ADMIN — MICONAZOLE NITRATE: 20 POWDER TOPICAL at 21:34

## 2023-02-24 RX ADMIN — POTASSIUM CHLORIDE 40 MEQ: 1500 TABLET, EXTENDED RELEASE ORAL at 06:30

## 2023-02-24 RX ADMIN — SODIUM CHLORIDE, PRESERVATIVE FREE 10 ML: 5 INJECTION INTRAVENOUS at 21:35

## 2023-02-24 NOTE — PROGRESS NOTES
Physical Therapy  Facility/Department: [address] INTERMEDIATE 1  Physical Therapy Initial Assessment    Name: Aniket Bond  : 1945  MRN: [id number]  Date of Service: 2023      Patient Diagnosis(es): The encounter diagnosis was Septicemia [facility]). Past Medical History:  has a past medical history of Anxiety, Arthritis, Asthma, Claustrophobia, Dermatitis, Fracture, GERD (gastroesophageal reflux disease), Hiatal hernia, History of blood transfusion, HTN (hypertension), Hyperlipidemia, On aspirin at home, Pancreatic cyst, Pneumonia, Sleep apnea, SOB (shortness of breath), and Tachycardia. Past Surgical History:  has a past surgical history that includes Cholecystectomy; Cataract removal (2013, 2013); back surgery (2014); joint replacement (2016); Total knee arthroplasty (Right); other surgical history (2017); Spinal fixation surgery with implant (); and Upper gastrointestinal endoscopy (07/10/2017). Evaluating Therapist: Antwon Bennett PT    Room #:  2333/8211-U  Diagnosis:  Septic shock ([facility] [A41.9, R65.21]  PMHx/PSHx:  Hx of T12, L1 compression fx  Precautions:  falls, alarm      Social:  Pt admitted from [place] [address]. Prior lived alone, states daughter will stay with her at discharge. 1 floor home. Pt ambulates with ww. Has a lift chair at home   Initial Evaluation  Date: 23 Treatment      Short Term/ Long Term   Goals   Was pt agreeable to Eval/treatment? yes     Does pt have pain?  Chronic back pain and L knee pain     Bed Mobility  Rolling: NT  Supine to sit: NT  Sit to supine: NT  Scooting: NT  Min assist   Transfers Sit to stand: max assist  Stand to sit: mod assist  Stand pivot: NT  Min assist   Ambulation    10 feet and 25 feet with ww with min to mod assist  40 feet with ww with CGA   Stair Negotiation  Ascended and descended  NT   N/A   LE strength     3+/5    4/5   balance      Fair-     AM-PAC Raw score                        Pt is alert and Oriented   LE ROM: WFL  Sensation: decreased LEs  Edema: none  Endurance: fair  Chair alarm: yes     ASSESSMENT:    Pt displays functional ability as noted in the objective portion of this evaluation. Patient education  Pt educated on transfer technique    Patient response to education:   Pt verbalized understanding Pt demonstrated skill Pt requires further education in this area   yes With cues and assist yes       Comments:  Pt fatigues quickly with ambulation. Flexed at trunk at nearly leaning on forearms on ww. Decreased step length and foot clearance. High risk for falls. Increased assistance needed as pt fatigues    Pt's/ family goals   1. To return home    Conditions Requiring Skilled Therapeutic Intervention:    [x]Decreased strength     []Decreased ROM  [x]Decreased functional mobility  [x]Decreased balance   [x]Decreased endurance   []Decreased posture  []Decreased sensation  []Decreased coordination   []Decreased vision  [x]Decreased safety awareness   [x]Increased pain       Patient and or family understand(s) diagnosis, prognosis, and plan of care.     Prognosis is good for reaching above PT goals    PHYSICAL THERAPY PLAN OF CARE:    PT POC is established based on physician order and patient diagnosis     Referring provider/PT Order: Landry Price MD/ PT eval and treat      Current Treatment Recommendations:     [x] Strengthening to improve independence with functional mobility   [] ROM to improve independence with functional mobility   [x] Balance Training to improve static/dynamic balance and to reduce fall risk  [x] Endurance Training to improve activity tolerance during functional mobility   [x] Transfer Training to improve safety and independence with all functional transfers   [x] Gait Training to improve gait mechanics, endurance and assess need for appropriate assistive device  [] Stair Training in preparation for safe discharge home and/or into the community   [] Positioning to prevent skin breakdown and contractures  [x] Safety and Education Training   [x] Patient/Caregiver Education   [] HEP  [] Other     PT long term treatment goals are located in above grid    Frequency of treatments: 2-5x/week x 5 days.    Time in  100  Time out  115        Evaluation Time includes thorough review of current medical information, gathering information on past medical history/social history and prior level of function, completion of standardized testing/informal observation of tasks, assessment of data and education on plan of care and goals.      CPT codes:  [x] Low Complexity PT evaluation 22508  [] Moderate Complexity PT evaluation 41174  [] High Complexity PT evaluation 02305  [] PT Re-evaluation 79319  [] Gait training 89506 minutes  [] Manual therapy 12619 minutes  [] Therapeutic activities 37035 minutes  [] Therapeutic exercises 71691 minutes  [] Neuromuscular reeducation 74007 minutes     Katherin Jimenez PT [id number]

## 2023-02-24 NOTE — PROGRESS NOTES
[address]    RDLV:                                                  Patient Name: Gena Coronel    MRN: [id number]    : 1945    Room: [address] [place] [address]      Evaluating OT: SIENA Montaño/ABHINAV, [id number]    Referring Faraz Diallo MD  Specific Provider Orders/Date: OT Eval and Treat 23    Diagnosis: Septic shock ([facility] [A41.9, R65.21]   Surgery: NA     Pertinent Medical History:      Past Medical History:   Diagnosis Date    Anxiety     Arthritis     Asthma     Claustrophobia     Dermatitis 2017    bilateral legs knees to ankle; follows with [facility]    Fracture 2017    \"in Spine\" compression T10 per pt; difficulty laying flat    GERD (gastroesophageal reflux disease)     Hiatal hernia     History of blood transfusion     HTN (hypertension) 2013    Hyperlipidemia     On aspirin at home     for age per pcp    Pancreatic cyst 2017    Pneumonia 2018    Sleep apnea     SOB (shortness of breath) 2013    Tachycardia 2013    follows with Dr Lore Abebe         Past Surgical History:   Procedure Laterality Date    BACK SURGERY  2014    has screws in back    CATARACT REMOVAL  2013, 2013    [facility] with lens implants    CHOLECYSTECTOMY      JOINT REPLACEMENT  2016    SI joint fusion Right    OTHER SURGICAL HISTORY  2017    MRI -     SPINAL FIXATION SURGERY WITH IMPLANT  2013    in [address] Right     UPPER GASTROINTESTINAL ENDOSCOPY  07/10/2017     Precautions:  Fall Risk, meza, bed/chair alarm, hx T12 and L1 spinal fractures, spinal stimulator (needs battery replaced)    Assessment of current deficits    [x] Functional mobility  [x]ADLs  [x] Strength               [x]Cognition    [x] Functional transfers   [x] IADLs         [x] Safety Awareness   [x]Endurance    [x] Fine Coordination              [x] Balance      [x] Vision/perception   [x]Sensation     []Gross Motor Coordination  [x] ROM  [] Delirium                   [] Motor Control     OT PLAN OF CARE   OT POC based on physician orders, patient diagnosis and results of clinical assessment    Frequency/Duration 1-3 days/wk for 2 weeks PRN   Specific OT Treatment Interventions to include:   * Instruction/training on adapted ADL techniques and AE recommendations to increase functional independence within precautions       * Training on energy conservation strategies, correct breathing pattern and techniques to improve independence/tolerance for self-care routine  * Functional transfer/mobility training/DME recommendations for increased independence, safety, and fall prevention  * Patient/Family education to increase follow through with safety techniques and functional independence  * Recommendation of environmental modifications for increased safety with functional transfers/mobility and ADLs  * Splinting/positioning for increased function, prevention of contractures, and improve skin integrity  * Therapeutic exercise to improve motor endurance, ROM, and functional strength for ADLs/functional transfers  * Therapeutic activities to facilitate/challenge dynamic balance, stand tolerance for increased safety and independence with ADLs  * Therapeutic activities to facilitate gross/fine motor skills for increased independence with ADLs  * Positioning to improve skin integrity, interaction with environment and functional independence  * Delirium prevention/treatment    Recommended Adaptive Equipment:  TBD    Comments: Based on patient's functional performance as stated below and level of assistance needed prior to admission, this therapist believes that the patient would benefit from further skilled OT following hospital stay in an effort to increase safety, functional independence, and quality of life.     Home Living: Pt admitted from [address]- normally lives in ranch home with 3 step(s) to enter and ? rail(s); bed/bath on main floor. States her daughter will be staying with her s/p d/c. Bathroom setup: tub shower  Equipment owned: [place], w/c, tub bench    Prior Level of Function: reports she needed \"just a little\" assistance with ADLs and with IADLs; using ww for functional mobility. Pain Level: denies pain at rest  Cognition: A&O: 4/4; Follows 2-3 step directions. Poor insight into deficits/situation   Memory: F   Sequencing: F   Problem solving: F   Judgement/safety: F     Functional Assessment: AM-PAC Daily Activity Raw Score: 15/24   Initial Eval Status  Date: 2/24/23 Treatment Status  Date: STGs = LTGs  Time frame: 10-14 days   Feeding Set up A        Grooming Set up A  Seated, to wash hands in supported sitting with bath cloth    SBA while seated/standing at sink    UB Dressing SBA  simulated    Set up A   LB Dressing Max A  To doff/arleen socks EOB    Min A with AE PRN   Bathing Max A  simulated    Min A with AE PRN   Toileting Max A  simulated    Min A   Bed Mobility  Rolling: NT  Supine to sit: NT  Sit to supine: Max A  Min A   Functional Transfers Sit to stand: Mod A EOB  Stand to sit: Min A  SBA   Functional Mobility Mod A no AE  For side steps at EOB  SBA ww for in-room distances   Balance Sitting:     Static:  SBA    Dynamic: SBA  Standing: Mod A no AE     Endurance/Activity Tolerance Fair-  Good-   Visual/  Perceptual Glasses: yes, readers   -appears wfl           Hand Dominance R   AROM (PROM) Strength Additional Info:    RUE  WFL AAROM 3-/5 good  and wfl FMC/dexterity noted during ADL tasks   LUE WFL AAROM 3-/5 good  and wfl FMC/dexterity noted during ADL tasks     Hearing: wfl  Sensation: reports decreased sensation in B feet  Tone: WNL  Edema: unremarkable                            Comments: Upon arrival patient seated EOB finishing PT assessment.  Functional transfers, functional mobility, bed mobility, seated grooming addressed. Patient demo's limited insight into deficits. After session, patient long sitting with all devices within reach, all lines and tubes intact. Pt required cues and education as noted above for safe facilitation and completion of tasks. Therapist provided skilled monitoring of patient's response during treatment session. Prior to and at the end of session, environmental modifications/line management completed for patients safety and efficiency of treatment session. Overall, patient demonstrates moderate to significant difficulties with completion of BADLs and IADLs. Factors contributing to these difficulties include impaired insight, decreased endurance, and generalized weakness. As noted above, patient likely to benefit from further OT intervention to increase independence, safety, and overall quality of life. Eval Complexity:   Low  Complexity    Treatment:   Bed mobility: Facilitated bed mobility with cues for proper body mechanics and sequencing to prepare for ADL completion. Functional transfers: Facilitated transfers from various surfaces with cues for body alignment, safety and hand placement. ADL completion: Self-care retraining for the above-mentioned ADLs; training on proper hand placement, safety technique, sequencing, and energy conservation techniques. Postural Balance: Standing balance retraining to improve righting reactions with postural changes during ADLs. Delirium Prevention/Management: Implementation of non-pharmacologic interventions for delirium prevention incorporated throughout session to patient. Including environmental and sensory modifications to decrease patient's internal distraction caused by delirium, and improve overall mentation. Rehab Potential:  Good for established goals     Patient / Family Goal: To go home     Patient and/or family were instructed on functional diagnosis, prognosis/goals and OT plan of care. Demonstrated good understanding.      Eval Complexity: Low      Time In:1311  Time Out: 1324  Total Time:13 minutes    Min Units   OT Eval Low 97165  X 1    OT Eval Medium 79086      OT Eval High 20178       OT Re-Eval [id number]       Therapeutic Ex 29060       Therapeutic Activities 82002       ADL/Self Care 51490       Orthotic Management 61663       Neuro Re-Ed 43529       Non-Billable Time          Evaluation Time additionally includes thorough review of current medical information, gathering information on past medical history/social history and prior level of function, completion of standardized testing/informal observation of tasks, assessment of data and education on plan of care and goals.     Danish Krishna, OTR/L  [id number]

## 2023-02-24 NOTE — CARE COORDINATION
CASE MANAGEMENT. ... Patient transferred from icu to tele. Met with patient at the bedside. She is sitting up in chair. Tolerating room air. Ms Kiersten Seay states that she is not interested in returning to [facility]. Wants to go home. States her daughter will be staying with her for a while and when she leaves, she will go stay with her sister. Has history with [facility], but is not interested in [address] at ID. Voices living in a ranch with 3 steps to enter-states she can tolerate. Has stairlift to basement, several walkers, wheelchair, tub bench and cpap-cannot recall dme agency. Has chronic back pain with several surgeries and medtronic stimulator. PT/OT jerri ordered-requested to see for further dc planning. She has meza which was placed this admit on 2/21/23. States she will dc home with it because of urinary retention. Discharge plan is home. NO HHC. OF NOTE. Debra Guillaume with [facility] aware that patient does not wish to return. states family picked up patients belongs. If she changes her mind and wants HW-no beds available. In the event that patient will discharge over the weekend and then decides on [address], referral called to Mylene with [facility]. Patient is accepted. Notify Mylene of ID. She is on call this weekend.

## 2023-02-24 NOTE — PROGRESS NOTES
Infectious Disease  Progress Note  [facility]    Chief Complaint: sepsis    Subjective:  she is feeling better . Wants to go home. Sitting in chair. No new complaints. Scheduled Meds:   magnesium sulfate  1,000 mg IntraVENous Once    midodrine  5 mg Oral TID WC    hydrocortisone  20 mg Oral QAM    And    hydrocortisone  10 mg Oral Nightly    thiamine  100 mg Oral Daily    pantoprazole  40 mg Oral BID AC    fluconazole  200 mg Oral Daily    sodium chloride flush  5-40 mL IntraVENous 2 times per day    enoxaparin  40 mg SubCUTAneous Daily    miconazole nitrate   Topical BID    miconazole   Topical BID    LORazepam  1 mg Oral TID    aspirin  81 mg Oral Daily    atorvastatin  10 mg Oral Nightly    escitalopram  10 mg Oral Nightly    montelukast  10 mg Oral Daily     Continuous Infusions:   sodium chloride       PRN Meds:benzonatate, sodium phosphate IVPB **OR** sodium phosphate IVPB, magnesium sulfate, prochlorperazine, sodium chloride flush, sodium chloride, polyethylene glycol, potassium chloride, sodium chloride, acetaminophen, guaiFENesin-codeine, levalbuterol    ROS:  As mentioned in subjective, all other systems negative      /77   Pulse (!) 105   Temp 98 °F (36.7 °C) (Oral)   Resp 18   Ht 5' 4\" (1.626 m)   Wt 129 lb 13.6 oz (58.9 kg)   SpO2 96%   BMI 22.29 kg/m²     Physical Exam  Constitutional: The patient is awake, alert, and oriented. looks better. Skin: Warm and dry. No jaundice. HEENT: Eyes show round, and reactive pupils. Moist mucous membranes, no ulcerations, no thrush. Neck: Supple to movements. No lymphadenopathy. Chest: clear to auscultation  Cardiovascular: S1 and S2 are rhythmic and regular. No murmurs appreciated. Abdomen: Positive bowel sounds to auscultation. Benign to palpation. No masses felt. No hepatosplenomegaly. Genitourinary: meza in place  Extremities: No clubbing, no cyanosis, no edema. Musculoskeletal: bilateral LE scratches with chronic pinkish appearance.  No sign of cellulitis. Neurological: alert, oriented x 3. No gross motor abnormalities  Lines: peripheral, right fem CVC- removed. Labs, Cultures reviewed  Radiology reviewed    Microbiology:  Reviewed all the cultures in epic from 2018 (resp, urine and blood ) have always been negative  Blood cx 2/20: negative so far  Urine cx 2/20: >[id number] yeast   RVP negative     Assessment:  Sepsis/ likely complicated UTI: no other source was found. Elevated procalcitonin:  Bilateral LE scratches: no sign of acute infection. Plan:    Continue PO fluconazole x 7 days total.(Day 3) script sent to employee pharmacy. Discussed with case management. ID signs off. Please call me if there are any questions.      Electronically signed by Adore Borjas MD on 2/24/2023 at 10:20 AM

## 2023-02-24 NOTE — PROGRESS NOTES
[facility] Hospitalist Progress Note    Admitting Date and Time: 2/20/2023  5:09 PM  Admit Dx: Septic shock (HCC) [A41.9, R65.21]    Subjective:  Patient is being followed for Septic shock (HCC) [A41.9, R65.21]   Pt feels needs nebs scheduled.  Still has cough.  Wants an antibiotic.  Explained an antibiotic is not needed.  Per RN: as above.  K+ and Mg2+ low today.  IV repletion ordered.    ROS: denies fever, chills, cp, sob, n/v, HA unless stated above.      levalbuterol  0.63 mg Nebulization Q8H    midodrine  5 mg Oral TID WC    hydrocortisone  20 mg Oral QAM    And    hydrocortisone  10 mg Oral Nightly    thiamine  100 mg Oral Daily    pantoprazole  40 mg Oral BID AC    fluconazole  200 mg Oral Daily    sodium chloride flush  5-40 mL IntraVENous 2 times per day    enoxaparin  40 mg SubCUTAneous Daily    miconazole nitrate   Topical BID    miconazole   Topical BID    LORazepam  1 mg Oral TID    aspirin  81 mg Oral Daily    atorvastatin  10 mg Oral Nightly    escitalopram  10 mg Oral Nightly    montelukast  10 mg Oral Daily     benzonatate, 200 mg, TID PRN  guaiFENesin-codeine, 10 mL, TID PRN  sodium phosphate IVPB, 0.16 mmol/kg, PRN   Or  sodium phosphate IVPB, 0.32 mmol/kg, PRN  magnesium sulfate, 2,000 mg, PRN  prochlorperazine, 10 mg, Q6H PRN  sodium chloride flush, 5-40 mL, PRN  sodium chloride, , PRN  polyethylene glycol, 17 g, Daily PRN  potassium chloride, 20 mEq, PRN  sodium chloride, 500 mL, Once PRN  acetaminophen, 650 mg, Q4H PRN         Objective:    /77   Pulse (!) 105   Temp 98 °F (36.7 °C) (Oral)   Resp 18   Ht 5' 4\" (1.626 m)   Wt 129 lb 13.6 oz (58.9 kg)   SpO2 96%   BMI 22.29 kg/m²     General Appearance: alert and oriented to person, place and time and in no acute distress  Skin: warm and dry  Head: normocephalic and atraumatic  Eyes: pupils equal, round, and reactive to light, extraocular eye movements intact, conjunctivae normal  Neck: neck supple and non tender without  mass   Pulmonary/Chest: clear to auscultation bilaterally- no wheezes, rales or rhonchi, normal air movement, no respiratory distress  Cardiovascular: normal rate, normal S1 and S2 and no carotid bruits  Abdomen: soft, non-tender, non-distended, normal bowel sounds, no masses or organomegaly  Extremities: no cyanosis, no clubbing and no edema  Neurologic: no cranial nerve deficit and speech normal        Recent Labs     02/22/23  0945 02/22/23  1055 02/22/23  1821 02/23/23  0440 02/24/23  0126     --   --  138 141   K 7.5*   < > 4.3 3.1* 2.7*   *  --   --  107 107   CO2 21*  --   --  23 26   BUN 4*  --   --  5* 4*   CREATININE 0.5  --   --  0.5 0.5   GLUCOSE 171*  --   --  102* 77   CALCIUM 7.0*  --   --  7.1* 7.5*    < > = values in this interval not displayed. Recent Labs     02/22/23  0413 02/23/23  0440 02/24/23  0126   WBC 3.1* 5.2 6.7   RBC 3.22* 2.83* 3.04*   HGB 8.4* 7.8* 8.0*   HCT 27.5* 24.0* 25.8*   MCV 85.4 84.8 84.9   MCH 26.1 27.6 26.3   MCHC 30.5* 32.5 31.0*   RDW 21.7* 21.5* 21.7*    263 281   MPV 11.5 11.5 12.0       Radiology:   Fluoroscopy modified barium swallow with video    Result Date: 2/23/2023  EXAMINATION: MODIFIED BARIUM SWALLOW WAS PERFORMED IN CONJUNCTION WITH SPEECH PATHOLOGY SERVICES TECHNIQUE: Under fluoroscopic evaluation cineradiography/videoradiography recordings were performed in conjunction with the speech-language pathologist (SLP). Various liquid, solid and/or semi-solid barium preparations were used to assess swallowing function. FLUOROSCOPY DOSE AND TYPE: Fluoroscopy time 58 seconds, Air Kerma 61.9 mGy. 8 fluoroscopic cine loops were saved. COMPARISON: None HISTORY: ORDERING SYSTEM PROVIDED HISTORY: pneumonia.  r/o silent aspiration. chronic cough TECHNOLOGIST PROVIDED HISTORY: Reason for exam:->pneumonia.  r/o silent aspiration.   chronic cough FINDINGS: There was laryngeal penetration to the level of the vocal cords, only when thin liquid barium was ingested through a straw. Otherwise, no evidence of laryngeal penetration or aspiration. There is delay of the oral phase of swallowing. Pharyngeal delay was also noted with thin consistency barium. There is reduced laryngeal elevation. Laryngeal penetration with thin consistency barium, only when swallowed through a straw. Otherwise, no evidence of laryngeal penetration or aspiration with any tested consistency of barium. Please see separate speech pathology report for full discussion of findings and recommendations. Assessment:    Principal Problem:    Septic shock (HCC)  Active Problems:    Asthma    Acute cystitis without hematuria    HTN (hypertension), benign    Anxiety disorder    Chronic back pain  Resolved Problems:    * No resolved hospital problems. *      Plan:  1. Sepsis with Hypotension/Septic Shock - Off IV Zosyn. ID consulted. Continue same. Urine culture with Yeast (Candida albicans). On oral Fluconazole. 2.    Hyperlipidemia - Lipid panel in am.  On statin. LFTs normal.  Continue same. Last Lipid panel on 2/6/23 was at goal except for HDL which was low (81/102/23/38). 3.  Hypoalbuminemia - improving. albumin 2.4; total protein 4.7.  4.  Prolonged QT, resolved - QTc 447 on 2/22/23. Keep electrolytes Mg2+ > 2 and K+ >4.  5.  Anemia of Inflammation - Hgb 8.0. Monitor for bleeding. Keep Hgb >8.  6.  Ambulatory dysfunction - needs a battery replacement for her spinal stimulstor. 7.   Chronic cough - may have silent reflux due to [facility]. DC Pepcid. Protonix 40 mg IV q 12 hr.  Speech Therapy consulted. MBS ordered. No silent aspiration noted. No straws  8. Hypomagnesemia - Mg2+ 1.9. Trend and treat accordingly. 9.  Hypoalbuminemia -  albumin 2.4; total protein 4.7. Continue ultrafiltration on TTS and HD on MWF. Renal involved. Multifactorial.  Supportive care. NOTE: This report was transcribed using voice recognition software.  Every effort was made to ensure accuracy; however, inadvertent computerized transcription errors may be present.     Electronically signed by Alexander Cheatham MD on 2/24/2023 at 10:53 AM

## 2023-02-25 LAB
ALBUMIN SERPL-MCNC: 2.4 G/DL (ref 3.5–5.2)
ALP BLD-CCNC: 73 U/L (ref 35–104)
ALT SERPL-CCNC: 6 U/L (ref 0–32)
ANION GAP SERPL CALCULATED.3IONS-SCNC: 9 MMOL/L (ref 7–16)
ANISOCYTOSIS: ABNORMAL
AST SERPL-CCNC: 17 U/L (ref 0–31)
BASOPHILS ABSOLUTE: 0.02 E9/L (ref 0–0.2)
BASOPHILS RELATIVE PERCENT: 0.4 % (ref 0–2)
BILIRUB SERPL-MCNC: 0.4 MG/DL (ref 0–1.2)
BLOOD CULTURE, ROUTINE: NORMAL
BUN BLDV-MCNC: 5 MG/DL (ref 6–23)
BURR CELLS: ABNORMAL
CALCIUM SERPL-MCNC: 7.7 MG/DL (ref 8.6–10.2)
CHLORIDE BLD-SCNC: 106 MMOL/L (ref 98–107)
CO2: 26 MMOL/L (ref 22–29)
CREAT SERPL-MCNC: 0.5 MG/DL (ref 0.5–1)
CULTURE, BLOOD 2: NORMAL
EOSINOPHILS ABSOLUTE: 0.04 E9/L (ref 0.05–0.5)
EOSINOPHILS RELATIVE PERCENT: 0.8 % (ref 0–6)
GFR SERPL CREATININE-BSD FRML MDRD: >60 ML/MIN/1.73
GLUCOSE BLD-MCNC: 100 MG/DL (ref 74–99)
HCT VFR BLD CALC: 25.9 % (ref 34–48)
HEMOGLOBIN: 8.1 G/DL (ref 11.5–15.5)
HYPOCHROMIA: ABNORMAL
IMMATURE GRANULOCYTES #: 0.04 E9/L
IMMATURE GRANULOCYTES %: 0.8 % (ref 0–5)
LYMPHOCYTES ABSOLUTE: 1.5 E9/L (ref 1.5–4)
LYMPHOCYTES RELATIVE PERCENT: 28.7 % (ref 20–42)
MAGNESIUM: 2.1 MG/DL (ref 1.6–2.6)
MCH RBC QN AUTO: 26.7 PG (ref 26–35)
MCHC RBC AUTO-ENTMCNC: 31.3 % (ref 32–34.5)
MCV RBC AUTO: 85.5 FL (ref 80–99.9)
METER GLUCOSE: 104 MG/DL (ref 74–99)
METER GLUCOSE: 130 MG/DL (ref 74–99)
METER GLUCOSE: 81 MG/DL (ref 74–99)
METER GLUCOSE: 82 MG/DL (ref 74–99)
METER GLUCOSE: 86 MG/DL (ref 74–99)
MONOCYTES ABSOLUTE: 0.81 E9/L (ref 0.1–0.95)
MONOCYTES RELATIVE PERCENT: 15.5 % (ref 2–12)
NEUTROPHILS ABSOLUTE: 2.81 E9/L (ref 1.8–7.3)
NEUTROPHILS RELATIVE PERCENT: 53.8 % (ref 43–80)
OVALOCYTES: ABNORMAL
PDW BLD-RTO: 21.7 FL (ref 11.5–15)
PHOSPHORUS: 2.5 MG/DL (ref 2.5–4.5)
PLATELET # BLD: 307 E9/L (ref 130–450)
PMV BLD AUTO: 11.4 FL (ref 7–12)
POIKILOCYTES: ABNORMAL
POLYCHROMASIA: ABNORMAL
POTASSIUM SERPL-SCNC: 2.7 MMOL/L (ref 3.5–5)
POTASSIUM SERPL-SCNC: 3.6 MMOL/L (ref 3.5–5)
RBC # BLD: 3.03 E12/L (ref 3.5–5.5)
SCHISTOCYTES: ABNORMAL
SODIUM BLD-SCNC: 141 MMOL/L (ref 132–146)
TARGET CELLS: ABNORMAL
TOTAL PROTEIN: 4.7 G/DL (ref 6.4–8.3)
WBC # BLD: 5.2 E9/L (ref 4.5–11.5)

## 2023-02-25 PROCEDURE — 82962 GLUCOSE BLOOD TEST: CPT

## 2023-02-25 PROCEDURE — 6360000002 HC RX W HCPCS: Performed by: NURSE PRACTITIONER

## 2023-02-25 PROCEDURE — 6370000000 HC RX 637 (ALT 250 FOR IP): Performed by: FAMILY MEDICINE

## 2023-02-25 PROCEDURE — 6370000000 HC RX 637 (ALT 250 FOR IP): Performed by: INTERNAL MEDICINE

## 2023-02-25 PROCEDURE — 99233 SBSQ HOSP IP/OBS HIGH 50: CPT | Performed by: FAMILY MEDICINE

## 2023-02-25 PROCEDURE — 94669 MECHANICAL CHEST WALL OSCILL: CPT

## 2023-02-25 PROCEDURE — 2700000000 HC OXYGEN THERAPY PER DAY

## 2023-02-25 PROCEDURE — 6370000000 HC RX 637 (ALT 250 FOR IP): Performed by: STUDENT IN AN ORGANIZED HEALTH CARE EDUCATION/TRAINING PROGRAM

## 2023-02-25 PROCEDURE — 6360000002 HC RX W HCPCS: Performed by: FAMILY MEDICINE

## 2023-02-25 PROCEDURE — 36415 COLL VENOUS BLD VENIPUNCTURE: CPT

## 2023-02-25 PROCEDURE — 80053 COMPREHEN METABOLIC PANEL: CPT

## 2023-02-25 PROCEDURE — 2060000000 HC ICU INTERMEDIATE R&B

## 2023-02-25 PROCEDURE — 94640 AIRWAY INHALATION TREATMENT: CPT

## 2023-02-25 PROCEDURE — 84100 ASSAY OF PHOSPHORUS: CPT

## 2023-02-25 PROCEDURE — 84132 ASSAY OF SERUM POTASSIUM: CPT

## 2023-02-25 PROCEDURE — 83735 ASSAY OF MAGNESIUM: CPT

## 2023-02-25 PROCEDURE — 85025 COMPLETE CBC W/AUTO DIFF WBC: CPT

## 2023-02-25 PROCEDURE — 6370000000 HC RX 637 (ALT 250 FOR IP)

## 2023-02-25 PROCEDURE — 2500000003 HC RX 250 WO HCPCS: Performed by: INTERNAL MEDICINE

## 2023-02-25 PROCEDURE — 2580000003 HC RX 258: Performed by: NURSE PRACTITIONER

## 2023-02-25 RX ORDER — POTASSIUM CHLORIDE 20 MEQ/1
40 TABLET, EXTENDED RELEASE ORAL
Status: COMPLETED | OUTPATIENT
Start: 2023-02-25 | End: 2023-02-25

## 2023-02-25 RX ORDER — POTASSIUM CHLORIDE 20 MEQ/1
TABLET, EXTENDED RELEASE ORAL
Status: COMPLETED
Start: 2023-02-25 | End: 2023-02-25

## 2023-02-25 RX ADMIN — MIDODRINE HYDROCHLORIDE 5 MG: 5 TABLET ORAL at 09:23

## 2023-02-25 RX ADMIN — MONTELUKAST SODIUM 10 MG: 10 TABLET ORAL at 09:22

## 2023-02-25 RX ADMIN — PANTOPRAZOLE SODIUM 40 MG: 40 TABLET, DELAYED RELEASE ORAL at 06:01

## 2023-02-25 RX ADMIN — LORAZEPAM 1 MG: 1 TABLET ORAL at 12:45

## 2023-02-25 RX ADMIN — SODIUM CHLORIDE, PRESERVATIVE FREE 10 ML: 5 INJECTION INTRAVENOUS at 21:54

## 2023-02-25 RX ADMIN — BENZONATATE 200 MG: 100 CAPSULE ORAL at 21:56

## 2023-02-25 RX ADMIN — ENOXAPARIN SODIUM 40 MG: 100 INJECTION SUBCUTANEOUS at 09:23

## 2023-02-25 RX ADMIN — POTASSIUM CHLORIDE 40 MEQ: 1500 TABLET, EXTENDED RELEASE ORAL at 06:03

## 2023-02-25 RX ADMIN — Medication 100 MG: at 09:22

## 2023-02-25 RX ADMIN — POTASSIUM CHLORIDE 40 MEQ: 1500 TABLET, EXTENDED RELEASE ORAL at 09:32

## 2023-02-25 RX ADMIN — LEVALBUTEROL HYDROCHLORIDE 0.63 MG: 0.63 SOLUTION RESPIRATORY (INHALATION) at 00:20

## 2023-02-25 RX ADMIN — PROCHLORPERAZINE EDISYLATE 10 MG: 5 INJECTION INTRAMUSCULAR; INTRAVENOUS at 14:30

## 2023-02-25 RX ADMIN — LEVALBUTEROL HYDROCHLORIDE 0.63 MG: 0.63 SOLUTION RESPIRATORY (INHALATION) at 23:21

## 2023-02-25 RX ADMIN — HYDROCORTISONE 20 MG: 20 TABLET ORAL at 09:22

## 2023-02-25 RX ADMIN — LORAZEPAM 1 MG: 1 TABLET ORAL at 09:22

## 2023-02-25 RX ADMIN — MICONAZOLE NITRATE: 20 POWDER TOPICAL at 09:22

## 2023-02-25 RX ADMIN — LEVALBUTEROL HYDROCHLORIDE 0.63 MG: 0.63 SOLUTION RESPIRATORY (INHALATION) at 16:27

## 2023-02-25 RX ADMIN — ATORVASTATIN CALCIUM 10 MG: 10 TABLET, FILM COATED ORAL at 21:54

## 2023-02-25 RX ADMIN — MICONAZOLE NITRATE: 2 OINTMENT TOPICAL at 21:55

## 2023-02-25 RX ADMIN — LORAZEPAM 1 MG: 1 TABLET ORAL at 21:54

## 2023-02-25 RX ADMIN — HYDROCORTISONE 10 MG: 5 TABLET ORAL at 21:54

## 2023-02-25 RX ADMIN — LEVALBUTEROL HYDROCHLORIDE 0.63 MG: 0.63 SOLUTION RESPIRATORY (INHALATION) at 08:05

## 2023-02-25 RX ADMIN — MICONAZOLE NITRATE: 2 OINTMENT TOPICAL at 09:30

## 2023-02-25 RX ADMIN — ASPIRIN 81 MG: 81 TABLET, COATED ORAL at 09:22

## 2023-02-25 RX ADMIN — ESCITALOPRAM 10 MG: 10 TABLET, FILM COATED ORAL at 21:54

## 2023-02-25 RX ADMIN — PANTOPRAZOLE SODIUM 40 MG: 40 TABLET, DELAYED RELEASE ORAL at 16:06

## 2023-02-25 RX ADMIN — MIDODRINE HYDROCHLORIDE 5 MG: 5 TABLET ORAL at 12:45

## 2023-02-25 RX ADMIN — MICONAZOLE NITRATE: 20 POWDER TOPICAL at 21:55

## 2023-02-25 RX ADMIN — SODIUM CHLORIDE, PRESERVATIVE FREE 10 ML: 5 INJECTION INTRAVENOUS at 09:25

## 2023-02-25 RX ADMIN — MIDODRINE HYDROCHLORIDE 5 MG: 5 TABLET ORAL at 16:06

## 2023-02-25 RX ADMIN — FLUCONAZOLE 200 MG: 100 TABLET ORAL at 14:43

## 2023-02-25 NOTE — PROGRESS NOTES
Patient given 10 ml guaifenesin with codeine. 5 ml cup spilled on bed, so another 5 ml pulled from omnicel. Omnicel count accurate, patient received appropriate dose.

## 2023-02-26 LAB
ALBUMIN SERPL-MCNC: 2.6 G/DL (ref 3.5–5.2)
ALP BLD-CCNC: 76 U/L (ref 35–104)
ALT SERPL-CCNC: 7 U/L (ref 0–32)
ANION GAP SERPL CALCULATED.3IONS-SCNC: 7 MMOL/L (ref 7–16)
ANISOCYTOSIS: ABNORMAL
AST SERPL-CCNC: 16 U/L (ref 0–31)
BASOPHILS ABSOLUTE: 0.03 E9/L (ref 0–0.2)
BASOPHILS RELATIVE PERCENT: 0.5 % (ref 0–2)
BILIRUB SERPL-MCNC: 0.4 MG/DL (ref 0–1.2)
BUN BLDV-MCNC: 6 MG/DL (ref 6–23)
CALCIUM SERPL-MCNC: 7.8 MG/DL (ref 8.6–10.2)
CHLORIDE BLD-SCNC: 105 MMOL/L (ref 98–107)
CO2: 27 MMOL/L (ref 22–29)
CREAT SERPL-MCNC: 0.5 MG/DL (ref 0.5–1)
EOSINOPHILS ABSOLUTE: 0.03 E9/L (ref 0.05–0.5)
EOSINOPHILS RELATIVE PERCENT: 0.5 % (ref 0–6)
GFR SERPL CREATININE-BSD FRML MDRD: >60 ML/MIN/1.73
GLUCOSE BLD-MCNC: 105 MG/DL (ref 74–99)
HCT VFR BLD CALC: 25.9 % (ref 34–48)
HEMOGLOBIN: 7.9 G/DL (ref 11.5–15.5)
HYPOCHROMIA: ABNORMAL
IMMATURE GRANULOCYTES #: 0.04 E9/L
IMMATURE GRANULOCYTES %: 0.6 % (ref 0–5)
LYMPHOCYTES ABSOLUTE: 1.43 E9/L (ref 1.5–4)
LYMPHOCYTES RELATIVE PERCENT: 22.1 % (ref 20–42)
MAGNESIUM: 2.1 MG/DL (ref 1.6–2.6)
MCH RBC QN AUTO: 26.3 PG (ref 26–35)
MCHC RBC AUTO-ENTMCNC: 30.5 % (ref 32–34.5)
MCV RBC AUTO: 86.3 FL (ref 80–99.9)
METER GLUCOSE: 116 MG/DL (ref 74–99)
METER GLUCOSE: 136 MG/DL (ref 74–99)
METER GLUCOSE: 90 MG/DL (ref 74–99)
METER GLUCOSE: 96 MG/DL (ref 74–99)
MONOCYTES ABSOLUTE: 0.78 E9/L (ref 0.1–0.95)
MONOCYTES RELATIVE PERCENT: 12.1 % (ref 2–12)
NEUTROPHILS ABSOLUTE: 4.16 E9/L (ref 1.8–7.3)
NEUTROPHILS RELATIVE PERCENT: 64.2 % (ref 43–80)
OVALOCYTES: ABNORMAL
PDW BLD-RTO: 22.1 FL (ref 11.5–15)
PHOSPHORUS: 2.9 MG/DL (ref 2.5–4.5)
PLATELET # BLD: 345 E9/L (ref 130–450)
PMV BLD AUTO: 11.7 FL (ref 7–12)
POIKILOCYTES: ABNORMAL
POTASSIUM SERPL-SCNC: 3.6 MMOL/L (ref 3.5–5)
RBC # BLD: 3 E12/L (ref 3.5–5.5)
SODIUM BLD-SCNC: 139 MMOL/L (ref 132–146)
TOTAL PROTEIN: 4.8 G/DL (ref 6.4–8.3)
WBC # BLD: 6.5 E9/L (ref 4.5–11.5)

## 2023-02-26 PROCEDURE — 83735 ASSAY OF MAGNESIUM: CPT

## 2023-02-26 PROCEDURE — 6360000002 HC RX W HCPCS: Performed by: NURSE PRACTITIONER

## 2023-02-26 PROCEDURE — 6370000000 HC RX 637 (ALT 250 FOR IP): Performed by: INTERNAL MEDICINE

## 2023-02-26 PROCEDURE — 84100 ASSAY OF PHOSPHORUS: CPT

## 2023-02-26 PROCEDURE — 6370000000 HC RX 637 (ALT 250 FOR IP): Performed by: FAMILY MEDICINE

## 2023-02-26 PROCEDURE — 85025 COMPLETE CBC W/AUTO DIFF WBC: CPT

## 2023-02-26 PROCEDURE — 2060000000 HC ICU INTERMEDIATE R&B

## 2023-02-26 PROCEDURE — 6360000002 HC RX W HCPCS: Performed by: FAMILY MEDICINE

## 2023-02-26 PROCEDURE — 94640 AIRWAY INHALATION TREATMENT: CPT

## 2023-02-26 PROCEDURE — 2700000000 HC OXYGEN THERAPY PER DAY

## 2023-02-26 PROCEDURE — 2580000003 HC RX 258: Performed by: NURSE PRACTITIONER

## 2023-02-26 PROCEDURE — 80053 COMPREHEN METABOLIC PANEL: CPT

## 2023-02-26 PROCEDURE — 6370000000 HC RX 637 (ALT 250 FOR IP): Performed by: STUDENT IN AN ORGANIZED HEALTH CARE EDUCATION/TRAINING PROGRAM

## 2023-02-26 PROCEDURE — 82962 GLUCOSE BLOOD TEST: CPT

## 2023-02-26 PROCEDURE — 99233 SBSQ HOSP IP/OBS HIGH 50: CPT | Performed by: FAMILY MEDICINE

## 2023-02-26 PROCEDURE — 36415 COLL VENOUS BLD VENIPUNCTURE: CPT

## 2023-02-26 RX ADMIN — ASPIRIN 81 MG: 81 TABLET, COATED ORAL at 08:23

## 2023-02-26 RX ADMIN — LEVALBUTEROL HYDROCHLORIDE 0.63 MG: 0.63 SOLUTION RESPIRATORY (INHALATION) at 07:59

## 2023-02-26 RX ADMIN — GUAIFENESIN AND CODEINE PHOSPHATE 10 ML: 10; 100 LIQUID ORAL at 20:29

## 2023-02-26 RX ADMIN — ENOXAPARIN SODIUM 40 MG: 100 INJECTION SUBCUTANEOUS at 08:23

## 2023-02-26 RX ADMIN — SODIUM CHLORIDE, PRESERVATIVE FREE 10 ML: 5 INJECTION INTRAVENOUS at 08:24

## 2023-02-26 RX ADMIN — LEVALBUTEROL HYDROCHLORIDE 0.63 MG: 0.63 SOLUTION RESPIRATORY (INHALATION) at 16:17

## 2023-02-26 RX ADMIN — LEVALBUTEROL HYDROCHLORIDE 0.63 MG: 0.63 SOLUTION RESPIRATORY (INHALATION) at 23:30

## 2023-02-26 RX ADMIN — MICONAZOLE NITRATE: 20 POWDER TOPICAL at 20:29

## 2023-02-26 RX ADMIN — SODIUM CHLORIDE, PRESERVATIVE FREE 10 ML: 5 INJECTION INTRAVENOUS at 20:30

## 2023-02-26 RX ADMIN — HYDROCORTISONE 10 MG: 5 TABLET ORAL at 20:30

## 2023-02-26 RX ADMIN — MICONAZOLE NITRATE: 2 OINTMENT TOPICAL at 08:23

## 2023-02-26 RX ADMIN — LORAZEPAM 1 MG: 1 TABLET ORAL at 20:30

## 2023-02-26 RX ADMIN — FLUCONAZOLE 200 MG: 100 TABLET ORAL at 14:29

## 2023-02-26 RX ADMIN — MIDODRINE HYDROCHLORIDE 5 MG: 5 TABLET ORAL at 08:23

## 2023-02-26 RX ADMIN — HYDROCORTISONE 20 MG: 20 TABLET ORAL at 08:23

## 2023-02-26 RX ADMIN — MICONAZOLE NITRATE: 20 POWDER TOPICAL at 08:23

## 2023-02-26 RX ADMIN — ATORVASTATIN CALCIUM 10 MG: 10 TABLET, FILM COATED ORAL at 20:30

## 2023-02-26 RX ADMIN — PANTOPRAZOLE SODIUM 40 MG: 40 TABLET, DELAYED RELEASE ORAL at 14:29

## 2023-02-26 RX ADMIN — PANTOPRAZOLE SODIUM 40 MG: 40 TABLET, DELAYED RELEASE ORAL at 05:38

## 2023-02-26 RX ADMIN — MICONAZOLE NITRATE: 2 OINTMENT TOPICAL at 20:29

## 2023-02-26 RX ADMIN — LORAZEPAM 1 MG: 1 TABLET ORAL at 14:29

## 2023-02-26 RX ADMIN — ESCITALOPRAM 10 MG: 10 TABLET, FILM COATED ORAL at 20:30

## 2023-02-26 RX ADMIN — MONTELUKAST SODIUM 10 MG: 10 TABLET ORAL at 08:23

## 2023-02-26 RX ADMIN — LORAZEPAM 1 MG: 1 TABLET ORAL at 08:23

## 2023-02-26 RX ADMIN — Medication 100 MG: at 08:23

## 2023-02-26 NOTE — PROGRESS NOTES
[facility] Progress Note    Admitting Date and Time: 2/20/2023  5:09 PM  Admit Dx: Septic shock ([facility] [address] [A41.9, R65.21]    Subjective:  Patient is being followed for Septic shock ([facility] [address] [A41.9, R65.21]   Pt feels strongly wants to go home. PT AMPAC score low. Nursing to check if able to assess today. Otherwise will need to stay and obtain evaluation by PTand OT tomorrow. Per RN: as above.       ROS: denies fever, chills, cp, sob, n/v, HA unless stated above.      levalbuterol  0.63 mg Nebulization Q8H    midodrine  5 mg Oral TID WC    hydrocortisone  20 mg Oral QAM    And    hydrocortisone  10 mg Oral Nightly    thiamine  100 mg Oral Daily    pantoprazole  40 mg Oral BID AC    fluconazole  200 mg Oral Daily    sodium chloride flush  5-40 mL IntraVENous 2 times per day    enoxaparin  40 mg SubCUTAneous Daily    miconazole nitrate   Topical BID    miconazole   Topical BID    LORazepam  1 mg Oral TID    aspirin  81 mg Oral Daily    atorvastatin  10 mg Oral Nightly    escitalopram  10 mg Oral Nightly    montelukast  10 mg Oral Daily     benzonatate, 200 mg, TID PRN  guaiFENesin-codeine, 10 mL, TID PRN  sodium phosphate IVPB, 0.16 mmol/kg, PRN   Or  sodium phosphate IVPB, 0.32 mmol/kg, PRN  magnesium sulfate, 2,000 mg, PRN  prochlorperazine, 10 mg, Q6H PRN  sodium chloride flush, 5-40 mL, PRN  sodium chloride, , PRN  polyethylene glycol, 17 g, Daily PRN  sodium chloride, 500 mL, Once PRN  acetaminophen, 650 mg, Q4H PRN         Objective:    BP (!) 142/90   Pulse 90   Temp 98.4 °F (36.9 °C) (Oral)   Resp 18   Ht 5' 4\" (1.626 m)   Wt 129 lb 12.8 oz (58.9 kg)   SpO2 98%   BMI 22.28 kg/m²      General Appearance: alert and oriented to person, place and time and in no acute distress  Skin: warm and dry  Head: normocephalic and atraumatic  Eyes: pupils equal, round, and reactive to light, extraocular eye movements intact, conjunctivae normal  Neck: neck supple and non tender without mass Pulmonary/Chest: clear to auscultation bilaterally- no wheezes, rales or rhonchi, normal air movement, no respiratory distress  Cardiovascular: normal rate, normal S1 and S2 and no carotid bruits  Abdomen: soft, non-tender, non-distended, normal bowel sounds, no masses or organomegaly  Extremities: no cyanosis, no clubbing and no edema  Neurologic: no cranial nerve deficit and speech normal        Recent Labs     02/24/23  0126 02/25/23  0114 02/25/23  1705 02/26/23  0205    141  --  139   K 2.7* 2.7* 3.6 3.6    106  --  105   CO2 26 26  --  27   BUN 4* 5*  --  6   CREATININE 0.5 0.5  --  0.5   GLUCOSE 77 100*  --  105*   CALCIUM 7.5* 7.7*  --  7.8*       Recent Labs     02/24/23  0126 02/25/23  0114 02/26/23  0205   WBC 6.7 5.2 6.5   RBC 3.04* 3.03* 3.00*   HGB 8.0* 8.1* 7.9*   HCT 25.8* 25.9* 25.9*   MCV 84.9 85.5 86.3   MCH 26.3 26.7 26.3   MCHC 31.0* 31.3* 30.5*   RDW 21.7* 21.7* 22.1*    307 345   MPV 12.0 11.4 11.7       Radiology:   No results found. Assessment:    Principal Problem:    Septic shock (HCC)  Active Problems:    Asthma    Acute cystitis without hematuria    HTN (hypertension), benign    Anxiety disorder    Chronic back pain  Resolved Problems:    * No resolved hospital problems. *      Plan:  1. Sepsis with Hypotension/Septic Shock - On oral Fluconazole, day 5/7.  2.    Hyperlipidemia - On statin. LFTs normal.  Continue same. Last Lipid panel on 2/6/23 was at goal except for HDL which was low (81/102/23/38). 3.  Hypoalbuminemia - improving. total protein 4.8. Albumin 2.6  4. Prolonged QT, resolved - QTc 447 on 2/22/23. Keep electrolytes Mg2+ > 2 and K+ >4.  5.  Anemia of Inflammation - Hgb 7.9. Monitor for bleeding. Keep Hgb >8.  6.  Ambulatory dysfunction - needs a battery replacement for her spinal stimulstor. 7.   Chronic cough - may have silent reflux due to [facility]. DC Pepcid. Protonix 40 mg IV q 12 hr.  Speech Therapy consulted. MBS ordered.   No silent aspiration noted. No straws  8. Hypomagnesemia - Mg2+ 2.1. Trend and treat accordingly. 9.  Hypoalbuminemia -  improving. Encourage nutrition. Supportive care. NOTE: This report was transcribed using voice recognition software. Every effort was made to ensure accuracy; however, inadvertent computerized transcription errors may be present.     Electronically signed by Nicky Cleveland MD on 2/26/2023 at 9:49 AM

## 2023-02-26 NOTE — PLAN OF CARE
Problem: Discharge Planning  Goal: Discharge to home or other facility with appropriate resources  2/26/2023 0830 by Shasha Neely RN  Outcome: Progressing     Problem: Pain  Goal: Verbalizes/displays adequate comfort level or baseline comfort level  2/26/2023 0830 by Shasha Neely RN  Outcome: Progressing     Problem: Skin/Tissue Integrity  Goal: Absence of new skin breakdown  Description: 1. Monitor for areas of redness and/or skin breakdown  2. Assess vascular access sites hourly  3. Every 4-6 hours minimum:  Change oxygen saturation probe site  4. Every 4-6 hours:  If on nasal continuous positive airway pressure, respiratory therapy assess nares and determine need for appliance change or resting period.   2/26/2023 0830 by Shasha Neely RN  Outcome: Progressing

## 2023-02-26 NOTE — SIGNIFICANT EVENT
Spoke with Daughter, Eyal Perdomo (visiting in room) via phone about discharge plans. Desires PT/OT to evaluate her on Monday to ensure can adequately ambulate with walker, transfer/stand and assist at home with ADLs. Expressed interest in [place] [id number] if qualified for Therapy. To await assessments by Therapy on Monday and discharge plan accordingly.     Karen Jacome MD  Hospitalist, #[id number]

## 2023-02-27 LAB
ALBUMIN SERPL-MCNC: 2.5 G/DL (ref 3.5–5.2)
ALP BLD-CCNC: 65 U/L (ref 35–104)
ALT SERPL-CCNC: 5 U/L (ref 0–32)
ANION GAP SERPL CALCULATED.3IONS-SCNC: 9 MMOL/L (ref 7–16)
ANISOCYTOSIS: ABNORMAL
AST SERPL-CCNC: 13 U/L (ref 0–31)
BASOPHILS ABSOLUTE: 0.07 E9/L (ref 0–0.2)
BASOPHILS RELATIVE PERCENT: 0.9 % (ref 0–2)
BILIRUB SERPL-MCNC: 0.5 MG/DL (ref 0–1.2)
BUN BLDV-MCNC: 6 MG/DL (ref 6–23)
CALCIUM SERPL-MCNC: 8.1 MG/DL (ref 8.6–10.2)
CHLORIDE BLD-SCNC: 103 MMOL/L (ref 98–107)
CO2: 27 MMOL/L (ref 22–29)
CREAT SERPL-MCNC: 0.5 MG/DL (ref 0.5–1)
EOSINOPHILS ABSOLUTE: 0.49 E9/L (ref 0.05–0.5)
EOSINOPHILS RELATIVE PERCENT: 6.1 % (ref 0–6)
GFR SERPL CREATININE-BSD FRML MDRD: >60 ML/MIN/1.73
GLUCOSE BLD-MCNC: 95 MG/DL (ref 74–99)
HCT VFR BLD CALC: 27.4 % (ref 34–48)
HEMOGLOBIN: 8.2 G/DL (ref 11.5–15.5)
HYPOCHROMIA: ABNORMAL
IMMATURE GRANULOCYTES #: 0.08 E9/L
IMMATURE GRANULOCYTES %: 1 % (ref 0–5)
LYMPHOCYTES ABSOLUTE: 2.01 E9/L (ref 1.5–4)
LYMPHOCYTES RELATIVE PERCENT: 25.2 % (ref 20–42)
MAGNESIUM: 1.9 MG/DL (ref 1.6–2.6)
MCH RBC QN AUTO: 26.8 PG (ref 26–35)
MCHC RBC AUTO-ENTMCNC: 29.9 % (ref 32–34.5)
MCV RBC AUTO: 89.5 FL (ref 80–99.9)
METER GLUCOSE: 103 MG/DL (ref 74–99)
METER GLUCOSE: 156 MG/DL (ref 74–99)
METER GLUCOSE: 95 MG/DL (ref 74–99)
METER GLUCOSE: 99 MG/DL (ref 74–99)
MONOCYTES ABSOLUTE: 1.05 E9/L (ref 0.1–0.95)
MONOCYTES RELATIVE PERCENT: 13.1 % (ref 2–12)
NEUTROPHILS ABSOLUTE: 4.29 E9/L (ref 1.8–7.3)
NEUTROPHILS RELATIVE PERCENT: 53.7 % (ref 43–80)
OVALOCYTES: ABNORMAL
PDW BLD-RTO: 22.1 FL (ref 11.5–15)
PHOSPHORUS: 4.2 MG/DL (ref 2.5–4.5)
PLATELET # BLD: 369 E9/L (ref 130–450)
PMV BLD AUTO: 11.8 FL (ref 7–12)
POIKILOCYTES: ABNORMAL
POTASSIUM SERPL-SCNC: 3.3 MMOL/L (ref 3.5–5)
RBC # BLD: 3.06 E12/L (ref 3.5–5.5)
SCHISTOCYTES: ABNORMAL
SODIUM BLD-SCNC: 139 MMOL/L (ref 132–146)
TOTAL PROTEIN: 4.8 G/DL (ref 6.4–8.3)
WBC # BLD: 8 E9/L (ref 4.5–11.5)

## 2023-02-27 PROCEDURE — 2580000003 HC RX 258: Performed by: NURSE PRACTITIONER

## 2023-02-27 PROCEDURE — 36415 COLL VENOUS BLD VENIPUNCTURE: CPT

## 2023-02-27 PROCEDURE — 94640 AIRWAY INHALATION TREATMENT: CPT

## 2023-02-27 PROCEDURE — 80053 COMPREHEN METABOLIC PANEL: CPT

## 2023-02-27 PROCEDURE — 99231 SBSQ HOSP IP/OBS SF/LOW 25: CPT | Performed by: STUDENT IN AN ORGANIZED HEALTH CARE EDUCATION/TRAINING PROGRAM

## 2023-02-27 PROCEDURE — 92526 ORAL FUNCTION THERAPY: CPT | Performed by: SPEECH-LANGUAGE PATHOLOGIST

## 2023-02-27 PROCEDURE — 85025 COMPLETE CBC W/AUTO DIFF WBC: CPT

## 2023-02-27 PROCEDURE — 6370000000 HC RX 637 (ALT 250 FOR IP): Performed by: INTERNAL MEDICINE

## 2023-02-27 PROCEDURE — 6370000000 HC RX 637 (ALT 250 FOR IP): Performed by: FAMILY MEDICINE

## 2023-02-27 PROCEDURE — 2060000000 HC ICU INTERMEDIATE R&B

## 2023-02-27 PROCEDURE — 2700000000 HC OXYGEN THERAPY PER DAY

## 2023-02-27 PROCEDURE — 6370000000 HC RX 637 (ALT 250 FOR IP): Performed by: STUDENT IN AN ORGANIZED HEALTH CARE EDUCATION/TRAINING PROGRAM

## 2023-02-27 PROCEDURE — 97530 THERAPEUTIC ACTIVITIES: CPT

## 2023-02-27 PROCEDURE — 82962 GLUCOSE BLOOD TEST: CPT

## 2023-02-27 PROCEDURE — 94669 MECHANICAL CHEST WALL OSCILL: CPT

## 2023-02-27 PROCEDURE — 6360000002 HC RX W HCPCS: Performed by: NURSE PRACTITIONER

## 2023-02-27 PROCEDURE — 83735 ASSAY OF MAGNESIUM: CPT

## 2023-02-27 PROCEDURE — 6360000002 HC RX W HCPCS: Performed by: FAMILY MEDICINE

## 2023-02-27 PROCEDURE — 84100 ASSAY OF PHOSPHORUS: CPT

## 2023-02-27 RX ORDER — POTASSIUM CHLORIDE 20 MEQ/1
40 TABLET, EXTENDED RELEASE ORAL ONCE
Status: COMPLETED | OUTPATIENT
Start: 2023-02-27 | End: 2023-02-27

## 2023-02-27 RX ADMIN — MICONAZOLE NITRATE: 20 POWDER TOPICAL at 19:55

## 2023-02-27 RX ADMIN — LEVALBUTEROL HYDROCHLORIDE 0.63 MG: 0.63 SOLUTION RESPIRATORY (INHALATION) at 15:45

## 2023-02-27 RX ADMIN — LORAZEPAM 1 MG: 1 TABLET ORAL at 15:03

## 2023-02-27 RX ADMIN — MICONAZOLE NITRATE: 2 OINTMENT TOPICAL at 09:18

## 2023-02-27 RX ADMIN — GUAIFENESIN AND CODEINE PHOSPHATE 10 ML: 10; 100 LIQUID ORAL at 19:55

## 2023-02-27 RX ADMIN — MONTELUKAST SODIUM 10 MG: 10 TABLET ORAL at 09:17

## 2023-02-27 RX ADMIN — ASPIRIN 81 MG: 81 TABLET, COATED ORAL at 09:17

## 2023-02-27 RX ADMIN — ATORVASTATIN CALCIUM 10 MG: 10 TABLET, FILM COATED ORAL at 19:54

## 2023-02-27 RX ADMIN — POTASSIUM CHLORIDE 40 MEQ: 1500 TABLET, EXTENDED RELEASE ORAL at 09:17

## 2023-02-27 RX ADMIN — MICONAZOLE NITRATE: 2 OINTMENT TOPICAL at 19:55

## 2023-02-27 RX ADMIN — HYDROCORTISONE 10 MG: 5 TABLET ORAL at 19:55

## 2023-02-27 RX ADMIN — MICONAZOLE NITRATE: 20 POWDER TOPICAL at 09:17

## 2023-02-27 RX ADMIN — LEVALBUTEROL HYDROCHLORIDE 0.63 MG: 0.63 SOLUTION RESPIRATORY (INHALATION) at 08:19

## 2023-02-27 RX ADMIN — SODIUM CHLORIDE, PRESERVATIVE FREE 10 ML: 5 INJECTION INTRAVENOUS at 09:18

## 2023-02-27 RX ADMIN — LEVALBUTEROL HYDROCHLORIDE 0.63 MG: 0.63 SOLUTION RESPIRATORY (INHALATION) at 23:43

## 2023-02-27 RX ADMIN — HYDROCORTISONE 20 MG: 20 TABLET ORAL at 09:17

## 2023-02-27 RX ADMIN — PANTOPRAZOLE SODIUM 40 MG: 40 TABLET, DELAYED RELEASE ORAL at 15:02

## 2023-02-27 RX ADMIN — FLUCONAZOLE 200 MG: 100 TABLET ORAL at 15:03

## 2023-02-27 RX ADMIN — LORAZEPAM 1 MG: 1 TABLET ORAL at 19:55

## 2023-02-27 RX ADMIN — ENOXAPARIN SODIUM 40 MG: 100 INJECTION SUBCUTANEOUS at 09:18

## 2023-02-27 RX ADMIN — ESCITALOPRAM 10 MG: 10 TABLET, FILM COATED ORAL at 19:54

## 2023-02-27 RX ADMIN — LORAZEPAM 1 MG: 1 TABLET ORAL at 09:17

## 2023-02-27 RX ADMIN — PANTOPRAZOLE SODIUM 40 MG: 40 TABLET, DELAYED RELEASE ORAL at 06:51

## 2023-02-27 RX ADMIN — SODIUM CHLORIDE, PRESERVATIVE FREE 10 ML: 5 INJECTION INTRAVENOUS at 19:55

## 2023-02-27 RX ADMIN — ACETAMINOPHEN 650 MG: 325 TABLET ORAL at 18:08

## 2023-02-27 RX ADMIN — Medication 100 MG: at 09:17

## 2023-02-27 ASSESSMENT — PAIN SCALES - GENERAL
PAINLEVEL_OUTOF10: 0
PAINLEVEL_OUTOF10: 4

## 2023-02-27 NOTE — PROGRESS NOTES
Physical Therapy  Facility/Department: [address] INTERMEDIATE 1  Physical Therapy Treatment Note    Name: Temo Adair  : 1945  MRN: [id number]  Date of Service: 2023      Patient Diagnosis(es): The encounter diagnosis was Septicemia [facility]). Past Medical History:  has a past medical history of Anxiety, Arthritis, Asthma, Claustrophobia, Dermatitis, Fracture, GERD (gastroesophageal reflux disease), Hiatal hernia, History of blood transfusion, HTN (hypertension), Hyperlipidemia, On aspirin at home, Pancreatic cyst, Pneumonia, Sleep apnea, SOB (shortness of breath), and Tachycardia. Past Surgical History:  has a past surgical history that includes Cholecystectomy; Cataract removal (2013, 2013); back surgery (2014); joint replacement (2016); Total knee arthroplasty (Right); other surgical history (2017); Spinal fixation surgery with implant (); and Upper gastrointestinal endoscopy (07/10/2017). Evaluating Therapist: [facility] PT    Room #:  1032/3363-M  Diagnosis:  Septic shock ([facility] [address] [A41.9, R65.21]  PMHx/PSHx:  Hx of T12, L1 compression fx  Precautions:  falls, alarm      Social:  Pt admitted from [facility]. Prior lived alone, states daughter will stay with her at discharge. 1 floor home. Pt ambulates with ww. Has a lift chair at home   Initial Evaluation  Date: 23 Treatment  2023    Short Term/ Long Term   Goals   Was pt agreeable to Eval/treatment? yes yes    Does pt have pain? Chronic back pain and L knee pain L knee pain    Bed Mobility  Rolling: NT  Supine to sit: NT  Sit to supine: NT  Scooting: NT Rolling: SBA  Supine to sit: SBA  Scooting: SBA seated to EOB. Much effort from Pt to perform bed mobility.   Min assist   Transfers Sit to stand: max assist  Stand to sit: mod assist  Stand pivot: NT Sit <> stand: Min A Min assist   Ambulation    10 feet and 25 feet with ww with min to mod assist 40 feet x 1 using [employer] for support Min A for balance 40 feet with ww with CGA   Stair Negotiation  Ascended and descended  NT   N/A   LE strength     3+/5    4/5   balance      Fair-     AM-PAC Raw score               11/24 16/24        Pt is alert and able to follow instruction  Balance: poor dynamic using [employer] for support    Pt performed therapeutic exercise of the following: NT    Patient education/treatment  Pt was educated on UE usage to assist with transfer safety, gait mechanics promoting upright posture    Patient response to education:   Pt verbalized understanding Pt demonstrated skill Pt requires further education in this area   yes With prompt for transfer safety yes     ASSESSMENT:   Comments: Nurse ok with rx. Pt found in bed, sat EOB SBA for balance. Gait slow and inconsistent. Pt unsteady throughout, required constant hands on assist for balance and safety. Pt fatigued after activity. Pt remains unsafe to gait or transfer alone presently. Pt was left in a bedside chair with call light in reach, waffle cushion in place    Chair/bed alarm: chair alarm active    Time in 0948   Time out 1002   Total Treatment Time 14 minutes   CPT codes:     Therapeutic activities 83385 14 minutes   Therapeutic exercises 48429 0 minutes       Pt is making good progress toward established Physical Therapy goals, noted improved balance. Continue with physical therapy current plan of care.     Debbie Santiago PTA   License Number: [id number]

## 2023-02-27 NOTE — PROGRESS NOTES
[facility] Progress Note    Admitting Date and Time: 2/20/2023  5:09 PM  Admit Dx: Septic shock ([facility] [address] [A41.9, R65.21]    Subjective:  Patient is being followed for Septic shock ([facility] [address] [A41.9, R65.21]   Pt feels better. Per RN: No major concerns.      ROS: denies fever, chills, cp, sob, n/v, HA unless stated above.      levalbuterol  0.63 mg Nebulization Q8H    midodrine  5 mg Oral TID WC    hydrocortisone  20 mg Oral QAM    And    hydrocortisone  10 mg Oral Nightly    thiamine  100 mg Oral Daily    pantoprazole  40 mg Oral BID AC    fluconazole  200 mg Oral Daily    sodium chloride flush  5-40 mL IntraVENous 2 times per day    enoxaparin  40 mg SubCUTAneous Daily    miconazole nitrate   Topical BID    miconazole   Topical BID    LORazepam  1 mg Oral TID    aspirin  81 mg Oral Daily    atorvastatin  10 mg Oral Nightly    escitalopram  10 mg Oral Nightly    montelukast  10 mg Oral Daily     benzonatate, 200 mg, TID PRN  guaiFENesin-codeine, 10 mL, TID PRN  sodium phosphate IVPB, 0.16 mmol/kg, PRN   Or  sodium phosphate IVPB, 0.32 mmol/kg, PRN  magnesium sulfate, 2,000 mg, PRN  prochlorperazine, 10 mg, Q6H PRN  sodium chloride flush, 5-40 mL, PRN  sodium chloride, , PRN  polyethylene glycol, 17 g, Daily PRN  sodium chloride, 500 mL, Once PRN  acetaminophen, 650 mg, Q4H PRN         Objective:    /65   Pulse (!) 110   Temp 98.8 °F (37.1 °C) (Axillary)   Resp 18   Ht 5' 4\" (1.626 m)   Wt 129 lb (58.5 kg) Comment: unable to zero bed  SpO2 97%   BMI 22.14 kg/m²     General Appearance: alert and oriented to person, place and time and in no acute distress  Skin: warm and dry  Head: normocephalic and atraumatic  Eyes: pupils equal, round, and reactive to light, extraocular eye movements intact, conjunctivae normal  Neck: neck supple and non tender without mass   Pulmonary/Chest: clear to auscultation bilaterally- no wheezes, rales or rhonchi, normal air movement, no respiratory distress  Cardiovascular: normal rate, normal S1 and S2 and no carotid bruits  Abdomen: soft, non-tender, non-distended, normal bowel sounds, no masses or organomegaly  Extremities: no cyanosis, no clubbing and no edema  Neurologic: no cranial nerve deficit and speech normal        Recent Labs     02/25/23  0114 02/25/23  1705 02/26/23  0205 02/27/23  0516     --  139 139   K 2.7* 3.6 3.6 3.3*     --  105 103   CO2 26  --  27 27   BUN 5*  --  6 6   CREATININE 0.5  --  0.5 0.5   GLUCOSE 100*  --  105* 95   CALCIUM 7.7*  --  7.8* 8.1*       Recent Labs     02/25/23  0114 02/26/23  0205 02/27/23  0516   WBC 5.2 6.5 8.0   RBC 3.03* 3.00* 3.06*   HGB 8.1* 7.9* 8.2*   HCT 25.9* 25.9* 27.4*   MCV 85.5 86.3 89.5   MCH 26.7 26.3 26.8   MCHC 31.3* 30.5* 29.9*   RDW 21.7* 22.1* 22.1*    345 369   MPV 11.4 11.7 11.8       Micro:  No components found for: BC)    Radiology:   No results found.    Assessment:    Principal Problem:    Septic shock (HCC)  Active Problems:    Asthma    Acute cystitis without hematuria    HTN (hypertension), benign    Anxiety disorder    Chronic back pain  Resolved Problems:    * No resolved hospital problems. *      Plan:  1.   Sepsis with Hypotension/Septic Shock- Resolved - On oral Fluconazole,.  2.    Hyperlipidemia - On statin.  LFTs normal.  Continue same.  Last Lipid panel on 2/6/23 was at goal except for HDL which was low (81/102/23/38).    3.  Hypoalbuminemia - improving.  total protein 4.8.  Albumin 2.6  4.  Prolonged QT, resolved - QTc 447 on 2/22/23.  Keep electrolytes Mg2+ > 2 and K+ >4.  5.  Anemia of Inflammation - Hgb 7.9.  Monitor for bleeding.  Keep Hgb >8.  6.  Ambulatory dysfunction - needs a battery replacement for her spinal stimulstor.  7.   Chronic cough - may have silent reflux due to HH.  DC Pepcid.  Protonix 40 mg IV q 12 hr.  Speech Therapy consulted.  MBS ordered.  No silent aspiration noted.  No straws  8.  Hypomagnesemia -  Trend and treat  accordingly. 9.  Hypoalbuminemia -  improving. Encourage nutrition. Supportive care. 10. Physical Deconditioning - Ampac score 11/24, possible JAIR/ OP PT/ HHC    DVT Prophylaxis - Lovenox      NOTE: This report was transcribed using voice recognition software. Every effort was made to ensure accuracy; however, inadvertent computerized transcription errors may be present.   Electronically signed by Yaniv White MD on 2/27/2023 at 2:10 PM

## 2023-02-27 NOTE — CARE COORDINATION
Social Work/Discharge Planning:  Notified therapy of need for updated notes to confirm if plan is home or to return to snf. Will continue to follow.   Electronically signed by CHELSEY Landry on 2/27/2023 at 9:18 AM

## 2023-02-27 NOTE — PROGRESS NOTES
Occupational Therapy  OT BEDSIDE TREATMENT NOTE      Date:2023  Patient Name: Connie Clarke  MRN: [id number]  : 1945  Room: [address] [place] [address]       Evaluating OT: SIENA Waterman/L, [id number]     Referring Diann Sorenson MD  Specific Provider Orders/Date: OT Eval and Treat 23     Diagnosis: Septic shock ([facility] [address] [A41.9, R65.21]   Surgery: NA     Pertinent Medical History:      Past Medical History        Past Medical History:   Diagnosis Date    Anxiety      Arthritis      Asthma      Claustrophobia      Dermatitis 2017     bilateral legs knees to ankle; follows with [facility]    Fracture 2017     \"in Spine\" compression T10 per pt; difficulty laying flat    GERD (gastroesophageal reflux disease)      Hiatal hernia      History of blood transfusion      HTN (hypertension) 2013    Hyperlipidemia      On aspirin at home       for age per pcp    Pancreatic cyst 2017    Pneumonia 2018    Sleep apnea      SOB (shortness of breath) 2013    Tachycardia 2013     follows with Dr El Santana            Past Surgical History         Past Surgical History:   Procedure Laterality Date    BACK SURGERY   2014     has screws in back    CATARACT REMOVAL   2013, 2013     [facility] with lens implants    CHOLECYSTECTOMY        JOINT REPLACEMENT   2016     SI joint fusion Right    OTHER SURGICAL HISTORY   2017     MRI -     SPINAL FIXATION SURGERY WITH IMPLANT   2013     in [facility] Right      UPPER GASTROINTESTINAL ENDOSCOPY   07/10/2017         Precautions:  Fall Risk, meza, bed/chair alarm, hx T12 and L1 spinal fractures, spinal stimulator (needs battery replaced)     Assessment of current deficits    [x] Functional mobility          [x]ADLs           [x] Strength                  [x]Cognition    [x] Functional transfers         [x] IADLs         [x] Safety Awareness   [x]Endurance    [x] Fine Coordination [x] Balance      [x] Vision/perception   [x]Sensation      []Gross Motor Coordination            [x] ROM           [] Delirium                   [] Motor Control      OT PLAN OF CARE   OT POC based on physician orders, patient diagnosis and results of clinical assessment     Frequency/Duration 1-3 days/wk for 2 weeks PRN   Specific OT Treatment Interventions to include:   * Instruction/training on adapted ADL techniques and AE recommendations to increase functional independence within precautions       * Training on energy conservation strategies, correct breathing pattern and techniques to improve independence/tolerance for self-care routine  * Functional transfer/mobility training/DME recommendations for increased independence, safety, and fall prevention  * Patient/Family education to increase follow through with safety techniques and functional independence  * Recommendation of environmental modifications for increased safety with functional transfers/mobility and ADLs  * Splinting/positioning for increased function, prevention of contractures, and improve skin integrity  * Therapeutic exercise to improve motor endurance, ROM, and functional strength for ADLs/functional transfers  * Therapeutic activities to facilitate/challenge dynamic balance, stand tolerance for increased safety and independence with ADLs  * Therapeutic activities to facilitate gross/fine motor skills for increased independence with ADLs  * Positioning to improve skin integrity, interaction with environment and functional independence  * Delirium prevention/treatment     Recommended Adaptive Equipment:  TBD     Comments: Based on patient's functional performance as stated below and level of assistance needed prior to admission, this therapist believes that the patient would benefit from further skilled OT following hospital stay in an effort to increase safety, functional independence, and quality of life.      Home Living: Pt admitted from [facility]- normally lives in ranch home with 3 step(s) to enter and ? rail(s); bed/bath on main floor. States her daughter will be staying with her s/p d/c. Bathroom setup: tub shower  Equipment owned: [place], w/c, tub bench     Prior Level of Function: reports she needed \"just a little\" assistance with ADLs and with IADLs; using ww for functional mobility. Pain Level: denies pain at rest  Cognition: A&O: 4/4; Follows 2-3 step directions. Poor insight into deficits/situation              Memory: F              Sequencing: F              Problem solving: F              Judgement/safety: F                Functional Assessment: AM-PAC Daily Activity Raw Score: 16/24    Initial Eval Status  Date: 2/24/23 Treatment Status  Date: 2/27/23 STGs = LTGs  Time frame: 10-14 days   Feeding Set up A          Grooming Set up A  Seated, to wash hands in supported sitting with bath cloth      SBA while seated/standing at sink    UB Dressing SBA  simulated Min A to don gown around back    Set up A   LB Dressing Max A  To doff/arleen socks EOB Max A to don socks     Min A with AE PRN   Bathing Max A  simulated      Min A with AE PRN   Toileting Max A  simulated meza     Min A   Bed Mobility  Rolling: NT  Supine to sit: NT  Sit to supine: Max A  supine to sit SBA Min A   Functional Transfers Sit to stand: Mod A EOB  Stand to sit: Min A Sit <> stand min A  SBA   Functional Mobility Mod A no AE  For side steps at EOB Functional mobility in room using WW min A  SBA ww for in-room distances   Balance Sitting:     Static:  SBA    Dynamic: SBA  Standing: Mod A no AE Sitting balance supervision  Standing balance CGA with [address]      Endurance/Activity Tolerance Fair-  fair Good-   Visual/  Perceptual Glasses: yes, readers   -appears wfl         Comments:  patient cleared with nursing and agreeable to session. Patient fatigued but good effort and improvement demonstrated.  Patient in chair with call light in reach and alarm on      Education/treatment: ADL and functional transfer/activity performed to increase safety and independence during self care tasks. Education provided on safety awareness, adl reeducation, functional transfer training    Pt has made progress towards set goals.      Time In: 9:47  Time Out: 10:01     Min Units   Therapeutic Ex [id number]     Therapeutic Activities 79166 18 9   ADL/Self Care 66336     Orthotic Management 34657     Neuro Re-Ed 19312     Non-Billable Time     TOTAL TIMED TREATMENT 14 [address] [place]YT/ABHINAV [address]

## 2023-02-27 NOTE — PROGRESS NOTES
SPEECH LANGUAGE PATHOLOGY  DAILY PROGRESS NOTE        PATIENT NAME:  Carey Diallo      :  1945          TODAY'S DATE:  2023 ROOM:  [address] [place], [place] [address]-    SWALLOWING:           Patient seen as a follow up for dysphagia management. Family - daughter and sister - at bedside. Good toleration of current diet; no overt s/s of aspiration. Good carryover of compensatory strategies independently. Speech Pathologist (SLP) completed re-education with the patient and education with the family regarding type of swallowing impairment from [facility] on 2023. Reviewed current solid/liquid consistency diet recommendations and discussed compensatory strategies to ensure safe PO intake. Reviewed aspiration precautions. Encouraged patient and/or family to engage SLP in unstructured Q&A session relative to identified deficit areas; indicated understanding of all information provided via satisfactory verbal response. Will cont to follow. DYSPHAGIA DIAGNOSIS:  mild pharyngeal phase dysphagia including deep laryngeal penetration with thin liquid per straw     DIET RECOMMENDATIONS:  Regular consistency solids (IDDSI level 7) with thin liquids (IDDSI level 0)-NO STRAWS     MEDICATION ADMINISTRATION: Per patient choice/nursing judgement     FEEDING RECOMMENDATIONS:              Assistance level:  No assistance needed                Compensatory strategies recommended: Small bites/sips and No straw     Melvin Brooks M.A. CCC-SLP  SP. [id number]      CPT code(s) 28468  dysphagia tx  Total minutes :  15 minutes

## 2023-02-27 NOTE — CARE COORDINATION
Social Work/Discharge Planning:  Met with patient, her daughter Trey Dancer and sister Yaneth Lewis and reviewed therapy notes from today. Discussed options of snf versus home with home health care. Provided patient and her daughter with choice list for home health care and snf list for [facility]. Trey Dancer states they are leaning towards home with home health care. She will review choice list and inform  of plan. Will continue to follow. Electronically signed by CHELSEY Musa on 2/27/2023 at 2:52 PM    Addendum:  Patient daughter Trey Dancer states first home health care choice is [facility] and [address] [place] [address]. Called Mylene with [facility] and left a message in regards to availability. Referral made to Mylene with [facility] and she will review patient information. Patient will need a home health care order. Will continue to follow. Electronically signed by CHELSEY Musa on 2/27/2023 at 3:48 PM    Addendum:  Mylene with [facility] states they can accept patient and start of care is Friday 3/3. Mylene states to confirm if patient PCP Dr. Alfonso Vicente will be agreeable to [facility]. Notified patient daughter Trey Dancer and she will follow up with the office. Referral made to Mylene with [facility] and she will review patient information. Will continue to follow.   Electronically signed by CHELSEY Musa on 2/27/2023 at 3:58 PM

## 2023-02-28 VITALS
WEIGHT: 129 LBS | SYSTOLIC BLOOD PRESSURE: 134 MMHG | RESPIRATION RATE: 16 BRPM | TEMPERATURE: 98.1 F | BODY MASS INDEX: 22.02 KG/M2 | OXYGEN SATURATION: 97 % | HEIGHT: 64 IN | DIASTOLIC BLOOD PRESSURE: 77 MMHG | HEART RATE: 91 BPM

## 2023-02-28 LAB
ALBUMIN SERPL-MCNC: 2.7 G/DL (ref 3.5–5.2)
ALP BLD-CCNC: 65 U/L (ref 35–104)
ALT SERPL-CCNC: 6 U/L (ref 0–32)
ANION GAP SERPL CALCULATED.3IONS-SCNC: 8 MMOL/L (ref 7–16)
ANISOCYTOSIS: ABNORMAL
AST SERPL-CCNC: 12 U/L (ref 0–31)
BASOPHILS ABSOLUTE: 0.03 E9/L (ref 0–0.2)
BASOPHILS RELATIVE PERCENT: 0.4 % (ref 0–2)
BILIRUB SERPL-MCNC: 0.4 MG/DL (ref 0–1.2)
BUN BLDV-MCNC: 10 MG/DL (ref 6–23)
CALCIUM SERPL-MCNC: 8.1 MG/DL (ref 8.6–10.2)
CHLORIDE BLD-SCNC: 106 MMOL/L (ref 98–107)
CO2: 26 MMOL/L (ref 22–29)
CREAT SERPL-MCNC: 0.9 MG/DL (ref 0.5–1)
EOSINOPHILS ABSOLUTE: 0.09 E9/L (ref 0.05–0.5)
EOSINOPHILS RELATIVE PERCENT: 1.2 % (ref 0–6)
GFR SERPL CREATININE-BSD FRML MDRD: >60 ML/MIN/1.73
GLUCOSE BLD-MCNC: 119 MG/DL (ref 74–99)
HCT VFR BLD CALC: 24.8 % (ref 34–48)
HEMOGLOBIN: 7.6 G/DL (ref 11.5–15.5)
HYPOCHROMIA: ABNORMAL
IMMATURE GRANULOCYTES #: 0.08 E9/L
IMMATURE GRANULOCYTES %: 1.1 % (ref 0–5)
LYMPHOCYTES ABSOLUTE: 1.48 E9/L (ref 1.5–4)
LYMPHOCYTES RELATIVE PERCENT: 20.1 % (ref 20–42)
MAGNESIUM: 1.9 MG/DL (ref 1.6–2.6)
MCH RBC QN AUTO: 27 PG (ref 26–35)
MCHC RBC AUTO-ENTMCNC: 30.6 % (ref 32–34.5)
MCV RBC AUTO: 88.3 FL (ref 80–99.9)
METER GLUCOSE: 101 MG/DL (ref 74–99)
METER GLUCOSE: 109 MG/DL (ref 74–99)
METER GLUCOSE: 118 MG/DL (ref 74–99)
MONOCYTES ABSOLUTE: 0.92 E9/L (ref 0.1–0.95)
MONOCYTES RELATIVE PERCENT: 12.5 % (ref 2–12)
NEUTROPHILS ABSOLUTE: 4.76 E9/L (ref 1.8–7.3)
NEUTROPHILS RELATIVE PERCENT: 64.7 % (ref 43–80)
OVALOCYTES: ABNORMAL
PDW BLD-RTO: 21.9 FL (ref 11.5–15)
PHOSPHORUS: 4.7 MG/DL (ref 2.5–4.5)
PLATELET # BLD: 376 E9/L (ref 130–450)
PMV BLD AUTO: 12 FL (ref 7–12)
POIKILOCYTES: ABNORMAL
POLYCHROMASIA: ABNORMAL
POTASSIUM SERPL-SCNC: 4 MMOL/L (ref 3.5–5)
RBC # BLD: 2.81 E12/L (ref 3.5–5.5)
SCHISTOCYTES: ABNORMAL
SODIUM BLD-SCNC: 140 MMOL/L (ref 132–146)
TOTAL PROTEIN: 4.9 G/DL (ref 6.4–8.3)
WBC # BLD: 7.4 E9/L (ref 4.5–11.5)

## 2023-02-28 PROCEDURE — 83735 ASSAY OF MAGNESIUM: CPT

## 2023-02-28 PROCEDURE — 85025 COMPLETE CBC W/AUTO DIFF WBC: CPT

## 2023-02-28 PROCEDURE — 82962 GLUCOSE BLOOD TEST: CPT

## 2023-02-28 PROCEDURE — 94640 AIRWAY INHALATION TREATMENT: CPT

## 2023-02-28 PROCEDURE — 84100 ASSAY OF PHOSPHORUS: CPT

## 2023-02-28 PROCEDURE — 99239 HOSP IP/OBS DSCHRG MGMT >30: CPT | Performed by: STUDENT IN AN ORGANIZED HEALTH CARE EDUCATION/TRAINING PROGRAM

## 2023-02-28 PROCEDURE — 6370000000 HC RX 637 (ALT 250 FOR IP): Performed by: INTERNAL MEDICINE

## 2023-02-28 PROCEDURE — 36415 COLL VENOUS BLD VENIPUNCTURE: CPT

## 2023-02-28 PROCEDURE — 6360000002 HC RX W HCPCS: Performed by: FAMILY MEDICINE

## 2023-02-28 PROCEDURE — 2580000003 HC RX 258: Performed by: NURSE PRACTITIONER

## 2023-02-28 PROCEDURE — 80053 COMPREHEN METABOLIC PANEL: CPT

## 2023-02-28 PROCEDURE — 6360000002 HC RX W HCPCS: Performed by: NURSE PRACTITIONER

## 2023-02-28 RX ORDER — POLYETHYLENE GLYCOL 3350 17 G/17G
17 POWDER, FOR SOLUTION ORAL DAILY
Qty: 527 G | Refills: 0 | Status: SHIPPED | OUTPATIENT
Start: 2023-02-28 | End: 2023-03-31

## 2023-02-28 RX ORDER — PANTOPRAZOLE SODIUM 40 MG/1
40 TABLET, DELAYED RELEASE ORAL
Qty: 30 TABLET | Refills: 3 | Status: SHIPPED | OUTPATIENT
Start: 2023-02-28

## 2023-02-28 RX ORDER — LANOLIN ALCOHOL/MO/W.PET/CERES
100 CREAM (GRAM) TOPICAL DAILY
Qty: 30 TABLET | Refills: 3 | Status: SHIPPED | OUTPATIENT
Start: 2023-03-01 | End: 2023-02-28 | Stop reason: SDUPTHER

## 2023-02-28 RX ORDER — HYDROCORTISONE 10 MG/1
10 TABLET ORAL NIGHTLY
Qty: 30 TABLET | Refills: 0 | Status: SHIPPED | OUTPATIENT
Start: 2023-02-28 | End: 2023-02-28 | Stop reason: SDUPTHER

## 2023-02-28 RX ORDER — MIDODRINE HYDROCHLORIDE 5 MG/1
5 TABLET ORAL
Qty: 90 TABLET | Refills: 3 | Status: SHIPPED | OUTPATIENT
Start: 2023-02-28 | End: 2023-02-28 | Stop reason: SDUPTHER

## 2023-02-28 RX ORDER — FLUCONAZOLE 200 MG/1
200 TABLET ORAL DAILY
Qty: 5 TABLET | Refills: 0 | Status: SHIPPED | OUTPATIENT
Start: 2023-02-28 | End: 2023-03-05

## 2023-02-28 RX ORDER — LANOLIN ALCOHOL/MO/W.PET/CERES
100 CREAM (GRAM) TOPICAL DAILY
Qty: 30 TABLET | Refills: 3 | Status: SHIPPED | OUTPATIENT
Start: 2023-03-01

## 2023-02-28 RX ORDER — MIDODRINE HYDROCHLORIDE 5 MG/1
5 TABLET ORAL
Qty: 90 TABLET | Refills: 3 | Status: SHIPPED | OUTPATIENT
Start: 2023-02-28

## 2023-02-28 RX ORDER — HYDROCORTISONE 20 MG/1
20 TABLET ORAL EVERY MORNING
Qty: 30 TABLET | Refills: 0 | Status: SHIPPED | OUTPATIENT
Start: 2023-03-01 | End: 2023-02-28 | Stop reason: SDUPTHER

## 2023-02-28 RX ORDER — PANTOPRAZOLE SODIUM 40 MG/1
40 TABLET, DELAYED RELEASE ORAL
Qty: 30 TABLET | Refills: 3 | Status: SHIPPED | OUTPATIENT
Start: 2023-02-28 | End: 2023-02-28 | Stop reason: SDUPTHER

## 2023-02-28 RX ORDER — HYDROCORTISONE 20 MG/1
20 TABLET ORAL EVERY MORNING
Qty: 30 TABLET | Refills: 0 | Status: SHIPPED | OUTPATIENT
Start: 2023-03-01 | End: 2023-03-31

## 2023-02-28 RX ORDER — HYDROCORTISONE 10 MG/1
10 TABLET ORAL NIGHTLY
Qty: 30 TABLET | Refills: 0 | Status: SHIPPED | OUTPATIENT
Start: 2023-02-28 | End: 2023-03-30

## 2023-02-28 RX ADMIN — HYDROCORTISONE 20 MG: 20 TABLET ORAL at 08:10

## 2023-02-28 RX ADMIN — Medication 100 MG: at 08:11

## 2023-02-28 RX ADMIN — MONTELUKAST SODIUM 10 MG: 10 TABLET ORAL at 08:11

## 2023-02-28 RX ADMIN — SODIUM CHLORIDE, PRESERVATIVE FREE 10 ML: 5 INJECTION INTRAVENOUS at 08:13

## 2023-02-28 RX ADMIN — MICONAZOLE NITRATE: 2 OINTMENT TOPICAL at 08:13

## 2023-02-28 RX ADMIN — PANTOPRAZOLE SODIUM 40 MG: 40 TABLET, DELAYED RELEASE ORAL at 05:55

## 2023-02-28 RX ADMIN — ENOXAPARIN SODIUM 40 MG: 100 INJECTION SUBCUTANEOUS at 08:11

## 2023-02-28 RX ADMIN — LORAZEPAM 1 MG: 1 TABLET ORAL at 08:11

## 2023-02-28 RX ADMIN — ASPIRIN 81 MG: 81 TABLET, COATED ORAL at 08:11

## 2023-02-28 RX ADMIN — LEVALBUTEROL HYDROCHLORIDE 0.63 MG: 0.63 SOLUTION RESPIRATORY (INHALATION) at 08:17

## 2023-02-28 RX ADMIN — MICONAZOLE NITRATE: 20 POWDER TOPICAL at 08:13

## 2023-02-28 ASSESSMENT — PAIN SCALES - GENERAL
PAINLEVEL_OUTOF10: 0
PAINLEVEL_OUTOF10: 0

## 2023-02-28 NOTE — CARE COORDINATION
Social Work/Discharge Planning:  Mylene with [facility] states they can accept but not with a meza. Cancelled referral to [place]. Called Mylene with [facility], [place], [facility] and confirmed they can still accept. Mylene states they will start care on Friday 3/3. Called patient daughter Margi Gannon (ph: [phone]) and provided her with an update. Informed her of possible discharge today. Notified Mylene with [facility], [place], [facility] of home health care order.   Electronically signed by CHELSEY Delgado on 2/28/2023 at 10:11 AM

## 2023-02-28 NOTE — DISCHARGE SUMMARY
[facility] Physician Discharge Summary        [id number] [facility] [employer]  [address] [facility], [address] [place] [address] [address] [id number]  [phone]    Start of care Friday 3/3      Activity level: As tolerated     Dispo: home with [facility]    Condition on discharge: Stable     Patient ID:  Mounika Nichols  [id number]  45 y.o.  1945    Admit date: 2/20/2023    Discharge date and time:  2/28/2023  10:42 AM    Admission Diagnoses: Principal Problem:    Septic shock ([facility] [address]  Active Problems:    Asthma    Acute cystitis without hematuria    HTN (hypertension), benign    Anxiety disorder    Chronic back pain  Resolved Problems:    * No resolved hospital problems. *      Discharge Diagnoses: Principal Problem:    Septic shock ([facility] [address]  Active Problems:    Asthma    Acute cystitis without hematuria    HTN (hypertension), benign    Anxiety disorder    Chronic back pain  Resolved Problems:    * No resolved hospital problems. *      Consults:  IP CONSULT TO CRITICAL CARE  IP CONSULT TO INFECTIOUS DISEASES    Hospital Course:   Patient Mounika Nichols is a 70 y.o. presented with Septic shock ([facility] [address] [A41.9, R65.21]  Patient was admitted and managed for Sepsis with Hypotension/Septic Shock- managed in ICU on pressors- Resolved - On oral Fluconazole, being discharged on po steroids, midodrine, held antihypertensives. Chronic cough - may have silent reflux due to [place]. DC Pepcid. Protonix 40 mg IV q 12 hr changed to po. Speech Therapy consulted. MBS ordered. No silent aspiration noted. No straws.  Physical Deconditioning - Ampac score 11/24,  OP PT/ HHC    Discharge Exam:  General Appearance: alert and oriented to person, place and time and in no acute distress  Skin: warm and dry  Head: normocephalic and atraumatic  Eyes: pupils equal, round, and reactive to light, extraocular eye movements intact, conjunctivae normal  Neck: neck supple and non tender without mass Pulmonary/Chest: clear to auscultation bilaterally- no wheezes, rales or rhonchi, normal air movement, no respiratory distress  Cardiovascular: normal rate, normal S1 and S2 and no carotid bruits  Abdomen: soft, non-tender, non-distended, normal bowel sounds, no masses or organomegaly  Extremities: no cyanosis, no clubbing and no edema  Neurologic: no cranial nerve deficit and speech normal    I/O last 3 completed shifts: In: 1140 [P.O.:1140]  Out: 2801 [Urine:2800; Stool:1]  I/O this shift:  In: -   Out: 750 [Urine:750]      LABS:  Recent Labs     02/26/23 0205 02/27/23  0516 02/28/23  0117    139 140   K 3.6 3.3* 4.0    103 106   CO2 27 27 26   BUN 6 6 10   CREATININE 0.5 0.5 0.9   GLUCOSE 105* 95 119*   CALCIUM 7.8* 8.1* 8.1*       Recent Labs     02/26/23 0205 02/27/23 0516 02/28/23  0117   WBC 6.5 8.0 7.4   RBC 3.00* 3.06* 2.81*   HGB 7.9* 8.2* 7.6*   HCT 25.9* 27.4* 24.8*   MCV 86.3 89.5 88.3   MCH 26.3 26.8 27.0   MCHC 30.5* 29.9* 30.6*   RDW 22.1* 22.1* 21.9*    369 376   MPV 11.7 11.8 12.0       No results for input(s): POCGLU in the last 72 hours. Imaging:  CT ABDOMEN PELVIS WO CONTRAST Additional Contrast? None    Result Date: 2/20/2023  EXAMINATION: CT OF THE ABDOMEN AND PELVIS WITHOUT CONTRAST 2/20/2023 7:41 pm TECHNIQUE: CT of the abdomen and pelvis was performed without the administration of intravenous contrast. Multiplanar reformatted images are provided for review. Automated exposure control, iterative reconstruction, and/or weight based adjustment of the mA/kV was utilized to reduce the radiation dose to as low as reasonably achievable. COMPARISON: None.  HISTORY: ORDERING SYSTEM PROVIDED HISTORY: UTI rule out obstructive uropathy TECHNOLOGIST PROVIDED HISTORY: Reason for exam:->UTI rule out obstructive uropathy Additional Contrast?->None Decision Support Exception - unselect if not a suspected or confirmed emergency medical condition->Emergency Medical Condition (MA) FINDINGS: Lower Chest: Small left pleural effusion with adjacent atelectasis. Organs: And the liver, spleen, adrenal glands, kidneys, pancreas are normal. Cholecystectomy. GI/Bowel: Diffuse colonic fecal retention. Normal small bowel and appendix. Pelvis: Brewer catheter in a decompressed urinary bladder. Peritoneum/Retroperitoneum: No free fluid or free air. Bones/Soft Tissues: Posterior spinal fixation hardware lumbar spine. Degenerative changes thoracolumbar spine. Degenerative changes involving both hip joints. No urinary tract stones or hydronephrosis. Brewer catheter in a decompressed urinary bladder. Diffuse colonic fecal retention. Cholecystectomy. CT CHEST WO CONTRAST    Result Date: 2/20/2023  EXAMINATION: CT OF THE CHEST WITHOUT CONTRAST 2/20/2023 7:41 pm TECHNIQUE: CT of the chest was performed without the administration of intravenous contrast. Multiplanar reformatted images are provided for review. Automated exposure control, iterative reconstruction, and/or weight based adjustment of the mA/kV was utilized to reduce the radiation dose to as low as reasonably achievable. COMPARISON: None. HISTORY: ORDERING SYSTEM PROVIDED HISTORY: sob, fever, ?pneumonia, ?covid-19 TECHNOLOGIST PROVIDED HISTORY: Reason for exam:->sob, fever, ?pneumonia, ?covid-19 FINDINGS: Mediastinum: Thoracic aorta, heart and pericardium are normal.  Calcified plaque involving coronary arteries. No significant mediastinal adenopathy. Lungs/pleura:   No evidence for pneumonia. Small left pleural effusion with adjacent atelectasis. No pneumothorax. Upper Abdomen: Moderate-sized hiatal hernia. Cholecystectomy. Colonic fecal retention. Soft Tissues/Bones:   Degenerative changes thoracic spine with kyphosis and multilevel vertebroplasties. Chronic appearing compression deformities lower thoracic spine. Neurostimulator terminates at the level the midthoracic spine. Small left pleural effusion with adjacent atelectasis. No evidence for pneumonia. Hiatal hernia. XR CHEST PORTABLE    Result Date: 2/20/2023  EXAMINATION: ONE XRAY VIEW OF THE CHEST 2/20/2023 5:50 pm COMPARISON: January 31, 2023 HISTORY: ORDERING SYSTEM PROVIDED HISTORY: coarse breath sounds TECHNOLOGIST PROVIDED HISTORY: Reason for exam:->coarse breath sounds FINDINGS: Poor inspiratory effort is seen. The cardiomediastinal silhouette is prominent in size. Peribronchial cuffing bilateral hilar prominence, prominence of the bronchovascular and interstitial lung markings visualized most prominent in the left lower lung field, mild haziness overlying the left costophrenic angle, the right costophrenic angle is clear with no evidence of pleural fluid. No evidence of pneumothorax is seen. Biapical prominence suggestive of COPD changes. Moderate sized hiatus hernia is seen. Degenerative bone changes seen. Moderate sized hiatus hernia. Mild bronchovascular prominence, this could represent subsegmental atelectatic changes or aspiration pneumonia. Patient Instructions:      Medication List        START taking these medications      fluconazole 200 MG tablet  Commonly known as: DIFLUCAN  Take 1 tablet by mouth daily for 5 days     * hydrocortisone 10 MG tablet  Commonly known as: CORTEF  Take 1 tablet by mouth at bedtime     * hydrocortisone 20 MG tablet  Commonly known as: CORTEF  Take 1 tablet by mouth every morning  Start taking on: March 1, 2023     midodrine 5 MG tablet  Commonly known as: PROAMATINE  Take 1 tablet by mouth 3 times daily (with meals)     pantoprazole 40 MG tablet  Commonly known as: PROTONIX  Take 1 tablet by mouth 2 times daily (before meals)     thiamine 100 MG tablet  Take 1 tablet by mouth daily  Start taking on: March 1, 2023           * This list has 2 medication(s) that are the same as other medications prescribed for you. Read the directions carefully, and ask your doctor or other care provider to review them with you. CONTINUE taking these medications      albuterol sulfate  (90 Base) MCG/ACT inhaler  Commonly known as: Ventolin HFA  Inhale 2 puffs into the lungs every 6 hours as needed for Wheezing     aspirin 81 MG tablet     atorvastatin 10 MG tablet  Commonly known as: LIPITOR     camphor-menthol 0.5-0.5 % lotion  Commonly known as: SARNA  Apply topically as needed.      docusate 100 MG Caps  Commonly known as: COLACE, DULCOLAX  Take 100 mg by mouth 2 times daily as needed for Constipation     escitalopram 10 MG tablet  Commonly known as: LEXAPRO     fluticasone 50 MCG/ACT nasal spray  Commonly known as: FLONASE  USE 1 SPRAY IN EACH NOSTRIL DAILY     Fluticasone furoate-vilanterol 200-25 MCG/INH Aepb inhaler  Commonly known as: BREO ELLIPTA  Inhale 1 puff into the lungs daily     guaiFENesin-codeine 100-10 MG/5ML syrup  Commonly known as: TUSSI-ORGANIDIN NR     LORazepam 2 MG tablet  Commonly known as: ATIVAN     montelukast 10 MG tablet  Commonly known as: SINGULAIR  TAKE 1 TABLET DAILY     polyethylene glycol 17 g packet  Commonly known as: GLYCOLAX  Take 17 g by mouth daily     Tylenol 8 Hour 650 MG extended release tablet  Generic drug: acetaminophen     white petrolatum Oint ointment  Apply topically 2 times daily            STOP taking these medications      dilTIAZem 120 MG extended release capsule  Commonly known as: CARDIZEM CD     doxycycline hyclate 100 MG capsule  Commonly known as: VIBRAMYCIN     famotidine 20 MG tablet  Commonly known as: PEPCID     furosemide 20 MG tablet  Commonly known as: LASIX     potassium chloride 10 MEQ extended release tablet  Commonly known as: KLOR-CON M     vitamin C 500 MG tablet  Commonly known as: ASCORBIC ACID               Where to Get Your Medications        These medications were sent to [facility], [address] [place] [address]      Phone: [phone]   fluconazole 200 MG tablet  hydrocortisone 10 MG tablet  hydrocortisone 20 MG tablet  midodrine 5 MG tablet  pantoprazole 40 MG tablet  thiamine 100 MG tablet           Note that more than 30 minutes was spent in preparing discharge papers, discussing discharge with patient, medication review, etc.    Signed:  Electronically signed by Mikhail Landavrede MD on 2/28/2023 at 10:42 AM

## 2023-02-28 NOTE — PROGRESS NOTES
Nutrition Assessment     Type and Reason for Visit: Initial, RD Nutrition Re-Screen/LOS    Nutrition Recommendations/Plan:   Continue current diet w/SLP recs. Will monitor. Nutrition Assessment:  ADM:   Sepsis with Hypotension/Septic Shock. Follows SLP for speech TX-mild pharyngeal phase dysphagia including deep laryngeal penetration with thin liquid per straw. Will follow SLP recs. Appetite is good. Will monitor. Nutrition Related Findings:   A& O, dysphagia (Regular texture, no straw), no edema, Abd WDL, hypokalemia (treated KCl) Wound Type: None (redness)    Current Nutrition Therapies:    ADULT DIET; Regular    Anthropometric Measures:  Height: 5' 4\" (162.6 cm)  Current Body Wt: 129 lb (58.5 kg)   BMI: 22.1    Nutrition Diagnosis:   No nutrition diagnosis at this time      Nutrition Interventions:   Food and/or Nutrient Delivery: Continue Current Diet  Nutrition Education/Counseling: Education not indicated  Coordination of Nutrition Care: Continue to monitor while inpatient       Goals:      Meet at least 75% of estimated needs by next RD assessment.        Nutrition Monitoring and Evaluation:      Food/Nutrient Intake Outcomes: Food and Nutrient Intake  Physical Signs/Symptoms Outcomes: Biochemical Data, Chewing or Swallowing, Fluid Status or Edema, Nutrition Focused Physical Findings, Skin, Weight (W/SLP)    Discharge Planning:          [place] Williamson, [address]  Contact: [phone]

## 2023-02-28 NOTE — PLAN OF CARE
Problem: Discharge Planning  Goal: Discharge to home or other facility with appropriate resources  2/28/2023 1044 by Kevin Polk RN  Outcome: Completed     Problem: Pain  Goal: Verbalizes/displays adequate comfort level or baseline comfort level  2/28/2023 1044 by Kevin Polk RN  Outcome: Completed     Problem: Skin/Tissue Integrity  Goal: Absence of new skin breakdown  Description: 1. Monitor for areas of redness and/or skin breakdown  2. Assess vascular access sites hourly  3. Every 4-6 hours minimum:  Change oxygen saturation probe site  4. Every 4-6 hours:  If on nasal continuous positive airway pressure, respiratory therapy assess nares and determine need for appliance change or resting period.   2/28/2023 1044 by Kevin Polk RN  Outcome: Completed     Problem: Safety - Adult  Goal: Free from fall injury  2/28/2023 1044 by Kevin Polk RN  Outcome: Completed     Problem: ABCDS Injury Assessment  Goal: Absence of physical injury  2/28/2023 1044 by Kevin Polk RN  Outcome: Completed

## 2023-03-01 ENCOUNTER — TELEPHONE (OUTPATIENT)
Dept: NEUROSURGERY | Age: 78
End: 2023-03-01

## 2023-03-09 ENCOUNTER — APPOINTMENT (OUTPATIENT)
Dept: CT IMAGING | Age: 78
DRG: 698 | End: 2023-03-09
Payer: MEDICARE

## 2023-03-09 ENCOUNTER — APPOINTMENT (OUTPATIENT)
Dept: GENERAL RADIOLOGY | Age: 78
DRG: 698 | End: 2023-03-09
Payer: MEDICARE

## 2023-03-09 ENCOUNTER — HOSPITAL ENCOUNTER (INPATIENT)
Age: 78
LOS: 6 days | Discharge: HOME HEALTH | DRG: 698 | End: 2023-03-15
Attending: STUDENT IN AN ORGANIZED HEALTH CARE EDUCATION/TRAINING PROGRAM | Admitting: INTERNAL MEDICINE
Payer: MEDICARE

## 2023-03-09 ENCOUNTER — TELEPHONE (OUTPATIENT)
Dept: OTHER | Facility: CLINIC | Age: 78
End: 2023-03-09

## 2023-03-09 DIAGNOSIS — E87.20 LACTIC ACIDOSIS: ICD-10-CM

## 2023-03-09 DIAGNOSIS — E87.6 HYPOKALEMIA: ICD-10-CM

## 2023-03-09 DIAGNOSIS — A41.9 SEPSIS, DUE TO UNSPECIFIED ORGANISM, UNSPECIFIED WHETHER ACUTE ORGAN DYSFUNCTION PRESENT (HCC): Primary | ICD-10-CM

## 2023-03-09 DIAGNOSIS — K86.2 PANCREATIC CYST: ICD-10-CM

## 2023-03-09 DIAGNOSIS — N30.01 ACUTE CYSTITIS WITH HEMATURIA: ICD-10-CM

## 2023-03-09 LAB
ALBUMIN SERPL-MCNC: 3.1 G/DL (ref 3.5–5.2)
ALP BLD-CCNC: 74 U/L (ref 35–104)
ALT SERPL-CCNC: 8 U/L (ref 0–32)
ANION GAP SERPL CALCULATED.3IONS-SCNC: 13 MMOL/L (ref 7–16)
ANISOCYTOSIS: ABNORMAL
AST SERPL-CCNC: 15 U/L (ref 0–31)
BACTERIA: ABNORMAL /HPF
BASOPHILS ABSOLUTE: 0.02 E9/L (ref 0–0.2)
BASOPHILS RELATIVE PERCENT: 0.2 % (ref 0–2)
BILIRUB SERPL-MCNC: 0.3 MG/DL (ref 0–1.2)
BILIRUBIN URINE: NEGATIVE
BLOOD, URINE: ABNORMAL
BUN BLDV-MCNC: 14 MG/DL (ref 6–23)
CALCIUM SERPL-MCNC: 8.5 MG/DL (ref 8.6–10.2)
CHLORIDE BLD-SCNC: 101 MMOL/L (ref 98–107)
CLARITY: ABNORMAL
CO2: 23 MMOL/L (ref 22–29)
COLOR: YELLOW
CREAT SERPL-MCNC: 0.7 MG/DL (ref 0.5–1)
EOSINOPHILS ABSOLUTE: 0.49 E9/L (ref 0.05–0.5)
EOSINOPHILS RELATIVE PERCENT: 4.6 % (ref 0–6)
EPITHELIAL CELLS, UA: ABNORMAL /HPF
GFR SERPL CREATININE-BSD FRML MDRD: >60 ML/MIN/1.73
GLUCOSE BLD-MCNC: 96 MG/DL (ref 74–99)
GLUCOSE URINE: NEGATIVE MG/DL
HCT VFR BLD CALC: 32.5 % (ref 34–48)
HEMOGLOBIN: 9.7 G/DL (ref 11.5–15.5)
IMMATURE GRANULOCYTES #: 0.11 E9/L
IMMATURE GRANULOCYTES %: 1 % (ref 0–5)
INFLUENZA A BY PCR: NOT DETECTED
INFLUENZA B BY PCR: NOT DETECTED
KETONES, URINE: NEGATIVE MG/DL
LACTIC ACID, SEPSIS: 2.3 MMOL/L (ref 0.5–1.9)
LEUKOCYTE ESTERASE, URINE: ABNORMAL
LYMPHOCYTES ABSOLUTE: 0.52 E9/L (ref 1.5–4)
LYMPHOCYTES RELATIVE PERCENT: 4.9 % (ref 20–42)
MCH RBC QN AUTO: 28.2 PG (ref 26–35)
MCHC RBC AUTO-ENTMCNC: 29.8 % (ref 32–34.5)
MCV RBC AUTO: 94.5 FL (ref 80–99.9)
MONOCYTES ABSOLUTE: 0.61 E9/L (ref 0.1–0.95)
MONOCYTES RELATIVE PERCENT: 5.7 % (ref 2–12)
NEUTROPHILS ABSOLUTE: 8.97 E9/L (ref 1.8–7.3)
NEUTROPHILS RELATIVE PERCENT: 83.6 % (ref 43–80)
NITRITE, URINE: POSITIVE
OVALOCYTES: ABNORMAL
PDW BLD-RTO: 20.5 FL (ref 11.5–15)
PH UA: 5.5 (ref 5–9)
PLATELET # BLD: 334 E9/L (ref 130–450)
PMV BLD AUTO: 11.7 FL (ref 7–12)
POIKILOCYTES: ABNORMAL
POLYCHROMASIA: ABNORMAL
POTASSIUM SERPL-SCNC: 3.4 MMOL/L (ref 3.5–5)
PROTEIN UA: NEGATIVE MG/DL
RBC # BLD: 3.44 E12/L (ref 3.5–5.5)
RBC UA: ABNORMAL /HPF (ref 0–2)
SARS-COV-2, NAAT: NOT DETECTED
SODIUM BLD-SCNC: 137 MMOL/L (ref 132–146)
SPECIFIC GRAVITY UA: 1.01 (ref 1–1.03)
TOTAL PROTEIN: 5.9 G/DL (ref 6.4–8.3)
TROPONIN, HIGH SENSITIVITY: 38 NG/L (ref 0–9)
TROPONIN, HIGH SENSITIVITY: 43 NG/L (ref 0–9)
UROBILINOGEN, URINE: 0.2 E.U./DL
WBC # BLD: 10.7 E9/L (ref 4.5–11.5)
WBC UA: >20 /HPF (ref 0–5)

## 2023-03-09 PROCEDURE — 74177 CT ABD & PELVIS W/CONTRAST: CPT

## 2023-03-09 PROCEDURE — 99285 EMERGENCY DEPT VISIT HI MDM: CPT

## 2023-03-09 PROCEDURE — 6360000002 HC RX W HCPCS

## 2023-03-09 PROCEDURE — 83605 ASSAY OF LACTIC ACID: CPT

## 2023-03-09 PROCEDURE — 99223 1ST HOSP IP/OBS HIGH 75: CPT | Performed by: INTERNAL MEDICINE

## 2023-03-09 PROCEDURE — 87186 SC STD MICRODIL/AGAR DIL: CPT

## 2023-03-09 PROCEDURE — 2580000003 HC RX 258: Performed by: STUDENT IN AN ORGANIZED HEALTH CARE EDUCATION/TRAINING PROGRAM

## 2023-03-09 PROCEDURE — 81001 URINALYSIS AUTO W/SCOPE: CPT

## 2023-03-09 PROCEDURE — 87502 INFLUENZA DNA AMP PROBE: CPT

## 2023-03-09 PROCEDURE — 36556 INSERT NON-TUNNEL CV CATH: CPT

## 2023-03-09 PROCEDURE — 84484 ASSAY OF TROPONIN QUANT: CPT

## 2023-03-09 PROCEDURE — 87077 CULTURE AEROBIC IDENTIFY: CPT

## 2023-03-09 PROCEDURE — 87088 URINE BACTERIA CULTURE: CPT

## 2023-03-09 PROCEDURE — 80053 COMPREHEN METABOLIC PANEL: CPT

## 2023-03-09 PROCEDURE — 96361 HYDRATE IV INFUSION ADD-ON: CPT

## 2023-03-09 PROCEDURE — 96374 THER/PROPH/DIAG INJ IV PUSH: CPT

## 2023-03-09 PROCEDURE — 71045 X-RAY EXAM CHEST 1 VIEW: CPT

## 2023-03-09 PROCEDURE — 0202U NFCT DS 22 TRGT SARS-COV-2: CPT

## 2023-03-09 PROCEDURE — 87040 BLOOD CULTURE FOR BACTERIA: CPT

## 2023-03-09 PROCEDURE — 6360000004 HC RX CONTRAST MEDICATION: Performed by: RADIOLOGY

## 2023-03-09 PROCEDURE — 82533 TOTAL CORTISOL: CPT

## 2023-03-09 PROCEDURE — 36415 COLL VENOUS BLD VENIPUNCTURE: CPT

## 2023-03-09 PROCEDURE — 87635 SARS-COV-2 COVID-19 AMP PRB: CPT

## 2023-03-09 PROCEDURE — 6370000000 HC RX 637 (ALT 250 FOR IP)

## 2023-03-09 PROCEDURE — 96375 TX/PRO/DX INJ NEW DRUG ADDON: CPT

## 2023-03-09 PROCEDURE — 2580000003 HC RX 258

## 2023-03-09 PROCEDURE — 2000000000 HC ICU R&B

## 2023-03-09 PROCEDURE — 85025 COMPLETE CBC W/AUTO DIFF WBC: CPT

## 2023-03-09 PROCEDURE — 6360000002 HC RX W HCPCS: Performed by: STUDENT IN AN ORGANIZED HEALTH CARE EDUCATION/TRAINING PROGRAM

## 2023-03-09 RX ORDER — POTASSIUM CHLORIDE 7.45 MG/ML
10 INJECTION INTRAVENOUS ONCE
Status: COMPLETED | OUTPATIENT
Start: 2023-03-09 | End: 2023-03-10

## 2023-03-09 RX ORDER — 0.9 % SODIUM CHLORIDE 0.9 %
1000 INTRAVENOUS SOLUTION INTRAVENOUS ONCE
Status: COMPLETED | OUTPATIENT
Start: 2023-03-09 | End: 2023-03-09

## 2023-03-09 RX ORDER — KETOROLAC TROMETHAMINE 30 MG/ML
15 INJECTION, SOLUTION INTRAMUSCULAR; INTRAVENOUS ONCE
Status: COMPLETED | OUTPATIENT
Start: 2023-03-09 | End: 2023-03-09

## 2023-03-09 RX ORDER — ACETAMINOPHEN 325 MG/1
325 TABLET ORAL ONCE
Status: COMPLETED | OUTPATIENT
Start: 2023-03-09 | End: 2023-03-09

## 2023-03-09 RX ORDER — ACETAMINOPHEN 325 MG/1
650 TABLET ORAL ONCE
Status: COMPLETED | OUTPATIENT
Start: 2023-03-09 | End: 2023-03-09

## 2023-03-09 RX ORDER — ONDANSETRON 2 MG/ML
4 INJECTION INTRAMUSCULAR; INTRAVENOUS ONCE
Status: COMPLETED | OUTPATIENT
Start: 2023-03-09 | End: 2023-03-09

## 2023-03-09 RX ADMIN — POTASSIUM CHLORIDE 10 MEQ: 7.46 INJECTION, SOLUTION INTRAVENOUS at 23:52

## 2023-03-09 RX ADMIN — KETOROLAC TROMETHAMINE 15 MG: 30 INJECTION, SOLUTION INTRAMUSCULAR; INTRAVENOUS at 22:46

## 2023-03-09 RX ADMIN — WATER 2000 MG: 1 INJECTION INTRAMUSCULAR; INTRAVENOUS; SUBCUTANEOUS at 23:28

## 2023-03-09 RX ADMIN — ACETAMINOPHEN 650 MG: 325 TABLET ORAL at 20:53

## 2023-03-09 RX ADMIN — SODIUM CHLORIDE 1000 ML: 9 INJECTION, SOLUTION INTRAVENOUS at 20:41

## 2023-03-09 RX ADMIN — ONDANSETRON 4 MG: 2 INJECTION INTRAMUSCULAR; INTRAVENOUS at 20:39

## 2023-03-09 RX ADMIN — IOPAMIDOL 75 ML: 755 INJECTION, SOLUTION INTRAVENOUS at 22:34

## 2023-03-09 RX ADMIN — SODIUM CHLORIDE 1000 ML: 9 INJECTION, SOLUTION INTRAVENOUS at 22:23

## 2023-03-09 RX ADMIN — ACETAMINOPHEN 325 MG: 325 TABLET ORAL at 22:47

## 2023-03-09 ASSESSMENT — PAIN DESCRIPTION - LOCATION
LOCATION: HEAD
LOCATION: ABDOMEN

## 2023-03-09 ASSESSMENT — PAIN SCALES - GENERAL
PAINLEVEL_OUTOF10: 5
PAINLEVEL_OUTOF10: 10
PAINLEVEL_OUTOF10: 8

## 2023-03-09 ASSESSMENT — PAIN DESCRIPTION - DESCRIPTORS
DESCRIPTORS: ACHING
DESCRIPTORS: BURNING

## 2023-03-09 ASSESSMENT — PAIN DESCRIPTION - FREQUENCY: FREQUENCY: CONTINUOUS

## 2023-03-09 ASSESSMENT — PAIN DESCRIPTION - ORIENTATION: ORIENTATION: LOWER

## 2023-03-09 ASSESSMENT — PAIN DESCRIPTION - PAIN TYPE: TYPE: ACUTE PAIN

## 2023-03-09 NOTE — Clinical Note
Discharge Plan[de-identified] Other/[facility])   Telemetry/Cardiac Monitoring Required?: Yes

## 2023-03-10 LAB
ADENOVIRUS BY PCR: NOT DETECTED
ANION GAP SERPL CALCULATED.3IONS-SCNC: 8 MMOL/L (ref 7–16)
ANISOCYTOSIS: ABNORMAL
BASOPHILS ABSOLUTE: 0 E9/L (ref 0–0.2)
BASOPHILS RELATIVE PERCENT: 0 % (ref 0–2)
BORDETELLA PARAPERTUSSIS BY PCR: NOT DETECTED
BORDETELLA PERTUSSIS BY PCR: NOT DETECTED
BUN BLDV-MCNC: 12 MG/DL (ref 6–23)
C-REACTIVE PROTEIN: 13.8 MG/DL (ref 0–0.4)
CALCIUM SERPL-MCNC: 7.7 MG/DL (ref 8.6–10.2)
CHLAMYDOPHILIA PNEUMONIAE BY PCR: NOT DETECTED
CHLORIDE BLD-SCNC: 108 MMOL/L (ref 98–107)
CO2: 23 MMOL/L (ref 22–29)
CORONAVIRUS 229E BY PCR: NOT DETECTED
CORONAVIRUS HKU1 BY PCR: NOT DETECTED
CORONAVIRUS NL63 BY PCR: NOT DETECTED
CORONAVIRUS OC43 BY PCR: NOT DETECTED
CORTISOL TOTAL: 16.84 MCG/DL (ref 2.68–18.4)
CORTISOL TOTAL: 29.78 MCG/DL (ref 2.68–18.4)
CREAT SERPL-MCNC: 0.6 MG/DL (ref 0.5–1)
EOSINOPHILS ABSOLUTE: 0 E9/L (ref 0.05–0.5)
EOSINOPHILS RELATIVE PERCENT: 0 % (ref 0–6)
FERRITIN: 138 NG/ML
GFR SERPL CREATININE-BSD FRML MDRD: >60 ML/MIN/1.73
GLUCOSE BLD-MCNC: 149 MG/DL (ref 74–99)
HCT VFR BLD CALC: 30.1 % (ref 34–48)
HEMOGLOBIN: 9.1 G/DL (ref 11.5–15.5)
HUMAN METAPNEUMOVIRUS BY PCR: NOT DETECTED
HUMAN RHINOVIRUS/ENTEROVIRUS BY PCR: NOT DETECTED
HYPOCHROMIA: ABNORMAL
INFLUENZA A BY PCR: NOT DETECTED
INFLUENZA B BY PCR: NOT DETECTED
LACTIC ACID, SEPSIS: 1.5 MMOL/L (ref 0.5–1.9)
LYMPHOCYTES ABSOLUTE: 0.21 E9/L (ref 1.5–4)
LYMPHOCYTES RELATIVE PERCENT: 2.6 % (ref 20–42)
MAGNESIUM: 1.7 MG/DL (ref 1.6–2.6)
MCH RBC QN AUTO: 28.5 PG (ref 26–35)
MCHC RBC AUTO-ENTMCNC: 30.2 % (ref 32–34.5)
MCV RBC AUTO: 94.4 FL (ref 80–99.9)
MONOCYTES ABSOLUTE: 0 E9/L (ref 0.1–0.95)
MONOCYTES RELATIVE PERCENT: 0 % (ref 2–12)
MYCOPLASMA PNEUMONIAE BY PCR: NOT DETECTED
NEUTROPHILS ABSOLUTE: 6.89 E9/L (ref 1.8–7.3)
NEUTROPHILS RELATIVE PERCENT: 97.4 % (ref 43–80)
NUCLEATED RED BLOOD CELLS: 0 /100 WBC
OVALOCYTES: ABNORMAL
PARAINFLUENZA VIRUS 1 BY PCR: NOT DETECTED
PARAINFLUENZA VIRUS 2 BY PCR: NOT DETECTED
PARAINFLUENZA VIRUS 3 BY PCR: NOT DETECTED
PARAINFLUENZA VIRUS 4 BY PCR: NOT DETECTED
PDW BLD-RTO: 20.7 FL (ref 11.5–15)
PLATELET # BLD: 302 E9/L (ref 130–450)
PMV BLD AUTO: 11.1 FL (ref 7–12)
POIKILOCYTES: ABNORMAL
POLYCHROMASIA: ABNORMAL
POTASSIUM SERPL-SCNC: 3.8 MMOL/L (ref 3.5–5)
RBC # BLD: 3.19 E12/L (ref 3.5–5.5)
RESPIRATORY SYNCYTIAL VIRUS BY PCR: NOT DETECTED
SARS-COV-2, PCR: DETECTED
SCHISTOCYTES: ABNORMAL
SEDIMENTATION RATE, ERYTHROCYTE: 61 MM/HR (ref 0–20)
SODIUM BLD-SCNC: 139 MMOL/L (ref 132–146)
WBC # BLD: 7.1 E9/L (ref 4.5–11.5)

## 2023-03-10 PROCEDURE — 36415 COLL VENOUS BLD VENIPUNCTURE: CPT

## 2023-03-10 PROCEDURE — 6370000000 HC RX 637 (ALT 250 FOR IP): Performed by: INTERNAL MEDICINE

## 2023-03-10 PROCEDURE — 82728 ASSAY OF FERRITIN: CPT

## 2023-03-10 PROCEDURE — 87081 CULTURE SCREEN ONLY: CPT

## 2023-03-10 PROCEDURE — 80048 BASIC METABOLIC PNL TOTAL CA: CPT

## 2023-03-10 PROCEDURE — 36592 COLLECT BLOOD FROM PICC: CPT

## 2023-03-10 PROCEDURE — 6360000002 HC RX W HCPCS: Performed by: INTERNAL MEDICINE

## 2023-03-10 PROCEDURE — 94664 DEMO&/EVAL PT USE INHALER: CPT

## 2023-03-10 PROCEDURE — 6360000002 HC RX W HCPCS: Performed by: STUDENT IN AN ORGANIZED HEALTH CARE EDUCATION/TRAINING PROGRAM

## 2023-03-10 PROCEDURE — 86140 C-REACTIVE PROTEIN: CPT

## 2023-03-10 PROCEDURE — 85025 COMPLETE CBC W/AUTO DIFF WBC: CPT

## 2023-03-10 PROCEDURE — 99221 1ST HOSP IP/OBS SF/LOW 40: CPT | Performed by: INTERNAL MEDICINE

## 2023-03-10 PROCEDURE — 6360000002 HC RX W HCPCS: Performed by: EMERGENCY MEDICINE

## 2023-03-10 PROCEDURE — 94640 AIRWAY INHALATION TREATMENT: CPT

## 2023-03-10 PROCEDURE — 2580000003 HC RX 258: Performed by: STUDENT IN AN ORGANIZED HEALTH CARE EDUCATION/TRAINING PROGRAM

## 2023-03-10 PROCEDURE — 96375 TX/PRO/DX INJ NEW DRUG ADDON: CPT

## 2023-03-10 PROCEDURE — 82533 TOTAL CORTISOL: CPT

## 2023-03-10 PROCEDURE — 85651 RBC SED RATE NONAUTOMATED: CPT

## 2023-03-10 PROCEDURE — 02HV33Z INSERTION OF INFUSION DEVICE INTO SUPERIOR VENA CAVA, PERCUTANEOUS APPROACH: ICD-10-PCS | Performed by: INTERNAL MEDICINE

## 2023-03-10 PROCEDURE — 2580000003 HC RX 258

## 2023-03-10 PROCEDURE — 83735 ASSAY OF MAGNESIUM: CPT

## 2023-03-10 PROCEDURE — 6360000002 HC RX W HCPCS

## 2023-03-10 PROCEDURE — 2060000000 HC ICU INTERMEDIATE R&B

## 2023-03-10 PROCEDURE — 99233 SBSQ HOSP IP/OBS HIGH 50: CPT | Performed by: INTERNAL MEDICINE

## 2023-03-10 RX ORDER — HYDROCORTISONE 20 MG/1
20 TABLET ORAL EVERY MORNING
Status: DISCONTINUED | OUTPATIENT
Start: 2023-03-10 | End: 2023-03-10

## 2023-03-10 RX ORDER — LORAZEPAM 1 MG/1
2 TABLET ORAL 3 TIMES DAILY
Status: DISCONTINUED | OUTPATIENT
Start: 2023-03-10 | End: 2023-03-10

## 2023-03-10 RX ORDER — ACETAMINOPHEN 325 MG/1
650 TABLET ORAL EVERY 6 HOURS PRN
Status: DISCONTINUED | OUTPATIENT
Start: 2023-03-10 | End: 2023-03-15 | Stop reason: HOSPADM

## 2023-03-10 RX ORDER — ESCITALOPRAM OXALATE 10 MG/1
10 TABLET ORAL NIGHTLY
Status: DISCONTINUED | OUTPATIENT
Start: 2023-03-10 | End: 2023-03-15 | Stop reason: HOSPADM

## 2023-03-10 RX ORDER — ENOXAPARIN SODIUM 100 MG/ML
40 INJECTION SUBCUTANEOUS DAILY
Status: DISCONTINUED | OUTPATIENT
Start: 2023-03-10 | End: 2023-03-15 | Stop reason: HOSPADM

## 2023-03-10 RX ORDER — ONDANSETRON 2 MG/ML
4 INJECTION INTRAMUSCULAR; INTRAVENOUS EVERY 6 HOURS PRN
Status: DISCONTINUED | OUTPATIENT
Start: 2023-03-10 | End: 2023-03-15 | Stop reason: HOSPADM

## 2023-03-10 RX ORDER — POTASSIUM CHLORIDE 20 MEQ/1
40 TABLET, EXTENDED RELEASE ORAL ONCE
Status: DISCONTINUED | OUTPATIENT
Start: 2023-03-10 | End: 2023-03-15 | Stop reason: HOSPADM

## 2023-03-10 RX ORDER — BUDESONIDE AND FORMOTEROL FUMARATE DIHYDRATE 160; 4.5 UG/1; UG/1
2 AEROSOL RESPIRATORY (INHALATION) 2 TIMES DAILY
Status: DISCONTINUED | OUTPATIENT
Start: 2023-03-10 | End: 2023-03-15 | Stop reason: HOSPADM

## 2023-03-10 RX ORDER — DOCUSATE SODIUM 100 MG/1
100 CAPSULE, LIQUID FILLED ORAL 2 TIMES DAILY PRN
Status: DISCONTINUED | OUTPATIENT
Start: 2023-03-10 | End: 2023-03-15 | Stop reason: HOSPADM

## 2023-03-10 RX ORDER — WATER 1000 ML/1000ML
INJECTION, SOLUTION INTRAVENOUS
Status: COMPLETED
Start: 2023-03-10 | End: 2023-03-10

## 2023-03-10 RX ORDER — NITROFURANTOIN 25; 75 MG/1; MG/1
100 CAPSULE ORAL 2 TIMES DAILY
Status: ON HOLD | COMMUNITY
End: 2023-03-15 | Stop reason: HOSPADM

## 2023-03-10 RX ORDER — HYDROCORTISONE 5 MG/1
10 TABLET ORAL NIGHTLY
Status: DISCONTINUED | OUTPATIENT
Start: 2023-03-10 | End: 2023-03-10

## 2023-03-10 RX ORDER — MONTELUKAST SODIUM 10 MG/1
10 TABLET ORAL DAILY
Status: DISCONTINUED | OUTPATIENT
Start: 2023-03-10 | End: 2023-03-15 | Stop reason: HOSPADM

## 2023-03-10 RX ORDER — FERROUS SULFATE 325(65) MG
325 TABLET ORAL
COMMUNITY

## 2023-03-10 RX ORDER — MAGNESIUM SULFATE IN WATER 40 MG/ML
2000 INJECTION, SOLUTION INTRAVENOUS ONCE
Status: COMPLETED | OUTPATIENT
Start: 2023-03-10 | End: 2023-03-10

## 2023-03-10 RX ORDER — ASPIRIN 81 MG/1
81 TABLET, CHEWABLE ORAL DAILY
Status: DISCONTINUED | OUTPATIENT
Start: 2023-03-10 | End: 2023-03-15 | Stop reason: HOSPADM

## 2023-03-10 RX ORDER — ATORVASTATIN CALCIUM 10 MG/1
10 TABLET, FILM COATED ORAL NIGHTLY
Status: DISCONTINUED | OUTPATIENT
Start: 2023-03-10 | End: 2023-03-15 | Stop reason: HOSPADM

## 2023-03-10 RX ORDER — LORAZEPAM 1 MG/1
2 TABLET ORAL EVERY 8 HOURS PRN
Status: DISCONTINUED | OUTPATIENT
Start: 2023-03-10 | End: 2023-03-15 | Stop reason: HOSPADM

## 2023-03-10 RX ORDER — 0.9 % SODIUM CHLORIDE 0.9 %
1000 INTRAVENOUS SOLUTION INTRAVENOUS ONCE
Status: COMPLETED | OUTPATIENT
Start: 2023-03-10 | End: 2023-03-10

## 2023-03-10 RX ORDER — PANTOPRAZOLE SODIUM 40 MG/1
40 TABLET, DELAYED RELEASE ORAL
Status: DISCONTINUED | OUTPATIENT
Start: 2023-03-10 | End: 2023-03-15 | Stop reason: HOSPADM

## 2023-03-10 RX ORDER — LANOLIN ALCOHOL/MO/W.PET/CERES
100 CREAM (GRAM) TOPICAL DAILY
Status: DISCONTINUED | OUTPATIENT
Start: 2023-03-10 | End: 2023-03-15 | Stop reason: HOSPADM

## 2023-03-10 RX ORDER — POLYETHYLENE GLYCOL 3350 17 G/17G
17 POWDER, FOR SOLUTION ORAL DAILY
Status: DISCONTINUED | OUTPATIENT
Start: 2023-03-10 | End: 2023-03-15 | Stop reason: HOSPADM

## 2023-03-10 RX ORDER — METHYLPREDNISOLONE SODIUM SUCCINATE 40 MG/ML
40 INJECTION, POWDER, LYOPHILIZED, FOR SOLUTION INTRAMUSCULAR; INTRAVENOUS ONCE
Status: COMPLETED | OUTPATIENT
Start: 2023-03-10 | End: 2023-03-10

## 2023-03-10 RX ORDER — POTASSIUM CHLORIDE 20 MEQ/1
40 TABLET, EXTENDED RELEASE ORAL ONCE
Status: COMPLETED | OUTPATIENT
Start: 2023-03-10 | End: 2023-03-10

## 2023-03-10 RX ORDER — ACETAMINOPHEN 325 MG/1
650 TABLET ORAL EVERY 8 HOURS SCHEDULED
Status: DISCONTINUED | OUTPATIENT
Start: 2023-03-10 | End: 2023-03-10

## 2023-03-10 RX ORDER — MIDODRINE HYDROCHLORIDE 5 MG/1
5 TABLET ORAL
Status: DISCONTINUED | OUTPATIENT
Start: 2023-03-10 | End: 2023-03-15 | Stop reason: HOSPADM

## 2023-03-10 RX ORDER — HYDROCORTISONE 20 MG/1
60 TABLET ORAL EVERY MORNING
Status: DISCONTINUED | OUTPATIENT
Start: 2023-03-10 | End: 2023-03-10

## 2023-03-10 RX ORDER — CODEINE PHOSPHATE AND GUAIFENESIN 10; 100 MG/5ML; MG/5ML
5 SOLUTION ORAL 3 TIMES DAILY PRN
Status: DISCONTINUED | OUTPATIENT
Start: 2023-03-10 | End: 2023-03-15 | Stop reason: HOSPADM

## 2023-03-10 RX ORDER — FLUTICASONE PROPIONATE 50 MCG
1 SPRAY, SUSPENSION (ML) NASAL DAILY
Status: DISCONTINUED | OUTPATIENT
Start: 2023-03-10 | End: 2023-03-15 | Stop reason: HOSPADM

## 2023-03-10 RX ORDER — ALBUTEROL SULFATE 90 UG/1
2 AEROSOL, METERED RESPIRATORY (INHALATION) EVERY 6 HOURS PRN
Status: DISCONTINUED | OUTPATIENT
Start: 2023-03-10 | End: 2023-03-15 | Stop reason: HOSPADM

## 2023-03-10 RX ORDER — HYDROCORTISONE 20 MG/1
40 TABLET ORAL 2 TIMES DAILY
Status: DISCONTINUED | OUTPATIENT
Start: 2023-03-10 | End: 2023-03-11

## 2023-03-10 RX ORDER — FERROUS SULFATE 325(65) MG
325 TABLET ORAL
Status: DISCONTINUED | OUTPATIENT
Start: 2023-03-10 | End: 2023-03-15 | Stop reason: HOSPADM

## 2023-03-10 RX ADMIN — HYDROCORTISONE SODIUM SUCCINATE 50 MG: 100 INJECTION, POWDER, FOR SOLUTION INTRAMUSCULAR; INTRAVENOUS at 08:41

## 2023-03-10 RX ADMIN — PANTOPRAZOLE SODIUM 40 MG: 40 TABLET, DELAYED RELEASE ORAL at 16:23

## 2023-03-10 RX ADMIN — ESCITALOPRAM 10 MG: 10 TABLET, FILM COATED ORAL at 21:35

## 2023-03-10 RX ADMIN — WATER 2 ML: 1 INJECTION INTRAMUSCULAR; INTRAVENOUS; SUBCUTANEOUS at 08:41

## 2023-03-10 RX ADMIN — POLYETHYLENE GLYCOL 3350 17 G: 17 POWDER, FOR SOLUTION ORAL at 08:21

## 2023-03-10 RX ADMIN — METHYLPREDNISOLONE SODIUM SUCCINATE 40 MG: 40 INJECTION, POWDER, FOR SOLUTION INTRAMUSCULAR; INTRAVENOUS at 02:01

## 2023-03-10 RX ADMIN — MAGNESIUM SULFATE HEPTAHYDRATE 2000 MG: 40 INJECTION, SOLUTION INTRAVENOUS at 10:08

## 2023-03-10 RX ADMIN — Medication 100 MG: at 08:41

## 2023-03-10 RX ADMIN — FLUTICASONE PROPIONATE 1 SPRAY: 50 SPRAY, METERED NASAL at 10:09

## 2023-03-10 RX ADMIN — HYDROCORTISONE 40 MG: 20 TABLET ORAL at 21:35

## 2023-03-10 RX ADMIN — MIDODRINE HYDROCHLORIDE 5 MG: 5 TABLET ORAL at 08:19

## 2023-03-10 RX ADMIN — WATER 2000 MG: 1 INJECTION INTRAMUSCULAR; INTRAVENOUS; SUBCUTANEOUS at 21:36

## 2023-03-10 RX ADMIN — POTASSIUM CHLORIDE 40 MEQ: 1500 TABLET, EXTENDED RELEASE ORAL at 08:41

## 2023-03-10 RX ADMIN — LORAZEPAM 2 MG: 1 TABLET ORAL at 08:20

## 2023-03-10 RX ADMIN — ASPIRIN 81 MG CHEWABLE TABLET 81 MG: 81 TABLET CHEWABLE at 08:19

## 2023-03-10 RX ADMIN — ATORVASTATIN CALCIUM 10 MG: 10 TABLET, FILM COATED ORAL at 21:35

## 2023-03-10 RX ADMIN — FERROUS SULFATE TAB 325 MG (65 MG ELEMENTAL FE) 325 MG: 325 (65 FE) TAB at 08:41

## 2023-03-10 RX ADMIN — PANTOPRAZOLE SODIUM 40 MG: 40 TABLET, DELAYED RELEASE ORAL at 08:21

## 2023-03-10 RX ADMIN — ENOXAPARIN SODIUM 40 MG: 100 INJECTION SUBCUTANEOUS at 08:41

## 2023-03-10 RX ADMIN — BUDESONIDE AND FORMOTEROL FUMARATE DIHYDRATE 2 PUFF: 160; 4.5 AEROSOL RESPIRATORY (INHALATION) at 20:45

## 2023-03-10 RX ADMIN — MONTELUKAST SODIUM 10 MG: 10 TABLET ORAL at 08:20

## 2023-03-10 RX ADMIN — SODIUM CHLORIDE 1000 ML: 9 INJECTION, SOLUTION INTRAVENOUS at 00:50

## 2023-03-10 RX ADMIN — LORAZEPAM 2 MG: 1 TABLET ORAL at 21:51

## 2023-03-10 RX ADMIN — HYDROCORTISONE SODIUM SUCCINATE 50 MG: 100 INJECTION, POWDER, FOR SOLUTION INTRAMUSCULAR; INTRAVENOUS at 01:58

## 2023-03-10 ASSESSMENT — ENCOUNTER SYMPTOMS
PHOTOPHOBIA: 0
ABDOMINAL PAIN: 1
TROUBLE SWALLOWING: 0
NAUSEA: 1
SHORTNESS OF BREATH: 0
VOICE CHANGE: 0
WHEEZING: 0
DIARRHEA: 0
VOMITING: 1
BACK PAIN: 0
COUGH: 0
RHINORRHEA: 0

## 2023-03-10 ASSESSMENT — PAIN SCALES - GENERAL
PAINLEVEL_OUTOF10: 0
PAINLEVEL_OUTOF10: 0

## 2023-03-10 ASSESSMENT — PAIN - FUNCTIONAL ASSESSMENT: PAIN_FUNCTIONAL_ASSESSMENT: NONE - DENIES PAIN

## 2023-03-10 NOTE — TELEPHONE ENCOUNTER
Writer contacted Dr. Shellee Hammans to inform of 30 day readmission risk. Writer informed of potential readmission.

## 2023-03-10 NOTE — ED PROVIDER NOTES
3/10/23  2:47 AM EST      Patient presently admitted and boarded in the department pending bed availability. Intervention required by emergency physician.           Medications   acetaminophen (TYLENOL) extended release tablet 650 mg (650 mg Oral Not Given 3/10/23 0123)   aspirin chewable tablet 81 mg (has no administration in time range)   atorvastatin (LIPITOR) tablet 10 mg (has no administration in time range)   docusate sodium (COLACE) capsule 100 mg (has no administration in time range)   escitalopram (LEXAPRO) tablet 10 mg (has no administration in time range)   fluticasone (FLONASE) 50 MCG/ACT nasal spray 1 spray (has no administration in time range)   guaiFENesin-codeine (GUAIFENESIN AC) 100-10 MG/5ML liquid 5 mL (has no administration in time range)   hydrocortisone (CORTEF) tablet 20 mg (has no administration in time range)   hydrocortisone (CORTEF) tablet 10 mg (has no administration in time range)   LORazepam (ATIVAN) tablet 2 mg (has no administration in time range)   midodrine (PROAMATINE) tablet 5 mg (has no administration in time range)   montelukast (SINGULAIR) tablet 10 mg (has no administration in time range)   pantoprazole (PROTONIX) tablet 40 mg (has no administration in time range)   polyethylene glycol (GLYCOLAX) packet 17 g (has no administration in time range)   ondansetron (ZOFRAN) injection 4 mg (has no administration in time range)   HYDROmorphone (DILAUDID) injection 0.25 mg (has no administration in time range)   potassium chloride (KLOR-CON M) extended release tablet 40 mEq (has no administration in time range)   norepinephrine (LEVOPHED) 16 mg in sodium chloride 0.9 % 250 mL infusion (has no administration in time range)   0.9 % sodium chloride bolus (0 mLs IntraVENous Stopped 3/9/23 2222)   ondansetron (ZOFRAN) injection 4 mg (4 mg IntraVENous Given 3/9/23 2039)   acetaminophen (TYLENOL) tablet 650 mg (650 mg Oral Given 3/9/23 2053)   0.9 % sodium chloride bolus (0 mLs IntraVENous Stopped 3/9/23 2324)   acetaminophen (TYLENOL) tablet 325 mg (325 mg Oral Given 3/9/23 2247)   ketorolac (TORADOL) injection 15 mg (15 mg IntraVENous Given 3/9/23 2246)   iopamidol (ISOVUE-370) 76 % injection 75 mL (75 mLs IntraVENous Given 3/9/23 2234)   cefTRIAXone (ROCEPHIN) 2,000 mg in sterile water 20 mL IV syringe (2,000 mg IntraVENous Given 3/9/23 2328)   potassium chloride 10 mEq/100 mL IVPB (Peripheral Line) (10 mEq IntraVENous New Bag 3/9/23 2352)   methylPREDNISolone sodium (SOLU-MEDROL) injection 40 mg (40 mg IntraVENous Given 3/10/23 0201)   0.9 % sodium chloride bolus (0 mLs IntraVENous Stopped 3/10/23 0123)   hydrocortisone sodium succinate PF (SOLU-CORTEF) injection 50 mg (50 mg IntraVENous Given 3/10/23 0158)              MDM: Medical Decision Making  Patient admitted pending bed availability, history of chronic steroid use with Cortef. Normotensive during most of her ED visit pending bed availability upstairs, she became hypotensive, was given IV Solu-Medrol as IV Cortef. She remained hypotensive, additional fluid bolus ordered. She was received more than 30 cc/kg. Febrile on arrival to the ED with urinary source. Having refractory hypotension, vasopressor support required, spoke with critical care they will except the patient to the ICU. Spoke with medicine note they were notified of the patient's upgrade    Amount and/or Complexity of Data Reviewed  Labs: ordered. Radiology: ordered. Risk  OTC drugs. Prescription drug management. Decision regarding hospitalization. Spoke with Dr. Ranulfo Wilkinson (Critical care). Discussed case. They will provide consultation. Spoke with Dr. Meena Ellis (Medicine). Discussed case. They were updated on the patient's upgraded to the intensive care unit. SEP-1 CORE MEASURE DATA      Sepsis Criteria   Severe Sepsis Criteria   Septic Shock Criteria     Must be confirmed or suspected to move forward with diagnosis of sepsis.     Must meet 2:    [x] Temperature > 100.9 F (38.3 C)        or < 96.8 F (36 C)  [x] HR > 90  [] RR > 20  [] WBC > 12 or < 4 or 10% bands      AND:      [x] Infection Confirmed or        Suspected. Must meet 1:    [x] Lactate > 2       or   [] Signs of Organ Dysfunction:    - SBP < 90 or MAP < 65  - Altered mental status  - Creatinine > 2 or increased from      baseline  - Urine Output < 0.5 ml/kg/hr  - Bilirubin > 2  - INR > 1.5 (not anticoagulated)  - Platelets < 665,711  - Acute Respiratory Failure as     evidenced by new need for NIPPV     or mechanical ventilation      [] No criteria met for Severe Sepsis. Must meet 1:    [] Lactate > 4        or   [x] SBP < 90 or MAP < 65 for at        least two readings in the first        hour after fluid bolus        administration      [] Vasopressors initiated (if hypotension persists after fluid resuscitation)        [] No criteria met for Septic Shock. Patient Vitals for the past 6 hrs:   BP Temp Pulse Resp SpO2   03/09/23 2133 113/72 (!) 101.9 °F (38.8 °C) (!) 128 17 95 %   03/09/23 2246 133/79 -- (!) 120 17 96 %   03/09/23 2323 -- 98.4 °F (36.9 °C) (!) 115 -- --   03/10/23 0045 (!) 80/44 -- -- -- --   03/10/23 0100 (!) 79/43 -- -- -- --   03/10/23 0115 (!) 80/44 -- -- -- --   03/10/23 0130 (!) 95/49 -- -- -- --   03/10/23 0200 (!) 93/52 -- -- -- --   03/10/23 0215 (!) 98/57 -- -- -- --      Recent Labs     03/09/23 2019   WBC 10.7   CREATININE 0.7   BILITOT 0.3            Time Septic Shock Identified: [id number]    Fluid Resuscitation Rational: at least 30mL/kg based on entered actual weight at time of triage    Repeat lactate level: ordered and pending at this time    Reassessment Exam:   I have reassessed tissue perfusion and hemodynamic status after fluid bolus at this time:         PROCEDURE  3/10/23       Time: 0300h    CENTRAL LINE INSERTION  Risks, benefits and alternatives (for applicable procedures below) described.    Performed By: EM Attending Physician and EM Resident. Indication: central venous monitoring. Informed consent: Verbal consent obtained. The patient was counseled regarding the procedure in person, it's indications, risks, potential complications and alternatives and any questions were answered. Verbal consent was obtained. Procedure: After routine sterile preparation, local anesthesia obtained by infiltration using 1% Lidocaine without epinephrine. A right 3-Lumen 7F Central Venous Catheter was placed by femoral vein approach and secured by standard fashion. Ultrasound Guidance:   used. Number of Attempts: 1  Post-procedure Findings: A post procedural chest x-ray  was not indicated. Patient tolerated the procedure well.        --------------------------------- IMPRESSION AND DISPOSITION ---------------------------------    IMPRESSION  1. Sepsis, due to unspecified organism, unspecified whether acute organ dysfunction present ([facility] [place] [address]    2. Acute cystitis with hematuria    3. Hypokalemia    4. Lactic acidosis    5.  Pancreatic cyst        DISPOSITION  Disposition: Admit to CCU/ICU  Patient condition is critical        Zenobia Hall DO  03/10/23 2045

## 2023-03-10 NOTE — CONSULTS
Critical Care Admit/Consult Note     Patient - Tracie Goodman   MRN -  [id number]   Acct # - [de-identified]   - 1945      Date of Admission -  3/9/2023  7:13 PM  Date of evaluation -  3/10/2023  [address] Day - 1      ADMIT/CONSULT DETAILS     Reason for Admit/Consult   UTI, hypotension    Consulting Service/Physician   Consulting - Corinne Aquas, DO  Primary Care Physician - Opal Gaines MD         HPI   Ms Roberta Quesada presented to the ED today for evaluation of nausea, vomiting, and abdominal pain that started yesterday morning and febrile to 100.9 prior to presentation. She lives at home with her sister whom she says was caring for her. She was recently discharged for the same on 23 initially required pressor that admission treated with zosyn > PO fluconazole x 7 days with >[id number] yeast discharging of steroids for ?? Adrenal insufficiency and midodrine - her lasix and diltiazem were stopped on discharge. It is documented but not immediately clear if she was/is on macrobid. Reports ongoing dysuria. In the ED she was found to have fever 101.9, tachycardia 137 becoming hypotensive to at lowest 79/53. She was volume resuscitated with 3 L bolus was lined for initiation of pressor though pressor was ultimately not needed. Lactate was elevated at 2.3 but improved to 1.5 after fluids. UA c/w UTI showing > 20 WBCs and moderate bacteria and large LE. She was started on ceftriaxone in the ED and admitted to the ICU for further management.          Past Medical History         Diagnosis Date    Anxiety     Arthritis     Asthma     Claustrophobia     Dermatitis 2017    bilateral legs knees to ankle; follows with [facility]    Fracture 2017    \"in Spine\" compression T10 per pt; difficulty laying flat    GERD (gastroesophageal reflux disease)     Hiatal hernia     History of blood transfusion     HTN (hypertension) 2013    Hyperlipidemia     On aspirin at home     for age per pcp    Pancreatic cyst 5/27/2017    Pneumonia 07/2018    Sleep apnea     SOB (shortness of breath) 4/22/2013    Tachycardia 04/22/2013    follows with Dr Adriana Barahona        Past Surgical History           Procedure Laterality Date    BACK SURGERY  8/2014    has screws in back    CATARACT REMOVAL  12/6/2013, 2/20/2013    [facility] with lens implants    CHOLECYSTECTOMY      JOINT REPLACEMENT  12/16/2016    SI joint fusion Right    OTHER SURGICAL HISTORY  02/09/2017    MRI -     SPINAL FIXATION SURGERY WITH IMPLANT  2013    in [address] Right     UPPER GASTROINTESTINAL ENDOSCOPY  07/10/2017     Current Medications   Current Medications    acetaminophen  650 mg Oral 3 times per day    aspirin  81 mg Oral Daily    atorvastatin  10 mg Oral Nightly    escitalopram  10 mg Oral Nightly    fluticasone  1 spray Each Nostril Daily    hydrocortisone  20 mg Oral QAM    hydrocortisone  10 mg Oral Nightly    LORazepam  2 mg Oral TID    midodrine  5 mg Oral TID WC    montelukast  10 mg Oral Daily    pantoprazole  40 mg Oral BID AC    polyethylene glycol  17 g Oral Daily    potassium chloride  40 mEq Oral Once     docusate sodium, guaiFENesin-codeine, ondansetron, HYDROmorphone  IV Drips/Infusions   norepinephrine       Home Medications  Medications Prior to Admission: ferrous sulfate (IRON 325) 325 (65 Fe) MG tablet, Take 325 mg by mouth daily (with breakfast)  nitrofurantoin, macrocrystal-monohydrate, (MACROBID) 100 MG capsule, Take 100 mg by mouth 2 times daily  hydrocortisone (CORTEF) 20 MG tablet, Take 1 tablet by mouth every morning  hydrocortisone (CORTEF) 10 MG tablet, Take 1 tablet by mouth at bedtime  polyethylene glycol (GLYCOLAX) 17 g packet, Take 17 g by mouth daily  midodrine (PROAMATINE) 5 MG tablet, Take 1 tablet by mouth 3 times daily (with meals) (Patient taking differently: Take 5 mg by mouth 3 times daily (with meals) Take if Systolic B/P is less tahn 100)  pantoprazole (PROTONIX) 40 MG tablet, Take 1 tablet by mouth 2 times daily (before meals)  thiamine 100 MG tablet, Take 1 tablet by mouth daily  guaiFENesin-codeine (TUSSI-ORGANIDIN NR) 100-10 MG/5ML syrup, Take 5 mLs by mouth 3 times daily as needed for Cough. acetaminophen (TYLENOL) 650 MG extended release tablet, Take 650 mg by mouth as needed for Pain  white petrolatum OINT ointment, Apply topically 2 times daily  camphor-menthol (SARNA) 0.5-0.5 % lotion, Apply topically as needed. (Patient not taking: Reported on 3/10/2023)  docusate sodium (COLACE, DULCOLAX) 100 MG CAPS, Take 100 mg by mouth 2 times daily as needed for Constipation  fluticasone (FLONASE) 50 MCG/ACT nasal spray, USE 1 SPRAY IN EACH NOSTRIL DAILY  Fluticasone furoate-vilanterol (BREO ELLIPTA) 200-25 MCG/INH AEPB inhaler, Inhale 1 puff into the lungs daily  montelukast (SINGULAIR) 10 MG tablet, TAKE 1 TABLET DAILY  albuterol sulfate HFA (VENTOLIN HFA) 108 (90 Base) MCG/ACT inhaler, Inhale 2 puffs into the lungs every 6 hours as needed for Wheezing  LORazepam (ATIVAN) 2 MG tablet, Take 2 mg by mouth 3 times daily. Rafaela Kennedy aspirin 81 MG tablet, Take 81 mg by mouth daily  escitalopram (LEXAPRO) 10 MG tablet, Take 10 mg by mouth nightly   atorvastatin (LIPITOR) 10 MG tablet, Take 10 mg by mouth nightly     Diet/Nutrition   ADULT DIET; 5 carb choices (75 gm/meal)    Allergies   Percocet [oxycodone-acetaminophen]    Social History   Tobacco   reports that she has never smoked. She has never used smokeless tobacco.    Alcohol     reports no history of alcohol use.     Family History         Problem Relation Age of Onset    Stroke Mother     Heart Disease Mother     Hypertension Mother     Other Mother         CHOLECYSTECTOMY; OSTEOPENIA    Heart Disease Father     Hypertension Father     Diabetes Father     Arthritis Father     Asthma Daughter      Lines and Devices    Right fem CVC    Mechanical Ventilation Data   VENT SETTINGS (Comprehensive)     Additional Respiratory Assessments  Heart Rate: 88  Resp: 20  SpO2: 99 %    ABG  No results found for: PH, PCO2, PO2, HCO3, O2SAT  No results found for: IFIO2, MODE, SETTIDVOL, SETPEEP        Vitals    height is 5' 4\" (1.626 m) and weight is 114 lb (51.7 kg). Her temperature is 98.4 °F (36.9 °C). Her blood pressure is 106/66 and her pulse is 88. Her respiration is 20 and oxygen saturation is 99%. Temperature Range: Temp: 98.4 °F (36.9 °C) Temp  Av °F (37.8 °C)  Min: 98.4 °F (36.9 °C)  Max: 101.9 °F (38.8 °C)  BP Range:  Systolic (14PNG), JSM:757 , Min:79 , CRT:141     Diastolic (97ZYE), JET:65, Min:43, Max:94    Pulse Range: Pulse  Av.4  Min: 88  Max: 137  Respiration Range: Resp  Av.5  Min: 17  Max: 27  Current Pulse Ox[de-identified]  SpO2: 99 %  24HR Pulse Ox Range:  SpO2  Av %  Min: 88 %  Max: 99 %  Oxygen Amount and Delivery: O2 Flow Rate (L/min): 2 L/min      I/O (24 Hours)    Patient Vitals for the past 8 hrs:   BP Temp Temp src Pulse Resp SpO2   03/10/23 0500 106/66 -- -- 88 20 --   03/10/23 0435 (!) 108/59 -- -- 93 19 99 %   03/10/23 0359 98/60 -- -- 94 27 99 %   03/10/23 0348 -- -- -- -- -- 98 %   03/10/23 0347 -- -- -- -- -- (!) 88 %   03/10/23 0342 (!) 113/94 -- -- (!) 102 20 94 %   03/10/23 0340 (!) 100/58 98.4 °F (36.9 °C) -- 99 18 93 %   03/10/23 0215 (!) 98/57 -- -- -- -- --   03/10/23 0200 (!) 93/52 -- -- -- -- --   03/10/23 0130 (!) 95/49 -- -- -- -- --   03/10/23 0115 (!) 80/44 -- -- -- -- --   03/10/23 0100 (!) 79/43 -- -- -- -- --   03/10/23 0045 (!) 80/44 -- -- -- -- --   23 2323 -- 98.4 °F (36.9 °C) Oral (!) 115 -- --   23 2246 133/79 -- -- (!) 120 17 96 %     No intake or output data in the 24 hours ending 03/10/23 0539  No intake/output data recorded.      Patient Vitals for the past 96 hrs (Last 3 readings):   Weight   23 1921 114 lb (51.7 kg)     Exam   PHYSICAL EXAM:  Vitals:  /75   Pulse 91   Temp 98 °F (36.7 °C) (Oral)   Resp 22   Ht 5' 4\" (1.626 m)   Wt 117 lb 4.6 oz (53.2 kg)   SpO2 96%   BMI 20.13 kg/m²     General Appearance: alert and oriented to person, place and time and in no acute distress  Skin: warm and dry  Head: normocephalic and atraumatic  Eyes: pupils equal, round, and reactive to light, extraocular eye movements intact, conjunctivae normal  Neck: neck supple and non tender without mass   Pulmonary/Chest: clear to auscultation bilaterally- no wheezes, rales or rhonchi, normal air movement, no respiratory distress  Cardiovascular: normal rate, normal S1 and S2 and no carotid bruits  Abdomen: soft, suprapubic tenderness, non-distended, normal bowel sounds, no masses or organomegaly  Extremities: no cyanosis, no clubbing and no edema  Neurologic: no cranial nerve deficit and speech normal      Data   Old records and images have been reviewed    Lab Results   CBC     Lab Results   Component Value Date/Time    WBC 10.7 03/09/2023 08:19 PM    RBC 3.44 03/09/2023 08:19 PM    HGB 9.7 03/09/2023 08:19 PM    HCT 32.5 03/09/2023 08:19 PM     03/09/2023 08:19 PM    MCV 94.5 03/09/2023 08:19 PM    MCH 28.2 03/09/2023 08:19 PM    MCHC 29.8 03/09/2023 08:19 PM    RDW 20.5 03/09/2023 08:19 PM    NRBC 0.0 02/04/2023 01:50 AM    SEGSPCT 61 04/02/2012 08:30 AM    LYMPHOPCT 4.9 03/09/2023 08:19 PM    MONOPCT 5.7 03/09/2023 08:19 PM    MYELOPCT 0.9 02/01/2023 09:10 AM    BASOPCT 0.2 03/09/2023 08:19 PM    MONOSABS 0.61 03/09/2023 08:19 PM    LYMPHSABS 0.52 03/09/2023 08:19 PM    EOSABS 0.49 03/09/2023 08:19 PM    BASOSABS 0.02 03/09/2023 08:19 PM       BMP   Lab Results   Component Value Date/Time     03/09/2023 08:19 PM    K 3.4 03/09/2023 08:19 PM    K 7.5 02/22/2023 09:45 AM     03/09/2023 08:19 PM    CO2 23 03/09/2023 08:19 PM    BUN 14 03/09/2023 08:19 PM    CREATININE 0.7 03/09/2023 08:19 PM    GLUCOSE 96 03/09/2023 08:19 PM    GLUCOSE 85 04/02/2012 08:30 AM    CALCIUM 8.5 03/09/2023 08:19 PM       LFTS  Lab Results   Component Value Date/Time    [facility] 74 03/09/2023 08:19 PM    ALT 8 03/09/2023 08:19 PM    AST 15 03/09/2023 08:19 PM    PROT 5.9 03/09/2023 08:19 PM    BILITOT 0.3 03/09/2023 08:19 PM    BILIDIR 0.2 04/01/2012 04:20 PM    LABALBU 3.1 03/09/2023 08:19 PM    LABALBU 3.6 04/02/2012 08:30 AM       INR  No results for input(s): PROTIME, INR in the last 72 hours. APTT  No results for input(s): APTT in the last 72 hours. Lactic Acid  Lab Results   Component Value Date/Time    LACTA 1.6 02/21/2023 01:30 AM    LACTA 1.5 02/20/2023 05:43 PM    LACTA 1.2 02/01/2023 09:10 AM        BNP   No results for input(s): BNP in the last 72 hours. Cultures     No results for input(s): BC in the last 72 hours. No results for input(s): Maria De Jesus [place] in the last 72 hours. No results for input(s): LABURIN in the last 72 hours. Radiology   CXR  Impression:   03/09/23 2313     No acute process. CT Scans  Impression:  CT ABDOMEN PELVIS W IV CONTRAST  03/09/23 2313     1. Mild prominence of the bladder wall. Clinical correlation for cystitis   recommended. Tiny locule of gas within the bladder may be due to infection   or recent catheterization. 2. Severe sigmoid diverticulosis without diverticulitis. 3. 12 mm cyst in the pancreatic tail, grossly stable at least as of   12/06/2018. Per the recommendations of the [employer] of Radiology,   imaging follow-up is recommended in 2 years. 4. Moderate to large hiatal hernia. 5. Small bilateral pleural effusions.         SYSTEMS ASSESSMENT    Neuro   # No acute issues    Respiratory   # No acute issues    Cardiovascular   # Hypotension  - Resolved with volume resuscitation  - Stress dose steroids   - Resume home midodrine  - Lactate now normal  - Has not required pressor  - Trend hemodynamics    Gastrointestinal   # Diet as tolerated    Renal   # No acute issues     Infectious Disease   # UTI  - Started on ceftriaxone in the ED - continue  - Follow cultures, resp panel, procal, crp    Hematology/Oncology   # No acute issues    Endocrine   # ? Adrenal insufficiency  - On steroids outpatient  - Stress dose steroids  - Pending cortisol    Social/Spiritual/DNR/Other   Code: Full  DVT ppx: Lovenox  GI ppx: pantoprazole    \"Thank you for asking us to see this complex patient. \"

## 2023-03-10 NOTE — PROGRESS NOTES
Critical Care Team - Daily Progress Note      Date and time: 3/10/2023 11:24 AM  Patient's name:  aMrshall Estrella  Medical Record Number: [id number]  Patient's account/billing number: [de-identified]  Patient's YOB: 1945  Age: 70 y.o. Date of Admission: 3/9/2023  7:13 PM  Length of stay during current admission: 1      Primary Care Physician: Lidia Torres MD  ICU Attending Physician: Dr. Rosey Conner    Code Status: Prior    Reason for ICU admission: Hypotension, septic shock      SUBJECTIVE:     OVERNIGHT EVENTS:           3/10/23: RFA covid-positive. Patient is sitting up in bed eating breakfast this morning without nausea or vomiting. She denies shortness of breath. Faint crackles appreciated bilateral lung bases. Her lactic acidosis resolved 2.3 > 1.5, her hemoglobin was 9.7 > 9.1 her baseline is around 9-10. Blood cx, urine cx, cortisol level pending. Endo consulted per primary for adrenal insufficiency, ID consulted. Replaced mag 2g. Patient stable and appropriate for transfer to floors. Awake and following commands: Yes  Current Ventilation: 2L NC    ROS:  Unless stated above, ROS is otherwise negative. Initial HPI + past overnight events:     Patient is a 82-year-old female with a past medical history of adrenal insufficiency for which she takes midodrine and steriods, asthma, GERD, hypertension, hyperlipidemia who presented to the ED for nausea, vomiting, and fevers for the past few days. She lives independently at home with her sister. Of note, she was discharged 2/20/23 from the hospital admitted for sepsis with hypotension/septic shock on fluconazole for Candida albicans urine cx. She began taking Macrobid for UTI 10 days ago. Had Brewer which was removed yesterday. Vitals in the emergency department significant for temperature of 101.9, and blood pressure of 97/57 with a MAP of 70. She was subsequently given 3 L of fluid and steroids and brought to the ICU.   In the ED, her lactate was elevated at 2.3, troponin 38 > 43, potassium 3.4, urinalysis demonstrating positive nitrates and large leukocyte esterase with greater than 20 white blood cells. CT abdomen pelvis demonstrated mild prominence of the bladder wall likely indicating cystitis with a tiny locule of gas perhaps reflecting infection as well as a stable 12 mm cyst in the pancreatic tail, and moderate to large hiatal hernia with small bilateral pleural effusions. Given 3 L of fluid, Zosyn, 2 g of ceftriaxone, 50 mg Solu-Cortef, 50 mg Toradol, and 40 mEq Kcl 40 mg Solu-Medrol in ED. OBJECTIVE:     VITAL SIGNS:  /73   Pulse 88   Temp 98 °F (36.7 °C) (Oral)   Resp 15   Ht 5' 4\" (1.626 m)   Wt 117 lb 4.6 oz (53.2 kg)   SpO2 100%   BMI 20.13 kg/m²   Tmax over 24 hours:  Temp (24hrs), Av.6 °F (37.6 °C), Min:98 °F (36.7 °C), Max:101.9 °F (38.8 °C)      Patient Vitals for the past 6 hrs:   BP Temp Temp src Pulse Resp SpO2   03/10/23 1100 121/73 -- -- 88 15 100 %   03/10/23 1000 128/76 -- -- 88 26 98 %   03/10/23 0900 103/80 -- -- (!) 101 25 100 %   03/10/23 0800 112/75 98 °F (36.7 °C) Oral 91 22 96 %   03/10/23 0700 (!) 97/57 -- -- 83 23 --   03/10/23 0630 (!) 102/57 -- -- 82 24 --   03/10/23 0600 (!) 96/58 -- -- 86 24 --   03/10/23 0530 (!) 98/58 -- -- 86 25 98 %       Intake/Output:   I/O this shift:  In: 300 [P.O.:300]  Out: -   No intake/output data recorded. Intake/Output Summary (Last 24 hours) at 3/10/2023 1124  Last data filed at 3/10/2023 0900  Gross per 24 hour   Intake 300 ml   Output --   Net 300 ml     Wt Readings from Last 2 Encounters:   03/10/23 117 lb 4.6 oz (53.2 kg)   23 129 lb (58.5 kg)     Body mass index is 20.13 kg/m². Physical Exam  Constitutional:       General: She is not in acute distress. HENT:      Head: Normocephalic and atraumatic. Eyes:      Extraocular Movements: Extraocular movements intact.    Cardiovascular:      Rate and Rhythm: Normal rate and regular rhythm. Heart sounds: No murmur heard. No friction rub. No gallop. Pulmonary:      Breath sounds: Rales present. No wheezing or rhonchi. Comments: Rales bilateral lung bases  Abdominal:      Tenderness: There is no abdominal tenderness. There is no guarding. Musculoskeletal:      Right lower leg: No edema. Left lower leg: No edema. Skin:     Comments: Patient with multiple lesions bilateral lower extremities without drainage or streaking. Neurological:      Mental Status: She is alert and oriented to person, place, and time. MEDICATIONS:    Scheduled Meds:   aspirin  81 mg Oral Daily    atorvastatin  10 mg Oral Nightly    escitalopram  10 mg Oral Nightly    fluticasone  1 spray Each Nostril Daily    LORazepam  2 mg Oral TID    midodrine  5 mg Oral TID WC    montelukast  10 mg Oral Daily    pantoprazole  40 mg Oral BID AC    polyethylene glycol  17 g Oral Daily    potassium chloride  40 mEq Oral Once    ferrous sulfate  325 mg Oral Daily with breakfast    budesonide-formoterol  2 puff Inhalation BID    thiamine  100 mg Oral Daily    enoxaparin  40 mg SubCUTAneous Daily    magnesium sulfate  2,000 mg IntraVENous Once    hydrocortisone  40 mg Oral BID     Continuous Infusions:      PRN Meds:   docusate sodium, 100 mg, BID PRN  guaiFENesin-codeine, 5 mL, TID PRN  ondansetron, 4 mg, Q6H PRN  HYDROmorphone, 0.25 mg, Q3H PRN  albuterol sulfate HFA, 2 puff, Q6H PRN  acetaminophen, 650 mg, Q6H PRN        VENT SETTINGS (Comprehensive) (if applicable): Additional Respiratory Assessments  Heart Rate: 88  Resp: 15  SpO2: 100 %    Arterial Blood Gas 3/10/2023  No results for input(s): PH, PCO2, PO2, HCO3, BE, O2SAT in the last 72 hours.       Laboratory findings:    Complete Blood Count:   Recent Labs     03/09/23  2019 03/10/23  0812   WBC 10.7 7.1   HGB 9.7* 9.1*   HCT 32.5* 30.1*    302        Last 3 Blood Glucose:   Recent Labs     03/09/23  2019 03/10/23  0812   GLUCOSE 96 149* PT/INR:    Lab Results   Component Value Date/Time    PROTIME 11.8 02/13/2023 05:57 AM    PROTIME 11.7 04/01/2012 03:16 PM    INR 1.1 02/13/2023 05:57 AM     PTT:    Lab Results   Component Value Date/Time    APTT 37.6 05/28/2017 04:10 AM       Comprehensive Metabolic Profile:   Recent Labs     03/09/23  2019 03/10/23  0812    139   K 3.4* 3.8    108*   CO2 23 23   BUN 14 12   CREATININE 0.7 0.6   GLUCOSE 96 149*   CALCIUM 8.5* 7.7*   PROT 5.9*  --    LABALBU 3.1*  --    BILITOT 0.3  --    ALKPHOS 74  --    AST 15  --    ALT 8  --       Magnesium:   Lab Results   Component Value Date/Time    MG 1.7 03/10/2023 08:12 AM     Phosphorus:   Lab Results   Component Value Date/Time    PHOS 4.7 02/28/2023 01:17 AM     Ionized Calcium: No results found for: CAION     Urinalysis:     Troponin: No results for input(s): TROPONINI in the last 72 hours. Microbiology:  Cultures drawn: MRSA, urine cx, blood x2 pending    Radiology/Imaging:     Chest Xray (3/10/2023):  No acute process.     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    Tachycardia 12/06/2018    Vomiting 12/06/2018    Anxiety disorder 09/26/2017    Chronic back pain 09/26/2017    Pancreatic cyst 05/27/2017    HTN (hypertension), benign 04/02/2012    Vitamin B12 deficiency 04/02/2012    Mixed hyperlipidemia 04/01/2012         PLAN:     Neuro   # No acute issues     Respiratory   # No acute issues     Cardiovascular   # Hypotension  - Resolved with volume resuscitation  - Stress dose steroids, adjust per endocrinology  - Resume home midodrine  - Lactate now normal  - Has not required pressor  - Echo 2017: EF 27%, stage I diastolic dysfunction     Gastrointestinal   # Diet as tolerated     Renal   # No acute issues     Infectious Disease   # UTI  Previous urine cx grew Candida albicans.   - Started on ceftriaxone in the ED   - ID following, appreciate recommendations  - Follow cultures, resp panel, procal, crp    #Bilateral lower extremity leg wounds  - Wound care consulted     Hematology/Oncology   # No acute issues     Endocrine   # Adrenal insufficiency  - On steroids outpatient  - Endocrinology consulted, appreciate recommendations  - Cortef 40 BID per endocrinology  - Pending cortisol     Social/Spiritual/DNR/Other   Code: Full  DVT ppx: Lovenox    Patient stable and appropriate for transfer to floors. Justo Elizondo DO  Family Medicine Resident, PGY-1  [facility]  3/10/2023 11:24 AM        I personally saw, examined and provided care for the patient. Radiographs, labs and medication list were reviewed by me independently. Review of Residents documentation was conducted and revisions were made as appropriate. I agree with the above documented exam, problem list and plan of care.         CCT excluding procedures 33 minutes    Manda Elias DO

## 2023-03-10 NOTE — PROGRESS NOTES
Patient admitted from ER to 216, with the following belongings Pajamas, T-shirt, Sweat Pants, socks, shoes, quilted bag, cell phone, , stylus, placed on monitor, patient oriented to room and unit visiting hours. Patient guide at bedside, reviewed patient rights and responsibilities. MRSA nasal swab obtained. Bed alarm on. Call light within reach.   Patient removed from urine saturated incontinence brief and bathed on arrival.

## 2023-03-10 NOTE — CONSULTS
[address] [place] [address] [facility]    Consultation Note     Admit Date: 3/9/2023  7:13 PM    Reason for Consult:   UTI/COVID    Attending Physician:  Giuseppe Plummer DO     Chief Complaint: chills, vomiting, burning urination    HISTORY OF PRESENT ILLNESS:   The patient is a 70 y.o.  female known to the Infectious Diseases service. The patient is known to me from prior admission, pt had hx of complicated UTI with candida albicans, improved with fluconazole. She was at home and she was discharged with meza catheter from last admission. She had dysuria and was given macrobid for the past 2 days. It did not get any better. Meza was removed yesterday. She had chills and threw up. No diarrhea or abdominal pain. Since admission, pt was febrile to 101.2, tachycardic, hypotensive. Admitted to ICU. Labs showed white count of 7.1, hgb is 9.1, platelet count is 500. Blood and urine cx in process. UA does show pyuria. MRSA NS in process. RVP showed covid. CXR is clear. CT abdomen reviewed. Patient is on ceftriaxone and I got consulted for further antibiotic management.     Past Medical History:        Diagnosis Date    Anxiety     Arthritis     Asthma     Claustrophobia     Dermatitis 02/2017    bilateral legs knees to ankle; follows with [facility]    Fracture 02/2017    \"in Spine\" compression T10 per pt; difficulty laying flat    GERD (gastroesophageal reflux disease)     Hiatal hernia     History of blood transfusion     HTN (hypertension) 4/22/2013    Hyperlipidemia     On aspirin at home     for age per pcp    Pancreatic cyst 5/27/2017    Pneumonia 07/2018    Sleep apnea     SOB (shortness of breath) 4/22/2013    Tachycardia 04/22/2013    follows with Dr Casimiro Lr     Past Surgical History:        Procedure Laterality Date    BACK SURGERY  8/2014    has screws in back    CATARACT REMOVAL  12/6/2013, 2/20/2013    [facility] with lens implants    CHOLECYSTECTOMY      JOINT REPLACEMENT 12/16/2016    SI joint fusion Right    OTHER SURGICAL HISTORY  02/09/2017    MRI -     SPINAL FIXATION SURGERY WITH IMPLANT  2013    in [address] Right     UPPER GASTROINTESTINAL ENDOSCOPY  07/10/2017     Current Medications:   Scheduled Meds:   acetaminophen  650 mg Oral 3 times per day    aspirin  81 mg Oral Daily    atorvastatin  10 mg Oral Nightly    escitalopram  10 mg Oral Nightly    fluticasone  1 spray Each Nostril Daily    LORazepam  2 mg Oral TID    midodrine  5 mg Oral TID WC    montelukast  10 mg Oral Daily    pantoprazole  40 mg Oral BID AC    polyethylene glycol  17 g Oral Daily    potassium chloride  40 mEq Oral Once    hydrocortisone sodium succinate PF  50 mg IntraVENous Q8H    ferrous sulfate  325 mg Oral Daily with breakfast    budesonide-formoterol  2 puff Inhalation BID    thiamine  100 mg Oral Daily    enoxaparin  40 mg SubCUTAneous Daily    magnesium sulfate  2,000 mg IntraVENous Once     Continuous Infusions:  PRN Meds:docusate sodium, guaiFENesin-codeine, ondansetron, HYDROmorphone, albuterol sulfate HFA    Allergies:  Percocet [oxycodone-acetaminophen]    Social History:   Social History     Socioeconomic History    Marital status:    Occupational History    Occupation: retired-     Tobacco Use    Smoking status: Never    Smokeless tobacco: Never   Vaping Use    Vaping Use: Never used   Substance and Sexual Activity    Alcohol use: No    Drug use: Never    Sexual activity: Not Currently   Social History Narrative    ** Merged History Encounter **            Family History:       Problem Relation Age of Onset    Stroke Mother     Heart Disease Mother     Hypertension Mother     Other Mother         CHOLECYSTECTOMY; OSTEOPENIA    Heart Disease Father     Hypertension Father     Diabetes Father     Arthritis Father     Asthma Daughter    . Otherwise non-pertinent to the chief complaint.     REVIEW OF SYSTEMS:    As mentioned in HPI, all other systems negative. PHYSICAL EXAM:    Vitals:    /80   Pulse (!) 101   Temp 98 °F (36.7 °C) (Oral)   Resp 25   Ht 5' 4\" (1.626 m)   Wt 117 lb 4.6 oz (53.2 kg)   SpO2 100%   BMI 20.13 kg/m²   Constitutional: The patient is awake, alert, and oriented. Looks dry  Skin: Warm and dry. Small area of superficial ulceration on the coccygeal region. No jaundice. HEENT: Eyes show round, and reactive pupils. Moist mucous membranes, no ulcerations, no thrush. Neck: Supple to movements. No lymphadenopathy. Chest: clear to auscultation  Cardiovascular: S1 and S2 are rhythmic and regular. No murmurs appreciated. Abdomen: Positive bowel sounds to auscultation. Benign to palpation. No masses felt. No hepatosplenomegaly. Genitourinary: no yeast infection. No catheter  Extremities: No clubbing, no cyanosis, no edema. Musculoskeletal: chronic pinkish discoloration the bilateral legs with scratch marks.    Neurological: alert, oriented x 3  Lines: peripheral, right fem CVC      CBC+dif:  Recent Labs     03/09/23  2019 03/10/23  0812   WBC 10.7 7.1   HGB 9.7* 9.1*   HCT 32.5* 30.1*   MCV 94.5 94.4    302   NEUTROABS 8.97* 6.89     Lab Results   Component Value Date    CRP 12.8 (H) 02/21/2023    CRP 24.1 (H) 02/01/2023    CRP 7.8 (H) 09/30/2017     No results found for: CRP  Lab Results   Component Value Date    SEDRATE 61 (H) 03/10/2023    SEDRATE 26 (H) 02/21/2023    SEDRATE 42 (H) 02/01/2023     Lab Results   Component Value Date    ALT 8 03/09/2023    AST 15 03/09/2023    ALKPHOS 74 03/09/2023    BILITOT 0.3 03/09/2023     Lab Results   Component Value Date/Time     03/10/2023 08:12 AM    K 3.8 03/10/2023 08:12 AM    K 7.5 02/22/2023 09:45 AM     03/10/2023 08:12 AM    CO2 23 03/10/2023 08:12 AM    BUN 12 03/10/2023 08:12 AM    CREATININE 0.6 03/10/2023 08:12 AM    GFRAA >60 01/12/2019 04:00 AM    LABGLOM >60 03/10/2023 08:12 AM    GLUCOSE 149 03/10/2023 08:12 AM    GLUCOSE 85 04/02/2012 08:30 AM    PROT 5.9 03/09/2023 08:19 PM    LABALBU 3.1 03/09/2023 08:19 PM    LABALBU 3.6 04/02/2012 08:30 AM    CALCIUM 7.7 03/10/2023 08:12 AM    BILITOT 0.3 03/09/2023 08:19 PM    ALKPHOS 74 03/09/2023 08:19 PM    AST 15 03/09/2023 08:19 PM    ALT 8 03/09/2023 08:19 PM       Lab Results   Component Value Date/Time    PROTIME 11.8 02/13/2023 05:57 AM    PROTIME 11.7 04/01/2012 03:16 PM    INR 1.1 02/13/2023 05:57 AM       Lab Results   Component Value Date/Time    TSH 5.640 01/31/2023 08:17 PM       Lab Results   Component Value Date/Time    COLORU Yellow 03/09/2023 08:19 PM    PHUR 5.5 03/09/2023 08:19 PM    WBCUA >20 03/09/2023 08:19 PM    WBCUA 1-3 04/01/2012 03:16 PM    RBCUA 2-5 03/09/2023 08:19 PM    RBCUA NONE 04/01/2012 03:16 PM    MUCUS Present 12/26/2016 03:17 PM    BACTERIA MODERATE 03/09/2023 08:19 PM    CLARITYU SL CLOUDY 03/09/2023 08:19 PM    SPECGRAV 1.015 03/09/2023 08:19 PM    LEUKOCYTESUR LARGE 03/09/2023 08:19 PM    UROBILINOGEN 0.2 03/09/2023 08:19 PM    BILIRUBINUR Negative 03/09/2023 08:19 PM    BILIRUBINUR NEGATIVE 04/01/2012 03:16 PM    BLOODU SMALL 03/09/2023 08:19 PM    GLUCOSEU Negative 03/09/2023 08:19 PM    GLUCOSEU NEGATIVE 04/01/2012 03:16 PM    AMORPHOUS MODERATE 02/04/2012 01:45 PM       No results found for: FXB7XTE, BEART, H1JACLGA, PHART, THGBART, AGW4FDU, PO2ART, JFR4QBM  Radiology:  CT ABDOMEN PELVIS W IV CONTRAST Additional Contrast? None   Final Result   1. Mild prominence of the bladder wall. Clinical correlation for cystitis   recommended. Tiny locule of gas within the bladder may be due to infection   or recent catheterization. 2. Severe sigmoid diverticulosis without diverticulitis. 3. 12 mm cyst in the pancreatic tail, grossly stable at least as of   12/06/2018. Per the recommendations of the [employer] of Radiology,   imaging follow-up is recommended in 2 years. 4. Moderate to large hiatal hernia. 5. Small bilateral pleural effusions.          XR CHEST PORTABLE   Final Result   No acute process. Microbiology:  Pending  No results for input(s): BC in the last 72 hours. No results for input(s): ORG in the last 72 hours. No results for input(s): Phylicia Courts in the last 72 hours. No results for input(s): STREPNEUMAGU in the last 72 hours. No results for input(s): LP1UAG in the last 72 hours. No results for input(s): ASO in the last 72 hours. No results for input(s): CULTRESP in the last 72 hours. Assessment:  Sepsis/ complicated UTI: had meza at home which was removed yesterday s/p macrobid for 2 days at home  Positive COVID test: no respiratory complaints. She had 5 doses of covid vaccinations so far. Recent one 2 months ago. She had covid infection in December. Current presentation is unlikely related to COVID. Plan:    Cont IV ceftriaxone 2gr daily   Will follow cultures and adjust antibiotics. Monitor labs  Will follow with you    Thank you for having us see this patient in consultation. discussed with MICU team.    Electronically signed by Jojo Ewing MD on 3/10/2023 at 9:55 AM

## 2023-03-10 NOTE — CONSULTS
ENDOCRINOLOGY INITIAL CONSULTATION NOTE    Date of Admission: 3/9/2023  Date of Service: 3/10/2023  Admitting Physician: Hal Goldsmith DO   Primary Care Physician: Amy Mcmanus MD  Consultant physician: Kenia Marinelli MD     Reason for the consultation:  Secondary adrenal insufficiency     History of Present Illness: The history is provided by the patient. Accuracy of the patient data is excellent. Marcel Rodarte is a very pleasant 70 y.o. old female with PMH of secondary adrenal insufficiency, asthma, atopic dermatitis, HLD and other listed below admitted to [facility] on 3/9/2023 because of N/V and fever, endocrine service was consulted for secondary hypothyroidism   The patient was recently admitted to the hospital (2/20-2/28) for sepsis with hypotension/septic shock on fluconazole for Candida albicans urine. Up on arrival, temp 101.9, BP 97/57, the patient was received 3 L of fluid and steroids and brought to the ICU. CT abdomen pelvis demonstrated mild prominence of the bladder wall likely indicating cystitis with a tiny locule of gas perhaps reflecting infection as well as a stable 12 mm cyst in the pancreatic tail, and moderate to large hiatal hernia with small bilateral pleural effusions     The patient has been troubled by atopic dermatitis for many years and has been on daily steroids therapy for long time. At last admission the patient was diagnosed with secondary adrenal insufficiency.  Cosyntropin stim test 2/21/2023 confirmed adrenal insufficiency    2/21/23 04:30 2/21/23 13:11   CORTISOL 2.31 (L) 8.74       Past Medical History   Past Medical History:   Diagnosis Date    Anxiety     Arthritis     Asthma     Claustrophobia     Dermatitis 02/2017    bilateral legs knees to ankle; follows with [facility]    Fracture 02/2017    \"in Spine\" compression T10 per pt; difficulty laying flat    GERD (gastroesophageal reflux disease)     Hiatal hernia     History of blood transfusion     HTN (hypertension) 4/22/2013    Hyperlipidemia     On aspirin at home     for age per pcp    Pancreatic cyst 5/27/2017    Pneumonia 07/2018    Sleep apnea     SOB (shortness of breath) 4/22/2013    Tachycardia 04/22/2013    follows with Dr Radha Bolanos       Past Surgical History   Past Surgical History:   Procedure Laterality Date    BACK SURGERY  8/2014    has screws in back    CATARACT REMOVAL  12/6/2013, 2/20/2013    [facility] with lens implants    CHOLECYSTECTOMY      JOINT REPLACEMENT  12/16/2016    SI joint fusion Right    OTHER SURGICAL HISTORY  02/09/2017    MRI -     SPINAL FIXATION SURGERY WITH IMPLANT  2013    in [address] Right     UPPER GASTROINTESTINAL ENDOSCOPY  07/10/2017       Social history   Tobacco:   reports that she has never smoked. She has never used smokeless tobacco.  Alcohol:   reports no history of alcohol use. Drugs:   reports no history of drug use.   Family history   Family History   Problem Relation Age of Onset    Stroke Mother     Heart Disease Mother     Hypertension Mother     Other Mother         CHOLECYSTECTOMY; OSTEOPENIA    Heart Disease Father     Hypertension Father     Diabetes Father     Arthritis Father     Asthma Daughter        Allergies and Drug reactions  Allergies   Allergen Reactions    Percocet [Oxycodone-Acetaminophen]      \"Makes me go crazy\"       Scheduled Meds:   aspirin  81 mg Oral Daily    atorvastatin  10 mg Oral Nightly    escitalopram  10 mg Oral Nightly    fluticasone  1 spray Each Nostril Daily    midodrine  5 mg Oral TID WC    montelukast  10 mg Oral Daily    pantoprazole  40 mg Oral BID AC    polyethylene glycol  17 g Oral Daily    potassium chloride  40 mEq Oral Once    ferrous sulfate  325 mg Oral Daily with breakfast    budesonide-formoterol  2 puff Inhalation BID    thiamine  100 mg Oral Daily    enoxaparin  40 mg SubCUTAneous Daily    hydrocortisone  40 mg Oral BID    cefTRIAXone (ROCEPHIN) IV  2,000 mg IntraVENous Q24H PRN Meds:   docusate sodium, 100 mg, BID PRN  guaiFENesin-codeine, 5 mL, TID PRN  ondansetron, 4 mg, Q6H PRN  HYDROmorphone, 0.25 mg, Q3H PRN  albuterol sulfate HFA, 2 puff, Q6H PRN  acetaminophen, 650 mg, Q6H PRN  LORazepam, 2 mg, Q8H PRN      Continuous Infusions:      Review of Systems  All systems reviewed. All negative except for symptoms mentioned in HPI   Constitutional: No fever, no chills, no diaphoresis, no generalized weakness. HEENT: No blurred vision, No sore throat, no ear pain, no hair loss  Neck: denied any neck swelling, difficulty swallowing,   Cadrdiopulomary: No CP, SOB or palpitation, No orthopnea or PND. No cough or wheezing. GI: No N/V/D, no constipation, No abdominal pain, no melena or hematochezia   : Denied any dysuria, hematuria, flank pain, discharge, or incontinence. Skin: denied any rash, ulcer, or hyperpigmentation. MSK: denied any joint deformity, joint pain/swelling, muscle pain, or back pain. Neuro: no numbess, no tingling, no weakness    OBJECTIVE    /73   Pulse 88   Temp 98 °F (36.7 °C) (Oral)   Resp 15   Ht 5' 4\" (1.626 m)   Wt 117 lb 4.6 oz (53.2 kg)   SpO2 100%   BMI 20.13 kg/m²   Wt Readings from Last 6 Encounters:   03/10/23 117 lb 4.6 oz (53.2 kg)   02/27/23 129 lb (58.5 kg)   02/14/23 128 lb (58.1 kg)   02/07/23 128 lb (58.1 kg)   02/04/23 152 lb (68.9 kg)   01/28/23 128 lb (58.1 kg)       Physical examination:  General: awake alert, oriented x3, no abnormal position or movements. HEENT: normocephalic non traumatic   Pulm: good equal air entry no added sounds   CVS: S1 + S2   Abd: soft lax, no tenderness,   Skin: warm, no lesions, no rash.     Musculoskeletal: No back tenderness, no kyphosis/scoliosis    Neuro: CN intact, sensation notmal , muscle power normal.    Psych: normal mood, and affect    Review of Laboratory Data:  I personally reviewed the following labs:  Recent Labs     03/09/23  2019 03/10/23  0812   WBC 10.7 7.1   RBC 3.44* 3.19* HGB 9.7* 9.1*   HCT 32.5* 30.1*   MCV 94.5 94.4   MCH 28.2 28.5   MCHC 29.8* 30.2*   RDW 20.5* 20.7*    302   MPV 11.7 11.1     Recent Labs     03/09/23  2019 03/10/23  0812    139   K 3.4* 3.8    108*   CO2 23 23   BUN 14 12   CREATININE 0.7 0.6   GLUCOSE 96 149*   CALCIUM 8.5* 7.7*   PROT 5.9*  --    LABALBU 3.1*  --    BILITOT 0.3  --    ALKPHOS 74  --    AST 15  --    ALT 8  --      No results found for: BHYDRXBUT  No results found for: LABA1C  Lab Results   Component Value Date/Time    TSH 5.640 (H) 01/31/2023 08:17 PM    T4FREE 0.99 02/01/2023 09:10 AM    J6EXVBJ 10.0 01/20/2011 03:00 PM     Lab Results   Component Value Date/Time    GLUCOSE 149 03/10/2023 08:12 AM    GLUCOSE 85 04/02/2012 08:30 AM     Lab Results   Component Value Date/Time    TRIG 102 02/06/2023 10:40 AM    HDL 23 02/06/2023 10:40 AM    LDLCALC 38 02/06/2023 10:40 AM    CHOL 81 02/06/2023 10:40 AM       Blood culture   Lab Results   Component Value Date/Time    BC 5 Days no growth 02/20/2023 05:43 PM    BC 5 Days no growth 02/02/2023 03:35 PM    BC  01/31/2023 11:25 PM     This organism was isolated in one set. Susceptibility testing is not routinely done as this  organism frequently represents skin contamination. Additional testing can be ordered by calling the  Microbiology Department. BC 5 Days- no growth 12/06/2018 02:00 PM    BC 5 Days- no growth 06/30/2018 01:32 PM    BC 5 Days- no growth 09/25/2017 04:45 PM    BC 5 Days- no growth 05/27/2017 12:00 PM       Radiology:  CT ABDOMEN PELVIS W IV CONTRAST Additional Contrast? None   Final Result   1. Mild prominence of the bladder wall. Clinical correlation for cystitis   recommended. Tiny locule of gas within the bladder may be due to infection   or recent catheterization. 2. Severe sigmoid diverticulosis without diverticulitis. 3. 12 mm cyst in the pancreatic tail, grossly stable at least as of   12/06/2018.   Per the recommendations of the [employer],   imaging follow-up is recommended in 2 years. 4. Moderate to large hiatal hernia. 5. Small bilateral pleural effusions. XR CHEST PORTABLE   Final Result   No acute process. Medical Records/Labs/Images Review:   I personally reviewed and summarized previous records   All labs and imaging were reviewed independently    [address], a 70 y.o.-old female seen in for management of adrenal insufficiency    Secondary Adrenal Insufficiency   Due to chronic steroid for atopic dermatitis   We recommend the following steroids   Hydrocortisone 40 mg BID x 2 days then 20 mg BID x 3 days then 15mg AM/5mg evening after that   Patient need to be on at least maintenance dose of steroid all the time and stress dose for any stressful events. Discussed the importance of wearing medical alert identification (bracelet, necklace)    HLD   Lipitor 10 mg daily     UTI  Discussed the importance of stress dose steroids with infection  Abs per primary     Interdisciplinary plan for communication with healthcare providers:   Consult recommendations were discussed with the Primary Service/Nursing staff      The above issues were reviewed with the patient who understood and agreed with the plan. Thank you for allowing us to participate in the care of this patient. Please do not hesitate to contact us with any additional questions. Marylu Frausto MD  Endocrinologist, [facility]   [address] [facility] [address]   Phone: [phone]  Fax: [phone]  ---------------------------------  An electronic signature was used to authenticate this note.  Peter Taylor MD on 3/10/2023 at 2:47 PM

## 2023-03-10 NOTE — ED PROVIDER NOTES
70y.o. year old female presenting to the emergency department complaining of fever, nausea, vomiting, and abdominal pain. The patient is currently on Macrobid for a urinary tract infection. The patient symptoms were sudden in onset today, persistent, moderate in severity, nothing makes it better or worse. She does complain of lower abdominal pain. She was nauseated and started vomiting this morning. Patient was febrile and states that the fever also started today and she not take any thing for it prior to arrival.  Patient states that she was just discharged from the hospital 10 days ago for urinary tract infection. She states that she is still taking antibiotics from that which is the [address]. She did receive 4 mg of IV Zofran by the paramedics in route. The patient denies any back pain, hematemesis, hematochezia, melena, chest pain, shortness of breath, lightheadedness, dizziness, syncope, or other acute symptoms or concerns. Chief Complaint   Patient presents with    Fever    Emesis    Abdominal Pain       Review of Systems   Constitutional:  Positive for fatigue and fever. Negative for chills. HENT:  Negative for congestion, rhinorrhea, trouble swallowing and voice change. Eyes:  Negative for photophobia and visual disturbance. Respiratory:  Negative for cough, shortness of breath and wheezing. Cardiovascular:  Negative for chest pain and palpitations. Gastrointestinal:  Positive for abdominal pain, nausea and vomiting. Negative for diarrhea. Genitourinary:  Positive for dysuria. Negative for hematuria and urgency. Musculoskeletal:  Negative for arthralgias, back pain, neck pain and neck stiffness. Skin:  Negative for wound. Neurological:  Negative for dizziness, syncope and headaches. Psychiatric/Behavioral:  Negative for behavioral problems and confusion. The patient is nervous/anxious. Physical Exam  Vitals reviewed.    Constitutional:       General: She is not in acute distress. Appearance: Normal appearance. HENT:      Head: Normocephalic. Right Ear: External ear normal.      Left Ear: External ear normal.      Nose: Nose normal.      Mouth/Throat:      Mouth: Mucous membranes are moist.   Eyes:      General: No scleral icterus. Right eye: No discharge. Left eye: No discharge. Conjunctiva/sclera: Conjunctivae normal.   Cardiovascular:      Rate and Rhythm: Regular rhythm. Tachycardia present. Heart sounds: No friction rub. Pulmonary:      Effort: Pulmonary effort is normal. No respiratory distress. Breath sounds: No stridor. No rhonchi. Abdominal:      General: There is no distension. Palpations: Abdomen is soft. Tenderness: There is abdominal tenderness in the suprapubic area. There is no guarding or rebound. Musculoskeletal:         General: No tenderness or deformity. Cervical back: Normal range of motion. No rigidity or tenderness. Skin:     General: Skin is warm. Coloration: Skin is not jaundiced or pale. Neurological:      Mental Status: She is alert and oriented to person, place, and time. Sensory: No sensory deficit. Motor: No weakness. Psychiatric:         Mood and Affect: Mood is anxious. Behavior: Behavior normal.        Procedures     CT ABDOMEN PELVIS W IV CONTRAST Additional Contrast? None   Final Result   1. Mild prominence of the bladder wall. Clinical correlation for cystitis   recommended. Tiny locule of gas within the bladder may be due to infection   or recent catheterization. 2. Severe sigmoid diverticulosis without diverticulitis. 3. 12 mm cyst in the pancreatic tail, grossly stable at least as of   12/06/2018. Per the recommendations of the [employer] [facility] [employer],   imaging follow-up is recommended in 2 years. 4. Moderate to large hiatal hernia. 5. Small bilateral pleural effusions.          XR CHEST PORTABLE   Final Result   No acute process. MDM:  70y.o. year old female presenting to the ER with complaints of fever, abdominal pain, nausea. Patient accompanied by family. No known social determinants of health. Prior records were reviewed, the patient was just admitted for sepsis on 2- to 2- with sepsis at that time, hematuria and cystitis. Differential diagnosis includes but is not limited to UTI versus pyelonephritis versus kidney stone. Alert and oriented x4. Tenderness over suprapubic region noted. Tachycardia noted. Patient febrile given Tylenol 650. Additional 450 given. Negative for influenza negative for COVID mild hypokalemia noted. Urinalysis positive for urinary infection. Does troponin 5. Lactic negative. Elevation white count with anemia noted. Chest x-ray negative. CT demonstrating prominence of bladder wall. With other incidental findings as noted. Patient given dose of IV Rocephin, fluids, Toradol. Improvement of fever with heart rate improving. Spoke to Dr. Sharon Silva, discussed patient will plan to admit patient at this time. ED Course as of 03/10/23 0017   Thu Mar 09, 2023   2338 Consulted with Dr. Sharon Silva, hospitalist, who accepted for admission. [KG]      ED Course User Index  [KG] Tiff Logopro, [employer]        ED Course as of 03/10/23 0017   Thu Mar 09, 2023   2338 Consulted with Dr. Sharon Silva, hospitalist, who accepted for admission. [KG]      ED Course User Index  [KG] Tiff Jasmine DO       --------------------------------------------- PAST HISTORY ---------------------------------------------  Past Medical History:  has a past medical history of Anxiety, Arthritis, Asthma, Claustrophobia, Dermatitis, Fracture, GERD (gastroesophageal reflux disease), Hiatal hernia, History of blood transfusion, HTN (hypertension), Hyperlipidemia, On aspirin at home, Pancreatic cyst, Pneumonia, Sleep apnea, SOB (shortness of breath), and Tachycardia.     Past Surgical History:  has a past surgical history that includes Cholecystectomy; Cataract removal (12/6/2013, 2/20/2013); back surgery (8/2014); joint replacement (12/16/2016); Total knee arthroplasty (Right); other surgical history (02/09/2017); Spinal fixation surgery with implant (2013); and Upper gastrointestinal endoscopy (07/10/2017). Social History:  reports that she has never smoked. She has never used smokeless tobacco. She reports that she does not drink alcohol and does not use drugs. Family History: family history includes Arthritis in her father; Asthma in her daughter; Diabetes in her father; Heart Disease in her father and mother; Hypertension in her father and mother; Other in her mother; Stroke in her mother. The patients home medications have been reviewed.     Allergies: Percocet [oxycodone-acetaminophen]    -------------------------------------------------- RESULTS -------------------------------------------------    LABS:  Results for orders placed or performed during the hospital encounter of 03/09/23   COVID-19, Rapid    Specimen: Nasopharyngeal Swab   Result Value Ref Range    SARS-CoV-2, NAAT Not Detected Not Detected   Rapid influenza A/B antigens    Specimen: Nasopharyngeal   Result Value Ref Range    Influenza A by PCR Not Detected Not Detected    Influenza B by PCR Not Detected Not Detected   CBC with Auto Differential   Result Value Ref Range    WBC 10.7 4.5 - 11.5 E9/L    RBC 3.44 (L) 3.50 - 5.50 E12/L    Hemoglobin 9.7 (L) 11.5 - 15.5 g/dL    Hematocrit 32.5 (L) 34.0 - 48.0 %    MCV 94.5 80.0 - 99.9 fL    MCH 28.2 26.0 - 35.0 pg    MCHC 29.8 (L) 32.0 - 34.5 %    RDW 20.5 (H) 11.5 - 15.0 fL    Platelets 532 071 - 556 E9/L    MPV 11.7 7.0 - 12.0 fL    Neutrophils % 83.6 (H) 43.0 - 80.0 %    Immature Granulocytes % 1.0 0.0 - 5.0 %    Lymphocytes % 4.9 (L) 20.0 - 42.0 %    Monocytes % 5.7 2.0 - 12.0 %    Eosinophils % 4.6 0.0 - 6.0 %    Basophils % 0.2 0.0 - 2.0 %    Neutrophils Absolute 8.97 (H) 1.80 - 7.30 E9/L    Immature Granulocytes # 0.11 E9/L    Lymphocytes Absolute 0.52 (L) 1.50 - 4.00 E9/L    Monocytes Absolute 0.61 0.10 - 0.95 E9/L    Eosinophils Absolute 0.49 0.05 - 0.50 E9/L    Basophils Absolute 0.02 0.00 - 0.20 E9/L    Anisocytosis 2+     Polychromasia 1+     Poikilocytes 1+     Ovalocytes 1+    CMP   Result Value Ref Range    Sodium 137 132 - 146 mmol/L    Potassium 3.4 (L) 3.5 - 5.0 mmol/L    Chloride 101 98 - 107 mmol/L    CO2 23 22 - 29 mmol/L    Anion Gap 13 7 - 16 mmol/L    Glucose 96 74 - 99 mg/dL    BUN 14 6 - 23 mg/dL    Creatinine 0.7 0.5 - 1.0 mg/dL    Est, Glom Filt Rate >60 >=60 mL/min/1.73    Calcium 8.5 (L) 8.6 - 10.2 mg/dL    Total Protein 5.9 (L) 6.4 - 8.3 g/dL    Albumin 3.1 (L) 3.5 - 5.2 g/dL    Total Bilirubin 0.3 0.0 - 1.2 mg/dL    Alkaline Phosphatase 74 35 - 104 U/L    ALT 8 0 - 32 U/L    AST 15 0 - 31 U/L   Urinalysis   Result Value Ref Range    Color, UA Yellow Straw/Yellow    Clarity, UA SL CLOUDY Clear    Glucose, Ur Negative Negative mg/dL    Bilirubin Urine Negative Negative    Ketones, Urine Negative Negative mg/dL    Specific Gravity, UA 1.015 1.005 - 1.030    Blood, Urine SMALL (A) Negative    pH, UA 5.5 5.0 - 9.0    Protein, UA Negative Negative mg/dL    Urobilinogen, Urine 0.2 <2.0 E.U./dL    Nitrite, Urine POSITIVE (A) Negative    Leukocyte Esterase, Urine LARGE (A) Negative   Lactate, Sepsis   Result Value Ref Range    Lactic Acid, Sepsis 2.3 (H) 0.5 - 1.9 mmol/L   Lactate, Sepsis   Result Value Ref Range    Lactic Acid, Sepsis 1.5 0.5 - 1.9 mmol/L   Troponin   Result Value Ref Range    Troponin, High Sensitivity 38 (H) 0 - 9 ng/L   Troponin   Result Value Ref Range    Troponin, High Sensitivity 43 (H) 0 - 9 ng/L   Microscopic Urinalysis   Result Value Ref Range    WBC, UA >20 (A) 0 - 5 /HPF    RBC, UA 2-5 0 - 2 /HPF    Epithelial Cells, UA RARE /HPF    Bacteria, UA MODERATE (A) None Seen /HPF       RADIOLOGY:  CT ABDOMEN PELVIS W IV CONTRAST Additional Contrast? None   Final Result   1. Mild prominence of the bladder wall. Clinical correlation for cystitis   recommended. Tiny locule of gas within the bladder may be due to infection   or recent catheterization. 2. Severe sigmoid diverticulosis without diverticulitis. 3. 12 mm cyst in the pancreatic tail, grossly stable at least as of   12/06/2018. Per the recommendations of the [employer],   imaging follow-up is recommended in 2 years. 4. Moderate to large hiatal hernia. 5. Small bilateral pleural effusions. XR CHEST PORTABLE   Final Result   No acute process. ------------------------- NURSING NOTES AND VITALS REVIEWED ---------------------------  Date / Time Roomed:  3/9/2023  7:13 PM  ED Bed Assignment:  17/17    The nursing notes within the ED encounter and vital signs as below have been reviewed. Patient Vitals for the past 24 hrs:   BP Temp Temp src Pulse Resp SpO2 Height Weight   03/09/23 2323 -- 98.4 °F (36.9 °C) Oral (!) 115 -- -- -- --   03/09/23 2246 133/79 -- -- (!) 120 17 96 % -- --   03/09/23 2133 113/72 (!) 101.9 °F (38.8 °C) Oral (!) 128 17 95 % -- --   03/09/23 1921 (!) 156/76 (!) 101.2 °F (38.4 °C) Oral (!) 137 18 93 % 5' 4\" (1.626 m) 114 lb (51.7 kg)       Oxygen Saturation Interpretation: Normal    ------------------------------------------ PROGRESS NOTES ------------------------------------------  Re-evaluation(s):  Patients symptoms are worsening  Repeat physical examination is not changed    Counseling:  I have spoken with the patient and discussed todays results, in addition to providing specific details for the plan of care and counseling regarding the diagnosis and prognosis. Their questions are answered at this time and they are agreeable with the plan of admission.    --------------------------------- ADDITIONAL PROVIDER NOTES ---------------------------------  Consultations:  Spoke with Dr. Trevor Garber. Discussed case.   They will admit the patient.  This patient's ED course included: a personal history and physicial examination, re-evaluation prior to disposition, multiple bedside re-evaluations, IV medications, cardiac monitoring, and continuous pulse oximetry    This patient has remained hemodynamically stable during their ED course.    Diagnosis:  1. Septicemia (HCC)    2. Acute cystitis with hematuria    3. Hypokalemia    4. Lactic acidosis    5. Pancreatic cyst        Disposition:  Patient's disposition: Admit  Patient's condition is stable.    Attending was present and available throughout encounter including all critical portions; See Attending Note/Attestation for Final Plan       Natanael Booker,   Resident  03/10/23 0017       Manisha Aranda DO  03/10/23 0024    ATTENDING PROVIDER ATTESTATION:     I have personally performed and/or participated in the history, exam, medical decision making, and procedures and agree with all pertinent clinical information. I personally reviewed any EKG obtained and agree with the resident's interpretation unless stated otherwise below.    I have also reviewed and agree with the past medical, family and social history unless otherwise noted.    I have discussed this patient in detail with the resident and provided the instruction and education regarding the evidence-based evaluation and treatment of Fever, Emesis, and Abdominal Pain      The patient is a 78-year-old female presents emergency department complaining of fever, nausea, vomiting, and abdominal pain.  Patient found to be urosepsis.  Cultures were obtained.  Patient was started on antibiotics.  Reviewed prior cultures which were sensitive to cephalosporins.  Patient was not hypotensive and was initially tachycardic and febrile on arrival but did improve after treatment.  Patient was slightly hypokalemic and potassium was also repleted.  She was treated with oral Tylenol, IV fluids, IV Toradol, IV potassium, and IV Rocephin.  Discussed with  hospitalist and patient was admitted to medicine in stable condition.     Electronically signed by Claudy Mitchell DO on 3/10/23 at 12:24 AM JAEL Mitchell DO  03/10/23 0025

## 2023-03-10 NOTE — PROGRESS NOTES
[facility] Progress Note    Admitting Date and Time: 3/9/2023  7:13 PM  Admit Dx: Hypokalemia [E87.6]  Lactic acidosis [E87.20]  Pancreatic cyst [K86.2]  Acute cystitis with hematuria [N30.01]  Sepsis ([facility] [A41.9]  Sepsis, due to unspecified organism, unspecified whether acute organ dysfunction present ([facility] [address] [A41.9]    Subjective:  Patient is being followed for Hypokalemia [E87.6]  Lactic acidosis [E87.20]  Pancreatic cyst [K86.2]  Acute cystitis with hematuria [N30.01]  Sepsis ([facility] [address] [A41.9]  Sepsis, due to unspecified organism, unspecified whether acute organ dysfunction present [facility]) [A41.9]     Patient states that her dysuria has improved. ROS: denies fever, chills, cp, sob, n/v, HA unless stated above.      acetaminophen  650 mg Oral 3 times per day    aspirin  81 mg Oral Daily    atorvastatin  10 mg Oral Nightly    escitalopram  10 mg Oral Nightly    fluticasone  1 spray Each Nostril Daily    LORazepam  2 mg Oral TID    midodrine  5 mg Oral TID WC    montelukast  10 mg Oral Daily    pantoprazole  40 mg Oral BID AC    polyethylene glycol  17 g Oral Daily    potassium chloride  40 mEq Oral Once    hydrocortisone sodium succinate PF  50 mg IntraVENous Q8H    potassium chloride  40 mEq Oral Once    ferrous sulfate  325 mg Oral Daily with breakfast    budesonide-formoterol  2 puff Inhalation BID    thiamine  100 mg Oral Daily    enoxaparin  40 mg SubCUTAneous Daily     docusate sodium, 100 mg, BID PRN  guaiFENesin-codeine, 5 mL, TID PRN  ondansetron, 4 mg, Q6H PRN  HYDROmorphone, 0.25 mg, Q3H PRN  albuterol sulfate HFA, 2 puff, Q6H PRN         Objective:    /75   Pulse 91   Temp 98.4 °F (36.9 °C)   Resp 22   Ht 5' 4\" (1.626 m)   Wt 117 lb 4.6 oz (53.2 kg)   SpO2 96%   BMI 20.13 kg/m²     General Appearance: alert and oriented to person, place and time and in no acute distress  Skin: warm and dry  Head: normocephalic and atraumatic  Eyes: pupils equal, round, and reactive to light, extraocular eye movements intact, conjunctivae normal  Neck: neck supple and non tender without mass   Pulmonary/Chest: clear to auscultation bilaterally- no wheezes, rales or rhonchi, normal air movement, no respiratory distress  Cardiovascular: normal rate, normal S1 and S2 and no carotid bruits  Abdomen: soft, non-tender, non-distended, normal bowel sounds, no masses or organomegaly  Extremities: no cyanosis, no clubbing and no edema  Neurologic: no cranial nerve deficit and speech normal        Recent Labs     03/09/23 2019      K 3.4*      CO2 23   BUN 14   CREATININE 0.7   GLUCOSE 96   CALCIUM 8.5*       Recent Labs     03/09/23  2019 03/10/23  0812   WBC 10.7 7.1   RBC 3.44* 3.19*   HGB 9.7* 9.1*   HCT 32.5* 30.1*   MCV 94.5 94.4   MCH 28.2 28.5   MCHC 29.8* 30.2*   RDW 20.5* 20.7*    302   MPV 11.7 11.1         Assessment:    Principal Problem:    Sepsis (HCC)  Active Problems:    Asthma    Failure to thrive in adult    Acute cystitis without hematuria    HTN (hypertension), benign    Chronic back pain    Tachycardia  Resolved Problems:    * No resolved hospital problems. *      Plan:    Chronic adrenal insufficiency - Patient is on hydrocortisone 20 mg during the day and 10 at night. She has been started on hydrocortisone 40 mg po bid as per endocrinology. I did order AM cortisol. Acute hypoxic respiratory failure - Currently on 2 L NC  Chronic hypotension 2/2 #1 - Doubtful that patient will need pressors. She has received 3 L NS bolus. Can transfer out of ICU as per intensivist  Lactic acidosis - Resolved  Acute cystitis without hematuria - Apparently ID was consulted in the ER. She has received a dose of rocephin in the ER. She has a history of candida UTI times 1 and Cephalosporin sensitive klebsiella UTI times 2 in the past. It appears that she was taking macrobid at home. Covid 19 infection - There is no pneumonia noted on CXR. She is hypoxic. Continue solu cortef.  Will order inflammatory markers to see if she is a candidate for toci and/or sergio. ID and pulm on board. Chronic normocytic anemia - Transfuse if hgb is less than 7. Continue iron supplementation  Hypokalemia - Kdur 40 mEq PO times 1. Will check magnesium levels. DVT prophylaxis - Lovenox    PT/OT  Transition out of ICU as per intensivist.      NOTE: This report was transcribed using voice recognition software. Every effort was made to ensure accuracy; however, inadvertent computerized transcription errors may be present.     Electronically signed by Deni Calle DO on 3/10/2023 at 8:31 AM

## 2023-03-10 NOTE — ED TRIAGE NOTES
Pt arrives to ED via EMS from home with c/o lower abdominal pain and vomiting that started this AM. Pt also febrile and states this started today as well but has not taken anything for it. Pt was discharged from the hospital 10 days ago for UTI and states she is still taking the ATB for that. Pt is actively vomiting on arrival but per EMS did received 4mg Zofran IV enroute. Pt is A&OX4, skin w/d, resp even/unlabored.

## 2023-03-10 NOTE — PATIENT CARE CONFERENCE
Intensive Care Daily Quality Rounding Checklist      ICU Team Members: Dr. Cheyanne Scruggs, residents, bedside nurse, charge nurse, RT, pharmacy    ICU Day #: NUMBER: 1    Intubation Date:      Ventilator Day #:     Central Line Insertion Date: March 10        Day #: NUMBER: 1        Indication: CVCIndication: Hemodynamically unstable requiring volume resuscitation     Arterial Line Insertion Date:        Day #:     Temporary Hemodialysis Catheter Insertion Date:        Day #     DVT Prophylaxis:    GI Prophylaxis:    Brewer Catheter Insertion Date:  None       Day #:       Indications:       Continued need (if yes, reason documented and discussed with physician):     Skin Issues/ Wounds and ordered treatment discussed on rounds: Yes    Goals/ Plans for the Day: Daily labs and replace/transfuse as needed. Antibiotics per ID. Remove CVC. Transfer to IM.

## 2023-03-10 NOTE — H&P
[facility] History and Physical        Chief Complaint:  fevers, lower abd pain  History of Present Illness   The patient is a 70 y.o. female    She reports ongoing dysuria and some lower moderate severe abd pain-- hx of UTI in recent past, supposedlly on macrobid, was feverish earlier today with N/V this am - now imrpoved in ER. Reportedly fevers/chills over past 1-2 days  I don't know about macrobid, except per ER report  But I do see she was [facility] from hospital 10 days ago on diflucan. Also per DC   And bc of suspected adrenal insufficiency she was started on hydrocortizone 20 qam +10mg Qpm  +midrodien for onging low BP - she was to stop fourosemide and diltiazam on DC  +PPI started- pepcid stopped    In ER urrosepsis suspected:  negative LA, +pyuria >20 wbc  Tenderness over suprapubic region noted. Tachycardia noted +inc WBC  No stone on CT but  CT demonstrating prominence of bladder wall  \"UTI versus pyelonephritis\"  Sp given dose of IV Rocephin, fluids, Toradol. Improvement of fever with heart rate improving    Noted last admission she didn't have UTI - she had vaginitis candida +pyuria  Candida albicans Abnormal      Urine Culture, Routine >100,000 CFU/ml    Flu and covid negative, will check respiratory panel  Urine cx- UTI vs candida  No diarrhea- annabel if changes  Blood cx- recent admission- lines - rule out bacteremia  Consutl ID    Last admission and DC 10 days ago:  \"Patient was admitted and managed for Sepsis with Hypotension/Septic Shock- managed in ICU on pressors- Resolved - On oral Fluconazole, being discharged on po steroids, midodrine, held antihypertensives. Chronic cough - may have silent reflux due to [facility]. DC Pepcid. Protonix 40 mg IV q 12 hr changed to po. Speech Therapy consulted. MBS ordered. No silent aspiration noted. No straws.  Physical Deconditioning - Ampac score 11/24,  OP PT/ HHC\"    - hx taken from the   REVIEW OF SYSTEMS:  no fevers, chills, cp, sob, n/v, ha, vision/hearing changes, wt changes, hot/cold flashes, other open skin lesions, diarrhea, constipation, dysuria/hematuria unless noted in HPI. Complete ROS performed with the patient and is otherwise negative. Past Medical History:      Diagnosis Date    Anxiety     Arthritis     Asthma     Claustrophobia     Dermatitis 02/2017    bilateral legs knees to ankle; follows with [facility]    Fracture 02/2017    \"in Spine\" compression T10 per pt; difficulty laying flat    GERD (gastroesophageal reflux disease)     Hiatal hernia     History of blood transfusion     HTN (hypertension) 4/22/2013    Hyperlipidemia     On aspirin at home     for age per pcp    Pancreatic cyst 5/27/2017    Pneumonia 07/2018    Sleep apnea     SOB (shortness of breath) 4/22/2013    Tachycardia 04/22/2013    follows with Dr Capri Cooper       Past Surgical History:        Procedure Laterality Date    BACK SURGERY  8/2014    has screws in back    CATARACT REMOVAL  12/6/2013, 2/20/2013    [facility] with lens implants    CHOLECYSTECTOMY      JOINT REPLACEMENT  12/16/2016    SI joint fusion Right    OTHER SURGICAL HISTORY  02/09/2017    MRI -     SPINAL FIXATION SURGERY WITH IMPLANT  2013    in [address] Right     UPPER GASTROINTESTINAL ENDOSCOPY  07/10/2017       Home Medications:  Prior to Admission medications    Medication Sig Start Date End Date Taking?  Authorizing Provider   hydrocortisone (CORTEF) 20 MG tablet Take 1 tablet by mouth every morning 3/1/23 3/31/23  Marilee Anthony MD   hydrocortisone (CORTEF) 10 MG tablet Take 1 tablet by mouth at bedtime 2/28/23 3/30/23  Marilee Anthony MD   polyethylene glycol (GLYCOLAX) 17 g packet Take 17 g by mouth daily 2/28/23 3/31/23  Marilee Anthony MD   midodrine (PROAMATINE) 5 MG tablet Take 1 tablet by mouth 3 times daily (with meals) 2/28/23   Marilee Anthony MD   pantoprazole (PROTONIX) 40 MG tablet Take 1 tablet by mouth 2 times daily (before meals) 2/28/23   Tameka Ferguson MD   thiamine 100 MG tablet Take 1 tablet by mouth daily 3/1/23   Tameka Ferguson MD   guaiFENesin-codeine (TUSSI-ORGANIDIN NR) 100-10 MG/5ML syrup Take 5 mLs by mouth 3 times daily as needed for Cough. Historical Provider, MD   acetaminophen (TYLENOL 8 HOUR) 650 MG extended release tablet Take 650 mg by mouth as needed for Pain    Historical Provider, MD   white petrolatum OINT ointment Apply topically 2 times daily 2/3/23   JOSEFA Hernández   camphor-menthol [facility] 0.5-0.5 % lotion Apply topically as needed. 2/4/23   JOSEFA Hernández   docusate sodium (COLACE, DULCOLAX) 100 MG CAPS Take 100 mg by mouth 2 times daily as needed for Constipation 2/3/23   JOSEFA Hernández   fluticasone (FLONASE) 50 MCG/ACT nasal spray USE 1 SPRAY IN EACH NOSTRIL DAILY 6/27/22   STEVE Saldivar CNP   Fluticasone furoate-vilanterol (BREO ELLIPTA) 200-25 MCG/INH AEPB inhaler Inhale 1 puff into the lungs daily 1/10/22   STEVE Saldivar CNP   montelukast (SINGULAIR) 10 MG tablet TAKE 1 TABLET DAILY 1/10/22   STEVE Saldivar CNP   albuterol sulfate HFA (VENTOLIN HFA) 108 (90 Base) MCG/ACT inhaler Inhale 2 puffs into the lungs every 6 hours as needed for Wheezing 1/10/22   STEVE Laguna CNP   LORazepam (ATIVAN) 2 MG tablet Take 2 mg by mouth 3 times daily. Aaron Rivera Historical Provider, MD   aspirin 81 MG tablet Take 81 mg by mouth daily Pt to hold 5 days prior per Dr. Deangelo Bland Provider, MD   escitalopram (LEXAPRO) 10 MG tablet Take 10 mg by mouth nightly  11/26/13   Historical Provider, MD   atorvastatin (LIPITOR) 10 MG tablet Take 10 mg by mouth nightly     Historical Provider, MD       Allergies:  Percocet [oxycodone-acetaminophen]    Social History:   TOBACCO:   reports that she has never smoked. She has never used smokeless tobacco.  ETOH:   reports no history of alcohol use.     Family History:       Problem Relation Age of Onset    Stroke Mother Heart Disease Mother     Hypertension Mother     Other Mother         CHOLECYSTECTOMY; OSTEOPENIA    Heart Disease Father     Hypertension Father     Diabetes Father     Arthritis Father     Asthma Daughter       Or deferred/otherwise considered non contributory to current admission  PHYSICAL EXAM:    VS: /79   Pulse (!) 115   Temp 98.4 °F (36.9 °C) (Oral)   Resp 17   Ht 5' 4\" (1.626 m)   Wt 114 lb (51.7 kg)   SpO2 96%   BMI 19.57 kg/m²     General Appearance:     no acute distress. Psych:  HEENT:    A.O. As per HPI details  NC/AT, PERRL, no pallor no icterus, lips/ext mucous membrane grossly dry        Resp:     Dec  bs No wheezes, No rhonchi   Chest wall:    No tenderness or deformity   Heart:    Regular rate and rhythm, S1 and S2 normal, no rub or gallop. Abdomen:     Soft, slight suprapubic +tender, bowel sounds active    no suspicious obvious masses/organomegaly   Genitalia & Rectal:    Deferred.    Extremities x4:   Extremities  atraumatic, no cyanosis, no clubbing   Musculoskeletal:      NO active synovitis or swollen b/l wrists, 2-5 MCPs examined   Skin:   Skin color, texture, turgor fdry       Neurologic:  .Grossly symmetric  strength in UEs and LEs with symmetric grossly intact to light touch sensation     LABS:  CBC:   Lab Results   Component Value Date/Time    WBC 10.7 03/09/2023 08:19 PM    RBC 3.44 03/09/2023 08:19 PM    HGB 9.7 03/09/2023 08:19 PM    HCT 32.5 03/09/2023 08:19 PM     03/09/2023 08:19 PM    MCV 94.5 03/09/2023 08:19 PM     BMP:    Lab Results   Component Value Date/Time     03/09/2023 08:19 PM    K 3.4 03/09/2023 08:19 PM    K 7.5 02/22/2023 09:45 AM     03/09/2023 08:19 PM    CO2 23 03/09/2023 08:19 PM    BUN 14 03/09/2023 08:19 PM    CREATININE 0.7 03/09/2023 08:19 PM    GLUCOSE 96 03/09/2023 08:19 PM    GLUCOSE 85 04/02/2012 08:30 AM    CALCIUM 8.5 03/09/2023 08:19 PM     Hepatic Function Panel:    Lab Results   Component Value Date/Time ALKPHOS 74 03/09/2023 08:19 PM    AST 15 03/09/2023 08:19 PM    ALT 8 03/09/2023 08:19 PM    PROT 5.9 03/09/2023 08:19 PM    LABALBU 3.1 03/09/2023 08:19 PM    LABALBU 3.6 04/02/2012 08:30 AM    BILITOT 0.3 03/09/2023 08:19 PM     Magnesium:    Lab Results   Component Value Date/Time    MG 1.9 02/28/2023 01:17 AM       PT/INR:    Lab Results   Component Value Date/Time    PROTIME 11.8 02/13/2023 05:57 AM    PROTIME 11.7 04/01/2012 03:16 PM    INR 1.1 02/13/2023 05:57 AM     U/A:   Lab Results   Component Value Date/Time    LEUKOCYTESUR LARGE 03/09/2023 08:19 PM    PHUR 5.5 03/09/2023 08:19 PM    WBCUA >20 03/09/2023 08:19 PM    WBCUA 1-3 04/01/2012 03:16 PM    RBCUA 2-5 03/09/2023 08:19 PM    RBCUA NONE 04/01/2012 03:16 PM    BACTERIA MODERATE 03/09/2023 08:19 PM    SPECGRAV 1.015 03/09/2023 08:19 PM    BLOODU SMALL 03/09/2023 08:19 PM    GLUCOSEU Negative 03/09/2023 08:19 PM    GLUCOSEU NEGATIVE 04/01/2012 03:16 PM     ABG:  No results found for: PHART, ODJ6BSO, PO2ART, M2AUTPQA, EQL5JYI, BEART  TSH:    Lab Results   Component Value Date/Time    TSH 5.640 01/31/2023 08:17 PM     Cardiac Enzymes:   Lab Results   Component Value Date    CKTOTAL 35 04/01/2012    CKTOTAL 52 02/04/2012    CKTOTAL 37 01/20/2011    CKMB <0.2 04/01/2012    CKMB <0.2 02/04/2012    CKMB <0.2 01/20/2011    TROPONINI <0.01 12/11/2018    TROPONINI <0.01 12/06/2018    TROPONINI <0.01 06/30/2018       Radiology: CT ABDOMEN PELVIS W IV CONTRAST Additional Contrast? None    Result Date: 3/9/2023  EXAMINATION: CT OF THE ABDOMEN AND PELVIS WITH CONTRAST 3/9/2023 10:35 pm TECHNIQUE: CT of the abdomen and pelvis was performed with the administration of intravenous contrast. Multiplanar reformatted images are provided for review. Automated exposure control, iterative reconstruction, and/or weight based adjustment of the mA/kV was utilized to reduce the radiation dose to as low as reasonably achievable. COMPARISON: CT abdomen and pelvis without contrast,  02/20/2023. HISTORY: ORDERING SYSTEM PROVIDED HISTORY: abdominal pain, dysuria TECHNOLOGIST PROVIDED HISTORY: Additional Contrast?->None Reason for exam:->abdominal pain, dysuria Decision Support Exception - unselect if not a suspected or confirmed emergency medical condition->Emergency Medical Condition (MA) FINDINGS: Lower Chest: There is a moderate to large hiatal hernia. Small bilateral pleural effusions with overlying compressive atelectasis. The heart is normal in size. Prominent calcification along the mitral valve. Organs: Liver: Unremarkable. Gallbladder: Surgically absent Pancreas: 12 mm cyst in the pancreatic tail, which is grossly stable at least as of 12/06/2018. No ductal dilatation. Spleen:  Unremarkable. Adrenals: Unremarkable. Kidneys: Unremarkable. GI/Bowel: Severe sigmoid diverticulosis without diverticulitis. No bowel wall thickening or obstruction. Pelvis: Mild prominence of the bladder wall. Clinical correlation for cystitis recommended. Small locule of gas within the bladder may be due to recent instrumentation or infection. Arcuate artery calcifications are seen in the uterus which is otherwise grossly unremarkable. Small right inguinal hernia containing fat and small amount of fluid. Peritoneum/Retroperitoneum: Mild calcified atherosclerosis is seen in the aorta. No aneurysm. No lymphadenopathy. No free air or free fluid is seen. Bones/Soft Tissues: Status post decompressive laminectomies with posterior interbody fusion from L3-S1. Chronic compression fracture deformity in the L1 vertebral body. The bones are demineralized. 1. Mild prominence of the bladder wall. Clinical correlation for cystitis recommended. Tiny locule of gas within the bladder may be due to infection or recent catheterization. 2. Severe sigmoid diverticulosis without diverticulitis. 3. 12 mm cyst in the pancreatic tail, grossly stable at least as of 12/06/2018.   Per the recommendations of the American College of Radiology, imaging follow-up is recommended in 2 years. 4. Moderate to large hiatal hernia. 5. Small bilateral pleural effusions. XR CHEST PORTABLE    Result Date: 3/9/2023  EXAMINATION: ONE XRAY VIEW OF THE CHEST 3/9/2023 8:12 pm COMPARISON: None. HISTORY: ORDERING SYSTEM PROVIDED HISTORY: fever, emesis TECHNOLOGIST PROVIDED HISTORY: Reason for exam:->fever, emesis FINDINGS: The lungs are without acute focal process. There is no effusion or pneumothorax. The cardiomediastinal silhouette is without acute process. The osseous structures are without acute process. No acute process. Assessment & Plan   ACTIVE hospital problems being addressed/reassessed for this admission:  Principal Problem:    Sepsis ([address]  Active Problems:    Asthma    Failure to thrive in adult    Acute cystitis without hematuria    HTN (hypertension), benign    Chronic back pain    Tachycardia  Resolved Problems:    * No resolved hospital problems. *    Code status/DVT prophylaxis and PLAN --see orders   Note extensive time spent coordinating care between ER docs, ER and floor nurses, and transitioning care over to day providers  Plan of care/ clinical impressions/communication specifics detailed below:    70 y.o. female    She reports ongoing dysuria and some lower moderate severe abd pain-- hx of UTI in recent past, supposedlly on macrobid, was feverish earlier today with N/V this am - now imrpoved in ER. Reportedly fevers/chills over past 1-2 days  I don't know about macrobid, except per ER report  But I do see she was [facility] from hospital 10 days ago on diflucan. Also per DC   And bc of suspected adrenal insufficiency she was started on hydrocortizone 20 qam +10mg Qpm  +midrodien for onging low BP - she was to stop fourosemide and diltiazam on DC  +PPI started- pepcid stopped    In ER urrosepsis suspected:  negative LA, +pyuria >20 wbc  Tenderness over suprapubic region noted.   Tachycardia noted +inc WBC  No stone on CT but  CT demonstrating prominence of bladder wall  \"UTI versus pyelonephritis\"  Sp given dose of IV Rocephin, fluids, Toradol. Improvement of fever with heart rate improving  CT  Tiny locule of gas within the bladder may be due to infection   or recent catheterization     Noted last admission she didn't have UTI - she had vaginitis candida +pyuria  Candida albicans Abnormal      Urine Culture, Routine >100,000 CFU/ml    Flu and covid negative, will check respiratory panel  Urine cx- UTI vs candida  Cxr neg-- I doubt pneumonia - no hypoxia or cough  No diarrhea- annabel if changes  Blood cx- recent admission- lines - rule out bacteremia  Consutl ID    ?adrenal insufficiency- chrfonic low BP -- in /77  Bc of septic shock risk, giving IV fluids and one stress dose steroids toingiht  Check procalcitonin, crp  (noted LA normal)  Zofran for nausea etc..  Chronci pancreatic cyst noted:  12 mm cyst in the pancreatic tail, grossly stable at least as of   12/06/2018       Dm2   +gerd, hiatal hernia known  Lwo K+ will replete +check mag    Hgb 9.7 - hgiher than  baseline 7.6-- possible dehydrated and hemoconcentrated on admission  Likely ACD- ferrtin 86 2 months ago, with very low TIBC  Trop 38 up to 43- not highly suspicoius    ADDENDUM:  noted that bc of recurrent low BP in ER- patient was upgraded to ICU- concerns of septic shock. Last admission and DC 10 days ago:  \"Patient was admitted and managed for Sepsis with Hypotension/Septic Shock- managed in ICU on pressors- Resolved - On oral Fluconazole, being discharged on po steroids, midodrine, held antihypertensives. Chronic cough - [facility] have silent reflux [facility]. DC Pepcid. Protonix 40 mg IV q 12 hr changed to po. Speech Therapy consulted. MBS ordered. No silent aspiration noted. No straws.  Physical Deconditioning - Ampac score 11/24,  OP PT/ HHC\"    Paige Stock MD   Night Officer, overnight admitting doctor at [facility] ([place], [place]) call day time doctor   for questions after 7:30am    Covering for [address] Service  If Qs please call [phone]  Electronically signed by Kaden Moss MD on 3/9/2023 at 11:46 PM

## 2023-03-11 PROBLEM — E27.49 SECONDARY ADRENAL INSUFFICIENCY (HCC): Status: ACTIVE | Noted: 2023-03-11

## 2023-03-11 LAB — MRSA CULTURE ONLY: NORMAL

## 2023-03-11 PROCEDURE — 2060000000 HC ICU INTERMEDIATE R&B

## 2023-03-11 PROCEDURE — 94640 AIRWAY INHALATION TREATMENT: CPT

## 2023-03-11 PROCEDURE — 2580000003 HC RX 258: Performed by: STUDENT IN AN ORGANIZED HEALTH CARE EDUCATION/TRAINING PROGRAM

## 2023-03-11 PROCEDURE — 6360000002 HC RX W HCPCS: Performed by: INTERNAL MEDICINE

## 2023-03-11 PROCEDURE — 6370000000 HC RX 637 (ALT 250 FOR IP): Performed by: INTERNAL MEDICINE

## 2023-03-11 PROCEDURE — 99232 SBSQ HOSP IP/OBS MODERATE 35: CPT | Performed by: INTERNAL MEDICINE

## 2023-03-11 PROCEDURE — 6360000002 HC RX W HCPCS: Performed by: STUDENT IN AN ORGANIZED HEALTH CARE EDUCATION/TRAINING PROGRAM

## 2023-03-11 PROCEDURE — 99233 SBSQ HOSP IP/OBS HIGH 50: CPT | Performed by: INTERNAL MEDICINE

## 2023-03-11 RX ORDER — HYDROCORTISONE 5 MG/1
5 TABLET ORAL EVERY EVENING
Status: DISCONTINUED | OUTPATIENT
Start: 2023-03-15 | End: 2023-03-15 | Stop reason: HOSPADM

## 2023-03-11 RX ORDER — HYDROCORTISONE 5 MG/1
15 TABLET ORAL EVERY MORNING
Status: DISCONTINUED | OUTPATIENT
Start: 2023-03-15 | End: 2023-03-15 | Stop reason: HOSPADM

## 2023-03-11 RX ORDER — HYDROCORTISONE 20 MG/1
40 TABLET ORAL 2 TIMES DAILY
Status: COMPLETED | OUTPATIENT
Start: 2023-03-11 | End: 2023-03-11

## 2023-03-11 RX ORDER — HYDROCORTISONE 20 MG/1
20 TABLET ORAL 2 TIMES DAILY
Status: COMPLETED | OUTPATIENT
Start: 2023-03-12 | End: 2023-03-14

## 2023-03-11 RX ADMIN — LORAZEPAM 2 MG: 1 TABLET ORAL at 07:50

## 2023-03-11 RX ADMIN — HYDROCORTISONE 40 MG: 20 TABLET ORAL at 21:15

## 2023-03-11 RX ADMIN — Medication 100 MG: at 07:50

## 2023-03-11 RX ADMIN — ENOXAPARIN SODIUM 40 MG: 100 INJECTION SUBCUTANEOUS at 07:50

## 2023-03-11 RX ADMIN — MONTELUKAST SODIUM 10 MG: 10 TABLET ORAL at 07:50

## 2023-03-11 RX ADMIN — PANTOPRAZOLE SODIUM 40 MG: 40 TABLET, DELAYED RELEASE ORAL at 06:41

## 2023-03-11 RX ADMIN — LORAZEPAM 2 MG: 1 TABLET ORAL at 21:10

## 2023-03-11 RX ADMIN — BUDESONIDE AND FORMOTEROL FUMARATE DIHYDRATE 2 PUFF: 160; 4.5 AEROSOL RESPIRATORY (INHALATION) at 20:11

## 2023-03-11 RX ADMIN — WATER 2000 MG: 1 INJECTION INTRAMUSCULAR; INTRAVENOUS; SUBCUTANEOUS at 21:10

## 2023-03-11 RX ADMIN — BUDESONIDE AND FORMOTEROL FUMARATE DIHYDRATE 2 PUFF: 160; 4.5 AEROSOL RESPIRATORY (INHALATION) at 10:18

## 2023-03-11 RX ADMIN — MIDODRINE HYDROCHLORIDE 5 MG: 5 TABLET ORAL at 07:50

## 2023-03-11 RX ADMIN — FLUTICASONE PROPIONATE 1 SPRAY: 50 SPRAY, METERED NASAL at 07:57

## 2023-03-11 RX ADMIN — FERROUS SULFATE TAB 325 MG (65 MG ELEMENTAL FE) 325 MG: 325 (65 FE) TAB at 07:50

## 2023-03-11 RX ADMIN — ATORVASTATIN CALCIUM 10 MG: 10 TABLET, FILM COATED ORAL at 21:11

## 2023-03-11 RX ADMIN — POLYETHYLENE GLYCOL 3350 17 G: 17 POWDER, FOR SOLUTION ORAL at 07:50

## 2023-03-11 RX ADMIN — ASPIRIN 81 MG CHEWABLE TABLET 81 MG: 81 TABLET CHEWABLE at 07:50

## 2023-03-11 RX ADMIN — PANTOPRAZOLE SODIUM 40 MG: 40 TABLET, DELAYED RELEASE ORAL at 16:05

## 2023-03-11 RX ADMIN — ESCITALOPRAM 10 MG: 10 TABLET, FILM COATED ORAL at 21:11

## 2023-03-11 RX ADMIN — HYDROCORTISONE 40 MG: 20 TABLET ORAL at 07:50

## 2023-03-11 ASSESSMENT — PAIN SCALES - GENERAL
PAINLEVEL_OUTOF10: 0

## 2023-03-11 NOTE — PROGRESS NOTES
[facility] Progress Note    Admitting Date and Time: 3/9/2023  7:13 PM  Admit Dx: Hypokalemia [E87.6]  Lactic acidosis [E87.20]  Pancreatic cyst [K86.2]  Acute cystitis with hematuria [N30.01]  Sepsis ([facility] [address] [A41.9]  Sepsis, due to unspecified organism, unspecified whether acute organ dysfunction present ([address] [A41.9]    Subjective:  Patient is being followed for Hypokalemia [E87.6]  Lactic acidosis [E87.20]  Pancreatic cyst [K86.2]  Acute cystitis with hematuria [N30.01]  Sepsis ([facility] [address] [A41.9]  Sepsis, due to unspecified organism, unspecified whether acute organ dysfunction present ([facility] [A41.9]     No complaints at this time aside from itchy legs which she has had for years    ROS: denies fever, chills, cp, sob, n/v, HA unless stated above.       aspirin  81 mg Oral Daily    atorvastatin  10 mg Oral Nightly    escitalopram  10 mg Oral Nightly    fluticasone  1 spray Each Nostril Daily    midodrine  5 mg Oral TID WC    montelukast  10 mg Oral Daily    pantoprazole  40 mg Oral BID AC    polyethylene glycol  17 g Oral Daily    potassium chloride  40 mEq Oral Once    ferrous sulfate  325 mg Oral Daily with breakfast    budesonide-formoterol  2 puff Inhalation BID    thiamine  100 mg Oral Daily    enoxaparin  40 mg SubCUTAneous Daily    hydrocortisone  40 mg Oral BID    cefTRIAXone (ROCEPHIN) IV  2,000 mg IntraVENous Q24H     docusate sodium, 100 mg, BID PRN  guaiFENesin-codeine, 5 mL, TID PRN  ondansetron, 4 mg, Q6H PRN  HYDROmorphone, 0.25 mg, Q3H PRN  albuterol sulfate HFA, 2 puff, Q6H PRN  acetaminophen, 650 mg, Q6H PRN  LORazepam, 2 mg, Q8H PRN         Objective:    /73   Pulse 100   Temp 98.4 °F (36.9 °C) (Oral)   Resp 13   Ht 5' 4\" (1.626 m)   Wt 117 lb 4.6 oz (53.2 kg)   SpO2 97%   BMI 20.13 kg/m²     General Appearance: alert and oriented to person, place and time and in no acute distress  Skin: warm and dry  Head: normocephalic and atraumatic  Eyes: pupils equal, round, and reactive to light, extraocular eye movements intact, conjunctivae normal  Neck: neck supple and non tender without mass   Pulmonary/Chest: clear to auscultation bilaterally- no wheezes, rales or rhonchi, normal air movement, no respiratory distress  Cardiovascular: normal rate, normal S1 and S2 and no carotid bruits  Abdomen: soft, non-tender, non-distended, normal bowel sounds, no masses or organomegaly  Extremities: no cyanosis, no clubbing and no edema  Neurologic: no cranial nerve deficit and speech normal        Recent Labs     03/09/23  2019 03/10/23  0812    139   K 3.4* 3.8    108*   CO2 23 23   BUN 14 12   CREATININE 0.7 0.6   GLUCOSE 96 149*   CALCIUM 8.5* 7.7*       Recent Labs     03/09/23  2019 03/10/23  0812   WBC 10.7 7.1   RBC 3.44* 3.19*   HGB 9.7* 9.1*   HCT 32.5* 30.1*   MCV 94.5 94.4   MCH 28.2 28.5   MCHC 29.8* 30.2*   RDW 20.5* 20.7*    302   MPV 11.7 11.1       Assessment:    Principal Problem:    Sepsis (HCC)  Active Problems:    Asthma    Failure to thrive in adult    Acute cystitis without hematuria    HTN (hypertension), benign    Chronic back pain    Tachycardia  Resolved Problems:    * No resolved hospital problems. 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 Continue hydrocrotisone taper outlined by endocrinology. Acute hypoxic respiratory failure - Currently on 2 L NC  Chronic hypotension 2/2 #1 - Blood pressure improved  Acute cystitis without hematuria - Rocephin 2 g daily as per ID  Covid 19 positive - Will defer treatment to ID and pulmonology  Chronic normocytic anemia - Transfuse if hgb is less than 7. Continue iron supplementation  Hypokalemia - Resolved  DVT prophylaxis - Lovenox     PT/OT        NOTE: This report was transcribed using voice recognition software.  Every effort was made to ensure accuracy; however, inadvertent computerized transcription errors may be present.     Electronically signed by Jimmie Guerra DO on 3/11/2023 at 8:10 AM

## 2023-03-11 NOTE — PROGRESS NOTES
[address] [place] [address] [facility]  Progress Note    SUBJECTIVE:  Chief Complaint   Patient presents with    Fever    Emesis    Abdominal Pain     Patient is tolerating medications. No reported adverse drug reactions. No nausea, vomiting, diarrhea. Review of systems:  As stated above in the chief complaint, otherwise negative. Medications:  Scheduled Meds:   hydrocortisone  40 mg Oral BID    [START ON 3/12/2023] hydrocortisone  20 mg Oral BID    [START ON 3/15/2023] hydrocortisone  15 mg Oral QAM    [START ON 3/15/2023] hydrocortisone  5 mg Oral QPM    aspirin  81 mg Oral Daily    atorvastatin  10 mg Oral Nightly    escitalopram  10 mg Oral Nightly    fluticasone  1 spray Each Nostril Daily    [Held by provider] midodrine  5 mg Oral TID WC    montelukast  10 mg Oral Daily    pantoprazole  40 mg Oral BID AC    polyethylene glycol  17 g Oral Daily    potassium chloride  40 mEq Oral Once    ferrous sulfate  325 mg Oral Daily with breakfast    budesonide-formoterol  2 puff Inhalation BID    thiamine  100 mg Oral Daily    enoxaparin  40 mg SubCUTAneous Daily    cefTRIAXone (ROCEPHIN) IV  2,000 mg IntraVENous Q24H     Continuous Infusions:  PRN Meds:docusate sodium, guaiFENesin-codeine, ondansetron, HYDROmorphone, albuterol sulfate HFA, acetaminophen, LORazepam    OBJECTIVE:  BP (!) 154/72   Pulse 75   Temp 97.6 °F (36.4 °C) (Oral)   Resp 23   Ht 5' 4\" (1.626 m)   Wt 117 lb 4.6 oz (53.2 kg)   SpO2 98%   BMI 20.13 kg/m²   Temp  Av °F (36.7 °C)  Min: 97.6 °F (36.4 °C)  Max: 98.4 °F (36.9 °C)  Constitutional: The patient is awake, alert, and oriented. Skin: Warm and dry. No rashes were noted. HEENT: Round and reactive pupils. Moist mucous membranes. No ulcerations or thrush. Neck: Supple to movements. Chest: No use of accessory muscles to breathe. Symmetrical expansion. No wheezing, crackles or rhonchi. Cardiovascular: S1 and S2 are rhythmic and regular. No murmurs appreciated. Abdomen: Positive bowel sounds to auscultation. Benign to palpation. No masses felt. No hepatosplenomegaly. Extremities: No clubbing, no cyanosis, no edema.   Lines: Peripheral.    Laboratory and Tests Review:  Lab Results   Component Value Date    WBC 7.1 03/10/2023    WBC 10.7 03/09/2023    WBC 7.4 02/28/2023    HGB 9.1 (L) 03/10/2023    HCT 30.1 (L) 03/10/2023    MCV 94.4 03/10/2023     03/10/2023     Lab Results   Component Value Date    NEUTROABS 6.89 03/10/2023    NEUTROABS 8.97 (H) 03/09/2023    NEUTROABS 4.76 02/28/2023     No results found for: [facility]  Lab Results   Component Value Date    ALT 8 03/09/2023    AST 15 03/09/2023    ALKPHOS 74 03/09/2023    BILITOT 0.3 03/09/2023     Lab Results   Component Value Date/Time     03/10/2023 08:12 AM    K 3.8 03/10/2023 08:12 AM    K 7.5 02/22/2023 09:45 AM     03/10/2023 08:12 AM    CO2 23 03/10/2023 08:12 AM    BUN 12 03/10/2023 08:12 AM    CREATININE 0.6 03/10/2023 08:12 AM    CREATININE 0.7 03/09/2023 08:19 PM    CREATININE 0.9 02/28/2023 01:17 AM    GFRAA >60 01/12/2019 04:00 AM    LABGLOM >60 03/10/2023 08:12 AM    GLUCOSE 149 03/10/2023 08:12 AM    GLUCOSE 85 04/02/2012 08:30 AM    PROT 5.9 03/09/2023 08:19 PM    LABALBU 3.1 03/09/2023 08:19 PM    LABALBU 3.6 04/02/2012 08:30 AM    CALCIUM 7.7 03/10/2023 08:12 AM    BILITOT 0.3 03/09/2023 08:19 PM    ALKPHOS 74 03/09/2023 08:19 PM    AST 15 03/09/2023 08:19 PM    ALT 8 03/09/2023 08:19 PM     Lab Results   Component Value Date    CRP 13.8 (H) 03/10/2023    CRP 12.8 (H) 02/21/2023    CRP 24.1 (H) 02/01/2023     Lab Results   Component Value Date    SEDRATE 61 (H) 03/10/2023    SEDRATE 26 (H) 02/21/2023    SEDRATE 42 (H) 02/01/2023     Radiology:      Microbiology:   Respiratory panel 3/9/2023: SARS-CoV-2  Blood cultures 3/9/2023: Negative so far  Urine screen MRSA: Negative  Rapid influenza: Negative  Rapid SARS-CoV-2: Negative  Urine culture 3/9/2023: Pending    ASSESSMENT:  Complicated UTI with sepsis-improving  Fever associated to the above  SARS-CoV-2 infection in January 2023. A rapid SARS-CoV-2 here was negative. Respiratory panel, which is more sensitive, he is positive. I doubt patient has active or ongoing SARS-CoV-2 infection    PLAN:  Continue Ceftriaxone  Check final cultures  Transfer out of ICU  Discontinue droplet plus isolation    Spoke with nursing. Discussed with Dr. Rosey Conner.     Yobany Araya MD  11:40 AM  3/11/2023

## 2023-03-11 NOTE — PROGRESS NOTES
ENDOCRINOLOGY PROGRESS NOTE    Date of Admission: 3/9/2023  Date of Service: 3/11/2023  Admitting Physician: Carol Sparks DO   Primary Care Physician: Danika Caldera MD  Consultant physician: Analilia Leslie MD     Reason for the consultation:  Secondary adrenal insufficiency     History of Present Illness: The history is provided by the patient. Accuracy of the patient data is excellent. Lacey Beltran is a very pleasant 70 y.o. old female with PMH of secondary adrenal insufficiency, asthma, atopic dermatitis, HLD and other listed below admitted to [facility] on 3/9/2023 because of N/V and fever, endocrine service was consulted for secondary hypothyroidism       Subjective:  Pt was seen and examined at bedside this AM, no acte events        Scheduled Meds:   hydrocortisone  40 mg Oral BID    [START ON 3/12/2023] hydrocortisone  20 mg Oral BID    [START ON 3/15/2023] hydrocortisone  15 mg Oral QAM    [START ON 3/15/2023] hydrocortisone  5 mg Oral QPM    aspirin  81 mg Oral Daily    atorvastatin  10 mg Oral Nightly    escitalopram  10 mg Oral Nightly    fluticasone  1 spray Each Nostril Daily    [Held by provider] midodrine  5 mg Oral TID WC    montelukast  10 mg Oral Daily    pantoprazole  40 mg Oral BID AC    polyethylene glycol  17 g Oral Daily    potassium chloride  40 mEq Oral Once    ferrous sulfate  325 mg Oral Daily with breakfast    budesonide-formoterol  2 puff Inhalation BID    thiamine  100 mg Oral Daily    enoxaparin  40 mg SubCUTAneous Daily    cefTRIAXone (ROCEPHIN) IV  2,000 mg IntraVENous Q24H     PRN Meds:   docusate sodium, 100 mg, BID PRN  guaiFENesin-codeine, 5 mL, TID PRN  ondansetron, 4 mg, Q6H PRN  HYDROmorphone, 0.25 mg, Q3H PRN  albuterol sulfate HFA, 2 puff, Q6H PRN  acetaminophen, 650 mg, Q6H PRN  LORazepam, 2 mg, Q8H PRN    Continuous Infusions:      Review of Systems  All systems reviewed.  All negative except for symptoms mentioned in HPI   Constitutional: No fever, no chills, no diaphoresis, no generalized weakness. HEENT: No blurred vision, No sore throat, no ear pain, no hair loss  Neck: denied any neck swelling, difficulty swallowing,   Cadrdiopulomary: No CP, SOB or palpitation, No orthopnea or PND. No cough or wheezing. GI: No N/V/D, no constipation, No abdominal pain, no melena or hematochezia   : Denied any dysuria, hematuria, flank pain, discharge, or incontinence. Skin: denied any rash, ulcer, or hyperpigmentation. MSK: denied any joint deformity, joint pain/swelling, muscle pain, or back pain. Neuro: no numbess, no tingling, no weakness    OBJECTIVE    BP (!) 154/72   Pulse 75   Temp 97.6 °F (36.4 °C) (Oral)   Resp 23   Ht 5' 4\" (1.626 m)   Wt 117 lb 4.6 oz (53.2 kg)   SpO2 98%   BMI 20.13 kg/m²   Wt Readings from Last 6 Encounters:   03/10/23 117 lb 4.6 oz (53.2 kg)   02/27/23 129 lb (58.5 kg)   02/14/23 128 lb (58.1 kg)   02/07/23 128 lb (58.1 kg)   02/04/23 152 lb (68.9 kg)   01/28/23 128 lb (58.1 kg)       Physical examination:  General: awake alert, oriented x3, no abnormal position or movements. HEENT: normocephalic non traumatic   Pulm: good equal air entry no added sounds   CVS: S1 + S2   Abd: soft lax, no tenderness,   Skin: warm, no lesions, no rash.     Musculoskeletal: No back tenderness, no kyphosis/scoliosis    Neuro: CN intact, sensation notmal , muscle power normal.    Psych: normal mood, and affect    Review of Laboratory Data:  I personally reviewed the following labs:  Recent Labs     03/09/23  2019 03/10/23  0812   WBC 10.7 7.1   RBC 3.44* 3.19*   HGB 9.7* 9.1*   HCT 32.5* 30.1*   MCV 94.5 94.4   MCH 28.2 28.5   MCHC 29.8* 30.2*   RDW 20.5* 20.7*    302   MPV 11.7 11.1     Recent Labs     03/09/23  2019 03/10/23  0812    139   K 3.4* 3.8    108*   CO2 23 23   BUN 14 12   CREATININE 0.7 0.6   GLUCOSE 96 149*   CALCIUM 8.5* 7.7*   PROT 5.9*  --    LABALBU 3.1*  --    BILITOT 0.3  --    ALKPHOS 74  --    AST 15  --    ALT 8  -- No results found for: BHYDRXBUT  No results found for: LABA1C  Lab Results   Component Value Date/Time    TSH 5.640 (H) 01/31/2023 08:17 PM    T4FREE 0.99 02/01/2023 09:10 AM    P7CPBNW 10.0 01/20/2011 03:00 PM     Lab Results   Component Value Date/Time    GLUCOSE 149 03/10/2023 08:12 AM    GLUCOSE 85 04/02/2012 08:30 AM     Lab Results   Component Value Date/Time    TRIG 102 02/06/2023 10:40 AM    HDL 23 02/06/2023 10:40 AM    LDLCALC 38 02/06/2023 10:40 AM    CHOL 81 02/06/2023 10:40 AM       Blood culture   Lab Results   Component Value Date/Time    BC 24 Hours no growth 03/09/2023 08:30 PM    BC 5 Days no growth 02/20/2023 05:43 PM    BC 5 Days no growth 02/02/2023 03:35 PM    BC  01/31/2023 11:25 PM     This organism was isolated in one set. Susceptibility testing is not routinely done as this  organism frequently represents skin contamination. Additional testing can be ordered by calling the  Microbiology Department. BC 5 Days- no growth 12/06/2018 02:00 PM    BC 5 Days- no growth 06/30/2018 01:32 PM    BC 5 Days- no growth 09/25/2017 04:45 PM    BC 5 Days- no growth 05/27/2017 12:00 PM       Radiology:  CT ABDOMEN PELVIS W IV CONTRAST Additional Contrast? None   Final Result   1. Mild prominence of the bladder wall. Clinical correlation for cystitis   recommended. Tiny locule of gas within the bladder may be due to infection   or recent catheterization. 2. Severe sigmoid diverticulosis without diverticulitis. 3. 12 mm cyst in the pancreatic tail, grossly stable at least as of   12/06/2018. Per the recommendations of the [employer] of Radiology,   imaging follow-up is recommended in 2 years. 4. Moderate to large hiatal hernia. 5. Small bilateral pleural effusions. XR CHEST PORTABLE   Final Result   No acute process.              Medical Records/Labs/Images Review:   I personally reviewed and summarized previous records   All labs and imaging were reviewed independently    [address], a 70 y.o.-old female seen in for management of adrenal insufficiency    Secondary Adrenal Insufficiency   Due to chronic steroid for atopic dermatitis   We recommend the following steroids   Hydrocortisone 40 mg BID for today then 20 mg BID x 3 days then 15mg AM/5mg evening after that   Patient need to be on at least maintenance dose of steroid all the time and stress dose for any stressful events. Discussed the importance of wearing medical alert identification (bracelet, necklace)    HLD   Lipitor 10 mg daily     UTI  Discussed the importance of stress dose steroids with infection  Abs per primary     Interdisciplinary plan for communication with healthcare providers:   Consult recommendations were discussed with the Primary Service/Nursing staff      The above issues were reviewed with the patient who understood and agreed with the plan. Thank you for allowing us to participate in the care of this patient. Please do not hesitate to contact us with any additional questions. Rey Carlson MD  Endocrinologist, [facility] Endocr[facility]ology   [address] [facility] [address]   Phone: [phone]  Fax: [phone]  ---------------------------------  An electronic signature was used to authenticate this note.  Garth Mendez MD on 3/11/2023 at 11:19 AM

## 2023-03-12 LAB
ANION GAP SERPL CALCULATED.3IONS-SCNC: 10 MMOL/L (ref 7–16)
BUN BLDV-MCNC: 9 MG/DL (ref 6–23)
CALCIUM SERPL-MCNC: 8 MG/DL (ref 8.6–10.2)
CHLORIDE BLD-SCNC: 104 MMOL/L (ref 98–107)
CO2: 26 MMOL/L (ref 22–29)
CREAT SERPL-MCNC: 0.6 MG/DL (ref 0.5–1)
GFR SERPL CREATININE-BSD FRML MDRD: >60 ML/MIN/1.73
GLUCOSE BLD-MCNC: 151 MG/DL (ref 74–99)
HCT VFR BLD CALC: 21.4 % (ref 34–48)
HCT VFR BLD CALC: 29 % (ref 34–48)
HEMOGLOBIN: 6.5 G/DL (ref 11.5–15.5)
HEMOGLOBIN: 8.8 G/DL (ref 11.5–15.5)
MCH RBC QN AUTO: 28.5 PG (ref 26–35)
MCHC RBC AUTO-ENTMCNC: 30.4 % (ref 32–34.5)
MCV RBC AUTO: 93.9 FL (ref 80–99.9)
ORGANISM: ABNORMAL
PDW BLD-RTO: 19.9 FL (ref 11.5–15)
PLATELET # BLD: 228 E9/L (ref 130–450)
PMV BLD AUTO: 11.4 FL (ref 7–12)
POTASSIUM SERPL-SCNC: 3.3 MMOL/L (ref 3.5–5)
RBC # BLD: 2.28 E12/L (ref 3.5–5.5)
SODIUM BLD-SCNC: 140 MMOL/L (ref 132–146)
URINE CULTURE, ROUTINE: ABNORMAL
WBC # BLD: 5.9 E9/L (ref 4.5–11.5)

## 2023-03-12 PROCEDURE — 80048 BASIC METABOLIC PNL TOTAL CA: CPT

## 2023-03-12 PROCEDURE — 6370000000 HC RX 637 (ALT 250 FOR IP): Performed by: INTERNAL MEDICINE

## 2023-03-12 PROCEDURE — 99233 SBSQ HOSP IP/OBS HIGH 50: CPT | Performed by: INTERNAL MEDICINE

## 2023-03-12 PROCEDURE — 85014 HEMATOCRIT: CPT

## 2023-03-12 PROCEDURE — 6360000002 HC RX W HCPCS: Performed by: INTERNAL MEDICINE

## 2023-03-12 PROCEDURE — 2060000000 HC ICU INTERMEDIATE R&B

## 2023-03-12 PROCEDURE — 94640 AIRWAY INHALATION TREATMENT: CPT

## 2023-03-12 PROCEDURE — 85027 COMPLETE CBC AUTOMATED: CPT

## 2023-03-12 PROCEDURE — 6370000000 HC RX 637 (ALT 250 FOR IP): Performed by: SPECIALIST

## 2023-03-12 PROCEDURE — 2580000003 HC RX 258: Performed by: SPECIALIST

## 2023-03-12 PROCEDURE — 85018 HEMOGLOBIN: CPT

## 2023-03-12 PROCEDURE — 36415 COLL VENOUS BLD VENIPUNCTURE: CPT

## 2023-03-12 PROCEDURE — 6360000002 HC RX W HCPCS: Performed by: SPECIALIST

## 2023-03-12 RX ORDER — POTASSIUM CHLORIDE 20 MEQ/1
40 TABLET, EXTENDED RELEASE ORAL ONCE
Status: COMPLETED | OUTPATIENT
Start: 2023-03-12 | End: 2023-03-12

## 2023-03-12 RX ORDER — VALACYCLOVIR HYDROCHLORIDE 500 MG/1
2000 TABLET, FILM COATED ORAL 2 TIMES DAILY
Status: COMPLETED | OUTPATIENT
Start: 2023-03-12 | End: 2023-03-12

## 2023-03-12 RX ADMIN — HYDROCORTISONE 20 MG: 20 TABLET ORAL at 09:16

## 2023-03-12 RX ADMIN — BUDESONIDE AND FORMOTEROL FUMARATE DIHYDRATE 2 PUFF: 160; 4.5 AEROSOL RESPIRATORY (INHALATION) at 20:11

## 2023-03-12 RX ADMIN — ESCITALOPRAM 10 MG: 10 TABLET, FILM COATED ORAL at 20:33

## 2023-03-12 RX ADMIN — PANTOPRAZOLE SODIUM 40 MG: 40 TABLET, DELAYED RELEASE ORAL at 16:12

## 2023-03-12 RX ADMIN — FERROUS SULFATE TAB 325 MG (65 MG ELEMENTAL FE) 325 MG: 325 (65 FE) TAB at 09:16

## 2023-03-12 RX ADMIN — CEFEPIME 1000 MG: 1 INJECTION, POWDER, FOR SOLUTION INTRAMUSCULAR; INTRAVENOUS at 13:08

## 2023-03-12 RX ADMIN — VALACYCLOVIR HYDROCHLORIDE 2000 MG: 500 TABLET, FILM COATED ORAL at 13:08

## 2023-03-12 RX ADMIN — MONTELUKAST SODIUM 10 MG: 10 TABLET ORAL at 09:15

## 2023-03-12 RX ADMIN — Medication 100 MG: at 09:15

## 2023-03-12 RX ADMIN — POTASSIUM CHLORIDE 40 MEQ: 1500 TABLET, EXTENDED RELEASE ORAL at 10:58

## 2023-03-12 RX ADMIN — ASPIRIN 81 MG CHEWABLE TABLET 81 MG: 81 TABLET CHEWABLE at 09:15

## 2023-03-12 RX ADMIN — LORAZEPAM 2 MG: 1 TABLET ORAL at 09:25

## 2023-03-12 RX ADMIN — FLUTICASONE PROPIONATE 1 SPRAY: 50 SPRAY, METERED NASAL at 09:16

## 2023-03-12 RX ADMIN — BUDESONIDE AND FORMOTEROL FUMARATE DIHYDRATE 2 PUFF: 160; 4.5 AEROSOL RESPIRATORY (INHALATION) at 08:28

## 2023-03-12 RX ADMIN — ATORVASTATIN CALCIUM 10 MG: 10 TABLET, FILM COATED ORAL at 20:33

## 2023-03-12 RX ADMIN — HYDROCORTISONE 20 MG: 20 TABLET ORAL at 20:34

## 2023-03-12 RX ADMIN — LORAZEPAM 2 MG: 1 TABLET ORAL at 20:33

## 2023-03-12 RX ADMIN — VALACYCLOVIR HYDROCHLORIDE 2000 MG: 500 TABLET, FILM COATED ORAL at 21:36

## 2023-03-12 RX ADMIN — PANTOPRAZOLE SODIUM 40 MG: 40 TABLET, DELAYED RELEASE ORAL at 07:02

## 2023-03-12 ASSESSMENT — PAIN SCALES - GENERAL
PAINLEVEL_OUTOF10: 0

## 2023-03-12 NOTE — PLAN OF CARE
Problem: Safety - Adult  Goal: Free from fall injury  Outcome: Progressing  Flowsheets (Taken 3/12/2023 0008)  Free From Fall Injury: Based on caregiver fall risk screen, instruct family/caregiver to ask for assistance with transferring infant if caregiver noted to have fall risk factors     Problem: Discharge Planning  Goal: Discharge to home or other facility with appropriate resources  Outcome: Not Progressing     Problem: Skin/Tissue Integrity  Goal: Absence of new skin breakdown  Description: 1. Monitor for areas of redness and/or skin breakdown  2. Assess vascular access sites hourly  3. Every 4-6 hours minimum:  Change oxygen saturation probe site  4. Every 4-6 hours:  If on nasal continuous positive airway pressure, respiratory therapy assess nares and determine need for appliance change or resting period.   Outcome: Progressing     Problem: ABCDS Injury Assessment  Goal: Absence of physical injury  Outcome: Progressing  Flowsheets (Taken 3/12/2023 0008)  Absence of Physical Injury: Implement safety measures based on patient assessment     Problem: Pain  Goal: Verbalizes/displays adequate comfort level or baseline comfort level  Outcome: Progressing  Flowsheets (Taken 3/11/2023 2000)  Verbalizes/displays adequate comfort level or baseline comfort level:   Encourage patient to monitor pain and request assistance   Assess pain using appropriate pain scale   Implement non-pharmacological measures as appropriate and evaluate response     Problem: Discharge Planning  Goal: Discharge to home or other facility with appropriate resources  Outcome: Not Progressing

## 2023-03-12 NOTE — PLAN OF CARE
Problem: Safety - Adult  Goal: Free from fall injury  3/12/2023 1337 by Toya Haq RN  Outcome: Progressing  Problem: Discharge Planning  Goal: Discharge to home or other facility with appropriate resources  3/12/2023 1337 by Toya Haq RN  Outcome: Progressing  Problem: Skin/Tissue Integrity  Goal: Absence of new skin breakdown  Description: 1. Monitor for areas of redness and/or skin breakdown  2. Assess vascular access sites hourly  3. Every 4-6 hours minimum:  Change oxygen saturation probe site  4. Every 4-6 hours:  If on nasal continuous positive airway pressure, respiratory therapy assess nares and determine need for appliance change or resting period.   3/12/2023 1337 by Toya Haq RN  Outcome: Progressing    Problem: Pain  Goal: Verbalizes/displays adequate comfort level or baseline comfort level  3/12/2023 1337 by Toya Haq RN  Outcome: Progressing

## 2023-03-12 NOTE — PROGRESS NOTES
Patient states that she wears a cpap at home but doesn't have unit here. Patient offered use of hospital unit but declined. She states she won't wear hospital unit.  Sravani Garcia, ALLIP

## 2023-03-12 NOTE — PROGRESS NOTES
[facility] Progress Note    Admitting Date and Time: 3/9/2023  7:13 PM  Admit Dx: Hypokalemia [E87.6]  Lactic acidosis [E87.20]  Pancreatic cyst [K86.2]  Acute cystitis with hematuria [N30.01]  Sepsis ([facility] [address] [A41.9]  Sepsis, due to unspecified organism, unspecified whether acute organ dysfunction present ([facility] [address] [A41.9]    Subjective:  Patient is being followed for Hypokalemia [E87.6]  Lactic acidosis [E87.20]  Pancreatic cyst [K86.2]  Acute cystitis with hematuria [N30.01]  Sepsis ([facility] [address] [A41.9]  Sepsis, due to unspecified organism, unspecified whether acute organ dysfunction present [facility]) [A41.9]     Patient has no complaints this morning.  I was notified that her hemoglobin is low and so we will recheck this lab    ROS: denies fever, chills, cp, sob, n/v, HA unless stated above.      hydrocortisone  20 mg Oral BID    [START ON 3/15/2023] hydrocortisone  15 mg Oral QAM    [START ON 3/15/2023] hydrocortisone  5 mg Oral QPM    aspirin  81 mg Oral Daily    atorvastatin  10 mg Oral Nightly    escitalopram  10 mg Oral Nightly    fluticasone  1 spray Each Nostril Daily    [Held by provider] midodrine  5 mg Oral TID WC    montelukast  10 mg Oral Daily    pantoprazole  40 mg Oral BID AC    polyethylene glycol  17 g Oral Daily    potassium chloride  40 mEq Oral Once    ferrous sulfate  325 mg Oral Daily with breakfast    budesonide-formoterol  2 puff Inhalation BID    thiamine  100 mg Oral Daily    enoxaparin  40 mg SubCUTAneous Daily    cefTRIAXone (ROCEPHIN) IV  2,000 mg IntraVENous Q24H     docusate sodium, 100 mg, BID PRN  guaiFENesin-codeine, 5 mL, TID PRN  ondansetron, 4 mg, Q6H PRN  HYDROmorphone, 0.25 mg, Q3H PRN  albuterol sulfate HFA, 2 puff, Q6H PRN  acetaminophen, 650 mg, Q6H PRN  LORazepam, 2 mg, Q8H PRN         Objective:    BP (!) 158/96   Pulse 99   Temp 97.4 °F (36.3 °C) (Oral)   Resp 16   Ht 5' 4\" (1.626 m)   Wt 117 lb 4.6 oz (53.2 kg)   SpO2 99%   BMI 20.13 kg/m²     General Appearance: alert and oriented to person, place and time and in no acute distress  Skin: warm and dry  Head: normocephalic and atraumatic  Eyes: pupils equal, round, and reactive to light, extraocular eye movements intact, conjunctivae normal  Neck: neck supple and non tender without mass   Pulmonary/Chest: clear to auscultation bilaterally- no wheezes, rales or rhonchi, normal air movement, no respiratory distress  Cardiovascular: normal rate, normal S1 and S2 and no carotid bruits  Abdomen: soft, non-tender, non-distended, normal bowel sounds, no masses or organomegaly  Extremities: no cyanosis, no clubbing and no edema  Neurologic: no cranial nerve deficit and speech normal        Recent Labs     03/09/23  2019 03/10/23  0812    139   K 3.4* 3.8    108*   CO2 23 23   BUN 14 12   CREATININE 0.7 0.6   GLUCOSE 96 149*   CALCIUM 8.5* 7.7*       Recent Labs     03/09/23  2019 03/10/23  0812   WBC 10.7 7.1   RBC 3.44* 3.19*   HGB 9.7* 9.1*   HCT 32.5* 30.1*   MCV 94.5 94.4   MCH 28.2 28.5   MCHC 29.8* 30.2*   RDW 20.5* 20.7*    302   MPV 11.7 11.1           Assessment:    Principal Problem:    Sepsis (HCC)  Active Problems:    Asthma    Failure to thrive in adult    Acute cystitis without hematuria    Secondary adrenal insufficiency (HCC)    HTN (hypertension), benign    Chronic back pain    Tachycardia  Resolved Problems:    * No resolved hospital problems. 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 Continue hydrocrotisone taper outlined by endocrinology. Acute hypoxic respiratory failure - Currently on 2 L NC  Chronic hypotension 2/2 #1 - Blood pressure improved  Acute cystitis without hematuria - Rocephin 2 g daily as per ID  Covid 19 positive - Will defer treatment to ID and pulmonology  Chronic normocytic anemia - Transfuse if hgb is less than 7.  Continue iron supplementation  DVT prophylaxis - Lovenox     PT/OT, discharge planning  Recheck hemoglobin    NOTE: This report was transcribed using voice recognition software. Every effort was made to ensure accuracy; however, inadvertent computerized transcription errors may be present.     Electronically signed by Diane Mason DO on 3/12/2023 at 7:54 AM

## 2023-03-12 NOTE — PROGRESS NOTES
[address] [place] [address] [facility]  Progress Note    SUBJECTIVE:  Chief Complaint   Patient presents with    Fever    Emesis    Abdominal Pain     The patient is still in the ICU but has been downgraded. No nausea or vomiting. No abdominal pain. No fever. Review of systems:  As stated above in the chief complaint, otherwise negative. Medications:  Scheduled Meds:   hydrocortisone  20 mg Oral BID    [START ON 3/15/2023] hydrocortisone  15 mg Oral QAM    [START ON 3/15/2023] hydrocortisone  5 mg Oral QPM    aspirin  81 mg Oral Daily    atorvastatin  10 mg Oral Nightly    escitalopram  10 mg Oral Nightly    fluticasone  1 spray Each Nostril Daily    [Held by provider] midodrine  5 mg Oral TID WC    montelukast  10 mg Oral Daily    pantoprazole  40 mg Oral BID AC    polyethylene glycol  17 g Oral Daily    potassium chloride  40 mEq Oral Once    ferrous sulfate  325 mg Oral Daily with breakfast    budesonide-formoterol  2 puff Inhalation BID    thiamine  100 mg Oral Daily    enoxaparin  40 mg SubCUTAneous Daily    cefTRIAXone (ROCEPHIN) IV  2,000 mg IntraVENous Q24H     Continuous Infusions:  PRN Meds:docusate sodium, guaiFENesin-codeine, ondansetron, HYDROmorphone, albuterol sulfate HFA, acetaminophen, LORazepam    OBJECTIVE:  /72   Pulse 85   Temp 97.4 °F (36.3 °C) (Oral)   Resp 16   Ht 5' 4\" (1.626 m)   Wt 117 lb 4.6 oz (53.2 kg)   SpO2 97%   BMI 20.13 kg/m²   Temp  Av.4 °F (36.3 °C)  Min: 97.4 °F (36.3 °C)  Max: 97.5 °F (36.4 °C)  Constitutional: The patient is awake, alert, and oriented. No distress. Skin: Warm and dry. No rash. HEENT: Round and reactive pupils. Moist mucous membranes. No thrush. She does have some raised, erythematous lesions on her lips on the right side. Neck: Supple to movements. Chest: No respiratory distress. No crackles. Cardiovascular: S1 and S2 are rhythmic and regular. No murmurs appreciated.    Abdomen: Positive bowel sounds to auscultation. Benign to palpation. Extremities: No edema.   Lines: Peripheral.    Laboratory and Tests Review:  Lab Results   Component Value Date    WBC 5.9 03/12/2023    WBC 7.1 03/10/2023    WBC 10.7 03/09/2023    HGB 6.5 (LL) 03/12/2023    HCT 21.4 (L) 03/12/2023    MCV 93.9 03/12/2023     03/12/2023     Lab Results   Component Value Date    NEUTROABS 6.89 03/10/2023    NEUTROABS 8.97 (H) 03/09/2023    NEUTROABS 4.76 02/28/2023     No results found for: CRPHS  Lab Results   Component Value Date    ALT 8 03/09/2023    AST 15 03/09/2023    ALKPHOS 74 03/09/2023    BILITOT 0.3 03/09/2023     Lab Results   Component Value Date/Time     03/12/2023 09:08 AM    K 3.3 03/12/2023 09:08 AM    K 7.5 02/22/2023 09:45 AM     03/12/2023 09:08 AM    CO2 26 03/12/2023 09:08 AM    BUN 9 03/12/2023 09:08 AM    CREATININE 0.6 03/12/2023 09:08 AM    CREATININE 0.6 03/10/2023 08:12 AM    CREATININE 0.7 03/09/2023 08:19 PM    GFRAA >60 01/12/2019 04:00 AM    LABGLOM >60 03/12/2023 09:08 AM    GLUCOSE 151 03/12/2023 09:08 AM    GLUCOSE 85 04/02/2012 08:30 AM    PROT 5.9 03/09/2023 08:19 PM    LABALBU 3.1 03/09/2023 08:19 PM    LABALBU 3.6 04/02/2012 08:30 AM    CALCIUM 8.0 03/12/2023 09:08 AM    BILITOT 0.3 03/09/2023 08:19 PM    ALKPHOS 74 03/09/2023 08:19 PM    AST 15 03/09/2023 08:19 PM    ALT 8 03/09/2023 08:19 PM     Lab Results   Component Value Date    CRP 13.8 (H) 03/10/2023    CRP 12.8 (H) 02/21/2023    CRP 24.1 (H) 02/01/2023     Lab Results   Component Value Date    SEDRATE 61 (H) 03/10/2023    SEDRATE 26 (H) 02/21/2023    SEDRATE 42 (H) 02/01/2023     Radiology:      Microbiology:   Respiratory panel 3/9/2023: SARS-CoV-2  Blood cultures 3/9/2023: Negative so far  Urine screen MRSA: Negative  Rapid influenza: Negative  Rapid SARS-CoV-2: Negative  Urine culture 3/9/2023: >100 K ox + GNR    ASSESSMENT:  Complicated UTI with sepsis-improving  Fever associated to the above  SARS-CoV-2 infection in January 2023.  A rapid SARS-CoV-2 here was negative. Respiratory panel, which is more sensitive, he is positive. I doubt patient has active or ongoing SARS-CoV-2 infection  Probable herpes labialis    PLAN:  Change Ceftriaxone to Cefepime  Valacyclovir  Check final cultures  Transfer out of ICU  Discontinue droplet plus isolation    Spoke with nursing.       Natalia Vega MD  10:10 AM  3/12/2023

## 2023-03-13 LAB
ANION GAP SERPL CALCULATED.3IONS-SCNC: 11 MMOL/L (ref 7–16)
BUN BLDV-MCNC: 14 MG/DL (ref 6–23)
CALCIUM SERPL-MCNC: 8.3 MG/DL (ref 8.6–10.2)
CHLORIDE BLD-SCNC: 105 MMOL/L (ref 98–107)
CO2: 24 MMOL/L (ref 22–29)
CREAT SERPL-MCNC: 0.5 MG/DL (ref 0.5–1)
GFR SERPL CREATININE-BSD FRML MDRD: >60 ML/MIN/1.73
GLUCOSE BLD-MCNC: 154 MG/DL (ref 74–99)
HCT VFR BLD CALC: 28.7 % (ref 34–48)
HEMOGLOBIN: 8.4 G/DL (ref 11.5–15.5)
MCH RBC QN AUTO: 27.7 PG (ref 26–35)
MCHC RBC AUTO-ENTMCNC: 29.3 % (ref 32–34.5)
MCV RBC AUTO: 94.7 FL (ref 80–99.9)
PDW BLD-RTO: 19.6 FL (ref 11.5–15)
PLATELET # BLD: 324 E9/L (ref 130–450)
PMV BLD AUTO: 11.4 FL (ref 7–12)
POTASSIUM SERPL-SCNC: 3.6 MMOL/L (ref 3.5–5)
RBC # BLD: 3.03 E12/L (ref 3.5–5.5)
SODIUM BLD-SCNC: 140 MMOL/L (ref 132–146)
WBC # BLD: 8.4 E9/L (ref 4.5–11.5)

## 2023-03-13 PROCEDURE — 99231 SBSQ HOSP IP/OBS SF/LOW 25: CPT | Performed by: INTERNAL MEDICINE

## 2023-03-13 PROCEDURE — 97530 THERAPEUTIC ACTIVITIES: CPT

## 2023-03-13 PROCEDURE — 6370000000 HC RX 637 (ALT 250 FOR IP): Performed by: INTERNAL MEDICINE

## 2023-03-13 PROCEDURE — 80048 BASIC METABOLIC PNL TOTAL CA: CPT

## 2023-03-13 PROCEDURE — 6360000002 HC RX W HCPCS: Performed by: STUDENT IN AN ORGANIZED HEALTH CARE EDUCATION/TRAINING PROGRAM

## 2023-03-13 PROCEDURE — 2580000003 HC RX 258: Performed by: STUDENT IN AN ORGANIZED HEALTH CARE EDUCATION/TRAINING PROGRAM

## 2023-03-13 PROCEDURE — 99233 SBSQ HOSP IP/OBS HIGH 50: CPT | Performed by: INTERNAL MEDICINE

## 2023-03-13 PROCEDURE — 2580000003 HC RX 258: Performed by: SPECIALIST

## 2023-03-13 PROCEDURE — 85027 COMPLETE CBC AUTOMATED: CPT

## 2023-03-13 PROCEDURE — 36415 COLL VENOUS BLD VENIPUNCTURE: CPT

## 2023-03-13 PROCEDURE — 6360000002 HC RX W HCPCS: Performed by: INTERNAL MEDICINE

## 2023-03-13 PROCEDURE — 6360000002 HC RX W HCPCS: Performed by: SPECIALIST

## 2023-03-13 PROCEDURE — 2060000000 HC ICU INTERMEDIATE R&B

## 2023-03-13 PROCEDURE — 97161 PT EVAL LOW COMPLEX 20 MIN: CPT

## 2023-03-13 RX ORDER — AMMONIUM LACTATE 12 G/100G
LOTION TOPICAL 2 TIMES DAILY
Status: DISCONTINUED | OUTPATIENT
Start: 2023-03-13 | End: 2023-03-15 | Stop reason: HOSPADM

## 2023-03-13 RX ADMIN — ENOXAPARIN SODIUM 40 MG: 100 INJECTION SUBCUTANEOUS at 08:49

## 2023-03-13 RX ADMIN — MEROPENEM 1000 MG: 1 INJECTION, POWDER, FOR SOLUTION INTRAVENOUS at 20:30

## 2023-03-13 RX ADMIN — FERROUS SULFATE TAB 325 MG (65 MG ELEMENTAL FE) 325 MG: 325 (65 FE) TAB at 08:49

## 2023-03-13 RX ADMIN — ESCITALOPRAM 10 MG: 10 TABLET, FILM COATED ORAL at 20:23

## 2023-03-13 RX ADMIN — PANTOPRAZOLE SODIUM 40 MG: 40 TABLET, DELAYED RELEASE ORAL at 06:14

## 2023-03-13 RX ADMIN — CEFEPIME 1000 MG: 1 INJECTION, POWDER, FOR SOLUTION INTRAMUSCULAR; INTRAVENOUS at 11:12

## 2023-03-13 RX ADMIN — LORAZEPAM 2 MG: 1 TABLET ORAL at 20:23

## 2023-03-13 RX ADMIN — Medication 100 MG: at 08:49

## 2023-03-13 RX ADMIN — MONTELUKAST SODIUM 10 MG: 10 TABLET ORAL at 08:49

## 2023-03-13 RX ADMIN — LORAZEPAM 2 MG: 1 TABLET ORAL at 08:49

## 2023-03-13 RX ADMIN — BUDESONIDE AND FORMOTEROL FUMARATE DIHYDRATE 2 PUFF: 160; 4.5 AEROSOL RESPIRATORY (INHALATION) at 20:24

## 2023-03-13 RX ADMIN — ALBUTEROL SULFATE 2 PUFF: 90 AEROSOL, METERED RESPIRATORY (INHALATION) at 20:24

## 2023-03-13 RX ADMIN — ASPIRIN 81 MG CHEWABLE TABLET 81 MG: 81 TABLET CHEWABLE at 08:49

## 2023-03-13 RX ADMIN — HYDROCORTISONE 20 MG: 20 TABLET ORAL at 08:49

## 2023-03-13 RX ADMIN — BUDESONIDE AND FORMOTEROL FUMARATE DIHYDRATE 2 PUFF: 160; 4.5 AEROSOL RESPIRATORY (INHALATION) at 08:50

## 2023-03-13 RX ADMIN — PANTOPRAZOLE SODIUM 40 MG: 40 TABLET, DELAYED RELEASE ORAL at 16:45

## 2023-03-13 RX ADMIN — Medication: at 20:24

## 2023-03-13 RX ADMIN — HYDROCORTISONE 20 MG: 20 TABLET ORAL at 20:24

## 2023-03-13 RX ADMIN — FLUTICASONE PROPIONATE 1 SPRAY: 50 SPRAY, METERED NASAL at 08:50

## 2023-03-13 RX ADMIN — CEFEPIME 1000 MG: 1 INJECTION, POWDER, FOR SOLUTION INTRAMUSCULAR; INTRAVENOUS at 00:47

## 2023-03-13 RX ADMIN — ATORVASTATIN CALCIUM 10 MG: 10 TABLET, FILM COATED ORAL at 20:24

## 2023-03-13 NOTE — PROGRESS NOTES
[address] [place] [address] [facility]  ZAHEER  Progress Note    SUBJECTIVE:  Chief Complaint   Patient presents with    Fever    Emesis    Abdominal Pain     The patient was transferred out of the ICU  She is sitting up in the chair, eating lunch   No fevers  No nausea or vomiting  Asking when she can go home     Review of systems:  As stated above in the chief complaint, otherwise negative. Medications:  Scheduled Meds:   cefepime  1,000 mg IntraVENous Q12H    hydrocortisone  20 mg Oral BID    [START ON 3/15/2023] hydrocortisone  15 mg Oral QAM    [START ON 3/15/2023] hydrocortisone  5 mg Oral QPM    aspirin  81 mg Oral Daily    atorvastatin  10 mg Oral Nightly    escitalopram  10 mg Oral Nightly    fluticasone  1 spray Each Nostril Daily    [Held by provider] midodrine  5 mg Oral TID WC    montelukast  10 mg Oral Daily    pantoprazole  40 mg Oral BID AC    polyethylene glycol  17 g Oral Daily    potassium chloride  40 mEq Oral Once    ferrous sulfate  325 mg Oral Daily with breakfast    budesonide-formoterol  2 puff Inhalation BID    thiamine  100 mg Oral Daily    enoxaparin  40 mg SubCUTAneous Daily     Continuous Infusions:  PRN Meds:docusate sodium, guaiFENesin-codeine, ondansetron, HYDROmorphone, albuterol sulfate HFA, acetaminophen, LORazepam    OBJECTIVE:  /88   Pulse (!) 107   Temp 98.8 °F (37.1 °C) (Oral)   Resp 18   Ht 5' 4\" (1.626 m)   Wt 124 lb 3.2 oz (56.3 kg)   SpO2 95%   BMI 21.32 kg/m²   Temp  Av.3 °F (36.8 °C)  Min: 97.8 °F (36.6 °C)  Max: 98.8 °F (37.1 °C)  Constitutional: The patient is awake, alert, and oriented. No distress. Up in the chair. Skin: Warm and dry. No rash. HEENT: Round and reactive pupils. Moist mucous membranes. No thrush. Lesions on the lip are improved. Neck: Supple to movements. Chest: No respiratory distress. No crackles. Cardiovascular: S1 and S2 are rhythmic and regular. No murmurs appreciated.    Abdomen: Positive bowel sounds to auscultation. Benign to palpation.   Extremities: No edema.  Lines: Peripheral.    Laboratory and Tests Review:  Lab Results   Component Value Date    WBC 8.4 03/13/2023    WBC 5.9 03/12/2023    WBC 7.1 03/10/2023    HGB 8.4 (L) 03/13/2023    HCT 28.7 (L) 03/13/2023    MCV 94.7 03/13/2023     03/13/2023     Lab Results   Component Value Date    NEUTROABS 6.89 03/10/2023    NEUTROABS 8.97 (H) 03/09/2023    NEUTROABS 4.76 02/28/2023     No results found for: CRP  Lab Results   Component Value Date    ALT 8 03/09/2023    AST 15 03/09/2023    ALKPHOS 74 03/09/2023    BILITOT 0.3 03/09/2023     Lab Results   Component Value Date/Time     03/13/2023 02:40 AM    K 3.6 03/13/2023 02:40 AM    K 7.5 02/22/2023 09:45 AM     03/13/2023 02:40 AM    CO2 24 03/13/2023 02:40 AM    BUN 14 03/13/2023 02:40 AM    CREATININE 0.5 03/13/2023 02:40 AM    CREATININE 0.6 03/12/2023 09:08 AM    CREATININE 0.6 03/10/2023 08:12 AM    GFRAA >60 01/12/2019 04:00 AM    LABGLOM >60 03/13/2023 02:40 AM    GLUCOSE 154 03/13/2023 02:40 AM    GLUCOSE 85 04/02/2012 08:30 AM    PROT 5.9 03/09/2023 08:19 PM    LABALBU 3.1 03/09/2023 08:19 PM    LABALBU 3.6 04/02/2012 08:30 AM    CALCIUM 8.3 03/13/2023 02:40 AM    BILITOT 0.3 03/09/2023 08:19 PM    ALKPHOS 74 03/09/2023 08:19 PM    AST 15 03/09/2023 08:19 PM    ALT 8 03/09/2023 08:19 PM     Lab Results   Component Value Date    CRP 13.8 (H) 03/10/2023    CRP 12.8 (H) 02/21/2023    CRP 24.1 (H) 02/01/2023     Lab Results   Component Value Date    SEDRATE 61 (H) 03/10/2023    SEDRATE 26 (H) 02/21/2023    SEDRATE 42 (H) 02/01/2023     Radiology:      Microbiology:   Respiratory panel 3/9/2023: SARS-CoV-2  Blood cultures 3/9/2023: Negative so far  Urine screen MRSA: Negative  Rapid influenza: Negative  Rapid SARS-CoV-2: Negative  Urine culture 3/9/2023: >100 K ox + GNR    ASSESSMENT:  Complicated UTI with sepsis-improving  Fever associated to the above  SARS-CoV-2 infection in January  2023.  A rapid SARS-CoV-2 here was negative. Respiratory panel, which is more sensitive, he is positive. I doubt patient has active or ongoing SARS-CoV-2 infection  Probable herpes labialis, improved    PLAN:  Continue Cefepime for now   Valacyclovir - completed 3/12  Check final cultures - await ID & sensitivities of urine cx  Labs reviewed    STEVE Pugh - CNP  11:36 AM  3/13/2023  Pt seen and examined. Above discussed agree with advanced practice nurse. Labs, cultures, and radiographs reviewed. Face to Face encounter occurred. Changes made as necessary. She is doing well. Her urinary symptoms are better. Urine cx showed Pseudomonas which is resistant to cefepime. Switched to meropenem. Her symptoms and BP improved without appropriate antibiotics making me think her hypotension is likely related to adrenal insuffiency more than UTI. However will plan meropenem for 3 days.      Elise Mabry MD

## 2023-03-13 NOTE — PROGRESS NOTES
Physical Therapy  Facility/Department: [facility] SURG  Physical Therapy Initial Assessment    Name: Claribel Fairchild  : 1945  MRN: [id number]  Date of Service: 3/13/2023                 Patient Diagnosis(es): The primary encounter diagnosis was Sepsis, due to unspecified organism, unspecified whether acute organ dysfunction present [facility]). Diagnoses of Acute cystitis with hematuria, Hypokalemia, Lactic acidosis, and Pancreatic cyst were also pertinent to this visit. Past Medical History:  has a past medical history of Anxiety, Arthritis, Asthma, Claustrophobia, Dermatitis, Fracture, GERD (gastroesophageal reflux disease), Hiatal hernia, History of blood transfusion, HTN (hypertension), Hyperlipidemia, On aspirin at home, Pancreatic cyst, Pneumonia, Sleep apnea, SOB (shortness of breath), and Tachycardia. Past Surgical History:  has a past surgical history that includes Cholecystectomy; Cataract removal (2013, 2013); back surgery (2014); joint replacement (2016); Total knee arthroplasty (Right); other surgical history (2017); Spinal fixation surgery with implant (); and Upper gastrointestinal endoscopy (07/10/2017). Requires PT Follow-Up: Yes       Evaluating Therapist: Cesar Hagen PT     Referring Provider:  Timothy Blount DO    PT order : PT eval and treat     Room #: 855   DIAGNOSIS: The primary encounter diagnosis was Sepsis, due to unspecified organism, unspecified whether acute organ dysfunction present [facility]). Diagnoses of Acute cystitis with hematuria, Hypokalemia, Lactic acidosis, and Pancreatic cyst were also pertinent to this visit. PRECAUTIONS: falls    Social:  Pt lives with  sister  in a  1  floor plan     Prior to admission pt walked with  ww. Has been sleeping in a recliner      Initial Evaluation  Date:  3/13/2023  Treatment      Short Term/ Long Term   Goals   Was pt agreeable to Eval/treatment? Yes      Does pt have pain?   None reported      Bed Mobility Rolling:  SBA   Supine to sit:  SBA   Sit to supine:  Nt   Scooting:  min assist in sit    S/I    Transfers Sit to stand:  min assist   Stand to sit: min assist   Stand pivot:  NT    S/I    Ambulation     15 and 50  feet with  ww  with  CGA    100  feet with  ww  with  S/I        Stair negotiation: ascended and descended NT    4  steps with  1  rail with  CGA    LE ROM  WFL     LE strength  4-/ 5  4/ 5    AM- PAC RAW score   17/ 24            Pt is alert and Oriented x 3      Balance:  CGA . Fall risk due to weakness , decreased balance     Endurance: decreased   Bed/Chair alarm:  no alarm box available      ASSESSMENT  Pt displays functional ability as noted in the objective portion of this evaluation. Conditions Requiring Skilled Therapeutic Intervention:    []Decreased strength     []Decreased ROM  []Decreased functional mobility  []Decreased balance   []Decreased endurance   []Decreased posture  []Decreased sensation  []Decreased coordination   []Decreased vision  []Decreased safety awareness   []Increased pain             Treatment/Education:    Pt in bed  upon arrival ; agreeable to PT. Mobility as above. Cues given for hand placement with transfers and posture with stand. Pt with flexed posture and posterior lean with initial stand. Pt needed assist with hygiene after restroom use. Labored breathing with gait. SpO2 98%. Instructed in PLB. Pt educated on fall risk,  safety with mobility        Patient response to education:   Pt verbalized understanding Pt demonstrated skill Pt requires further education in this area    x  With cues   x       Comments:  Pt left  in chair after session, with call light in reach. Waffle cushion placed       Rehab potential is Good for reaching above PT goals. Pts/ family goals   1. None stated     Patient and or family understand(s) diagnosis, prognosis, and plan of care.  -  yes     PLAN  PT care will be provided in accordance with the objectives noted above.  Whenever appropriate, clear delegation orders will be provided for nursing staff. Exercises and functional mobility practice will be used as well as appropriate assistive devices or modalities to obtain goals. Patient and family education will also be administered as needed. PLAN OF CARE:    Current Treatment Recommendations     [x] Strengthening to improve independence with functional mobility   [] ROM to improve independence with functional mobility   [x] Balance Training to improve static/dynamic balance and to reduce fall risk  [x] Endurance Training to improve activity tolerance during functional mobility   [x] Transfer Training to improve safety and independence with all functional transfers   [x] Gait Training to improve gait mechanics, endurance and assess need for appropriate assistive device  [x] Stair Training in preparation for safe discharge home and/or into the community   [x] Positioning to prevent skin breakdown and contractures  [x] Safety and Education Training   [x] Patient/Caregiver Education   [] HEP  [] Other     Frequency of treatments will be 2-5x/week x  7-10 days. Time in:  0902   Time out: 0927       Evaluation Time includes thorough review of current medical information, gathering information on past medical history/social history and prior level of function, completion of standardized testing/informal observation of tasks, assessment of data and education on plan of care and goals.     CPT codes:  [x] Low Complexity PT evaluation 03273  [] Moderate Complexity PT evaluation 21056  [] High Complexity PT evaluation 33499  [] PT Re-evaluation 31085  [] Gait training 87148  minutes  [x] Therapeutic activities 30864 10 minutes  [] Therapeutic exercises 24872  minutes  [] Neuromuscular reeducation 62205  minutes       Luis Hightower number:  PT 6435

## 2023-03-13 NOTE — CARE COORDINATION
Transition of Care-Met with patient at the bedside, introduced myself and Cm role in discharge planning. Patient lives with her sister in a two story home, however has made a first floor set up. Her sister and daughter help her with ADL's and errands. She is active with [facility] a resumption of care order prior to discharge. She uses a walker to ambulate, also has a CPAP. Past stay at [facility] score 17/24-patient wishes to return home at discharge, no further needs verbalized. Daughter to transport home. Case Management Assessment  Initial Evaluation    Date/Time of Evaluation: 3/13/2023 1:56 PM  Assessment Completed by: Joseph Portillo RN    If patient is discharged prior to next notation, then this note serves as note for discharge by case management. Patient Name: Xu Perry                   YOB: 1945  Diagnosis: Hypokalemia [E87.6]  Lactic acidosis [E87.20]  Pancreatic cyst [K86.2]  Acute cystitis with hematuria [N30.01]  Sepsis ([facility] [address] [A41.9]  Sepsis, due to unspecified organism, unspecified whether acute organ dysfunction present [facility]) [A41.9]                   Date / Time: 3/9/2023  7:13 PM    Patient Admission Status: Inpatient   Readmission Risk (Low < 19, Mod (19-27), High > 27): Readmission Risk Score: 29.3    Current PCP: Nasrin Bateman MD  PCP verified by CM? Yes    Chart Reviewed: Yes      History Provided by: Patient  Patient Orientation: Alert and Oriented    Patient Cognition: Alert    Hospitalization in the last 30 days (Readmission):  Yes    If yes, Readmission Assessment in CM Navigator will be completed.     Advance Directives:      Code Status: Prior   Patient's Primary Decision Maker is: Legal Next of Kin    Primary Decision Maker: Chantelle Stover - Child - [phone]    Secondary Decision Maker: DominguezGomez - Spouse - [phone]    Secondary Decision Maker: DominguezGomez - Child - [phone]    Discharge Planning:    Patient lives with: Family Members Type of Home: House  Primary Care Giver: Family  Patient Support Systems include: Family Members   Current Financial resources:    Current community resources:    Current services prior to admission: None            Current DME:              Type of Home Care services:  OT, PT, Nursing Services    ADLS  Prior functional level: Assistance with the following:, Bathing, Dressing, Toileting, Cooking, Shopping, Mobility  Current functional level: Mobility, Shopping, Cooking, Toileting, Dressing, Bathing, Assistance with the following:    PT AM-PAC:   /24  OT AM-PAC:   /24    Family can provide assistance at DC: Yes  Would you like Case Management to discuss the discharge plan with any other family members/significant others, and if so, who? No  Plans to Return to Present Housing: Yes  Other Identified Issues/Barriers to RETURNING to current housing: none  Potential Assistance needed at discharge: Outpatient PT/OT, Home Care            Potential DME:    Patient expects to discharge to: House  Plan for transportation at discharge:      Financial    Payor: Felix Nair / Plan: Sudhakar Mcmillan [facility] [place] LOC[facility] / Product Type: *No Product type* /     Does insurance require precert for SNF: Yes    Potential assistance Purchasing Medications: No  Meds-to-Beds request:        [id number] - [address] [phone] [place] [phone] [address] [address]  Phone: [phone] Fax: [phone]      Notes:    Factors facilitating achievement of predicted outcomes: Family support    Barriers to discharge: Upper extremity weakness and Lower extremity weakness    Additional Case Management Notes:      The Plan for Transition of Care is related to the following treatment goals of Hypokalemia [E87.6]  Lactic acidosis [E87.20]  Pancreatic cyst [K86.2]  Acute cystitis with hematuria [N30.01]  Sepsis ([facility] [address] [A41.9]  Sepsis, due to unspecified organism, unspecified whether acute organ dysfunction present ([facility] [address] [A18.3]    IF APPLICABLE: The Patient and/or patient representative Kenia Hernandez and her family were provided with a choice of provider and agrees with the discharge plan. Freedom of choice list with basic dialogue that supports the patient's individualized plan of care/goals and shares the quality data associated with the providers was provided to:     Patient Representative Name:       The Patient and/or Patient Representative Agree with the Discharge Plan?       Robert Chavez RN  Case Management Department  Ph: [phone] Fax: [phone]

## 2023-03-13 NOTE — PROGRESS NOTES
ENDOCRINOLOGY PROGRESS NOTE    Date of Admission: 3/9/2023  Date of Service: 3/13/2023  Admitting Physician: Jerry Erickson DO   Primary Care Physician: Mahsa Montes MD  Consultant physician: Haylee Davidson MD     Reason for the consultation:  Secondary adrenal insufficiency     History of Present Illness: The history is provided by the patient. Accuracy of the patient data is excellent. Kinsey Barksdale is a very pleasant 70 y.o. old female with PMH of secondary adrenal insufficiency, asthma, atopic dermatitis, HLD and other listed below admitted [facility] on 3/9/2023 because of N/V and fever, endocrine service was consulted for secondary hypothyroidism       Subjective:  Pt was seen and examined at bedside this evening, no acte events        Scheduled Meds:   meropenem  1,000 mg IntraVENous Q8H    ammonium lactate   Topical BID    hydrocortisone  20 mg Oral BID    [START ON 3/15/2023] hydrocortisone  15 mg Oral QAM    [START ON 3/15/2023] hydrocortisone  5 mg Oral QPM    aspirin  81 mg Oral Daily    atorvastatin  10 mg Oral Nightly    escitalopram  10 mg Oral Nightly    fluticasone  1 spray Each Nostril Daily    [Held by provider] midodrine  5 mg Oral TID WC    montelukast  10 mg Oral Daily    pantoprazole  40 mg Oral BID AC    polyethylene glycol  17 g Oral Daily    potassium chloride  40 mEq Oral Once    ferrous sulfate  325 mg Oral Daily with breakfast    budesonide-formoterol  2 puff Inhalation BID    thiamine  100 mg Oral Daily    enoxaparin  40 mg SubCUTAneous Daily     PRN Meds:   docusate sodium, 100 mg, BID PRN  guaiFENesin-codeine, 5 mL, TID PRN  ondansetron, 4 mg, Q6H PRN  HYDROmorphone, 0.25 mg, Q3H PRN  albuterol sulfate HFA, 2 puff, Q6H PRN  acetaminophen, 650 mg, Q6H PRN  LORazepam, 2 mg, Q8H PRN    Continuous Infusions:      Review of Systems  All systems reviewed.  All negative except for symptoms mentioned in HPI   Constitutional: No fever, no chills, no diaphoresis, no generalized weakness. HEENT: No blurred vision, No sore throat, no ear pain, no hair loss  Neck: denied any neck swelling, difficulty swallowing,   Cadrdiopulomary: No CP, SOB or palpitation, No orthopnea or PND. No cough or wheezing. GI: No N/V/D, no constipation, No abdominal pain, no melena or hematochezia   : Denied any dysuria, hematuria, flank pain, discharge, or incontinence. Skin: denied any rash, ulcer, or hyperpigmentation. MSK: denied any joint deformity, joint pain/swelling, muscle pain, or back pain. Neuro: no numbess, no tingling, no weakness    OBJECTIVE    BP (!) 147/75   Pulse 95   Temp 97.5 °F (36.4 °C) (Oral)   Resp 18   Ht 5' 4\" (1.626 m)   Wt 124 lb 3.2 oz (56.3 kg)   SpO2 100%   BMI 21.32 kg/m²   Wt Readings from Last 6 Encounters:   03/13/23 124 lb 3.2 oz (56.3 kg)   02/27/23 129 lb (58.5 kg)   02/14/23 128 lb (58.1 kg)   02/07/23 128 lb (58.1 kg)   02/04/23 152 lb (68.9 kg)   01/28/23 128 lb (58.1 kg)       Physical examination:  General: awake alert, oriented x3, no abnormal position or movements. HEENT: normocephalic non traumatic   Pulm: good equal air entry no added sounds   CVS: S1 + S2   Abd: soft lax, no tenderness,   Skin: warm, no lesions, no rash.     Musculoskeletal: No back tenderness, no kyphosis/scoliosis    Neuro: CN intact, sensation notmal , muscle power normal.    Psych: normal mood, and affect    Review of Laboratory Data:  I personally reviewed the following labs:  Recent Labs     03/12/23  0908 03/12/23  0944 03/13/23  0240   WBC 5.9  --  8.4   RBC 2.28*  --  3.03*   HGB 6.5* 8.8* 8.4*   HCT 21.4* 29.0* 28.7*   MCV 93.9  --  94.7   MCH 28.5  --  27.7   MCHC 30.4*  --  29.3*   RDW 19.9*  --  19.6*     --  324   MPV 11.4  --  11.4     Recent Labs     03/12/23  0908 03/13/23  0240    140   K 3.3* 3.6    105   CO2 26 24   BUN 9 14   CREATININE 0.6 0.5   GLUCOSE 151* 154*   CALCIUM 8.0* 8.3*     No results found for: BHYDRXBUT  No results found for: LABA1C  Lab Results   Component Value Date/Time    TSH 5.640 (H) 01/31/2023 08:17 PM    T4FREE 0.99 02/01/2023 09:10 AM    Z1SUKLJ 10.0 01/20/2011 03:00 PM     Lab Results   Component Value Date/Time    GLUCOSE 154 03/13/2023 02:40 AM    GLUCOSE 85 04/02/2012 08:30 AM     Lab Results   Component Value Date/Time    TRIG 102 02/06/2023 10:40 AM    HDL 23 02/06/2023 10:40 AM    LDLCALC 38 02/06/2023 10:40 AM    CHOL 81 02/06/2023 10:40 AM       Blood culture   Lab Results   Component Value Date/Time    BC 24 Hours no growth 03/09/2023 08:30 PM    BC 5 Days no growth 02/20/2023 05:43 PM    BC 5 Days no growth 02/02/2023 03:35 PM    BC  01/31/2023 11:25 PM     This organism was isolated in one set. Susceptibility testing is not routinely done as this  organism frequently represents skin contamination. Additional testing can be ordered by calling the  Microbiology Department. BC 5 Days- no growth 12/06/2018 02:00 PM    BC 5 Days- no growth 06/30/2018 01:32 PM    BC 5 Days- no growth 09/25/2017 04:45 PM    BC 5 Days- no growth 05/27/2017 12:00 PM       Radiology:  CT ABDOMEN PELVIS W IV CONTRAST Additional Contrast? None   Final Result   1. Mild prominence of the bladder wall. Clinical correlation for cystitis   recommended. Tiny locule of gas within the bladder may be due to infection   or recent catheterization. 2. Severe sigmoid diverticulosis without diverticulitis. 3. 12 mm cyst in the pancreatic tail, grossly stable at least as of   12/06/2018. Per the recommendations of the [employer] of Radiology,   imaging follow-up is recommended in 2 years. 4. Moderate to large hiatal hernia. 5. Small bilateral pleural effusions. XR CHEST PORTABLE   Final Result   No acute process.              Medical Records/Labs/Images Review:   I personally reviewed and summarized previous records   All labs and imaging were reviewed independently    Jose Alida, a 78 y.o.-old female seen in for management of adrenal insufficiency    Secondary Adrenal Insufficiency   Due to chronic steroid for atopic dermatitis   We recommend the following steroids   Hydrocortisone 20 mg BID for today and tomorrow then 15mg AM/5mg evening after that   Patient need to be on at least maintenance dose of steroid all the time and stress dose for any stressful events. Discussed the importance of wearing medical alert identification (bracelet, necklace)    HLD   Lipitor 10 mg daily     Interdisciplinary plan for communication with healthcare providers:   Consult recommendations were discussed with the Primary Service/Nursing staff      The above issues were reviewed with the patient who understood and agreed with the plan. Thank you for allowing us to participate in the care of this patient. Please do not hesitate to contact us with any additional questions. Almaz Altman MD  Endocrinologist, [facility]   [address] [place], [place] [address]   Phone: [phone]  Fax: [phone]  ---------------------------------  An electronic signature was used to authenticate this note.  Gabriel Esparza MD on 3/13/2023 at 7:28 PM

## 2023-03-13 NOTE — PROGRESS NOTES
Pt transferred to room 624 with belongings- cell phone with , eye glasses, tote bag with clothing items.  Report called

## 2023-03-13 NOTE — PROGRESS NOTES
[facility] Progress Note    Admitting Date and Time: 3/9/2023  7:13 PM  Admit Dx: Hypokalemia [E87.6]  Lactic acidosis [E87.20]  Pancreatic cyst [K86.2]  Acute cystitis with hematuria [N30.01]  Sepsis ([facility] [address] [A41.9]  Sepsis, due to unspecified organism, unspecified whether acute organ dysfunction present ([facility] [address] [A41.9]    Subjective:  Patient is being followed for Hypokalemia [E87.6]  Lactic acidosis [E87.20]  Pancreatic cyst [K86.2]  Acute cystitis with hematuria [N30.01]  Sepsis ([facility] [address] [A41.9]  Sepsis, due to unspecified organism, unspecified whether acute organ dysfunction present [facility]) [A41.9]     Patient has been transferred out of the ICU. Currently she denies any complaints. ROS: denies fever, chills, cp, sob, n/v, HA unless stated above.       cefepime  1,000 mg IntraVENous Q12H    hydrocortisone  20 mg Oral BID    [START ON 3/15/2023] hydrocortisone  15 mg Oral QAM    [START ON 3/15/2023] hydrocortisone  5 mg Oral QPM    aspirin  81 mg Oral Daily    atorvastatin  10 mg Oral Nightly    escitalopram  10 mg Oral Nightly    fluticasone  1 spray Each Nostril Daily    [Held by provider] midodrine  5 mg Oral TID WC    montelukast  10 mg Oral Daily    pantoprazole  40 mg Oral BID AC    polyethylene glycol  17 g Oral Daily    potassium chloride  40 mEq Oral Once    ferrous sulfate  325 mg Oral Daily with breakfast    budesonide-formoterol  2 puff Inhalation BID    thiamine  100 mg Oral Daily    enoxaparin  40 mg SubCUTAneous Daily     docusate sodium, 100 mg, BID PRN  guaiFENesin-codeine, 5 mL, TID PRN  ondansetron, 4 mg, Q6H PRN  HYDROmorphone, 0.25 mg, Q3H PRN  albuterol sulfate HFA, 2 puff, Q6H PRN  acetaminophen, 650 mg, Q6H PRN  LORazepam, 2 mg, Q8H PRN         Objective:    /88   Pulse (!) 107   Temp 98.8 °F (37.1 °C) (Oral)   Resp 18   Ht 5' 4\" (1.626 m)   Wt 124 lb 3.2 oz (56.3 kg)   SpO2 95%   BMI 21.32 kg/m²     General Appearance: alert and oriented to person, place and time and in no acute distress  Skin: warm and dry  Head: normocephalic and atraumatic  Eyes: pupils equal, round, and reactive to light, extraocular eye movements intact, conjunctivae normal  Neck: neck supple and non tender without mass   Pulmonary/Chest: clear to auscultation bilaterally- no wheezes, rales or rhonchi, normal air movement, no respiratory distress  Cardiovascular: normal rate, normal S1 and S2 and no carotid bruits  Abdomen: soft, non-tender, non-distended, normal bowel sounds, no masses or organomegaly  Extremities: no cyanosis, no clubbing and no edema  Neurologic: no cranial nerve deficit and speech normal        Recent Labs     03/10/23  0812 03/12/23  0908 03/13/23  0240    140 140   K 3.8 3.3* 3.6   * 104 105   CO2 23 26 24   BUN 12 9 14   CREATININE 0.6 0.6 0.5   GLUCOSE 149* 151* 154*   CALCIUM 7.7* 8.0* 8.3*       Recent Labs     03/10/23  0812 03/12/23  0908 03/12/23  0944 03/13/23  0240   WBC 7.1 5.9  --  8.4   RBC 3.19* 2.28*  --  3.03*   HGB 9.1* 6.5* 8.8* 8.4*   HCT 30.1* 21.4* 29.0* 28.7*   MCV 94.4 93.9  --  94.7   MCH 28.5 28.5  --  27.7   MCHC 30.2* 30.4*  --  29.3*   RDW 20.7* 19.9*  --  19.6*    228  --  324   MPV 11.1 11.4  --  11.4           Assessment:    Principal Problem:    Sepsis (HCC)  Active Problems:    Asthma    Failure to thrive in adult    Acute cystitis without hematuria    Secondary adrenal insufficiency (HCC)    HTN (hypertension), benign    Chronic back pain    Tachycardia  Resolved Problems:    * No resolved hospital problems. 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 Continue hydrocrotisone taper outlined by endocrinology. Acute hypoxic respiratory failure - Currently on 2 L NC  Chronic hypotension 2/2 #1 - Blood pressure improved  Complicated acute cystitis without hematuria - Antibiotics have been changed to cefepime.  UCx growing GNR oxidase positive (3/9)  Covid 19 positive however likely not active   Herpes labialis - Valacyclovir  Chronic normocytic anemia - Transfuse if hgb is less than 7. Continue iron supplementation  DVT prophylaxis - Lovenox     PT/OT, discharge planning        NOTE: This report was transcribed using voice recognition software. Every effort was made to ensure accuracy; however, inadvertent computerized transcription errors may be present.     Electronically signed by Genesis CEDILLO on 3/13/2023 at 9:11 AM

## 2023-03-13 NOTE — PLAN OF CARE
Problem: Safety - Adult  Goal: Free from fall injury  Outcome: Progressing     Problem: Discharge Planning  Goal: Discharge to home or other facility with appropriate resources  Outcome: Progressing     Problem: Skin/Tissue Integrity  Goal: Absence of new skin breakdown  Description: 1. Monitor for areas of redness and/or skin breakdown  2. Assess vascular access sites hourly  3. Every 4-6 hours minimum:  Change oxygen saturation probe site  4. Every 4-6 hours:  If on nasal continuous positive airway pressure, respiratory therapy assess nares and determine need for appliance change or resting period.   Outcome: Progressing     Problem: ABCDS Injury Assessment  Goal: Absence of physical injury  Outcome: Progressing     Problem: Pain  Goal: Verbalizes/displays adequate comfort level or baseline comfort level  Outcome: Progressing

## 2023-03-14 PROBLEM — E78.5 HYPERLIPIDEMIA: Status: ACTIVE | Noted: 2023-03-14

## 2023-03-14 PROBLEM — K21.9 GASTROESOPHAGEAL REFLUX DISEASE: Status: ACTIVE | Noted: 2023-03-14

## 2023-03-14 PROBLEM — N39.0 COMPLICATED UTI (URINARY TRACT INFECTION): Status: ACTIVE | Noted: 2023-03-14

## 2023-03-14 LAB
ANION GAP SERPL CALCULATED.3IONS-SCNC: 10 MMOL/L (ref 7–16)
BLOOD CULTURE, ROUTINE: NORMAL
BUN BLDV-MCNC: 12 MG/DL (ref 6–23)
CALCIUM SERPL-MCNC: 8.2 MG/DL (ref 8.6–10.2)
CHLORIDE BLD-SCNC: 107 MMOL/L (ref 98–107)
CO2: 25 MMOL/L (ref 22–29)
CREAT SERPL-MCNC: 0.5 MG/DL (ref 0.5–1)
GFR SERPL CREATININE-BSD FRML MDRD: >60 ML/MIN/1.73
GLUCOSE BLD-MCNC: 87 MG/DL (ref 74–99)
HCT VFR BLD CALC: 27.3 % (ref 34–48)
HEMOGLOBIN: 8.1 G/DL (ref 11.5–15.5)
MCH RBC QN AUTO: 27.7 PG (ref 26–35)
MCHC RBC AUTO-ENTMCNC: 29.7 % (ref 32–34.5)
MCV RBC AUTO: 93.5 FL (ref 80–99.9)
PDW BLD-RTO: 19.5 FL (ref 11.5–15)
PLATELET # BLD: 326 E9/L (ref 130–450)
PMV BLD AUTO: 10.8 FL (ref 7–12)
POTASSIUM SERPL-SCNC: 2.7 MMOL/L (ref 3.5–5)
POTASSIUM SERPL-SCNC: 4.5 MMOL/L (ref 3.5–5)
RBC # BLD: 2.92 E12/L (ref 3.5–5.5)
SODIUM BLD-SCNC: 142 MMOL/L (ref 132–146)
WBC # BLD: 10.7 E9/L (ref 4.5–11.5)

## 2023-03-14 PROCEDURE — 84132 ASSAY OF SERUM POTASSIUM: CPT

## 2023-03-14 PROCEDURE — 2580000003 HC RX 258: Performed by: STUDENT IN AN ORGANIZED HEALTH CARE EDUCATION/TRAINING PROGRAM

## 2023-03-14 PROCEDURE — 36415 COLL VENOUS BLD VENIPUNCTURE: CPT

## 2023-03-14 PROCEDURE — 85027 COMPLETE CBC AUTOMATED: CPT

## 2023-03-14 PROCEDURE — 2060000000 HC ICU INTERMEDIATE R&B

## 2023-03-14 PROCEDURE — 6370000000 HC RX 637 (ALT 250 FOR IP): Performed by: INTERNAL MEDICINE

## 2023-03-14 PROCEDURE — 99232 SBSQ HOSP IP/OBS MODERATE 35: CPT | Performed by: INTERNAL MEDICINE

## 2023-03-14 PROCEDURE — 6370000000 HC RX 637 (ALT 250 FOR IP): Performed by: NURSE PRACTITIONER

## 2023-03-14 PROCEDURE — 80048 BASIC METABOLIC PNL TOTAL CA: CPT

## 2023-03-14 PROCEDURE — 6360000002 HC RX W HCPCS: Performed by: NURSE PRACTITIONER

## 2023-03-14 PROCEDURE — 6360000002 HC RX W HCPCS: Performed by: INTERNAL MEDICINE

## 2023-03-14 PROCEDURE — 6360000002 HC RX W HCPCS: Performed by: STUDENT IN AN ORGANIZED HEALTH CARE EDUCATION/TRAINING PROGRAM

## 2023-03-14 PROCEDURE — 97165 OT EVAL LOW COMPLEX 30 MIN: CPT

## 2023-03-14 PROCEDURE — 99231 SBSQ HOSP IP/OBS SF/LOW 25: CPT | Performed by: INTERNAL MEDICINE

## 2023-03-14 RX ORDER — POTASSIUM CHLORIDE 20 MEQ/1
60 TABLET, EXTENDED RELEASE ORAL ONCE
Status: COMPLETED | OUTPATIENT
Start: 2023-03-14 | End: 2023-03-14

## 2023-03-14 RX ORDER — POTASSIUM CHLORIDE 7.45 MG/ML
10 INJECTION INTRAVENOUS
Status: COMPLETED | OUTPATIENT
Start: 2023-03-14 | End: 2023-03-14

## 2023-03-14 RX ADMIN — ESCITALOPRAM 10 MG: 10 TABLET, FILM COATED ORAL at 20:07

## 2023-03-14 RX ADMIN — FERROUS SULFATE TAB 325 MG (65 MG ELEMENTAL FE) 325 MG: 325 (65 FE) TAB at 08:56

## 2023-03-14 RX ADMIN — LORAZEPAM 2 MG: 1 TABLET ORAL at 20:12

## 2023-03-14 RX ADMIN — ASPIRIN 81 MG CHEWABLE TABLET 81 MG: 81 TABLET CHEWABLE at 08:54

## 2023-03-14 RX ADMIN — PANTOPRAZOLE SODIUM 40 MG: 40 TABLET, DELAYED RELEASE ORAL at 06:04

## 2023-03-14 RX ADMIN — MEROPENEM 1000 MG: 1 INJECTION, POWDER, FOR SOLUTION INTRAVENOUS at 04:30

## 2023-03-14 RX ADMIN — HYDROCORTISONE 20 MG: 20 TABLET ORAL at 20:06

## 2023-03-14 RX ADMIN — POTASSIUM CHLORIDE 10 MEQ: 7.45 INJECTION INTRAVENOUS at 07:44

## 2023-03-14 RX ADMIN — MONTELUKAST SODIUM 10 MG: 10 TABLET ORAL at 08:55

## 2023-03-14 RX ADMIN — PANTOPRAZOLE SODIUM 40 MG: 40 TABLET, DELAYED RELEASE ORAL at 16:20

## 2023-03-14 RX ADMIN — POTASSIUM CHLORIDE 60 MEQ: 20 TABLET, EXTENDED RELEASE ORAL at 05:23

## 2023-03-14 RX ADMIN — Medication 100 MG: at 08:55

## 2023-03-14 RX ADMIN — LORAZEPAM 2 MG: 1 TABLET ORAL at 06:04

## 2023-03-14 RX ADMIN — MEROPENEM 1000 MG: 1 INJECTION, POWDER, FOR SOLUTION INTRAVENOUS at 11:38

## 2023-03-14 RX ADMIN — ATORVASTATIN CALCIUM 10 MG: 10 TABLET, FILM COATED ORAL at 20:06

## 2023-03-14 RX ADMIN — Medication: at 20:14

## 2023-03-14 RX ADMIN — BUDESONIDE AND FORMOTEROL FUMARATE DIHYDRATE 2 PUFF: 160; 4.5 AEROSOL RESPIRATORY (INHALATION) at 08:56

## 2023-03-14 RX ADMIN — ENOXAPARIN SODIUM 40 MG: 100 INJECTION SUBCUTANEOUS at 08:55

## 2023-03-14 RX ADMIN — HYDROCORTISONE 20 MG: 20 TABLET ORAL at 08:55

## 2023-03-14 RX ADMIN — FLUTICASONE PROPIONATE 1 SPRAY: 50 SPRAY, METERED NASAL at 08:55

## 2023-03-14 RX ADMIN — POTASSIUM CHLORIDE 10 MEQ: 7.45 INJECTION INTRAVENOUS at 10:41

## 2023-03-14 RX ADMIN — Medication: at 08:54

## 2023-03-14 RX ADMIN — MEROPENEM 1000 MG: 1 INJECTION, POWDER, FOR SOLUTION INTRAVENOUS at 20:17

## 2023-03-14 RX ADMIN — POTASSIUM CHLORIDE 10 MEQ: 7.45 INJECTION INTRAVENOUS at 05:23

## 2023-03-14 RX ADMIN — BUDESONIDE AND FORMOTEROL FUMARATE DIHYDRATE 2 PUFF: 160; 4.5 AEROSOL RESPIRATORY (INHALATION) at 20:14

## 2023-03-14 ASSESSMENT — PAIN SCALES - GENERAL
PAINLEVEL_OUTOF10: 0

## 2023-03-14 NOTE — CARE COORDINATION
Transition of Care-Plan to replace potassium today, remains on IV Merrem. PT score is 17. ID is managing antibiotics, need recommendation/reconciliation. Patient declined home health care unless IV antibiotics are needed awaiting final cultures-ID and sensitivities of urine cx. Discharge plan is home with her sister, denied any further needs, CM following for possible home health care needs.     Dolly Linder BSN, RN  [facility]

## 2023-03-14 NOTE — PLAN OF CARE
Problem: Safety - Adult  Goal: Free from fall injury  3/14/2023 1643 by Santos Blake  Outcome: Progressing  3/14/2023 0255 by Jennifer John RN  Outcome: Progressing     Problem: Discharge Planning  Goal: Discharge to home or other facility with appropriate resources  3/14/2023 1643 by Santos Blake  Outcome: Progressing  3/14/2023 0255 by Jennifer John RN  Outcome: Progressing     Problem: Skin/Tissue Integrity  Goal: Absence of new skin breakdown  Description: 1. Monitor for areas of redness and/or skin breakdown  2. Assess vascular access sites hourly  3. Every 4-6 hours minimum:  Change oxygen saturation probe site  4. Every 4-6 hours:  If on nasal continuous positive airway pressure, respiratory therapy assess nares and determine need for appliance change or resting period.   3/14/2023 1643 by Santos Blake  Outcome: Progressing  3/14/2023 0255 by Jennifer John RN  Outcome: Progressing     Problem: ABCDS Injury Assessment  Goal: Absence of physical injury  Outcome: Progressing     Problem: Pain  Goal: Verbalizes/displays adequate comfort level or baseline comfort level  Outcome: Progressing

## 2023-03-14 NOTE — PROGRESS NOTES
Physician Progress Note      Wilber Marshall  CSN #:                  [id number]  :                       1945  ADMIT DATE:       3/9/2023 7:13 PM  [address] [place] DATE:  RESPONDING  PROVIDER #:        Pearl Cleaning DO          QUERY TEXT:    Dr. Delroy Ayers,    Patient admitted with UTI and noted to have previous Meza catheter. If   possible, please document in the progress notes and discharge summary if you   are evaluating and/or treating any of the following: The medical record reflects the following:  Risk Factors: UTI - previous meza catheter  Clinical Indicators: UA/UC+, per the 3/10 ID Consult note \"She was at home and   she was discharged with meza catheter from last admission. Meza was removed   yesterday. Rafaela Kennedy Rafaela Brent Sepsis/ complicated UTI: had meza at home which was removed   yesterday s/p macrobid for 2 days at home. \"  Treatment: ID Consult, IV cefepime, IV Rocephin, IV meropenem    Thank you,  Perez Case RN, CCDS  Clinical Documentation Integrity  [email or web address]  Options provided:  -- UTI due to previous urinary catheter  -- UTI not due to indwelling urinary catheter  -- Other - I will add my own diagnosis  -- Disagree - Not applicable / Not valid  -- Disagree - Clinically unable to determine / Unknown  -- Refer to Clinical Documentation Reviewer    PROVIDER RESPONSE TEXT:    UTI is due to the previous indwelling urinary catheter.     Query created by: Governor Aschoff on 3/13/2023 4:25 PM      Electronically signed by:  Pearl Cleaning DO 3/14/2023 8:17 AM

## 2023-03-14 NOTE — PROGRESS NOTES
ENDOCRINOLOGY PROGRESS NOTE    Date of Admission: 3/9/2023  Date of Service: 3/14/2023  Admitting Physician: Jacky Carroll DO   Primary Care Physician: Brendan Caruso MD  Consultant physician: Maximino Burrell MD     Reason for the consultation:  Secondary adrenal insufficiency     History of Present Illness: The history is provided by the patient. Accuracy of the patient data is excellent. Wesley Magaña is a very pleasant 70 y.o. old female with PMH of secondary adrenal insufficiency, asthma, atopic dermatitis, HLD and other listed below admitted to [facility] on 3/9/2023 because of N/V and fever, endocrine service was consulted for secondary hypothyroidism       Subjective:  Pt was seen and examined at bedside this evening, no acte events      Scheduled Meds:   meropenem  1,000 mg IntraVENous Q8H    ammonium lactate   Topical BID    hydrocortisone  20 mg Oral BID    [START ON 3/15/2023] hydrocortisone  15 mg Oral QAM    [START ON 3/15/2023] hydrocortisone  5 mg Oral QPM    aspirin  81 mg Oral Daily    atorvastatin  10 mg Oral Nightly    escitalopram  10 mg Oral Nightly    fluticasone  1 spray Each Nostril Daily    [Held by provider] midodrine  5 mg Oral TID WC    montelukast  10 mg Oral Daily    pantoprazole  40 mg Oral BID AC    polyethylene glycol  17 g Oral Daily    potassium chloride  40 mEq Oral Once    ferrous sulfate  325 mg Oral Daily with breakfast    budesonide-formoterol  2 puff Inhalation BID    thiamine  100 mg Oral Daily    enoxaparin  40 mg SubCUTAneous Daily     PRN Meds:   docusate sodium, 100 mg, BID PRN  guaiFENesin-codeine, 5 mL, TID PRN  ondansetron, 4 mg, Q6H PRN  HYDROmorphone, 0.25 mg, Q3H PRN  albuterol sulfate HFA, 2 puff, Q6H PRN  acetaminophen, 650 mg, Q6H PRN  LORazepam, 2 mg, Q8H PRN    Continuous Infusions:      Review of Systems  All systems reviewed.  All negative except for symptoms mentioned in HPI   Constitutional: No fever, no chills, no diaphoresis, no generalized weakness.  HEENT: No blurred vision, No sore throat, no ear pain, no hair loss  Neck: denied any neck swelling, difficulty swallowing,   Cadrdiopulomary: No CP, SOB or palpitation, No orthopnea or PND. No cough or wheezing.  GI: No N/V/D, no constipation, No abdominal pain, no melena or hematochezia   : Denied any dysuria, hematuria, flank pain, discharge, or incontinence.   Skin: denied any rash, ulcer, or hyperpigmentation.  MSK: denied any joint deformity, joint pain/swelling, muscle pain, or back pain.  Neuro: no numbess, no tingling, no weakness    OBJECTIVE    /72   Pulse 97   Temp 97.9 °F (36.6 °C) (Oral)   Resp 16   Ht 5' 4\" (1.626 m)   Wt 127 lb 6 oz (57.8 kg)   SpO2 99%   BMI 21.86 kg/m²   Wt Readings from Last 6 Encounters:   03/14/23 127 lb 6 oz (57.8 kg)   02/27/23 129 lb (58.5 kg)   02/14/23 128 lb (58.1 kg)   02/07/23 128 lb (58.1 kg)   02/04/23 152 lb (68.9 kg)   01/28/23 128 lb (58.1 kg)       Physical examination:  General: awake alert, oriented x3, no abnormal position or movements.   HEENT: normocephalic non traumatic   Pulm: good equal air entry no added sounds   CVS: S1 + S2   Abd: soft lax, no tenderness,   Skin: warm, no lesions, no rash.    Musculoskeletal: No back tenderness, no kyphosis/scoliosis    Neuro: CN intact, sensation notmal , muscle power normal.    Psych: normal mood, and affect    Review of Laboratory Data:  I personally reviewed the following labs:  Recent Labs     03/12/23  0908 03/12/23  0944 03/13/23  0240 03/14/23  0338   WBC 5.9  --  8.4 10.7   RBC 2.28*  --  3.03* 2.92*   HGB 6.5* 8.8* 8.4* 8.1*   HCT 21.4* 29.0* 28.7* 27.3*   MCV 93.9  --  94.7 93.5   MCH 28.5  --  27.7 27.7   MCHC 30.4*  --  29.3* 29.7*   RDW 19.9*  --  19.6* 19.5*     --  324 326   MPV 11.4  --  11.4 10.8     Recent Labs     03/12/23  0908 03/13/23  0240 03/14/23  0338 03/14/23  1255    140 142  --    K 3.3* 3.6 2.7* 4.5    105 107  --    CO2 26 24 25  --    BUN 9 14  12  --    CREATININE 0.6 0.5 0.5  --    GLUCOSE 151* 154* 87  --    CALCIUM 8.0* 8.3* 8.2*  --      No results found for: BHYDRXBUT  No results found for: LABA1C  Lab Results   Component Value Date/Time    TSH 5.640 (H) 01/31/2023 08:17 PM    T4FREE 0.99 02/01/2023 09:10 AM    G7LBHUM 10.0 01/20/2011 03:00 PM     Lab Results   Component Value Date/Time    GLUCOSE 87 03/14/2023 03:38 AM    GLUCOSE 85 04/02/2012 08:30 AM     Lab Results   Component Value Date/Time    TRIG 102 02/06/2023 10:40 AM    HDL 23 02/06/2023 10:40 AM    LDLCALC 38 02/06/2023 10:40 AM    CHOL 81 02/06/2023 10:40 AM       Blood culture   Lab Results   Component Value Date/Time    BC 24 Hours no growth 03/09/2023 08:30 PM    BC 5 Days no growth 02/20/2023 05:43 PM    BC 5 Days no growth 02/02/2023 03:35 PM    BC  01/31/2023 11:25 PM     This organism was isolated in one set. Susceptibility testing is not routinely done as this  organism frequently represents skin contamination. Additional testing can be ordered by calling the  Microbiology Department. BC 5 Days- no growth 12/06/2018 02:00 PM    BC 5 Days- no growth 06/30/2018 01:32 PM    BC 5 Days- no growth 09/25/2017 04:45 PM    BC 5 Days- no growth 05/27/2017 12:00 PM       Radiology:  CT ABDOMEN PELVIS W IV CONTRAST Additional Contrast? None   Final Result   1. Mild prominence of the bladder wall. Clinical correlation for cystitis   recommended. Tiny locule of gas within the bladder may be due to infection   or recent catheterization. 2. Severe sigmoid diverticulosis without diverticulitis. 3. 12 mm cyst in the pancreatic tail, grossly stable at least as of   12/06/2018. Per the recommendations of the [employer] of Radiology,   imaging follow-up is recommended in 2 years. 4. Moderate to large hiatal hernia. 5. Small bilateral pleural effusions. XR CHEST PORTABLE   Final Result   No acute process.              Medical Records/Labs/Images Review:   I personally reviewed and summarized previous records   All labs and imaging were reviewed independently    [address], a 70 y.o.-old female seen in for management of adrenal insufficiency    Secondary Adrenal Insufficiency   Due to chronic steroid for atopic dermatitis   We recommend the following steroids   Hydrocortisone 20 mg BID for today then 15mg AM/5mg evening after that   Patient need to be on at least maintenance dose of steroid all the time and stress dose for any stressful events. Discussed the importance of wearing medical alert identification (bracelet, necklace)    HLD   Lipitor 10 mg daily     Interdisciplinary plan for communication with healthcare providers:   Consult recommendations were discussed with the Primary Service/Nursing staff      The above issues were reviewed with the patient who understood and agreed with the plan. Thank you for allowing us to participate in the care of this patient. Please do not hesitate to contact us with any additional questions. Paula Blake MD  Endocrinologist, [facility]   [address] [place] [id number]   Phone: [phone]  Fax: [phone]  ---------------------------------  An electronic signature was used to authenticate this note.  Jez Coffey MD on 3/14/2023 at 7:56 PM

## 2023-03-14 NOTE — PROGRESS NOTES
[address] [place] [address] [facility]  Progress Note    SUBJECTIVE:  Chief Complaint   Patient presents with    Fever    Emesis    Abdominal Pain     Tolerating current antibiotics   Getting a potassium infusion that is burning a little  No fevers   Feels well. Review of systems:  As stated above in the chief complaint, otherwise negative. Medications:  Scheduled Meds:   meropenem  1,000 mg IntraVENous Q8H    ammonium lactate   Topical BID    hydrocortisone  20 mg Oral BID    [START ON 3/15/2023] hydrocortisone  15 mg Oral QAM    [START ON 3/15/2023] hydrocortisone  5 mg Oral QPM    aspirin  81 mg Oral Daily    atorvastatin  10 mg Oral Nightly    escitalopram  10 mg Oral Nightly    fluticasone  1 spray Each Nostril Daily    [Held by provider] midodrine  5 mg Oral TID WC    montelukast  10 mg Oral Daily    pantoprazole  40 mg Oral BID AC    polyethylene glycol  17 g Oral Daily    potassium chloride  40 mEq Oral Once    ferrous sulfate  325 mg Oral Daily with breakfast    budesonide-formoterol  2 puff Inhalation BID    thiamine  100 mg Oral Daily    enoxaparin  40 mg SubCUTAneous Daily     Continuous Infusions:  PRN Meds:docusate sodium, guaiFENesin-codeine, ondansetron, HYDROmorphone, albuterol sulfate HFA, acetaminophen, LORazepam    OBJECTIVE:  /77   Pulse (!) 101   Temp 97.8 °F (36.6 °C) (Axillary)   Resp 20   Ht 5' 4\" (1.626 m)   Wt 127 lb 6 oz (57.8 kg)   SpO2 96%   BMI 21.86 kg/m²   Temp  Av.6 °F (36.4 °C)  Min: 97.4 °F (36.3 °C)  Max: 97.8 °F (36.6 °C)  Constitutional: The patient is awake, alert, and oriented. No distress. Up in the chair. Skin: Warm and dry. No rash. HEENT: Round and reactive pupils. Moist mucous membranes. No thrush. Lesions on the lip are improved. Neck: Supple to movements. Chest: No respiratory distress. No crackles. Cardiovascular: S1 and S2 are rhythmic and regular. No murmurs appreciated.    Abdomen: Positive bowel sounds to auscultation. Benign to palpation. Extremities: No edema.   Lines: Peripheral.    Laboratory and Tests Review:  Lab Results   Component Value Date    WBC 10.7 03/14/2023    WBC 8.4 03/13/2023    WBC 5.9 03/12/2023    HGB 8.1 (L) 03/14/2023    HCT 27.3 (L) 03/14/2023    MCV 93.5 03/14/2023     03/14/2023     Lab Results   Component Value Date    NEUTROABS 6.89 03/10/2023    NEUTROABS 8.97 (H) 03/09/2023    NEUTROABS 4.76 02/28/2023     No results found for: CRPHS  Lab Results   Component Value Date    ALT 8 03/09/2023    AST 15 03/09/2023    ALKPHOS 74 03/09/2023    BILITOT 0.3 03/09/2023     Lab Results   Component Value Date/Time     03/14/2023 03:38 AM    K 2.7 03/14/2023 03:38 AM    K 7.5 02/22/2023 09:45 AM     03/14/2023 03:38 AM    CO2 25 03/14/2023 03:38 AM    BUN 12 03/14/2023 03:38 AM    CREATININE 0.5 03/14/2023 03:38 AM    CREATININE 0.5 03/13/2023 02:40 AM    CREATININE 0.6 03/12/2023 09:08 AM    GFRAA >60 01/12/2019 04:00 AM    LABGLOM >60 03/14/2023 03:38 AM    GLUCOSE 87 03/14/2023 03:38 AM    GLUCOSE 85 04/02/2012 08:30 AM    PROT 5.9 03/09/2023 08:19 PM    LABALBU 3.1 03/09/2023 08:19 PM    LABALBU 3.6 04/02/2012 08:30 AM    CALCIUM 8.2 03/14/2023 03:38 AM    BILITOT 0.3 03/09/2023 08:19 PM    ALKPHOS 74 03/09/2023 08:19 PM    AST 15 03/09/2023 08:19 PM    ALT 8 03/09/2023 08:19 PM     Lab Results   Component Value Date    CRP 13.8 (H) 03/10/2023    CRP 12.8 (H) 02/21/2023    CRP 24.1 (H) 02/01/2023     Lab Results   Component Value Date    SEDRATE 61 (H) 03/10/2023    SEDRATE 26 (H) 02/21/2023    SEDRATE 42 (H) 02/01/2023     Radiology:      Microbiology:   Respiratory panel 3/9/2023: SARS-CoV-2  Blood cultures 3/9/2023: Negative so far  Urine screen MRSA: Negative  Rapid influenza: Negative  Rapid SARS-CoV-2: Negative  Urine culture 3/9/2023: >100 K Ox + GNR     ASSESSMENT:  Complicated UTI with sepsis-improving  Fever associated to the above  SARS-CoV-2 infection in January 2023.  A rapid SARS-CoV-2 here was negative. Respiratory panel, which is more sensitive, he is positive. I doubt patient has active or ongoing SARS-CoV-2 infection  Probable herpes labialis, improved    PLAN:  Continue Merrem for 2 more days    Labs and cultures reviewed    STEVE Mir CNP  10:46 AM  3/14/2023    Pt seen and examined. Above discussed agree with advanced practice nurse. Labs, cultures, and radiographs reviewed. Face to Face encounter occurred. Changes made as necessary. She was tearful that she wants to go home. She will need one more day of antibiotics and possibly can go home from my end tomorrow. She calmed down. No fever overnight. abdominal symptoms are better.      Scott Park MD

## 2023-03-14 NOTE — PROGRESS NOTES
Occupational Therapy    OCCUPATIONAL THERAPY INITIAL EVALUATION     Esmer Wilburn [place], [place]         ICOW:3/22/9110                                                  Patient Name: Zaire Pittman    MRN: [id number]    : 1945    Room: [address] [place] [address]      Evaluating OT: Coleman Burt OTR/L   [id number]      Referring Savannah Coleman DO    Specific Provider Orders/Date:OT eval and treat 3/12/2023      Diagnosis:  Hypokalemia [E87.6]  Lactic acidosis [E87.20]  Pancreatic cyst [K86.2]  Acute cystitis with hematuria [N30.01]  Sepsis ([facility] [A41.9]  Sepsis, due to unspecified organism, unspecified whether acute organ dysfunction present [facility]) [A41.9]     Pertinent Medical History: asthma, anxiety, back surgery      Precautions:  Fall Risk,      Assessment of current deficits    [x] Functional mobility  [x]ADLs  [x] Strength               []Cognition    [x] Functional transfers   [x] IADLs         [x] Safety Awareness   [x]Endurance    [] Fine Coordination              [x] Balance      [] Vision/perception   []Sensation     []Gross Motor Coordination  [] ROM  [] Delirium                   [] Motor Control     OT PLAN OF CARE   OT POC based on physician orders, patient diagnosis and results of clinical assessment    Frequency/Duration  2-4 days/wk for 2 weeks PRN   Specific OT Treatment Interventions to include:   ADL retraining/adapted techniques and AE recommendations to increase functional independence within precautions                    Energy conservation techniques to improve tolerance for selfcare routine   Functional transfer/mobility training/DME recommendations for increased independence, safety and fall prevention         Patient/family education to increase safety and functional independence             Environmental modifications for safe mobility and completion of ADLs                             Therapeutic activity to improve functional performance during ADLs. Therapeutic exercise to improve tolerance and functional strength for ADLs    Balance retraining/tolerance tasks for facilitation of postural control with dynamic challenges during ADLs . Positioning to improve functional independence  []    Recommended Adaptive Equipment: TBD     Home Living: Pt lives with sister, 1 st floor set-up. Bathroom setup: walk in shower on 2nd floor , stair glide    Equipment owned: walker     Prior Level of Function: assist  with lower body ADLs , assist  with IADLs; ambulated with walker       Pain Level: no pain this session ;   Cognition: A&O: pleasant, following commands, conversing    Memory:  fair+   Sequencing:  fair+   Problem solving:  fair+   Judgement/safety:  fair+     Functional Assessment:  AM-PAC Daily Activity Raw Score: 17/24   Initial Eval Status  Date: 3/14/2023 Treatment Status  Date: STGs = LTGs  Time frame: 10-14 days   Feeding Independent      Grooming SBA/set-up   Standing at sink washing hands   Supervision    UB Dressing Min A   Supervision    LB Dressing Min A  Supervision    Bathing Min a   Supervision    Toileting Min A  Assist with thorough hygiene  SBA    Bed Mobility  SBA  Supine to sit   Supervision    Functional Transfers CGA/SBA   Sit - stand from bed , commode   Supervision    Functional Mobility CGA/SBA , w/walker   Ambulating to/from bathroom   Supervision  with good tolerance    Balance Sitting:     Static:  Independent     Dynamic:SBA   Standing: SBA   Independent/supervision    Activity Tolerance No SOB   Good  with ADL activity    Visual/  Perceptual Glasses: reading                 Hand Dominance right   AROM (PROM)   RUE  Shoulder ROM limited, approx 90 degrees   Distally WFLs     Hearing: WFL   Sensation:  No c/o numbness or tingling   Tone: WFL   Edema: none observed     Comments: Upon arrival patient lying in bed .   At end of session, patient sitting in chair  with call light and phone within reach, all lines and tubes intact. Overall patient demonstrated  decreased independence and safety during completion of ADL/functional transfer/mobility tasks. Pt would benefit from continued skilled OT to increase safety and independence with completion of ADL/IADL tasks for functional independence and quality of life. Rehab Potential: good for established goals     Patient / Family Goal: return home       Patient and/or family were instructed on functional diagnosis, prognosis/goals and OT plan of care. Demonstrated good  understanding. Eval Complexity: Low    Time In: 0945  Time Out: 1000      Min Units   OT Eval Low 97165 x  1   OT Eval Medium 59576      OT Eval High 31053      OT Re-Eval [id number]       Therapeutic Ex 62918      Therapeutic Activities 35645       ADL/Self Care 93739       Orthotic Management 44018       Manual 73100     Neuro Re-Ed 91091       Non-Billable Time         Evaluation Time additionally includes thorough review of current medical information, gathering information on past medical history/social history and prior level of function, interpretation of standardized testing/informal observation of tasks, assessment of data and development of plan of care and goals.             Debra Reinoso  OTR/L  OT [id number]

## 2023-03-14 NOTE — PLAN OF CARE
Problem: Safety - Adult  Goal: Free from fall injury  3/14/2023 0255 by Jarret Atkinson RN  Outcome: Progressing  3/13/2023 1504 by Gail Herrera  Outcome: Progressing     Problem: Discharge Planning  Goal: Discharge to home or other facility with appropriate resources  3/14/2023 0255 by Jarret Atkinson RN  Outcome: Progressing  3/13/2023 1504 by Gail Herrera  Outcome: Progressing     Problem: Skin/Tissue Integrity  Goal: Absence of new skin breakdown  Description: 1. Monitor for areas of redness and/or skin breakdown  2. Assess vascular access sites hourly  3. Every 4-6 hours minimum:  Change oxygen saturation probe site  4. Every 4-6 hours:  If on nasal continuous positive airway pressure, respiratory therapy assess nares and determine need for appliance change or resting period.   3/14/2023 0255 by Jarret Atkinson RN  Outcome: Progressing  3/13/2023 1504 by Gail Herrera  Outcome: Progressing     Problem: ABCDS Injury Assessment  Goal: Absence of physical injury  3/13/2023 1504 by Gail Herrera  Outcome: Progressing     Problem: Pain  Goal: Verbalizes/displays adequate comfort level or baseline comfort level  3/13/2023 1504 by Gail Herrera  Outcome: Progressing

## 2023-03-14 NOTE — PROGRESS NOTES
[facility] Progress Note    Admitting Date and Time: 3/9/2023  7:13 PM  Admit Dx: Hypokalemia [E87.6]  Lactic acidosis [E87.20]  Pancreatic cyst [K86.2]  Acute cystitis with hematuria [N30.01]  Sepsis ([facility]) [A41.9]  Sepsis, due to unspecified organism, unspecified whether acute organ dysfunction present ([facility]) [A41.9]    Subjective:  Patient is being followed for Hypokalemia [E87.6]  Lactic acidosis [E87.20]  Pancreatic cyst [K86.2]  Acute cystitis with hematuria [N30.01]  Sepsis ([facility]) [A41.9]  Sepsis, due to unspecified organism, unspecified whether acute organ dysfunction present [facility]) [A41.9]     Patient seen sitting up in the chair. She is alert and in no apparent distress. No acute issues at this time. ROS: denies fever, chills, cp, sob, n/v, HA unless stated above.       meropenem  1,000 mg IntraVENous Q8H    ammonium lactate   Topical BID    hydrocortisone  20 mg Oral BID    [START ON 3/15/2023] hydrocortisone  15 mg Oral QAM    [START ON 3/15/2023] hydrocortisone  5 mg Oral QPM    aspirin  81 mg Oral Daily    atorvastatin  10 mg Oral Nightly    escitalopram  10 mg Oral Nightly    fluticasone  1 spray Each Nostril Daily    [Held by provider] midodrine  5 mg Oral TID WC    montelukast  10 mg Oral Daily    pantoprazole  40 mg Oral BID AC    polyethylene glycol  17 g Oral Daily    potassium chloride  40 mEq Oral Once    ferrous sulfate  325 mg Oral Daily with breakfast    budesonide-formoterol  2 puff Inhalation BID    thiamine  100 mg Oral Daily    enoxaparin  40 mg SubCUTAneous Daily     docusate sodium, 100 mg, BID PRN  guaiFENesin-codeine, 5 mL, TID PRN  ondansetron, 4 mg, Q6H PRN  HYDROmorphone, 0.25 mg, Q3H PRN  albuterol sulfate HFA, 2 puff, Q6H PRN  acetaminophen, 650 mg, Q6H PRN  LORazepam, 2 mg, Q8H PRN         Objective:    /77   Pulse 97   Temp 97.9 °F (36.6 °C) (Axillary)   Resp 18   Ht 5' 4\" (1.626 m)   Wt 127 lb 6 oz (57.8 kg)   SpO2 98%   BMI 21.86 kg/m² General Appearance: alert and oriented to person, place and time and in no acute distress  Skin: warm and dry  Head: normocephalic and atraumatic  Eyes: pupils equal, round, and reactive to light, extraocular eye movements intact, conjunctivae normal  Neck: neck supple and non tender without mass   Pulmonary/Chest: clear to auscultation bilaterally- no wheezes, rales or rhonchi, normal air movement, no respiratory distress  Cardiovascular: normal rate, normal S1 and S2 and no carotid bruits  Abdomen: soft, non-tender, non-distended, normal bowel sounds, no masses or organomegaly  Extremities: no cyanosis, no clubbing and no edema  Neurologic: no cranial nerve deficit and speech normal        Recent Labs     03/12/23  0908 03/13/23  0240 03/14/23  0338 03/14/23  1255    140 142  --    K 3.3* 3.6 2.7* 4.5    105 107  --    CO2 26 24 25  --    BUN 9 14 12  --    CREATININE 0.6 0.5 0.5  --    GLUCOSE 151* 154* 87  --    CALCIUM 8.0* 8.3* 8.2*  --        Recent Labs     03/12/23  0908 03/12/23  0944 03/13/23  0240 03/14/23  0338   WBC 5.9  --  8.4 10.7   RBC 2.28*  --  3.03* 2.92*   HGB 6.5* 8.8* 8.4* 8.1*   HCT 21.4* 29.0* 28.7* 27.3*   MCV 93.9  --  94.7 93.5   MCH 28.5  --  27.7 27.7   MCHC 30.4*  --  29.3* 29.7*   RDW 19.9*  --  19.6* 19.5*     --  324 326   MPV 11.4  --  11.4 10.8           Assessment:    Principal Problem:    Sepsis (HCC)  Active Problems:    Asthma    Failure to thrive in adult    Acute cystitis without hematuria    Secondary adrenal insufficiency (HCC)    HTN (hypertension), benign    Chronic back pain    Tachycardia  Resolved Problems:    * No resolved hospital problems. *      Plan:  Complicated UTI with sepsis: UC positive oxydase positive GNR > 100K. Blood cultures preliminary no growth. Antibiotics changed to Merrem for 3 days- ID input appreciated  Hypoxic respiratory failure: On 2 L nc. CXR negative for acute process. Covid 19: Likely no longer active.    Herpes Labialis: Continue acyclovir  Chronic normocytic anemia: Hgb trending down. Hgb 8.8>8.4>8.1. Monitor H&H. Transfuse for Hgb < 7  Adrenal Insufficiency: 2/2 chronic steroid use.hydrocortisone 20 mg during the day and 10 at night. Continue hydrocrotisone taper outlined by endocrinology- Endocrinology following  Chronic hypotension: 2/2 adrenal insuffiencey. Disposition: Patient would like to be discharged to home. She lives with her sister and has her daughter er to assist at home. AMPAC score 17/24. Case management following    NOTE: This report was transcribed using voice recognition software. Every effort was made to ensure accuracy; however, inadvertent computerized transcription errors may be present. Electronically signed by STEVE Davis CNP on 3/14/2023 at 2:53 PM  HOSPITALIST ATTENDING PHYSICIAN NOTE 3/14/2023 1627PM:    Details of the evaluation - subjective assessment (including medication profile, past medical, family and social history when applicable), examination, review of lab and test data, diagnostic impressions and medical decision making - performed by STEVE Davis CNP, were discussed with me on the date of service and I agree with clinical information herein unless otherwise noted. The patient has been evaluated by me personally earlier today. Pt reports no fevers, chills,n/v.     Exam: heart reg at rate of 96, lungs cta, abd pos bs soft nt, ext neg for le edema    I agree with the assessment and plan of STEVE Davis CNP    Sepsis(fever, tachycardia, infection)POA  Complicated uti  Secondary adrenal insufficiency  hypokalemia  Recent covid infection with positive test doubt active infection  Probable herpes labialis  Gerd  Hyperlipidemia      ID note reviewed. Hopeful discharge tomorrow. Electronically signed by Diego Segal D.O.   Hospitalist  [facility]ist [facility] at [facility]

## 2023-03-15 VITALS
DIASTOLIC BLOOD PRESSURE: 68 MMHG | TEMPERATURE: 97.8 F | BODY MASS INDEX: 21.17 KG/M2 | RESPIRATION RATE: 16 BRPM | OXYGEN SATURATION: 97 % | WEIGHT: 124 LBS | SYSTOLIC BLOOD PRESSURE: 127 MMHG | HEART RATE: 96 BPM | HEIGHT: 64 IN

## 2023-03-15 LAB
ALBUMIN SERPL-MCNC: 2.9 G/DL (ref 3.5–5.2)
ALP BLD-CCNC: 54 U/L (ref 35–104)
ALT SERPL-CCNC: 11 U/L (ref 0–32)
ANION GAP SERPL CALCULATED.3IONS-SCNC: 9 MMOL/L (ref 7–16)
AST SERPL-CCNC: 12 U/L (ref 0–31)
BACTERIA BLD CULT ORG #2: NORMAL
BILIRUB SERPL-MCNC: <0.2 MG/DL (ref 0–1.2)
BUN BLDV-MCNC: 12 MG/DL (ref 6–23)
CALCIUM SERPL-MCNC: 8.4 MG/DL (ref 8.6–10.2)
CHLORIDE BLD-SCNC: 107 MMOL/L (ref 98–107)
CO2: 23 MMOL/L (ref 22–29)
CREAT SERPL-MCNC: 0.6 MG/DL (ref 0.5–1)
GFR SERPL CREATININE-BSD FRML MDRD: >60 ML/MIN/1.73
GLUCOSE BLD-MCNC: 130 MG/DL (ref 74–99)
HCT VFR BLD CALC: 27.5 % (ref 34–48)
HEMOGLOBIN: 8 G/DL (ref 11.5–15.5)
MCH RBC QN AUTO: 27.4 PG (ref 26–35)
MCHC RBC AUTO-ENTMCNC: 29.1 % (ref 32–34.5)
MCV RBC AUTO: 94.2 FL (ref 80–99.9)
PDW BLD-RTO: 19.2 FL (ref 11.5–15)
PLATELET # BLD: 345 E9/L (ref 130–450)
PMV BLD AUTO: 11.6 FL (ref 7–12)
POTASSIUM SERPL-SCNC: 3.6 MMOL/L (ref 3.5–5)
RBC # BLD: 2.92 E12/L (ref 3.5–5.5)
SODIUM BLD-SCNC: 139 MMOL/L (ref 132–146)
TOTAL PROTEIN: 5.1 G/DL (ref 6.4–8.3)
WBC # BLD: 10.1 E9/L (ref 4.5–11.5)

## 2023-03-15 PROCEDURE — 6370000000 HC RX 637 (ALT 250 FOR IP): Performed by: INTERNAL MEDICINE

## 2023-03-15 PROCEDURE — 80053 COMPREHEN METABOLIC PANEL: CPT

## 2023-03-15 PROCEDURE — 36415 COLL VENOUS BLD VENIPUNCTURE: CPT

## 2023-03-15 PROCEDURE — 99239 HOSP IP/OBS DSCHRG MGMT >30: CPT | Performed by: INTERNAL MEDICINE

## 2023-03-15 PROCEDURE — 97535 SELF CARE MNGMENT TRAINING: CPT

## 2023-03-15 PROCEDURE — 99231 SBSQ HOSP IP/OBS SF/LOW 25: CPT | Performed by: INTERNAL MEDICINE

## 2023-03-15 PROCEDURE — 6360000002 HC RX W HCPCS: Performed by: STUDENT IN AN ORGANIZED HEALTH CARE EDUCATION/TRAINING PROGRAM

## 2023-03-15 PROCEDURE — 99238 HOSP IP/OBS DSCHRG MGMT 30/<: CPT | Performed by: INTERNAL MEDICINE

## 2023-03-15 PROCEDURE — 2580000003 HC RX 258: Performed by: STUDENT IN AN ORGANIZED HEALTH CARE EDUCATION/TRAINING PROGRAM

## 2023-03-15 PROCEDURE — 6360000002 HC RX W HCPCS: Performed by: REGISTERED NURSE

## 2023-03-15 PROCEDURE — 85027 COMPLETE CBC AUTOMATED: CPT

## 2023-03-15 PROCEDURE — 2580000003 HC RX 258: Performed by: REGISTERED NURSE

## 2023-03-15 PROCEDURE — 6360000002 HC RX W HCPCS: Performed by: INTERNAL MEDICINE

## 2023-03-15 RX ORDER — HYDROCORTISONE 5 MG/1
15 TABLET ORAL EVERY MORNING
Qty: 30 TABLET | Refills: 0 | Status: SHIPPED | OUTPATIENT
Start: 2023-03-16

## 2023-03-15 RX ORDER — HYDROCORTISONE 5 MG/1
5 TABLET ORAL EVERY EVENING
Qty: 30 TABLET | Refills: 0 | Status: SHIPPED | OUTPATIENT
Start: 2023-03-15 | End: 2023-03-25 | Stop reason: SDUPTHER

## 2023-03-15 RX ADMIN — BUDESONIDE AND FORMOTEROL FUMARATE DIHYDRATE 2 PUFF: 160; 4.5 AEROSOL RESPIRATORY (INHALATION) at 09:42

## 2023-03-15 RX ADMIN — PANTOPRAZOLE SODIUM 40 MG: 40 TABLET, DELAYED RELEASE ORAL at 17:33

## 2023-03-15 RX ADMIN — ASPIRIN 81 MG CHEWABLE TABLET 81 MG: 81 TABLET CHEWABLE at 09:36

## 2023-03-15 RX ADMIN — FLUTICASONE PROPIONATE 1 SPRAY: 50 SPRAY, METERED NASAL at 09:42

## 2023-03-15 RX ADMIN — HYDROCORTISONE 5 MG: 5 TABLET ORAL at 18:10

## 2023-03-15 RX ADMIN — LORAZEPAM 2 MG: 1 TABLET ORAL at 09:36

## 2023-03-15 RX ADMIN — FERROUS SULFATE TAB 325 MG (65 MG ELEMENTAL FE) 325 MG: 325 (65 FE) TAB at 09:35

## 2023-03-15 RX ADMIN — MEROPENEM 1000 MG: 1 INJECTION, POWDER, FOR SOLUTION INTRAVENOUS at 13:17

## 2023-03-15 RX ADMIN — MONTELUKAST SODIUM 10 MG: 10 TABLET ORAL at 09:36

## 2023-03-15 RX ADMIN — HYDROCORTISONE 15 MG: 5 TABLET ORAL at 09:35

## 2023-03-15 RX ADMIN — ENOXAPARIN SODIUM 40 MG: 100 INJECTION SUBCUTANEOUS at 09:35

## 2023-03-15 RX ADMIN — Medication 100 MG: at 09:36

## 2023-03-15 RX ADMIN — Medication: at 09:40

## 2023-03-15 RX ADMIN — MEROPENEM 1000 MG: 1 INJECTION, POWDER, FOR SOLUTION INTRAVENOUS at 04:01

## 2023-03-15 RX ADMIN — PANTOPRAZOLE SODIUM 40 MG: 40 TABLET, DELAYED RELEASE ORAL at 06:48

## 2023-03-15 ASSESSMENT — PAIN SCALES - GENERAL: PAINLEVEL_OUTOF10: 0

## 2023-03-15 NOTE — PROGRESS NOTES
ENDOCRINOLOGY PROGRESS NOTE    Date of Admission: 3/9/2023  Date of Service: 3/15/2023  Admitting Physician: Chayo Smith DO   Primary Care Physician: Yemi Diallo MD  Consultant physician: Rey Carlson MD     Reason for the consultation:  Secondary adrenal insufficiency     History of Present Illness: The history is provided by the patient. Accuracy of the patient data is excellent. Heydi Garcia is a very pleasant 70 y.o. old female with PMH of secondary adrenal insufficiency, asthma, atopic dermatitis, HLD and other listed below admitted [facility]/9/2023 because of N/V and fever, endocrine service was consulted for secondary hypothyroidism       Subjective:  Pt was seen and examined at bedside this morning, no complaints, doing well. Scheduled Meds:   meropenem  1,000 mg IntraVENous Q8H    ammonium lactate   Topical BID    hydrocortisone  15 mg Oral QAM    hydrocortisone  5 mg Oral QPM    aspirin  81 mg Oral Daily    atorvastatin  10 mg Oral Nightly    escitalopram  10 mg Oral Nightly    fluticasone  1 spray Each Nostril Daily    [Held by provider] midodrine  5 mg Oral TID WC    montelukast  10 mg Oral Daily    pantoprazole  40 mg Oral BID AC    polyethylene glycol  17 g Oral Daily    potassium chloride  40 mEq Oral Once    ferrous sulfate  325 mg Oral Daily with breakfast    budesonide-formoterol  2 puff Inhalation BID    thiamine  100 mg Oral Daily    enoxaparin  40 mg SubCUTAneous Daily     PRN Meds:   docusate sodium, 100 mg, BID PRN  guaiFENesin-codeine, 5 mL, TID PRN  ondansetron, 4 mg, Q6H PRN  HYDROmorphone, 0.25 mg, Q3H PRN  albuterol sulfate HFA, 2 puff, Q6H PRN  acetaminophen, 650 mg, Q6H PRN  LORazepam, 2 mg, Q8H PRN    Continuous Infusions:      Review of Systems  All systems reviewed. All negative except for symptoms mentioned in HPI   Constitutional: No fever, no chills, no diaphoresis, no generalized weakness.   HEENT: No blurred vision, No sore throat, no ear pain, no hair loss  Neck: denied any neck swelling, difficulty swallowing,   Cadrdiopulomary: No CP, SOB or palpitation, No orthopnea or PND. No cough or wheezing. GI: No N/V/D, no constipation, No abdominal pain, no melena or hematochezia   : Denied any dysuria, hematuria, flank pain, discharge, or incontinence. Skin: denied any rash, ulcer, or hyperpigmentation. MSK: denied any joint deformity, joint pain/swelling, muscle pain, or back pain. Neuro: no numbess, no tingling, no weakness    OBJECTIVE    /68   Pulse 96   Temp 97.8 °F (36.6 °C) (Axillary)   Resp 16   Ht 5' 4\" (1.626 m)   Wt 124 lb (56.2 kg)   SpO2 97%   BMI 21.28 kg/m²   Wt Readings from Last 6 Encounters:   03/15/23 124 lb (56.2 kg)   02/27/23 129 lb (58.5 kg)   02/14/23 128 lb (58.1 kg)   02/07/23 128 lb (58.1 kg)   02/04/23 152 lb (68.9 kg)   01/28/23 128 lb (58.1 kg)       Physical examination:  General: awake alert, oriented x3, no abnormal position or movements. HEENT: normocephalic non traumatic   Pulm: good equal air entry no added sounds   CVS: S1 + S2   Abd: soft lax, no tenderness,   Skin: warm, no lesions, no rash.     Musculoskeletal: No back tenderness, no kyphosis/scoliosis    Neuro: CN intact, sensation notmal , muscle power normal.    Psych: normal mood, and affect    Review of Laboratory Data:  I personally reviewed the following labs:  Recent Labs     03/13/23  0240 03/14/23  0338 03/15/23  0250   WBC 8.4 10.7 10.1   RBC 3.03* 2.92* 2.92*   HGB 8.4* 8.1* 8.0*   HCT 28.7* 27.3* 27.5*   MCV 94.7 93.5 94.2   MCH 27.7 27.7 27.4   MCHC 29.3* 29.7* 29.1*   RDW 19.6* 19.5* 19.2*    326 345   MPV 11.4 10.8 11.6     Recent Labs     03/13/23  0240 03/14/23  0338 03/14/23  1255 03/15/23  0250    142  --  139   K 3.6 2.7* 4.5 3.6    107  --  107   CO2 24 25  --  23   BUN 14 12  --  12   CREATININE 0.5 0.5  --  0.6   GLUCOSE 154* 87  --  130*   CALCIUM 8.3* 8.2*  --  8.4*   PROT  --   --   --  5.1*   LABALBU  --   -- --  2.9*   BILITOT  --   --   --  <0.2   ALKPHOS  --   --   --  54   AST  --   --   --  12   ALT  --   --   --  11     No results found for: BHYDRXBUT  No results found for: LABA1C  Lab Results   Component Value Date/Time    TSH 5.640 (H) 01/31/2023 08:17 PM    T4FREE 0.99 02/01/2023 09:10 AM    T4WLZRH 10.0 01/20/2011 03:00 PM     Lab Results   Component Value Date/Time    GLUCOSE 130 03/15/2023 02:50 AM    GLUCOSE 85 04/02/2012 08:30 AM     Lab Results   Component Value Date/Time    TRIG 102 02/06/2023 10:40 AM    HDL 23 02/06/2023 10:40 AM    LDLCALC 38 02/06/2023 10:40 AM    CHOL 81 02/06/2023 10:40 AM       Blood culture   Lab Results   Component Value Date/Time    BC 5 Days no growth 03/09/2023 08:30 PM    BC 5 Days no growth 02/20/2023 05:43 PM    BC 5 Days no growth 02/02/2023 03:35 PM    BC  01/31/2023 11:25 PM     This organism was isolated in one set. Susceptibility testing is not routinely done as this  organism frequently represents skin contamination. Additional testing can be ordered by calling the  Microbiology Department. BC 5 Days- no growth 12/06/2018 02:00 PM    BC 5 Days- no growth 06/30/2018 01:32 PM    BC 5 Days- no growth 09/25/2017 04:45 PM    BC 5 Days- no growth 05/27/2017 12:00 PM       Radiology:  CT ABDOMEN PELVIS W IV CONTRAST Additional Contrast? None   Final Result   1. Mild prominence of the bladder wall. Clinical correlation for cystitis   recommended. Tiny locule of gas within the bladder may be due to infection   or recent catheterization. 2. Severe sigmoid diverticulosis without diverticulitis. 3. 12 mm cyst in the pancreatic tail, grossly stable at least as of   12/06/2018. Per the recommendations of the [employer] of Radiology,   imaging follow-up is recommended in 2 years. 4. Moderate to large hiatal hernia. 5. Small bilateral pleural effusions. XR CHEST PORTABLE   Final Result   No acute process.              Medical Records/Labs/Images Review: I personally reviewed and summarized previous records   All labs and imaging were reviewed independently    [address], a 70 y.o.-old female seen in for management of adrenal insufficiency    Secondary Adrenal Insufficiency   Due to chronic steroid for atopic dermatitis   We recommend the following steroids   Hydrocortisone 15mg AM/5mg in the evening starting today   Patient need to be on at least maintenance dose of steroid all the time and stress dose for any stressful events. Discussed the importance of wearing medical alert identification (bracelet, necklace)    HLD   Lipitor 10 mg daily     Interdisciplinary plan for communication with healthcare providers:   Consult recommendations were discussed with the Primary Service/Nursing staff      The above issues were reviewed with the patient who understood and agreed with the plan. Thank you for allowing us to participate in the care of this patient. Please do not hesitate to contact us with any additional questions. Cindy Hendrix MD  Case discussed with Dr. Shelton Chisholm MD  Endocrinologist, [facility]   [address] [place] [address]   Phone: [phone]  Fax: [phone]  ---------------------------------  An electronic signature was used to authenticate this note.  Gabriel Esparza MD on 3/15/2023 at 11:45 AM

## 2023-03-15 NOTE — PROGRESS NOTES
[address] [place] [address] [facility]  [id number]  Progress Note    SUBJECTIVE:  Chief Complaint   Patient presents with    Fever    Emesis    Abdominal Pain     Sitting up in bed, in no distress  Says she is feeling much better and would like to go home today  No fevers   No nausea or diarrhea. Denies abdominal pain     Review of systems:  As stated above in the chief complaint, otherwise negative. Medications:  Scheduled Meds:   meropenem  1,000 mg IntraVENous Q8H    ammonium lactate   Topical BID    hydrocortisone  15 mg Oral QAM    hydrocortisone  5 mg Oral QPM    aspirin  81 mg Oral Daily    atorvastatin  10 mg Oral Nightly    escitalopram  10 mg Oral Nightly    fluticasone  1 spray Each Nostril Daily    [Held by provider] midodrine  5 mg Oral TID WC    montelukast  10 mg Oral Daily    pantoprazole  40 mg Oral BID AC    polyethylene glycol  17 g Oral Daily    potassium chloride  40 mEq Oral Once    ferrous sulfate  325 mg Oral Daily with breakfast    budesonide-formoterol  2 puff Inhalation BID    thiamine  100 mg Oral Daily    enoxaparin  40 mg SubCUTAneous Daily     Continuous Infusions:  PRN Meds:docusate sodium, guaiFENesin-codeine, ondansetron, HYDROmorphone, albuterol sulfate HFA, acetaminophen, LORazepam    OBJECTIVE:  /68   Pulse 96   Temp 97.8 °F (36.6 °C) (Axillary)   Resp 16   Ht 5' 4\" (1.626 m)   Wt 124 lb (56.2 kg)   SpO2 97%   BMI 21.28 kg/m²   Temp  Av °F (36.7 °C)  Min: 97.8 °F (36.6 °C)  Max: 98.5 °F (36.9 °C)  Constitutional: The patient is awake, alert, and oriented. Sitting up in bed, in no distress. Skin: Warm and dry. No rash. HEENT: Round and reactive pupils. Moist mucous membranes. No thrush. Lesions on the lip are improved. Neck: Supple to movements. Chest: No respiratory distress. No crackles. Cardiovascular: S1 and S2 are rhythmic and regular. No murmurs appreciated. Abdomen: Positive bowel sounds to auscultation. Benign to palpation. Extremities: No edema. Lines: Peripheral.    Laboratory and Tests Review:  Lab Results   Component Value Date    WBC 10.1 03/15/2023    WBC 10.7 03/14/2023    WBC 8.4 03/13/2023    HGB 8.0 (L) 03/15/2023    HCT 27.5 (L) 03/15/2023    MCV 94.2 03/15/2023     03/15/2023     Lab Results   Component Value Date    NEUTROABS 6.89 03/10/2023    NEUTROABS 8.97 (H) 03/09/2023    NEUTROABS 4.76 02/28/2023     No results found for: CRPHS  Lab Results   Component Value Date    ALT 11 03/15/2023    AST 12 03/15/2023    ALKPHOS 54 03/15/2023    BILITOT <0.2 03/15/2023     Lab Results   Component Value Date/Time     03/15/2023 02:50 AM    K 3.6 03/15/2023 02:50 AM    K 7.5 02/22/2023 09:45 AM     03/15/2023 02:50 AM    CO2 23 03/15/2023 02:50 AM    BUN 12 03/15/2023 02:50 AM    CREATININE 0.6 03/15/2023 02:50 AM    CREATININE 0.5 03/14/2023 03:38 AM    CREATININE 0.5 03/13/2023 02:40 AM    GFRAA >60 01/12/2019 04:00 AM    LABGLOM >60 03/15/2023 02:50 AM    GLUCOSE 130 03/15/2023 02:50 AM    GLUCOSE 85 04/02/2012 08:30 AM    PROT 5.1 03/15/2023 02:50 AM    LABALBU 2.9 03/15/2023 02:50 AM    LABALBU 3.6 04/02/2012 08:30 AM    CALCIUM 8.4 03/15/2023 02:50 AM    BILITOT <0.2 03/15/2023 02:50 AM    ALKPHOS 54 03/15/2023 02:50 AM    AST 12 03/15/2023 02:50 AM    ALT 11 03/15/2023 02:50 AM     Lab Results   Component Value Date    CRP 13.8 (H) 03/10/2023    CRP 12.8 (H) 02/21/2023    CRP 24.1 (H) 02/01/2023     Lab Results   Component Value Date    SEDRATE 61 (H) 03/10/2023    SEDRATE 26 (H) 02/21/2023    SEDRATE 42 (H) 02/01/2023     Radiology:      Microbiology:   Respiratory panel 3/9/2023: SARS-CoV-2  Blood cultures 3/9/2023: Negative so far  Urine screen MRSA: Negative  Rapid influenza: Negative  Rapid SARS-CoV-2: Negative  Urine culture 3/9/2023: >100 K Ox + GNR     ASSESSMENT:  Complicated UTI with sepsis-improving  Fever associated to the above  SARS-CoV-2 infection in January 2023.   A rapid SARS-CoV-2 here was negative. Respiratory panel, which is more sensitive, he is positive. I doubt patient has active or ongoing SARS-CoV-2 infection  Probable herpes labialis, improved    PLAN:  Complete Merrem today  Labs and cultures reviewed  She can be discharged from ID standpoint today     STEVE Pennington CNP  11:23 AM  3/15/2023  Pt seen and examined. Above discussed agree with advanced practice nurse. Labs, cultures, and radiographs reviewed. Face to Face encounter occurred. Changes made as necessary.      Graciela Hahn MD

## 2023-03-15 NOTE — PROGRESS NOTES
IV sites x 2 removed per protocol. Telemetry removed per protocol. Complete discharge instructions given to sister. Verbalized understanding. Sepsis and UTI education reviewed.

## 2023-03-15 NOTE — DISCHARGE SUMMARY
[facility] Physician Discharge Summary       [id number] [address] [address] ( formerly [employer])  [address] [facility], [address] [place] [address] 1 [phone]  [phone]          Activity level: As tolerated     Dispo:Home      Condition on discharge: Stable     Patient ID:  Jennifer Herndon  [id number]  15 y.o.  1945    Admit date: 3/9/2023    Discharge date and time:  3/15/2023  2:45 PM    Admission Diagnoses: Principal Problem:    Sepsis ([address]  Active Problems:    Asthma    Failure to thrive in adult    Acute cystitis without hematuria    Secondary adrenal insufficiency ([address]    Complicated UTI (urinary tract infection)    Gastroesophageal reflux disease    Hyperlipidemia    HTN (hypertension), benign    Chronic back pain    Tachycardia  Resolved Problems:    * No resolved hospital problems. *      Discharge Diagnoses: Principal Problem:    Sepsis ([address]  Active Problems:    Asthma    Failure to thrive in adult    Acute cystitis without hematuria    Secondary adrenal insufficiency (HCC)    Complicated UTI (urinary tract infection)    Gastroesophageal reflux disease    Hyperlipidemia    HTN (hypertension), benign    Chronic back pain    Tachycardia  Resolved Problems:    * No resolved hospital problems. *      Consults:  IP CONSULT TO INFECTIOUS DISEASES  IP CONSULT TO CRITICAL CARE  IP CONSULT TO ENDOCRINOLOGY    Procedures: None    Hospital Course: Patient presented to the ED with complaints of fevers and lower abdominal pain. Complicated UTI with sepsis: UC positive oxydase positive GNR > 100K. Blood cultures preliminary no growth. Antibiotics changed to Merrem for 3 days- ID input appreciated  Hypoxic respiratory failure: On 2 L nc. CXR negative for acute process. Covid 19: Likely no longer active. Herpes Labialis: Continue acyclovir  Chronic normocytic anemia: Hgb trending down. Hgb 8.8>8.4>8.1. Monitor H&H.  Transfuse for Hgb < 7  Adrenal Insufficiency: 2/2 chronic steroid use.hydrocortisone 20 mg during the day and 10 at night. Continue hydrocrotisone taper outlined by endocrinology- Endocrinology following  Chronic hypotension: 2/2 adrenal insuffiencey. Patient will complete last dose of Merrem this evening and is stable for discharge home. She can follow-up outpatient with infectious disease and her PCP. Discharge Exam:    General Appearance: alert and oriented to person, place and time and in no acute distress  Skin: warm and dry  Head: normocephalic and atraumatic  Eyes: pupils equal, round, and reactive to light, extraocular eye movements intact, conjunctivae normal  Neck: neck supple and non tender without mass   Pulmonary/Chest: clear to auscultation bilaterally- no wheezes, rales or rhonchi, normal air movement, no respiratory distress  Cardiovascular: normal rate, normal S1 and S2 and no carotid bruits  Abdomen: soft, non-tender, non-distended, normal bowel sounds, no masses or organomegaly  Extremities: no cyanosis, no clubbing and no edema  Neurologic: no cranial nerve deficit and speech normal    I/O last 3 completed shifts: In: 733 [P.O.:250; IV Piggyback:483]  Out: -   I/O this shift: In: 550 [P.O.:550]  Out: -       LABS:  Recent Labs     03/13/23  0240 03/14/23  0338 03/14/23  1255 03/15/23  0250    142  --  139   K 3.6 2.7* 4.5 3.6    107  --  107   CO2 24 25  --  23   BUN 14 12  --  12   CREATININE 0.5 0.5  --  0.6   GLUCOSE 154* 87  --  130*   CALCIUM 8.3* 8.2*  --  8.4*       Recent Labs     03/13/23  0240 03/14/23  0338 03/15/23  0250   WBC 8.4 10.7 10.1   RBC 3.03* 2.92* 2.92*   HGB 8.4* 8.1* 8.0*   HCT 28.7* 27.3* 27.5*   MCV 94.7 93.5 94.2   MCH 27.7 27.7 27.4   MCHC 29.3* 29.7* 29.1*   RDW 19.6* 19.5* 19.2*    326 345   MPV 11.4 10.8 11.6       No results for input(s): POCGLU in the last 72 hours.     Imaging:  CT ABDOMEN PELVIS W IV CONTRAST Additional Contrast? None    Result Date: 3/9/2023  EXAMINATION: CT OF THE ABDOMEN AND PELVIS WITH CONTRAST 3/9/2023 10:35 pm TECHNIQUE: CT of the abdomen and pelvis was performed with the administration of intravenous contrast. Multiplanar reformatted images are provided for review. Automated exposure control, iterative reconstruction, and/or weight based adjustment of the mA/kV was utilized to reduce the radiation dose to as low as reasonably achievable. COMPARISON: CT abdomen and pelvis without contrast, 02/20/2023. HISTORY: ORDERING SYSTEM PROVIDED HISTORY: abdominal pain, dysuria TECHNOLOGIST PROVIDED HISTORY: Additional Contrast?->None Reason for exam:->abdominal pain, dysuria Decision Support Exception - unselect if not a suspected or confirmed emergency medical condition->Emergency Medical Condition (MA) FINDINGS: Lower Chest: There is a moderate to large hiatal hernia. Small bilateral pleural effusions with overlying compressive atelectasis. The heart is normal in size. Prominent calcification along the mitral valve. Organs: Liver: Unremarkable. Gallbladder: Surgically absent Pancreas: 12 mm cyst in the pancreatic tail, which is grossly stable at least as of 12/06/2018. No ductal dilatation. Spleen:  Unremarkable. Adrenals: Unremarkable. Kidneys: Unremarkable. GI/Bowel: Severe sigmoid diverticulosis without diverticulitis. No bowel wall thickening or obstruction. Pelvis: Mild prominence of the bladder wall. Clinical correlation for cystitis recommended. Small locule of gas within the bladder may be due to recent instrumentation or infection. Arcuate artery calcifications are seen in the uterus which is otherwise grossly unremarkable. Small right inguinal hernia containing fat and small amount of fluid. Peritoneum/Retroperitoneum: Mild calcified atherosclerosis is seen in the aorta. No aneurysm. No lymphadenopathy. No free air or free fluid is seen. Bones/Soft Tissues: Status post decompressive laminectomies with posterior interbody fusion from L3-S1.   Chronic compression fracture deformity in the L1 vertebral body. The bones are demineralized. 1. Mild prominence of the bladder wall. Clinical correlation for cystitis recommended. Tiny locule of gas within the bladder may be due to infection or recent catheterization. 2. Severe sigmoid diverticulosis without diverticulitis. 3. 12 mm cyst in the pancreatic tail, grossly stable at least as of 12/06/2018. Per the recommendations of the [employer] of Radiology, imaging follow-up is recommended in 2 years. 4. Moderate to large hiatal hernia. 5. Small bilateral pleural effusions. XR CHEST PORTABLE    Result Date: 3/9/2023  EXAMINATION: ONE XRAY VIEW OF THE CHEST 3/9/2023 8:12 pm COMPARISON: None. HISTORY: ORDERING SYSTEM PROVIDED HISTORY: fever, emesis TECHNOLOGIST PROVIDED HISTORY: Reason for exam:->fever, emesis FINDINGS: The lungs are without acute focal process. There is no effusion or pneumothorax. The cardiomediastinal silhouette is without acute process. The osseous structures are without acute process. No acute process. Patient Instructions:      Medication List        CHANGE how you take these medications      * hydrocortisone 5 MG tablet  Commonly known as: CORTEF  Take 1 tablet by mouth every evening  What changed:   medication strength  how much to take  when to take this     * hydrocortisone 5 MG tablet  Commonly known as: CORTEF  Take 3 tablets by mouth every morning  Start taking on: March 16, 2023  What changed:   medication strength  how much to take           * This list has 2 medication(s) that are the same as other medications prescribed for you. Read the directions carefully, and ask your doctor or other care provider to review them with you.                 CONTINUE taking these medications      acetaminophen 650 MG extended release tablet  Commonly known as: TYLENOL     albuterol sulfate  (90 Base) MCG/ACT inhaler  Commonly known as: Ventolin HFA  Inhale 2 puffs into the lungs every 6 hours as needed for Wheezing     aspirin 81 MG tablet     atorvastatin 10 MG tablet  Commonly known as: LIPITOR     docusate 100 MG Caps  Commonly known as: COLACE, DULCOLAX  Take 100 mg by mouth 2 times daily as needed for Constipation     escitalopram 10 MG tablet  Commonly known as: LEXAPRO     ferrous sulfate 325 (65 Fe) MG tablet  Commonly known as: IRON 325     fluticasone 50 MCG/ACT nasal spray  Commonly known as: FLONASE  USE 1 SPRAY IN EACH NOSTRIL DAILY     Fluticasone furoate-vilanterol 200-25 MCG/INH Aepb inhaler  Commonly known as: BREO ELLIPTA  Inhale 1 puff into the lungs daily     guaiFENesin-codeine 100-10 MG/5ML syrup  Commonly known as: TUSSI-ORGANIDIN NR     LORazepam 2 MG tablet  Commonly known as: ATIVAN     montelukast 10 MG tablet  Commonly known as: SINGULAIR  TAKE 1 TABLET DAILY     pantoprazole 40 MG tablet  Commonly known as: PROTONIX  Take 1 tablet by mouth 2 times daily (before meals)     polyethylene glycol 17 g packet  Commonly known as: GLYCOLAX  Take 17 g by mouth daily     thiamine 100 MG tablet  Take 1 tablet by mouth daily     white petrolatum Oint ointment  Apply topically 2 times daily            STOP taking these medications      camphor-menthol 0.5-0.5 % lotion  Commonly known as: SARNA     midodrine 5 MG tablet  Commonly known as: PROAMATINE     nitrofurantoin (macrocrystal-monohydrate) 100 MG capsule  Commonly known as: MACROBID               Where to Get Your Medications        These medications were sent to [address] [place] [address] Mohamud [address] [address]      Phone: [phone]   hydrocortisone 5 MG tablet  hydrocortisone 5 MG tablet             Signed:  Electronically signed by Brett Gowers, APRN - CNP on 3/15/2023 at 2:45 PM    Addendum: I have personally participated in the history, exam, medical decision making with Brett Gowers on the date of service and I agree with all of the pertinent clinical information unless otherwise noted. I have also reviewed and agree with the past medical, family, and social history unless otherwise noted. Patient was admitted with shortness of breath. PHYSICAL EXAM:  Vitals:  /68   Pulse 96   Temp 97.8 °F (36.6 °C) (Axillary)   Resp 16   Ht 5' 4\" (1.626 m)   Wt 124 lb (56.2 kg)   SpO2 97%   BMI 21.28 kg/m²   Gen: awake, alert, NAD  Lungs: clear to auscultation bilaterally no crackles no wheezing. Heart: RRR, no murmur   Abdomen: soft nontender nondistended positive bowel sounds. Extremities: full range of motion no peripheral edema. Impression:  Principal Problem:    Sepsis ([address]  Active Problems:    Asthma    Failure to thrive in adult    Acute cystitis without hematuria    Secondary adrenal insufficiency (HCC)    Complicated UTI (urinary tract infection)    Gastroesophageal reflux disease    Hyperlipidemia    HTN (hypertension), benign    Chronic back pain    Tachycardia  Resolved Problems:    * No resolved hospital problems. *      My findings/plan include:    Secondary adrenal insufficiency due to chronic steroid use - Patient is on hydrocortisone 20 mg during the day and 10 at night. Continue hydrocrotisone taper outlined by endocrinology. Acute hypoxic respiratory failure - Resolved  Chronic hypotension 2/2 #1 - Blood pressure improved  Complicated acute cystitis without hematuria - Antibiotics have been changed to cefepime. UCx growing GNR oxidase positive (3/9) and has completed merrem as per ID  Covid 19 positive however likely not active   Herpes labialis - Valacyclovir  Chronic normocytic anemia - Transfuse if hgb is less than 7. Continue iron supplementation  DVT prophylaxis - Lovenox     PT/OT,    Medically stable for discharge home    NOTE: This report was transcribed using voice recognition software.  Every effort was made to ensure accuracy; however, inadvertent computerized transcription errors may be present.   Electronically signed by Diandra Castillo DO on 3/15/2023 at 4:13 PM

## 2023-03-15 NOTE — PLAN OF CARE
Problem: Safety - Adult  Goal: Free from fall injury  3/15/2023 0240 by Sharon Herrera RN  Outcome: Progressing  Flowsheets (Taken 3/14/2023 2050)  Free From Fall Injury: Instruct family/caregiver on patient safety  3/14/2023 1643 by Bogdan Govea  Outcome: Progressing     Problem: Discharge Planning  Goal: Discharge to home or other facility with appropriate resources  3/15/2023 0240 by Sharon Herrera RN  Outcome: Progressing  3/14/2023 1643 by Bogdan Govea  Outcome: Progressing     Problem: Skin/Tissue Integrity  Goal: Absence of new skin breakdown  Description: 1. Monitor for areas of redness and/or skin breakdown  2. Assess vascular access sites hourly  3. Every 4-6 hours minimum:  Change oxygen saturation probe site  4. Every 4-6 hours:  If on nasal continuous positive airway pressure, respiratory therapy assess nares and determine need for appliance change or resting period.   3/15/2023 0240 by Sharon Herrera RN  Outcome: Progressing  3/14/2023 1643 by Bogdan Govea  Outcome: Progressing     Problem: ABCDS Injury Assessment  Goal: Absence of physical injury  3/15/2023 0240 by Sharon Herrera RN  Outcome: Progressing  Flowsheets (Taken 3/14/2023 2050)  Absence of Physical Injury: Implement safety measures based on patient assessment  3/14/2023 1643 by Bogdan Govea  Outcome: Progressing     Problem: Pain  Goal: Verbalizes/displays adequate comfort level or baseline comfort level  3/15/2023 0240 by Sharon Herrera RN  Outcome: Progressing  3/14/2023 1643 by Bogdan Govea  Outcome: Progressing

## 2023-03-15 NOTE — CARE COORDINATION
Transition of Care-Anticipate discharge today-no IV antibiotics for discharge-patient active with [facility] -resumption of care orders placed-updated liaison. Family to transport home.     Herman Wan BSN, RN  [facility]

## 2023-03-15 NOTE — PROGRESS NOTES
Occupational Therapy  OT BEDSIDE TREATMENT NOTE      Date:3/15/2023  Patient Name: Marilyn Jhaveri  MRN: [id number]  : 1945  Room: [address] [place] [address]     Evaluating OT: Gladis Mercado OTR/L   [id number]       [facility]    Specific Provider Orders/Date:OT eval and treat 3/12/2023       Diagnosis:  Hypokalemia [E87.6]  Lactic acidosis [E87.20]  Pancreatic cyst [K86.2]  Acute cystitis with hematuria [N30.01]  Sepsis ([facility] [A41.9]  Sepsis, due to unspecified organism, unspecified whether acute organ dysfunction present [facility]) [A41.9]     Pertinent Medical History: asthma, anxiety, back surgery      Precautions:  Fall Risk      Assessment of current deficits    [x] Functional mobility            [x]ADLs           [x] Strength                  []Cognition    [x] Functional transfers          [x] IADLs         [x] Safety Awareness   [x]Endurance    [] Fine Coordination                         [x] Balance      [] Vision/perception   []Sensation      []Gross Motor Coordination             [] ROM           [] Delirium                   [] Motor Control      OT PLAN OF CARE   OT POC based on physician orders, patient diagnosis and results of clinical assessment     Frequency/Duration  2-4 days/wk for 2 weeks PRN   Specific OT Treatment Interventions to include:   ADL retraining/adapted techniques and AE recommendations to increase functional independence within precautions                    Energy conservation techniques to improve tolerance for selfcare routine   Functional transfer/mobility training/DME recommendations for increased independence, safety and fall prevention         Patient/family education to increase safety and functional independence             Environmental modifications for safe mobility and completion of ADLs                             Therapeutic activity to improve functional performance during ADLs.                                          Therapeutic exercise to improve tolerance and functional strength for ADLs    Balance retraining/tolerance tasks for facilitation of postural control with dynamic challenges during ADLs . Positioning to improve functional independence  []      Recommended Adaptive Equipment: continue to assess      Home Living: Pt lives with sister, 1 st floor set-up. Bathroom setup: walk in shower on 2nd floor , stair glide    Equipment owned: walker      Prior Level of Function: assist  with lower body ADLs , assist  with IADLs; ambulated with walker         Pain Level: Pt did not report level of pain   Cognition: Awake and alert. Min cues for safety. Functional Assessment:  AM-PAC Daily Activity Raw Score: 18/24    Initial Eval Status  Date: 3/14/2023 Treatment Status  Date:3/15/23  STGs = LTGs  Time frame: 10-14 days   Feeding Independent   independent      Grooming SBA/set-up   Standing at sink washing hands   setup while standing at the sink. Supervision    UB Dressing Min A  Min A to don gown around back   Supervision    LB Dressing Min A  min A  Supervision    Bathing Min a    Supervision    Toileting Min A  Assist with thorough hygiene SBA   Transfers and hygiene. SBA    Bed Mobility  SBA  Supine to sit  SBA supine <> sit   Supervision    Functional Transfers CGA/SBA   Sit - stand from bed , commode  SBA   Supervision    Functional Mobility CGA/SBA , w/walker   Ambulating to/from bathroom  SBA to and from bathroom using w/w for support. Supervision  with good tolerance    Balance Sitting:     Static:  Independent     Dynamic:SBA   Standing: SBA    Independent/supervision    Activity Tolerance No SOB  Fair   Good  with ADL activity      Comments:  Pt agreeable to therapy. Completed ADL activity and mobility to and from bathroom. Pt requesting to return to the bed at the end of the session. Plans to return home with sister for assistance as needed.       Education/treatment:  ADL retraining with facilitation of movement to increase self care skills. Therapeutic activity to address balance and endurance for ADL and transfers. Pt education of walker safety, transfer safety, and energy conservation. Pt has made  progress towards set goals.        Time In: 8:00   Time Out: 8:24      Min Units   Therapeutic Ex [id number]     Therapeutic Activities [id number] 6    ADL/Self Care [id number] 2   Orthotic Management 33452     Neuro Re-Ed 95219     Non-Billable Time     TOTAL TIMED TREATMENT 24 [facility] MILAGRO/L [id number]

## 2023-03-16 ENCOUNTER — CARE COORDINATION (OUTPATIENT)
Dept: CASE MANAGEMENT | Age: 78
End: 2023-03-16

## 2023-03-16 NOTE — CARE COORDINATION
[facility] Care Transitions Initial Follow Up Call    Call within 2 business days of discharge: Yes    Patient Current Location:  Home: [address] [place] [address]    Care Transition Nurse contacted the patient by telephone to perform post hospital discharge assessment. Verified name and  with patient as identifiers. Provided introduction to self, and explanation of the Care Transition Nurse role. Patient: Mary Mason Patient : 1945   MRN: [id number]  Reason for Admission: Sepsis/COVID+  Discharge Date: 3/15/23 RARS: Readmission Risk Score: 29.7      Last Discharge [address]       Date Complaint Diagnosis Description Type Department Provider    3/9/23 Fever; Emesis; Abdominal Pain Sepsis, due to unspecified organism, unspecified whether acute organ dysfunction present ([facility] . .. ED to Hosp-Admission (Discharged) (ADMITTED) [facility] [address] Mariola Soto DO; Darcie Yancey-. .. Was this an external facility discharge? No Discharge Facility: [facility]    Challenges to be reviewed by the provider   Additional needs identified to be addressed with provider: No  none               Method of communication with provider: none. Spoke with patient today 3/16/23 for initial hospital discharge/COVID+ follow up. Patient states she is feeling better since discharge and offers no complaints at this time. Denies any shortness of breath, cough/congestion, chest pain, chest discomfort, abdominal pain, nausea, vomiting, diarrhea, chills or fever. Noted respiratory panel on admission was positive for COVID. Noted urine culture was positive for GNR Oxidase. Denies any difficulty urinating, pain/burning with urination, flank/kidney pain or noticing blood in urine. Patient states she lives with sister Manuel Baires) and offers assistance if needed. States lives in two story home but stays on main living level. States using a walker when up ambulating.      Provided a complete review of home meds with patient who confirms no new meds ordered on discharge. Reviewed dosing on Cortef per AVS which patient verbalizes understanding. CTN reviewed s/s of infection/UTI and COVID zone tool knowing when to seek medical attention. Confirmed with patient she will call today to make appts with PCP, ID and endocrinology. States sister drives to appts and has no transportation issues. Confirmed with patient she is resuming services with [facility]. CTN will call to verify EDER. Denies any needs or concerns at this time. Patient is receptive to subsequent follow up. CTN will continue to follow for Care Transition. Care Transition Nurse reviewed discharge instructions, medical action plan, and red flags with patient who verbalized understanding. The patient was given an opportunity to ask questions and does not have any further questions or concerns at this time. Were discharge instructions available to patient? Yes. Reviewed appropriate site of care based on symptoms and resources available to patient including: PCP  Specialist  Home health  When to call 911  Condition related references. The patient agrees to contact the PCP office for questions related to their healthcare. Advance Care Planning:   Does patient have an Advance Directive: reviewed and current. Medication reconciliation was performed with patient, who verbalizes understanding of administration of home medications. Medications reviewed, 1111F entered: N/A    Was patient discharged with a pulse oximeter? no    Non-face-to-face services provided:  Scheduled appointment with PCP-CTN confirmed with patient she will call  Dr. Paolo Cespedes (PCP) today to schedule HFU appt  Scheduled appointment with Specialist-CTN confirmed with patient she will call Dr. Eusebio Harris (ID) and Dr. China Armendariz (endo) today to schedule f/u appts.    Obtained and reviewed discharge summary and/or continuity of care documents  Education of patient/family/caregiver/guardian to support self-management-Discussed s/s of infection/UTI and COVID zone tool knowing when to seek medical attention. Offered patient enrollment in the Remote Patient Monitoring (RPM) program for in-home monitoring: NA.    Care Transitions 24 Hour Call    Schedule Follow Up Appointment with PCP: Declined  Do you have a copy of your discharge instructions?: Yes  Do you have all of your prescriptions and are they filled?: Yes  Have you been contacted by a [facility] Pharmacist?: No  Have you scheduled your follow up appointment?: No  Do you have support at home?: Roommate/Housemate  Do you feel like you have everything you need to keep you well at home?: Yes  Are you an active caregiver in your home?: No  Care Transitions Interventions         Discussed follow-up appointments. If no appointment was previously scheduled, appointment scheduling offered: Yes. Is follow up appointment scheduled within 7 days of discharge? No.    Follow Up  Future Appointments   Date Time Provider Jenaro Erwin   3/20/2023 11:15 AM [facility] ECHO RM 2 SEYZ [facility] Ani   3/30/2023  1:30 PM Maryam Liao  [facility],  [address] Card [facility]   4/4/2023  1:00 PM STEVE Harrison - CNP [facility] PULMONAR   4/19/2023 12:30 PM Robin Villar MD [facility]       Care Transition Nurse provided contact information. Plan for follow-up call in 5-7 days based on severity of symptoms and risk factors. Plan for next call: follow-up appointment-Did patient get scheduled for f/u appts with PCP, ID, and endo?     STEVE Singh

## 2023-03-16 NOTE — CARE COORDINATION
Spoke with Marylin at [facility] confirming [address] and advise of discharge. Rosa Isela Malone denies any needs and has CTN contact information and will call for any questions.

## 2023-03-20 ENCOUNTER — HOSPITAL ENCOUNTER (OUTPATIENT)
Dept: CARDIOLOGY | Age: 78
Discharge: HOME | End: 2023-03-20
Payer: MEDICARE

## 2023-03-20 DIAGNOSIS — R06.02 SOB (SHORTNESS OF BREATH): ICD-10-CM

## 2023-03-20 DIAGNOSIS — I50.33 ACUTE ON CHRONIC DIASTOLIC CONGESTIVE HEART FAILURE (HCC): ICD-10-CM

## 2023-03-20 LAB
LV EF: 68 %
LVEF MODALITY: NORMAL

## 2023-03-20 PROCEDURE — 93306 TTE W/DOPPLER COMPLETE: CPT

## 2023-03-23 ENCOUNTER — CARE COORDINATION (OUTPATIENT)
Dept: CASE MANAGEMENT | Age: 78
End: 2023-03-23

## 2023-03-23 NOTE — CARE COORDINATION
[facility] Care Transitions Follow Up Call    Patient Current Location:  Home: [address] [place] [address]    Care Transition Nurse contacted the patient by telephone to follow up after admission on 3/15/23. Verified name and  with patient as identifiers. Patient: Micah Garcia  Patient : 1945   MRN: [id number]  Reason for Admission: Sepsis/COVID+  Discharge Date: 3/15/23 RARS: Readmission Risk Score: 29.7    Spoke with Buzz Norton briefly for follow up covid monitoring (positive) call. She reports that PT is presently at her home. She denies current SOB, chest tightness, or cough. She confirmed that she did follow up with her PCP yesterday, 3/22/23, and no changes were made. Buzz Norton denies any needs, questions, or concerns at this time. Discussed follow-up appointments. Was follow up appointment scheduled within 7 days of discharge? Yes. Follow Up  Future Appointments   Date Time Provider Jenaro Erwin   3/30/2023  1:30 PM DO Colette Zambrano [facility]   2023  1:00 PM STEVE Swain CNP [facility]   2023 12:30 PM Robin Burroughs MD [facility]      Care Transitions Subsequent and Final Call    Subsequent and Final Calls  Do you have any ongoing symptoms?: No  Have your medications changed?: No  Do you have any questions related to your medications?: No  Do you currently have any active services?: Yes  Are you currently active with any services?: Home Health  Do you have any needs or concerns that I can assist you with?: No  Identified Barriers: None  Care Transitions Interventions  No Identified Needs  Other Interventions:            Martín Pedersen RN

## 2023-03-24 DIAGNOSIS — E27.49 SECONDARY ADRENAL INSUFFICIENCY (HCC): Primary | ICD-10-CM

## 2023-03-25 RX ORDER — HYDROCORTISONE 5 MG/1
TABLET ORAL
Qty: 120 TABLET | Refills: 5 | Status: SHIPPED | OUTPATIENT
Start: 2023-03-25

## 2023-03-30 ENCOUNTER — OFFICE VISIT (OUTPATIENT)
Dept: CARDIOLOGY CLINIC | Age: 78
End: 2023-03-30
Payer: MEDICARE

## 2023-03-30 ENCOUNTER — CARE COORDINATION (OUTPATIENT)
Dept: CASE MANAGEMENT | Age: 78
End: 2023-03-30

## 2023-03-30 VITALS
BODY MASS INDEX: 20.88 KG/M2 | HEIGHT: 64 IN | SYSTOLIC BLOOD PRESSURE: 122 MMHG | WEIGHT: 122.3 LBS | HEART RATE: 103 BPM | DIASTOLIC BLOOD PRESSURE: 70 MMHG

## 2023-03-30 DIAGNOSIS — I10 HTN (HYPERTENSION), BENIGN: Primary | ICD-10-CM

## 2023-03-30 PROCEDURE — 93000 ELECTROCARDIOGRAM COMPLETE: CPT | Performed by: INTERNAL MEDICINE

## 2023-03-30 PROCEDURE — 3074F SYST BP LT 130 MM HG: CPT | Performed by: INTERNAL MEDICINE

## 2023-03-30 PROCEDURE — 3078F DIAST BP <80 MM HG: CPT | Performed by: INTERNAL MEDICINE

## 2023-03-30 PROCEDURE — 99214 OFFICE O/P EST MOD 30 MIN: CPT | Performed by: INTERNAL MEDICINE

## 2023-03-30 PROCEDURE — 1123F ACP DISCUSS/DSCN MKR DOCD: CPT | Performed by: INTERNAL MEDICINE

## 2023-03-30 RX ORDER — MIDODRINE HYDROCHLORIDE 5 MG/1
5 TABLET ORAL 3 TIMES DAILY
COMMUNITY
End: 2023-03-30

## 2023-03-30 NOTE — CARE COORDINATION
[facility] Care Transitions Follow Up Call    Patient Current Location: [address] Transition Nurse contacted the patient by telephone to follow up after admission. Verified name and  with patient as identifiers. Patient: Shruti Kaba  Patient : 1945   MRN: <[id number]>  Reason for Admission: 3/9/2023 - 3/15/2023 [address] [place] [address] Blvd. Sepsis/Covid, Complicated UTI. Discharge Date: 3/15/23 RARS: Readmission Risk Score: 29.7    Cardio/Chavez 3/30/23  Attended. Endo/[place]  12:30. Needs to be reviewed by the provider   Additional needs identified to be addressed with provider: No  none             Method of communication with provider: none. Brie Esparza reports she is doing \"ok\". No acute pain concerns. States she has lower back ache but feels it arthritic in nature vs her recent UTI. She is staying w/ her sister and continues to receive [place] [id number] services for therapy. She is up w/ walker and states she feels steady. Pt notes urinary urgency and is wearing briefs for  dribbling and occasional incontinence. States she will be seeing urology mid April. No urinary pain/burn, strong urine odor, or pink-tinge. States urine color is lt. She is following up w/ endo for concern of adrenal insuffiencey. No SOB, cough, congestion, or LE edema. Reviewed s/s developing UTI to report to her physician/return to ED, v/u. Addressed changes since last contact:  none  Discussed follow-up appointments. If no appointment was previously scheduled, appointment scheduling offered: Yes. Is follow up appointment scheduled within 7 days of discharge? Cardio/[place]avez 3/30/23  Attended. Endo/[place]  12:30. PCP 3/22/23  Attended.     Follow Up  Future Appointments   Date Time Provider Jenaro Erwin   2023  1:00 PM STEVE García - CNP [facility]   2023 12:30 PM MD CRISTIANO Kimble [facility]   2023  1:30 PM Sheryl Lucas,  [facility],  [address] Card [facility]     [address] [address]

## 2023-03-30 NOTE — PROGRESS NOTES
CHIEF COMPLAINT: SVT/Tachycardia    HISTORY OF PRESENT ILLNESS: Patient is a 70 y.o. female seen at the request of Margreta Landau, MD.      Patient presents in follow up. Some baseline FORD. No CP.     Past Medical History:   Diagnosis Date    Anxiety     Arthritis     Asthma     Claustrophobia     Dermatitis 02/2017    bilateral legs knees to ankle; follows with [facility]    Fracture 02/2017    \"in Spine\" compression T10 per pt; difficulty laying flat    GERD (gastroesophageal reflux disease)     Hiatal hernia     History of blood transfusion     HTN (hypertension) 4/22/2013    Hyperlipidemia     On aspirin at home     for age per pcp    Pancreatic cyst 5/27/2017    Pneumonia 07/2018    Sleep apnea     SOB (shortness of breath) 4/22/2013    Tachycardia 04/22/2013    follows with Dr Jocelyn Gallagher       Patient Active Problem List   Diagnosis    Mixed hyperlipidemia    HTN (hypertension), benign    Asthma    Vitamin B12 deficiency    Pancreatic cyst    Anxiety disorder    Chronic back pain    Tachycardia    Vomiting    Compression fracture of lumbar spine, non-traumatic, initial encounter ([facility] [address]    Compression fracture of T12 vertebra (HCC)    Failed spinal cord stimulator (HCC)    Cellulitis    Septicemia (HCC)    Failure to thrive in adult    Septic shock (HCC)    Acute cystitis without hematuria    Sepsis ([address]    Secondary adrenal insufficiency (HCC)    Complicated UTI (urinary tract infection)    Gastroesophageal reflux disease    Hyperlipidemia       Allergies   Allergen Reactions    Percocet [Oxycodone-Acetaminophen]      \"Makes me go crazy\"       Current Outpatient Medications   Medication Sig Dispense Refill    CALCIUM PO Take by mouth      VITAMIN D PO Take by mouth      Ascorbic Acid (VITAMIN C PO) Take by mouth      MAGNESIUM PO Take by mouth      ZINC PO Take by mouth      BIOTIN PO Take by mouth      midodrine (PROAMATINE) 5 MG tablet Take 5 mg by mouth 3 times daily      hydrocortisone

## 2023-04-04 PROBLEM — D64.9 ANEMIA: Status: ACTIVE | Noted: 2023-04-04

## 2023-04-04 PROBLEM — I49.9 CARDIAC ARRHYTHMIA: Status: ACTIVE | Noted: 2023-04-04

## 2023-04-04 PROBLEM — F41.9 ANXIETY: Status: ACTIVE | Noted: 2017-09-26

## 2023-04-05 ENCOUNTER — CARE COORDINATION (OUTPATIENT)
Dept: CASE MANAGEMENT | Age: 78
End: 2023-04-05

## 2023-04-05 NOTE — CARE COORDINATION
Tiffany Acevedo  [facility] [address] [facility]   10/4/2023  1:00 PM STEVE Crowe - CNP [facility]       Care Transition Nurse reviewed discharge instructions, medical action plan, and red flags with patient and discussed any barriers to care and/or understanding of plan of care after discharge. Discussed appropriate site of care based on symptoms and resources available to patient including: PCP  Specialist  Home health  When to call 911  Condition related references. The patient agrees to contact the PCP office for questions related to their healthcare. Advance Care Planning:   reviewed and current. Patients top risk factors for readmission: functional physical ability, medical condition-Asthma, Sepsis, HTN, and polypharmacy    Offered patient enrollment in the Remote Patient Monitoring (RPM) program for in-home monitoring: Patient is not eligible for RPM program.     Care Transitions Subsequent and Final Call    Subsequent and Final Calls  Do you have any ongoing symptoms?: No  Have your medications changed?: No  Do you have any questions related to your medications?: No  Do you currently have any active services?: Yes  Are you currently active with any services?: Home Health  Do you have any needs or concerns that I can assist you with?: No  Identified Barriers: Lack of Education  Care Transitions Interventions  No Identified Needs  Other Interventions:             Care Transition Nurse provided contact information for future needs. No further follow-up call indicated based on severity of symptoms and risk factors.   Plan for next call:  CTN signing off for Care Transition    STEVE Lee

## 2023-04-18 ENCOUNTER — TELEPHONE (OUTPATIENT)
Dept: NEUROSURGERY | Age: 78
End: 2023-04-18

## 2023-04-18 NOTE — TELEPHONE ENCOUNTER
Prior Authorization Form:      DEMOGRAPHICS:                     Patient Name:  Ami Kelsey  Patient :  1945            Insurance:  Payor: [employer] Fast / Plan: [employer] [facility] LOCAL / Product Type: *No Product type* /   Insurance ID Number:    Payer/Plan Subscr  Sex Relation Sub. Ins. ID Effective Group Num   1.  Francis Garcia [address] A 1945 Female Self [id number]                                    BOX          DIAGNOSIS & PROCEDURE:                       Procedure/Operation: Spinal cord stimulator battery replacement           CPT Code: 85325    Diagnosis:  spinal cord stimulator malfunction    ICD10 Code: S53.060T    Location:  [facility]    Surgeon:  Alan Malone INFORMATION:                          Date: 23    Time: 7 am              Anesthesia:  general                                                       Status:  Outpatient        Special Comments:  [place]:  Model 3228, 1 lead - Penta lead, 16 electrodes, Battery- [employer] XR 5 model 3660       Electronically signed by Chetna Cochran MA on 2023 at 10:58 AM

## 2023-04-19 ENCOUNTER — OFFICE VISIT (OUTPATIENT)
Dept: ENDOCRINOLOGY | Age: 78
End: 2023-04-19
Payer: MEDICARE

## 2023-04-19 VITALS
BODY MASS INDEX: 20.66 KG/M2 | HEIGHT: 64 IN | WEIGHT: 121 LBS | DIASTOLIC BLOOD PRESSURE: 79 MMHG | SYSTOLIC BLOOD PRESSURE: 133 MMHG | HEART RATE: 94 BPM | OXYGEN SATURATION: 98 %

## 2023-04-19 DIAGNOSIS — E55.9 VITAMIN D DEFICIENCY: ICD-10-CM

## 2023-04-19 DIAGNOSIS — E27.49 SECONDARY ADRENAL INSUFFICIENCY (HCC): Primary | ICD-10-CM

## 2023-04-19 PROCEDURE — 3074F SYST BP LT 130 MM HG: CPT | Performed by: INTERNAL MEDICINE

## 2023-04-19 PROCEDURE — 3078F DIAST BP <80 MM HG: CPT | Performed by: INTERNAL MEDICINE

## 2023-04-19 PROCEDURE — 1123F ACP DISCUSS/DSCN MKR DOCD: CPT | Performed by: INTERNAL MEDICINE

## 2023-04-19 PROCEDURE — 99214 OFFICE O/P EST MOD 30 MIN: CPT | Performed by: INTERNAL MEDICINE

## 2023-04-19 RX ORDER — DEXAMETHASONE SODIUM PHOSPHATE 4 MG/ML
2 INJECTION, SOLUTION INTRA-ARTICULAR; INTRALESIONAL; INTRAMUSCULAR; INTRAVENOUS; SOFT TISSUE PRN
Qty: 0.5 ML | Refills: 0
Start: 2023-04-19

## 2023-04-19 RX ORDER — HYDROCORTISONE 5 MG/1
TABLET ORAL
Qty: 150 TABLET | Refills: 11 | Status: SHIPPED | OUTPATIENT
Start: 2023-04-19

## 2023-04-19 NOTE — PROGRESS NOTES
[address] [facility] Department of Endocrinology Diabetes and Metabolism   [address] [facility] [address]   Phone: [phone]  Fax: [phone]       Date of Service: 4/19/2023  Primary Care Physician: Jaime Ewing MD  Consultant physician: Lane Morton MD     Reason for the consultation:  Secondary adrenal insufficiency     History of Present Illness: The history is provided by the patient. Accuracy of the patient data is excellent. Patrick Díaz is a very pleasant 70 y.o. old female with PMH of secondary adrenal insufficiency, asthma, atopic dermatitis, HLD and other listed below seen today for follow up visit     The patient has been troubled by atopic dermatitis for many years and has been on daily steroids therapy for long time. Previous workup was consistent with secondary adrenal insufficiency.  Cosyntropin stim test 2/21/2023 confirmed adrenal insufficiency    2/21/23 04:30 2/21/23 13:11   CORTISOL 2.31 (L) 8.74     Hydrocortisone 15 mg AM, 5 mg in evening     Past Medical History   Past Medical History:   Diagnosis Date    Anxiety     Arthritis     Asthma     Claustrophobia     Dermatitis 02/2017    bilateral legs knees to ankle; follows with [facility]    Fracture 02/2017    \"in Spine\" compression T10 per pt; difficulty laying flat    GERD (gastroesophageal reflux disease)     Hiatal hernia     History of blood transfusion     HTN (hypertension) 4/22/2013    Hyperlipidemia     On aspirin at home     for age per pcp    Pancreatic cyst 5/27/2017    Pneumonia 07/2018    Sleep apnea     SOB (shortness of breath) 4/22/2013    Tachycardia 04/22/2013    follows with Dr Too Madsen       Past Surgical History   Past Surgical History:   Procedure Laterality Date    BACK SURGERY  8/2014    has screws in back    CATARACT REMOVAL  12/6/2013, 2/20/2013    [facility] with lens implants    CHOLECYSTECTOMY      JOINT REPLACEMENT  12/16/2016    SI joint fusion Right

## 2023-04-19 NOTE — PATIENT INSTRUCTIONS
ADRENAL STRESS-DOSING INSTRUCTIONS    Why adrenal stress-dosing? Adrenal hormones are needed for life. The key adrenal hormone is cortisol (also called hydrocortisone). Cortisol keeps the circulation in tone, maintains the blood pressure, prevents blood sugar from going too low and helps control inflammation. It helps the body respond to illness, injury, surgery or other physical stress. The normal adrenal gland normally makes as much as the body needs, and it can automatically make many times more than normal if necessary. Some people make enough cortisol for times when they feel well, but not enough for times when they are sick. This is called adrenal insufficiency (or cortisol deficiency). It can cause weight loss, fatigue, weakness, dizziness, fainting, or even collapse. What to do when you are sick. People who do not make enough cortisol must take extra cortisol when they are sick or injured (STRESS-DOSE STEROIDS). If you do not receive extra cortisol you could become very ill or may go into shock and can die unless given steroids promptly. Getting an emergency injection of cortisol can save your life at such times. Be sure to wear an ID to tell emergency workers that you do not make enough cortisol and need it when sick. This extra cortisol comes in many forms which, in general are called \"steroids. \". They can be given by mouth, through a feeding tube, in the muscle or in a vein. Instructions for every-day treatment:    Steroid Name                  Dose (amount)               When to Take  Hydrocortisone  15 mg   Every morning    Hydrocortisone   5 mg Every afternoon     YOU NEED EXTRA CORTISOL WHEN YOU ARE SICK OR INJURED   Here are the instructions:    A.  If you have:   Fever 101 or 102 (by mouth) or more than 100.4 rectally  Illness such as strep throat, ear infections, bronchitis  Injury such as a sprain or strain  Vomiting once or diarrhea but looking okay (wait 20 minutes to give if

## 2023-05-18 ENCOUNTER — PREP FOR PROCEDURE (OUTPATIENT)
Dept: NEUROSURGERY | Age: 78
End: 2023-05-18

## 2023-05-18 DIAGNOSIS — Z01.818 PRE-OP TESTING: Primary | ICD-10-CM

## 2023-05-18 RX ORDER — SODIUM CHLORIDE 9 MG/ML
INJECTION, SOLUTION INTRAVENOUS PRN
Status: CANCELLED | OUTPATIENT
Start: 2023-05-18

## 2023-05-18 RX ORDER — SODIUM CHLORIDE 9 MG/ML
INJECTION, SOLUTION INTRAVENOUS CONTINUOUS
Status: CANCELLED | OUTPATIENT
Start: 2023-05-18

## 2023-05-18 RX ORDER — SODIUM CHLORIDE 0.9 % (FLUSH) 0.9 %
5-40 SYRINGE (ML) INJECTION EVERY 12 HOURS SCHEDULED
Status: CANCELLED | OUTPATIENT
Start: 2023-05-18

## 2023-05-18 RX ORDER — SODIUM CHLORIDE 0.9 % (FLUSH) 0.9 %
5-40 SYRINGE (ML) INJECTION PRN
Status: CANCELLED | OUTPATIENT
Start: 2023-05-18

## 2023-05-24 ENCOUNTER — HOSPITAL ENCOUNTER (OUTPATIENT)
Dept: GENERAL RADIOLOGY | Age: 78
Discharge: HOME | End: 2023-05-26
Payer: MEDICARE

## 2023-05-24 ENCOUNTER — HOSPITAL ENCOUNTER (OUTPATIENT)
Dept: PREADMISSION TESTING | Age: 78
Discharge: HOME | End: 2023-05-24
Payer: MEDICARE

## 2023-05-24 VITALS
SYSTOLIC BLOOD PRESSURE: 127 MMHG | HEIGHT: 64 IN | TEMPERATURE: 98 F | OXYGEN SATURATION: 97 % | DIASTOLIC BLOOD PRESSURE: 62 MMHG | BODY MASS INDEX: 21.17 KG/M2 | RESPIRATION RATE: 16 BRPM | WEIGHT: 124 LBS

## 2023-05-24 DIAGNOSIS — Z01.818 PRE-OP TESTING: ICD-10-CM

## 2023-05-24 DIAGNOSIS — Z01.812 PRE-OPERATIVE LABORATORY EXAMINATION: Primary | ICD-10-CM

## 2023-05-24 LAB
ABO + RH BLD: NORMAL
ANION GAP SERPL CALCULATED.3IONS-SCNC: 11 MMOL/L (ref 7–16)
BASOPHILS # BLD: 0.09 E9/L (ref 0–0.2)
BASOPHILS NFR BLD: 1 % (ref 0–2)
BILIRUB UR QL STRIP: NEGATIVE
BLD GP AB SCN SERPL QL: NORMAL
BUN SERPL-MCNC: 28 MG/DL (ref 6–23)
CALCIUM SERPL-MCNC: 9.6 MG/DL (ref 8.6–10.2)
CHLORIDE SERPL-SCNC: 107 MMOL/L (ref 98–107)
CLARITY UR: CLEAR
CO2 SERPL-SCNC: 21 MMOL/L (ref 22–29)
COLOR UR: YELLOW
CREAT SERPL-MCNC: 0.8 MG/DL (ref 0.5–1)
EOSINOPHIL # BLD: 0.08 E9/L (ref 0.05–0.5)
EOSINOPHIL NFR BLD: 0.9 % (ref 0–6)
ERYTHROCYTE [DISTWIDTH] IN BLOOD BY AUTOMATED COUNT: 13.7 FL (ref 11.5–15)
GLUCOSE SERPL-MCNC: 88 MG/DL (ref 74–99)
GLUCOSE UR STRIP-MCNC: NEGATIVE MG/DL
HCT VFR BLD AUTO: 39.5 % (ref 34–48)
HGB BLD-MCNC: 12.1 G/DL (ref 11.5–15.5)
HGB UR QL STRIP: NEGATIVE
IMM GRANULOCYTES # BLD: 0.06 E9/L
IMM GRANULOCYTES NFR BLD: 0.7 % (ref 0–5)
INR BLD: 0.9
KETONES UR STRIP-MCNC: NEGATIVE MG/DL
LEUKOCYTE ESTERASE UR QL STRIP: NEGATIVE
LYMPHOCYTES # BLD: 1.36 E9/L (ref 1.5–4)
LYMPHOCYTES NFR BLD: 15.6 % (ref 20–42)
MCH RBC QN AUTO: 28.9 PG (ref 26–35)
MCHC RBC AUTO-ENTMCNC: 30.6 % (ref 32–34.5)
MCV RBC AUTO: 94.5 FL (ref 80–99.9)
MONOCYTES # BLD: 0.57 E9/L (ref 0.1–0.95)
MONOCYTES NFR BLD: 6.5 % (ref 2–12)
NEUTROPHILS # BLD: 6.55 E9/L (ref 1.8–7.3)
NEUTS SEG NFR BLD: 75.3 % (ref 43–80)
NITRITE UR QL STRIP: NEGATIVE
PH UR STRIP: 5 [PH] (ref 5–9)
PLATELET # BLD AUTO: 280 E9/L (ref 130–450)
PMV BLD AUTO: 11.3 FL (ref 7–12)
POTASSIUM SERPL-SCNC: 4.6 MMOL/L (ref 3.5–5)
PROT UR STRIP-MCNC: NEGATIVE MG/DL
PROTHROMBIN TIME: 10.2 SEC (ref 9.3–12.4)
RBC # BLD AUTO: 4.18 E12/L (ref 3.5–5.5)
SODIUM SERPL-SCNC: 139 MMOL/L (ref 132–146)
SP GR UR STRIP: 1.02 (ref 1–1.03)
UROBILINOGEN UR STRIP-ACNC: 0.2 E.U./DL
WBC # BLD: 8.7 E9/L (ref 4.5–11.5)

## 2023-05-24 PROCEDURE — 81003 URINALYSIS AUTO W/O SCOPE: CPT

## 2023-05-24 PROCEDURE — 86901 BLOOD TYPING SEROLOGIC RH(D): CPT

## 2023-05-24 PROCEDURE — 36415 COLL VENOUS BLD VENIPUNCTURE: CPT

## 2023-05-24 PROCEDURE — 80048 BASIC METABOLIC PNL TOTAL CA: CPT

## 2023-05-24 PROCEDURE — 86900 BLOOD TYPING SEROLOGIC ABO: CPT

## 2023-05-24 PROCEDURE — 87088 URINE BACTERIA CULTURE: CPT

## 2023-05-24 PROCEDURE — 85610 PROTHROMBIN TIME: CPT

## 2023-05-24 PROCEDURE — 86850 RBC ANTIBODY SCREEN: CPT

## 2023-05-24 PROCEDURE — 71046 X-RAY EXAM CHEST 2 VIEWS: CPT

## 2023-05-24 PROCEDURE — 85025 COMPLETE CBC W/AUTO DIFF WBC: CPT

## 2023-05-24 RX ORDER — TRIMETHOPRIM 100 MG/1
100 TABLET ORAL DAILY
COMMUNITY
Start: 2023-05-08

## 2023-05-26 LAB — BACTERIA UR CULT: NORMAL

## 2023-05-30 NOTE — H&P
HISTORY OF PRESENT ILLNESS:  The patient is a 68-year-old lady with a history of chronic back pain. She has had multiple spinal surgeries and sacroiliac joint fusion. She states she has a spinal cord stimulator  in place. The battery has reached end of life     PAST MEDICAL HISTORY:  Positive for anxiety, arthritis, asthma,  claustrophobia, dermatitis, hypertension, obstructive sleep apnea and  tachycardia. PAST SURGICAL HISTORY:  Positive for lumbar fusion, cataract removal,  cholecystectomy, SI joint fusion on the right, total knee arthroplasty  on the right. FAMILY HISTORY:  Positive for hypertension in her mother and father. SOCIAL HISTORY:  Negative for tobacco or alcohol use. ALLERGIES:  Include PERCOCET. REVIEW OF SYSTEMS:  HEENT:  Negative for headache, double vision, blurry  vision. CARDIOVASCULAR:  Negative for chest pain, arrhythmia or  palpitations. RESPIRATORY:  Negative for shortness of breath, asthma,  bronchitis or pneumonia. GASTROINTESTINAL:  Negative for heartburn,  nausea, vomiting, diarrhea, or constipation. GENITOURINARY:  Negative  for hematuria or dysuria. HEMATOLOGIC:  Positive for easy bruising. INFECTIOUS:  Negative for any recent infection. MUSCULOSKELETAL:   Positive for back pain. PSYCHIATRIC:  Positive for some anxiety and  depression. NEUROLOGIC:  Negative for seizure or stroke. ENDOCRINE:   Negative for thyroid disorder or diabetes. PHYSICAL EXAMINATION:  GENERAL:  She is resting in bed. Appears to be in mild-to-moderate  distress secondary to pain. She appears her stated age. HEENT:  Her head is normocephalic and atraumatic. Pupils are 3-2 mm and  reactive. She has no drainage out of her eyes, ears, nose or throat. SKIN:  Warm and dry. MUSCULOSKELETAL:  She had good range of motion of bilateral upper and  lower extremities with tenderness to palpation along her spine. ABDOMEN:  Again is soft, nontender, nondistended.   RESPIRATORY:  She

## 2023-05-31 ENCOUNTER — ANESTHESIA EVENT (OUTPATIENT)
Dept: OPERATING ROOM | Age: 78
End: 2023-05-31
Payer: MEDICARE

## 2023-05-31 ENCOUNTER — HOSPITAL ENCOUNTER (OUTPATIENT)
Age: 78
Setting detail: OUTPATIENT SURGERY
Discharge: HOME | End: 2023-05-31
Attending: NEUROLOGICAL SURGERY | Admitting: NEUROLOGICAL SURGERY
Payer: MEDICARE

## 2023-05-31 ENCOUNTER — ANESTHESIA (OUTPATIENT)
Dept: OPERATING ROOM | Age: 78
End: 2023-05-31
Payer: MEDICARE

## 2023-05-31 VITALS
TEMPERATURE: 97.4 F | HEIGHT: 64 IN | OXYGEN SATURATION: 98 % | WEIGHT: 124 LBS | HEART RATE: 107 BPM | SYSTOLIC BLOOD PRESSURE: 124 MMHG | BODY MASS INDEX: 21.17 KG/M2 | RESPIRATION RATE: 18 BRPM | DIASTOLIC BLOOD PRESSURE: 68 MMHG

## 2023-05-31 DIAGNOSIS — Z01.812 PRE-OPERATIVE LABORATORY EXAMINATION: ICD-10-CM

## 2023-05-31 DIAGNOSIS — T85.192A: ICD-10-CM

## 2023-05-31 LAB — METER GLUCOSE: 93 MG/DL (ref 74–99)

## 2023-05-31 PROCEDURE — 82962 GLUCOSE BLOOD TEST: CPT

## 2023-05-31 PROCEDURE — C1767 GENERATOR, NEURO NON-RECHARG: HCPCS | Performed by: NEUROLOGICAL SURGERY

## 2023-05-31 PROCEDURE — 6360000002 HC RX W HCPCS: Performed by: NEUROLOGICAL SURGERY

## 2023-05-31 PROCEDURE — 2580000003 HC RX 258: Performed by: NEUROLOGICAL SURGERY

## 2023-05-31 PROCEDURE — 3700000001 HC ADD 15 MINUTES (ANESTHESIA): Performed by: NEUROLOGICAL SURGERY

## 2023-05-31 PROCEDURE — 2580000003 HC RX 258: Performed by: NURSE ANESTHETIST, CERTIFIED REGISTERED

## 2023-05-31 PROCEDURE — 6360000002 HC RX W HCPCS: Performed by: NURSE ANESTHETIST, CERTIFIED REGISTERED

## 2023-05-31 PROCEDURE — 7100000001 HC PACU RECOVERY - ADDTL 15 MIN: Performed by: NEUROLOGICAL SURGERY

## 2023-05-31 PROCEDURE — 6370000000 HC RX 637 (ALT 250 FOR IP): Performed by: NEUROLOGICAL SURGERY

## 2023-05-31 PROCEDURE — 2500000003 HC RX 250 WO HCPCS: Performed by: NURSE ANESTHETIST, CERTIFIED REGISTERED

## 2023-05-31 PROCEDURE — 7100000011 HC PHASE II RECOVERY - ADDTL 15 MIN: Performed by: NEUROLOGICAL SURGERY

## 2023-05-31 PROCEDURE — 2709999900 HC NON-CHARGEABLE SUPPLY: Performed by: NEUROLOGICAL SURGERY

## 2023-05-31 PROCEDURE — 2720000010 HC SURG SUPPLY STERILE: Performed by: NEUROLOGICAL SURGERY

## 2023-05-31 PROCEDURE — 3600000013 HC SURGERY LEVEL 3 ADDTL 15MIN: Performed by: NEUROLOGICAL SURGERY

## 2023-05-31 PROCEDURE — 2580000003 HC RX 258: Performed by: PHYSICIAN ASSISTANT

## 2023-05-31 PROCEDURE — 3700000000 HC ANESTHESIA ATTENDED CARE: Performed by: NEUROLOGICAL SURGERY

## 2023-05-31 PROCEDURE — 2500000003 HC RX 250 WO HCPCS: Performed by: NEUROLOGICAL SURGERY

## 2023-05-31 PROCEDURE — 7100000000 HC PACU RECOVERY - FIRST 15 MIN: Performed by: NEUROLOGICAL SURGERY

## 2023-05-31 PROCEDURE — 3600000003 HC SURGERY LEVEL 3 BASE: Performed by: NEUROLOGICAL SURGERY

## 2023-05-31 PROCEDURE — A4217 STERILE WATER/SALINE, 500 ML: HCPCS | Performed by: NEUROLOGICAL SURGERY

## 2023-05-31 PROCEDURE — 7100000010 HC PHASE II RECOVERY - FIRST 15 MIN: Performed by: NEUROLOGICAL SURGERY

## 2023-05-31 PROCEDURE — 88300 SURGICAL PATH GROSS: CPT

## 2023-05-31 RX ORDER — LIDOCAINE HYDROCHLORIDE 20 MG/ML
INJECTION, SOLUTION INTRAVENOUS PRN
Status: DISCONTINUED | OUTPATIENT
Start: 2023-05-31 | End: 2023-05-31 | Stop reason: SDUPTHER

## 2023-05-31 RX ORDER — MIDAZOLAM HYDROCHLORIDE 1 MG/ML
INJECTION INTRAMUSCULAR; INTRAVENOUS PRN
Status: DISCONTINUED | OUTPATIENT
Start: 2023-05-31 | End: 2023-05-31 | Stop reason: SDUPTHER

## 2023-05-31 RX ORDER — SODIUM CHLORIDE 0.9 % (FLUSH) 0.9 %
5-40 SYRINGE (ML) INJECTION EVERY 12 HOURS SCHEDULED
Status: DISCONTINUED | OUTPATIENT
Start: 2023-05-31 | End: 2023-05-31 | Stop reason: SDUPTHER

## 2023-05-31 RX ORDER — SODIUM CHLORIDE 9 MG/ML
INJECTION, SOLUTION INTRAVENOUS CONTINUOUS
Status: DISCONTINUED | OUTPATIENT
Start: 2023-05-31 | End: 2023-05-31 | Stop reason: SDUPTHER

## 2023-05-31 RX ORDER — MEPERIDINE HYDROCHLORIDE 25 MG/ML
12.5 INJECTION INTRAMUSCULAR; INTRAVENOUS; SUBCUTANEOUS
Status: DISCONTINUED | OUTPATIENT
Start: 2023-05-31 | End: 2023-05-31 | Stop reason: HOSPADM

## 2023-05-31 RX ORDER — HYDROMORPHONE HYDROCHLORIDE 1 MG/ML
0.25 INJECTION, SOLUTION INTRAMUSCULAR; INTRAVENOUS; SUBCUTANEOUS EVERY 5 MIN PRN
Status: DISCONTINUED | OUTPATIENT
Start: 2023-05-31 | End: 2023-05-31 | Stop reason: HOSPADM

## 2023-05-31 RX ORDER — SODIUM CHLORIDE 9 MG/ML
INJECTION, SOLUTION INTRAVENOUS PRN
Status: DISCONTINUED | OUTPATIENT
Start: 2023-05-31 | End: 2023-05-31 | Stop reason: HOSPADM

## 2023-05-31 RX ORDER — PROPOFOL 10 MG/ML
INJECTION, EMULSION INTRAVENOUS PRN
Status: DISCONTINUED | OUTPATIENT
Start: 2023-05-31 | End: 2023-05-31 | Stop reason: SDUPTHER

## 2023-05-31 RX ORDER — ONDANSETRON 2 MG/ML
INJECTION INTRAMUSCULAR; INTRAVENOUS PRN
Status: DISCONTINUED | OUTPATIENT
Start: 2023-05-31 | End: 2023-05-31 | Stop reason: SDUPTHER

## 2023-05-31 RX ORDER — SODIUM CHLORIDE 0.9 % (FLUSH) 0.9 %
5-40 SYRINGE (ML) INJECTION EVERY 12 HOURS SCHEDULED
Status: DISCONTINUED | OUTPATIENT
Start: 2023-05-31 | End: 2023-05-31 | Stop reason: HOSPADM

## 2023-05-31 RX ORDER — FENTANYL CITRATE 50 UG/ML
INJECTION, SOLUTION INTRAMUSCULAR; INTRAVENOUS PRN
Status: DISCONTINUED | OUTPATIENT
Start: 2023-05-31 | End: 2023-05-31 | Stop reason: SDUPTHER

## 2023-05-31 RX ORDER — DEXAMETHASONE SODIUM PHOSPHATE 10 MG/ML
INJECTION INTRAMUSCULAR; INTRAVENOUS PRN
Status: DISCONTINUED | OUTPATIENT
Start: 2023-05-31 | End: 2023-05-31 | Stop reason: SDUPTHER

## 2023-05-31 RX ORDER — SODIUM CHLORIDE 9 MG/ML
INJECTION, SOLUTION INTRAVENOUS PRN
Status: DISCONTINUED | OUTPATIENT
Start: 2023-05-31 | End: 2023-05-31 | Stop reason: SDUPTHER

## 2023-05-31 RX ORDER — SODIUM CHLORIDE 9 MG/ML
INJECTION, SOLUTION INTRAVENOUS CONTINUOUS PRN
Status: DISCONTINUED | OUTPATIENT
Start: 2023-05-31 | End: 2023-05-31 | Stop reason: SDUPTHER

## 2023-05-31 RX ORDER — SODIUM CHLORIDE 9 MG/ML
INJECTION, SOLUTION INTRAVENOUS CONTINUOUS
Status: DISCONTINUED | OUTPATIENT
Start: 2023-05-31 | End: 2023-05-31 | Stop reason: HOSPADM

## 2023-05-31 RX ORDER — HYDROMORPHONE HYDROCHLORIDE 1 MG/ML
0.5 INJECTION, SOLUTION INTRAMUSCULAR; INTRAVENOUS; SUBCUTANEOUS EVERY 5 MIN PRN
Status: DISCONTINUED | OUTPATIENT
Start: 2023-05-31 | End: 2023-05-31 | Stop reason: HOSPADM

## 2023-05-31 RX ORDER — SODIUM CHLORIDE 0.9 % (FLUSH) 0.9 %
5-40 SYRINGE (ML) INJECTION PRN
Status: DISCONTINUED | OUTPATIENT
Start: 2023-05-31 | End: 2023-05-31 | Stop reason: HOSPADM

## 2023-05-31 RX ORDER — ROCURONIUM BROMIDE 10 MG/ML
INJECTION, SOLUTION INTRAVENOUS PRN
Status: DISCONTINUED | OUTPATIENT
Start: 2023-05-31 | End: 2023-05-31 | Stop reason: SDUPTHER

## 2023-05-31 RX ORDER — ONDANSETRON 2 MG/ML
4 INJECTION INTRAMUSCULAR; INTRAVENOUS
Status: DISCONTINUED | OUTPATIENT
Start: 2023-05-31 | End: 2023-05-31 | Stop reason: HOSPADM

## 2023-05-31 RX ORDER — BACITRACIN ZINC 500 [USP'U]/G
OINTMENT TOPICAL PRN
Status: DISCONTINUED | OUTPATIENT
Start: 2023-05-31 | End: 2023-05-31 | Stop reason: ALTCHOICE

## 2023-05-31 RX ORDER — ACETAMINOPHEN 500 MG
1000 TABLET ORAL ONCE
Status: DISCONTINUED | OUTPATIENT
Start: 2023-05-31 | End: 2023-05-31 | Stop reason: HOSPADM

## 2023-05-31 RX ORDER — SODIUM CHLORIDE 0.9 % (FLUSH) 0.9 %
5-40 SYRINGE (ML) INJECTION PRN
Status: DISCONTINUED | OUTPATIENT
Start: 2023-05-31 | End: 2023-05-31 | Stop reason: SDUPTHER

## 2023-05-31 RX ORDER — CEPHALEXIN 500 MG/1
500 CAPSULE ORAL 4 TIMES DAILY
Qty: 28 CAPSULE | Refills: 0 | Status: SHIPPED | OUTPATIENT
Start: 2023-05-31

## 2023-05-31 RX ORDER — LIDOCAINE HYDROCHLORIDE AND EPINEPHRINE 5; 5 MG/ML; UG/ML
INJECTION, SOLUTION INFILTRATION; PERINEURAL PRN
Status: DISCONTINUED | OUTPATIENT
Start: 2023-05-31 | End: 2023-05-31 | Stop reason: ALTCHOICE

## 2023-05-31 RX ADMIN — FENTANYL CITRATE 50 MCG: 50 INJECTION, SOLUTION INTRAMUSCULAR; INTRAVENOUS at 09:28

## 2023-05-31 RX ADMIN — PROPOFOL 80 MG: 10 INJECTION, EMULSION INTRAVENOUS at 09:28

## 2023-05-31 RX ADMIN — DEXAMETHASONE SODIUM PHOSPHATE 10 MG: 10 INJECTION INTRAMUSCULAR; INTRAVENOUS at 09:35

## 2023-05-31 RX ADMIN — ONDANSETRON 4 MG: 2 INJECTION INTRAMUSCULAR; INTRAVENOUS at 10:06

## 2023-05-31 RX ADMIN — MIDAZOLAM 1 MG: 1 INJECTION INTRAMUSCULAR; INTRAVENOUS at 09:19

## 2023-05-31 RX ADMIN — SODIUM CHLORIDE: 9 INJECTION, SOLUTION INTRAVENOUS at 07:56

## 2023-05-31 RX ADMIN — ROCURONIUM BROMIDE 40 MG: 10 INJECTION INTRAVENOUS at 09:28

## 2023-05-31 RX ADMIN — CEFAZOLIN 2000 MG: 2 INJECTION, POWDER, FOR SOLUTION INTRAMUSCULAR; INTRAVENOUS at 09:36

## 2023-05-31 RX ADMIN — LIDOCAINE HYDROCHLORIDE 100 MG: 20 INJECTION, SOLUTION INTRAVENOUS at 09:28

## 2023-05-31 RX ADMIN — SUGAMMADEX 112 MG: 100 INJECTION, SOLUTION INTRAVENOUS at 10:00

## 2023-05-31 RX ADMIN — SODIUM CHLORIDE: 9 INJECTION, SOLUTION INTRAVENOUS at 09:10

## 2023-05-31 ASSESSMENT — PAIN SCALES - GENERAL
PAINLEVEL_OUTOF10: 0

## 2023-05-31 ASSESSMENT — PAIN - FUNCTIONAL ASSESSMENT: PAIN_FUNCTIONAL_ASSESSMENT: NONE - DENIES PAIN

## 2023-05-31 ASSESSMENT — ENCOUNTER SYMPTOMS
SHORTNESS OF BREATH: 0
SHORTNESS OF BREATH: 1

## 2023-05-31 NOTE — PROGRESS NOTES
Discharge instructions reviewed with patient and patient's sister. Understanding stated of instructions.   Electronically signed by Kaela Velazquez RN on 5/31/2023 at 12:13 PM

## 2023-05-31 NOTE — DISCHARGE INSTRUCTIONS
Discharge Instructions    1. No lifting more than 10 pounds. 2. Refrain from bending, twisting, or turning at the waist.  3. No brace is needed to be worn. 4. Leave incision open to air. 5. Follow up in office in 2 weeks for staple removal  6. Patient may shower, do not soak or scrub at the incision site. 7. Refrain from driving and sexual activity for 1 month. 8. Follow-up in the office in 1 month, no films necessary.

## 2023-05-31 NOTE — ANESTHESIA PRE PROCEDURE
Department of Anesthesiology  Preprocedure Note       Name:  Kirby Kohli   Age:  70 y.o.  :  1945                                          MRN:  [id number]         Date:  2023      Surgeon: Maris Rosen):  Debbie Wills MD    Procedure: Procedure(s):  Spinal cord stimulator battery replacement/ [facility]    Medications prior to admission:   Prior to Admission medications    Medication Sig Start Date End Date Taking?  Authorizing Provider   trimethoprim (TRIMPEX) 100 MG tablet Take 1 tablet by mouth daily 23   Historical Provider, MD   fluocinonide (LIDEX) 0.05 % cream Apply 1 application topically in the morning and at bedtime 23   Historical Provider, MD   hydrocortisone (CORTEF) 5 MG tablet Take 3 tablets (15 mg) in the morning and 1 tablet (5 mg) in the evening PLUS extra for stress dose in case of illness 23   Bertha Cunningham MD   dexamethasone (DECADRON) 4 MG/ML injection Inject 0.5 mLs into the muscle as needed (Adrenal crisis) 23   Bertha Cunningham MD   CALCIUM PO Take 1 tablet by mouth daily 21   Historical Provider, MD   VITAMIN D PO Take 2,000 Units by mouth in the morning and at bedtime    Historical Provider, MD   Ascorbic Acid (VITAMIN C PO) Take 250 mg by mouth daily    Historical Provider, MD   MAGNESIUM PO Take 133 mg by mouth daily    Historical Provider, MD   BIOTIN PO Take 5,000 mg by mouth daily    Historical Provider, MD   ferrous sulfate (IRON 325) 325 (65 Fe) MG tablet Take 1 tablet by mouth daily (with breakfast)    Historical Provider, MD   pantoprazole (PROTONIX) 40 MG tablet Take 1 tablet by mouth 2 times daily (before meals)  Patient taking differently: Take 1 tablet by mouth daily 23   Kathie Lanes, MD   thiamine 100 MG tablet Take 1 tablet by mouth daily 3/1/23   Kathie Lanes, MD   acetaminophen (TYLENOL) 650 MG extended release tablet Take 1 tablet by mouth every 4 hours as needed for Pain    Historical Provider, MD   docusate sodium (COLACE,
CREATININE 0.8 05/24/2023 11:30 AM    GFRAA >60 01/12/2019 04:00 AM    LABGLOM >60 05/24/2023 11:30 AM    GLUCOSE 88 05/24/2023 11:30 AM    GLUCOSE 85 04/02/2012 08:30 AM    PROT 5.1 03/15/2023 02:50 AM    CALCIUM 9.6 05/24/2023 11:30 AM    BILITOT <0.2 03/15/2023 02:50 AM    ALKPHOS 54 03/15/2023 02:50 AM    AST 12 03/15/2023 02:50 AM    ALT 11 03/15/2023 02:50 AM       POC Tests: No results for input(s): POCGLU, POCNA, POCK, POCCL, POCBUN, POCHEMO, POCHCT in the last 72 hours. Coags:   Lab Results   Component Value Date/Time    PROTIME 10.2 05/24/2023 11:30 AM    PROTIME 11.7 04/01/2012 03:16 PM    INR 0.9 05/24/2023 11:30 AM    APTT 37.6 05/28/2017 04:10 AM       HCG (If Applicable): No results found for: PREGTESTUR, PREGSERUM, HCG, HCGQUANT     ABGs: No results found for: PHART, PO2ART, EKJ7TRZ, CEX8QXZ, BEART, Q1WFWQVL     Type & Screen (If Applicable):  Lab Results   Component Value Date    LABABO O 04/01/2012    [address] 04/01/2012       Drug/Infectious Status (If Applicable):  No results found for: HIV, HEPCAB    COVID-19 Screening (If Applicable):   Lab Results   Component Value Date/Time    COVID19 Not Detected 03/09/2023 08:19 PM    COVID19 DETECTED 03/09/2023 08:19 PM       S/Procedure Date  Date: 03/20/2023 Start: 11:10 AM     Study Location: Echo Lab  Technical Quality: Adequate visualization     Indications:Dyspnea/SOB.     Patient Status: Routine     Height: 64 inches Weight: 124 pounds BSA: 1.6 m^2 BMI: 21.28 kg/m^2     BP: 149/70 mmHg      Findings      Left Ventricle   Ejection fraction is visually estimated at 65-70%. No regional wall motion   abnormalities seen. Normal left ventricular wall thickness. Left ventricle   size is normal. Normal left ventricular diastolic filling pattern for age. Right Ventricle   Normal right ventricle structure and function. Left Atrium   Left atrial volume index of 20 ml per meters squared BSA. Right Atrium   Normal right atrium.       Mitral

## 2023-05-31 NOTE — PROGRESS NOTES
CLINICAL PHARMACY NOTE: MEDS TO BEDS    Total # of Prescriptions Filled: 1   The following medications were delivered to the patient:  Cephalexin 500 mg    Additional Documentation:   Sister picked  up at register

## 2023-05-31 NOTE — BRIEF OP NOTE
Brief Postoperative Note      Patient: Freddy Velazquez  YOB: 1945  MRN: [id number]    Date of Procedure: 5/31/2023    Pre-Op Diagnosis Codes:     * Failed spinal cord stimulator ([facility] [address] [T85.192A]    Post-Op Diagnosis: Same       Procedure(s):  Spinal cord stimulator battery replacement    Surgeon(s):  Miles Botello MD    Assistant:  Physician Assistant: JUVENAL Mckeon    Anesthesia: General    Estimated Blood Loss (mL): Minimal    Complications: None    Specimens:   ID Type Source Tests Collected by Time Destination   A : STIMULATOR BATTERY Hardware Hardware SURGICAL PATHOLOGY Miles Botello MD 5/31/2023 1013        Implants:  Implant Name Type Inv.  Item Serial No.  Lot No. LRB No. Used Action   [employer] PRO[employer] PLUS 5 IMPLANTABLE PULSE GENERATOR     [id number] Right 1 Implanted         Drains: * No LDAs found *    Findings: see dictated op note      Electronically signed by Vannessa Law MD on 5/31/2023 at 10:23 AM

## 2023-06-01 NOTE — OP NOTE
was identified by her name, medical  record number and the operative procedure, which she was about to  undergo. Next, induction of generalized endotracheal anesthesia was  then commenced. Upon completion of induction of generalized  endotracheal anesthesia, she received preoperative antibiotics. She was  then flipped into prone position on a Vidal table. All pressure  points were padded. Her right gluteal region was prepped and draped in  the usual sterile fashion. After this was done, #10 blade was used to  open up the incision where a prior implantable pulse generator was. Monopolar cautery was then used to dissect through the subcutaneous  tissue. I then proceeded to use Metzenbaum scissors to dissect through  the soft tissue. I was able to then identify the previously placed  implantable programmable generator. Once this implantable pulse  generator was identified, Metzenbaum scissors were used to cut the  anchoring stitches. Once the anchoring stitches were cut, it was then  removed and once it was removed, I used a torque  to remove the  leads that were inserted into it. The implantable pulse generator was  then handed off. The new implantable pulse generator was then brought  into the field. The leads were then inserted into this new generator  and were _____ down. I then proceeded to then insert it into the pocket  and anchored it down using two 2-0 silk sutures. I then irrigated the  wound copiously with antibiotic-impregnated saline. I obtained adequate  hemostasis with monopolar and bipolar cautery. I proceeded to then  close the wound in layers using 2-0 Vicryl for the subcutaneous layer  and staples for the skin. A dry sterile dressing was placed over this. The patient was then flipped into supine position on her hospital bed,  was extubated. Complex programming of stimulator was performed. She  was transported to the postanesthesia care unit in stable condition.    There

## 2023-06-01 NOTE — ANESTHESIA POSTPROCEDURE EVALUATION
Department of Anesthesiology  Postprocedure Note    Patient: [place]  MRN: [id number]  [place]: 1945  Date of evaluation: 6/1/2023      Procedure Summary     Date: 05/31/23 Room / Location: [facility] OR  / [facility]    Anesthesia Start: 2221 Anesthesia Stop: 5076    Procedure: Spinal cord stimulator battery replacement (Right: Back) Diagnosis:       Failed spinal cord stimulator ([address]      (Failed spinal cord stimulator ([address] [N34.560L])    Surgeons: Daniel Zapata MD Responsible Provider: Hector Jacome DO    Anesthesia Type: General ASA Status: 3          Anesthesia Type: General    Jamie Phase I: Jamie Score: 10    Jamie Phase II: Jamie Score: 10      Anesthesia Post Evaluation    Patient location during evaluation: PACU  Patient participation: complete - patient participated  Level of consciousness: awake and alert  Airway patency: patent  Nausea & Vomiting: no nausea and no vomiting  Complications: no  Cardiovascular status: blood pressure returned to baseline  Respiratory status: acceptable  Hydration status: euvolemic  Multimodal analgesia pain management approach

## 2023-06-22 ENCOUNTER — TELEPHONE (OUTPATIENT)
Dept: ENDOCRINOLOGY | Age: 78
End: 2023-06-22

## 2023-08-16 ENCOUNTER — OFFICE VISIT (OUTPATIENT)
Dept: NEUROSURGERY | Age: 78
End: 2023-08-16
Payer: MEDICARE

## 2023-08-16 VITALS — SYSTOLIC BLOOD PRESSURE: 129 MMHG | HEART RATE: 87 BPM | OXYGEN SATURATION: 97 % | DIASTOLIC BLOOD PRESSURE: 78 MMHG

## 2023-08-16 DIAGNOSIS — Z87.81 HX OF COMPRESSION FRACTURE OF SPINE: ICD-10-CM

## 2023-08-16 DIAGNOSIS — Z98.1 HISTORY OF LUMBAR FUSION: ICD-10-CM

## 2023-08-16 DIAGNOSIS — M53.3 PAIN OF LEFT SACROILIAC JOINT: Primary | ICD-10-CM

## 2023-08-16 PROCEDURE — 99214 OFFICE O/P EST MOD 30 MIN: CPT | Performed by: PHYSICIAN ASSISTANT

## 2023-08-16 PROCEDURE — 1123F ACP DISCUSS/DSCN MKR DOCD: CPT | Performed by: PHYSICIAN ASSISTANT

## 2023-08-16 PROCEDURE — 3078F DIAST BP <80 MM HG: CPT | Performed by: PHYSICIAN ASSISTANT

## 2023-08-16 PROCEDURE — 3074F SYST BP LT 130 MM HG: CPT | Performed by: PHYSICIAN ASSISTANT

## 2023-08-16 PROCEDURE — 99212 OFFICE O/P EST SF 10 MIN: CPT

## 2023-08-16 RX ORDER — PREGABALIN 25 MG/1
50 CAPSULE ORAL EVERY 12 HOURS
COMMUNITY
Start: 2023-07-13

## 2023-08-16 NOTE — PROGRESS NOTES
Problem Focused Office Visit     Subjective: Alex Echeverria is a 70 y.o.  female who is well known to our services. She has a past medical history of multiple spinal surgeries, right sacroiliac joint fusion, and recent spinal cord stimulator battery replacement 5/31/23. She presents to the office today c/o acute left sided back pain with radiation into her left groin x3 weeks. Describes the pain as sharp and throbbing. Denies injury or fall. Patient went to [facility] who performed an XR. She states they told her she has a new compression fracture. Patient presents to our office for further evaluation. Denies loss of bowel or bladder, saddle anesthesia, pain down the legs, numbness, tingling, headache, loss of dexterity, abnormal gait, fever, chills, N/V, SOB, or chest pain. Physical Exam:              WDWN, no apparent distress   Patient presents in a wheelchair              Non-labored breathing               Vitals Stable              Alert and oriented x3              CN 3-12 intact              PERRL              EOMI              Motor strength symmetric 4/5 throughout               Sensation to LT intact bilaterally   TTP left sided lumbar spine                Imaging: XR from outside facility reviewed. Previous fusion appears stable. Chronic T12 and L1 compression fractures noted. Imaging was compared to CT ab/pelvis 3/9/23. Assessment: This is a 70 y.o.  female presenting for evaluation of acute left sided low back/groin pain      Plan:  -Patient has SCS and unable to have MRI. NM bone scan ordered to evaluate for any fractures. 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 Pain could be elicited from the SI joint if no acute issues found on imaging  -Continue follow up with [facility] pain management   -OARRS report reviewed   -Will call patient after completion of imaging to discuss results and further treatment plan.

## 2023-08-22 ENCOUNTER — OFFICE VISIT (OUTPATIENT)
Dept: ENDOCRINOLOGY | Age: 78
End: 2023-08-22
Payer: MEDICARE

## 2023-08-22 VITALS
HEART RATE: 89 BPM | OXYGEN SATURATION: 100 % | WEIGHT: 148 LBS | DIASTOLIC BLOOD PRESSURE: 61 MMHG | HEIGHT: 64 IN | BODY MASS INDEX: 25.27 KG/M2 | RESPIRATION RATE: 18 BRPM | SYSTOLIC BLOOD PRESSURE: 115 MMHG

## 2023-08-22 DIAGNOSIS — E03.8 SUBCLINICAL HYPOTHYROIDISM: ICD-10-CM

## 2023-08-22 DIAGNOSIS — E27.49 SECONDARY ADRENAL INSUFFICIENCY (HCC): Primary | ICD-10-CM

## 2023-08-22 DIAGNOSIS — E27.49 SECONDARY ADRENAL INSUFFICIENCY (HCC): ICD-10-CM

## 2023-08-22 DIAGNOSIS — E55.9 VITAMIN D DEFICIENCY: ICD-10-CM

## 2023-08-22 DIAGNOSIS — Z79.52 CURRENT CHRONIC USE OF SYSTEMIC STEROIDS: ICD-10-CM

## 2023-08-22 LAB
ANION GAP SERPL CALCULATED.3IONS-SCNC: 14 MMOL/L (ref 7–16)
BUN BLDV-MCNC: 31 MG/DL (ref 6–23)
CALCIUM SERPL-MCNC: 8.5 MG/DL (ref 8.6–10.2)
CHLORIDE BLD-SCNC: 106 MMOL/L (ref 98–107)
CO2: 24 MMOL/L (ref 22–29)
CREAT SERPL-MCNC: 0.9 MG/DL (ref 0.5–1)
GFR SERPL CREATININE-BSD FRML MDRD: >60 ML/MIN/1.73M2
GLUCOSE BLD-MCNC: 88 MG/DL (ref 74–99)
HBA1C MFR BLD: 6.5 % (ref 4–5.6)
POTASSIUM SERPL-SCNC: 4.5 MMOL/L (ref 3.5–5)
SODIUM BLD-SCNC: 144 MMOL/L (ref 132–146)
T4 TOTAL: 5.5 UG/DL (ref 4.5–11.7)
TSH SERPL DL<=0.05 MIU/L-ACNC: 2.06 UIU/ML (ref 0.27–4.2)

## 2023-08-22 PROCEDURE — 3078F DIAST BP <80 MM HG: CPT | Performed by: INTERNAL MEDICINE

## 2023-08-22 PROCEDURE — 3074F SYST BP LT 130 MM HG: CPT | Performed by: INTERNAL MEDICINE

## 2023-08-22 PROCEDURE — 1123F ACP DISCUSS/DSCN MKR DOCD: CPT | Performed by: INTERNAL MEDICINE

## 2023-08-22 PROCEDURE — 99214 OFFICE O/P EST MOD 30 MIN: CPT | Performed by: INTERNAL MEDICINE

## 2023-08-22 RX ORDER — DEXAMETHASONE SODIUM PHOSPHATE 4 MG/ML
2 INJECTION, SOLUTION INTRA-ARTICULAR; INTRALESIONAL; INTRAMUSCULAR; INTRAVENOUS; SOFT TISSUE PRN
Qty: 0.5 ML | Refills: 0 | Status: SHIPPED | OUTPATIENT
Start: 2023-08-22

## 2023-08-22 RX ORDER — PREGABALIN 50 MG/1
50 CAPSULE ORAL EVERY 12 HOURS
COMMUNITY
Start: 2023-07-20

## 2023-08-22 NOTE — PROGRESS NOTES
Component Value Date/Time    GLUCOSE 88 05/24/2023 11:30 AM    GLUCOSE 85 04/02/2012 08:30 AM     No results found for: LABA1C  Lab Results   Component Value Date/Time    TRIG 102 02/06/2023 10:40 AM    HDL 23 02/06/2023 10:40 AM    LDLCALC 38 02/06/2023 10:40 AM    CHOL 81 02/06/2023 10:40 AM     Lab Results   Component Value Date/Time    VITD25 42 02/06/2023 10:40 AM    VITD25 43 12/16/2022 05:04 AM     ASSESSMENT & RECOMMENDATIONS   Kvng Wills, a 70 y.o.-old female seen in for management of adrenal insufficiency    Secondary Adrenal Insufficiency   Due to chronic steroid for atopic dermatitis   Patient is currently on hydrocortisone 15 mg AM/5mg evening   Patient need to be on at least maintenance dose of steroid all the time and stress dose for any stressful events. Discussed the importance of wearing medical alert identification (bracelet, necklace)    HLD   Lipitor 10 mg daily     I personally reviewed external notes from PCP and other patient's care team providers, and personally interpreted labs associated with the above diagnosis. I also ordered labs to further assess and manage the above addressed medical conditions    Return in about 6 months (around 2/22/2024) for secondary adrenal insufficiency . The above issues were reviewed with the patient who understood and agreed with the plan. Thank you for allowing us to participate in the care of this patient. Please do not hesitate to contact us with any additional questions. Diagnosis Orders   1. Secondary adrenal insufficiency (HCC)  dexamethasone (DECADRON) 4 MG/ML injection    Basic Metabolic Panel      2. Vitamin D deficiency        3. Subclinical hypothyroidism  TSH    T4      4.  Current chronic use of systemic steroids  Basic Metabolic Panel    Hemoglobin A1C          Deangelo Nunn MD  Endocrinologist, [facility] Care and Endocrinology   [address] [facility] [address]   Phone: [phone]  Fax:

## 2023-10-04 ENCOUNTER — HOSPITAL ENCOUNTER (OUTPATIENT)
Age: 78
Discharge: HOME | End: 2023-10-06
Payer: MEDICARE

## 2023-10-04 ENCOUNTER — HOSPITAL ENCOUNTER (OUTPATIENT)
Dept: GENERAL RADIOLOGY | Age: 78
Discharge: HOME | End: 2023-10-06
Payer: MEDICARE

## 2023-10-04 DIAGNOSIS — R06.02 SHORTNESS OF BREATH: ICD-10-CM

## 2023-10-04 PROCEDURE — 71046 X-RAY EXAM CHEST 2 VIEWS: CPT

## 2023-10-23 ENCOUNTER — TELEPHONE (OUTPATIENT)
Dept: ENDOCRINOLOGY | Age: 78
End: 2023-10-23

## 2023-10-23 NOTE — TELEPHONE ENCOUNTER
Pt called office and stated that she has been off the prednisone for for about 2 months and her skin condition is really bad and gotten even worse.   Pt would like to know if she can go back on the prednisone to clear up the skin rash

## 2023-10-24 NOTE — TELEPHONE ENCOUNTER
Okay to go back to prednisone if that what she needed for her skin lesion. Please be aware that you need to stop hydrocortisone once you start prednisone.     Prednisone usually prescribed by another provider for her rash

## 2023-10-31 ENCOUNTER — TELEPHONE (OUTPATIENT)
Dept: ENDOCRINOLOGY | Age: 78
End: 2023-10-31

## 2023-10-31 NOTE — TELEPHONE ENCOUNTER
Bay Baker from [facility] called stated patient is going to start to use prednisone again and wanted to know what she should tell the patient, regarding her sugars.   I returned the call and was only able to leave a message we will have to monitor the blood sugar and we will need blood sugar logs   asked for a return call         Bay Baker   [phone]   ext  [phone]

## 2023-11-03 DIAGNOSIS — R73.9 ELEVATED BLOOD SUGAR: ICD-10-CM

## 2023-11-03 DIAGNOSIS — Z79.52 CURRENT CHRONIC USE OF SYSTEMIC STEROIDS: Primary | ICD-10-CM

## 2023-11-03 RX ORDER — LANCETS 30 GAUGE
EACH MISCELLANEOUS
Qty: 1 KIT | Refills: 0 | Status: SHIPPED | OUTPATIENT
Start: 2023-11-03

## 2023-11-03 RX ORDER — LANCETS 33 GAUGE
1 EACH MISCELLANEOUS DAILY
Qty: 100 EACH | Refills: 3 | Status: SHIPPED | OUTPATIENT
Start: 2023-11-03

## 2023-11-03 RX ORDER — BLOOD-GLUCOSE METER
1 EACH MISCELLANEOUS DAILY
Qty: 100 EACH | Refills: 3 | Status: SHIPPED | OUTPATIENT
Start: 2023-11-03

## 2023-11-08 ENCOUNTER — TELEPHONE (OUTPATIENT)
Dept: ENDOCRINOLOGY | Age: 78
End: 2023-11-08

## 2023-11-08 NOTE — TELEPHONE ENCOUNTER
BS are  improving with decrease in prednisone. How much longer does she anticipate being on steroids ?

## 2023-11-08 NOTE — TELEPHONE ENCOUNTER
Attached are her blood sugars. She is now on a maintenance dose of prednisone 20mg/day. She's on no dm meds.

## 2023-11-14 NOTE — TELEPHONE ENCOUNTER
The pt will stay on a maintenance dose of prednisone 20mg from here on out. Attached are her blood sugars on the 20mg.

## 2023-12-06 NOTE — PATIENT CARE CONFERENCE
Intensive Care Daily Quality Rounding Checklist        ICU Team Members: Charge nurse and bedside nurse    ICU Day #: N/A--was downgraded yesterday     Intubation Date: N/A     Ventilator Day #: N/A     Central Line Insertion Date: February 21                                                    Day #: NUMBER: 3                                                    Indication: CVC Indication: vesicant medication (KCL replacement)      Arterial Line Insertion Date: N/A                             Day #: N/A     Temporary Hemodialysis Catheter Insertion Date: N/A                             Day # N/A     DVT Prophylaxis: PCDs    GI Prophylaxis: Pepcid     Brewer Catheter Insertion Date: February 21                                        Day #: 1                             Indications: Increased PVR for which Urology has been following as outpatient                          Continued need (if yes, reason documented and discussed with physician): yes, strict Is and Os     Skin Issues/ Wounds and ordered treatment discussed on rounds: has redness to folds and perianal area     Goals/ Plans for the Day: Daily labs, replace electrolytes as needed, wean O2 as able, transfer to floor once bed available
Intensive Care Daily Quality Rounding Checklist        ICU Team Members: Dr. Leah Luna, Alesia Wilde (residents), clinical pharmacist, respiratory therapist, charge nurse, and bedside nurse     ICU Day #: NUMBER: 2     Intubation Date: N/A     Ventilator Day #: N/A     Central Line Insertion Date: February 21                                                    Day #: NUMBER: 2                                                    Indication: CVC Indication: Hemodynamically unstable requiring volume resuscitation      Arterial Line Insertion Date: N/A                             Day #: N/A     Temporary Hemodialysis Catheter Insertion Date: N/A                             Day # N/A     DVT Prophylaxis: PCDs    GI Prophylaxis: Pepcid     Brewer Catheter Insertion Date: February 21                                        Day #: 1                             Indications: Increased PVR for which Urology has been following as outpatient                          Continued need (if yes, reason documented and discussed with physician): yes, strict Is and Os     Skin Issues/ Wounds and ordered treatment discussed on rounds: has redness to folds and perianal area     Goals/ Plans for the Day: Daily labs, replace electrolytes as needed, wean O2 as able, transfer to floor
Intensive Care Daily Quality Rounding Checklist      ICU Team Members: Dr. Kanchan Garsia, Alesia Weir, & Nely (residents), clinical pharmacist, respiratory therapist, charge nurse, and bedside nurse    ICU Day #: NUMBER: 1    Intubation Date: N/A    Ventilator Day #: N/A    Central Line Insertion Date: February 21        Day #: NUMBER: 1        Indication: CVCIndication: Hemodynamically unstable requiring volume resuscitation     Arterial Line Insertion Date: N/A      Day #: N/A    Temporary Hemodialysis Catheter Insertion Date: N/A      Day # N/A    DVT Prophylaxis: PCDs    GI Prophylaxis: Pepcid    Meza Catheter Insertion Date: February 21       Day #: 1      Indications: Increased PVR for which Urology has been following as outpatient    Continued need (if yes, reason documented and discussed with physician): yes, strict Is and Os    Skin Issues/ Wounds and ordered treatment discussed on rounds: has redness to folds and perianal area    Goals/ Plans for the Day: Daily labs, check Cosyntropin stimulus test, give fluid bolus, continue IVF, if meza catheter not changed on this admission, change it
Quality 110: Preventive Care And Screening: Influenza Immunization: Influenza Immunization previously received during influenza season
Detail Level: Detailed
Quality 111:Pneumonia Vaccination Status For Older Adults: Patient received any pneumococcal conjugate or polysaccharide vaccine on or after their 60th birthday and before the end of the measurement period

## 2023-12-27 ENCOUNTER — OFFICE VISIT (OUTPATIENT)
Dept: ENDOCRINOLOGY | Age: 78
End: 2023-12-27
Payer: MEDICARE

## 2023-12-27 VITALS
OXYGEN SATURATION: 97 % | SYSTOLIC BLOOD PRESSURE: 117 MMHG | BODY MASS INDEX: 26.29 KG/M2 | WEIGHT: 154 LBS | HEIGHT: 64 IN | HEART RATE: 89 BPM | RESPIRATION RATE: 18 BRPM | DIASTOLIC BLOOD PRESSURE: 77 MMHG

## 2023-12-27 DIAGNOSIS — E55.9 VITAMIN D DEFICIENCY: ICD-10-CM

## 2023-12-27 DIAGNOSIS — Z79.52 CURRENT CHRONIC USE OF SYSTEMIC STEROIDS: Primary | ICD-10-CM

## 2023-12-27 PROCEDURE — 99214 OFFICE O/P EST MOD 30 MIN: CPT | Performed by: INTERNAL MEDICINE

## 2023-12-27 PROCEDURE — 3078F DIAST BP <80 MM HG: CPT | Performed by: INTERNAL MEDICINE

## 2023-12-27 PROCEDURE — 3074F SYST BP LT 130 MM HG: CPT | Performed by: INTERNAL MEDICINE

## 2023-12-27 PROCEDURE — 1123F ACP DISCUSS/DSCN MKR DOCD: CPT | Performed by: INTERNAL MEDICINE

## 2023-12-27 RX ORDER — TORSEMIDE 20 MG/1
TABLET ORAL
COMMUNITY
Start: 2023-12-05

## 2023-12-27 RX ORDER — POTASSIUM CHLORIDE 750 MG/1
CAPSULE, EXTENDED RELEASE ORAL DAILY
COMMUNITY
Start: 2023-10-14

## 2023-12-27 RX ORDER — PREDNISONE 20 MG/1
40 TABLET ORAL DAILY
Qty: 60 TABLET | Refills: 0 | Status: SHIPPED | OUTPATIENT
Start: 2023-12-27

## 2023-12-27 NOTE — PROGRESS NOTES
[facility] Department of Endocrinology Diabetes and Metabolism   [address] [place] [address]   Phone: [phone]  Fax: [phone]       Date of Service: 12/27/2023  Primary Care Physician: Gino Prescott MD  Consultant physician: Camellia Bosworth MD     Reason for the consultation:  Secondary adrenal insufficiency     History of Present Illness: The history is provided by the patient. Accuracy of the patient data is excellent. Alina Ziegler is a very pleasant 70 y.o. old female with PMH of secondary adrenal insufficiency, asthma, atopic dermatitis, HLD and other listed below seen today for follow up visit     The patient has been troubled by atopic dermatitis for many years and has been on daily steroids therapy for long time. Previous workup was consistent with secondary adrenal insufficiency.  Cosyntropin stim test 2/21/2023 confirmed adrenal insufficiency     2/21/23 04:30 2/21/23 13:11   CORTISOL 2.31 (L) 8.74     Pt currently on Prednisone 40 mg daily for Skin rash     Past Medical History   Past Medical History:   Diagnosis Date    Anxiety     Arthritis     Asthma     Claustrophobia     Dermatitis 02/2017    bilateral legs knees to ankle; follows with [facility]    Fracture 02/2017    \"in Spine\" compression T10 per pt; difficulty laying flat    GERD (gastroesophageal reflux disease)     Hiatal hernia     History of blood transfusion     HTN (hypertension) 4/22/2013    Hyperlipidemia     On aspirin at home     for age per pcp    Pancreatic cyst 5/27/2017    Pneumonia 07/2018    Sleep apnea     SOB (shortness of breath) 4/22/2013    Tachycardia 04/22/2013    follows with Dr Nikki Johns       Past Surgical History   Past Surgical History:   Procedure Laterality Date    BACK SURGERY  08/2014    has screws in back    CATARACT REMOVAL Bilateral 12/06/2013    [facility] with lens implants    CHOLECYSTECTOMY      JOINT REPLACEMENT  12/16/2016    [facility]

## 2024-01-17 ENCOUNTER — OFFICE VISIT (OUTPATIENT)
Dept: ENDOCRINOLOGY | Age: 79
End: 2024-01-17

## 2024-01-17 VITALS
BODY MASS INDEX: 27.49 KG/M2 | HEART RATE: 93 BPM | OXYGEN SATURATION: 98 % | DIASTOLIC BLOOD PRESSURE: 79 MMHG | WEIGHT: 161 LBS | HEIGHT: 64 IN | SYSTOLIC BLOOD PRESSURE: 133 MMHG | RESPIRATION RATE: 18 BRPM

## 2024-01-17 DIAGNOSIS — Z79.52 CURRENT CHRONIC USE OF SYSTEMIC STEROIDS: Primary | ICD-10-CM

## 2024-01-17 DIAGNOSIS — E27.49 SECONDARY ADRENAL INSUFFICIENCY (HCC): ICD-10-CM

## 2024-01-17 LAB — HBA1C MFR BLD: 7.8 %

## 2024-01-17 RX ORDER — PREDNISONE 10 MG/1
TABLET ORAL
Qty: 105 TABLET | Refills: 4 | Status: SHIPPED | OUTPATIENT
Start: 2024-01-17

## 2024-01-17 NOTE — PROGRESS NOTES
MHYX Mumboe  [facility] Department of Endocrinology Diabetes and Metabolism   [address] [place] [address]   Phone: [phone]  Fax: [phone]       Date of Service: 1/17/2024  Primary Care Physician: Bryan Sepulveda MD  Consultant physician: Robin Metz MD     Reason for the consultation:  Secondary adrenal insufficiency     History of Present Illness:  The history is provided by the patient. Accuracy of the patient data is excellent.    Haritha Dominguez is a very pleasant 78 y.o. old female with PMH of secondary adrenal insufficiency, asthma, atopic dermatitis, HLD and other listed below seen today for follow up visit     The patient has been troubled by atopic dermatitis for many years and has been on daily steroids therapy for long time. Previous workup was consistent with secondary adrenal insufficiency. Cosyntropin stim test 2/21/2023 confirmed adrenal insufficiency     2/21/23 04:30 2/21/23 13:11   CORTISOL 2.31 (L) 8.74     Pt currently on Prednisone 40 mg daily for Skin rash     Past Medical History   Past Medical History:   Diagnosis Date    Anxiety     Arthritis     Asthma     Claustrophobia     Dermatitis 02/2017    bilateral legs knees to ankle; follows with [facility]    Fracture 02/2017    \"in Spine\" compression T10 per pt; difficulty laying flat    GERD (gastroesophageal reflux disease)     Hiatal hernia     History of blood transfusion     HTN (hypertension) 4/22/2013    Hyperlipidemia     On aspirin at home     for age per pcp    Pancreatic cyst 5/27/2017    Pneumonia 07/2018    Sleep apnea     SOB (shortness of breath) 4/22/2013    Tachycardia 04/22/2013    follows with Dr NUPUR Chavez       Past Surgical History   Past Surgical History:   Procedure Laterality Date    BACK SURGERY  08/2014    has screws in back    CATARACT REMOVAL Bilateral 12/06/2013    [facility] with lens implants    CHOLECYSTECTOMY      JOINT REPLACEMENT  12/16/2016    [facility]

## 2024-02-26 ENCOUNTER — TELEPHONE (OUTPATIENT)
Dept: ENDOCRINOLOGY | Age: 79
End: 2024-02-26

## 2024-02-26 NOTE — TELEPHONE ENCOUNTER
Pt send in her blood sugars because she is on a maintenance dose of prednisone 30mg. She is on no dm meds.

## 2024-02-26 NOTE — TELEPHONE ENCOUNTER
Overall they look ok with the exception of last few days. Maybe something she is eating?    Keep checking and let us know if they stay elevated

## 2024-03-18 ENCOUNTER — OFFICE VISIT (OUTPATIENT)
Dept: ENDOCRINOLOGY | Age: 79
End: 2024-03-18
Payer: MEDICARE

## 2024-03-18 VITALS
BODY MASS INDEX: 27.64 KG/M2 | HEIGHT: 64 IN | HEART RATE: 98 BPM | SYSTOLIC BLOOD PRESSURE: 145 MMHG | DIASTOLIC BLOOD PRESSURE: 79 MMHG | OXYGEN SATURATION: 96 %

## 2024-03-18 DIAGNOSIS — M81.0 OSTEOPOROSIS, UNSPECIFIED OSTEOPOROSIS TYPE, UNSPECIFIED PATHOLOGICAL FRACTURE PRESENCE: ICD-10-CM

## 2024-03-18 DIAGNOSIS — L12.9 PEMPHIGOID: Primary | ICD-10-CM

## 2024-03-18 PROCEDURE — 3078F DIAST BP <80 MM HG: CPT | Performed by: INTERNAL MEDICINE

## 2024-03-18 PROCEDURE — 3077F SYST BP >= 140 MM HG: CPT | Performed by: INTERNAL MEDICINE

## 2024-03-18 PROCEDURE — 1123F ACP DISCUSS/DSCN MKR DOCD: CPT | Performed by: INTERNAL MEDICINE

## 2024-03-18 PROCEDURE — 99214 OFFICE O/P EST MOD 30 MIN: CPT | Performed by: INTERNAL MEDICINE

## 2024-03-18 NOTE — PROGRESS NOTES
MHYX CapLinked  [facility] Department of Endocrinology Diabetes and Metabolism   [address] [place] [address]   Phone: [phone]  Fax: [phone]       Date of Service: 3/18/2024  Primary Care Physician: Bryan Sepulveda MD  Consultant physician: Robin Metz MD     Reason for the consultation:  Secondary adrenal insufficiency     History of Present Illness:  The history is provided by the patient. Accuracy of the patient data is excellent.    Haritha Dominguez is a very pleasant 79 y.o. old female with PMH of secondary adrenal insufficiency, asthma, atopic dermatitis, HLD and other listed below seen today for follow up visit   The patient has been troubled by atopic dermatitis for many years and has been on daily steroids therapy for long time. Previous workup was consistent with secondary adrenal insufficiency. Cosyntropin stim test 2/21/2023 confirmed adrenal insufficiency     2/21/23 04:30 2/21/23 13:11   CORTISOL 2.31 (L) 8.74     Pt currently on Prednisone 30 mg daily for Skin rash         Past Medical History   Past Medical History:   Diagnosis Date    Anxiety     Arthritis     Asthma     Claustrophobia     Dermatitis 02/2017    bilateral legs knees to ankle; follows with [facility]    Fracture 02/2017    \"in Spine\" compression T10 per pt; difficulty laying flat    GERD (gastroesophageal reflux disease)     Hiatal hernia     History of blood transfusion     HTN (hypertension) 4/22/2013    Hyperlipidemia     On aspirin at home     for age per pcp    Pancreatic cyst 5/27/2017    Pneumonia 07/2018    Sleep apnea     SOB (shortness of breath) 4/22/2013    Tachycardia 04/22/2013    follows with Dr NUPUR Chavez       Past Surgical History   Past Surgical History:   Procedure Laterality Date    BACK SURGERY  08/2014    has screws in back    CATARACT REMOVAL Bilateral 12/06/2013    [facility] with lens implants    CHOLECYSTECTOMY      JOINT REPLACEMENT  12/16/2016    [facility]

## 2024-03-23 ENCOUNTER — TELEPHONE (OUTPATIENT)
Dept: ENDOCRINOLOGY | Age: 79
End: 2024-03-23

## 2024-03-23 NOTE — TELEPHONE ENCOUNTER
Endocrine staff  Please contact dermatology department at [facility] and request an urgent consultation for this patient.  The patient has been struggling with a skin lesion for a while for which no local dermatologist would like to deal with.  She is currently on a very high dose of prednisone and she is very cushingoid I want her to see a dermatologist at [facility] to help with treatment of current skin lesion so we can decrease the steroids dose

## 2024-04-16 ENCOUNTER — HOSPITAL ENCOUNTER (OUTPATIENT)
Dept: MAMMOGRAPHY | Age: 79
Discharge: HOME | End: 2024-04-18
Attending: INTERNAL MEDICINE
Payer: MEDICARE

## 2024-04-16 DIAGNOSIS — M81.0 OSTEOPOROSIS, UNSPECIFIED OSTEOPOROSIS TYPE, UNSPECIFIED PATHOLOGICAL FRACTURE PRESENCE: ICD-10-CM

## 2024-04-16 PROCEDURE — 77080 DXA BONE DENSITY AXIAL: CPT

## 2024-04-18 ENCOUNTER — APPOINTMENT (OUTPATIENT)
Dept: GENERAL RADIOLOGY | Age: 79
DRG: 871 | End: 2024-04-18
Payer: MEDICARE

## 2024-04-18 ENCOUNTER — HOSPITAL ENCOUNTER (INPATIENT)
Age: 79
LOS: 4 days | Discharge: HOME | DRG: 871 | End: 2024-04-22
Attending: EMERGENCY MEDICINE | Admitting: INTERNAL MEDICINE
Payer: MEDICARE

## 2024-04-18 DIAGNOSIS — J18.9 PNEUMONIA OF RIGHT LOWER LOBE DUE TO INFECTIOUS ORGANISM: Primary | ICD-10-CM

## 2024-04-18 DIAGNOSIS — A41.9 SEPTICEMIA (HCC): ICD-10-CM

## 2024-04-18 LAB
ALBUMIN SERPL-MCNC: 3.8 G/DL (ref 3.5–5.2)
ALP SERPL-CCNC: 60 U/L (ref 35–104)
ALT SERPL-CCNC: 11 U/L (ref 0–32)
ANION GAP SERPL CALCULATED.3IONS-SCNC: 18 MMOL/L (ref 7–16)
AST SERPL-CCNC: 14 U/L (ref 0–31)
B PARAP IS1001 DNA NPH QL NAA+NON-PROBE: NOT DETECTED
B PERT DNA SPEC QL NAA+PROBE: NOT DETECTED
BASOPHILS # BLD: 0.06 K/UL (ref 0–0.2)
BASOPHILS NFR BLD: 0 % (ref 0–2)
BILIRUB SERPL-MCNC: 0.3 MG/DL (ref 0–1.2)
BILIRUB UR QL STRIP: NEGATIVE
BUN SERPL-MCNC: 20 MG/DL (ref 6–23)
C PNEUM DNA NPH QL NAA+NON-PROBE: NOT DETECTED
CALCIUM SERPL-MCNC: 8.7 MG/DL (ref 8.6–10.2)
CHLORIDE SERPL-SCNC: 106 MMOL/L (ref 98–107)
CLARITY UR: CLEAR
CO2 SERPL-SCNC: 20 MMOL/L (ref 22–29)
COLOR UR: YELLOW
CREAT SERPL-MCNC: 0.9 MG/DL (ref 0.5–1)
EOSINOPHIL # BLD: 0.02 K/UL (ref 0.05–0.5)
EOSINOPHILS RELATIVE PERCENT: 0 % (ref 0–6)
EPI CELLS #/AREA URNS HPF: NORMAL /HPF
ERYTHROCYTE [DISTWIDTH] IN BLOOD BY AUTOMATED COUNT: 15.8 % (ref 11.5–15)
FLUAV RNA NPH QL NAA+NON-PROBE: NOT DETECTED
FLUBV RNA NPH QL NAA+NON-PROBE: NOT DETECTED
GFR SERPL CREATININE-BSD FRML MDRD: 70 ML/MIN/1.73M2
GLUCOSE SERPL-MCNC: 144 MG/DL (ref 74–99)
GLUCOSE UR STRIP-MCNC: NEGATIVE MG/DL
HADV DNA NPH QL NAA+NON-PROBE: NOT DETECTED
HCOV 229E RNA NPH QL NAA+NON-PROBE: NOT DETECTED
HCOV HKU1 RNA NPH QL NAA+NON-PROBE: NOT DETECTED
HCOV NL63 RNA NPH QL NAA+NON-PROBE: NOT DETECTED
HCOV OC43 RNA NPH QL NAA+NON-PROBE: NOT DETECTED
HCT VFR BLD AUTO: 36.9 % (ref 34–48)
HGB BLD-MCNC: 11.6 G/DL (ref 11.5–15.5)
HGB UR QL STRIP.AUTO: ABNORMAL
HMPV RNA NPH QL NAA+NON-PROBE: NOT DETECTED
HPIV1 RNA NPH QL NAA+NON-PROBE: NOT DETECTED
HPIV2 RNA NPH QL NAA+NON-PROBE: NOT DETECTED
HPIV3 RNA NPH QL NAA+NON-PROBE: NOT DETECTED
HPIV4 RNA NPH QL NAA+NON-PROBE: NOT DETECTED
IMM GRANULOCYTES # BLD AUTO: 0.51 K/UL (ref 0–0.58)
IMM GRANULOCYTES NFR BLD: 3 % (ref 0–5)
KETONES UR STRIP-MCNC: NEGATIVE MG/DL
LACTATE BLDV-SCNC: 2.4 MMOL/L (ref 0.5–1.9)
LACTATE BLDV-SCNC: 4.1 MMOL/L (ref 0.5–1.9)
LEUKOCYTE ESTERASE UR QL STRIP: NEGATIVE
LYMPHOCYTES NFR BLD: 1.6 K/UL (ref 1.5–4)
LYMPHOCYTES RELATIVE PERCENT: 10 % (ref 20–42)
M PNEUMO DNA NPH QL NAA+NON-PROBE: NOT DETECTED
MCH RBC QN AUTO: 32 PG (ref 26–35)
MCHC RBC AUTO-ENTMCNC: 31.4 G/DL (ref 32–34.5)
MCV RBC AUTO: 101.9 FL (ref 80–99.9)
MONOCYTES NFR BLD: 0.63 K/UL (ref 0.1–0.95)
MONOCYTES NFR BLD: 4 % (ref 2–12)
NEUTROPHILS NFR BLD: 83 % (ref 43–80)
NEUTS SEG NFR BLD: 14.06 K/UL (ref 1.8–7.3)
NITRITE UR QL STRIP: NEGATIVE
PH UR STRIP: 5.5 [PH] (ref 5–9)
PLATELET # BLD AUTO: 315 K/UL (ref 130–450)
PMV BLD AUTO: 11.2 FL (ref 7–12)
POTASSIUM SERPL-SCNC: 3.9 MMOL/L (ref 3.5–5)
PROT SERPL-MCNC: 6.3 G/DL (ref 6.4–8.3)
PROT UR STRIP-MCNC: NEGATIVE MG/DL
RBC # BLD AUTO: 3.62 M/UL (ref 3.5–5.5)
RBC # BLD: ABNORMAL 10*6/UL
RBC #/AREA URNS HPF: NORMAL /HPF
RSV RNA NPH QL NAA+NON-PROBE: NOT DETECTED
RV+EV RNA NPH QL NAA+NON-PROBE: NOT DETECTED
SARS-COV-2 RNA NPH QL NAA+NON-PROBE: NOT DETECTED
SODIUM SERPL-SCNC: 144 MMOL/L (ref 132–146)
SP GR UR STRIP: 1.02 (ref 1–1.03)
SPECIMEN DESCRIPTION: NORMAL
UROBILINOGEN UR STRIP-ACNC: 0.2 EU/DL (ref 0–1)
WBC #/AREA URNS HPF: NORMAL /HPF
WBC OTHER # BLD: 16.9 K/UL (ref 4.5–11.5)

## 2024-04-18 PROCEDURE — 2580000003 HC RX 258: Performed by: INTERNAL MEDICINE

## 2024-04-18 PROCEDURE — 87040 BLOOD CULTURE FOR BACTERIA: CPT

## 2024-04-18 PROCEDURE — 99285 EMERGENCY DEPT VISIT HI MDM: CPT

## 2024-04-18 PROCEDURE — 96365 THER/PROPH/DIAG IV INF INIT: CPT

## 2024-04-18 PROCEDURE — 83605 ASSAY OF LACTIC ACID: CPT

## 2024-04-18 PROCEDURE — 94664 DEMO&/EVAL PT USE INHALER: CPT

## 2024-04-18 PROCEDURE — 84145 PROCALCITONIN (PCT): CPT

## 2024-04-18 PROCEDURE — 0202U NFCT DS 22 TRGT SARS-COV-2: CPT

## 2024-04-18 PROCEDURE — 6360000002 HC RX W HCPCS

## 2024-04-18 PROCEDURE — 6370000000 HC RX 637 (ALT 250 FOR IP)

## 2024-04-18 PROCEDURE — 5A09357 ASSISTANCE WITH RESPIRATORY VENTILATION, LESS THAN 24 CONSECUTIVE HOURS, CONTINUOUS POSITIVE AIRWAY PRESSURE: ICD-10-PCS | Performed by: INTERNAL MEDICINE

## 2024-04-18 PROCEDURE — 94640 AIRWAY INHALATION TREATMENT: CPT

## 2024-04-18 PROCEDURE — 71045 X-RAY EXAM CHEST 1 VIEW: CPT

## 2024-04-18 PROCEDURE — 2500000003 HC RX 250 WO HCPCS

## 2024-04-18 PROCEDURE — 80053 COMPREHEN METABOLIC PANEL: CPT

## 2024-04-18 PROCEDURE — 93005 ELECTROCARDIOGRAM TRACING: CPT

## 2024-04-18 PROCEDURE — 2060000000 HC ICU INTERMEDIATE R&B

## 2024-04-18 PROCEDURE — 96375 TX/PRO/DX INJ NEW DRUG ADDON: CPT

## 2024-04-18 PROCEDURE — 81001 URINALYSIS AUTO W/SCOPE: CPT

## 2024-04-18 PROCEDURE — 85025 COMPLETE CBC W/AUTO DIFF WBC: CPT

## 2024-04-18 PROCEDURE — 2580000003 HC RX 258

## 2024-04-18 RX ORDER — 0.9 % SODIUM CHLORIDE 0.9 %
1000 INTRAVENOUS SOLUTION INTRAVENOUS ONCE
Status: COMPLETED | OUTPATIENT
Start: 2024-04-18 | End: 2024-04-19

## 2024-04-18 RX ORDER — SODIUM CHLORIDE 0.9 % (FLUSH) 0.9 %
5-40 SYRINGE (ML) INJECTION EVERY 12 HOURS SCHEDULED
Status: DISCONTINUED | OUTPATIENT
Start: 2024-04-18 | End: 2024-04-22 | Stop reason: HOSPADM

## 2024-04-18 RX ORDER — ACETAMINOPHEN 325 MG/1
650 TABLET ORAL EVERY 4 HOURS PRN
Status: DISCONTINUED | OUTPATIENT
Start: 2024-04-18 | End: 2024-04-22 | Stop reason: HOSPADM

## 2024-04-18 RX ORDER — ENOXAPARIN SODIUM 100 MG/ML
40 INJECTION SUBCUTANEOUS DAILY
Status: DISCONTINUED | OUTPATIENT
Start: 2024-04-19 | End: 2024-04-22 | Stop reason: HOSPADM

## 2024-04-18 RX ORDER — SODIUM CHLORIDE 9 MG/ML
INJECTION, SOLUTION INTRAVENOUS CONTINUOUS
Status: DISCONTINUED | OUTPATIENT
Start: 2024-04-18 | End: 2024-04-22 | Stop reason: HOSPADM

## 2024-04-18 RX ORDER — 0.9 % SODIUM CHLORIDE 0.9 %
1000 INTRAVENOUS SOLUTION INTRAVENOUS ONCE
Status: COMPLETED | OUTPATIENT
Start: 2024-04-18 | End: 2024-04-18

## 2024-04-18 RX ORDER — ONDANSETRON 4 MG/1
4 TABLET, ORALLY DISINTEGRATING ORAL EVERY 8 HOURS PRN
Status: DISCONTINUED | OUTPATIENT
Start: 2024-04-18 | End: 2024-04-22 | Stop reason: HOSPADM

## 2024-04-18 RX ORDER — ACETAMINOPHEN 325 MG/1
650 TABLET ORAL ONCE
Status: COMPLETED | OUTPATIENT
Start: 2024-04-18 | End: 2024-04-18

## 2024-04-18 RX ORDER — IPRATROPIUM BROMIDE AND ALBUTEROL SULFATE 2.5; .5 MG/3ML; MG/3ML
3 SOLUTION RESPIRATORY (INHALATION) ONCE
Status: COMPLETED | OUTPATIENT
Start: 2024-04-18 | End: 2024-04-18

## 2024-04-18 RX ORDER — ONDANSETRON 2 MG/ML
4 INJECTION INTRAMUSCULAR; INTRAVENOUS ONCE
Status: COMPLETED | OUTPATIENT
Start: 2024-04-18 | End: 2024-04-18

## 2024-04-18 RX ORDER — ONDANSETRON 2 MG/ML
4 INJECTION INTRAMUSCULAR; INTRAVENOUS EVERY 6 HOURS PRN
Status: DISCONTINUED | OUTPATIENT
Start: 2024-04-18 | End: 2024-04-22 | Stop reason: HOSPADM

## 2024-04-18 RX ORDER — SODIUM CHLORIDE 9 MG/ML
INJECTION, SOLUTION INTRAVENOUS PRN
Status: DISCONTINUED | OUTPATIENT
Start: 2024-04-18 | End: 2024-04-22 | Stop reason: HOSPADM

## 2024-04-18 RX ORDER — LORAZEPAM 2 MG/ML
0.5 INJECTION INTRAMUSCULAR ONCE
Status: DISCONTINUED | OUTPATIENT
Start: 2024-04-18 | End: 2024-04-22 | Stop reason: HOSPADM

## 2024-04-18 RX ORDER — SODIUM CHLORIDE 0.9 % (FLUSH) 0.9 %
5-40 SYRINGE (ML) INJECTION PRN
Status: DISCONTINUED | OUTPATIENT
Start: 2024-04-18 | End: 2024-04-22 | Stop reason: HOSPADM

## 2024-04-18 RX ADMIN — SODIUM CHLORIDE 1000 ML: 9 INJECTION, SOLUTION INTRAVENOUS at 23:34

## 2024-04-18 RX ADMIN — IPRATROPIUM BROMIDE AND ALBUTEROL SULFATE 3 DOSE: 2.5; .5 SOLUTION RESPIRATORY (INHALATION) at 21:09

## 2024-04-18 RX ADMIN — ACETAMINOPHEN 650 MG: 325 TABLET ORAL at 21:07

## 2024-04-18 RX ADMIN — WATER 125 MG: 1 INJECTION INTRAMUSCULAR; INTRAVENOUS; SUBCUTANEOUS at 21:08

## 2024-04-18 RX ADMIN — SODIUM CHLORIDE, PRESERVATIVE FREE 10 ML: 5 INJECTION INTRAVENOUS at 23:34

## 2024-04-18 RX ADMIN — ONDANSETRON 4 MG: 2 INJECTION INTRAMUSCULAR; INTRAVENOUS at 21:08

## 2024-04-18 RX ADMIN — WATER 2000 MG: 1 INJECTION INTRAMUSCULAR; INTRAVENOUS; SUBCUTANEOUS at 22:18

## 2024-04-18 RX ADMIN — SODIUM CHLORIDE 1000 ML: 0.9 INJECTION, SOLUTION INTRAVENOUS at 20:50

## 2024-04-18 RX ADMIN — DOXYCYCLINE 100 MG: 100 INJECTION, POWDER, LYOPHILIZED, FOR SOLUTION INTRAVENOUS at 22:22

## 2024-04-19 ENCOUNTER — APPOINTMENT (OUTPATIENT)
Dept: GENERAL RADIOLOGY | Age: 79
DRG: 871 | End: 2024-04-19
Payer: MEDICARE

## 2024-04-19 ENCOUNTER — APPOINTMENT (OUTPATIENT)
Dept: CT IMAGING | Age: 79
DRG: 871 | End: 2024-04-19
Payer: MEDICARE

## 2024-04-19 LAB
EKG ATRIAL RATE: 146 BPM
EKG P AXIS: 48 DEGREES
EKG P-R INTERVAL: 132 MS
EKG Q-T INTERVAL: 264 MS
EKG QRS DURATION: 72 MS
EKG QTC CALCULATION (BAZETT): 411 MS
EKG R AXIS: 12 DEGREES
EKG T AXIS: 61 DEGREES
EKG VENTRICULAR RATE: 146 BPM
PROCALCITONIN SERPL-MCNC: 0.13 NG/ML (ref 0–0.08)

## 2024-04-19 PROCEDURE — 2580000003 HC RX 258

## 2024-04-19 PROCEDURE — 92611 MOTION FLUOROSCOPY/SWALLOW: CPT | Performed by: SPEECH-LANGUAGE PATHOLOGIST

## 2024-04-19 PROCEDURE — 94660 CPAP INITIATION&MGMT: CPT

## 2024-04-19 PROCEDURE — 94640 AIRWAY INHALATION TREATMENT: CPT

## 2024-04-19 PROCEDURE — 93010 ELECTROCARDIOGRAM REPORT: CPT | Performed by: INTERNAL MEDICINE

## 2024-04-19 PROCEDURE — 6370000000 HC RX 637 (ALT 250 FOR IP)

## 2024-04-19 PROCEDURE — 2500000003 HC RX 250 WO HCPCS

## 2024-04-19 PROCEDURE — 2700000000 HC OXYGEN THERAPY PER DAY

## 2024-04-19 PROCEDURE — 2580000003 HC RX 258: Performed by: INTERNAL MEDICINE

## 2024-04-19 PROCEDURE — 71250 CT THORAX DX C-: CPT

## 2024-04-19 PROCEDURE — 6360000002 HC RX W HCPCS: Performed by: INTERNAL MEDICINE

## 2024-04-19 PROCEDURE — 74230 X-RAY XM SWLNG FUNCJ C+: CPT

## 2024-04-19 PROCEDURE — 2500000003 HC RX 250 WO HCPCS: Performed by: INTERNAL MEDICINE

## 2024-04-19 PROCEDURE — 2500000003 HC RX 250 WO HCPCS: Performed by: PHYSICIAN ASSISTANT

## 2024-04-19 PROCEDURE — 92526 ORAL FUNCTION THERAPY: CPT | Performed by: SPEECH-LANGUAGE PATHOLOGIST

## 2024-04-19 PROCEDURE — 6370000000 HC RX 637 (ALT 250 FOR IP): Performed by: INTERNAL MEDICINE

## 2024-04-19 PROCEDURE — 2060000000 HC ICU INTERMEDIATE R&B

## 2024-04-19 PROCEDURE — 92610 EVALUATE SWALLOWING FUNCTION: CPT | Performed by: SPEECH-LANGUAGE PATHOLOGIST

## 2024-04-19 RX ORDER — MONTELUKAST SODIUM 10 MG/1
10 TABLET ORAL NIGHTLY
Status: DISCONTINUED | OUTPATIENT
Start: 2024-04-19 | End: 2024-04-22 | Stop reason: HOSPADM

## 2024-04-19 RX ORDER — PREDNISONE 10 MG/1
10 TABLET ORAL DAILY
Status: ON HOLD | COMMUNITY
End: 2024-04-22 | Stop reason: HOSPADM

## 2024-04-19 RX ORDER — TORSEMIDE 20 MG/1
20 TABLET ORAL DAILY
Status: DISCONTINUED | OUTPATIENT
Start: 2024-04-19 | End: 2024-04-22 | Stop reason: HOSPADM

## 2024-04-19 RX ORDER — ACETAMINOPHEN 325 MG/1
650 TABLET ORAL EVERY 6 HOURS PRN
Status: DISCONTINUED | OUTPATIENT
Start: 2024-04-19 | End: 2024-04-22 | Stop reason: HOSPADM

## 2024-04-19 RX ORDER — ASPIRIN 81 MG/1
81 TABLET ORAL DAILY
COMMUNITY

## 2024-04-19 RX ORDER — POLYETHYLENE GLYCOL 3350 17 G/17G
17 POWDER, FOR SOLUTION ORAL DAILY PRN
Status: DISCONTINUED | OUTPATIENT
Start: 2024-04-19 | End: 2024-04-22 | Stop reason: HOSPADM

## 2024-04-19 RX ORDER — LORAZEPAM 2 MG/1
2 TABLET ORAL EVERY 6 HOURS PRN
Status: ON HOLD | COMMUNITY
End: 2024-04-19

## 2024-04-19 RX ORDER — FUROSEMIDE 40 MG/1
20 TABLET ORAL DAILY
Status: DISCONTINUED | OUTPATIENT
Start: 2024-04-19 | End: 2024-04-19 | Stop reason: ALTCHOICE

## 2024-04-19 RX ORDER — ASCORBIC ACID 500 MG
500 TABLET ORAL DAILY
COMMUNITY

## 2024-04-19 RX ORDER — POTASSIUM CHLORIDE 750 MG/1
10 TABLET, EXTENDED RELEASE ORAL DAILY
Status: DISCONTINUED | OUTPATIENT
Start: 2024-04-19 | End: 2024-04-22 | Stop reason: HOSPADM

## 2024-04-19 RX ORDER — SODIUM CHLORIDE 9 MG/ML
INJECTION, SOLUTION INTRAVENOUS PRN
Status: DISCONTINUED | OUTPATIENT
Start: 2024-04-19 | End: 2024-04-22 | Stop reason: HOSPADM

## 2024-04-19 RX ORDER — ESCITALOPRAM OXALATE 10 MG/1
10 TABLET ORAL NIGHTLY
Status: DISCONTINUED | OUTPATIENT
Start: 2024-04-19 | End: 2024-04-22 | Stop reason: HOSPADM

## 2024-04-19 RX ORDER — POTASSIUM CHLORIDE 20 MEQ/1
40 TABLET, EXTENDED RELEASE ORAL PRN
Status: DISCONTINUED | OUTPATIENT
Start: 2024-04-19 | End: 2024-04-22 | Stop reason: HOSPADM

## 2024-04-19 RX ORDER — ACETAMINOPHEN 160 MG
2000 TABLET,DISINTEGRATING ORAL 2 TIMES DAILY
COMMUNITY

## 2024-04-19 RX ORDER — PANTOPRAZOLE SODIUM 40 MG/1
40 TABLET, DELAYED RELEASE ORAL DAILY
Status: DISCONTINUED | OUTPATIENT
Start: 2024-04-19 | End: 2024-04-22 | Stop reason: HOSPADM

## 2024-04-19 RX ORDER — ASPIRIN 81 MG/1
81 TABLET ORAL DAILY
Status: DISCONTINUED | OUTPATIENT
Start: 2024-04-19 | End: 2024-04-22 | Stop reason: HOSPADM

## 2024-04-19 RX ORDER — POTASSIUM CHLORIDE 7.45 MG/ML
10 INJECTION INTRAVENOUS PRN
Status: DISCONTINUED | OUTPATIENT
Start: 2024-04-19 | End: 2024-04-22 | Stop reason: HOSPADM

## 2024-04-19 RX ORDER — ARFORMOTEROL TARTRATE 15 UG/2ML
15 SOLUTION RESPIRATORY (INHALATION)
Status: DISCONTINUED | OUTPATIENT
Start: 2024-04-19 | End: 2024-04-22 | Stop reason: HOSPADM

## 2024-04-19 RX ORDER — PREDNISONE 10 MG/1
10 TABLET ORAL DAILY
Status: DISCONTINUED | OUTPATIENT
Start: 2024-04-19 | End: 2024-04-22 | Stop reason: HOSPADM

## 2024-04-19 RX ORDER — MAGNESIUM SULFATE IN WATER 40 MG/ML
2000 INJECTION, SOLUTION INTRAVENOUS PRN
Status: DISCONTINUED | OUTPATIENT
Start: 2024-04-19 | End: 2024-04-22 | Stop reason: HOSPADM

## 2024-04-19 RX ORDER — LORAZEPAM 1 MG/1
2 TABLET ORAL 3 TIMES DAILY
Status: DISCONTINUED | OUTPATIENT
Start: 2024-04-19 | End: 2024-04-22 | Stop reason: HOSPADM

## 2024-04-19 RX ORDER — THIAMINE MONONITRATE (VIT B1) 100 MG
100 TABLET ORAL DAILY
COMMUNITY

## 2024-04-19 RX ORDER — LORAZEPAM 1 MG/1
2 TABLET ORAL EVERY 6 HOURS PRN
Status: DISCONTINUED | OUTPATIENT
Start: 2024-04-19 | End: 2024-04-19

## 2024-04-19 RX ORDER — ACETAMINOPHEN 650 MG/1
650 SUPPOSITORY RECTAL EVERY 6 HOURS PRN
Status: DISCONTINUED | OUTPATIENT
Start: 2024-04-19 | End: 2024-04-22 | Stop reason: HOSPADM

## 2024-04-19 RX ORDER — PREGABALIN 50 MG/1
50 CAPSULE ORAL 2 TIMES DAILY
Status: DISCONTINUED | OUTPATIENT
Start: 2024-04-19 | End: 2024-04-22 | Stop reason: HOSPADM

## 2024-04-19 RX ORDER — BUDESONIDE 0.5 MG/2ML
0.5 INHALANT ORAL
Status: DISCONTINUED | OUTPATIENT
Start: 2024-04-19 | End: 2024-04-22 | Stop reason: HOSPADM

## 2024-04-19 RX ORDER — SODIUM CHLORIDE 0.9 % (FLUSH) 0.9 %
5-40 SYRINGE (ML) INJECTION PRN
Status: DISCONTINUED | OUTPATIENT
Start: 2024-04-19 | End: 2024-04-19 | Stop reason: SDUPTHER

## 2024-04-19 RX ORDER — SODIUM CHLORIDE 0.9 % (FLUSH) 0.9 %
5-40 SYRINGE (ML) INJECTION EVERY 12 HOURS SCHEDULED
Status: DISCONTINUED | OUTPATIENT
Start: 2024-04-19 | End: 2024-04-19 | Stop reason: SDUPTHER

## 2024-04-19 RX ORDER — PREDNISONE 20 MG/1
40 TABLET ORAL DAILY
Status: DISCONTINUED | OUTPATIENT
Start: 2024-04-20 | End: 2024-04-21

## 2024-04-19 RX ORDER — ALBUTEROL SULFATE 2.5 MG/3ML
2.5 SOLUTION RESPIRATORY (INHALATION)
Status: DISCONTINUED | OUTPATIENT
Start: 2024-04-19 | End: 2024-04-20

## 2024-04-19 RX ORDER — ATORVASTATIN CALCIUM 10 MG/1
10 TABLET, FILM COATED ORAL NIGHTLY
Status: DISCONTINUED | OUTPATIENT
Start: 2024-04-19 | End: 2024-04-22 | Stop reason: HOSPADM

## 2024-04-19 RX ADMIN — ARFORMOTEROL TARTRATE 15 MCG: 15 SOLUTION RESPIRATORY (INHALATION) at 18:33

## 2024-04-19 RX ADMIN — ALBUTEROL SULFATE 2.5 MG: 2.5 SOLUTION RESPIRATORY (INHALATION) at 15:37

## 2024-04-19 RX ADMIN — ASPIRIN 81 MG: 81 TABLET, COATED ORAL at 09:50

## 2024-04-19 RX ADMIN — IPRATROPIUM BROMIDE 0.5 MG: 0.5 SOLUTION RESPIRATORY (INHALATION) at 11:39

## 2024-04-19 RX ADMIN — POTASSIUM CHLORIDE 10 MEQ: 750 TABLET, EXTENDED RELEASE ORAL at 09:50

## 2024-04-19 RX ADMIN — MONTELUKAST 10 MG: 10 TABLET, FILM COATED ORAL at 21:57

## 2024-04-19 RX ADMIN — SODIUM CHLORIDE: 9 INJECTION, SOLUTION INTRAVENOUS at 11:41

## 2024-04-19 RX ADMIN — IPRATROPIUM BROMIDE 0.5 MG: 0.5 SOLUTION RESPIRATORY (INHALATION) at 18:32

## 2024-04-19 RX ADMIN — ESCITALOPRAM OXALATE 10 MG: 10 TABLET ORAL at 22:01

## 2024-04-19 RX ADMIN — BARIUM SULFATE 15 ML: 400 PASTE ORAL at 15:18

## 2024-04-19 RX ADMIN — SODIUM CHLORIDE: 9 INJECTION, SOLUTION INTRAVENOUS at 00:07

## 2024-04-19 RX ADMIN — ATORVASTATIN CALCIUM 10 MG: 10 TABLET, FILM COATED ORAL at 21:57

## 2024-04-19 RX ADMIN — SODIUM CHLORIDE, PRESERVATIVE FREE 10 ML: 5 INJECTION INTRAVENOUS at 09:51

## 2024-04-19 RX ADMIN — DOXYCYCLINE 100 MG: 100 INJECTION, POWDER, LYOPHILIZED, FOR SOLUTION INTRAVENOUS at 22:07

## 2024-04-19 RX ADMIN — PREDNISONE 10 MG: 10 TABLET ORAL at 09:50

## 2024-04-19 RX ADMIN — DOXYCYCLINE 100 MG: 100 INJECTION, POWDER, LYOPHILIZED, FOR SOLUTION INTRAVENOUS at 11:40

## 2024-04-19 RX ADMIN — LORAZEPAM 2 MG: 1 TABLET ORAL at 09:50

## 2024-04-19 RX ADMIN — ANTI-FUNGAL POWDER MICONAZOLE NITRATE TALC FREE: 1.42 POWDER TOPICAL at 09:50

## 2024-04-19 RX ADMIN — LORAZEPAM 2 MG: 1 TABLET ORAL at 16:07

## 2024-04-19 RX ADMIN — BUDESONIDE INHALATION 500 MCG: 0.5 SUSPENSION RESPIRATORY (INHALATION) at 18:32

## 2024-04-19 RX ADMIN — PREGABALIN 50 MG: 50 CAPSULE ORAL at 21:59

## 2024-04-19 RX ADMIN — ANTI-FUNGAL POWDER MICONAZOLE NITRATE TALC FREE: 1.42 POWDER TOPICAL at 21:57

## 2024-04-19 RX ADMIN — ENOXAPARIN SODIUM 40 MG: 100 INJECTION SUBCUTANEOUS at 09:50

## 2024-04-19 RX ADMIN — TORSEMIDE 20 MG: 20 TABLET ORAL at 15:48

## 2024-04-19 RX ADMIN — PANTOPRAZOLE SODIUM 40 MG: 40 TABLET, DELAYED RELEASE ORAL at 09:50

## 2024-04-19 RX ADMIN — BARIUM SULFATE 15 ML: 0.81 POWDER, FOR SUSPENSION ORAL at 15:18

## 2024-04-19 RX ADMIN — ALBUTEROL SULFATE 2.5 MG: 2.5 SOLUTION RESPIRATORY (INHALATION) at 11:39

## 2024-04-19 RX ADMIN — BARIUM SULFATE 15 ML: 400 SUSPENSION ORAL at 15:18

## 2024-04-19 RX ADMIN — WATER 1000 MG: 1 INJECTION INTRAMUSCULAR; INTRAVENOUS; SUBCUTANEOUS at 21:57

## 2024-04-19 RX ADMIN — ALBUTEROL SULFATE 2.5 MG: 2.5 SOLUTION RESPIRATORY (INHALATION) at 18:32

## 2024-04-19 RX ADMIN — IPRATROPIUM BROMIDE 0.5 MG: 0.5 SOLUTION RESPIRATORY (INHALATION) at 15:37

## 2024-04-19 RX ADMIN — PREDNISONE 30 MG: 20 TABLET ORAL at 15:48

## 2024-04-19 RX ADMIN — LORAZEPAM 2 MG: 1 TABLET ORAL at 21:57

## 2024-04-19 RX ADMIN — PREGABALIN 50 MG: 50 CAPSULE ORAL at 09:50

## 2024-04-19 NOTE — H&P
[facility]              [address] [place] [address]                           HISTORY & PHYSICAL      PATIENT NAME: CARLITO CEDENO              : 1945  MED REC NO: [id number]                        ROOM: 8501  ACCOUNT NO: [id number]                       ADMIT DATE: 2024  PROVIDER: Bryan Rivers DO      PRIMARY CARE PHYSICIAN:  Bryan Sepulveda MD    CHIEF COMPLAINT:  Chills, shortness of breath, cough.    HISTORY OF PRESENT ILLNESS:  The patient is a 79-year-old  female, who states she was eating an RB sandwich for lunch.  The sandwich got stuck in her throat and then she developed chills, shortness of breath, nonproductive cough.  She was seen in the emergency room.  Diagnostic evaluation revealed right lower lobe infiltrate.  The patient with history of asthma.  Pulmonologist is Dr. Decker.  Diagnostic evaluation in the emergency room revealed elevated WBC, elevated lactic acid.  She was admitted for further evaluation and treatment.    MEDICATIONS PRIOR TO ADMISSION:  Albuterol nebulizer, vitamin C, aspirin, atorvastatin, calcium, Lexapro, Trelegy inhaler, Lasix, lorazepam, magnesium, zinc, Singulair, Protonix, potassium chloride, prednisone 10 mg daily, Lyrica, vitamin D.    PAST MEDICAL HISTORY:  Anxiety, asthma, dermatitis, elevated cholesterol, chronic back pain, tachycardia.    PAST SURGICAL HISTORY:  Cholecystectomy, back surgery, SI joint surgery, right total knee replacement, spinal nerve stimulator, bilateral eye cataract surgery, cholecystectomy.    SOCIAL HISTORY:  Denies tobacco or alcohol.    REVIEW OF SYSTEMS:  Remarkable for above-stated chief complaint.    ALLERGIES:  TO PERCOCET.      PHYSICAL EXAMINATION:  GENERAL APPEARANCE:  Reveals a 79-year-old  female, who is alert and oriented x3, cooperative, and a good historian.  VITAL SIGNS:  On admission, temperature 101.8, pulse 145, respirations

## 2024-04-19 NOTE — CONSULTS
Normal right ventricle structure and function.   Mild mitral regurgitation is present.   Physiologic and/or trace tricuspid regurgitation.   RVSP is 28 mmHg.   No evidence for hemodynamically significant pericardial effusion.    Labs:  Lab Results   Component Value Date/Time    WBC 16.9 04/18/2024 09:02 PM    RBC 3.62 04/18/2024 09:02 PM    HGB 11.6 04/18/2024 09:02 PM    HCT 36.9 04/18/2024 09:02 PM    .9 04/18/2024 09:02 PM    MCH 32.0 04/18/2024 09:02 PM    MCHC 31.4 04/18/2024 09:02 PM    RDW 15.8 04/18/2024 09:02 PM     04/18/2024 09:02 PM    MPV 11.2 04/18/2024 09:02 PM     Lab Results   Component Value Date/Time     04/18/2024 09:02 PM    K 3.9 04/18/2024 09:02 PM    K 4.6 05/24/2023 11:30 AM     04/18/2024 09:02 PM    CO2 20 04/18/2024 09:02 PM    BUN 20 04/18/2024 09:02 PM    CREATININE 0.9 04/18/2024 09:02 PM    CALCIUM 8.7 04/18/2024 09:02 PM    GFRAA >60 01/12/2019 04:00 AM    LABGLOM 70 04/18/2024 09:02 PM     Lab Results   Component Value Date/Time    PROTIME 10.2 05/24/2023 11:30 AM    PROTIME 11.7 04/01/2012 03:16 PM    INR 0.9 05/24/2023 11:30 AM       Assessment:  Right lower lobe pneumonia, likely aspiration  Moderate-persistent asthma  CORRINE on CPAP  Tachycardia   Chronic steroid use - Prednisone 10 mg daily    Plan:  Wean oxygen as tolerated to maintain SpO2 greater than 92%  CPAP @ 7 At HS and PRN  Scheduled bronchodilators-brovana and pulmicort with Albuterol and atrovent QID - may resume Trelegy upon discharge  Rocephin and doxycycline for pneumonia  Prednisone 40 mg daily x 5 days then she can resume her chronic steroid dose of 10 mg daily  Singulair at HS  Check strep pneumo antigen   Check dedicated chest ct to further evaluate aspiration   DVT/PE prophylaxis - Lovenox      Thank you for allowing me to participate in the care of Haritha Dominguez.   Please feel free to call with questions.     This plan of care was reviewed in collaboration with

## 2024-04-19 NOTE — ED PROVIDER NOTES
[facility] MED SURG/PEDS  EMERGENCY DEPARTMENT ENCOUNTER        Pt Name: Haritha Dominguez  MRN: [id number]  Birthdate 1945  Date of evaluation: 4/18/2024  Provider: Magdiel Newton DO  PCP: Bryan Sepulveda MD  Note Started: 8:49 PM EDT 4/18/24    CHIEF COMPLAINT       Chief Complaint   Patient presents with    Shortness of Breath       HISTORY OF PRESENT ILLNESS: 1 or more Elements   History From: patient    Limitations to history : None    Haritha Dominguez is a 79 y.o. female who presents to emergency department for shortness of breath and fever that started today.  Patient reports she has a history of asthma, last breathing treatment was 6 PM.  She reports she became more short of breath as the day went on and developed a fever at home.  She has an intermittent cough and some nausea.  She reports that she got some chills today as well.  No sick contacts at home. Patient denies headache, chest pain, abdominal pain, vomiting, diarrhea, dysuria, hematuria, hematochezia, and melena.  Note-patient has a history of dermatological skin condition, and is being treated by dermatology with prednisone.  Does not wear oxygen at home, EMS reports the patient was 89% on room air so they placed her on some supplemental NC O2.    Nursing Notes were all reviewed and agreed with or any disagreements were addressed in the HPI.        REVIEW OF SYSTEMS :           Positives and Pertinent negatives as per HPI.     SURGICAL HISTORY     Past Surgical History:   Procedure Laterality Date    BACK SURGERY  08/2014    has screws in back    CATARACT REMOVAL Bilateral 12/06/2013    [facility] with lens implants    CHOLECYSTECTOMY      JOINT REPLACEMENT  12/16/2016    SI joint fusion Right    OTHER SURGICAL HISTORY  02/09/2017    MRI -     SPINAL FIXATION SURGERY WITH IMPLANT  2013    in [place]    STIMULATOR SURGERY Right 5/31/2023    Spinal cord stimulator battery replacement performed by Anabell Purcell MD at [facility]

## 2024-04-19 NOTE — ACP (ADVANCE CARE PLANNING)
Advance Care Planning   Healthcare Decision Maker:    Primary Decision Maker: Chantelle Stover - Child - [phone]    Secondary Decision Maker: Aurora Rey - Brother/Sister - [phone]    Secondary Decision Maker: Gomez Dominguez - Child - [phone]    Secondary Decision Maker: Gomez Dominguez - Spouse - [phone]    Click here to complete Healthcare Decision Makers including selection of the Healthcare Decision Maker Relationship (ie \"Primary\").

## 2024-04-19 NOTE — ED NOTES
Pt assisted onto bedpan and she urinated without complication. Pt then removed from bedpan. Kanwal care provided. Pt alert and oriented X4. Breathing easily and unlabored. Pt on bedside tele monitor. Safety measures maintained. No concerns at this time.

## 2024-04-20 LAB
BASOPHILS # BLD: 0.05 K/UL (ref 0–0.2)
BASOPHILS NFR BLD: 0 % (ref 0–2)
EOSINOPHIL # BLD: 0.02 K/UL (ref 0.05–0.5)
EOSINOPHILS RELATIVE PERCENT: 0 % (ref 0–6)
ERYTHROCYTE [DISTWIDTH] IN BLOOD BY AUTOMATED COUNT: 15.6 % (ref 11.5–15)
HCT VFR BLD AUTO: 30.4 % (ref 34–48)
HGB BLD-MCNC: 9.6 G/DL (ref 11.5–15.5)
IMM GRANULOCYTES # BLD AUTO: 0.38 K/UL (ref 0–0.58)
IMM GRANULOCYTES NFR BLD: 2 % (ref 0–5)
LYMPHOCYTES NFR BLD: 1.71 K/UL (ref 1.5–4)
LYMPHOCYTES RELATIVE PERCENT: 10 % (ref 20–42)
MCH RBC QN AUTO: 32.4 PG (ref 26–35)
MCHC RBC AUTO-ENTMCNC: 31.6 G/DL (ref 32–34.5)
MCV RBC AUTO: 102.7 FL (ref 80–99.9)
MONOCYTES NFR BLD: 1.08 K/UL (ref 0.1–0.95)
MONOCYTES NFR BLD: 6 % (ref 2–12)
NEUTROPHILS NFR BLD: 81 % (ref 43–80)
NEUTS SEG NFR BLD: 14.06 K/UL (ref 1.8–7.3)
PLATELET # BLD AUTO: 287 K/UL (ref 130–450)
PMV BLD AUTO: 11.7 FL (ref 7–12)
RBC # BLD AUTO: 2.96 M/UL (ref 3.5–5.5)
WBC OTHER # BLD: 17.3 K/UL (ref 4.5–11.5)

## 2024-04-20 PROCEDURE — 2060000000 HC ICU INTERMEDIATE R&B

## 2024-04-20 PROCEDURE — 6370000000 HC RX 637 (ALT 250 FOR IP): Performed by: INTERNAL MEDICINE

## 2024-04-20 PROCEDURE — 2700000000 HC OXYGEN THERAPY PER DAY

## 2024-04-20 PROCEDURE — 6360000002 HC RX W HCPCS: Performed by: INTERNAL MEDICINE

## 2024-04-20 PROCEDURE — 2580000003 HC RX 258

## 2024-04-20 PROCEDURE — 2580000003 HC RX 258: Performed by: INTERNAL MEDICINE

## 2024-04-20 PROCEDURE — 36415 COLL VENOUS BLD VENIPUNCTURE: CPT

## 2024-04-20 PROCEDURE — 6370000000 HC RX 637 (ALT 250 FOR IP)

## 2024-04-20 PROCEDURE — 2500000003 HC RX 250 WO HCPCS

## 2024-04-20 PROCEDURE — 94660 CPAP INITIATION&MGMT: CPT

## 2024-04-20 PROCEDURE — 94640 AIRWAY INHALATION TREATMENT: CPT

## 2024-04-20 PROCEDURE — 6360000002 HC RX W HCPCS

## 2024-04-20 PROCEDURE — 85025 COMPLETE CBC W/AUTO DIFF WBC: CPT

## 2024-04-20 RX ORDER — LEVALBUTEROL INHALATION SOLUTION 0.63 MG/3ML
0.63 SOLUTION RESPIRATORY (INHALATION)
Status: DISCONTINUED | OUTPATIENT
Start: 2024-04-20 | End: 2024-04-22 | Stop reason: HOSPADM

## 2024-04-20 RX ADMIN — TORSEMIDE 20 MG: 20 TABLET ORAL at 08:12

## 2024-04-20 RX ADMIN — ASPIRIN 81 MG: 81 TABLET, COATED ORAL at 08:12

## 2024-04-20 RX ADMIN — PREGABALIN 50 MG: 50 CAPSULE ORAL at 08:11

## 2024-04-20 RX ADMIN — LEVALBUTEROL HYDROCHLORIDE 0.63 MG: 0.63 SOLUTION RESPIRATORY (INHALATION) at 07:49

## 2024-04-20 RX ADMIN — ENOXAPARIN SODIUM 40 MG: 100 INJECTION SUBCUTANEOUS at 08:12

## 2024-04-20 RX ADMIN — IPRATROPIUM BROMIDE 0.5 MG: 0.5 SOLUTION RESPIRATORY (INHALATION) at 07:46

## 2024-04-20 RX ADMIN — BUDESONIDE INHALATION 500 MCG: 0.5 SUSPENSION RESPIRATORY (INHALATION) at 07:45

## 2024-04-20 RX ADMIN — LORAZEPAM 2 MG: 1 TABLET ORAL at 22:04

## 2024-04-20 RX ADMIN — SODIUM CHLORIDE, PRESERVATIVE FREE 10 ML: 5 INJECTION INTRAVENOUS at 22:02

## 2024-04-20 RX ADMIN — IPRATROPIUM BROMIDE 0.5 MG: 0.5 SOLUTION RESPIRATORY (INHALATION) at 12:15

## 2024-04-20 RX ADMIN — SODIUM CHLORIDE: 9 INJECTION, SOLUTION INTRAVENOUS at 18:05

## 2024-04-20 RX ADMIN — LORAZEPAM 2 MG: 1 TABLET ORAL at 08:11

## 2024-04-20 RX ADMIN — ANTI-FUNGAL POWDER MICONAZOLE NITRATE TALC FREE: 1.42 POWDER TOPICAL at 08:13

## 2024-04-20 RX ADMIN — DOXYCYCLINE 100 MG: 100 INJECTION, POWDER, LYOPHILIZED, FOR SOLUTION INTRAVENOUS at 22:11

## 2024-04-20 RX ADMIN — ATORVASTATIN CALCIUM 10 MG: 10 TABLET, FILM COATED ORAL at 22:04

## 2024-04-20 RX ADMIN — ARFORMOTEROL TARTRATE 15 MCG: 15 SOLUTION RESPIRATORY (INHALATION) at 07:45

## 2024-04-20 RX ADMIN — DOXYCYCLINE 100 MG: 100 INJECTION, POWDER, LYOPHILIZED, FOR SOLUTION INTRAVENOUS at 08:11

## 2024-04-20 RX ADMIN — LEVALBUTEROL HYDROCHLORIDE 0.63 MG: 0.63 SOLUTION RESPIRATORY (INHALATION) at 19:35

## 2024-04-20 RX ADMIN — BUDESONIDE INHALATION 500 MCG: 0.5 SUSPENSION RESPIRATORY (INHALATION) at 19:35

## 2024-04-20 RX ADMIN — PANTOPRAZOLE SODIUM 40 MG: 40 TABLET, DELAYED RELEASE ORAL at 08:13

## 2024-04-20 RX ADMIN — LEVALBUTEROL HYDROCHLORIDE 0.63 MG: 0.63 SOLUTION RESPIRATORY (INHALATION) at 15:46

## 2024-04-20 RX ADMIN — LEVALBUTEROL HYDROCHLORIDE 0.63 MG: 0.63 SOLUTION RESPIRATORY (INHALATION) at 12:15

## 2024-04-20 RX ADMIN — LORAZEPAM 2 MG: 1 TABLET ORAL at 13:26

## 2024-04-20 RX ADMIN — PREGABALIN 50 MG: 50 CAPSULE ORAL at 22:04

## 2024-04-20 RX ADMIN — POTASSIUM CHLORIDE 10 MEQ: 750 TABLET, EXTENDED RELEASE ORAL at 08:12

## 2024-04-20 RX ADMIN — MONTELUKAST 10 MG: 10 TABLET, FILM COATED ORAL at 22:04

## 2024-04-20 RX ADMIN — WATER 1000 MG: 1 INJECTION INTRAMUSCULAR; INTRAVENOUS; SUBCUTANEOUS at 21:57

## 2024-04-20 RX ADMIN — ARFORMOTEROL TARTRATE 15 MCG: 15 SOLUTION RESPIRATORY (INHALATION) at 19:35

## 2024-04-20 RX ADMIN — PREDNISONE 40 MG: 20 TABLET ORAL at 08:12

## 2024-04-20 RX ADMIN — IPRATROPIUM BROMIDE 0.5 MG: 0.5 SOLUTION RESPIRATORY (INHALATION) at 19:35

## 2024-04-20 RX ADMIN — IPRATROPIUM BROMIDE 0.5 MG: 0.5 SOLUTION RESPIRATORY (INHALATION) at 15:46

## 2024-04-20 RX ADMIN — ANTI-FUNGAL POWDER MICONAZOLE NITRATE TALC FREE: 1.42 POWDER TOPICAL at 22:05

## 2024-04-20 NOTE — PLAN OF CARE
Problem: Discharge Planning  Goal: Discharge to home or other facility with appropriate resources  Outcome: Progressing     Problem: Safety - Adult  Goal: Free from fall injury  Outcome: Progressing     Problem: Chronic Conditions and Co-morbidities  Goal: Patient's chronic conditions and co-morbidity symptoms are monitored and maintained or improved  Outcome: Progressing  Flowsheets (Taken 4/20/2024 1200)  Care Plan - Patient's Chronic Conditions and Co-Morbidity Symptoms are Monitored and Maintained or Improved: Monitor and assess patient's chronic conditions and comorbid symptoms for stability, deterioration, or improvement

## 2024-04-21 ENCOUNTER — APPOINTMENT (OUTPATIENT)
Dept: GENERAL RADIOLOGY | Age: 79
DRG: 871 | End: 2024-04-21
Payer: MEDICARE

## 2024-04-21 LAB
BASOPHILS # BLD: 0.05 K/UL (ref 0–0.2)
BASOPHILS NFR BLD: 0 % (ref 0–2)
EOSINOPHIL # BLD: 0.04 K/UL (ref 0.05–0.5)
EOSINOPHILS RELATIVE PERCENT: 0 % (ref 0–6)
ERYTHROCYTE [DISTWIDTH] IN BLOOD BY AUTOMATED COUNT: 15.3 % (ref 11.5–15)
HCT VFR BLD AUTO: 29.7 % (ref 34–48)
HGB BLD-MCNC: 9.1 G/DL (ref 11.5–15.5)
IMM GRANULOCYTES # BLD AUTO: 0.26 K/UL (ref 0–0.58)
IMM GRANULOCYTES NFR BLD: 2 % (ref 0–5)
LYMPHOCYTES NFR BLD: 1.65 K/UL (ref 1.5–4)
LYMPHOCYTES RELATIVE PERCENT: 14 % (ref 20–42)
MCH RBC QN AUTO: 31.7 PG (ref 26–35)
MCHC RBC AUTO-ENTMCNC: 30.6 G/DL (ref 32–34.5)
MCV RBC AUTO: 103.5 FL (ref 80–99.9)
MONOCYTES NFR BLD: 1.04 K/UL (ref 0.1–0.95)
MONOCYTES NFR BLD: 9 % (ref 2–12)
NEUTROPHILS NFR BLD: 75 % (ref 43–80)
NEUTS SEG NFR BLD: 9.09 K/UL (ref 1.8–7.3)
PLATELET # BLD AUTO: 249 K/UL (ref 130–450)
PMV BLD AUTO: 11.6 FL (ref 7–12)
RBC # BLD AUTO: 2.87 M/UL (ref 3.5–5.5)
WBC OTHER # BLD: 12.1 K/UL (ref 4.5–11.5)

## 2024-04-21 PROCEDURE — 94640 AIRWAY INHALATION TREATMENT: CPT

## 2024-04-21 PROCEDURE — 2580000003 HC RX 258

## 2024-04-21 PROCEDURE — 6370000000 HC RX 637 (ALT 250 FOR IP): Performed by: INTERNAL MEDICINE

## 2024-04-21 PROCEDURE — 94660 CPAP INITIATION&MGMT: CPT

## 2024-04-21 PROCEDURE — 2580000003 HC RX 258: Performed by: INTERNAL MEDICINE

## 2024-04-21 PROCEDURE — 71045 X-RAY EXAM CHEST 1 VIEW: CPT

## 2024-04-21 PROCEDURE — 6360000002 HC RX W HCPCS: Performed by: INTERNAL MEDICINE

## 2024-04-21 PROCEDURE — 2700000000 HC OXYGEN THERAPY PER DAY

## 2024-04-21 PROCEDURE — 6360000002 HC RX W HCPCS

## 2024-04-21 PROCEDURE — 2060000000 HC ICU INTERMEDIATE R&B

## 2024-04-21 PROCEDURE — 2500000003 HC RX 250 WO HCPCS

## 2024-04-21 PROCEDURE — 36415 COLL VENOUS BLD VENIPUNCTURE: CPT

## 2024-04-21 PROCEDURE — 85025 COMPLETE CBC W/AUTO DIFF WBC: CPT

## 2024-04-21 RX ADMIN — MONTELUKAST 10 MG: 10 TABLET, FILM COATED ORAL at 21:20

## 2024-04-21 RX ADMIN — ANTI-FUNGAL POWDER MICONAZOLE NITRATE TALC FREE: 1.42 POWDER TOPICAL at 21:21

## 2024-04-21 RX ADMIN — LORAZEPAM 2 MG: 1 TABLET ORAL at 21:20

## 2024-04-21 RX ADMIN — ATORVASTATIN CALCIUM 10 MG: 10 TABLET, FILM COATED ORAL at 21:20

## 2024-04-21 RX ADMIN — WATER 40 MG: 1 INJECTION INTRAMUSCULAR; INTRAVENOUS; SUBCUTANEOUS at 08:55

## 2024-04-21 RX ADMIN — ARFORMOTEROL TARTRATE 15 MCG: 15 SOLUTION RESPIRATORY (INHALATION) at 08:00

## 2024-04-21 RX ADMIN — DOXYCYCLINE 100 MG: 100 INJECTION, POWDER, LYOPHILIZED, FOR SOLUTION INTRAVENOUS at 09:27

## 2024-04-21 RX ADMIN — POTASSIUM CHLORIDE 10 MEQ: 750 TABLET, EXTENDED RELEASE ORAL at 08:55

## 2024-04-21 RX ADMIN — LEVALBUTEROL HYDROCHLORIDE 0.63 MG: 0.63 SOLUTION RESPIRATORY (INHALATION) at 16:19

## 2024-04-21 RX ADMIN — BUDESONIDE INHALATION 500 MCG: 0.5 SUSPENSION RESPIRATORY (INHALATION) at 20:33

## 2024-04-21 RX ADMIN — LORAZEPAM 2 MG: 1 TABLET ORAL at 14:08

## 2024-04-21 RX ADMIN — WATER 40 MG: 1 INJECTION INTRAMUSCULAR; INTRAVENOUS; SUBCUTANEOUS at 21:20

## 2024-04-21 RX ADMIN — IPRATROPIUM BROMIDE 0.5 MG: 0.5 SOLUTION RESPIRATORY (INHALATION) at 08:00

## 2024-04-21 RX ADMIN — ENOXAPARIN SODIUM 40 MG: 100 INJECTION SUBCUTANEOUS at 08:55

## 2024-04-21 RX ADMIN — LEVALBUTEROL HYDROCHLORIDE 0.63 MG: 0.63 SOLUTION RESPIRATORY (INHALATION) at 08:00

## 2024-04-21 RX ADMIN — ASPIRIN 81 MG: 81 TABLET, COATED ORAL at 08:56

## 2024-04-21 RX ADMIN — ESCITALOPRAM OXALATE 10 MG: 10 TABLET ORAL at 00:51

## 2024-04-21 RX ADMIN — IPRATROPIUM BROMIDE 0.5 MG: 0.5 SOLUTION RESPIRATORY (INHALATION) at 11:49

## 2024-04-21 RX ADMIN — PANTOPRAZOLE SODIUM 40 MG: 40 TABLET, DELAYED RELEASE ORAL at 08:56

## 2024-04-21 RX ADMIN — DOXYCYCLINE 100 MG: 100 INJECTION, POWDER, LYOPHILIZED, FOR SOLUTION INTRAVENOUS at 21:23

## 2024-04-21 RX ADMIN — LEVALBUTEROL HYDROCHLORIDE 0.63 MG: 0.63 SOLUTION RESPIRATORY (INHALATION) at 11:49

## 2024-04-21 RX ADMIN — LORAZEPAM 2 MG: 1 TABLET ORAL at 08:55

## 2024-04-21 RX ADMIN — IPRATROPIUM BROMIDE 0.5 MG: 0.5 SOLUTION RESPIRATORY (INHALATION) at 16:19

## 2024-04-21 RX ADMIN — PREGABALIN 50 MG: 50 CAPSULE ORAL at 21:20

## 2024-04-21 RX ADMIN — BUDESONIDE INHALATION 500 MCG: 0.5 SUSPENSION RESPIRATORY (INHALATION) at 08:00

## 2024-04-21 RX ADMIN — WATER 1000 MG: 1 INJECTION INTRAMUSCULAR; INTRAVENOUS; SUBCUTANEOUS at 21:20

## 2024-04-21 RX ADMIN — TORSEMIDE 20 MG: 20 TABLET ORAL at 08:56

## 2024-04-21 RX ADMIN — SODIUM CHLORIDE, PRESERVATIVE FREE 10 ML: 5 INJECTION INTRAVENOUS at 08:56

## 2024-04-21 RX ADMIN — ANTI-FUNGAL POWDER MICONAZOLE NITRATE TALC FREE: 1.42 POWDER TOPICAL at 08:56

## 2024-04-21 RX ADMIN — IPRATROPIUM BROMIDE 0.5 MG: 0.5 SOLUTION RESPIRATORY (INHALATION) at 20:33

## 2024-04-21 RX ADMIN — ESCITALOPRAM OXALATE 10 MG: 10 TABLET ORAL at 21:25

## 2024-04-21 RX ADMIN — ARFORMOTEROL TARTRATE 15 MCG: 15 SOLUTION RESPIRATORY (INHALATION) at 20:33

## 2024-04-21 RX ADMIN — PREGABALIN 50 MG: 50 CAPSULE ORAL at 08:55

## 2024-04-21 RX ADMIN — LEVALBUTEROL HYDROCHLORIDE 0.63 MG: 0.63 SOLUTION RESPIRATORY (INHALATION) at 20:33

## 2024-04-21 RX ADMIN — SODIUM CHLORIDE, PRESERVATIVE FREE 10 ML: 5 INJECTION INTRAVENOUS at 21:21

## 2024-04-21 ASSESSMENT — PAIN SCALES - GENERAL
PAINLEVEL_OUTOF10: 0
PAINLEVEL_OUTOF10: 0

## 2024-04-22 VITALS
BODY MASS INDEX: 26.29 KG/M2 | OXYGEN SATURATION: 98 % | HEIGHT: 64 IN | SYSTOLIC BLOOD PRESSURE: 127 MMHG | TEMPERATURE: 98.3 F | HEART RATE: 112 BPM | DIASTOLIC BLOOD PRESSURE: 86 MMHG | WEIGHT: 154 LBS | RESPIRATION RATE: 18 BRPM

## 2024-04-22 PROCEDURE — 92526 ORAL FUNCTION THERAPY: CPT

## 2024-04-22 PROCEDURE — 2580000003 HC RX 258

## 2024-04-22 PROCEDURE — 94640 AIRWAY INHALATION TREATMENT: CPT

## 2024-04-22 PROCEDURE — 6370000000 HC RX 637 (ALT 250 FOR IP): Performed by: INTERNAL MEDICINE

## 2024-04-22 PROCEDURE — 2700000000 HC OXYGEN THERAPY PER DAY

## 2024-04-22 PROCEDURE — 6360000002 HC RX W HCPCS

## 2024-04-22 PROCEDURE — 2500000003 HC RX 250 WO HCPCS

## 2024-04-22 PROCEDURE — 6360000002 HC RX W HCPCS: Performed by: INTERNAL MEDICINE

## 2024-04-22 PROCEDURE — 2580000003 HC RX 258: Performed by: INTERNAL MEDICINE

## 2024-04-22 PROCEDURE — 94660 CPAP INITIATION&MGMT: CPT

## 2024-04-22 RX ORDER — PREDNISONE 10 MG/1
TABLET ORAL
Qty: 30 TABLET | Refills: 0 | Status: SHIPPED | OUTPATIENT
Start: 2024-04-22

## 2024-04-22 RX ORDER — DOXYCYCLINE HYCLATE 100 MG
100 TABLET ORAL 2 TIMES DAILY
Qty: 8 TABLET | Refills: 0 | Status: SHIPPED | OUTPATIENT
Start: 2024-04-22 | End: 2024-04-26

## 2024-04-22 RX ORDER — CEFDINIR 300 MG/1
300 CAPSULE ORAL 2 TIMES DAILY
Qty: 8 CAPSULE | Refills: 0 | Status: SHIPPED | OUTPATIENT
Start: 2024-04-22 | End: 2024-04-26

## 2024-04-22 RX ORDER — DOXYCYCLINE HYCLATE 100 MG/1
100 CAPSULE ORAL EVERY 12 HOURS
Status: DISCONTINUED | OUTPATIENT
Start: 2024-04-22 | End: 2024-04-22 | Stop reason: HOSPADM

## 2024-04-22 RX ADMIN — ASPIRIN 81 MG: 81 TABLET, COATED ORAL at 08:26

## 2024-04-22 RX ADMIN — ENOXAPARIN SODIUM 40 MG: 100 INJECTION SUBCUTANEOUS at 08:26

## 2024-04-22 RX ADMIN — LEVALBUTEROL HYDROCHLORIDE 0.63 MG: 0.63 SOLUTION RESPIRATORY (INHALATION) at 08:49

## 2024-04-22 RX ADMIN — ARFORMOTEROL TARTRATE 15 MCG: 15 SOLUTION RESPIRATORY (INHALATION) at 08:49

## 2024-04-22 RX ADMIN — PANTOPRAZOLE SODIUM 40 MG: 40 TABLET, DELAYED RELEASE ORAL at 08:26

## 2024-04-22 RX ADMIN — TORSEMIDE 20 MG: 20 TABLET ORAL at 08:27

## 2024-04-22 RX ADMIN — ANTI-FUNGAL POWDER MICONAZOLE NITRATE TALC FREE: 1.42 POWDER TOPICAL at 08:29

## 2024-04-22 RX ADMIN — SODIUM CHLORIDE: 9 INJECTION, SOLUTION INTRAVENOUS at 07:01

## 2024-04-22 RX ADMIN — PREGABALIN 50 MG: 50 CAPSULE ORAL at 08:26

## 2024-04-22 RX ADMIN — LEVALBUTEROL HYDROCHLORIDE 0.63 MG: 0.63 SOLUTION RESPIRATORY (INHALATION) at 12:54

## 2024-04-22 RX ADMIN — BUDESONIDE INHALATION 500 MCG: 0.5 SUSPENSION RESPIRATORY (INHALATION) at 08:49

## 2024-04-22 RX ADMIN — IPRATROPIUM BROMIDE 0.5 MG: 0.5 SOLUTION RESPIRATORY (INHALATION) at 12:54

## 2024-04-22 RX ADMIN — DOXYCYCLINE 100 MG: 100 INJECTION, POWDER, LYOPHILIZED, FOR SOLUTION INTRAVENOUS at 08:37

## 2024-04-22 RX ADMIN — IPRATROPIUM BROMIDE 0.5 MG: 0.5 SOLUTION RESPIRATORY (INHALATION) at 08:48

## 2024-04-22 RX ADMIN — LORAZEPAM 2 MG: 1 TABLET ORAL at 08:26

## 2024-04-22 RX ADMIN — POTASSIUM CHLORIDE 10 MEQ: 750 TABLET, EXTENDED RELEASE ORAL at 08:26

## 2024-04-22 RX ADMIN — LORAZEPAM 2 MG: 1 TABLET ORAL at 14:07

## 2024-04-22 RX ADMIN — WATER 40 MG: 1 INJECTION INTRAMUSCULAR; INTRAVENOUS; SUBCUTANEOUS at 08:25

## 2024-04-22 ASSESSMENT — PAIN SCALES - GENERAL
PAINLEVEL_OUTOF10: 0
PAINLEVEL_OUTOF10: 0

## 2024-04-22 NOTE — CARE COORDINATION
Chart reviewed and case reviewed in [facility].  IV Solumedrol BID started 4/21 per pulmonology.  Patient on IV rocephin and IV doxycycline.  Patient to transition to oral antibiotics for discharge per Pulmonology notes.  Ambulatory POX note completed this am and patient will not need home O2 at discharge.  Met with the patient at the bedside to discuss transition of care planning.  Patient is agreeable to home care at discharge.  Discussed not needing home O2 at this time.  Spoke with patient's daughter Chantelle and sister Aurora Alonzo via phone re: HC provider choices, list reviewed, and they would like 1.) [facility], 2. ) [facility], or 3.) [facility].  Call placed to [facility] and spoke with April and referral made.  Start of care is Thursday.  Home care orders received.  Will continue to follow for additional transition of care planning needs.     Thelma Rasheed RN.  P:  [phone]      The Plan for Transition of Care is related to the following treatment goals: improve functional mobility and respiratory status    The Patient and/or patient representative, sister Aurora Richards, & Daughter Chantelle were provided with a choice of provider and agrees with the discharge plan. [x] Yes [] No    Freedom of choice list was provided with basic dialogue that supports the patient's individualized plan of care/goals, treatment preferences and shares the quality data associated with the providers. [x] Yes [] No    
      [id number] [place], [place] - [address] - P [phone] - F [phone]  [address] [place] [address]  Phone: [phone] Fax: [phone]      Notes:    Factors facilitating achievement of predicted outcomes: Family support, Motivated, Cooperative, Pleasant, Has needed Durable Medical Equipment at home, and Knowledge about rehab    Barriers to discharge: Limited safety awareness    Additional Case Management Notes: See Above     The Plan for Transition of Care is related to the following treatment goals of Septicemia (HCC) [A41.9]  Sepsis (HCC) [A41.9]  Pneumonia of right lower lobe due to infectious organism [J18.9]    IF APPLICABLE: The Patient and/or patient representative Haritha and her family were provided with a choice of provider and agrees with the discharge plan. Freedom of choice list with basic dialogue that supports the patient's individualized plan of care/goals and shares the quality data associated with the providers was provided to:     Patient Representative Name:       The Patient and/or Patient Representative Agree with the Discharge Plan?      Thelma Rasheed RN  Case Management Department  Ph: [phone]  Fax: [phone]

## 2024-04-22 NOTE — PLAN OF CARE
Problem: Discharge Planning  Goal: Discharge to home or other facility with appropriate resources  4/22/2024 1526 by Aydee Gomez RN  Outcome: Adequate for Discharge  4/22/2024 1525 by Aydee Gomez RN  Outcome: Adequate for Discharge  4/22/2024 0243 by Kayla Farooq RN  Outcome: Progressing     Problem: Safety - Adult  Goal: Free from fall injury  4/22/2024 1526 by Aydee Gomez RN  Outcome: Adequate for Discharge  4/22/2024 1525 by Aydee Gomez RN  Outcome: Adequate for Discharge  4/22/2024 0243 by Kayla Farooq RN  Outcome: Progressing     Problem: Chronic Conditions and Co-morbidities  Goal: Patient's chronic conditions and co-morbidity symptoms are monitored and maintained or improved  4/22/2024 1526 by Aydee Gomez RN  Outcome: Adequate for Discharge  4/22/2024 1525 by Aydee Gomez RN  Outcome: Adequate for Discharge  Flowsheets (Taken 4/22/2024 0730)  Care Plan - Patient's Chronic Conditions and Co-Morbidity Symptoms are Monitored and Maintained or Improved: Monitor and assess patient's chronic conditions and comorbid symptoms for stability, deterioration, or improvement  4/22/2024 0243 by Kayla Farooq RN  Outcome: Progressing     Problem: Pain  Goal: Verbalizes/displays adequate comfort level or baseline comfort level  4/22/2024 1526 by Aydee Gomez RN  Outcome: Adequate for Discharge  4/22/2024 1525 by Aydee Gomez RN  Outcome: Adequate for Discharge  4/22/2024 0243 by Kayla Farooq RN  Outcome: Progressing

## 2024-04-22 NOTE — PLAN OF CARE
Problem: Discharge Planning  Goal: Discharge to home or other facility with appropriate resources  4/22/2024 1525 by Aydee Gomez RN  Outcome: Adequate for Discharge  4/22/2024 0243 by Kayla Farooq RN  Outcome: Progressing     Problem: Safety - Adult  Goal: Free from fall injury  4/22/2024 1525 by Aydee Gomez RN  Outcome: Adequate for Discharge  4/22/2024 0243 by Kayla Farooq RN  Outcome: Progressing     Problem: Chronic Conditions and Co-morbidities  Goal: Patient's chronic conditions and co-morbidity symptoms are monitored and maintained or improved  4/22/2024 1525 by Aydee Gomez RN  Outcome: Adequate for Discharge  Flowsheets (Taken 4/22/2024 0730)  Care Plan - Patient's Chronic Conditions and Co-Morbidity Symptoms are Monitored and Maintained or Improved: Monitor and assess patient's chronic conditions and comorbid symptoms for stability, deterioration, or improvement  4/22/2024 0243 by Kayla Farooq RN  Outcome: Progressing     Problem: Pain  Goal: Verbalizes/displays adequate comfort level or baseline comfort level  4/22/2024 1525 by Aydee Gomez RN  Outcome: Adequate for Discharge  4/22/2024 0243 by Kayla Farooq RN  Outcome: Progressing

## 2024-04-22 NOTE — PLAN OF CARE
Problem: Discharge Planning  Goal: Discharge to home or other facility with appropriate resources  Outcome: Progressing     Problem: Safety - Adult  Goal: Free from fall injury  Outcome: Progressing     Problem: Chronic Conditions and Co-morbidities  Goal: Patient's chronic conditions and co-morbidity symptoms are monitored and maintained or improved  Outcome: Progressing     Problem: Pain  Goal: Verbalizes/displays adequate comfort level or baseline comfort level  Outcome: Progressing

## 2024-04-23 LAB
MICROORGANISM SPEC CULT: NORMAL
MICROORGANISM SPEC CULT: NORMAL
SERVICE CMNT-IMP: NORMAL
SERVICE CMNT-IMP: NORMAL
SPECIMEN DESCRIPTION: NORMAL
SPECIMEN DESCRIPTION: NORMAL

## 2024-04-23 NOTE — PROGRESS NOTES
Nathan Kurtz M.D.,[facility]  Jack Rolle D.O., TRU., [facility]  Ari Licona M.D.  Dee Albert M.D.   MAMADOU Blunt.O.        Daily Pulmonary Progress Note    Patient:  Haritha Dominguez 79 y.o. female MRN: [id number]            Synopsis     We are following patient for aspiration pneumonia    \"CC\" chills, shortness of breath, cough    Code status: Full code      Subjective      Patient was seen and examined lying in bed comfortably.  She has pretty significant wheezing today.  I will increase her steroids back to Solu-Medrol.  CXR this morning was reviewed and does show some improvement.    Review of Systems:  Constitutional: Fever, chills-resolved   Skin: Denies pigmentation, dark lesions, and rashes   HEENT: Denies hearing loss, tinnitus, ear drainage, epistaxis, sore throat, and hoarseness.  Cardiovascular: Denies palpitations, chest pain, and chest pressure.  Respiratory: Cough, sore throat, shortness of breath   Gastrointestinal: Denies nausea, vomiting, poor appetite, diarrhea, heartburn or reflux  Genitourinary: Denies dysuria, frequency, urgency or hematuria  Musculoskeletal: Denies myalgias, muscle weakness, and bone pain  Neurological: Denies dizziness, vertigo, headache, and focal weakness  Psychological: Denies anxiety and depression  Endocrine: Denies heat intolerance and cold intolerance  Hematopoietic/Lymphatic: Denies bleeding problems and blood transfusions    24-hour events:  No new events    Objective   OBJECTIVE:   BP (!) 140/85   Pulse (!) 105   Temp 98.2 °F (36.8 °C) (Temporal)   Resp 17   Ht 1.626 m (5' 4\")   Wt 69.9 kg (154 lb)   SpO2 99%   BMI 26.43 kg/m²   SpO2 Readings from Last 1 Encounters:   04/21/24 99%        I/O:    Intake/Output Summary (Last 24 hours) at 4/21/2024 1127  Last data filed at 4/20/2024 1806  Gross per 24 hour   Intake 3274.44 ml   Output 1975 ml   Net 1299.44 ml                CPAP/EPAP: 7 cmH2O     CURRENT MEDS :  Scheduled Meds:   
  Hospital Medicine    Subjective:  pt alert conversive feeling better on rm air      Current Facility-Administered Medications:     doxycycline hyclate (VIBRAMYCIN) capsule 100 mg, 100 mg, Oral, Q12H, Magdiel Newton DO    methylPREDNISolone sodium succ (SOLU-MEDROL) 40 mg in sterile water 1 mL injection, 40 mg, IntraVENous, Q12H, Alysa Kidd, APRN - CNP, 40 mg at 04/22/24 0825    levalbuterol (XOPENEX) nebulization 0.63 mg, 0.63 mg, Nebulization, 4x Daily RT, Alysa Kidd APRN - CNP, 0.63 mg at 04/22/24 1254    miconazole (MICOTIN) 2 % powder, , Topical, BID, Bryan Rivers DO, Given at 04/22/24 0829    aspirin EC tablet 81 mg, 81 mg, Oral, Daily, Bryan Rivers DO, 81 mg at 04/22/24 0826    atorvastatin (LIPITOR) tablet 10 mg, 10 mg, Oral, Nightly, Bryan Rivers DO, 10 mg at 04/21/24 2120    escitalopram (LEXAPRO) tablet 10 mg, 10 mg, Oral, Nightly, Bryan Rivers DO, 10 mg at 04/21/24 2125    LORazepam (ATIVAN) tablet 2 mg, 2 mg, Oral, TID, Bryan Rivers DO, 2 mg at 04/22/24 1407    montelukast (SINGULAIR) tablet 10 mg, 10 mg, Oral, Nightly, Bryan Rivers DO, 10 mg at 04/21/24 2120    pantoprazole (PROTONIX) tablet 40 mg, 40 mg, Oral, Daily, Bryan Rivers DO, 40 mg at 04/22/24 0826    potassium chloride (KLOR-CON M) extended release tablet 10 mEq, 10 mEq, Oral, Daily, Bryan Rivers DO, 10 mEq at 04/22/24 0826    [Held by provider] predniSONE (DELTASONE) tablet 10 mg, 10 mg, Oral, Daily, Bryan Rivers DO, 10 mg at 04/19/24 0950    pregabalin (LYRICA) capsule 50 mg, 50 mg, Oral, BID, Bryan Rivers DO, 50 mg at 04/22/24 0826    0.9 % sodium chloride infusion, , IntraVENous, PRN, Bryan Rivers,     potassium chloride (KLOR-CON M) extended release tablet 40 mEq, 40 mEq, Oral, PRN **OR** potassium bicarb-citric acid (EFFER-K) effervescent tablet 40 mEq, 40 mEq, Oral, PRN **OR** potassium chloride 10 mEq/100 mL IVPB (Peripheral Line), 10 mEq, IntraVENous, PRN, 
  Physician Progress Note      PATIENT:               HARITHA CEDENO  CSN #:                  [id number]  :                       1945  ADMIT DATE:       2024 8:35 PM  DISCH DATE:        2024 3:53 PM  RESPONDING  PROVIDER #:        Bryan Rivers DO          QUERY TEXT:    Pt admitted with chills, shortness of breath and cough.  Pt noted to have   pneumonia per H&P, and \"aspiration pneumonia\" per Pulmonology consult note   . If possible, please document in the progress notes and discharge summary   if you are evaluating and/or treating any of the following:    Note: CAP and HCAP indicate where the pneumonia was acquired, not a specific   type.    The medical record reflects the following:  Risk Factors: Advanced age >75  Clinical Indicators: H&P \"...states she was eating an RB sandwich for lunch.   The sandwich got stuck in her throat and then she developed chills, shortness   of breath, nonproductive cough...Diagnostic evaluation revealed right lower   lobe infiltrate...Pneumonia\", Pulmonology Consult  \"...Haritha presented   to the hospital after her lunch consisting of  roast beef sandwich from [employer]   got stuck in her throat...CXR shows a RLL infiltrate consistent with   aspiration...Right lower lobe pneumonia, likely aspiration...\" Pulmonology   Consult Note  \"...We are following patient fo  Treatment: IV Rocephin and doxycycline, Labs, Chest x-ray, CT Chest, Barium   Swallow Study, Pulmonology Consult, Speech Consult      Thank you,  DEVON GuerrierN, RN, [facility]  [phone]  Options provided:  -- Aspiration pneumonia  -- Gram negative pneumonia  -- Gram positive pneumonia  -- Other - I will add my own diagnosis  -- Disagree - Not applicable / Not valid  -- Disagree - Clinically unable to determine / Unknown  -- Refer to Clinical Documentation Reviewer    PROVIDER RESPONSE TEXT:    This patient has aspiration pneumonia.    Query created by: Phu Gerber on 2024 10:38 
  SPEECH/LANGUAGE PATHOLOGY  CLINICAL ASSESSMENT OF SWALLOWING FUNCTION   and PLAN OF CARE    PATIENT NAME:  Haritha Dominguez  (female)     MRN:  [id number]    :  1945  (79 y.o.)  STATUS:  Inpatient: Room 8501/8501-A    TODAY'S DATE:  24 1015    SLP swallowing-dysphagia evaluation and treatment  Start:  24 1015,   End:  24 1015,   ONE TIME,   Standing Count:  1 Occurrences,   R       Bryan Rivers DO  REASON FOR REFERRAL: dysphagia    EVALUATING THERAPIST: Joselin Currie, AUBRIE                 RESULTS:    DYSPHAGIA DIAGNOSIS:   Clinical indicators of mild  oropharyngeal phase dysphagia       DIET RECOMMENDATIONS:  Soft and bite size consistency solids (IDDSI level 6) with  nectar consistency (mildly thick - IDDSI level 2) liquids until MBSS is completed     Pt reports \"choking\" on roast beef sandwich yesterday- reportedly was able to clear by just drinking water. Now with RLL pneumonia. Also reports that she coughs with liquids at home.      FEEDING RECOMMENDATIONS:     Assistance level:  Supervision is needed during all oral intake, Encourage self-feeding      Compensatory strategies recommended: Thorough oral care to prevent colonization of oral bacteria , Upright in bed/ chair as tolerated, Small bites/sips, Small, SINGLE cup sips only, and NO STRAW      Discussed recommendations with nursing and/or faxed report to referring provider: Yes    SPEECH THERAPY  PLAN OF CARE   The dysphagia POC is established based on physician order, dysphagia diagnosis and results of clinical assessment     Will establish POC once MBSS is completed.    Conditions Requiring Skilled Therapeutic Intervention for dysphagia:    Patient is performing below functional baseline d/t  current acute condition, respiratory compromise, multiple medications, and/or increased dependency upon caregivers.  Coughing during PO intake    Sensation of food sticking in throat     Specific dysphagia interventions to 
4 Eyes Skin Assessment     NAME:  Haritha Dominguez  YOB: 1945  MEDICAL RECORD NUMBER:  [id number]    The patient is being assessed for  Admission    I agree that at least one RN has performed a thorough Head to Toe Skin Assessment on the patient. ALL assessment sites listed below have been assessed.      Areas assessed by both nurses:    Head, Face, Ears, Shoulders, Back, Chest, Arms, Elbows, Hands, Sacrum. Buttock, Coccyx, Ischium, and Legs. Feet and Heels        Does the Patient have a Wound? No noted wound(s)       Ruperto Prevention initiated by RN: No  Wound Care Orders initiated by RN: No    Pressure Injury (Stage 3,4, Unstageable, DTI, NWPT, and Complex wounds) if present, place Wound referral order by RN under : No    New Ostomies, if present place, Ostomy referral order under : No     Nurse 1 eSignature: Electronically signed by Susan Leyva RN on 4/19/24 at 6:27 AM EDT    **SHARE this note so that the co-signing nurse can place an eSignature**    Nurse 2 eSignature: Electronically signed by Guera Jenkins RN on 4/19/24 at 6:27 AM EDT   
Bssr given to Sigrid TAVARES  
Dr Albert notified of pulmonary consult per matias.  Pt added to census  
IV to PO Conversion Policy     Notification of IV to PO conversion:    This patient's order for doxycycline IV has been changed to doxycycline PO as approved by the [facility] ([facility] INTRAVENOUS TO ORAL Policy.    If the patient should become strict NPO while on this therapy, contact the prescriber for further orders.    Digna OvertonD, BCPS 4/22/2024 2:15 PM    
Patient refusing to wear CPAP after set up by respiratory therapy. Educated on importance of wearing CPAP, however patient still refusing. Placed back on NC.   
Pulse ox was 93 % on room air at rest.  Ambulated patient on room air.  Oxygen saturation was 88% on room air while ambulating.  Oxygen applied  Recovery pulse ox was 95% on  2 liters of oxygen while ambulating.  
Pulse ox was 93% on room air at rest.  Ambulated patient on room air.  Oxygen saturation was 95% on room air while ambulating.  No oxygen needed to applied.  Recovery pulse ox was 95% on 0 liters of oxygen while ambulating.   
SPEECH LANGUAGE PATHOLOGY  DAILY PROGRESS NOTE      PATIENT NAME:  Haritha Dominguez      :  1945          TODAY'S DATE:  2024 ROOM:  8501/8501-A    Current Diet: ADULT DIET; Dysphagia - Soft and Bite Sized    Patient seen for ongoing dysphagia tx. Patient reported fair toleration of diet. Patient independently recalled recommendation for no straws. Patient did report to SLP episode of coughing and feeling of food stuck in throat recently with burger. SLP educated patient on use of alternating liquids and solids as well as incorporating a sip into bites of solid if solid is dry/harder to chew. Also discussed slow rate and extra mastication. Patient verbalized understanding.     Recommendation: cont current diet with use of strategies.       CPT code(s) 79020  dysphagia tx  Total minutes :  15 minutes    Cassy Oleary M.S. CCC-SLP/L  Speech Language Pathologist  [id number]     
Subjective:    The patient is awake and alert.  No problems overnight.  She does admit she is still sob at times. She denies cp, n/v.      Objective:    /78   Pulse (!) 110   Temp 97.5 °F (36.4 °C) (Temporal)   Resp 18   Ht 1.626 m (5' 4\")   Wt 69.9 kg (154 lb)   SpO2 98%   BMI 26.43 kg/m²   HEENT no adenopathy no bruits  Heart:  RRR, no murmurs, gallops, or rubs.  Lungs:  scattered rhonchi   Abd: bowel sounds present, nontender, nondistended, no masses  Extrem:  No clubbing, cyanosis, or edema  WBC/Hgb/Hct/Plts:  16.9/11.6/36.9/315 (04/18 2102) basic metabolic panel   Scheduled Meds:   miconazole   Topical BID    albuterol  2.5 mg Nebulization 4x Daily RT    aspirin  81 mg Oral Daily    atorvastatin  10 mg Oral Nightly    escitalopram  10 mg Oral Nightly    LORazepam  2 mg Oral TID    montelukast  10 mg Oral Nightly    pantoprazole  40 mg Oral Daily    potassium chloride  10 mEq Oral Daily    [Held by provider] predniSONE  10 mg Oral Daily    pregabalin  50 mg Oral BID    cefTRIAXone (ROCEPHIN) IV  1,000 mg IntraVENous Q24H    budesonide  0.5 mg Nebulization BID RT    And    arformoterol tartrate  15 mcg Nebulization BID RT    And    ipratropium  0.5 mg Nebulization 4x Daily RT    torsemide  20 mg Oral Daily    predniSONE  40 mg Oral Daily    doxycycline (VIBRAMYCIN) IV  100 mg IntraVENous Q12H    LORazepam  0.5 mg IntraVENous Once    sodium chloride flush  5-40 mL IntraVENous 2 times per day    enoxaparin  40 mg SubCUTAneous Daily     Continuous Infusions:   sodium chloride      sodium chloride 75 mL/hr at 04/19/24 1141    sodium chloride       PRN Meds:.sodium chloride, potassium chloride **OR** potassium alternative oral replacement **OR** potassium chloride, magnesium sulfate, polyethylene glycol, acetaminophen **OR** acetaminophen, sodium chloride flush, sodium chloride, acetaminophen, ondansetron **OR** ondansetron  ADULT DIET; Dysphagia - Soft and Bite Sized    Assessment:    Patient Active 
Subjective:    The patient is awake and alert.  No problems overnight.  She is resting in nad, she denies cp or sob.    Objective:    /70   Pulse 100   Temp 97.6 °F (36.4 °C) (Temporal)   Resp 16   Ht 1.626 m (5' 4\")   Wt 69.9 kg (154 lb)   SpO2 99%   BMI 26.43 kg/m²   HEENT no adenopathy no bruits  Heart:  RRR, no murmurs, gallops, or rubs.  Lungs:  scattered rhonchi   Abd: bowel sounds present, nontender, nondistended, no masses  Extrem:  No clubbing, cyanosis, or edema  WBC/Hgb/Hct/Plts:  17.3/9.6/30.4/287 (04/20 0726) basic metabolic panel   Scheduled Meds:   levalbuterol  0.63 mg Nebulization 4x Daily RT    miconazole   Topical BID    aspirin  81 mg Oral Daily    atorvastatin  10 mg Oral Nightly    escitalopram  10 mg Oral Nightly    LORazepam  2 mg Oral TID    montelukast  10 mg Oral Nightly    pantoprazole  40 mg Oral Daily    potassium chloride  10 mEq Oral Daily    [Held by provider] predniSONE  10 mg Oral Daily    pregabalin  50 mg Oral BID    cefTRIAXone (ROCEPHIN) IV  1,000 mg IntraVENous Q24H    budesonide  0.5 mg Nebulization BID RT    And    arformoterol tartrate  15 mcg Nebulization BID RT    And    ipratropium  0.5 mg Nebulization 4x Daily RT    torsemide  20 mg Oral Daily    predniSONE  40 mg Oral Daily    doxycycline (VIBRAMYCIN) IV  100 mg IntraVENous Q12H    LORazepam  0.5 mg IntraVENous Once    sodium chloride flush  5-40 mL IntraVENous 2 times per day    enoxaparin  40 mg SubCUTAneous Daily     Continuous Infusions:   sodium chloride      sodium chloride 75 mL/hr at 04/20/24 1806    sodium chloride       PRN Meds:.sodium chloride, potassium chloride **OR** potassium alternative oral replacement **OR** potassium chloride, magnesium sulfate, polyethylene glycol, acetaminophen **OR** acetaminophen, sodium chloride flush, sodium chloride, acetaminophen, ondansetron **OR** ondansetron  ADULT DIET; Dysphagia - Soft and Bite Sized    Assessment:    Patient Active Problem List   Diagnosis 
7) with  thin liquids (IDDSI level 0)  Current Diet Order:  ADULT DIET; Dysphagia - Soft and Bite Sized; Mildly Thick (Nectar)    PROCEDURE:  Consistencies Administered During the Evaluation   Liquids: thin liquid and nectar thick liquid   Solids:  Pureed  and Hard solid      Method of Intake:   cup, spoon  Self fed      Position:   Sitting in the [facility] chair with head elevated above 75 degrees    INSTRUMENTAL ASSESSMENT:    ORAL PREP/ ORAL PHASE:    Decreased mastication due to:  disorganized chewing pattern  Decreased bolus formation resulting in observed premature pharyngeal spillage     PHARYNGEAL PHASE:     ONSET TIME       Delayed initiation of the pharyngeal swallow was noted with swallow reflex triggered at the level of the tongue base  -- solids only        PHARYNGEAL RESIDUALS        Vallecula/Pharyngeal Wall           No significant residuals were noted in the vallecula      Pyriform Sinuses      No significant residuals were noted in the pyriform sinuses     LARYNGEAL PENETRATION   Laryngeal penetration was not present during this evaluation    ASPIRATION  Aspiration was not present during this evaluation    PENETRATION-ASPIRATION SCALE (PAS):  THIN 1 = Material does not enter the airway  MILDLY THICK 1 = Material does not enter the airway  MODERATELY THICK item not administered  PUREE 1 = Material does not enter the airway  HARD SOLID 1 = Material does not enter the airway       COMPENSATORY STRATEGIES    Compensatory strategies that were beneficial included Thorough oral care to prevent colonization of oral bacteria , Upright in bed/ chair as tolerated, Small bites/sips, Small, SINGLE cup sips only, and NO STRAW      STRUCTURAL/FUNCTIONAL ANOMALIES   No structural/functional anomalies were noted    CERVICAL ESOPHAGEAL STAGE :     Was not assessed          ___________    Cognition:   Within functional limits for this exam    Oral Peripheral Examination   Adequate lingual/labial strength     Current 
ambulating in the hallway & O2 saturation on RA 93%    Mod I functional mobility using a rw & O2 saturation remain greater than 92%   Balance Sitting:     Static - independent     Dynamic - independent  Standing: SBA- occ CGA  Standing     Static: Mod I      Dynamic: Mod I    Activity Tolerance Fair- tolerance w/ light activity- pt c/o feeling winded even through her O2 saturation was greater than 90%  Good   Visual/  Perceptual WFL     Safety F+  Good-  during ADL, transfers & mobility     Hand Dominance Right   AROM  Strength Additional Info:  Goal: (PRN)   BUE  BUE 3/4  in all planes BUE strength 4/5 in all planes G-  and goodFMC/dexterity noted during ADL tasks               Hearing: WFL  Sensation: No c/o numbness or tingling  Tone: WFL  Edema: Unremarkable    Comment: Cleared by RN to see pt. Upon arrival patient supine in bed and agreeable to OT session. At end of session, patient seated in chair, call light and phone within reach, all lines and tubes intact.  Overall patient demonstrated decreased independence, activity tolerance, sitting/standing balance, and safety during completion of ADL/functional transfer/mobility tasks. Therapist facilitated ADL tasks, functional transfers, functional mobility, bed mobility to address safety awareness, implementation of fall prevention strategies, & functional engagement throughout daily activities. Pt required increased time throughout session due to feeling winded with activity. Pt educated with regards to pursed lip breathing strategies to use during ADLs, IADLs, transfers & mobility.Pt was also educated with regards to energy conservation & pacing strategies to implement @ home to ensure her safety. Pt would benefit from continued skilled OT to increase safety and independence with completion of ADL/IADL tasks for functional independence and quality of life.    Treatment: OT treatment provided this date includes:   ADL-  Instruction/training on safety and 
otherwise.     IMPRESSION:  There are persistent patchy parenchymal densities projecting over the right  mid lower lung concerning for pneumonia and/or atelectasis.        Chest CT 4/19/2024:  FINDINGS:  Mediastinum: Thyroid is homogeneous in attenuation. No bulky mediastinal  adenopathy. Central airways are patent. Esophagus with normal course and  caliber to the distal segment where there is a moderate to large hiatal  hernia..  Cardiac size mildly enlarged with coronary and valvular  calcifications greatest mitral valve involvement.  Trace pericardial effusion.     Lungs/pleura: Mosaicism and septal thickening of a mid and lower lung  predominance overall appearance likely interstitial edema pattern would be  difficult to exclude minimal aspiration components.  Small bilateral pleural  effusions.  No pleural effusion or pleural process.     Upper Abdomen: Visualized portions of the upper abdomen unremarkable.     Soft Tissues/Bones: No acute osseous or soft tissue findings. No aggressive  osseous lesion.     IMPRESSION:  1. Mosaicism and septal thickening of a mid and lower lung predominance  overall appearance likely interstitial edema pattern would be difficult to  exclude minimal aspiration components.  No focal consolidation separate  however.  2. Small bilateral pleural effusions.  3. Cardiomegaly with coronary and valvular calcifications greatest mitral  valve involvement. Trace pericardial effusion.  4. Moderate to large hiatal hernia.        CXR 4/18/24:  FINDINGS:  Right lower lobe airspace opacity.  There is no effusion or pneumothorax.  Cardiomegaly.  The osseous structures are without acute process.     IMPRESSION:  Right lower lobe airspace opacity worrisome for pneumonia.     Cardiomegaly.       Echo 3/20/2023:   Summary   Ejection fraction is visually estimated at 65-70%.   No regional wall motion abnormalities seen.   Normal right ventricle structure and function.   Mild mitral regurgitation is 
07:26 AM    MCH 32.4 04/20/2024 07:26 AM    MCHC 31.6 04/20/2024 07:26 AM    RDW 15.6 04/20/2024 07:26 AM     04/20/2024 07:26 AM    MPV 11.7 04/20/2024 07:26 AM     Lab Results   Component Value Date/Time     04/18/2024 09:02 PM    K 3.9 04/18/2024 09:02 PM    K 4.6 05/24/2023 11:30 AM     04/18/2024 09:02 PM    CO2 20 04/18/2024 09:02 PM    BUN 20 04/18/2024 09:02 PM    CREATININE 0.9 04/18/2024 09:02 PM    CALCIUM 8.7 04/18/2024 09:02 PM    GFRAA >60 01/12/2019 04:00 AM    LABGLOM 70 04/18/2024 09:02 PM     Lab Results   Component Value Date/Time    PROTIME 10.2 05/24/2023 11:30 AM    PROTIME 11.7 04/01/2012 03:16 PM    INR 0.9 05/24/2023 11:30 AM     Recent Labs     04/18/24  1145   PROCAL 0.13*          Assessment:    Right lower lobe pneumonia, likely aspiration  Aspiration pneumonitis  Moderate-persistent asthma  CORRINE on CPAP  Tachycardia   Chronic steroid use - Prednisone 10 mg daily    Plan:   Wean oxygen as tolerated to maintain SpO2 greater than 92%  CPAP @ 7 At HS and PRN  Scheduled bronchodilators-brovana and pulmicort with Albuterol and atrovent QID - may resume Trelegy upon discharge  Rocephin and doxycycline for pneumonia  Prednisone 40 mg daily x 5 days then she can resume her chronic steroid dose of 10 mg daily  Singulair at HS  Monitor leukocytosis-slight elevation today likely due to increase in steroids  Check strep pneumo antigen   Chest CT reviewed -aspiration pneumonitis  DVT/PE prophylaxis - Lovenox      Electronically signed by STEVE Mccall - CNP on 4/20/2024 at 10:30 AM

## 2024-05-21 ENCOUNTER — TELEPHONE (OUTPATIENT)
Dept: ENDOCRINOLOGY | Age: 79
End: 2024-05-21

## 2024-05-21 ENCOUNTER — OFFICE VISIT (OUTPATIENT)
Dept: ENDOCRINOLOGY | Age: 79
End: 2024-05-21
Payer: MEDICARE

## 2024-05-21 VITALS
RESPIRATION RATE: 18 BRPM | SYSTOLIC BLOOD PRESSURE: 104 MMHG | HEIGHT: 64 IN | BODY MASS INDEX: 25.27 KG/M2 | DIASTOLIC BLOOD PRESSURE: 67 MMHG | OXYGEN SATURATION: 99 % | WEIGHT: 148 LBS | HEART RATE: 117 BPM

## 2024-05-21 DIAGNOSIS — E55.9 VITAMIN D DEFICIENCY: ICD-10-CM

## 2024-05-21 DIAGNOSIS — M81.0 OSTEOPOROSIS, UNSPECIFIED OSTEOPOROSIS TYPE, UNSPECIFIED PATHOLOGICAL FRACTURE PRESENCE: ICD-10-CM

## 2024-05-21 DIAGNOSIS — E27.49 SECONDARY ADRENAL INSUFFICIENCY (HCC): Primary | ICD-10-CM

## 2024-05-21 PROCEDURE — 3074F SYST BP LT 130 MM HG: CPT | Performed by: INTERNAL MEDICINE

## 2024-05-21 PROCEDURE — 3078F DIAST BP <80 MM HG: CPT | Performed by: INTERNAL MEDICINE

## 2024-05-21 PROCEDURE — 1123F ACP DISCUSS/DSCN MKR DOCD: CPT | Performed by: INTERNAL MEDICINE

## 2024-05-21 PROCEDURE — 99214 OFFICE O/P EST MOD 30 MIN: CPT | Performed by: INTERNAL MEDICINE

## 2024-05-21 RX ORDER — HYDROCORTISONE 10 MG/1
TABLET ORAL
Qty: 120 TABLET | Refills: 5 | Status: SHIPPED | OUTPATIENT
Start: 2024-05-21

## 2024-05-21 NOTE — TELEPHONE ENCOUNTER
Spoke to patient regarding her having back pain , she wanted to go to [facility] to have steroid injection , she wanted to know if she can still take hydrocortisone     Please advise

## 2024-05-21 NOTE — PROGRESS NOTES
MHYX Foap AB  [facility] Department of Endocrinology Diabetes and Metabolism   [address] [place] [address]   Phone: [phone]  Fax: [phone]       Date of Service: 5/21/2024  Primary Care Physician: Bryan Sepulveda MD  Consultant physician: Robin Metz MD     Reason for the consultation:  Secondary adrenal insufficiency     History of Present Illness:  The history is provided by the patient. Accuracy of the patient data is excellent.    Haritha Dominguez is a very pleasant 79 y.o. old female with PMH of secondary adrenal insufficiency, asthma, atopic dermatitis, HLD and other listed below seen today for follow up visit   The patient has been troubled by atopic dermatitis for many years and has been on daily steroids therapy for long time. Previous workup was consistent with secondary adrenal insufficiency. Cosyntropin stim test 2/21/2023 confirmed adrenal insufficiency     2/21/23 04:30 2/21/23 13:11   CORTISOL 2.31 (L) 8.74     The patient is currently on hydrocortisone 30 mg in the morning and 10 mg in the evening        Past Medical History   Past Medical History:   Diagnosis Date    Anxiety     Arthritis     Asthma     Claustrophobia     Dermatitis 02/2017    bilateral legs knees to ankle; follows with [facility]    Fracture 02/2017    \"in Spine\" compression T10 per pt; difficulty laying flat    GERD (gastroesophageal reflux disease)     Hiatal hernia     History of blood transfusion     HTN (hypertension) 4/22/2013    Hyperlipidemia     On aspirin at home     for age per pcp    Pancreatic cyst 5/27/2017    Pneumonia 07/2018    Sleep apnea     SOB (shortness of breath) 4/22/2013    Tachycardia 04/22/2013    follows with Dr NUPUR Chavez       Past Surgical History   Past Surgical History:   Procedure Laterality Date    BACK SURGERY  08/2014    has screws in back    CATARACT REMOVAL Bilateral 12/06/2013    [facility] with lens implants    CHOLECYSTECTOMY

## 2024-06-11 ENCOUNTER — HOSPITAL ENCOUNTER (OUTPATIENT)
Dept: GENERAL RADIOLOGY | Age: 79
Discharge: HOME | End: 2024-06-13
Payer: MEDICARE

## 2024-06-11 ENCOUNTER — HOSPITAL ENCOUNTER (OUTPATIENT)
Age: 79
Discharge: HOME | End: 2024-06-13
Payer: MEDICARE

## 2024-06-11 DIAGNOSIS — J69.0 ASPIRATION PNEUMONIA OF RIGHT MIDDLE LOBE, UNSPECIFIED ASPIRATION PNEUMONIA TYPE (HCC): ICD-10-CM

## 2024-06-11 PROCEDURE — 71046 X-RAY EXAM CHEST 2 VIEWS: CPT

## 2024-08-08 DIAGNOSIS — E27.49 SECONDARY ADRENAL INSUFFICIENCY (HCC): ICD-10-CM

## 2024-08-08 RX ORDER — HYDROCORTISONE 10 MG/1
TABLET ORAL
Qty: 120 TABLET | Refills: 5 | Status: SHIPPED | OUTPATIENT
Start: 2024-08-08

## 2024-08-08 RX ORDER — HYDROCORTISONE 5 MG/1
TABLET ORAL
Refills: 0 | OUTPATIENT
Start: 2024-08-08

## 2024-08-08 NOTE — PROGRESS NOTES
follows with [facility]    Fracture 02/2017    \"in Spine\" compression T10 per pt; difficulty laying flat    GERD (gastroesophageal reflux disease)     Hiatal hernia     History of blood transfusion     HTN (hypertension) 4/22/2013    Hyperlipidemia     On aspirin at home     for age per pcp    Pancreatic cyst 5/27/2017    Pneumonia 07/2018    Sleep apnea     SOB (shortness of breath) 4/22/2013    Tachycardia 04/22/2013    follows with Dr NUPUR Chavez       PAST SURGICAL HISTORY   Past Surgical History:   Procedure Laterality Date    BACK SURGERY  08/2014    has screws in back    CATARACT REMOVAL Bilateral 12/06/2013    [facility] with lens implants    CHOLECYSTECTOMY      JOINT REPLACEMENT  12/16/2016    SI joint fusion Right    OTHER SURGICAL HISTORY  02/09/2017    MRI -     SPINAL FIXATION SURGERY WITH IMPLANT  2013    in [place]    STIMULATOR SURGERY Right 5/31/2023    Spinal cord stimulator battery replacement performed [facility] Anabell Purcell [place] [facility] [place]    TOTAL KNEE ARTHROPLASTY Right     UPPER GASTROINTESTINAL ENDOSCOPY  07/10/2017       SOCIAL HISTORY   Tobacco:   reports that she has never smoked. She has never used smokeless tobacco.  Alcohol:   reports no history of alcohol use.  Drugs:   reports no history of drug use.    FAMILY HISTORY   Family History   Problem Relation Age of Onset    Stroke Mother     Heart Disease Mother     Hypertension Mother     Other Mother         CHOLECYSTECTOMY; OSTEOPENIA    Heart Disease Father     Hypertension Father     Diabetes Father     Arthritis Father     Asthma Daughter        ALLERGIES AND DRUG REACTIONS   Allergies   Allergen Reactions    Percocet [Oxycodone-Acetaminophen]      \"Makes me go crazy\"       CURRENT MEDICATIONS   Current Outpatient Medications   Medication Sig Dispense Refill    montelukast (SINGULAIR) 10 MG tablet TAKE 1 TABLET DAILY 90 tablet 0    hydrocortisone (CORTEF) 10 MG tablet Take 3 tablets (30 mg) in the morning and 1 tablets (10 mg)

## 2024-08-09 DIAGNOSIS — Z79.52 CURRENT CHRONIC USE OF SYSTEMIC STEROIDS: ICD-10-CM

## 2024-08-09 DIAGNOSIS — R73.9 ELEVATED BLOOD SUGAR: ICD-10-CM

## 2024-08-09 RX ORDER — BLOOD SUGAR DIAGNOSTIC
STRIP MISCELLANEOUS
Qty: 100 STRIP | OUTPATIENT
Start: 2024-08-09

## 2024-08-09 RX ORDER — LANCETS 33 GAUGE
EACH MISCELLANEOUS
Qty: 100 EACH | Refills: 3 | OUTPATIENT
Start: 2024-08-09

## 2024-09-23 ENCOUNTER — OFFICE VISIT (OUTPATIENT)
Dept: ENDOCRINOLOGY | Age: 79
End: 2024-09-23
Payer: MEDICARE

## 2024-09-23 VITALS
RESPIRATION RATE: 18 BRPM | WEIGHT: 146 LBS | HEART RATE: 92 BPM | SYSTOLIC BLOOD PRESSURE: 127 MMHG | HEIGHT: 60 IN | BODY MASS INDEX: 28.66 KG/M2 | OXYGEN SATURATION: 99 % | DIASTOLIC BLOOD PRESSURE: 80 MMHG

## 2024-09-23 DIAGNOSIS — E55.9 VITAMIN D DEFICIENCY: Primary | ICD-10-CM

## 2024-09-23 DIAGNOSIS — E27.49 SECONDARY ADRENAL INSUFFICIENCY (HCC): ICD-10-CM

## 2024-09-23 PROCEDURE — 3074F SYST BP LT 130 MM HG: CPT | Performed by: INTERNAL MEDICINE

## 2024-09-23 PROCEDURE — 1123F ACP DISCUSS/DSCN MKR DOCD: CPT | Performed by: INTERNAL MEDICINE

## 2024-09-23 PROCEDURE — 99214 OFFICE O/P EST MOD 30 MIN: CPT | Performed by: INTERNAL MEDICINE

## 2024-09-23 PROCEDURE — 3079F DIAST BP 80-89 MM HG: CPT | Performed by: INTERNAL MEDICINE

## 2024-09-23 RX ORDER — HYDROCORTISONE 10 MG/1
TABLET ORAL
Qty: 90 TABLET | Refills: 3 | Status: SHIPPED | OUTPATIENT
Start: 2024-09-23

## 2024-09-23 RX ORDER — RALOXIFENE HYDROCHLORIDE 60 MG/1
60 TABLET, FILM COATED ORAL DAILY
Qty: 30 TABLET | Refills: 5 | Status: SHIPPED | OUTPATIENT
Start: 2024-09-23

## 2024-09-26 ENCOUNTER — HOSPITAL ENCOUNTER (OUTPATIENT)
Age: 79
Discharge: HOME | End: 2024-09-26
Payer: MEDICARE

## 2024-09-26 DIAGNOSIS — E27.49 SECONDARY ADRENAL INSUFFICIENCY (HCC): ICD-10-CM

## 2024-09-26 LAB
CORTIS SERPL-MCNC: 1.2 UG/DL (ref 2.7–18.4)
CORTISOL COLLECTION INFO: ABNORMAL

## 2024-09-26 PROCEDURE — 36415 COLL VENOUS BLD VENIPUNCTURE: CPT

## 2024-09-26 PROCEDURE — 82533 TOTAL CORTISOL: CPT

## 2024-09-28 LAB — ACTH PLAS-MCNC: 2 PG/ML (ref 7–63)

## 2024-09-29 ENCOUNTER — TELEPHONE (OUTPATIENT)
Dept: ENDOCRINOLOGY | Age: 79
End: 2024-09-29

## 2024-09-29 NOTE — TELEPHONE ENCOUNTER
Notify patient  Your cortisol level is still low.  You need to be on hydrocortisone for now.  Please continue hydrocortisone the same with use before 10 mg daily.

## 2024-11-04 ENCOUNTER — HOSPITAL ENCOUNTER (OUTPATIENT)
Dept: WOUND CARE | Age: 79
Discharge: HOME | End: 2024-11-04
Payer: MEDICARE

## 2024-11-04 ENCOUNTER — CLINICAL DOCUMENTATION (OUTPATIENT)
Dept: VASCULAR SURGERY | Age: 79
End: 2024-11-04

## 2024-11-04 ENCOUNTER — HOSPITAL ENCOUNTER (OUTPATIENT)
Age: 79
Discharge: HOME | End: 2024-11-04
Payer: MEDICARE

## 2024-11-04 VITALS
TEMPERATURE: 96.9 F | BODY MASS INDEX: 27.88 KG/M2 | HEIGHT: 60 IN | RESPIRATION RATE: 18 BRPM | HEART RATE: 89 BPM | WEIGHT: 142 LBS

## 2024-11-04 DIAGNOSIS — S80.811A ABRASION OF RIGHT LOWER LEG, INITIAL ENCOUNTER: ICD-10-CM

## 2024-11-04 DIAGNOSIS — S80.812A ABRASION OF LEFT LOWER LEG, INITIAL ENCOUNTER: ICD-10-CM

## 2024-11-04 DIAGNOSIS — R09.89 DECREASED DORSALIS PEDIS PULSE: ICD-10-CM

## 2024-11-04 DIAGNOSIS — S80.812A ABRASION OF LEFT LOWER LEG, INITIAL ENCOUNTER: Primary | ICD-10-CM

## 2024-11-04 DIAGNOSIS — L29.9 ITCHING: ICD-10-CM

## 2024-11-04 PROBLEM — A41.9 SEPSIS (HCC): Status: RESOLVED | Noted: 2023-03-09 | Resolved: 2024-11-04

## 2024-11-04 PROBLEM — N30.00 ACUTE CYSTITIS WITHOUT HEMATURIA: Status: RESOLVED | Noted: 2023-02-20 | Resolved: 2024-11-04

## 2024-11-04 PROBLEM — R65.21 SEPTIC SHOCK (HCC): Status: RESOLVED | Noted: 2023-02-20 | Resolved: 2024-11-04

## 2024-11-04 PROBLEM — R62.7 FAILURE TO THRIVE IN ADULT: Status: RESOLVED | Noted: 2023-02-02 | Resolved: 2024-11-04

## 2024-11-04 PROBLEM — R11.10 VOMITING: Status: RESOLVED | Noted: 2018-12-06 | Resolved: 2024-11-04

## 2024-11-04 PROBLEM — D64.9 ANEMIA: Status: RESOLVED | Noted: 2023-04-04 | Resolved: 2024-11-04

## 2024-11-04 PROBLEM — A41.9 SEPTIC SHOCK (HCC): Status: RESOLVED | Noted: 2023-02-20 | Resolved: 2024-11-04

## 2024-11-04 PROBLEM — L03.90 CELLULITIS: Status: RESOLVED | Noted: 2023-01-31 | Resolved: 2024-11-04

## 2024-11-04 PROBLEM — R00.0 TACHYCARDIA: Status: RESOLVED | Noted: 2018-12-06 | Resolved: 2024-11-04

## 2024-11-04 PROBLEM — A41.9 SEPTICEMIA (HCC): Status: RESOLVED | Noted: 2023-02-02 | Resolved: 2024-11-04

## 2024-11-04 LAB
ALBUMIN SERPL-MCNC: 3.9 G/DL (ref 3.5–5.2)
ALP SERPL-CCNC: 88 U/L (ref 35–104)
ALT SERPL-CCNC: 8 U/L (ref 0–32)
ANION GAP SERPL CALCULATED.3IONS-SCNC: 12 MMOL/L (ref 7–16)
AST SERPL-CCNC: 15 U/L (ref 0–31)
BILIRUB SERPL-MCNC: 0.2 MG/DL (ref 0–1.2)
BUN SERPL-MCNC: 16 MG/DL (ref 6–23)
CALCIUM SERPL-MCNC: 9.2 MG/DL (ref 8.6–10.2)
CHLORIDE SERPL-SCNC: 103 MMOL/L (ref 98–107)
CO2 SERPL-SCNC: 26 MMOL/L (ref 22–29)
CREAT SERPL-MCNC: 0.8 MG/DL (ref 0.5–1)
CRP SERPL HS-MCNC: 21 MG/L (ref 0–5)
ERYTHROCYTE [DISTWIDTH] IN BLOOD BY AUTOMATED COUNT: 13.2 % (ref 11.5–15)
ERYTHROCYTE [SEDIMENTATION RATE] IN BLOOD BY WESTERGREN METHOD: 72 MM/HR (ref 0–20)
GFR, ESTIMATED: 74 ML/MIN/1.73M2
GLUCOSE SERPL-MCNC: 97 MG/DL (ref 74–99)
HCT VFR BLD AUTO: 37.1 % (ref 34–48)
HGB BLD-MCNC: 11.3 G/DL (ref 11.5–15.5)
MCH RBC QN AUTO: 29.8 PG (ref 26–35)
MCHC RBC AUTO-ENTMCNC: 30.5 G/DL (ref 32–34.5)
MCV RBC AUTO: 97.9 FL (ref 80–99.9)
PLATELET # BLD AUTO: 336 K/UL (ref 130–450)
PMV BLD AUTO: 11.9 FL (ref 7–12)
POTASSIUM SERPL-SCNC: 4.4 MMOL/L (ref 3.5–5)
PROT SERPL-MCNC: 6.9 G/DL (ref 6.4–8.3)
RBC # BLD AUTO: 3.79 M/UL (ref 3.5–5.5)
RHEUMATOID FACT SER NEPH-ACNC: <10 IU/ML (ref 0–13)
SODIUM SERPL-SCNC: 141 MMOL/L (ref 132–146)
WBC OTHER # BLD: 8.9 K/UL (ref 4.5–11.5)

## 2024-11-04 PROCEDURE — 97597 DBRDMT OPN WND 1ST 20 CM/<: CPT

## 2024-11-04 PROCEDURE — 86431 RHEUMATOID FACTOR QUANT: CPT

## 2024-11-04 PROCEDURE — 80053 COMPREHEN METABOLIC PANEL: CPT

## 2024-11-04 PROCEDURE — 85027 COMPLETE CBC AUTOMATED: CPT

## 2024-11-04 PROCEDURE — 99204 OFFICE O/P NEW MOD 45 MIN: CPT | Performed by: SURGERY

## 2024-11-04 PROCEDURE — 97597 DBRDMT OPN WND 1ST 20 CM/<: CPT | Performed by: SURGERY

## 2024-11-04 PROCEDURE — 86039 ANTINUCLEAR ANTIBODIES (ANA): CPT

## 2024-11-04 PROCEDURE — 86225 DNA ANTIBODY NATIVE: CPT

## 2024-11-04 PROCEDURE — 36415 COLL VENOUS BLD VENIPUNCTURE: CPT

## 2024-11-04 PROCEDURE — 85652 RBC SED RATE AUTOMATED: CPT

## 2024-11-04 PROCEDURE — 99213 OFFICE O/P EST LOW 20 MIN: CPT

## 2024-11-04 PROCEDURE — 86038 ANTINUCLEAR ANTIBODIES: CPT

## 2024-11-04 PROCEDURE — 86140 C-REACTIVE PROTEIN: CPT

## 2024-11-04 RX ORDER — CELECOXIB 200 MG/1
200 CAPSULE ORAL DAILY
COMMUNITY

## 2024-11-04 RX ORDER — LIDOCAINE HYDROCHLORIDE 40 MG/ML
SOLUTION TOPICAL ONCE
Status: COMPLETED | OUTPATIENT
Start: 2024-11-04 | End: 2024-11-04

## 2024-11-04 RX ADMIN — LIDOCAINE HYDROCHLORIDE 10 ML: 40 SOLUTION TOPICAL at 11:02

## 2024-11-04 NOTE — PROGRESS NOTES
Wound Care Supplies      Supply Company:     [facility] [address] [facility] [address] [place] [address]  p: [phone] f: [phone]     Ordering Center:     [facility]  [address]  [facility] [address]  [phone]  WOUND CARE Dept: [phone]   FAX NUMBER     Patient Information:      Haritha Dominguez  [address] [facility] [address]   [phone]   : 1945  AGE: 79 y.o.     GENDER: female   EPISODE DATE: 2024    Insurance:      PRIMARY INSURANCE:  Plan: [employer] [facility] LOCAL  Coverage: [employer] MEDICARE  Effective Date: 2020  Group Number: [unfilled]  Subscriber Number: [id number] - (Medicare Managed)    Payer/Plan Subscr  Sex Relation Sub. Ins. ID Effective Group Num   1. BCBS MEDICARE* HARITHA DOMINGUEZ 1945 Female Self [id number]* 20 [id number]                                   [id number]   2. MEDICARE - [place]* HARITHA DOMINGUEZ 1945 Female Self [id number]                                    [address] [address]       Patient Wound Information:      Problem List Items Addressed This Visit          Other    Abrasion of skin of left lower leg - Primary    Relevant Orders    CBC    Comprehensive Metabolic Panel    Anti-DNA Antibody, Double-Stranded    Rheumatoid Factor    BEVERLY    C-Reactive Protein    Sedimentation Rate    Abrasion of skin of right lower leg    Relevant Orders    CBC    Comprehensive Metabolic Panel    Anti-DNA Antibody, Double-Stranded    Rheumatoid Factor    BEVERLY    C-Reactive Protein    Sedimentation Rate    Itching       WOUNDS REQUIRING DRESSING SUPPLIES:     Wound 03/10/23 Buttocks Left circular pink blanchable (Active)   Number of days: 605       Wound 24 Leg Lower;Right #1 circ (Active)   Wound Image    24 1053   Wound Etiology Other 24 1053   Wound Length (cm) 44 cm 24 1053   Wound Width (cm) 28.4 cm 24 1053   Wound Depth (cm) 0.1 cm 24 1053   Wound Surface Area 
underwent extensive evaluation by at least 9 dermatologist, including [facility], locally including biopsies etc., history of asthma, hypertension, hyperlipidemia, wedge compression fractures of the thoracolumbar spine due to osteoporosis resulting from the steroids that were used to treat her rash and dermatitis [] Melena       [] Hematochezia               Objective:    Pulse 89   Temp 96.9 °F (36.1 °C) (Temporal)   Resp 18   Ht 1.524 m (5')   Wt 64.4 kg (142 lb)   BMI 27.73 kg/m²   Wt Readings from Last 3 Encounters:   11/04/24 64.4 kg (142 lb)   09/23/24 66.2 kg (146 lb)   09/10/24 67.6 kg (149 lb)       PHYSICAL EXAM  CONSTITUTIONAL:   Awake, alert, cooperative  PSYCHIATRIC :  Oriented to time, place and person      normal insight to disease process  ENT:  External ears and nose without lesions    Hearing deficits is not noted  NECK: Supple, symmetrical, trachea midline    Thyroid goiter not appreciated    Carotid bruit is not noted bilaterally  LUNGS:  No increased work of breathing                  Clear to auscultation bilaterally   CARDIOVASCULAR:  regular rate and rhythm   ABDOMEN:  soft, non-distended, non-tender    Hernias is not noted   Aorta is not palpable   Lymphatics : Cervical lymphadenopathy is not noted     Femoral lymphadenopathy is not noted  SKIN:   Skin color is abnormal with with extensive scratch marks and open skin abrasions of both forearms and both lower extremities legs more than the arms   Texture and turgor is  normal   Induration is  noted  EXTREMITIES:   R UE Edema is not noted  L UE Edema is not noted  R LE Edema is not noted  L LE Edema is not noted  R femoral 33 L femoral 2   R dorsalis pedis 2 L dorsalis pedis 2   R posterior tibial 2 L posterior tibial 2     Assessment:     Problem List Items Addressed This Visit       Abrasion of skin of left lower leg - Primary    Relevant Orders    CBC    Comprehensive Metabolic Panel    Anti-DNA Antibody, Double-Stranded

## 2024-11-04 NOTE — DISCHARGE INSTRUCTIONS
Visit Discharge/Physician Orders    Discharge condition: Stable  Assessment of pain at discharge: minimal   Anesthetic used: lidocaine 4%   Discharge to: Home  Left via:Private automobile  Accompanied by: accompanied by sibling  ECF/[facility]: [facility]- phone: [phone]    Dressing Orders:  In clinic today: bilateral leg wounds- cover with xeroform, abd pads and kerlix- adhere with tape.   Spandigrip to bilateral legs on in morning and off at night    Can continue PCP orders at home     Treatment Orders: 11/4 MARTY  to be scheduled (arterial study to check blood flow)   11/4  Lab work ordered    EAT DIET WITH PROTEINS AND VITAMIN C  TAKE A MULTIVITAMIN DAILY IF NOT CONTRAINDICATED      [facility] followup visit _______Call Dr Price office 3 days after MARTY and lab work is done to review testing [phone]______________________  (Please note your next appointment above and if you are unable to keep, kindly give a 24 hour notice. Thank you.)    Physician signature:__________________________      If you experience any of the following, please call the Wound Care Center during business hours:    * Increase in Pain  * Temperature over 101  * Increase in drainage from your wound  * Drainage with a foul odor  * Bleeding  * Increase in swelling  * Need for compression bandage changes due to slippage, breakthrough drainage.    If you need medical attention outside of the business hours of the Wound Care Centers please contact your PCP or go to the nearest emergency room.

## 2024-11-05 LAB
ANA SER QL IA: NEGATIVE
ANTI DNA DOUBLE STRANDED: NEGATIVE

## 2024-11-06 ENCOUNTER — CLINICAL DOCUMENTATION (OUTPATIENT)
Dept: VASCULAR SURGERY | Age: 79
End: 2024-11-06

## 2024-11-06 NOTE — PROGRESS NOTES
The rest of the lab work by the patient was reviewed, BEVERLY, anti-DNA, rheumatoid factor normal    Sed rate, this time 72 mm/h last time was 61, CRP is 21 this time, last time 13.8    Will discuss with the patient the test results when she comes to see me at the wound care center next week

## 2025-02-19 ENCOUNTER — HOSPITAL ENCOUNTER (INPATIENT)
Age: 80
LOS: 22 days | Discharge: NURSING, REHAB OR LONG-TERM CARE | End: 2025-03-14
Attending: EMERGENCY MEDICINE | Admitting: INTERNAL MEDICINE
Payer: MEDICARE

## 2025-02-19 DIAGNOSIS — J45.40 MODERATE PERSISTENT ASTHMA WITHOUT COMPLICATION: Chronic | ICD-10-CM

## 2025-02-19 DIAGNOSIS — S32.020A COMPRESSION FRACTURE OF L2 VERTEBRA, INITIAL ENCOUNTER (HCC): ICD-10-CM

## 2025-02-19 DIAGNOSIS — R10.9 ABDOMINAL PAIN, UNSPECIFIED ABDOMINAL LOCATION: Primary | ICD-10-CM

## 2025-02-19 DIAGNOSIS — M48.56XA COMPRESSION FRACTURE OF LUMBAR SPINE, NON-TRAUMATIC, INITIAL ENCOUNTER (HCC): ICD-10-CM

## 2025-02-19 DIAGNOSIS — M54.50 ACUTE MIDLINE LOW BACK PAIN WITHOUT SCIATICA: ICD-10-CM

## 2025-02-19 DIAGNOSIS — F41.9 ANXIETY: ICD-10-CM

## 2025-02-19 LAB
ALBUMIN SERPL-MCNC: 4 G/DL (ref 3.5–5.2)
ALP SERPL-CCNC: 81 U/L (ref 35–104)
ALT SERPL-CCNC: 13 U/L (ref 0–32)
ANION GAP SERPL CALCULATED.3IONS-SCNC: 13 MMOL/L (ref 7–16)
AST SERPL-CCNC: 20 U/L (ref 0–31)
BILIRUB SERPL-MCNC: 0.2 MG/DL (ref 0–1.2)
BUN SERPL-MCNC: 22 MG/DL (ref 6–23)
CALCIUM SERPL-MCNC: 8.6 MG/DL (ref 8.6–10.2)
CHLORIDE SERPL-SCNC: 110 MMOL/L (ref 98–107)
CO2 SERPL-SCNC: 20 MMOL/L (ref 22–29)
CREAT SERPL-MCNC: 1 MG/DL (ref 0.5–1)
GFR, ESTIMATED: 56 ML/MIN/1.73M2
GLUCOSE SERPL-MCNC: 92 MG/DL (ref 74–99)
LIPASE SERPL-CCNC: 37 U/L (ref 13–60)
POTASSIUM SERPL-SCNC: 4.2 MMOL/L (ref 3.5–5)
PROT SERPL-MCNC: 6.6 G/DL (ref 6.4–8.3)
SODIUM SERPL-SCNC: 143 MMOL/L (ref 132–146)
TROPONIN I SERPL HS-MCNC: 18 NG/L (ref 0–9)

## 2025-02-19 PROCEDURE — 6360000002 HC RX W HCPCS: Performed by: EMERGENCY MEDICINE

## 2025-02-19 PROCEDURE — 84484 ASSAY OF TROPONIN QUANT: CPT

## 2025-02-19 PROCEDURE — 96375 TX/PRO/DX INJ NEW DRUG ADDON: CPT

## 2025-02-19 PROCEDURE — 99285 EMERGENCY DEPT VISIT HI MDM: CPT

## 2025-02-19 PROCEDURE — 80053 COMPREHEN METABOLIC PANEL: CPT

## 2025-02-19 PROCEDURE — 85025 COMPLETE CBC W/AUTO DIFF WBC: CPT

## 2025-02-19 PROCEDURE — 83690 ASSAY OF LIPASE: CPT

## 2025-02-19 PROCEDURE — 96374 THER/PROPH/DIAG INJ IV PUSH: CPT

## 2025-02-19 PROCEDURE — 93005 ELECTROCARDIOGRAM TRACING: CPT | Performed by: EMERGENCY MEDICINE

## 2025-02-19 RX ORDER — ONDANSETRON 2 MG/ML
4 INJECTION INTRAMUSCULAR; INTRAVENOUS EVERY 6 HOURS PRN
Status: DISCONTINUED | OUTPATIENT
Start: 2025-02-19 | End: 2025-03-14 | Stop reason: HOSPADM

## 2025-02-19 RX ORDER — FENTANYL CITRATE 50 UG/ML
25 INJECTION, SOLUTION INTRAMUSCULAR; INTRAVENOUS ONCE
Status: COMPLETED | OUTPATIENT
Start: 2025-02-19 | End: 2025-02-19

## 2025-02-19 RX ADMIN — ONDANSETRON 4 MG: 2 INJECTION, SOLUTION INTRAMUSCULAR; INTRAVENOUS at 23:17

## 2025-02-19 RX ADMIN — FENTANYL CITRATE 25 MCG: 50 INJECTION INTRAMUSCULAR; INTRAVENOUS at 23:16

## 2025-02-19 ASSESSMENT — PAIN - FUNCTIONAL ASSESSMENT: PAIN_FUNCTIONAL_ASSESSMENT: NONE - DENIES PAIN

## 2025-02-20 ENCOUNTER — APPOINTMENT (OUTPATIENT)
Dept: CT IMAGING | Age: 80
End: 2025-02-20
Payer: MEDICARE

## 2025-02-20 ENCOUNTER — APPOINTMENT (OUTPATIENT)
Dept: CT IMAGING | Age: 80
End: 2025-02-20
Attending: INTERNAL MEDICINE
Payer: MEDICARE

## 2025-02-20 PROBLEM — S32.020A COMPRESSION FRACTURE OF L2 LUMBAR VERTEBRA (HCC): Status: ACTIVE | Noted: 2025-02-20

## 2025-02-20 PROBLEM — S32.000A LUMBAR COMPRESSION FRACTURE, CLOSED, INITIAL ENCOUNTER (HCC): Status: ACTIVE | Noted: 2025-02-20

## 2025-02-20 LAB
BACTERIA URNS QL MICRO: ABNORMAL
BASOPHILS # BLD: 0.11 K/UL (ref 0–0.2)
BASOPHILS NFR BLD: 1 % (ref 0–2)
BILIRUB UR QL STRIP: NEGATIVE
CLARITY UR: CLEAR
COLOR UR: YELLOW
EOSINOPHIL # BLD: 1.22 K/UL (ref 0.05–0.5)
EOSINOPHILS RELATIVE PERCENT: 10 % (ref 0–6)
ERYTHROCYTE [DISTWIDTH] IN BLOOD BY AUTOMATED COUNT: 15.5 % (ref 11.5–15)
GLUCOSE UR STRIP-MCNC: NEGATIVE MG/DL
HCT VFR BLD AUTO: 33.3 % (ref 34–48)
HGB BLD-MCNC: 10.5 G/DL (ref 11.5–15.5)
HGB UR QL STRIP.AUTO: NEGATIVE
KETONES UR STRIP-MCNC: NEGATIVE MG/DL
LEUKOCYTE ESTERASE UR QL STRIP: ABNORMAL
LYMPHOCYTES NFR BLD: 1.22 K/UL (ref 1.5–4)
LYMPHOCYTES RELATIVE PERCENT: 10 % (ref 20–42)
MCH RBC QN AUTO: 30 PG (ref 26–35)
MCHC RBC AUTO-ENTMCNC: 31.5 G/DL (ref 32–34.5)
MCV RBC AUTO: 95.1 FL (ref 80–99.9)
MONOCYTES NFR BLD: 1.67 K/UL (ref 0.1–0.95)
MONOCYTES NFR BLD: 13 % (ref 2–12)
MYELOCYTES ABSOLUTE COUNT: 0.11 K/UL
MYELOCYTES: 1 %
NEUTROPHILS NFR BLD: 66 % (ref 43–80)
NEUTS SEG NFR BLD: 8.46 K/UL (ref 1.8–7.3)
NITRITE UR QL STRIP: NEGATIVE
PH UR STRIP: 6 [PH] (ref 5–8)
PLATELET # BLD AUTO: 263 K/UL (ref 130–450)
PMV BLD AUTO: 12 FL (ref 7–12)
PROT UR STRIP-MCNC: 30 MG/DL
RBC # BLD AUTO: 3.5 M/UL (ref 3.5–5.5)
RBC # BLD: ABNORMAL 10*6/UL
RBC #/AREA URNS HPF: ABNORMAL /HPF
SP GR UR STRIP: 1.02 (ref 1–1.03)
TROPONIN I SERPL HS-MCNC: 20 NG/L (ref 0–9)
UROBILINOGEN UR STRIP-ACNC: 0.2 EU/DL (ref 0–1)
WBC #/AREA URNS HPF: ABNORMAL /HPF
WBC OTHER # BLD: 12.8 K/UL (ref 4.5–11.5)

## 2025-02-20 PROCEDURE — 96375 TX/PRO/DX INJ NEW DRUG ADDON: CPT

## 2025-02-20 PROCEDURE — 6370000000 HC RX 637 (ALT 250 FOR IP): Performed by: INTERNAL MEDICINE

## 2025-02-20 PROCEDURE — 6370000000 HC RX 637 (ALT 250 FOR IP): Performed by: STUDENT IN AN ORGANIZED HEALTH CARE EDUCATION/TRAINING PROGRAM

## 2025-02-20 PROCEDURE — 99222 1ST HOSP IP/OBS MODERATE 55: CPT | Performed by: NEUROLOGICAL SURGERY

## 2025-02-20 PROCEDURE — 2500000003 HC RX 250 WO HCPCS: Performed by: INTERNAL MEDICINE

## 2025-02-20 PROCEDURE — 94640 AIRWAY INHALATION TREATMENT: CPT

## 2025-02-20 PROCEDURE — 6360000002 HC RX W HCPCS: Performed by: EMERGENCY MEDICINE

## 2025-02-20 PROCEDURE — 81001 URINALYSIS AUTO W/SCOPE: CPT

## 2025-02-20 PROCEDURE — 84484 ASSAY OF TROPONIN QUANT: CPT

## 2025-02-20 PROCEDURE — 74177 CT ABD & PELVIS W/CONTRAST: CPT

## 2025-02-20 PROCEDURE — 6360000002 HC RX W HCPCS: Performed by: INTERNAL MEDICINE

## 2025-02-20 PROCEDURE — 6360000004 HC RX CONTRAST MEDICATION: Performed by: RADIOLOGY

## 2025-02-20 PROCEDURE — 1200000000 HC SEMI PRIVATE

## 2025-02-20 PROCEDURE — 72131 CT LUMBAR SPINE W/O DYE: CPT

## 2025-02-20 RX ORDER — PANTOPRAZOLE SODIUM 40 MG/1
40 TABLET, DELAYED RELEASE ORAL DAILY
Status: DISCONTINUED | OUTPATIENT
Start: 2025-02-20 | End: 2025-03-13

## 2025-02-20 RX ORDER — ALBUTEROL SULFATE 90 UG/1
2 INHALANT RESPIRATORY (INHALATION) EVERY 6 HOURS PRN
COMMUNITY

## 2025-02-20 RX ORDER — FERROUS SULFATE 325(65) MG
325 TABLET ORAL
COMMUNITY

## 2025-02-20 RX ORDER — BISACODYL 5 MG/1
10 TABLET, DELAYED RELEASE ORAL ONCE
Status: COMPLETED | OUTPATIENT
Start: 2025-02-20 | End: 2025-02-20

## 2025-02-20 RX ORDER — ARFORMOTEROL TARTRATE 15 UG/2ML
15 SOLUTION RESPIRATORY (INHALATION)
Status: DISCONTINUED | OUTPATIENT
Start: 2025-02-20 | End: 2025-03-14 | Stop reason: HOSPADM

## 2025-02-20 RX ORDER — BUDESONIDE 0.5 MG/2ML
1 INHALANT ORAL
Status: DISCONTINUED | OUTPATIENT
Start: 2025-02-20 | End: 2025-03-14 | Stop reason: HOSPADM

## 2025-02-20 RX ORDER — LORAZEPAM 1 MG/1
2 TABLET ORAL 3 TIMES DAILY
Status: DISCONTINUED | OUTPATIENT
Start: 2025-02-20 | End: 2025-02-24

## 2025-02-20 RX ORDER — MAGNESIUM SULFATE IN WATER 40 MG/ML
2000 INJECTION, SOLUTION INTRAVENOUS PRN
Status: DISCONTINUED | OUTPATIENT
Start: 2025-02-20 | End: 2025-03-14 | Stop reason: HOSPADM

## 2025-02-20 RX ORDER — ASCORBIC ACID 500 MG
500 TABLET ORAL DAILY
COMMUNITY

## 2025-02-20 RX ORDER — ALBUTEROL SULFATE 0.83 MG/ML
2.5 SOLUTION RESPIRATORY (INHALATION) 4 TIMES DAILY PRN
Status: DISCONTINUED | OUTPATIENT
Start: 2025-02-20 | End: 2025-02-25

## 2025-02-20 RX ORDER — ACETAMINOPHEN 650 MG/1
650 SUPPOSITORY RECTAL EVERY 6 HOURS PRN
Status: DISCONTINUED | OUTPATIENT
Start: 2025-02-20 | End: 2025-03-14 | Stop reason: HOSPADM

## 2025-02-20 RX ORDER — TRAMADOL HYDROCHLORIDE 50 MG/1
50 TABLET ORAL EVERY 6 HOURS PRN
Status: DISCONTINUED | OUTPATIENT
Start: 2025-02-20 | End: 2025-02-20

## 2025-02-20 RX ORDER — FLUTICASONE PROPIONATE 50 MCG
1 SPRAY, SUSPENSION (ML) NASAL DAILY PRN
Status: ON HOLD | COMMUNITY
End: 2025-03-01 | Stop reason: HOSPADM

## 2025-02-20 RX ORDER — POTASSIUM CHLORIDE 1500 MG/1
40 TABLET, EXTENDED RELEASE ORAL PRN
Status: DISCONTINUED | OUTPATIENT
Start: 2025-02-20 | End: 2025-03-14 | Stop reason: HOSPADM

## 2025-02-20 RX ORDER — KETOROLAC TROMETHAMINE 30 MG/ML
15 INJECTION, SOLUTION INTRAMUSCULAR; INTRAVENOUS ONCE
Status: COMPLETED | OUTPATIENT
Start: 2025-02-20 | End: 2025-02-20

## 2025-02-20 RX ORDER — PANTOPRAZOLE SODIUM 40 MG/1
40 TABLET, DELAYED RELEASE ORAL 2 TIMES DAILY
COMMUNITY

## 2025-02-20 RX ORDER — POLYETHYLENE GLYCOL 3350 17 G/17G
17 POWDER, FOR SOLUTION ORAL DAILY PRN
COMMUNITY

## 2025-02-20 RX ORDER — CHOLECALCIFEROL (VITAMIN D3) 50 MCG
2000 TABLET ORAL 2 TIMES DAILY
COMMUNITY

## 2025-02-20 RX ORDER — POLYETHYLENE GLYCOL 3350 17 G/17G
17 POWDER, FOR SOLUTION ORAL DAILY PRN
Status: DISCONTINUED | OUTPATIENT
Start: 2025-02-20 | End: 2025-03-14 | Stop reason: HOSPADM

## 2025-02-20 RX ORDER — HYDROCORTISONE 10 MG/1
10 TABLET ORAL 2 TIMES DAILY
Status: ON HOLD | COMMUNITY
End: 2025-03-01 | Stop reason: HOSPADM

## 2025-02-20 RX ORDER — PREGABALIN 50 MG/1
50 CAPSULE ORAL 2 TIMES DAILY
Status: DISCONTINUED | OUTPATIENT
Start: 2025-02-20 | End: 2025-03-14 | Stop reason: HOSPADM

## 2025-02-20 RX ORDER — TORSEMIDE 20 MG/1
20 TABLET ORAL DAILY
Status: DISCONTINUED | OUTPATIENT
Start: 2025-02-20 | End: 2025-02-20

## 2025-02-20 RX ORDER — IOPAMIDOL 755 MG/ML
75 INJECTION, SOLUTION INTRAVASCULAR
Status: COMPLETED | OUTPATIENT
Start: 2025-02-20 | End: 2025-02-20

## 2025-02-20 RX ORDER — MONTELUKAST SODIUM 10 MG/1
10 TABLET ORAL DAILY
Status: DISCONTINUED | OUTPATIENT
Start: 2025-02-20 | End: 2025-03-14 | Stop reason: HOSPADM

## 2025-02-20 RX ORDER — POTASSIUM CHLORIDE 750 MG/1
10 CAPSULE, EXTENDED RELEASE ORAL DAILY
Status: DISCONTINUED | OUTPATIENT
Start: 2025-02-20 | End: 2025-02-20

## 2025-02-20 RX ORDER — ESCITALOPRAM OXALATE 10 MG/1
10 TABLET ORAL NIGHTLY
Status: DISCONTINUED | OUTPATIENT
Start: 2025-02-20 | End: 2025-03-14 | Stop reason: HOSPADM

## 2025-02-20 RX ORDER — ATORVASTATIN CALCIUM 10 MG/1
10 TABLET, FILM COATED ORAL NIGHTLY
Status: DISCONTINUED | OUTPATIENT
Start: 2025-02-20 | End: 2025-03-14 | Stop reason: HOSPADM

## 2025-02-20 RX ORDER — SODIUM CHLORIDE 0.9 % (FLUSH) 0.9 %
5-40 SYRINGE (ML) INJECTION EVERY 12 HOURS SCHEDULED
Status: DISCONTINUED | OUTPATIENT
Start: 2025-02-20 | End: 2025-03-14 | Stop reason: HOSPADM

## 2025-02-20 RX ORDER — HYDROCODONE BITARTRATE AND ACETAMINOPHEN 5; 325 MG/1; MG/1
1 TABLET ORAL EVERY 4 HOURS PRN
Status: DISCONTINUED | OUTPATIENT
Start: 2025-02-20 | End: 2025-03-14 | Stop reason: HOSPADM

## 2025-02-20 RX ORDER — ACETAMINOPHEN 325 MG/1
650 TABLET ORAL EVERY 6 HOURS PRN
Status: DISCONTINUED | OUTPATIENT
Start: 2025-02-20 | End: 2025-03-14 | Stop reason: HOSPADM

## 2025-02-20 RX ORDER — SODIUM CHLORIDE 9 MG/ML
INJECTION, SOLUTION INTRAVENOUS PRN
Status: DISCONTINUED | OUTPATIENT
Start: 2025-02-20 | End: 2025-03-14 | Stop reason: HOSPADM

## 2025-02-20 RX ORDER — POTASSIUM CHLORIDE 7.45 MG/ML
10 INJECTION INTRAVENOUS PRN
Status: DISCONTINUED | OUTPATIENT
Start: 2025-02-20 | End: 2025-03-14 | Stop reason: HOSPADM

## 2025-02-20 RX ORDER — SODIUM CHLORIDE 0.9 % (FLUSH) 0.9 %
5-40 SYRINGE (ML) INJECTION PRN
Status: DISCONTINUED | OUTPATIENT
Start: 2025-02-20 | End: 2025-03-14 | Stop reason: HOSPADM

## 2025-02-20 RX ORDER — LIDOCAINE HYDROCHLORIDE 10 MG/ML
5 INJECTION, SOLUTION INFILTRATION; PERINEURAL ONCE
Status: DISCONTINUED | OUTPATIENT
Start: 2025-02-20 | End: 2025-03-14 | Stop reason: HOSPADM

## 2025-02-20 RX ORDER — ENOXAPARIN SODIUM 100 MG/ML
40 INJECTION SUBCUTANEOUS DAILY
Status: DISCONTINUED | OUTPATIENT
Start: 2025-02-20 | End: 2025-02-26

## 2025-02-20 RX ORDER — HYDROCORTISONE 5 MG/1
10 TABLET ORAL DAILY
Status: DISCONTINUED | OUTPATIENT
Start: 2025-02-20 | End: 2025-02-20

## 2025-02-20 RX ADMIN — SODIUM CHLORIDE, PRESERVATIVE FREE 10 ML: 5 INJECTION INTRAVENOUS at 19:51

## 2025-02-20 RX ADMIN — LORAZEPAM 2 MG: 1 TABLET ORAL at 19:51

## 2025-02-20 RX ADMIN — BISACODYL 10 MG: 5 TABLET, COATED ORAL at 13:38

## 2025-02-20 RX ADMIN — PREGABALIN 50 MG: 50 CAPSULE ORAL at 13:36

## 2025-02-20 RX ADMIN — PANTOPRAZOLE SODIUM 40 MG: 40 TABLET, DELAYED RELEASE ORAL at 13:37

## 2025-02-20 RX ADMIN — ARFORMOTEROL TARTRATE 15 MCG: 15 SOLUTION RESPIRATORY (INHALATION) at 18:48

## 2025-02-20 RX ADMIN — MONTELUKAST 10 MG: 10 TABLET, FILM COATED ORAL at 13:35

## 2025-02-20 RX ADMIN — ATORVASTATIN CALCIUM 10 MG: 20 TABLET, FILM COATED ORAL at 19:51

## 2025-02-20 RX ADMIN — KETOROLAC TROMETHAMINE 15 MG: 30 INJECTION, SOLUTION INTRAMUSCULAR at 03:48

## 2025-02-20 RX ADMIN — ARFORMOTEROL TARTRATE 15 MCG: 15 SOLUTION RESPIRATORY (INHALATION) at 14:09

## 2025-02-20 RX ADMIN — HYDROCODONE BITARTRATE AND ACETAMINOPHEN 1 TABLET: 5; 325 TABLET ORAL at 19:51

## 2025-02-20 RX ADMIN — LORAZEPAM 2 MG: 1 TABLET ORAL at 13:38

## 2025-02-20 RX ADMIN — PREGABALIN 50 MG: 50 CAPSULE ORAL at 19:51

## 2025-02-20 RX ADMIN — HYDROCODONE BITARTRATE AND ACETAMINOPHEN 1 TABLET: 5; 325 TABLET ORAL at 13:34

## 2025-02-20 RX ADMIN — IOPAMIDOL 75 ML: 755 INJECTION, SOLUTION INTRAVENOUS at 01:19

## 2025-02-20 RX ADMIN — BUDESONIDE 1000 MCG: 0.5 INHALANT RESPIRATORY (INHALATION) at 14:10

## 2025-02-20 RX ADMIN — IPRATROPIUM BROMIDE 0.5 MG: 0.5 SOLUTION RESPIRATORY (INHALATION) at 14:09

## 2025-02-20 RX ADMIN — BUDESONIDE 1000 MCG: 0.5 INHALANT RESPIRATORY (INHALATION) at 18:49

## 2025-02-20 RX ADMIN — IPRATROPIUM BROMIDE 0.5 MG: 0.5 SOLUTION RESPIRATORY (INHALATION) at 18:48

## 2025-02-20 RX ADMIN — ESCITALOPRAM OXALATE 10 MG: 10 TABLET ORAL at 22:34

## 2025-02-20 RX ADMIN — ENOXAPARIN SODIUM 40 MG: 100 INJECTION SUBCUTANEOUS at 14:54

## 2025-02-20 ASSESSMENT — PAIN - FUNCTIONAL ASSESSMENT: PAIN_FUNCTIONAL_ASSESSMENT: 0-10

## 2025-02-20 ASSESSMENT — PAIN SCALES - GENERAL
PAINLEVEL_OUTOF10: 0
PAINLEVEL_OUTOF10: 6
PAINLEVEL_OUTOF10: 10
PAINLEVEL_OUTOF10: 6

## 2025-02-20 ASSESSMENT — PAIN DESCRIPTION - LOCATION: LOCATION: BACK

## 2025-02-20 NOTE — ED NOTES
Assumed care of this patient. Presented to the ED for abdominal pain.  Has compression fracture L 2.  Patient alert and oriented x 4.

## 2025-02-20 NOTE — PROGRESS NOTES
4 Eyes Skin Assessment     NAME:  Haritha Dominguez  YOB: 1945  MEDICAL RECORD NUMBER:  [id number]    The patient is being assessed for  Admission    I agree that at least one RN has performed a thorough Head to Toe Skin Assessment on the patient. ALL assessment sites listed below have been assessed.      Areas assessed by both nurses:    Head, Face, Ears, Shoulders, Back, Chest, Arms, Elbows, Hands, Sacrum. Buttock, Coccyx, Ischium, Legs. Feet and Heels, and Under Medical Devices         Does the Patient have a Wound? Yes wound(s) were present on assessment. LDA wound assessment was Initiated and completed by RN     Abrasion noted BUE, BLE, and lower back        Ruperto Prevention initiated by RN: Yes  Wound Care Orders initiated by RN: Yes    Pressure Injury (Stage 3,4, Unstageable, DTI, NWPT, and Complex wounds) if present, place Wound referral order by RN under : No    New Ostomies, if present place, Ostomy referral order under : No     Nurse 1 eSignature: Electronically signed by Nadiya Mina RN on 2/20/25 at 6:36 PM EST    **SHARE this note so that the co-signing nurse can place an eSignature**    Nurse 2 eSignature: Electronically signed by Bonita Palacios RN on 2/20/25 at 6:40 PM EST

## 2025-02-20 NOTE — ED NOTES
Radiology Procedure Waiver   Name: Haritha Dominguez  : 1945  MRN: [id number]    Date:  25    Time: 11:13 PM EST    Benefits of immediately proceeding with Radiology exam(s) without pre-testing outweigh the risks or are not indicated as specified below and therefore the following is/are being waived:    [] Pregnancy test   [] Patients LMP on-time and regular.   [] Patient had Tubal Ligation or has other Contraception Device.   [] Patient  is Menopausal or Premenarcheal.    [] Patient had Full or Partial Hysterectomy.    [] Protocol for Iodine allergy    [] MRI Questionnaire     [x] BUN/Creatinine   [] Patient age w/no hx of renal dysfunction.   [] Patient on Dialysis.   [] Recent Normal Labs.  Electronically signed by Oliver Waters MD on 25 at 11:13 PM Oliver Watkins MD  25 [phone]

## 2025-02-20 NOTE — H&P
[facility]              [address] [place] [address]                           HISTORY & PHYSICAL      PATIENT NAME: CARLITO CEDENO              : 1945  MED REC NO: [id number]                        ROOM: REJI COLE  ACCOUNT NO: [id number]                       ADMIT DATE: 2025  PROVIDER: Bryan Rivers DO      PRIMARY CARE PHYSICIAN:  Olayinka Reyes DO    CHIEF COMPLAINT:  Abdominal pain.    HISTORY OF PRESENT ILLNESS:  The patient is a 80-year-old  female who presented to the emergency room complaining of abdominal pain x1 day, also with complaints of low back pain.  Diagnostic evaluation in the emergency room revealed CT abdomen; moderate-to-large hiatal hernia, moderate colonic stool burden, 1.3 cm low attenuating lesion along the pancreatic body/tail, L2 compression fracture on CT abdomen.  The patient was admitted for further evaluation and treatment.    MEDICATIONS:  Prior to admission:  Albuterol nebulizer p.r.n., aspirin, Lipitor, calcium, Celebrex, Lexapro, Flonase p.r.n., Trelegy inhaler, Cortef, Ativan, magnesium, Singulair, Protonix, potassium chloride, Lyrica, Demadex, vitamin C, vitamin D.    PAST MEDICAL HISTORY:  Anxiety, asthma, dermatitis, elevated cholesterol, chronic back pain, tachycardia.    PAST SURGICAL HISTORY:  Cholecystectomy, back surgery, S1 joint surgery, right total knee replacement, spinal nerve stimulator, bilateral eye cataract surgery.    SOCIAL HISTORY:  Denies tobacco or alcohol.    REVIEW OF SYSTEMS:  Remarkable for above-stated chief complaint plus chronic dermatitis changes of the skin.    ALLERGIES:  PERCOCET.      PHYSICAL EXAMINATION:  GENERAL APPEARANCE:  Reveals a 80-year-old  female, who is alert and oriented x3, cooperative, and a good historian.  VITAL SIGNS:  On admission, temperature 99.3, pulse 103, respirations 18, blood pressure 139/85.  HEAD:  Normocephalic, atraumatic.

## 2025-02-20 NOTE — ED PROVIDER NOTES
HPI:  2/19/25, Time: 10:19 PM JAEL Dominguez is a 80 y.o. female anxiety arthritis asthma history of peripheral vascular disease dermatitis history of hypertension hyperlipidemia history of pancreatic cyst pneumonia sleep apnea presenting to the ED for abdominal pain, beginning 1 day ago.  The complaint has been persistent, moderate in severity, and worsened by nothing.  Patient reporting abdominal pain that started today.  Patient reports lower abdominal pain it does not radiate she reports no vomiting or diarrhea she reports no black or tarry stools she reports no hematemesis she reports no fever chills or chest pain she reports no fall or injury.  Patient reporting no incontinence.  Patient reporting no headache she reports no syncopal event.  Patient reporting no calf pain.    ROS:   Pertinent positives and negatives are stated within HPI, all other systems reviewed and are negative.  --------------------------------------------- PAST HISTORY ---------------------------------------------  Past Medical History:  has a past medical history of Abrasion of skin of left lower leg, Abrasion of skin of right lower leg, Anxiety, Arthritis, Asthma, Claustrophobia, Decreased dorsalis pedis pulse, Dermatitis, Fracture, GERD (gastroesophageal reflux disease), Hiatal hernia, History of blood transfusion, HTN (hypertension), Hyperlipidemia, Itching, On aspirin at home, Pancreatic cyst, Pneumonia, Sleep apnea, SOB (shortness of breath), and Tachycardia.    Past Surgical History:  has a past surgical history that includes Cholecystectomy; Cataract removal (Bilateral, 12/06/2013); back surgery (08/2014); joint replacement (12/16/2016); Total knee arthroplasty (Right); other surgical history (02/09/2017); Spinal fixation surgery with implant (2013); Upper gastrointestinal endoscopy (07/10/2017); and Stimulator Surgery (Right, 5/31/2023).    Social History:  reports that she has never smoked. She has never used  NEGATIVE NEGATIVE    Leukocyte Esterase, Urine TRACE (A) NEGATIVE   Troponin   Result Value Ref Range    Troponin, High Sensitivity 18 (H) 0 - 9 ng/L   Troponin   Result Value Ref Range    Troponin, High Sensitivity 20 (H) 0 - 9 ng/L   Microscopic Urinalysis   Result Value Ref Range    WBC, UA 0 TO 5 0 TO 5 /HPF    RBC, UA 0 TO 2 0 TO 2 /HPF    Bacteria, UA 1+ (A) None   EKG 12 Lead   Result Value Ref Range    Ventricular Rate 99 BPM    Atrial Rate 99 BPM    P-R Interval 152 ms    QRS Duration 74 ms    Q-T Interval 342 ms    QTc Calculation (Bazett) 438 ms    P Axis 44 degrees    R Axis 0 degrees    T Axis 19 degrees       RADIOLOGY:  Interpreted by Radiologist.  CT ABDOMEN PELVIS W IV CONTRAST Additional Contrast? None   Final Result   No acute intra-abdominal pelvic process appreciated.      Moderate to large hiatal hernia containing nearly the entirety of the stomach.      Moderate colonic stool burden which can be seen with constipation.      1.3 cm low attenuating lesion along the pancreatic body/tail. Suggest MRI   pancreatic protocol for further characterization.      Additional observations as above.           EKG:  This EKG is signed and interpreted by me.    Rate: 99  Rhythm: Sinus  Interpretation: no acute changes  Comparison: stable as compared to patient's most recent EKG 4/18/2024      ------------------------- NURSING NOTES AND VITALS REVIEWED ---------------------------   The nursing notes within the ED encounter and vital signs as below have been reviewed by myself.  /85   Pulse (!) 103   Temp 99.3 °F (37.4 °C) (Oral)   Resp 18   SpO2 92%   Oxygen Saturation Interpretation: Normal    The patient’s available past medical records and past encounters were reviewed.        ------------------------------ ED COURSE/MEDICAL DECISION MAKING----------------------  Medications   ondansetron (ZOFRAN) injection 4 mg (4 mg IntraVENous Given 2/19/25 5537)   lidocaine 1 % injection 5 mL (has no

## 2025-02-20 NOTE — DISCHARGE INSTRUCTIONS
Call Dr Hutson's office to scheduled follow up appointment [phone] or [phone]    BONES: Lumbar fusion hardware.  Lower thoracic vertebral augmentation.  Compression L1 and L2, L2 is new from 2023 but of indeterminate acuity.     ADDITIONAL FINDINGS: None.     IMPRESSION:  No acute intra-abdominal pelvic process appreciated.     Moderate to large hiatal hernia containing nearly the entirety of the stomach.     Moderate colonic stool burden which can be seen with constipation.     1.3 cm low attenuating lesion along the pancreatic body/tail. Suggest MRI  pancreatic protocol for further characterization.     Additional observations as above.

## 2025-02-21 PROCEDURE — 99222 1ST HOSP IP/OBS MODERATE 55: CPT | Performed by: STUDENT IN AN ORGANIZED HEALTH CARE EDUCATION/TRAINING PROGRAM

## 2025-02-21 PROCEDURE — 97161 PT EVAL LOW COMPLEX 20 MIN: CPT

## 2025-02-21 PROCEDURE — 6360000002 HC RX W HCPCS: Performed by: INTERNAL MEDICINE

## 2025-02-21 PROCEDURE — 1200000000 HC SEMI PRIVATE

## 2025-02-21 PROCEDURE — 6360000002 HC RX W HCPCS: Performed by: PHYSICIAN ASSISTANT

## 2025-02-21 PROCEDURE — 97165 OT EVAL LOW COMPLEX 30 MIN: CPT

## 2025-02-21 PROCEDURE — 2500000003 HC RX 250 WO HCPCS: Performed by: INTERNAL MEDICINE

## 2025-02-21 PROCEDURE — 94640 AIRWAY INHALATION TREATMENT: CPT

## 2025-02-21 PROCEDURE — 6370000000 HC RX 637 (ALT 250 FOR IP): Performed by: INTERNAL MEDICINE

## 2025-02-21 PROCEDURE — 97535 SELF CARE MNGMENT TRAINING: CPT

## 2025-02-21 PROCEDURE — 97530 THERAPEUTIC ACTIVITIES: CPT

## 2025-02-21 PROCEDURE — 6370000000 HC RX 637 (ALT 250 FOR IP): Performed by: STUDENT IN AN ORGANIZED HEALTH CARE EDUCATION/TRAINING PROGRAM

## 2025-02-21 PROCEDURE — 2580000003 HC RX 258

## 2025-02-21 PROCEDURE — 99232 SBSQ HOSP IP/OBS MODERATE 35: CPT | Performed by: NEUROLOGICAL SURGERY

## 2025-02-21 RX ORDER — SODIUM CHLORIDE, SODIUM LACTATE, POTASSIUM CHLORIDE, CALCIUM CHLORIDE 600; 310; 30; 20 MG/100ML; MG/100ML; MG/100ML; MG/100ML
INJECTION, SOLUTION INTRAVENOUS CONTINUOUS
Status: DISCONTINUED | OUTPATIENT
Start: 2025-02-21 | End: 2025-02-21

## 2025-02-21 RX ORDER — MORPHINE SULFATE 4 MG/ML
4 INJECTION, SOLUTION INTRAMUSCULAR; INTRAVENOUS
Status: DISCONTINUED | OUTPATIENT
Start: 2025-02-21 | End: 2025-02-26

## 2025-02-21 RX ADMIN — SODIUM CHLORIDE, SODIUM LACTATE, POTASSIUM CHLORIDE, AND CALCIUM CHLORIDE: .6; .31; .03; .02 INJECTION, SOLUTION INTRAVENOUS at 07:10

## 2025-02-21 RX ADMIN — ENOXAPARIN SODIUM 40 MG: 100 INJECTION SUBCUTANEOUS at 09:54

## 2025-02-21 RX ADMIN — IPRATROPIUM BROMIDE 0.5 MG: 0.5 SOLUTION RESPIRATORY (INHALATION) at 21:30

## 2025-02-21 RX ADMIN — LORAZEPAM 2 MG: 1 TABLET ORAL at 09:53

## 2025-02-21 RX ADMIN — HYDROCODONE BITARTRATE AND ACETAMINOPHEN 1 TABLET: 5; 325 TABLET ORAL at 22:43

## 2025-02-21 RX ADMIN — IPRATROPIUM BROMIDE 0.5 MG: 0.5 SOLUTION RESPIRATORY (INHALATION) at 14:34

## 2025-02-21 RX ADMIN — ESCITALOPRAM OXALATE 10 MG: 10 TABLET ORAL at 20:56

## 2025-02-21 RX ADMIN — ARFORMOTEROL TARTRATE 15 MCG: 15 SOLUTION RESPIRATORY (INHALATION) at 21:30

## 2025-02-21 RX ADMIN — ARFORMOTEROL TARTRATE 15 MCG: 15 SOLUTION RESPIRATORY (INHALATION) at 07:56

## 2025-02-21 RX ADMIN — ALBUTEROL SULFATE 2.5 MG: 2.5 SOLUTION RESPIRATORY (INHALATION) at 04:57

## 2025-02-21 RX ADMIN — MORPHINE SULFATE 4 MG: 4 INJECTION, SOLUTION INTRAMUSCULAR; INTRAVENOUS at 10:02

## 2025-02-21 RX ADMIN — ALBUTEROL SULFATE 2.5 MG: 2.5 SOLUTION RESPIRATORY (INHALATION) at 17:20

## 2025-02-21 RX ADMIN — PREGABALIN 50 MG: 50 CAPSULE ORAL at 09:54

## 2025-02-21 RX ADMIN — IPRATROPIUM BROMIDE 0.5 MG: 0.5 SOLUTION RESPIRATORY (INHALATION) at 04:57

## 2025-02-21 RX ADMIN — BUDESONIDE 1000 MCG: 0.5 INHALANT RESPIRATORY (INHALATION) at 21:30

## 2025-02-21 RX ADMIN — LORAZEPAM 2 MG: 1 TABLET ORAL at 20:54

## 2025-02-21 RX ADMIN — MONTELUKAST 10 MG: 10 TABLET, FILM COATED ORAL at 10:03

## 2025-02-21 RX ADMIN — PREGABALIN 50 MG: 50 CAPSULE ORAL at 20:54

## 2025-02-21 RX ADMIN — BUDESONIDE 1000 MCG: 0.5 INHALANT RESPIRATORY (INHALATION) at 07:57

## 2025-02-21 RX ADMIN — IPRATROPIUM BROMIDE 0.5 MG: 0.5 SOLUTION RESPIRATORY (INHALATION) at 07:57

## 2025-02-21 RX ADMIN — HYDROCODONE BITARTRATE AND ACETAMINOPHEN 1 TABLET: 5; 325 TABLET ORAL at 07:13

## 2025-02-21 RX ADMIN — LORAZEPAM 2 MG: 1 TABLET ORAL at 15:21

## 2025-02-21 RX ADMIN — ATORVASTATIN CALCIUM 10 MG: 20 TABLET, FILM COATED ORAL at 20:54

## 2025-02-21 RX ADMIN — SODIUM CHLORIDE, PRESERVATIVE FREE 10 ML: 5 INJECTION INTRAVENOUS at 20:54

## 2025-02-21 RX ADMIN — PANTOPRAZOLE SODIUM 40 MG: 40 TABLET, DELAYED RELEASE ORAL at 09:54

## 2025-02-21 ASSESSMENT — PAIN - FUNCTIONAL ASSESSMENT
PAIN_FUNCTIONAL_ASSESSMENT: ACTIVITIES ARE NOT PREVENTED

## 2025-02-21 ASSESSMENT — PAIN DESCRIPTION - PAIN TYPE
TYPE: ACUTE PAIN;SURGICAL PAIN
TYPE: ACUTE PAIN

## 2025-02-21 ASSESSMENT — PAIN DESCRIPTION - ONSET
ONSET: ON-GOING
ONSET: ON-GOING

## 2025-02-21 ASSESSMENT — PAIN SCALES - GENERAL
PAINLEVEL_OUTOF10: 8
PAINLEVEL_OUTOF10: 10
PAINLEVEL_OUTOF10: 3
PAINLEVEL_OUTOF10: 10

## 2025-02-21 ASSESSMENT — PAIN DESCRIPTION - LOCATION
LOCATION: BACK

## 2025-02-21 ASSESSMENT — PAIN DESCRIPTION - ORIENTATION
ORIENTATION: RIGHT;LEFT;LOWER
ORIENTATION: MID;LOWER
ORIENTATION: LOWER
ORIENTATION: RIGHT;LEFT;LOWER

## 2025-02-21 ASSESSMENT — PAIN DESCRIPTION - FREQUENCY
FREQUENCY: CONTINUOUS
FREQUENCY: INTERMITTENT

## 2025-02-21 ASSESSMENT — PAIN DESCRIPTION - DESCRIPTORS
DESCRIPTORS: ACHING;THROBBING
DESCRIPTORS: ACHING;STABBING;DISCOMFORT
DESCRIPTORS: ACHING;BURNING;DISCOMFORT
DESCRIPTORS: ACHING;DISCOMFORT;DULL

## 2025-02-21 NOTE — CONSULTS
Attending Physician Statement:    Chief Complaint:   Chief Complaint   Patient presents with    Abdominal Pain     Patient from home called for 10/10 epigastric pain reproducible with palpation. EMS gave 50 mcg of fentanyl and 4mg of zofran PTA. Denies pain currently       I have examined the patient and performed the key aspects of physical exam, reviewed the record (including all pertinent and new radiology images and laboratory findings), and discussed the case with the surgical team.  I agree with the assessment and plan with the following additions, corrections, and changes. 14pt review of symptoms completed and negative except as mentioned.    81 yo F known to Dr. Hutson. Has small panc body IPMN 1.2cm being followed. Does not want an MRI right now. Lesion appears similar to prior scans.no concern for malignancy.  Will have her follow up outpatient.     60 Minutes of which greater than 50% was spent counseling or coordinating her care.  Thank you Dr. Rivers for allowing me to participate in Ms. Dominguez's care.       Shweta Trammell MD  02/21/25  12:09 PM      [facility]  CONSULT NOTE  2/21/2025    Physician Consulted: Dr. Trammell  Reason for Consult: pancreatic lesion   Referring Physician: Dr. Rivers    [facility]  Haritha Dominguez is a 80 y.o. female who presents for evaluation of abdominal pain and back pain. History includes spinal compression fractures, hypertension, dermatitis, CORRINE and asthma.  Surgical history includes spinal fixation/stimulator, cholecystectomy and other orthopedic procedures.  Hepatobiliary and pancreatic surgery consulted for pancreatic mass.  Patient reports that she is still having moderate to severe back pain, no significant abdominal pain.    Per chart review: Patient has been seen by our service in 2017 for a 1.2 cm pancreatic body/tail mass.  At that time the patient was unable to perform an MRI due to severe claustrophobia.    On CT abdomen pelvis yesterday the pancreatic  body/tail mass measured 1.3 cm    Past Medical History:   Diagnosis Date    Abrasion of skin of left lower leg 11/04/2024    Abrasion of skin of right lower leg 11/04/2024    Anxiety     Arthritis     Asthma     Claustrophobia     Decreased dorsalis pedis pulse 11/04/2024    Dermatitis 02/2017    bilateral legs knees to ankle; follows with [facility]    Fracture 02/2017    \"in Spine\" compression T10 per pt; difficulty laying flat    GERD (gastroesophageal reflux disease)     Hiatal hernia     History of blood transfusion     HTN (hypertension) 04/22/2013    Hyperlipidemia     Itching 11/04/2024    On aspirin at home     for age per pcp    Pancreatic cyst 05/27/2017    Pneumonia 07/2018    Sleep apnea     SOB (shortness of breath) 04/22/2013    Tachycardia 04/22/2013    follows with Dr NUPUR Chavez       Past Surgical History:   Procedure Laterality Date    BACK SURGERY  08/2014    has screws in back    CATARACT REMOVAL Bilateral 12/06/2013    [facility] with lens implants    CHOLECYSTECTOMY      JOINT REPLACEMENT  12/16/2016    SI joint fusion Right    OTHER SURGICAL HISTORY  02/09/2017    MRI -     SPINAL FIXATION SURGERY WITH IMPLANT  2013    in [place]    STIMULATOR SURGERY Right 5/31/2023    Spinal cord stimulator battery replacement performed by Anabell Purcell MD at [facility] OR    TOTAL KNEE ARTHROPLASTY Right     UPPER GASTROINTESTINAL ENDOSCOPY  07/10/2017       Medications Prior to Admission    Prior to Admission medications    Medication Sig Start Date End Date Taking? Authorizing Provider   hydrocortisone (CORTEF) 10 MG tablet Take 1 tablet by mouth 2 times daily    Jessica Sullivan MD   pantoprazole (PROTONIX) 40 MG tablet Take 1 tablet by mouth in the morning and 1 tablet in the evening.    Jessica Sullivan MD   ferrous sulfate (IRON 325) 325 (65 Fe) MG tablet Take 1 tablet by mouth daily (with breakfast)    Jessica Sullivan MD   albuterol sulfate HFA (VENTOLIN HFA) 108 (90 Base)

## 2025-02-21 NOTE — PROGRESS NOTES
Physical Therapy Initial Evaluation    Name: Haritha RUIZ [facility]  : 1945  MRN: [id number]      Date of Service: 2025    Evaluating PT:  Bryan Monterroso, PT [id number]    Referring provider/PT Order:  PT Eval and Treat   25 1300  PT eval and treat  Start:  25 1300,   End:  25 1300,   ONE TIME,   Standing Count:  1 Occurrences,   R         Tita, Bryan LAZO, DO     Room #:  5208/5208-A  Diagnosis:  Lumbar compression fracture, closed, initial encounter ([facility]) [S32.000A]  Abdominal pain, unspecified abdominal location [R10.9]  Acute midline low back pain without sciatica [M54.50]  Compression fracture of L2 vertebra, initial encounter ([facility]) [S32.020A]  PMHx/PSHx:     has a past medical history of Abrasion of skin of left lower leg, Abrasion of skin of right lower leg, Anxiety, Arthritis, Asthma, Claustrophobia, Decreased dorsalis pedis pulse, Dermatitis, Fracture, GERD (gastroesophageal reflux disease), Hiatal hernia, History of blood transfusion, HTN (hypertension), Hyperlipidemia, Itching, On aspirin at home, Pancreatic cyst, Pneumonia, Sleep apnea, SOB (shortness of breath), and Tachycardia.    has a past surgical history that includes Cholecystectomy; Cataract removal (Bilateral, 2013); back surgery (2014); joint replacement (2016); Total knee arthroplasty (Right); other surgical history (2017); Spinal fixation surgery with implant (); Upper gastrointestinal endoscopy (07/10/2017); and Stimulator Surgery (Right, 2023).     Procedure/Surgery:  none  Precautions:  Falls, FWB (full weight bearing) Bilateral LE flexed posture kyphotic, , Soft TLSO  Equipment Needs: Patient has needed equipment ,    SUBJECTIVE:    Patient states she will D/C to her Sister's home where she can be on the first level. [place] with 3 FELIZ. Patient ambulated independently, with wheeled walker  PTA. Equipment owned: Wheelchair, Cane, and Wheeled Walker        [place] she will not go

## 2025-02-21 NOTE — PROGRESS NOTES
[facility] Medicine    Subjective:  pt alert conversive c/o severe pain / pt refuses mri abd      Current Facility-Administered Medications:     lactated ringers infusion, , IntraVENous, Continuous, Bigg Cope, DO, Last Rate: 75 mL/hr at 02/21/25 0710, New Bag at 02/21/25 0710    morphine sulfate (PF) injection 4 mg, 4 mg, IntraVENous, Q2H PRN, Rocio Lea PA-C    lidocaine 1 % injection 5 mL, 5 mL, IntraDERmal, Once, Oliver Waters MD    albuterol (PROVENTIL) (2.5 MG/3ML) 0.083% nebulizer solution 2.5 mg, 2.5 mg, Nebulization, 4x Daily PRN, Bryan Rivers DO, 2.5 mg at 02/21/25 0457    atorvastatin (LIPITOR) tablet 10 mg, 10 mg, Oral, Nightly, Bryan Rivers DO, 10 mg at 02/20/25 1951    escitalopram (LEXAPRO) tablet 10 mg, 10 mg, Oral, Nightly, Bryan Rivers DO, 10 mg at 02/20/25 2234    budesonide (PULMICORT) nebulizer suspension 1,000 mcg, 1 mg, Nebulization, BID RT, 1,000 mcg at 02/21/25 0757 **AND** arformoterol tartrate (BROVANA) nebulizer solution 15 mcg, 15 mcg, Nebulization, BID RT, 15 mcg at 02/21/25 0756 **AND** ipratropium (ATROVENT) 0.02 % nebulizer solution 0.5 mg, 0.5 mg, Nebulization, Q6H RT, Bryan Rivers DO, 0.5 mg at 02/21/25 0757    LORazepam (ATIVAN) tablet 2 mg, 2 mg, Oral, TID, Bryan Rivers DO, 2 mg at 02/20/25 1951    montelukast (SINGULAIR) tablet 10 mg, 10 mg, Oral, Daily, Bryan Rivers DO, 10 mg at 02/20/25 1335    pantoprazole (PROTONIX) tablet 40 mg, 40 mg, Oral, Daily, Bryan Rivers DO, 40 mg at 02/20/25 1337    pregabalin (LYRICA) capsule 50 mg, 50 mg, Oral, BID, Bryan Rivers, , 50 mg at 02/20/25 1951    sodium chloride flush 0.9 % injection 5-40 mL, 5-40 mL, IntraVENous, 2 times per day, Bryan Rivers DO, 10 mL at 02/20/25 1951    sodium chloride flush 0.9 % injection 5-40 mL, 5-40 mL, IntraVENous, PRN, Bryan Rivers DO    0.9 % sodium chloride infusion, , IntraVENous, PRN, Bryan Rivers,     potassium chloride  (KLOR-CON M) extended release tablet 40 mEq, 40 mEq, Oral, PRN **OR** potassium bicarb-citric acid (EFFER-K) effervescent tablet 40 mEq, 40 mEq, Oral, PRN **OR** potassium chloride 10 mEq/100 mL IVPB (Peripheral Line), 10 mEq, IntraVENous, PRN, Bryan Rivers,     magnesium sulfate 2000 mg in 50 mL IVPB premix, 2,000 mg, IntraVENous, PRN, Bryan Rivers,     enoxaparin (LOVENOX) injection 40 mg, 40 mg, SubCUTAneous, Daily, Bryan Rivers, , 40 mg at 02/20/25 1454    polyethylene glycol (GLYCOLAX) packet 17 g, 17 g, Oral, Daily PRN, Bryan Rivers DO    acetaminophen (TYLENOL) tablet 650 mg, 650 mg, Oral, Q6H PRN **OR** acetaminophen (TYLENOL) suppository 650 mg, 650 mg, Rectal, Q6H PRN, Bryan Rivers,     HYDROcodone-acetaminophen (NORCO) 5-325 MG per tablet 1 tablet, 1 tablet, Oral, Q4H PRN, Monica Hansen PA, 1 tablet at 02/21/25 0713    ondansetron (ZOFRAN) injection 4 mg, 4 mg, IntraVENous, Q6H PRN, Oliver Waters MD, 4 mg at 02/19/25 2315    Objective:    BP (!) 90/53   Pulse (!) 115   Temp 97.8 °F (36.6 °C) (Temporal)   Resp 16   Wt 64.4 kg (142 lb)   SpO2 (!) 88%   BMI 27.73 kg/m²     Heart:  reg  Lungs:  ctab  Abd: + bs soft nontender  Extrem:  min edema legs    CBC with Differential:    Lab Results   Component Value Date/Time    WBC 12.8 02/19/2025 10:44 PM    RBC 3.50 02/19/2025 10:44 PM    HGB 10.5 02/19/2025 10:44 PM    HCT 33.3 02/19/2025 10:44 PM     02/19/2025 10:44 PM    MCV 95.1 02/19/2025 10:44 PM    MCH 30.0 02/19/2025 10:44 PM    MCHC 31.5 02/19/2025 10:44 PM    RDW 15.5 02/19/2025 10:44 PM    NRBC 0.0 03/10/2023 08:12 AM    LYMPHOPCT 10 02/19/2025 10:44 PM    MONOPCT 13 02/19/2025 10:44 PM    MYELOPCT 1 02/19/2025 10:44 PM    MYELOPCT 0.9 02/01/2023 09:10 AM    EOSPCT 10 02/19/2025 10:44 PM    BASOPCT 1 02/19/2025 10:44 PM    MONOSABS 1.67 02/19/2025 10:44 PM    LYMPHSABS 1.22 02/19/2025 10:44 PM    EOSABS 1.22 02/19/2025 10:44 PM    BASOSABS 0.11

## 2025-02-21 NOTE — PROGRESS NOTES
Pt does not want MRI done, states she will follow with Dr. Hutson outpatient. Will sign off and see again if needed.    Discussed with Dr. Valeri Jackson, STEVE - CNP 2/21/2025 11:07 AM

## 2025-02-21 NOTE — PROGRESS NOTES
Department of Neurosurgery  Progress Note    CHIEF COMPLAINT: seen for L2 compression fx    SUBJECTIVE:  Soft TLSO in the room. C/o severe back pain    REVIEW OF SYSTEMS :  Constitutional: Negative for chills and fever.    Neurological: Negative for dizziness, tremors and speech change.   CVS: negative for chest pain  Resp: negative for SOB    OBJECTIVE:   VITALS:  BP (!) 83/46   Pulse (!) 111   Temp 97.7 °F (36.5 °C) (Temporal)   Resp 16   Wt 64.4 kg (142 lb)   SpO2 92%   BMI 27.73 kg/m²     PHYSICAL:  Neurologic: Alert and oriented x3; PERRL  Motor Exam:  Motor exam is symmetrical 5 out of 5 all extremities bilaterally  Sensory:  Sensory intact  Skin is warm  Abdomen is soft    DATA:  CBC:   Lab Results   Component Value Date/Time    WBC 12.8 02/19/2025 10:44 PM    RBC 3.50 02/19/2025 10:44 PM    HGB 10.5 02/19/2025 10:44 PM    HCT 33.3 02/19/2025 10:44 PM    MCV 95.1 02/19/2025 10:44 PM    MCH 30.0 02/19/2025 10:44 PM    MCHC 31.5 02/19/2025 10:44 PM    RDW 15.5 02/19/2025 10:44 PM     02/19/2025 10:44 PM    MPV 12.0 02/19/2025 10:44 PM     BMP:    Lab Results   Component Value Date/Time     02/19/2025 10:44 PM    K 4.2 02/19/2025 10:44 PM    K 4.6 05/24/2023 11:30 AM     02/19/2025 10:44 PM    CO2 20 02/19/2025 10:44 PM    BUN 22 02/19/2025 10:44 PM    CREATININE 1.0 02/19/2025 10:44 PM    CALCIUM 8.6 02/19/2025 10:44 PM    GFRAA >60 01/12/2019 04:00 AM    LABGLOM 56 02/19/2025 10:44 PM    LABGLOM 70 04/18/2024 09:02 PM    GLUCOSE 92 02/19/2025 10:44 PM    GLUCOSE 85 04/02/2012 08:30 AM     PT/INR:    Lab Results   Component Value Date/Time    PROTIME 10.2 05/24/2023 11:30 AM    PROTIME 11.7 04/01/2012 03:16 PM    INR 0.9 05/24/2023 11:30 AM     PTT:    Lab Results   Component Value Date/Time    APTT 37.6 05/28/2017 04:10 AM   [APTT}    Current Inpatient Medications  Current Facility-Administered Medications: lactated ringers infusion, , IntraVENous, Continuous  morphine sulfate (PF)

## 2025-02-21 NOTE — PLAN OF CARE
Problem: Discharge Planning  Goal: Discharge to home or other facility with appropriate resources  2/21/2025 1011 by Jackelyn Mead RN  Outcome: Progressing     Problem: Pain  Goal: Verbalizes/displays adequate comfort level or baseline comfort level  2/21/2025 1011 by Jackelyn Mead RN  Outcome: Progressing     Problem: Safety - Adult  Goal: Free from fall injury  2/21/2025 1011 by Jackelyn Mead RN  Outcome: Progressing  Flowsheets (Taken 2/21/2025 0900)  Free From Fall Injury: Instruct family/caregiver on patient safety     Problem: ABCDS Injury Assessment  Goal: Absence of physical injury  Outcome: Progressing

## 2025-02-21 NOTE — CARE COORDINATION
02/21/25 Transition of care: patient ia admitted due to worsening back pain with findings of Comp Fx L2. Met with patient at the bedside. She is alert and oriented. She lives alone in a one story home. She has 3 steps to enter the home. She has her sister around the corner and her niece across the street. They provide her meals and any assistance she needs. She follows with Dr MAREN Reyes. She has a wheeled walker, cane, wheelchair, shower chair and raised toilet seat at home. She is refusing to discuss snf. She states she was at [facility] and will not go again. She did have home care in the past thru [facility] and is agreeable to them again. Her plan is to return home. She states her sister will provide transportation home. Electronically signed by Nancie Jackson RN CM on 2/21/2025 at 10:02 AM    Addendum: referral sent to [facility] via [employer]. Electronically signed by Nancie Jackson RN CM n 2/21/2025 at 10:07 AM      Case Management Assessment  Initial Evaluation    Date/Time of Evaluation: 2/21/2025 10:03 AM  Assessment Completed by: Nancie Jackson RN    If patient is discharged prior to next notation, then this note serves as note for discharge by case management.    Patient Name: Haritha Dominguez                   YOB: 1945  Diagnosis: Lumbar compression fracture, closed, initial encounter ([facility]) [S32.000A]  Abdominal pain, unspecified abdominal location [R10.9]  Acute midline low back pain without sciatica [M54.50]  Compression fracture of L2 vertebra, initial encounter ([facility]) [S32.020A]                   Date / Time: 2/19/2025 10:29 PM    Patient Admission Status: Inpatient   Readmission Risk (Low < 19, Mod (19-27), High > 27): Readmission Risk Score: 13.9    Current PCP: Olayinka Reyes, DO  PCP verified by CM? Yes    Chart Reviewed: Yes      History Provided by: Patient  Patient Orientation: Alert and Oriented    Patient Cognition: Alert    Hospitalization in the last 30 days  [facility] [address]  Phone: [phone] Fax: [phone]    [facility] - [place], [place] - [address] - P [phone] - F [phone]  [address] [address]  Phone: [phone] Fax: [phone]      Notes:    Factors facilitating achievement of predicted outcomes: Family support and Caregiver support    Barriers to discharge: Pain, Limited safety awareness, Limited insight into deficits, Unrealistic expectations, and Lower extremity weakness    Additional Case Management Notes: see notes     The Plan for Transition of Care is related to the following treatment goals of Lumbar compression fracture, closed, initial encounter ([facility]) [S32.000A]  Abdominal pain, unspecified abdominal location [R10.9]  Acute midline low back pain without sciatica [M54.50]  Compression fracture of L2 vertebra, initial encounter ([facility]) [S32.020A]    IF APPLICABLE: The Patient and/or patient representative Haritha and her family were provided with a choice of provider and agrees with the discharge plan. Freedom of choice list with basic dialogue that supports the patient's individualized plan of care/goals and shares the quality data associated with the providers was provided to:     Patient Representative Name:       The Patient and/or Patient Representative Agree with the Discharge Plan?      Nancie Jacskon RN  Case Management Department  Ph: [phone]

## 2025-02-21 NOTE — PROGRESS NOTES
OCCUPATIONAL THERAPY INITIAL EVALUATION    [facility]  [address], [place]        Date:2025                                                  Patient Name: Haritha Dominguez    MRN: [id number]    : 1945    Room: [address] [place] [address]      Evaluating OT: Matt Soriano OTR/L; [id number]       Referring Provider: Bryan Rivers DO     Specific Provider Orders/Date: OT Eval and Treat 25 1300       Diagnosis: Pt c/o LBP worsening ~1 day PTA. CT scan ID'd L2 compression fx.     Surgery: None this admission.     Pertinent Medical History:  has a past medical history of Abrasion of skin of left lower leg, Abrasion of skin of right lower leg, Anxiety, Arthritis, Asthma, Claustrophobia, Decreased dorsalis pedis pulse, Dermatitis, Fracture, GERD (gastroesophageal reflux disease), Hiatal hernia, History of blood transfusion, HTN (hypertension), Hyperlipidemia, Itching, On aspirin at home, Pancreatic cyst, Pneumonia, Sleep apnea, SOB (shortness of breath), and Tachycardia.     Recommended Adaptive Equipment: TBD     Precautions:  Fall Risk, spinal precautions, soft TLSO (\"Wear TLSO when greater than 45 degrees\" Per NS note 25), +alarms, limited insight into deficits, monitor BP (hypotensive), flexed posture     Assessment of current deficits    [x] Functional mobility  [x]ADLs  [x] Strength               [x]Cognition    [x] Functional transfers   [x] IADLs         [x] Safety Awareness   [x]Endurance    [x] Fine Coordination              [x] Balance      [] Vision/perception   []Sensation     []Gross Motor Coordination  [x] ROM  [] Delirium                   [] Motor Control     OT PLAN OF CARE   OT POC based on physician orders, patient diagnosis and results of clinical assessment    Frequency/Duration 1-3 days/wk for 2 weeks PRN   Specific OT Treatment Interventions to include:   * Instruction/training on adapted ADL techniques and AE  Initial Eval Status  Date: 2/21/25 Treatment Status  Date: STGs = LTGs  Time frame: 10-14 days   Feeding Minimal Assist   Independent    Grooming Moderate Assist seated EOB  Stand by Assist    UB Dressing Maximal Assist   To don/doff TLSO  Stand by Assist    LB Dressing Dependent   To don socks at bed-level  Moderate Assist    Bathing Dependent  Simulated seated EOB  Moderate Assist    Toileting Dependent   Moderate Assist    Bed Mobility  Log Roll: Moderate Assist   Supine to sit: Maximal Assist   Sit to supine: Maximal Assist   Supine to sit: Minimal Assist   Sit to supine: Minimal Assist    Functional Transfers Sit to stand:Moderate Assist   Stand to sit:Minimal Assist   Stand pivot: Minimal Assist w/ ww  Commode: NT   Sit to stand: Supervision   Stand to sit: Supervision   Stand pivot:  Supervision   Commode:  Supervision    Functional Mobility Minimal Assist   Use of ww to<>from room doorway  Supervision w/ use of Appropriate AD   Balance Sitting:     Static:  Stand by Assist     Dynamic:Stand by Assist     Standing:    Static:  Minimal Assist w/ ww    Dynamic:Minimal Assist w/ ww  Sitting:     Static:  Independent     Dynamic:Independent     Standing:    Static:  Supervision     Dynamic:Supervision    Activity Tolerance fair  w/ light activity  good  w/ light to mod activity   Visual/  Perceptual Glasses: Yes.  WFL.     Safety fair   good   during ADL completion following spinal precautions   Vitals BP 89/46, 112 HR s/p activity.   RN made aware. Pt asymptomatic throughout session.         Hand Dominance Right   AROM (PROM) Strength Additional Info:  Goal: (PRN)   RUE  Shoulder flexion ~45 degrees, elbow flexion WFL. Impaired digit flexion.   Pt reports h/o arthritis. Shoulder 2+/5, distally 3+/5 grossly tested fair   and fair  FMC/dexterity noted during ADL tasks Improve overall RUE strength WFL for participation in functional tasks       LUE Shoulder flexion ~45 degrees, elbow flexion WFL. Impaired digit  flexion.   Pt reports h/o arthritis. Shoulder 2+/5, distally 3+/5 grossly tested fair   and fair  FMC/dexterity noted during ADL tasks Improve overall LUE strength WFL for participation in functional tasks       Hearing: WFL   Sensation: No c/o numbness or tingling  Tone: WFL  Edema: Unremarkable    Comment: Cleared by RN to see pt. Upon arrival patient supine in bed and agreeable to OT session. At end of session, patient supine in bed with +bed alarm, call light and phone within reach, all lines and tubes intact.  Overall patient demonstrated decreased independence, activity tolerance, sitting/standing balance, and safety during completion of ADL/functional transfer/mobility tasks. Therapist facilitated ADL tasks, functional transfers, functional mobility, bed mobility to address safety awareness, implementation of fall prevention strategies, & functional engagement throughout daily activities maintaining spinal precautions. Pt required increased time throughout session 2/2 deconditioning. Pt would benefit from continued skilled OT to increase safety and independence with completion of ADL/IADL tasks for functional independence and quality of life.    Treatment: OT treatment provided this date includes:   ADL-  Instruction/training on safety and adapted techniques for completion of ADLs: Therapist facilitated & pt educated on spinal precautions & activity modifications/adaptations to promote implementation of fall prevention strategies, EC/WS strategies, & safety awareness throughout ADLs.   Mobility-  Instruction/training on safety and improved independence with bed mobility/functional transfers and functional mobility w/ use of ww.  Sitting/Standing Balance/Tolerance: to increase balance, core/pelvic stability, and activity tolerance during ADLs and facilitate proper posture and positioning. Pt seated EOB ~10 mins.   Activity tolerance- Instruction/training on energy conservation/work simplification & pain

## 2025-02-22 LAB
EKG ATRIAL RATE: 99 BPM
EKG P AXIS: 44 DEGREES
EKG P-R INTERVAL: 152 MS
EKG Q-T INTERVAL: 342 MS
EKG QRS DURATION: 74 MS
EKG QTC CALCULATION (BAZETT): 438 MS
EKG R AXIS: 0 DEGREES
EKG T AXIS: 19 DEGREES
EKG VENTRICULAR RATE: 99 BPM

## 2025-02-22 PROCEDURE — 6370000000 HC RX 637 (ALT 250 FOR IP): Performed by: INTERNAL MEDICINE

## 2025-02-22 PROCEDURE — 6360000002 HC RX W HCPCS: Performed by: INTERNAL MEDICINE

## 2025-02-22 PROCEDURE — 6370000000 HC RX 637 (ALT 250 FOR IP): Performed by: STUDENT IN AN ORGANIZED HEALTH CARE EDUCATION/TRAINING PROGRAM

## 2025-02-22 PROCEDURE — 51798 US URINE CAPACITY MEASURE: CPT

## 2025-02-22 PROCEDURE — 1200000000 HC SEMI PRIVATE

## 2025-02-22 PROCEDURE — 94640 AIRWAY INHALATION TREATMENT: CPT

## 2025-02-22 PROCEDURE — 51701 INSERT BLADDER CATHETER: CPT

## 2025-02-22 PROCEDURE — 93010 ELECTROCARDIOGRAM REPORT: CPT | Performed by: INTERNAL MEDICINE

## 2025-02-22 PROCEDURE — 2500000003 HC RX 250 WO HCPCS: Performed by: INTERNAL MEDICINE

## 2025-02-22 RX ADMIN — BUDESONIDE 1000 MCG: 0.5 INHALANT RESPIRATORY (INHALATION) at 21:29

## 2025-02-22 RX ADMIN — ARFORMOTEROL TARTRATE 15 MCG: 15 SOLUTION RESPIRATORY (INHALATION) at 09:06

## 2025-02-22 RX ADMIN — IPRATROPIUM BROMIDE 0.5 MG: 0.5 SOLUTION RESPIRATORY (INHALATION) at 09:06

## 2025-02-22 RX ADMIN — LORAZEPAM 2 MG: 1 TABLET ORAL at 07:52

## 2025-02-22 RX ADMIN — HYDROCODONE BITARTRATE AND ACETAMINOPHEN 1 TABLET: 5; 325 TABLET ORAL at 13:15

## 2025-02-22 RX ADMIN — PANTOPRAZOLE SODIUM 40 MG: 40 TABLET, DELAYED RELEASE ORAL at 08:04

## 2025-02-22 RX ADMIN — MONTELUKAST 10 MG: 10 TABLET, FILM COATED ORAL at 07:52

## 2025-02-22 RX ADMIN — SODIUM CHLORIDE, PRESERVATIVE FREE 10 ML: 5 INJECTION INTRAVENOUS at 07:53

## 2025-02-22 RX ADMIN — POLYETHYLENE GLYCOL 3350 17 G: 17 POWDER, FOR SOLUTION ORAL at 07:52

## 2025-02-22 RX ADMIN — ENOXAPARIN SODIUM 40 MG: 100 INJECTION SUBCUTANEOUS at 07:53

## 2025-02-22 RX ADMIN — IPRATROPIUM BROMIDE 0.5 MG: 0.5 SOLUTION RESPIRATORY (INHALATION) at 21:29

## 2025-02-22 RX ADMIN — ATORVASTATIN CALCIUM 10 MG: 20 TABLET, FILM COATED ORAL at 21:16

## 2025-02-22 RX ADMIN — LORAZEPAM 2 MG: 1 TABLET ORAL at 21:17

## 2025-02-22 RX ADMIN — HYDROCODONE BITARTRATE AND ACETAMINOPHEN 1 TABLET: 5; 325 TABLET ORAL at 18:13

## 2025-02-22 RX ADMIN — PREGABALIN 50 MG: 50 CAPSULE ORAL at 07:52

## 2025-02-22 RX ADMIN — ESCITALOPRAM OXALATE 10 MG: 10 TABLET ORAL at 21:21

## 2025-02-22 RX ADMIN — BUDESONIDE 1000 MCG: 0.5 INHALANT RESPIRATORY (INHALATION) at 09:06

## 2025-02-22 RX ADMIN — ARFORMOTEROL TARTRATE 15 MCG: 15 SOLUTION RESPIRATORY (INHALATION) at 21:29

## 2025-02-22 RX ADMIN — HYDROCODONE BITARTRATE AND ACETAMINOPHEN 1 TABLET: 5; 325 TABLET ORAL at 07:53

## 2025-02-22 RX ADMIN — IPRATROPIUM BROMIDE 0.5 MG: 0.5 SOLUTION RESPIRATORY (INHALATION) at 13:28

## 2025-02-22 RX ADMIN — LORAZEPAM 2 MG: 1 TABLET ORAL at 13:15

## 2025-02-22 RX ADMIN — PREGABALIN 50 MG: 50 CAPSULE ORAL at 21:18

## 2025-02-22 ASSESSMENT — PAIN DESCRIPTION - ORIENTATION
ORIENTATION: MID;LOWER
ORIENTATION: LOWER

## 2025-02-22 ASSESSMENT — PAIN DESCRIPTION - DESCRIPTORS
DESCRIPTORS: DISCOMFORT;DULL;SORE
DESCRIPTORS: DISCOMFORT;DULL;GNAWING
DESCRIPTORS: STABBING;SORE;DISCOMFORT
DESCRIPTORS: STABBING;SORE;SHARP

## 2025-02-22 ASSESSMENT — PAIN DESCRIPTION - LOCATION
LOCATION: BACK

## 2025-02-22 ASSESSMENT — PAIN SCALES - GENERAL
PAINLEVEL_OUTOF10: 5
PAINLEVEL_OUTOF10: 10
PAINLEVEL_OUTOF10: 5
PAINLEVEL_OUTOF10: 5

## 2025-02-22 ASSESSMENT — PAIN DESCRIPTION - ONSET: ONSET: ON-GOING

## 2025-02-22 ASSESSMENT — PAIN DESCRIPTION - FREQUENCY: FREQUENCY: INTERMITTENT

## 2025-02-22 ASSESSMENT — PAIN DESCRIPTION - PAIN TYPE: TYPE: ACUTE PAIN

## 2025-02-22 NOTE — PLAN OF CARE
Problem: Discharge Planning  Goal: Discharge to home or other facility with appropriate resources  2/21/2025 2215 by Lillie Sunshine RN  Outcome: Progressing  2/21/2025 1011 by Jackelyn Mead RN  Outcome: Progressing     Problem: Pain  Goal: Verbalizes/displays adequate comfort level or baseline comfort level  2/21/2025 2215 by Lillie Sunshine RN  Outcome: Progressing  2/21/2025 1011 by Jackelyn Mead RN  Outcome: Progressing     Problem: Safety - Adult  Goal: Free from fall injury  2/21/2025 2215 by Lillie Sunshine RN  Outcome: Progressing  2/21/2025 1011 by Jackelyn Mead RN  Outcome: Progressing  Flowsheets (Taken 2/21/2025 0900)  Free From Fall Injury: Instruct family/caregiver on patient safety     Problem: ABCDS Injury Assessment  Goal: Absence of physical injury  2/21/2025 2215 by Lillie Sunshine RN  Outcome: Progressing  2/21/2025 1011 by Jackelyn Mead RN  Outcome: Progressing

## 2025-02-22 NOTE — PROGRESS NOTES
Physical Therapy    Medical chart reviewed for PT treatment 2/22. Attempted PT treatment x2. Pt requesting to get washed up before participating with therapy on first and second attempt. Aide was notified. Will re-attempt as able. Thank you.    Florian Batres, PT, DPT  [id number]

## 2025-02-22 NOTE — PLAN OF CARE
Problem: Discharge Planning  Goal: Discharge to home or other facility with appropriate resources  2/22/2025 0853 by Nadiya Mina, RN  Outcome: Progressing     Problem: Pain  Goal: Verbalizes/displays adequate comfort level or baseline comfort level  2/22/2025 0853 by Nadiya Mina RN  Outcome: Progressing     Problem: Safety - Adult  Goal: Free from fall injury  2/22/2025 0853 by Nadiya Mina, RN  Outcome: Progressing     Problem: ABCDS Injury Assessment  Goal: Absence of physical injury  2/22/2025 0853 by Nadiya Mina, RN  Outcome: Progressing

## 2025-02-22 NOTE — PROGRESS NOTES
Hospital Medicine    Subjective: Still in a lot of pain  Reports that she cannot ambulate because of pain      Current Facility-Administered Medications:     morphine sulfate (PF) injection 4 mg, 4 mg, IntraVENous, Q2H PRN, Rocio Lea PA-C, 4 mg at 02/21/25 1002    lidocaine 1 % injection 5 mL, 5 mL, IntraDERmal, Once, Oliver Waters MD    albuterol (PROVENTIL) (2.5 MG/3ML) 0.083% nebulizer solution 2.5 mg, 2.5 mg, Nebulization, 4x Daily PRN, Bryan Rivers DO, 2.5 mg at 02/21/25 1720    atorvastatin (LIPITOR) tablet 10 mg, 10 mg, Oral, Nightly, Bryan Rivers DO, 10 mg at 02/21/25 2054    escitalopram (LEXAPRO) tablet 10 mg, 10 mg, Oral, Nightly, Bryan Rivers DO, 10 mg at 02/21/25 2056    budesonide (PULMICORT) nebulizer suspension 1,000 mcg, 1 mg, Nebulization, BID RT, 1,000 mcg at 02/22/25 0906 **AND** arformoterol tartrate (BROVANA) nebulizer solution 15 mcg, 15 mcg, Nebulization, BID RT, 15 mcg at 02/22/25 0906 **AND** ipratropium (ATROVENT) 0.02 % nebulizer solution 0.5 mg, 0.5 mg, Nebulization, Q6H RT, Bryan Rivers DO, 0.5 mg at 02/22/25 1328    LORazepam (ATIVAN) tablet 2 mg, 2 mg, Oral, TID, Bryan Rivers DO, 2 mg at 02/22/25 1315    montelukast (SINGULAIR) tablet 10 mg, 10 mg, Oral, Daily, Bryan Rivers DO, 10 mg at 02/22/25 0752    pantoprazole (PROTONIX) tablet 40 mg, 40 mg, Oral, Daily, Bryan Rivers DO, 40 mg at 02/22/25 0804    pregabalin (LYRICA) capsule 50 mg, 50 mg, Oral, BID, Bryan Rivers DO, 50 mg at 02/22/25 0752    sodium chloride flush 0.9 % injection 5-40 mL, 5-40 mL, IntraVENous, 2 times per day, Bryan Rivers, , 10 mL at 02/22/25 0753    sodium chloride flush 0.9 % injection 5-40 mL, 5-40 mL, IntraVENous, PRN, Bryan Rivers,     0.9 % sodium chloride infusion, , IntraVENous, PRN, Bryan Rivers,     potassium chloride (KLOR-CON M) extended release tablet 40 mEq, 40 mEq, Oral, PRN **OR** potassium bicarb-citric acid  (EFFER-K) effervescent tablet 40 mEq, 40 mEq, Oral, PRN **OR** potassium chloride 10 mEq/100 mL IVPB (Peripheral Line), 10 mEq, IntraVENous, PRN, Bryan Rivers,     magnesium sulfate 2000 mg in 50 mL IVPB premix, 2,000 mg, IntraVENous, PRN, Bryan Rivers,     enoxaparin (LOVENOX) injection 40 mg, 40 mg, SubCUTAneous, Daily, Bryan Rivers, , 40 mg at 02/22/25 0753    polyethylene glycol (GLYCOLAX) packet 17 g, 17 g, Oral, Daily PRN, Bryan Rivers, , 17 g at 02/22/25 0752    acetaminophen (TYLENOL) tablet 650 mg, 650 mg, Oral, Q6H PRN **OR** acetaminophen (TYLENOL) suppository 650 mg, 650 mg, Rectal, Q6H PRN, Bryan Rivers,     HYDROcodone-acetaminophen (NORCO) 5-325 MG per tablet 1 tablet, 1 tablet, Oral, Q4H PRN, Monica Hansen PA, 1 tablet at 02/22/25 1315    ondansetron (ZOFRAN) injection 4 mg, 4 mg, IntraVENous, Q6H PRN, Oliver Waters MD, 4 mg at 02/19/25 2317    Objective:    BP 95/69   Pulse (!) 109   Temp 97.7 °F (36.5 °C) (Temporal)   Resp 16   Wt 64.4 kg (142 lb)   SpO2 94%   BMI 27.73 kg/m²     Heart:  reg  Lungs:  ctab  Abd: + bs soft nontender  Extrem:  min edema legs    CBC with Differential:    Lab Results   Component Value Date/Time    WBC 12.8 02/19/2025 10:44 PM    RBC 3.50 02/19/2025 10:44 PM    HGB 10.5 02/19/2025 10:44 PM    HCT 33.3 02/19/2025 10:44 PM     02/19/2025 10:44 PM    MCV 95.1 02/19/2025 10:44 PM    MCH 30.0 02/19/2025 10:44 PM    MCHC 31.5 02/19/2025 10:44 PM    RDW 15.5 02/19/2025 10:44 PM    NRBC 0.0 03/10/2023 08:12 AM    LYMPHOPCT 10 02/19/2025 10:44 PM    MONOPCT 13 02/19/2025 10:44 PM    MYELOPCT 1 02/19/2025 10:44 PM    MYELOPCT 0.9 02/01/2023 09:10 AM    EOSPCT 10 02/19/2025 10:44 PM    BASOPCT 1 02/19/2025 10:44 PM    MONOSABS 1.67 02/19/2025 10:44 PM    LYMPHSABS 1.22 02/19/2025 10:44 PM    EOSABS 1.22 02/19/2025 10:44 PM    BASOSABS 0.11 02/19/2025 10:44 PM     CMP:    Lab Results   Component Value Date/Time      02/19/2025 10:44 PM    K 4.2 02/19/2025 10:44 PM    K 4.6 05/24/2023 11:30 AM     02/19/2025 10:44 PM    CO2 20 02/19/2025 10:44 PM    BUN 22 02/19/2025 10:44 PM    CREATININE 1.0 02/19/2025 10:44 PM    GFRAA >60 01/12/2019 04:00 AM    LABGLOM 56 02/19/2025 10:44 PM    LABGLOM 70 04/18/2024 09:02 PM    GLUCOSE 92 02/19/2025 10:44 PM    GLUCOSE 85 04/02/2012 08:30 AM    CALCIUM 8.6 02/19/2025 10:44 PM    BILITOT 0.2 02/19/2025 10:44 PM    ALKPHOS 81 02/19/2025 10:44 PM    AST 20 02/19/2025 10:44 PM    ALT 13 02/19/2025 10:44 PM     Warfarin PT/INR:    Lab Results   Component Value Date    INR 0.9 05/24/2023    INR 1.1 02/13/2023    INR 1.2 05/28/2017    PROTIME 10.2 05/24/2023    PROTIME 11.8 02/13/2023    PROTIME 13.2 (H) 05/28/2017       Assessment:    Principal Problem:    Compression fracture of L2 lumbar vertebra (HCC)  Resolved Problems:    * No resolved hospital problems. *      Plan:  Apparently patient refused MRI  Await physical therapy to work with patient today  AM-PAC score was 14 yesterday  Possible subacute rehab  Neurosurgeon put noted        Amaury Howe MD  1:37 PM  2/22/2025

## 2025-02-23 LAB
ANION GAP SERPL CALCULATED.3IONS-SCNC: 21 MMOL/L (ref 7–16)
BASOPHILS # BLD: 0 K/UL (ref 0–0.2)
BASOPHILS NFR BLD: 0 % (ref 0–2)
BUN SERPL-MCNC: 24 MG/DL (ref 6–23)
CALCIUM SERPL-MCNC: 8.5 MG/DL (ref 8.6–10.2)
CHLORIDE SERPL-SCNC: 100 MMOL/L (ref 98–107)
CO2 SERPL-SCNC: 14 MMOL/L (ref 22–29)
CREAT SERPL-MCNC: 1 MG/DL (ref 0.5–1)
EOSINOPHIL # BLD: 0.09 K/UL (ref 0.05–0.5)
EOSINOPHILS RELATIVE PERCENT: 1 % (ref 0–6)
ERYTHROCYTE [DISTWIDTH] IN BLOOD BY AUTOMATED COUNT: 15.2 % (ref 11.5–15)
GFR, ESTIMATED: 61 ML/MIN/1.73M2
GLUCOSE SERPL-MCNC: 79 MG/DL (ref 74–99)
HCT VFR BLD AUTO: 32.2 % (ref 34–48)
HGB BLD-MCNC: 9.9 G/DL (ref 11.5–15.5)
LYMPHOCYTES NFR BLD: 0.18 K/UL (ref 1.5–4)
LYMPHOCYTES RELATIVE PERCENT: 2 % (ref 20–42)
MCH RBC QN AUTO: 29.7 PG (ref 26–35)
MCHC RBC AUTO-ENTMCNC: 30.7 G/DL (ref 32–34.5)
MCV RBC AUTO: 96.7 FL (ref 80–99.9)
MONOCYTES NFR BLD: 1.14 K/UL (ref 0.1–0.95)
MONOCYTES NFR BLD: 12 % (ref 2–12)
NEUTROPHILS NFR BLD: 86 % (ref 43–80)
NEUTS SEG NFR BLD: 8.5 K/UL (ref 1.8–7.3)
PLATELET # BLD AUTO: 220 K/UL (ref 130–450)
PMV BLD AUTO: 11.8 FL (ref 7–12)
POTASSIUM SERPL-SCNC: 4 MMOL/L (ref 3.5–5)
RBC # BLD AUTO: 3.33 M/UL (ref 3.5–5.5)
RBC # BLD: ABNORMAL 10*6/UL
SODIUM SERPL-SCNC: 135 MMOL/L (ref 132–146)
WBC OTHER # BLD: 9.9 K/UL (ref 4.5–11.5)

## 2025-02-23 PROCEDURE — 36415 COLL VENOUS BLD VENIPUNCTURE: CPT

## 2025-02-23 PROCEDURE — 1200000000 HC SEMI PRIVATE

## 2025-02-23 PROCEDURE — 6370000000 HC RX 637 (ALT 250 FOR IP): Performed by: INTERNAL MEDICINE

## 2025-02-23 PROCEDURE — 2500000003 HC RX 250 WO HCPCS: Performed by: INTERNAL MEDICINE

## 2025-02-23 PROCEDURE — 6360000002 HC RX W HCPCS: Performed by: INTERNAL MEDICINE

## 2025-02-23 PROCEDURE — 6370000000 HC RX 637 (ALT 250 FOR IP): Performed by: STUDENT IN AN ORGANIZED HEALTH CARE EDUCATION/TRAINING PROGRAM

## 2025-02-23 PROCEDURE — 97535 SELF CARE MNGMENT TRAINING: CPT

## 2025-02-23 PROCEDURE — 85025 COMPLETE CBC W/AUTO DIFF WBC: CPT

## 2025-02-23 PROCEDURE — 51798 US URINE CAPACITY MEASURE: CPT

## 2025-02-23 PROCEDURE — 51701 INSERT BLADDER CATHETER: CPT

## 2025-02-23 PROCEDURE — 80048 BASIC METABOLIC PNL TOTAL CA: CPT

## 2025-02-23 PROCEDURE — 97530 THERAPEUTIC ACTIVITIES: CPT

## 2025-02-23 PROCEDURE — 94640 AIRWAY INHALATION TREATMENT: CPT

## 2025-02-23 RX ADMIN — ARFORMOTEROL TARTRATE 15 MCG: 15 SOLUTION RESPIRATORY (INHALATION) at 07:43

## 2025-02-23 RX ADMIN — LORAZEPAM 2 MG: 1 TABLET ORAL at 20:37

## 2025-02-23 RX ADMIN — LORAZEPAM 2 MG: 1 TABLET ORAL at 13:06

## 2025-02-23 RX ADMIN — SODIUM CHLORIDE, PRESERVATIVE FREE 10 ML: 5 INJECTION INTRAVENOUS at 20:38

## 2025-02-23 RX ADMIN — ENOXAPARIN SODIUM 40 MG: 100 INJECTION SUBCUTANEOUS at 08:42

## 2025-02-23 RX ADMIN — HYDROCODONE BITARTRATE AND ACETAMINOPHEN 1 TABLET: 5; 325 TABLET ORAL at 17:38

## 2025-02-23 RX ADMIN — IPRATROPIUM BROMIDE 0.5 MG: 0.5 SOLUTION RESPIRATORY (INHALATION) at 14:33

## 2025-02-23 RX ADMIN — ESCITALOPRAM OXALATE 10 MG: 10 TABLET ORAL at 20:36

## 2025-02-23 RX ADMIN — HYDROCODONE BITARTRATE AND ACETAMINOPHEN 1 TABLET: 5; 325 TABLET ORAL at 08:42

## 2025-02-23 RX ADMIN — PREGABALIN 50 MG: 50 CAPSULE ORAL at 20:37

## 2025-02-23 RX ADMIN — LORAZEPAM 2 MG: 1 TABLET ORAL at 08:42

## 2025-02-23 RX ADMIN — ATORVASTATIN CALCIUM 10 MG: 20 TABLET, FILM COATED ORAL at 20:33

## 2025-02-23 RX ADMIN — IPRATROPIUM BROMIDE 0.5 MG: 0.5 SOLUTION RESPIRATORY (INHALATION) at 07:43

## 2025-02-23 RX ADMIN — PANTOPRAZOLE SODIUM 40 MG: 40 TABLET, DELAYED RELEASE ORAL at 08:43

## 2025-02-23 RX ADMIN — BUDESONIDE 1000 MCG: 0.5 INHALANT RESPIRATORY (INHALATION) at 07:43

## 2025-02-23 RX ADMIN — PREGABALIN 50 MG: 50 CAPSULE ORAL at 08:43

## 2025-02-23 RX ADMIN — MONTELUKAST 10 MG: 10 TABLET, FILM COATED ORAL at 08:43

## 2025-02-23 RX ADMIN — POLYETHYLENE GLYCOL 3350 17 G: 17 POWDER, FOR SOLUTION ORAL at 08:42

## 2025-02-23 ASSESSMENT — PAIN SCALES - GENERAL
PAINLEVEL_OUTOF10: 8
PAINLEVEL_OUTOF10: 4
PAINLEVEL_OUTOF10: 3
PAINLEVEL_OUTOF10: 9
PAINLEVEL_OUTOF10: 4

## 2025-02-23 ASSESSMENT — PAIN - FUNCTIONAL ASSESSMENT
PAIN_FUNCTIONAL_ASSESSMENT: PREVENTS OR INTERFERES SOME ACTIVE ACTIVITIES AND ADLS

## 2025-02-23 ASSESSMENT — PAIN DESCRIPTION - DESCRIPTORS
DESCRIPTORS: ACHING;DISCOMFORT;SORE
DESCRIPTORS: DULL;ACHING;NAGGING
DESCRIPTORS: DULL;ACHING;DISCOMFORT

## 2025-02-23 ASSESSMENT — PAIN DESCRIPTION - LOCATION
LOCATION: BACK

## 2025-02-23 ASSESSMENT — PAIN DESCRIPTION - FREQUENCY: FREQUENCY: INTERMITTENT

## 2025-02-23 ASSESSMENT — PAIN DESCRIPTION - ORIENTATION
ORIENTATION: LOWER

## 2025-02-23 ASSESSMENT — PAIN DESCRIPTION - PAIN TYPE: TYPE: CHRONIC PAIN

## 2025-02-23 ASSESSMENT — PAIN DESCRIPTION - ONSET: ONSET: ON-GOING

## 2025-02-23 NOTE — PLAN OF CARE
Problem: Discharge Planning  Goal: Discharge to home or other facility with appropriate resources  2/23/2025 0918 by Nadiya Mina RN  Outcome: Progressing     Problem: Pain  Goal: Verbalizes/displays adequate comfort level or baseline comfort level  2/23/2025 0918 by Nadiya Mina RN  Outcome: Progressing     Problem: Safety - Adult  Goal: Free from fall injury  2/23/2025 0918 by Nadiya Mina RN  Outcome: Progressing     Problem: ABCDS Injury Assessment  Goal: Absence of physical injury  2/23/2025 0918 by Nadiya Mina RN  Outcome: Progressing     Problem: Skin/Tissue Integrity  Goal: Skin integrity remains intact  Description: 1.  Monitor for areas of redness and/or skin breakdown  2.  Assess vascular access sites hourly  3.  Every 4-6 hours minimum:  Change oxygen saturation probe site  4.  Every 4-6 hours:  If on nasal continuous positive airway pressure, respiratory therapy assess nares and determine need for appliance change or resting period  Outcome: Progressing

## 2025-02-23 NOTE — PROGRESS NOTES
Physical Therapy Treatment     Name: Haritha RUIZ [facility]  : 1945  MRN: [id number]      Date of Service: 2025    Evaluating PT:  Bryan Monterroso, PT [id number]    Referring provider/PT Order:  PT Eval and Treat   25 1300  PT eval and treat  Start:  25 1300,   End:  25 1300,   ONE TIME,   Standing Count:  1 Occurrences,   R         Bryan Rivers, DO     Room #:  5208/5208-A  Diagnosis:  Lumbar compression fracture, closed, initial encounter ([facility]) [S32.000A]  Abdominal pain, unspecified abdominal location [R10.9]  Acute midline low back pain without sciatica [M54.50]  Compression fracture of L2 vertebra, initial encounter ([facility]) [S32.020A]  PMHx/PSHx:     has a past medical history of Abrasion of skin of left lower leg, Abrasion of skin of right lower leg, Anxiety, Arthritis, Asthma, Claustrophobia, Decreased dorsalis pedis pulse, Dermatitis, Fracture, GERD (gastroesophageal reflux disease), Hiatal hernia, History of blood transfusion, HTN (hypertension), Hyperlipidemia, Itching, On aspirin at home, Pancreatic cyst, Pneumonia, Sleep apnea, SOB (shortness of breath), and Tachycardia.    has a past surgical history that includes Cholecystectomy; Cataract removal (Bilateral, 2013); back surgery (2014); joint replacement (2016); Total knee arthroplasty (Right); other surgical history (2017); Spinal fixation surgery with implant (); Upper gastrointestinal endoscopy (07/10/2017); and Stimulator Surgery (Right, 2023).     Procedure/Surgery:  none  Precautions:  Falls, FWB (full weight bearing) Bilateral LE flexed posture kyphotic, , Soft TLSO  Equipment Needs: Patient has needed equipment ,    SUBJECTIVE:    Patient states she will D/C to her Sister's home where she can be on the first level. Her home [place] home with 3 FEL[place]. Patient ambulated independently, with wheeled walker  PTA. Equipment owned: Wheelchair, Cane, and Wheeled Walker        [place] she will not go to [facility]

## 2025-02-23 NOTE — PLAN OF CARE
Problem: Discharge Planning  Goal: Discharge to home or other facility with appropriate resources  2/22/2025 2224 by Lillie Sunshine RN  Outcome: Progressing  2/22/2025 0853 by Nadiya Mina RN  Outcome: Progressing     Problem: Pain  Goal: Verbalizes/displays adequate comfort level or baseline comfort level  2/22/2025 2224 by Lillie Sunshine RN  Outcome: Progressing  2/22/2025 0853 by Nadiya Mina RN  Outcome: Progressing     Problem: Safety - Adult  Goal: Free from fall injury  2/22/2025 2224 by Lillie Sunshine RN  Outcome: Progressing  2/22/2025 0853 by Nadiya Mina RN  Outcome: Progressing     Problem: ABCDS Injury Assessment  Goal: Absence of physical injury  2/22/2025 2224 by Lillie Sunshine RN  Outcome: Progressing  2/22/2025 0853 by Nadiya Mina RN  Outcome: Progressing

## 2025-02-23 NOTE — PROGRESS NOTES
Pt did not void this shift.  Straight cath.  Performed.  579 cc urine obtained.  Poor intake.  Encourage fluids.

## 2025-02-23 NOTE — PROGRESS NOTES
Hospital Medicine    Subjective: Per nursing patient is not eating  Patient has no new specific complaints today    Current Facility-Administered Medications:     morphine sulfate (PF) injection 4 mg, 4 mg, IntraVENous, Q2H PRN, Rocio Lea PA-C, 4 mg at 02/21/25 1002    lidocaine 1 % injection 5 mL, 5 mL, IntraDERmal, Once, Oliver Waters MD    albuterol (PROVENTIL) (2.5 MG/3ML) 0.083% nebulizer solution 2.5 mg, 2.5 mg, Nebulization, 4x Daily PRN, Bryan Rivers DO, 2.5 mg at 02/21/25 1720    atorvastatin (LIPITOR) tablet 10 mg, 10 mg, Oral, Nightly, Bryan Rivers DO, 10 mg at 02/22/25 2116    escitalopram (LEXAPRO) tablet 10 mg, 10 mg, Oral, Nightly, Bryan Rivers DO, 10 mg at 02/22/25 2121    budesonide (PULMICORT) nebulizer suspension 1,000 mcg, 1 mg, Nebulization, BID RT, 1,000 mcg at 02/23/25 0743 **AND** arformoterol tartrate (BROVANA) nebulizer solution 15 mcg, 15 mcg, Nebulization, BID RT, 15 mcg at 02/23/25 0743 **AND** ipratropium (ATROVENT) 0.02 % nebulizer solution 0.5 mg, 0.5 mg, Nebulization, Q6H RT, Bryan Rivers DO, 0.5 mg at 02/23/25 0743    LORazepam (ATIVAN) tablet 2 mg, 2 mg, Oral, TID, Bryan Rivers DO, 2 mg at 02/23/25 1306    montelukast (SINGULAIR) tablet 10 mg, 10 mg, Oral, Daily, Bryan Rivers DO, 10 mg at 02/23/25 0843    pantoprazole (PROTONIX) tablet 40 mg, 40 mg, Oral, Daily, Bryan Rivers DO, 40 mg at 02/23/25 0843    pregabalin (LYRICA) capsule 50 mg, 50 mg, Oral, BID, Bryan Rivers DO, 50 mg at 02/23/25 0843    sodium chloride flush 0.9 % injection 5-40 mL, 5-40 mL, IntraVENous, 2 times per day, Bryan Rivers, , 10 mL at 02/22/25 0753    sodium chloride flush 0.9 % injection 5-40 mL, 5-40 mL, IntraVENous, PRN, Bryan Rivers,     0.9 % sodium chloride infusion, , IntraVENous, PRN, Bryan Rivers,     potassium chloride (KLOR-CON M) extended release tablet 40 mEq, 40 mEq, Oral, PRN **OR** potassium bicarb-citric acid  (EFFER-K) effervescent tablet 40 mEq, 40 mEq, Oral, PRN **OR** potassium chloride 10 mEq/100 mL IVPB (Peripheral Line), 10 mEq, IntraVENous, PRN, Bryan Rivers,     magnesium sulfate 2000 mg in 50 mL IVPB premix, 2,000 mg, IntraVENous, PRN, Bryan Rivers,     enoxaparin (LOVENOX) injection 40 mg, 40 mg, SubCUTAneous, Daily, Bryan Rivers, , 40 mg at 02/23/25 0842    polyethylene glycol (GLYCOLAX) packet 17 g, 17 g, Oral, Daily PRN, Bryan Rivers, , 17 g at 02/23/25 0842    acetaminophen (TYLENOL) tablet 650 mg, 650 mg, Oral, Q6H PRN **OR** acetaminophen (TYLENOL) suppository 650 mg, 650 mg, Rectal, Q6H PRN, Bryan Rivers DO    HYDROcodone-acetaminophen (NORCO) 5-325 MG per tablet 1 tablet, 1 tablet, Oral, Q4H PRN, Monica Hansen PA, 1 tablet at 02/23/25 0842    ondansetron (ZOFRAN) injection 4 mg, 4 mg, IntraVENous, Q6H PRN, Oliver Waters MD, 4 mg at 02/19/25 4567    Objective:    BP (!) 132/59   Pulse (!) 114   Temp 98.1 °F (36.7 °C) (Temporal)   Resp 16   Wt 64.4 kg (142 lb)   SpO2 93%   BMI 27.73 kg/m²     Heart:  reg  Lungs:  ctab  Abd: + bs soft nontender  Extrem:  min edema legs    CBC with Differential:    Lab Results   Component Value Date/Time    WBC 9.9 02/23/2025 11:35 AM    RBC 3.33 02/23/2025 11:35 AM    HGB 9.9 02/23/2025 11:35 AM    HCT 32.2 02/23/2025 11:35 AM     02/23/2025 11:35 AM    MCV 96.7 02/23/2025 11:35 AM    MCH 29.7 02/23/2025 11:35 AM    MCHC 30.7 02/23/2025 11:35 AM    RDW 15.2 02/23/2025 11:35 AM    NRBC 0.0 03/10/2023 08:12 AM    LYMPHOPCT 2 02/23/2025 11:35 AM    MONOPCT 12 02/23/2025 11:35 AM    MYELOPCT 1 02/19/2025 10:44 PM    MYELOPCT 0.9 02/01/2023 09:10 AM    EOSPCT 1 02/23/2025 11:35 AM    BASOPCT 0 02/23/2025 11:35 AM    MONOSABS 1.14 02/23/2025 11:35 AM    LYMPHSABS 0.18 02/23/2025 11:35 AM    EOSABS 0.09 02/23/2025 11:35 AM    BASOSABS 0.00 02/23/2025 11:35 AM     CMP:    Lab Results   Component Value Date/Time

## 2025-02-23 NOTE — PROGRESS NOTES
Occupational Therapy  OT BEDSIDE TREATMENT NOTE   [facility]  [address], [place]      Date:2025  Patient Name: Haritha Dominguez  MRN: [id number]  : 1945  Room: [address] [place] [address]     Evaluating OT: Matt Soriano OTR/L; [id number]        Referring Provider: Bryan Rivers DO     Specific Provider Orders/Date: OT Eval and Treat 25 1300        Diagnosis: Pt c/o LBP worsening ~1 day PTA. CT scan ID'd L2 compression fx.     Surgery: None this admission.     Pertinent Medical History:  has a past medical history of Abrasion of skin of left lower leg, Abrasion of skin of right lower leg, Anxiety, Arthritis, Asthma, Claustrophobia, Decreased dorsalis pedis pulse, Dermatitis, Fracture, GERD (gastroesophageal reflux disease), Hiatal hernia, History of blood transfusion, HTN (hypertension), Hyperlipidemia, Itching, On aspirin at home, Pancreatic cyst, Pneumonia, Sleep apnea, SOB (shortness of breath), and Tachycardia.      Recommended Adaptive Equipment: TBD      Precautions:  Fall Risk, spinal precautions, soft TLSO (\"Wear TLSO when greater than 45 degrees\" Per NS note 25), +alarms, limited insight into deficits, monitor BP (hypotensive), flexed posture      Assessment of current deficits    [x] Functional mobility            [x]ADLs           [x] Strength                  [x]Cognition    [x] Functional transfers          [x] IADLs         [x] Safety Awareness   [x]Endurance    [x] Fine Coordination                         [x] Balance      [] Vision/perception   []Sensation      []Gross Motor Coordination             [x] ROM           [] Delirium                   [] Motor Control      OT PLAN OF CARE   OT POC based on physician orders, patient diagnosis and results of clinical assessment     Frequency/Duration 1-3 days/wk for 2 weeks PRN   Specific OT Treatment Interventions to include:   * Instruction/training on adapted ADL techniques    Standing:    Static:  Supervision     Dynamic:Supervision    Activity Tolerance fair  w/ light activity P+  good  w/ light to mod activity   Visual/  Perceptual Glasses: Yes.  WFL.       Safety fair   poor good   during ADL completion following spinal precautions   Vitals BP 89/46, 112 HR s/p activity.   RN made aware. Pt asymptomatic throughout session.     /59        Hand Dominance Right    AROM (PROM) Strength Additional Info:  Goal: (PRN)   RUE  Shoulder flexion ~45 degrees, elbow flexion WFL. Impaired digit flexion.   Pt reports h/o arthritis. Shoulder 2+/5, distally 3+/5 grossly tested fair   and fair  FMC/dexterity noted during ADL tasks Improve overall RUE strength WFL for participation in functional tasks         LUE Shoulder flexion ~45 degrees, elbow flexion WFL. Impaired digit flexion.   Pt reports h/o arthritis. Shoulder 2+/5, distally 3+/5 grossly tested fair   and fair  FMC/dexterity noted during ADL tasks Improve overall LUE strength WFL for participation in functional tasks         Hearing: WFL   Sensation: No c/o numbness or tingling  Tone: WFL  Edema: Unremarkable    Comments: Per RN, pt ok for treatment. Upon arrival pt supine in bed. ADL retraining to increase independence in dressing and grooming tasks, balance and trf training to increase participation in functional mobility and standing aspects of ADLs with increased safety. Pt was educated through out treatment regarding proper technique and safety with bed mobility, importance of wearing TLSO brace to allow pt to be upright in bed and improve positioning/posture, maintain spinal precautions during simple ADL tasks with modified techniques to improve safety and prevent falls.  Pt would benefit from continued skilled OT to increase safety and independence with completion of ADL/IADL tasks for functional independence and quality of life.    At end of session pt left seated in bedside chair, call light within reach, chair alarm

## 2025-02-23 NOTE — PROGRESS NOTES
Family in to visit with patient. Family stated that she normally has to \"really strain to urinate.\"

## 2025-02-23 NOTE — PROGRESS NOTES
Family requesting patient to placed in LTC facility after discharge. Family stated they would like either [facility]  Or [facility]. With  [facility] being their first choice. Nancie Jackson  notified of above.

## 2025-02-24 ENCOUNTER — APPOINTMENT (OUTPATIENT)
Dept: GENERAL RADIOLOGY | Age: 80
End: 2025-02-24
Payer: MEDICARE

## 2025-02-24 LAB
B PARAP IS1001 DNA NPH QL NAA+NON-PROBE: NOT DETECTED
B PERT DNA SPEC QL NAA+PROBE: NOT DETECTED
BASOPHILS # BLD: 0.17 K/UL (ref 0–0.2)
BASOPHILS NFR BLD: 2 % (ref 0–2)
C PNEUM DNA NPH QL NAA+NON-PROBE: NOT DETECTED
EOSINOPHIL # BLD: 0.08 K/UL (ref 0.05–0.5)
EOSINOPHILS RELATIVE PERCENT: 1 % (ref 0–6)
ERYTHROCYTE [DISTWIDTH] IN BLOOD BY AUTOMATED COUNT: 15.7 % (ref 11.5–15)
FLUAV H1 2009 PAN RNA NPH NAA+NON-PROBE: DETECTED
FLUAV RNA NPH QL NAA+NON-PROBE: DETECTED
FLUBV RNA NPH QL NAA+NON-PROBE: NOT DETECTED
HADV DNA NPH QL NAA+NON-PROBE: NOT DETECTED
HCOV 229E RNA NPH QL NAA+NON-PROBE: NOT DETECTED
HCOV HKU1 RNA NPH QL NAA+NON-PROBE: NOT DETECTED
HCOV NL63 RNA NPH QL NAA+NON-PROBE: NOT DETECTED
HCOV OC43 RNA NPH QL NAA+NON-PROBE: NOT DETECTED
HCT VFR BLD AUTO: 33 % (ref 34–48)
HGB BLD-MCNC: 10.6 G/DL (ref 11.5–15.5)
HMPV RNA NPH QL NAA+NON-PROBE: NOT DETECTED
HPIV1 RNA NPH QL NAA+NON-PROBE: NOT DETECTED
HPIV2 RNA NPH QL NAA+NON-PROBE: NOT DETECTED
HPIV3 RNA NPH QL NAA+NON-PROBE: NOT DETECTED
HPIV4 RNA NPH QL NAA+NON-PROBE: NOT DETECTED
LACTATE BLDV-SCNC: 1.1 MMOL/L (ref 0.5–1.9)
LYMPHOCYTES NFR BLD: 1.26 K/UL (ref 1.5–4)
LYMPHOCYTES RELATIVE PERCENT: 13 % (ref 20–42)
M PNEUMO DNA NPH QL NAA+NON-PROBE: NOT DETECTED
MCH RBC QN AUTO: 30 PG (ref 26–35)
MCHC RBC AUTO-ENTMCNC: 32.1 G/DL (ref 32–34.5)
MCV RBC AUTO: 93.5 FL (ref 80–99.9)
MONOCYTES NFR BLD: 0.51 K/UL (ref 0.1–0.95)
MONOCYTES NFR BLD: 5 % (ref 2–12)
MYELOCYTES ABSOLUTE COUNT: 0.08 K/UL
MYELOCYTES: 1 %
NEUTROPHILS NFR BLD: 78 % (ref 43–80)
NEUTS SEG NFR BLD: 7.49 K/UL (ref 1.8–7.3)
PLATELET # BLD AUTO: 241 K/UL (ref 130–450)
PMV BLD AUTO: 12.2 FL (ref 7–12)
RBC # BLD AUTO: 3.53 M/UL (ref 3.5–5.5)
RBC # BLD: ABNORMAL 10*6/UL
RSV RNA NPH QL NAA+NON-PROBE: NOT DETECTED
RV+EV RNA NPH QL NAA+NON-PROBE: NOT DETECTED
SARS-COV-2 RNA NPH QL NAA+NON-PROBE: NOT DETECTED
SPECIMEN DESCRIPTION: ABNORMAL
WBC OTHER # BLD: 9.6 K/UL (ref 4.5–11.5)

## 2025-02-24 PROCEDURE — 2700000000 HC OXYGEN THERAPY PER DAY

## 2025-02-24 PROCEDURE — 0202U NFCT DS 22 TRGT SARS-COV-2: CPT

## 2025-02-24 PROCEDURE — 51798 US URINE CAPACITY MEASURE: CPT

## 2025-02-24 PROCEDURE — 6370000000 HC RX 637 (ALT 250 FOR IP): Performed by: INTERNAL MEDICINE

## 2025-02-24 PROCEDURE — 94640 AIRWAY INHALATION TREATMENT: CPT

## 2025-02-24 PROCEDURE — 87040 BLOOD CULTURE FOR BACTERIA: CPT

## 2025-02-24 PROCEDURE — 83605 ASSAY OF LACTIC ACID: CPT

## 2025-02-24 PROCEDURE — 6370000000 HC RX 637 (ALT 250 FOR IP): Performed by: STUDENT IN AN ORGANIZED HEALTH CARE EDUCATION/TRAINING PROGRAM

## 2025-02-24 PROCEDURE — 85025 COMPLETE CBC W/AUTO DIFF WBC: CPT

## 2025-02-24 PROCEDURE — 1200000000 HC SEMI PRIVATE

## 2025-02-24 PROCEDURE — 6360000002 HC RX W HCPCS: Performed by: INTERNAL MEDICINE

## 2025-02-24 PROCEDURE — 2500000003 HC RX 250 WO HCPCS: Performed by: INTERNAL MEDICINE

## 2025-02-24 PROCEDURE — 36415 COLL VENOUS BLD VENIPUNCTURE: CPT

## 2025-02-24 PROCEDURE — 71045 X-RAY EXAM CHEST 1 VIEW: CPT

## 2025-02-24 PROCEDURE — 51701 INSERT BLADDER CATHETER: CPT

## 2025-02-24 PROCEDURE — 99222 1ST HOSP IP/OBS MODERATE 55: CPT | Performed by: PHYSICIAN ASSISTANT

## 2025-02-24 RX ORDER — OSELTAMIVIR PHOSPHATE 30 MG/1
30 CAPSULE ORAL 2 TIMES DAILY
Status: DISCONTINUED | OUTPATIENT
Start: 2025-02-24 | End: 2025-02-26

## 2025-02-24 RX ORDER — OSELTAMIVIR PHOSPHATE 75 MG/1
75 CAPSULE ORAL ONCE
Status: COMPLETED | OUTPATIENT
Start: 2025-02-24 | End: 2025-02-24

## 2025-02-24 RX ORDER — GUAIFENESIN/DEXTROMETHORPHAN 100-10MG/5
10 SYRUP ORAL EVERY 6 HOURS PRN
Status: DISCONTINUED | OUTPATIENT
Start: 2025-02-24 | End: 2025-03-02

## 2025-02-24 RX ORDER — LORAZEPAM 1 MG/1
1 TABLET ORAL 3 TIMES DAILY PRN
Status: DISCONTINUED | OUTPATIENT
Start: 2025-02-24 | End: 2025-03-14 | Stop reason: HOSPADM

## 2025-02-24 RX ORDER — OSELTAMIVIR PHOSPHATE 30 MG/1
30 CAPSULE ORAL 2 TIMES DAILY
Status: DISCONTINUED | OUTPATIENT
Start: 2025-02-24 | End: 2025-02-24 | Stop reason: DRUGHIGH

## 2025-02-24 RX ADMIN — IPRATROPIUM BROMIDE 0.5 MG: 0.5 SOLUTION RESPIRATORY (INHALATION) at 21:34

## 2025-02-24 RX ADMIN — SODIUM CHLORIDE, PRESERVATIVE FREE 10 ML: 5 INJECTION INTRAVENOUS at 22:31

## 2025-02-24 RX ADMIN — ACETAMINOPHEN 650 MG: 325 TABLET ORAL at 16:21

## 2025-02-24 RX ADMIN — ARFORMOTEROL TARTRATE 15 MCG: 15 SOLUTION RESPIRATORY (INHALATION) at 21:34

## 2025-02-24 RX ADMIN — ESCITALOPRAM OXALATE 10 MG: 10 TABLET ORAL at 22:31

## 2025-02-24 RX ADMIN — MONTELUKAST 10 MG: 10 TABLET, FILM COATED ORAL at 09:02

## 2025-02-24 RX ADMIN — PANTOPRAZOLE SODIUM 40 MG: 40 TABLET, DELAYED RELEASE ORAL at 09:02

## 2025-02-24 RX ADMIN — OSELTAMIVIR PHOSPHATE 30 MG: 30 CAPSULE ORAL at 22:31

## 2025-02-24 RX ADMIN — ENOXAPARIN SODIUM 40 MG: 100 INJECTION SUBCUTANEOUS at 09:02

## 2025-02-24 RX ADMIN — IPRATROPIUM BROMIDE 0.5 MG: 0.5 SOLUTION RESPIRATORY (INHALATION) at 12:02

## 2025-02-24 RX ADMIN — OSELTAMIVIR PHOSPHATE 75 MG: 75 CAPSULE ORAL at 13:16

## 2025-02-24 RX ADMIN — SODIUM CHLORIDE, PRESERVATIVE FREE 10 ML: 5 INJECTION INTRAVENOUS at 09:03

## 2025-02-24 RX ADMIN — ATORVASTATIN CALCIUM 10 MG: 20 TABLET, FILM COATED ORAL at 22:31

## 2025-02-24 RX ADMIN — PREGABALIN 50 MG: 50 CAPSULE ORAL at 09:02

## 2025-02-24 RX ADMIN — GUAIFENESIN SYRUP AND DEXTROMETHORPHAN 10 ML: 100; 10 SYRUP ORAL at 13:31

## 2025-02-24 RX ADMIN — BUDESONIDE 1000 MCG: 0.5 INHALANT RESPIRATORY (INHALATION) at 21:33

## 2025-02-24 RX ADMIN — PREGABALIN 50 MG: 50 CAPSULE ORAL at 22:31

## 2025-02-24 RX ADMIN — HYDROCODONE BITARTRATE AND ACETAMINOPHEN 1 TABLET: 5; 325 TABLET ORAL at 18:06

## 2025-02-24 RX ADMIN — HYDROCODONE BITARTRATE AND ACETAMINOPHEN 1 TABLET: 5; 325 TABLET ORAL at 22:34

## 2025-02-24 ASSESSMENT — PAIN SCALES - WONG BAKER
WONGBAKER_NUMERICALRESPONSE: HURTS A LITTLE BIT

## 2025-02-24 ASSESSMENT — PAIN DESCRIPTION - FREQUENCY
FREQUENCY: INTERMITTENT

## 2025-02-24 ASSESSMENT — PAIN DESCRIPTION - LOCATION
LOCATION: HIP;BACK
LOCATION: BACK
LOCATION: ARM

## 2025-02-24 ASSESSMENT — PAIN DESCRIPTION - DESCRIPTORS
DESCRIPTORS: ACHING;DISCOMFORT;SORE
DESCRIPTORS: ACHING

## 2025-02-24 ASSESSMENT — PAIN DESCRIPTION - ONSET
ONSET: ON-GOING

## 2025-02-24 ASSESSMENT — PAIN SCALES - GENERAL
PAINLEVEL_OUTOF10: 3
PAINLEVEL_OUTOF10: 7
PAINLEVEL_OUTOF10: 3
PAINLEVEL_OUTOF10: 5
PAINLEVEL_OUTOF10: 10
PAINLEVEL_OUTOF10: 5
PAINLEVEL_OUTOF10: 3

## 2025-02-24 ASSESSMENT — PAIN DESCRIPTION - PAIN TYPE
TYPE: CHRONIC PAIN

## 2025-02-24 ASSESSMENT — PAIN DESCRIPTION - ORIENTATION
ORIENTATION: MID
ORIENTATION: LOWER
ORIENTATION: LEFT

## 2025-02-24 NOTE — CARE COORDINATION
02/24/25  Update CM Note: Notified Humaira in admissions at [facility] of the positive influenza. She will check on patient going to [facility] when ready. She spoke with niece who states she would prefer [facility]. Pass/lynn/destination completed. Ambulance form initiated and will need completed at discharge. Envelope completed and in soft chart. Electronically signed by Nancie Jackson RN CM on 2/24/2025 at 3:10 PM    Addendum: Per family in the room the choice is [facility] Electronically signed by Nancie Jackson RN on 2/24/2025 at 3:35 PM

## 2025-02-24 NOTE — CONSULTS
Palliative Care Department  [phone]  Palliative Care Initial Consult  Provider Ines Uribe PA-C     Haritha Dominguez  [id number]  Hospital Day: 6  Date of Initial Consult: 02/23/2025  Referring Provider: Bryan Rivers DO   Palliative Medicine was consulted for assistance with: \"declining in health, sleeping 22 hrs per day, refusing to eat\"    HPI:   Haritha Dominguez is a 80 y.o. with a medical history of thoracic and lumbar compression fractures, B12 deficiency, hypertension, chronic back pain, asthma who was admitted on 2/19/2025 from home with a CHIEF COMPLAINT of abdominal pain and low back pain for 1 day.  In the ED, CT scan showing moderate to large hiatal hernia, moderate stool burden, 1.3 cm low attenuating lesion along the pancreatic body/tail, L2 compression fracture.  She was admitted for pain control and further workup.  CT lumbar spine without contrast showing new acute appearing fractures involving superior endplate of L2, stable chronic compression fracture involving L1, bilateral sacral alae fractures of uncertain chronicity, generalized osteopenia, stable alignment of lumbar spine with posterior fusion hardware at L3-S1 and multilevel laminectomy.  Oncology consulted for pancreatic lesion.  They recommended MRI.  Patient refused.  Neurosurgery consulted for L2 compression fracture, recommending brace.  During the course of her hospitalization, patient had further decline with lethargy, refusing to eat, intermittent confusion.  Palliative care consulted for further assistance    ASSESSMENT/PLAN:     Pertinent Hospital Diagnoses     Acute L2 compression fracture  1.3 cm pancreatic body/tail lesion      Palliative Care Encounter / Counseling Regarding Goals of Care  Please see detailed goals of care discussion as below  At this time, Haritha Dominguez, Does Not have capacity for medical decision-making.  Capacity is time limited and situation/question specific  Outcome of goals of care  and sister Aurora Richards at bedside. Since our conversation, respiratory panel positive for flu A. Recommend treating that and reassessing her appetite and mental status. Code status reviewed. Patient is okay with all resuscitative efforts in the setting of cardiac arrest. She is not sure if she would want intubated outside of cardiac arrest. Recommend keeping full code for now and seeing how she does this admission. Will readdress code status if there is further decline. Patient's family is requesting UA to be repeated. Palliative care will continue to follow.       OBJECTIVE:   Prognosis: Guarded    Physical Exam:  BP (!) 112/50   Pulse (!) 115   Temp 97.9 °F (36.6 °C) (Temporal)   Resp 16   Wt 64.4 kg (142 lb)   SpO2 98%   BMI 27.73 kg/m²   Constitutional:  Elderly, thin, NAD, awake, alert  Eyes: no scleral icterus, normal lids, no discharge  ENMT:  Normocephalic, atraumatic, mucosa moist, EOMI  Lungs:  nasal cannula. rhonchi  Heart::  tachycardia  Ext:  Moving all extremities  Skin:  Feces on hands. Scabbed circular lesions scattered on extremities  Psych: non-anxious affect  Neuro:  Lethargic, not able to participate in meaningful conversation    Objective data reviewed: labs, images, records, medication use, vitals, and chart    Discussed patient and the plan of care with the other IDT members: Floor Nurse, Patient, and Family    Time/Communication  Greater than 50% of time spent, total 55 minutes in counseling and coordination of care at the bedside regarding goals of care, symptom management, diagnosis and prognosis, and see above.    Thank you for allowing Palliative Medicine to participate in the care of Haritha Dominguez.

## 2025-02-24 NOTE — PROGRESS NOTES
Pt. Positive for influenza A, transfer order put in, called bed coordinator. Left a message for Dr. Davis answering service.

## 2025-02-24 NOTE — PROGRESS NOTES
[facility] Medicine    Subjective:  pt alert conversive c/o cough      Current Facility-Administered Medications:     guaiFENesin-dextromethorphan (ROBITUSSIN DM) 100-10 MG/5ML syrup 10 mL, 10 mL, Oral, Q6H PRN, Bryan Rivers,     LORazepam (ATIVAN) tablet 1 mg, 1 mg, Oral, TID PRN, Bryan Rivers,     morphine sulfate (PF) injection 4 mg, 4 mg, IntraVENous, Q2H PRN, Rocio Lea PA-C, 4 mg at 02/21/25 1002    lidocaine 1 % injection 5 mL, 5 mL, IntraDERmal, Once, Oliver Waters MD    albuterol (PROVENTIL) (2.5 MG/3ML) 0.083% nebulizer solution 2.5 mg, 2.5 mg, Nebulization, 4x Daily PRN, Bryan Rivers DO, 2.5 mg at 02/21/25 1720    atorvastatin (LIPITOR) tablet 10 mg, 10 mg, Oral, Nightly, Bryan Rivers DO, 10 mg at 02/23/25 2033    escitalopram (LEXAPRO) tablet 10 mg, 10 mg, Oral, Nightly, Bryan Rivers DO, 10 mg at 02/23/25 2036    budesonide (PULMICORT) nebulizer suspension 1,000 mcg, 1 mg, Nebulization, BID RT, 1,000 mcg at 02/23/25 0743 **AND** arformoterol tartrate (BROVANA) nebulizer solution 15 mcg, 15 mcg, Nebulization, BID RT, 15 mcg at 02/23/25 0743 **AND** ipratropium (ATROVENT) 0.02 % nebulizer solution 0.5 mg, 0.5 mg, Nebulization, Q6H RT, Bryan Rivers DO, 0.5 mg at 02/23/25 1433    montelukast (SINGULAIR) tablet 10 mg, 10 mg, Oral, Daily, Bryan Rivers DO, 10 mg at 02/23/25 0843    pantoprazole (PROTONIX) tablet 40 mg, 40 mg, Oral, Daily, Bryan Rivers DO, 40 mg at 02/23/25 0843    pregabalin (LYRICA) capsule 50 mg, 50 mg, Oral, BID, Bryan Rivers, , 50 mg at 02/23/25 2037    sodium chloride flush 0.9 % injection 5-40 mL, 5-40 mL, IntraVENous, 2 times per day, Bryan Rivers, , 10 mL at 02/23/25 2038    sodium chloride flush 0.9 % injection 5-40 mL, 5-40 mL, IntraVENous, PRN, Bryan Rivers, DO    0.9 % sodium chloride infusion, , IntraVENous, PRN, Bryan Rivers,     potassium chloride (KLOR-CON M) extended release tablet 40 mEq, 40

## 2025-02-24 NOTE — PLAN OF CARE
Problem: Discharge Planning  Goal: Discharge to home or other facility with appropriate resources  2/23/2025 2307 by Lillie Sunshine RN  Outcome: Progressing  2/23/2025 0918 by Nadiya Mina RN  Outcome: Progressing     Problem: Pain  Goal: Verbalizes/displays adequate comfort level or baseline comfort level  2/23/2025 2307 by Lillie Sunshine RN  Outcome: Progressing  2/23/2025 0918 by Nadiya Mina RN  Outcome: Progressing     Problem: Safety - Adult  Goal: Free from fall injury  2/23/2025 2307 by Lillie Sunshine RN  Outcome: Progressing  2/23/2025 0918 by Nadiya Mina RN  Outcome: Progressing     Problem: ABCDS Injury Assessment  Goal: Absence of physical injury  2/23/2025 2307 by Lillie Sunshine RN  Outcome: Progressing  2/23/2025 0918 by Nadiya Mina RN  Outcome: Progressing     Problem: Skin/Tissue Integrity  Goal: Skin integrity remains intact  Description: 1.  Monitor for areas of redness and/or skin breakdown  2.  Assess vascular access sites hourly  3.  Every 4-6 hours minimum:  Change oxygen saturation probe site  4.  Every 4-6 hours:  If on nasal continuous positive airway pressure, respiratory therapy assess nares and determine need for appliance change or resting period  2/23/2025 2307 by Lillie Sunshine RN  Outcome: Progressing  2/23/2025 0918 by Nadiya Mina RN  Outcome: Progressing      Obtained report from Janet WALTON  [phone]    Patient began with abdominal pain this morning.  4 episodes of emesis- NB/NB  3 day hx of fever- afebrile today    + strep   XR- constipation w/o evidence of obstruction  US- transverse colon intussusception    LAC 22G  282ml NS  0.71mg morphine x 2- last dose at 1420  1.42mg zofran   Rocephin 704mg    Last PO 0730    14.1kg

## 2025-02-24 NOTE — PROGRESS NOTES
Pharmacy Note - Renal dose adjustment made per P/T protocol    Original order:  Tamiflu 30 mg bid for 5 days    Estimated Creatinine Clearance: 39 mL/min (based on SCr of 1 mg/dL).    Recent Labs     02/23/25  1135   BUN 24*   CREATININE 1.0       Renally adjusted order:  Tamiflu 75 mg x 1 dose followed by 30 mg bid for 9 more doses.     Please call pharmacy with any questions.    Thank you,  Taz Cool [facility]  2/24/2025 12:35 PM

## 2025-02-24 NOTE — PLAN OF CARE
Problem: Discharge Planning  Goal: Discharge to home or other facility with appropriate resources  Outcome: Progressing     Problem: Pain  Goal: Verbalizes/displays adequate comfort level or baseline comfort level  Outcome: Progressing     Problem: Safety - Adult  Goal: Free from fall injury  Outcome: Progressing     Problem: ABCDS Injury Assessment  Goal: Absence of physical injury  Outcome: Progressing     Problem: Skin/Tissue Integrity  Goal: Skin integrity remains intact  Description: 1.  Monitor for areas of redness and/or skin breakdown  2.  Assess vascular access sites hourly  3.  Every 4-6 hours minimum:  Change oxygen saturation probe site  4.  Every 4-6 hours:  If on nasal continuous positive airway pressure, respiratory therapy assess nares and determine need for appliance change or resting period  Outcome: Progressing

## 2025-02-24 NOTE — CARE COORDINATION
02/24/25 Update CM Note; patient remained on general medical floor over the weekend. She is weak and c/o back pain today. Her family was in to visit and wishes for a referral to [facility] in [place] and/or [facility]. Referral sent to Marco A and Valerie will review. Electronically signed by Nancie Jackson RN CM on 2/24/2025 at 11:12 AM

## 2025-02-24 NOTE — CARE COORDINATION
02/24/25 Update CM Note; Per Valerie at [place] patient would need to be 10 days out from the flu which was diagnosed today. [facility] reviewing and is looking into how long patient was at [facility] in the past. Family is here at the bedside currently awaiting Palliative care for a meeting. Electronically signed by Nancie Jackson RN CM on 2/24/2025 at 12:53 PM

## 2025-02-24 NOTE — PROGRESS NOTES
Patient received the Sacrament of the Anointing of the Sick on Sunday, February 24, 2025, by Father Delfin Tracy.    If additional support is requested or needed please reach out to Spiritual Health ([id number]).    Chap. Toro Vazquez MDIV, BCC

## 2025-02-24 NOTE — PROGRESS NOTES
4 Eyes Skin Assessment     NAME:  Haritha Dominguez  YOB: 1945  MEDICAL RECORD NUMBER:  [id number]    The patient is being assessed for  Transfer to New Unit    I agree that at least one RN has performed a thorough Head to Toe Skin Assessment on the patient. ALL assessment sites listed below have been assessed.      Areas assessed by both nurses:    Head, Face, Ears, Shoulders, Back, Chest, Arms, Elbows, Hands, Sacrum. Buttock, Coccyx, Ischium, Legs. Feet and Heels, and Under Medical Devices         Does the Patient have a Wound? Yes wound(s) were present on assessment. LDA wound assessment was Initiated and completed by RN  Wound on the right foot big toe nail bed. Nail absent.       Ruperto Prevention initiated by RN: Yes  Wound Care Orders initiated by RN: No    Pressure Injury (Stage 3,4, Unstageable, DTI, NWPT, and Complex wounds) if present, place Wound referral order by RN under : No    New Ostomies, if present place, Ostomy referral order under : No     Nurse 1 eSignature: Electronically signed by Meliza Burgess RN on 2/24/25 at 5:49 PM EST    **SHARE this note so that the co-signing nurse can place an eSignature**    Nurse 2 eSignature: Electronically signed by Janae Langford RN on 2/24/25 at 6:28 PM EST

## 2025-02-24 NOTE — CONSULTS
2/24/2025 9:35 AM  Service: Urology  Group: [facility] (Nic/Perico/[facility])    Haritha RUIZ [facility]  [id number]     Chief Complaint: Retention     History of Present Illness:  The patient is a 80 y.o. female patient who presents with L2 back fracture  She is know to me and was seen in 2023 for AUR  She did have severe constipation then  She was again to found to have retention  A meza was placed  She had been straight cathed for 525 xx         Past Medical History:   Diagnosis Date    Abrasion of skin of left lower leg 11/04/2024    Abrasion of skin of right lower leg 11/04/2024    Anxiety     Arthritis     Asthma     Claustrophobia     Decreased dorsalis pedis pulse 11/04/2024    Dermatitis 02/2017    bilateral legs knees to ankle; follows with [facility]    Fracture 02/2017    \"in Spine\" compression T10 per pt; difficulty laying flat    GERD (gastroesophageal reflux disease)     Hiatal hernia     History of blood transfusion     HTN (hypertension) 04/22/2013    Hyperlipidemia     Itching 11/04/2024    On aspirin at home     for age per pcp    Pancreatic cyst 05/27/2017    Pneumonia 07/2018    Sleep apnea     SOB (shortness of breath) 04/22/2013    Tachycardia 04/22/2013    follows with Dr NUPUR Chavez       Past Surgical History:   Procedure Laterality Date    BACK SURGERY  08/2014    has screws in back    CATARACT REMOVAL Bilateral 12/06/2013    [facility] with lens implants    CHOLECYSTECTOMY      JOINT REPLACEMENT  12/16/2016    SI joint fusion Right    OTHER SURGICAL HISTORY  02/09/2017    MRI -     SPINAL FIXATION SURGERY WITH IMPLANT  2013    in [place]    STIMULATOR SURGERY Right 5/31/2023    Spinal cord stimulator battery replacement performed by Anabell Purcell MD at [facility] [place]    TOTAL KNEE ARTHROPLASTY Right     UPPER GASTROINTESTINAL ENDOSCOPY  07/10/2017       Medications Prior to Admission:    Medications Prior to Admission: hydrocortisone (CORTEF) 10 MG tablet, Take 1 tablet by mouth 2 times  30.7*   RDW 15.2*      MPV 11.8       Recent Labs     02/23/25  1135   CREATININE 1.0     IMPRESSION:  No acute intra-abdominal pelvic process appreciated.     Moderate to large hiatal hernia containing nearly the entirety of the stomach.     Moderate colonic stool burden which can be seen with constipation.     1.3 cm low attenuating lesion along the pancreatic body/tail. Suggest MRI  pancreatic protocol for further characterization.     Additional observations as above.     Images:              Assessment: Haritha Hough 80 y.o. female     AUR     Plan:    CT was reviewed  Straith cath prn   Will need outpatient eval     Jimenez RUIZ Nic, DO   [facility]  Urology

## 2025-02-24 NOTE — DISCHARGE INSTR - COC
Continuity of Care Form    Patient Name: Haritha Dominguez   :  1945  MRN:  [id number]    Admit date:  2025  Discharge date:  25    Code Status Order: Full Code   Advance Directives:   Advance Care Flowsheet Documentation             Admitting Physician:  Bryan Rivers DO  PCP: Olayinka Reyes DO    Discharging Nurse: Angelita  Discharging Hospital Unit/Room#: 5208/5208-A  Discharging Unit Phone Number: [phone]    Emergency Contact:   Extended Emergency Contact Information  Primary Emergency Contact: Chantelle Stover  Home Phone: [phone]  Relation: Child  Preferred language: English   needed? No  Secondary Emergency Contact: Aurora Rey Ann  Address: [address] [place] [address]  Home Phone: [phone]  Mobile Phone: [phone]  Relation: Brother/Sister    Past Surgical History:  Past Surgical History:   Procedure Laterality Date    BACK SURGERY  2014    has screws in back    CATARACT REMOVAL Bilateral 2013    [facility] with lens implants    CHOLECYSTECTOMY      JOINT REPLACEMENT  2016    SI joint fusion Right    OTHER SURGICAL HISTORY  2017    MRI -     SPINAL FIXATION SURGERY WITH IMPLANT      in [place]    STIMULATOR SURGERY Right 2023    Spinal cord stimulator battery replacement performed by Anabell Purcell MD at [facility] [place]    TOTAL KNEE ARTHROPLASTY Right     UPPER GASTROINTESTINAL ENDOSCOPY  07/10/2017       Immunization History:   Immunization History   Administered Date(s) Administered    COVID-19, MODERNA BLUE border, Primary or Immunocompromised, (age 12y+), IM, 100 mcg/0.5mL 2021    COVID-19, PFIZER Bivalent, DO NOT Dilute, (age 12y+), IM, 30 mcg/0.3 mL 02/10/2023    COVID-19, PFIZER GRAY top, DO NOT Dilute, (age 12 y+), IM, 30 mcg/0.3 mL 2022    COVID-19, PFIZER PURPLE top, DILUTE for use, (age 12 y+), 30mcg/0.3mL 2021, 2021, 2021    COVID-19, PFIZER, (age

## 2025-02-24 NOTE — PROGRESS NOTES
Straight cath. Performed  525cc urine obtained.  Tachy.  Hr 120's.  Turned and repositioned every 2 hours.  Loose, weak, non-productive cough noted.  Lungs dim.

## 2025-02-24 NOTE — PROGRESS NOTES
Called urology consult to Nataly Daniel. Pt. Did void over 200mls. With more urine on pad. Bladder scanned for 140mls. Per Nataly Daniel NP will try to assist pt. Up to bedside commode to encourage voiding at this time.

## 2025-02-25 PROCEDURE — 1200000000 HC SEMI PRIVATE

## 2025-02-25 PROCEDURE — 6360000002 HC RX W HCPCS: Performed by: INTERNAL MEDICINE

## 2025-02-25 PROCEDURE — 2500000003 HC RX 250 WO HCPCS: Performed by: INTERNAL MEDICINE

## 2025-02-25 PROCEDURE — 94640 AIRWAY INHALATION TREATMENT: CPT

## 2025-02-25 PROCEDURE — 97530 THERAPEUTIC ACTIVITIES: CPT

## 2025-02-25 PROCEDURE — 2700000000 HC OXYGEN THERAPY PER DAY

## 2025-02-25 PROCEDURE — 97535 SELF CARE MNGMENT TRAINING: CPT

## 2025-02-25 PROCEDURE — 99232 SBSQ HOSP IP/OBS MODERATE 35: CPT | Performed by: PHYSICIAN ASSISTANT

## 2025-02-25 PROCEDURE — 6370000000 HC RX 637 (ALT 250 FOR IP): Performed by: INTERNAL MEDICINE

## 2025-02-25 RX ORDER — ALBUTEROL SULFATE 0.83 MG/ML
2.5 SOLUTION RESPIRATORY (INHALATION)
Status: DISCONTINUED | OUTPATIENT
Start: 2025-02-25 | End: 2025-02-28

## 2025-02-25 RX ADMIN — GUAIFENESIN SYRUP AND DEXTROMETHORPHAN 10 ML: 100; 10 SYRUP ORAL at 14:44

## 2025-02-25 RX ADMIN — BUDESONIDE 1000 MCG: 0.5 INHALANT RESPIRATORY (INHALATION) at 20:20

## 2025-02-25 RX ADMIN — IPRATROPIUM BROMIDE 0.5 MG: 0.5 SOLUTION RESPIRATORY (INHALATION) at 09:45

## 2025-02-25 RX ADMIN — OSELTAMIVIR PHOSPHATE 30 MG: 30 CAPSULE ORAL at 20:51

## 2025-02-25 RX ADMIN — ARFORMOTEROL TARTRATE 15 MCG: 15 SOLUTION RESPIRATORY (INHALATION) at 20:20

## 2025-02-25 RX ADMIN — OSELTAMIVIR PHOSPHATE 30 MG: 30 CAPSULE ORAL at 09:18

## 2025-02-25 RX ADMIN — ALBUTEROL SULFATE 2.5 MG: 2.5 SOLUTION RESPIRATORY (INHALATION) at 13:07

## 2025-02-25 RX ADMIN — MONTELUKAST 10 MG: 10 TABLET, FILM COATED ORAL at 09:17

## 2025-02-25 RX ADMIN — ATORVASTATIN CALCIUM 10 MG: 20 TABLET, FILM COATED ORAL at 20:51

## 2025-02-25 RX ADMIN — PANTOPRAZOLE SODIUM 40 MG: 40 TABLET, DELAYED RELEASE ORAL at 09:17

## 2025-02-25 RX ADMIN — ESCITALOPRAM OXALATE 10 MG: 10 TABLET ORAL at 20:51

## 2025-02-25 RX ADMIN — SODIUM CHLORIDE, PRESERVATIVE FREE 10 ML: 5 INJECTION INTRAVENOUS at 20:57

## 2025-02-25 RX ADMIN — BUDESONIDE 1000 MCG: 0.5 INHALANT RESPIRATORY (INHALATION) at 09:46

## 2025-02-25 RX ADMIN — IPRATROPIUM BROMIDE 0.5 MG: 0.5 SOLUTION RESPIRATORY (INHALATION) at 20:21

## 2025-02-25 RX ADMIN — PREGABALIN 50 MG: 50 CAPSULE ORAL at 20:51

## 2025-02-25 RX ADMIN — PREGABALIN 50 MG: 50 CAPSULE ORAL at 09:17

## 2025-02-25 RX ADMIN — ALBUTEROL SULFATE 2.5 MG: 2.5 SOLUTION RESPIRATORY (INHALATION) at 17:05

## 2025-02-25 RX ADMIN — SODIUM CHLORIDE, PRESERVATIVE FREE 10 ML: 5 INJECTION INTRAVENOUS at 09:19

## 2025-02-25 RX ADMIN — ARFORMOTEROL TARTRATE 15 MCG: 15 SOLUTION RESPIRATORY (INHALATION) at 09:45

## 2025-02-25 RX ADMIN — ENOXAPARIN SODIUM 40 MG: 100 INJECTION SUBCUTANEOUS at 09:19

## 2025-02-25 RX ADMIN — ACETAMINOPHEN 650 MG: 325 TABLET ORAL at 20:51

## 2025-02-25 RX ADMIN — IPRATROPIUM BROMIDE 0.5 MG: 0.5 SOLUTION RESPIRATORY (INHALATION) at 13:07

## 2025-02-25 ASSESSMENT — PAIN SCALES - GENERAL
PAINLEVEL_OUTOF10: 0
PAINLEVEL_OUTOF10: 2

## 2025-02-25 NOTE — CARE COORDINATION
2/25/2025social work transition of care planning  Pt plan is to [facility]  [facility]. Sw requested precert be started today. Per previous Cm pasar and envelope in soft chart.  Electronically signed by CHELSEY Wiseman on 2/25/2025 at 9:17 AM    Addendum: auth obtained good til 2/27. Per   attending pt not ready today. Sw updated pt's dtr-Chantelle  Electronically signed by CHELSEY Wiseman on 2/25/2025 at 1:15 PM

## 2025-02-25 NOTE — PROGRESS NOTES
Occupational Therapy  OT BEDSIDE TREATMENT NOTE   [facility]  [address], [place]      Date:2025  Patient Name: Haritha Dominguez  MRN: [id number]  : 1945  Room: [address] [place] [address]     Evaluating OT: Matt Soriano OTR/L; [id number]        Referring Provider: Bryan Rivers DO     Specific Provider Orders/Date: OT Eval and Treat 25 1300        Diagnosis: Pt c/o LBP worsening ~1 day PTA. CT scan ID'd L2 compression fx.     Surgery: None this admission.     Pertinent Medical History:  has a past medical history of Abrasion of skin of left lower leg, Abrasion of skin of right lower leg, Anxiety, Arthritis, Asthma, Claustrophobia, Decreased dorsalis pedis pulse, Dermatitis, Fracture, GERD (gastroesophageal reflux disease), Hiatal hernia, History of blood transfusion, HTN (hypertension), Hyperlipidemia, Itching, On aspirin at home, Pancreatic cyst, Pneumonia, Sleep apnea, SOB (shortness of breath), and Tachycardia.      Recommended Adaptive Equipment: TBD      Precautions:  Fall Risk, spinal precautions, soft TLSO (\"Wear TLSO when greater than 45 degrees\" Per NS note 25), +alarms, limited insight into deficits, monitor BP (hypotensive), flexed posture      Assessment of current deficits    [x] Functional mobility            [x]ADLs           [x] Strength                  [x]Cognition    [x] Functional transfers          [x] IADLs         [x] Safety Awareness   [x]Endurance    [x] Fine Coordination                         [x] Balance      [] Vision/perception   []Sensation      []Gross Motor Coordination             [x] ROM           [] Delirium                   [] Motor Control      OT PLAN OF CARE   OT POC based on physician orders, patient diagnosis and results of clinical assessment     Frequency/Duration 1-3 days/wk for 2 weeks PRN   Specific OT Treatment Interventions to include:   * Instruction/training on adapted ADL techniques

## 2025-02-25 NOTE — PROGRESS NOTES
Spiritual Health History and Assessment/Progress Note  [facility]    Initial Encounter, Spiritual/Emotional Needs, Palliative Care,  ,  ,      Name: Haritha Dominguez MRN: [id number]    Age: 80 y.o.     Sex: female   Language: English   Shinto: Cheondoism   Compression fracture of L2 lumbar vertebra (HCC)     Date: 2/25/2025                           Spiritual Assessment began in [facility]        Referral/Consult From: Rounding, Palliative Care   Encounter Overview/Reason: Initial Encounter, Spiritual/Emotional Needs, Palliative Care  Service Provided For: Patient    Meliza, Belief, Meaning:   Patient identifies as spiritual, has beliefs or practices that help with coping during difficult times, and Other: Ms. Dominguez talked with me about her Cheondoism beliefs.  She stated that she does not remember our  visiting with her.   Family/Friends No family/friends present      Importance and Influence:  Patient has spiritual/personal beliefs that influence decisions regarding their health  Family/Friends No family/friends present    Community:  Patient feels well-supported. Support system includes: Children and Extended family and Other: Haritha talked with me about her sister but stated that not family has been visiting with her.  She did not talk about her children.  Family/Friends No family/friends present    Assessment and Plan of Care:     Patient Interventions include: Facilitated expression of thoughts and feelings, Explored spiritual coping/struggle/distress, Affirmed coping skills/support systems, and Other: She stated that she could not remember what health issues brought her to the hospital and was not able to talk with me about her health.  She talked about being thirsty    Family/Friends Interventions include: No family/friends present    Patient Plan of Care: Spiritual Care available upon further referral  Family/Friends Plan of Care: No family/friends present    Electronically

## 2025-02-25 NOTE — PROGRESS NOTES
[facility] Medicine    Subjective:  pt alert responsive + flu on tamiflu      Current Facility-Administered Medications:     albuterol (PROVENTIL) (2.5 MG/3ML) 0.083% nebulizer solution 2.5 mg, 2.5 mg, Nebulization, Q4H While awake, Bryan Rivers,     guaiFENesin-dextromethorphan (ROBITUSSIN DM) 100-10 MG/5ML syrup 10 mL, 10 mL, Oral, Q6H PRN, Bryan Rivers DO, 10 mL at 02/24/25 1331    LORazepam (ATIVAN) tablet 1 mg, 1 mg, Oral, TID PRN, Bryan Rivers DO    [COMPLETED] oseltamivir (TAMIFLU) capsule 75 mg, 75 mg, Oral, Once, 75 mg at 02/24/25 1316 **FOLLOWED BY** oseltamivir (TAMIFLU) capsule 30 mg, 30 mg, Oral, BID, Bryan Rivers DO, 30 mg at 02/25/25 0918    morphine sulfate (PF) injection 4 mg, 4 mg, IntraVENous, Q2H PRN, Rocio Lea PA-C, 4 mg at 02/21/25 1002    lidocaine 1 % injection 5 mL, 5 mL, IntraDERmal, Once, Oliver Waters MD    atorvastatin (LIPITOR) tablet 10 mg, 10 mg, Oral, Nightly, Bryan Rivers DO, 10 mg at 02/24/25 2231    escitalopram (LEXAPRO) tablet 10 mg, 10 mg, Oral, Nightly, Bryan Rivers DO, 10 mg at 02/24/25 2231    budesonide (PULMICORT) nebulizer suspension 1,000 mcg, 1 mg, Nebulization, BID RT, 1,000 mcg at 02/25/25 0946 **AND** arformoterol tartrate (BROVANA) nebulizer solution 15 mcg, 15 mcg, Nebulization, BID RT, 15 mcg at 02/25/25 0945 **AND** ipratropium (ATROVENT) 0.02 % nebulizer solution 0.5 mg, 0.5 mg, Nebulization, Q6H RT, Bryan Rivers DO, 0.5 mg at 02/25/25 0945    montelukast (SINGULAIR) tablet 10 mg, 10 mg, Oral, Daily, Bryan Rivers DO, 10 mg at 02/25/25 0917    pantoprazole (PROTONIX) tablet 40 mg, 40 mg, Oral, Daily, Bryan Rivers DO, 40 mg at 02/25/25 0917    pregabalin (LYRICA) capsule 50 mg, 50 mg, Oral, BID, Bryan Rivers DO, 50 mg at 02/25/25 0917    sodium chloride flush 0.9 % injection 5-40 mL, 5-40 mL, IntraVENous, 2 times per day, Bryan Rivers DO, 10 mL at 02/25/25 0919    sodium chloride flush  MYELOPCT 0.9 02/01/2023 09:10 AM    EOSPCT 1 02/24/2025 05:18 PM    BASOPCT 2 02/24/2025 05:18 PM    MONOSABS 0.51 02/24/2025 05:18 PM    LYMPHSABS 1.26 02/24/2025 05:18 PM    EOSABS 0.08 02/24/2025 05:18 PM    BASOSABS 0.17 02/24/2025 05:18 PM     CMP:    Lab Results   Component Value Date/Time     02/23/2025 11:35 AM    K 4.0 02/23/2025 11:35 AM    K 4.6 05/24/2023 11:30 AM     02/23/2025 11:35 AM    CO2 14 02/23/2025 11:35 AM    BUN 24 02/23/2025 11:35 AM    CREATININE 1.0 02/23/2025 11:35 AM    GFRAA >60 01/12/2019 04:00 AM    LABGLOM 61 02/23/2025 11:35 AM    LABGLOM 70 04/18/2024 09:02 PM    GLUCOSE 79 02/23/2025 11:35 AM    GLUCOSE 85 04/02/2012 08:30 AM    CALCIUM 8.5 02/23/2025 11:35 AM    BILITOT 0.2 02/19/2025 10:44 PM    ALKPHOS 81 02/19/2025 10:44 PM    AST 20 02/19/2025 10:44 PM    ALT 13 02/19/2025 10:44 PM     Warfarin PT/INR:    Lab Results   Component Value Date    INR 0.9 05/24/2023    INR 1.1 02/13/2023    INR 1.2 05/28/2017    PROTIME 10.2 05/24/2023    PROTIME 11.8 02/13/2023    PROTIME 13.2 (H) 05/28/2017       Assessment:    Principal Problem:    Compression fracture of L2 lumbar vertebra (HCC)  Resolved Problems:    * No resolved hospital problems. *  influenza    Plan:  Tamiflu aerosols discussed case with family at bedside        Bryan Rivers DO  12:44 PM  2/25/2025

## 2025-02-25 NOTE — PROGRESS NOTES
Coordination of care discussion and chart review with PM team.LSW met at bedside with pts daughter Chantelle and son Gomez . Pt resting in bed but does open her eyes. Family hopeful that pt will improve and are also planning snf care.  No immediate PM psychosocial needs identified for Haritha SARA Dominguez.

## 2025-02-25 NOTE — PROGRESS NOTES
Palliative Care Department  [phone]  Palliative Care Progress Note  Provider Ines Uribe PA-C     Haritha Dominguez  [id number]  Hospital Day: 7  Date of Initial Consult: 02/23/2025  Referring Provider: Bryan Rivers DO   Palliative Medicine was consulted for assistance with: \"declining in health, sleeping 22 hrs per day, refusing to eat\"    HPI:   Haritha Dominguez is a 80 y.o. with a medical history of thoracic and lumbar compression fractures, B12 deficiency, hypertension, chronic back pain, asthma who was admitted on 2/19/2025 from home with a CHIEF COMPLAINT of abdominal pain and low back pain for 1 day.  In the ED, CT scan showing moderate to large hiatal hernia, moderate stool burden, 1.3 cm low attenuating lesion along the pancreatic body/tail, L2 compression fracture.  She was admitted for pain control and further workup.  CT lumbar spine without contrast showing new acute appearing fractures involving superior endplate of L2, stable chronic compression fracture involving L1, bilateral sacral alae fractures of uncertain chronicity, generalized osteopenia, stable alignment of lumbar spine with posterior fusion hardware at L3-S1 and multilevel laminectomy.  Oncology consulted for pancreatic lesion.  They recommended MRI.  Patient refused.  Neurosurgery consulted for L2 compression fracture, recommending brace.  During the course of her hospitalization, patient had further decline with lethargy, refusing to eat, intermittent confusion.  Palliative care consulted for further assistance    2/24: Influenza A positive  ASSESSMENT/PLAN:     Pertinent Hospital Diagnoses     Acute L2 compression fracture  1.3 cm pancreatic body/tail lesion  Influenza A      Palliative Care Encounter / Counseling Regarding Goals of Care  Please see detailed goals of care discussion as below  At this time, Haritha Dominguez, Does Not have capacity for medical decision-making.  Capacity is time limited and situation/question

## 2025-02-25 NOTE — PROGRESS NOTES
Physical Therapy Treatment     Name: Haritha RUIZ Dominguez  : 1945  MRN: [id number]      Date of Service: 2025    Evaluating PT:  Bryan Monterroso, PT [id number]    Referring provider/PT Order:  PT Eval and Treat   25 1300  PT eval and treat  Start:  25 1300,   End:  25 1300,   ONE TIME,   Standing Count:  1 Occurrences,   R         Bryan Rivers, DO     Room #:  5423/5423-A  Diagnosis:  Lumbar compression fracture, closed, initial encounter ([facility]) [S32.000A]  Abdominal pain, unspecified abdominal location [R10.9]  Acute midline low back pain without sciatica [M54.50]  Compression fracture of L2 vertebra, initial encounter ([facility]) [S32.020A]  PMHx/PSHx:     has a past medical history of Abrasion of skin of left lower leg, Abrasion of skin of right lower leg, Anxiety, Arthritis, Asthma, Claustrophobia, Decreased dorsalis pedis pulse, Dermatitis, Fracture, GERD (gastroesophageal reflux disease), Hiatal hernia, History of blood transfusion, HTN (hypertension), Hyperlipidemia, Itching, On aspirin at home, Pancreatic cyst, Pneumonia, Sleep apnea, SOB (shortness of breath), and Tachycardia.    has a past surgical history that includes Cholecystectomy; Cataract removal (Bilateral, 2013); back surgery (2014); joint replacement (2016); Total knee arthroplasty (Right); other surgical history (2017); Spinal fixation surgery with implant (); Upper gastrointestinal endoscopy (07/10/2017); and Stimulator Surgery (Right, 2023).     Procedure/Surgery:  none  Precautions:  Falls, FWB (full weight bearing) Bilateral LE flexed posture kyphotic, , Soft TLSO, Droplet Influenza  Equipment Needs: Patient has needed equipment ,    SUBJECTIVE:    ???Patient states she will D/C to her Sister's home where she can be on the first level. Her [place] with 3 FELIZ. Patient ambulated independently, with wheeled walker  PTA. Equipment owned: Wheelchair, Cane, and Wheeled Walker ??      Divya  cleared for session by RN. Pt was in bed upon PT entry and agreeable to participate. Transferred to EOB with log roll technique with assist for trunk and BLE adhering to precautions, TLSO donned. Once sitting EOB demonstrated posterolateral lean requiring assist to correct. Lean lessened with time. Completed sit<>stand transfer with lift assist. STS completed with increased assist only able to toelrate side steps. Returned to bed with HOB elevated    Treatment:  Patient practiced and was instructed in the following treatment:    Bed mobility training - pt given verbal and tactile cues to facilitate proper sequencing and safety during rolling and supine>sit as well as provided with physical assistance to complete task   Sitting EOB for >10 minutes for upright tolerance, postural awareness and BLE ROM  Transfer training - pt was given verbal and tactile cues to facilitate proper hand placement, technique and safety during sit to stand and stand to sit as well as provided with physical assistance.  Skilled positioning - Pt placed in the chair with pillows utilized to facilitate upright posture, joint and skin integrity, and interaction with environment.         PLAN:    Patient is making slow progress towards established goals.  Will continue with current POC.      Time in  1130  Time out  1155    Total Treatment Time  23 minutes     CPT codes:  [] Gait training 42245 0 minutes  [] Manual therapy 68800 0 minutes  [x] Therapeutic activities 54920 23 minutes  [] Therapeutic exercises 06719 0 minutes  [] Neuromuscular reeducation 90478 0 minutes    Bryan Monterroso, PT [id number]

## 2025-02-26 ENCOUNTER — APPOINTMENT (OUTPATIENT)
Dept: GENERAL RADIOLOGY | Age: 80
End: 2025-02-26
Payer: MEDICARE

## 2025-02-26 DIAGNOSIS — Z98.1 HISTORY OF LUMBAR FUSION: Primary | ICD-10-CM

## 2025-02-26 LAB
ANION GAP SERPL CALCULATED.3IONS-SCNC: 19 MMOL/L (ref 7–16)
BUN SERPL-MCNC: 48 MG/DL (ref 6–23)
CALCIUM SERPL-MCNC: 7.9 MG/DL (ref 8.6–10.2)
CHLORIDE SERPL-SCNC: 105 MMOL/L (ref 98–107)
CO2 SERPL-SCNC: 16 MMOL/L (ref 22–29)
CREAT SERPL-MCNC: 2.6 MG/DL (ref 0.5–1)
ERYTHROCYTE [DISTWIDTH] IN BLOOD BY AUTOMATED COUNT: 16.2 % (ref 11.5–15)
GFR, ESTIMATED: 18 ML/MIN/1.73M2
GLUCOSE SERPL-MCNC: 77 MG/DL (ref 74–99)
HCT VFR BLD AUTO: 34 % (ref 34–48)
HGB BLD-MCNC: 10.4 G/DL (ref 11.5–15.5)
MCH RBC QN AUTO: 29.8 PG (ref 26–35)
MCHC RBC AUTO-ENTMCNC: 30.6 G/DL (ref 32–34.5)
MCV RBC AUTO: 97.4 FL (ref 80–99.9)
PLATELET # BLD AUTO: 205 K/UL (ref 130–450)
PMV BLD AUTO: 12.2 FL (ref 7–12)
POTASSIUM SERPL-SCNC: 4.2 MMOL/L (ref 3.5–5)
PROCALCITONIN SERPL-MCNC: 5.78 NG/ML (ref 0–0.08)
RBC # BLD AUTO: 3.49 M/UL (ref 3.5–5.5)
SODIUM SERPL-SCNC: 140 MMOL/L (ref 132–146)
WBC OTHER # BLD: 9 K/UL (ref 4.5–11.5)

## 2025-02-26 PROCEDURE — 2700000000 HC OXYGEN THERAPY PER DAY

## 2025-02-26 PROCEDURE — 6370000000 HC RX 637 (ALT 250 FOR IP): Performed by: STUDENT IN AN ORGANIZED HEALTH CARE EDUCATION/TRAINING PROGRAM

## 2025-02-26 PROCEDURE — 85027 COMPLETE CBC AUTOMATED: CPT

## 2025-02-26 PROCEDURE — 6360000002 HC RX W HCPCS: Performed by: INTERNAL MEDICINE

## 2025-02-26 PROCEDURE — 94640 AIRWAY INHALATION TREATMENT: CPT

## 2025-02-26 PROCEDURE — 84145 PROCALCITONIN (PCT): CPT

## 2025-02-26 PROCEDURE — 6370000000 HC RX 637 (ALT 250 FOR IP): Performed by: INTERNAL MEDICINE

## 2025-02-26 PROCEDURE — 36415 COLL VENOUS BLD VENIPUNCTURE: CPT

## 2025-02-26 PROCEDURE — 2500000003 HC RX 250 WO HCPCS: Performed by: INTERNAL MEDICINE

## 2025-02-26 PROCEDURE — 1200000000 HC SEMI PRIVATE

## 2025-02-26 PROCEDURE — 80048 BASIC METABOLIC PNL TOTAL CA: CPT

## 2025-02-26 PROCEDURE — 71045 X-RAY EXAM CHEST 1 VIEW: CPT

## 2025-02-26 RX ORDER — OSELTAMIVIR PHOSPHATE 30 MG/1
30 CAPSULE ORAL DAILY
Status: COMPLETED | OUTPATIENT
Start: 2025-02-27 | End: 2025-02-28

## 2025-02-26 RX ORDER — ENOXAPARIN SODIUM 100 MG/ML
30 INJECTION SUBCUTANEOUS DAILY
Status: DISCONTINUED | OUTPATIENT
Start: 2025-02-27 | End: 2025-03-07 | Stop reason: SDUPTHER

## 2025-02-26 RX ADMIN — HYDROCODONE BITARTRATE AND ACETAMINOPHEN 1 TABLET: 5; 325 TABLET ORAL at 08:59

## 2025-02-26 RX ADMIN — BUDESONIDE 1000 MCG: 0.5 INHALANT RESPIRATORY (INHALATION) at 09:39

## 2025-02-26 RX ADMIN — HYDROCODONE BITARTRATE AND ACETAMINOPHEN 1 TABLET: 5; 325 TABLET ORAL at 23:28

## 2025-02-26 RX ADMIN — ALBUTEROL SULFATE 2.5 MG: 2.5 SOLUTION RESPIRATORY (INHALATION) at 21:30

## 2025-02-26 RX ADMIN — LORAZEPAM 1 MG: 1 TABLET ORAL at 23:30

## 2025-02-26 RX ADMIN — ALBUTEROL SULFATE 2.5 MG: 2.5 SOLUTION RESPIRATORY (INHALATION) at 16:42

## 2025-02-26 RX ADMIN — OSELTAMIVIR PHOSPHATE 30 MG: 30 CAPSULE ORAL at 08:57

## 2025-02-26 RX ADMIN — PREGABALIN 50 MG: 50 CAPSULE ORAL at 08:57

## 2025-02-26 RX ADMIN — ALBUTEROL SULFATE 2.5 MG: 2.5 SOLUTION RESPIRATORY (INHALATION) at 09:39

## 2025-02-26 RX ADMIN — PREGABALIN 50 MG: 50 CAPSULE ORAL at 23:29

## 2025-02-26 RX ADMIN — ARFORMOTEROL TARTRATE 15 MCG: 15 SOLUTION RESPIRATORY (INHALATION) at 21:30

## 2025-02-26 RX ADMIN — GUAIFENESIN SYRUP AND DEXTROMETHORPHAN 10 ML: 100; 10 SYRUP ORAL at 23:31

## 2025-02-26 RX ADMIN — ENOXAPARIN SODIUM 40 MG: 100 INJECTION SUBCUTANEOUS at 08:56

## 2025-02-26 RX ADMIN — MONTELUKAST 10 MG: 10 TABLET, FILM COATED ORAL at 08:57

## 2025-02-26 RX ADMIN — ARFORMOTEROL TARTRATE 15 MCG: 15 SOLUTION RESPIRATORY (INHALATION) at 09:39

## 2025-02-26 RX ADMIN — BUDESONIDE 1000 MCG: 0.5 INHALANT RESPIRATORY (INHALATION) at 21:30

## 2025-02-26 RX ADMIN — ESCITALOPRAM OXALATE 10 MG: 10 TABLET ORAL at 23:29

## 2025-02-26 RX ADMIN — IPRATROPIUM BROMIDE 0.5 MG: 0.5 SOLUTION RESPIRATORY (INHALATION) at 09:39

## 2025-02-26 RX ADMIN — SODIUM CHLORIDE, PRESERVATIVE FREE 10 ML: 5 INJECTION INTRAVENOUS at 08:57

## 2025-02-26 RX ADMIN — SODIUM CHLORIDE, PRESERVATIVE FREE 10 ML: 5 INJECTION INTRAVENOUS at 23:31

## 2025-02-26 RX ADMIN — IPRATROPIUM BROMIDE 0.5 MG: 0.5 SOLUTION RESPIRATORY (INHALATION) at 21:29

## 2025-02-26 RX ADMIN — ATORVASTATIN CALCIUM 10 MG: 20 TABLET, FILM COATED ORAL at 23:27

## 2025-02-26 RX ADMIN — PANTOPRAZOLE SODIUM 40 MG: 40 TABLET, DELAYED RELEASE ORAL at 08:57

## 2025-02-26 ASSESSMENT — PAIN DESCRIPTION - LOCATION
LOCATION: BACK
LOCATION: BACK

## 2025-02-26 ASSESSMENT — PAIN DESCRIPTION - PAIN TYPE
TYPE: ACUTE PAIN
TYPE: CHRONIC PAIN

## 2025-02-26 ASSESSMENT — PAIN DESCRIPTION - DESCRIPTORS
DESCRIPTORS: ACHING
DESCRIPTORS: ACHING;THROBBING;DISCOMFORT

## 2025-02-26 ASSESSMENT — PAIN SCALES - GENERAL
PAINLEVEL_OUTOF10: 2
PAINLEVEL_OUTOF10: 7
PAINLEVEL_OUTOF10: 7

## 2025-02-26 ASSESSMENT — PAIN DESCRIPTION - FREQUENCY
FREQUENCY: INTERMITTENT
FREQUENCY: CONTINUOUS

## 2025-02-26 ASSESSMENT — PAIN DESCRIPTION - ONSET
ONSET: ON-GOING
ONSET: ON-GOING

## 2025-02-26 ASSESSMENT — PAIN DESCRIPTION - ORIENTATION
ORIENTATION: MID
ORIENTATION: MID;UPPER

## 2025-02-26 NOTE — PROGRESS NOTES
Renal Dose Adjustment Policy (Generic)     This patient is on medication that requires renal, weight, and/or indication dose adjustment.      Date Body Weight IBW  Adjusted BW SCr  CrCl Dialysis status   2/26/2025 64.4 kg (142 lb) Ideal body weight: 45.5 kg (100 lb 4.9 oz)  Adjusted ideal body weight: 53.1 kg (116 lb 15.8 oz) Serum creatinine: 2.6 mg/dL (H) 02/26/25 0823  Estimated creatinine clearance: 15 mL/min (A) N/a       Pharmacy has dose-adjusted the following medication(s):    Date Previous Order Adjusted Order   2/26/2025 Tamiflu 30 mg BID Tamiflu 30 mg daily       These changes were made per protocol according to the [facility]   Automatic Renal Dose Adjustment Policy.     *Please note this dose may need readjusted if patient's condition changes.    Please contact pharmacy with any questions regarding these changes.    Carlos Manuel Tony RPH  2/26/2025  1:12 PM

## 2025-02-26 NOTE — CONSULTS
[facility]  [facility]    Consultation Note     Admit Date: 2/19/2025 10:29 PM    Reason for Consult:   Sepsis    Attending Physician:  Bryan Rivers DO     Chief Complaint: Back pain and abdominal pain    HISTORY OF PRESENT ILLNESS:   80-year-old female with past medical history of GERD, hiatal hernia, HTN, HLD, pancreatic cyst, pneumonia, CORRINE, chronic back pain presented with abdominal pain for 1 day with low back plan.  CT abdomen showed moderate to large hiatal hernia with colonic stool burden, L2 compression fracture and low attenuating lesion in the pancreatic body/tail.  Patient is noted to be febrile with Tmax of 102.7.  Influenza A is found to be positive.  Blood cultures no growth so far.  ID consulted for sepsis    Past Medical History:        Diagnosis Date    Abrasion of skin of left lower leg 11/04/2024    Abrasion of skin of right lower leg 11/04/2024    Anxiety     Arthritis     Asthma     Claustrophobia     Decreased dorsalis pedis pulse 11/04/2024    Dermatitis 02/2017    bilateral legs knees to ankle; follows with [facility]    Fracture 02/2017    \"in Spine\" compression T10 per pt; difficulty laying flat    GERD (gastroesophageal reflux disease)     Hiatal hernia     History of blood transfusion     HTN (hypertension) 04/22/2013    Hyperlipidemia     Itching 11/04/2024    On aspirin at home     for age per pcp    Pancreatic cyst 05/27/2017    Pneumonia 07/2018    Sleep apnea     SOB (shortness of breath) 04/22/2013    Tachycardia 04/22/2013    follows with Dr NUPUR Chavez     Past Surgical History:        Procedure Laterality Date    BACK SURGERY  08/2014    has screws in back    CATARACT REMOVAL Bilateral 12/06/2013    [facility] with lens implants    CHOLECYSTECTOMY      JOINT REPLACEMENT  12/16/2016    SI joint fusion Right    OTHER SURGICAL HISTORY  02/09/2017    MRI -     SPINAL FIXATION SURGERY WITH IMPLANT  2013    in [facility]  Results   Component Value Date/Time    TSH 2.06 08/22/2023 11:07 AM       Lab Results   Component Value Date/Time    COLORU Yellow 02/20/2025 12:11 AM    PHUR 6.0 02/20/2025 12:11 AM    PHUR 5.5 04/18/2024 09:02 PM    WBCUA 0 TO 5 02/20/2025 12:11 AM    WBCUA 1-3 04/01/2012 03:16 PM    RBCUA 0 TO 2 02/20/2025 12:11 AM    RBCUA NONE 04/01/2012 03:16 PM    MUCUS Present 12/26/2016 03:17 PM    BACTERIA 1+ 02/20/2025 12:11 AM    CLARITYU Clear 05/24/2023 11:30 AM    LEUKOCYTESUR TRACE 02/20/2025 12:11 AM    UROBILINOGEN 0.2 02/20/2025 12:11 AM    BILIRUBINUR NEGATIVE 02/20/2025 12:11 AM    BLOODU Negative 05/24/2023 11:30 AM    GLUCOSEU NEGATIVE 02/20/2025 12:11 AM    GLUCOSEU NEGATIVE 04/01/2012 03:16 PM    AMORPHOUS MODERATE 02/04/2012 01:45 PM       No results found for: \"IQT6EBC\", \"BEART\", \"B8VSLSAR\", \"PHART\", \"THGBART\", \"ORT8FHU\", \"PO2ART\", \"CXY6WCC\"  Radiology:  XR CHEST PORTABLE   Final Result   No acute process.      Left perihilar atelectasis.         XR CHEST PORTABLE   Final Result   Increased interstitial markings seen in the perihilar regions to suggest mild   interstitial edema with small bilateral pleural effusions.         CT LUMBAR SPINE WO CONTRAST   Final Result   1. New acute appearing fractures seen involving superior endplate of L2.   2. Stable chronic compression fracture involving L1.   3. Bilateral sacral ala fractures of uncertain chronicity.   4. Generalized osteopenia.   5. Stable alignment of the lumbar spine with posterior fusion hardware at   L3-S1 and multilevel laminectomy.   6. Subtle areas of hypoattenuation are seen along pedicle screws at level of   S1 which may indicate subtle loosening.         CT ABDOMEN PELVIS W IV CONTRAST Additional Contrast? None   Final Result   No acute intra-abdominal pelvic process appreciated.      Moderate to large hiatal hernia containing nearly the entirety of the stomach.      Moderate colonic stool burden which can be seen with constipation.      1.3

## 2025-02-26 NOTE — PROGRESS NOTES
Hospital Medicine    Subjective:  pt lethargic but responsive      Current Facility-Administered Medications:     albuterol (PROVENTIL) (2.5 MG/3ML) 0.083% nebulizer solution 2.5 mg, 2.5 mg, Nebulization, Q4H While awake, Bryan Rivers, , 2.5 mg at 02/25/25 1705    guaiFENesin-dextromethorphan (ROBITUSSIN DM) 100-10 MG/5ML syrup 10 mL, 10 mL, Oral, Q6H PRN, Bryan Rivres DO, 10 mL at 02/25/25 1444    LORazepam (ATIVAN) tablet 1 mg, 1 mg, Oral, TID PRN, Bryan Rivers DO    [COMPLETED] oseltamivir (TAMIFLU) capsule 75 mg, 75 mg, Oral, Once, 75 mg at 02/24/25 1316 **FOLLOWED BY** oseltamivir (TAMIFLU) capsule 30 mg, 30 mg, Oral, BID, Bryan Rivers DO, 30 mg at 02/25/25 2051    lidocaine 1 % injection 5 mL, 5 mL, IntraDERmal, Once, Oliver Waters MD    atorvastatin (LIPITOR) tablet 10 mg, 10 mg, Oral, Nightly, Bryan Rivers DO, 10 mg at 02/25/25 2051    escitalopram (LEXAPRO) tablet 10 mg, 10 mg, Oral, Nightly, Bryan Rivers DO, 10 mg at 02/25/25 2051    budesonide (PULMICORT) nebulizer suspension 1,000 mcg, 1 mg, Nebulization, BID RT, 1,000 mcg at 02/25/25 2020 **AND** arformoterol tartrate (BROVANA) nebulizer solution 15 mcg, 15 mcg, Nebulization, BID RT, 15 mcg at 02/25/25 2020 **AND** ipratropium (ATROVENT) 0.02 % nebulizer solution 0.5 mg, 0.5 mg, Nebulization, Q6H RT, Bryan Rivers DO, 0.5 mg at 02/25/25 2021    montelukast (SINGULAIR) tablet 10 mg, 10 mg, Oral, Daily, Bryan Rivers DO, 10 mg at 02/25/25 0917    pantoprazole (PROTONIX) tablet 40 mg, 40 mg, Oral, Daily, Bryan Rivers, , 40 mg at 02/25/25 0917    pregabalin (LYRICA) capsule 50 mg, 50 mg, Oral, BID, Bryan Rivers DO, 50 mg at 02/25/25 2051    sodium chloride flush 0.9 % injection 5-40 mL, 5-40 mL, IntraVENous, 2 times per day, Bryan Rivers DO, 10 mL at 02/25/25 2057    sodium chloride flush 0.9 % injection 5-40 mL, 5-40 mL, IntraVENous, PRN, Bryan Rivers DO    0.9 % sodium chloride

## 2025-02-26 NOTE — CONSULTS
Comprehensive Nutrition Assessment    Type and Reason for Visit:  Initial, Consult    Nutrition Recommendations/Plan:   Continue current diet  Start ONS to promote oral intake  Will monitor     Malnutrition Assessment:  Malnutrition Status:  At risk for malnutrition (02/26/25 1525)    Context:  Acute Illness     Findings of the 6 clinical characteristics of malnutrition:  Energy Intake:  50% or less of estimated energy requirements for 5 or more days  Weight Loss:  Unable to assess (d/t lack of wt hx per EMR)     Body Fat Loss:  Unable to assess (pt in iso)     Muscle Mass Loss:  Unable to assess (pt in iso)    Fluid Accumulation:  No fluid accumulation     Strength:  Not Performed    Nutrition Assessment:    pt adm d/t abd pain/compression frx; PMhx of HTN, Pancreatic cyst, GERD,claustrophobia; pt refused MRI; pt w/ poor appetite/oral intake this adm and lethargy; influenza noted- pt in isolation; will start ONS and will monitor.    Nutrition Related Findings:    responds to voice; oriented x 2 (oriented/disoriented at times); active BS; no edema; I/O WNL Wound Type:  (foot/nail wound)       Current Nutrition Intake & Therapies:    Average Meal Intake: 1-25%  Average Supplements Intake: None Ordered  ADULT DIET; Dysphagia - Soft and Bite Sized  ADULT ORAL NUTRITION SUPPLEMENT; Breakfast, Lunch; Standard High Calorie/High Protein Oral Supplement  ADULT ORAL NUTRITION SUPPLEMENT; Dinner; Frozen Oral Supplement    Anthropometric Measures:  Height: 152.4 cm (5')  Ideal Body Weight (IBW): 100 lbs (45 kg)       Current Body Weight: 64.4 kg (141 lb 15.6 oz) (2/20-no method), 142 % IBW.    Current BMI (kg/m2): 27.7  Usual Body Weight:  (UTO d/t lack of wt hx per EMR)        Weight Adjustment For: No Adjustment                 BMI Categories: Overweight (BMI 25.0-29.9)    Estimated Daily Nutrient Needs:  Energy Requirements Based On: Formula  Weight Used for Energy Requirements: Current  Energy (kcal/day):  1768-8358  Weight Used for Protein Requirements: Ideal  Protein (g/day): 1.4-1.6g/kgxIBW=65-75g  Method Used for Fluid Requirements: 1 ml/kcal  Fluid (ml/day): 9182-9980    Nutrition Diagnosis:   Inadequate oral intake related to inadequate protein-energy intake as evidenced by intake 0-25%    Nutrition Interventions:   Food and/or Nutrient Delivery: Continue Current Diet, Start Oral Nutrition Supplement (Ensure BID; magic cup once/day)  Nutrition Education/Counseling: Education/Counseling not indicated  Coordination of Nutrition Care: Continue to monitor while inpatient       Goals:  Goals: Other  Type of Goal: New goal  Previous Goal Met: New Goal    Nutrition Monitoring and Evaluation:   Behavioral-Environmental Outcomes: None Identified  Food/Nutrient Intake Outcomes: Food and Nutrient Intake, Supplement Intake  Physical Signs/Symptoms Outcomes: Biochemical Data, Nutrition Focused Physical Findings, Skin, Chewing or Swallowing, Weight, GI Status, Fluid Status or Edema    Discharge Planning:    Continue Oral Nutrition Supplement     Bonita Perry RD, LD  Contact: [phone]

## 2025-02-26 NOTE — PROGRESS NOTES
DVT Prophylaxis Adjustment Policy (DVT Prophylaxis)     This patient is on DVT Prophylaxis medication that requires a dose adjustment      Date Body Weight IBW  Adjusted BW SCr  CrCl Dialysis status   2/26/2025 64.4 kg (142 lb) Ideal body weight: 45.5 kg (100 lb 4.9 oz)  Adjusted ideal body weight: 53.1 kg (116 lb 15.8 oz) Serum creatinine: 2.6 mg/dL (H) 02/26/25 0823  Estimated creatinine clearance: 15 mL/min (A) N/a       Pharmacy has dose-adjusted the DVT Prophylaxis regimen to match   the recommendations from the following table        Ordered Medication:Lovenox 40mg daily    Order Changed/converted to: Lovenox 30mg daily      These changes were made per protocol according to the [facility] Pharmacist   Review for Appropriate Use and Automatic Dose Adjustments of   Subcutaneous Anticoagulants Policy     *Please note this dose may need readjusted if patient's condition changes.    Please contact pharmacy with any questions regarding these changes.    Carlos Manuel Tony RPH  2/26/2025  1:28 PM

## 2025-02-26 NOTE — CARE COORDINATION
2/26/2025social work transition of are planning  Pt plan is to [facility]. Auth good til 2/27. Envelope in soft chart.  Electronically signed by CHELSEY Wiseman on 2/26/2025 at 9:50 AM

## 2025-02-27 ENCOUNTER — APPOINTMENT (OUTPATIENT)
Dept: ULTRASOUND IMAGING | Age: 80
End: 2025-02-27
Payer: MEDICARE

## 2025-02-27 PROBLEM — Z51.5 PALLIATIVE CARE ENCOUNTER: Status: ACTIVE | Noted: 2025-02-27

## 2025-02-27 PROBLEM — Z71.89 GOALS OF CARE, COUNSELING/DISCUSSION: Status: ACTIVE | Noted: 2025-02-27

## 2025-02-27 LAB
ANION GAP SERPL CALCULATED.3IONS-SCNC: 19 MMOL/L (ref 7–16)
B-OH-BUTYR SERPL-MCNC: 0.71 MMOL/L (ref 0.02–0.27)
BUN SERPL-MCNC: 55 MG/DL (ref 6–23)
CALCIUM SERPL-MCNC: 7.7 MG/DL (ref 8.6–10.2)
CHLORIDE SERPL-SCNC: 103 MMOL/L (ref 98–107)
CO2 SERPL-SCNC: 16 MMOL/L (ref 22–29)
CREAT SERPL-MCNC: 2.8 MG/DL (ref 0.5–1)
CREAT UR-MCNC: 193.6 MG/DL (ref 29–226)
CREAT UR-MCNC: 196.1 MG/DL (ref 29–226)
ERYTHROCYTE [DISTWIDTH] IN BLOOD BY AUTOMATED COUNT: 16.1 % (ref 11.5–15)
GFR, ESTIMATED: 17 ML/MIN/1.73M2
GLUCOSE SERPL-MCNC: 96 MG/DL (ref 74–99)
HCT VFR BLD AUTO: 29.8 % (ref 34–48)
HGB BLD-MCNC: 9.5 G/DL (ref 11.5–15.5)
LACTATE BLDV-SCNC: 1.1 MMOL/L (ref 0.5–2.2)
MCH RBC QN AUTO: 30.1 PG (ref 26–35)
MCHC RBC AUTO-ENTMCNC: 31.9 G/DL (ref 32–34.5)
MCV RBC AUTO: 94.3 FL (ref 80–99.9)
PLATELET # BLD AUTO: 210 K/UL (ref 130–450)
PMV BLD AUTO: 12.2 FL (ref 7–12)
POTASSIUM SERPL-SCNC: 3.9 MMOL/L (ref 3.5–5)
RBC # BLD AUTO: 3.16 M/UL (ref 3.5–5.5)
SODIUM SERPL-SCNC: 138 MMOL/L (ref 132–146)
SODIUM UR-SCNC: 36 MMOL/L
TOTAL PROTEIN, URINE: 111 MG/DL (ref 0–12)
URINE TOTAL PROTEIN CREATININE RATIO: 0.57 (ref 0–0.2)
UUN UR-MCNC: 486 MG/DL (ref 800–1666)
WBC OTHER # BLD: 6.3 K/UL (ref 4.5–11.5)

## 2025-02-27 PROCEDURE — 36415 COLL VENOUS BLD VENIPUNCTURE: CPT

## 2025-02-27 PROCEDURE — 2500000003 HC RX 250 WO HCPCS: Performed by: INTERNAL MEDICINE

## 2025-02-27 PROCEDURE — 83605 ASSAY OF LACTIC ACID: CPT

## 2025-02-27 PROCEDURE — 99232 SBSQ HOSP IP/OBS MODERATE 35: CPT | Performed by: PHYSICIAN ASSISTANT

## 2025-02-27 PROCEDURE — 82010 KETONE BODYS QUAN: CPT

## 2025-02-27 PROCEDURE — 85027 COMPLETE CBC AUTOMATED: CPT

## 2025-02-27 PROCEDURE — 84540 ASSAY OF URINE/UREA-N: CPT

## 2025-02-27 PROCEDURE — 94640 AIRWAY INHALATION TREATMENT: CPT

## 2025-02-27 PROCEDURE — 6370000000 HC RX 637 (ALT 250 FOR IP): Performed by: INTERNAL MEDICINE

## 2025-02-27 PROCEDURE — 84156 ASSAY OF PROTEIN URINE: CPT

## 2025-02-27 PROCEDURE — 80048 BASIC METABOLIC PNL TOTAL CA: CPT

## 2025-02-27 PROCEDURE — 6360000002 HC RX W HCPCS: Performed by: INTERNAL MEDICINE

## 2025-02-27 PROCEDURE — 76770 US EXAM ABDO BACK WALL COMP: CPT

## 2025-02-27 PROCEDURE — 2700000000 HC OXYGEN THERAPY PER DAY

## 2025-02-27 PROCEDURE — 2580000003 HC RX 258: Performed by: INTERNAL MEDICINE

## 2025-02-27 PROCEDURE — 84300 ASSAY OF URINE SODIUM: CPT

## 2025-02-27 PROCEDURE — 51798 US URINE CAPACITY MEASURE: CPT

## 2025-02-27 PROCEDURE — 82570 ASSAY OF URINE CREATININE: CPT

## 2025-02-27 PROCEDURE — 84166 PROTEIN E-PHORESIS/URINE/CSF: CPT

## 2025-02-27 PROCEDURE — 1200000000 HC SEMI PRIVATE

## 2025-02-27 RX ORDER — TRALOKINUMAB-LDRM 300 MG/2ML
300 INJECTION, SOLUTION SUBCUTANEOUS
Status: ON HOLD | COMMUNITY
End: 2025-03-18 | Stop reason: HOSPADM

## 2025-02-27 RX ORDER — SODIUM BICARBONATE 650 MG/1
1300 TABLET ORAL 2 TIMES DAILY
Status: DISCONTINUED | OUTPATIENT
Start: 2025-02-27 | End: 2025-03-02

## 2025-02-27 RX ORDER — SODIUM CHLORIDE 9 MG/ML
INJECTION, SOLUTION INTRAVENOUS CONTINUOUS
Status: DISCONTINUED | OUTPATIENT
Start: 2025-02-27 | End: 2025-03-01

## 2025-02-27 RX ADMIN — OSELTAMIVIR PHOSPHATE 30 MG: 30 CAPSULE ORAL at 14:45

## 2025-02-27 RX ADMIN — IPRATROPIUM BROMIDE 0.5 MG: 0.5 SOLUTION RESPIRATORY (INHALATION) at 12:46

## 2025-02-27 RX ADMIN — ALBUTEROL SULFATE 2.5 MG: 2.5 SOLUTION RESPIRATORY (INHALATION) at 16:52

## 2025-02-27 RX ADMIN — IPRATROPIUM BROMIDE 0.5 MG: 0.5 SOLUTION RESPIRATORY (INHALATION) at 21:51

## 2025-02-27 RX ADMIN — ALBUTEROL SULFATE 2.5 MG: 2.5 SOLUTION RESPIRATORY (INHALATION) at 21:51

## 2025-02-27 RX ADMIN — ALBUTEROL SULFATE 2.5 MG: 2.5 SOLUTION RESPIRATORY (INHALATION) at 12:46

## 2025-02-27 RX ADMIN — ATORVASTATIN CALCIUM 10 MG: 20 TABLET, FILM COATED ORAL at 21:39

## 2025-02-27 RX ADMIN — PANTOPRAZOLE SODIUM 40 MG: 40 TABLET, DELAYED RELEASE ORAL at 10:17

## 2025-02-27 RX ADMIN — IPRATROPIUM BROMIDE 0.5 MG: 0.5 SOLUTION RESPIRATORY (INHALATION) at 09:38

## 2025-02-27 RX ADMIN — SODIUM CHLORIDE, PRESERVATIVE FREE 10 ML: 5 INJECTION INTRAVENOUS at 21:39

## 2025-02-27 RX ADMIN — ENOXAPARIN SODIUM 30 MG: 100 INJECTION SUBCUTANEOUS at 10:17

## 2025-02-27 RX ADMIN — ESCITALOPRAM OXALATE 10 MG: 10 TABLET ORAL at 21:39

## 2025-02-27 RX ADMIN — MONTELUKAST 10 MG: 10 TABLET, FILM COATED ORAL at 10:17

## 2025-02-27 RX ADMIN — ALBUTEROL SULFATE 2.5 MG: 2.5 SOLUTION RESPIRATORY (INHALATION) at 09:37

## 2025-02-27 RX ADMIN — BUDESONIDE 1000 MCG: 0.5 INHALANT RESPIRATORY (INHALATION) at 09:37

## 2025-02-27 RX ADMIN — SODIUM CHLORIDE, PRESERVATIVE FREE 10 ML: 5 INJECTION INTRAVENOUS at 10:18

## 2025-02-27 RX ADMIN — PREGABALIN 50 MG: 50 CAPSULE ORAL at 10:17

## 2025-02-27 RX ADMIN — SODIUM CHLORIDE: 0.9 INJECTION, SOLUTION INTRAVENOUS at 12:47

## 2025-02-27 RX ADMIN — ARFORMOTEROL TARTRATE 15 MCG: 15 SOLUTION RESPIRATORY (INHALATION) at 09:37

## 2025-02-27 RX ADMIN — PREGABALIN 50 MG: 50 CAPSULE ORAL at 21:39

## 2025-02-27 ASSESSMENT — PAIN SCALES - WONG BAKER
WONGBAKER_NUMERICALRESPONSE: NO HURT

## 2025-02-27 ASSESSMENT — PAIN SCALES - GENERAL
PAINLEVEL_OUTOF10: 0

## 2025-02-27 NOTE — PROGRESS NOTES
[facility] Medicine    Subjective:  pt alert conversive bun and cr elevated      Current Facility-Administered Medications:     0.9 % sodium chloride infusion, , IntraVENous, Continuous, Bryan Rivers DO    [COMPLETED] oseltamivir (TAMIFLU) capsule 75 mg, 75 mg, Oral, Once, 75 mg at 02/24/25 1316 **FOLLOWED BY** oseltamivir (TAMIFLU) capsule 30 mg, 30 mg, Oral, Daily, Bryan Rivers DO    enoxaparin Sodium (LOVENOX) injection 30 mg, 30 mg, SubCUTAneous, Daily, Bryan Rivers DO    albuterol (PROVENTIL) (2.5 MG/3ML) 0.083% nebulizer solution 2.5 mg, 2.5 mg, Nebulization, Q4H While awake, Bryan Rivers DO, 2.5 mg at 02/26/25 2130    guaiFENesin-dextromethorphan (ROBITUSSIN DM) 100-10 MG/5ML syrup 10 mL, 10 mL, Oral, Q6H PRN, Bryan Rivers DO, 10 mL at 02/26/25 2331    LORazepam (ATIVAN) tablet 1 mg, 1 mg, Oral, TID PRN, Bryan Rivers DO, 1 mg at 02/26/25 2330    lidocaine 1 % injection 5 mL, 5 mL, IntraDERmal, Once, Oliver Waters MD    atorvastatin (LIPITOR) tablet 10 mg, 10 mg, Oral, Nightly, Bryan Rivers DO, 10 mg at 02/26/25 2327    escitalopram (LEXAPRO) tablet 10 mg, 10 mg, Oral, Nightly, Bryan Rivers DO, 10 mg at 02/26/25 2329    budesonide (PULMICORT) nebulizer suspension 1,000 mcg, 1 mg, Nebulization, BID RT, 1,000 mcg at 02/26/25 2130 **AND** arformoterol tartrate (BROVANA) nebulizer solution 15 mcg, 15 mcg, Nebulization, BID RT, 15 mcg at 02/26/25 2130 **AND** ipratropium (ATROVENT) 0.02 % nebulizer solution 0.5 mg, 0.5 mg, Nebulization, Q6H RT, Bryan Rivers DO, 0.5 mg at 02/26/25 2129    montelukast (SINGULAIR) tablet 10 mg, 10 mg, Oral, Daily, Bryan Rivers DO, 10 mg at 02/26/25 0857    pantoprazole (PROTONIX) tablet 40 mg, 40 mg, Oral, Daily, Bryan Rivers DO, 40 mg at 02/26/25 0857    pregabalin (LYRICA) capsule 50 mg, 50 mg, Oral, BID, Bryan Rivers DO, 50 mg at 02/26/25 2329    sodium chloride flush 0.9 % injection 5-40 mL, 5-40 mL,  IntraVENous, 2 times per day, Bryan Rivers, , 10 mL at 02/26/25 2331    sodium chloride flush 0.9 % injection 5-40 mL, 5-40 mL, IntraVENous, PRN, Bryan Rivers,     0.9 % sodium chloride infusion, , IntraVENous, PRN, Bryan Rivers,     potassium chloride (KLOR-CON M) extended release tablet 40 mEq, 40 mEq, Oral, PRN **OR** potassium bicarb-citric acid (EFFER-K) effervescent tablet 40 mEq, 40 mEq, Oral, PRN **OR** potassium chloride 10 mEq/100 mL IVPB (Peripheral Line), 10 mEq, IntraVENous, PRN, Bryan Rivers,     magnesium sulfate 2000 mg in 50 mL IVPB premix, 2,000 mg, IntraVENous, PRN, Bryan Rivers,     polyethylene glycol (GLYCOLAX) packet 17 g, 17 g, Oral, Daily PRN, Bryan Rivers, , 17 g at 02/23/25 0842    acetaminophen (TYLENOL) tablet 650 mg, 650 mg, Oral, Q6H PRN, 650 mg at 02/25/25 2051 **OR** acetaminophen (TYLENOL) suppository 650 mg, 650 mg, Rectal, Q6H PRN, Bryan Rivers DO    HYDROcodone-acetaminophen (NORCO) 5-325 MG per tablet 1 tablet, 1 tablet, Oral, Q4H PRN, Monica Hansen PA, 1 tablet at 02/26/25 2328    ondansetron (ZOFRAN) injection 4 mg, 4 mg, IntraVENous, Q6H PRN, Oliver Waters MD, 4 mg at 02/19/25 2317    Objective:    BP (!) 102/56   Pulse (!) 115   Temp 97 °F (36.1 °C) (Temporal)   Resp 16   Ht 1.524 m (5')   Wt 64.4 kg (142 lb)   SpO2 90%   BMI 27.73 kg/m²     Heart:  reg tachy  Lungs:  diffuse rhonchi  Abd: + bs soft nontender  Extrem:  min edema legs    CBC with Differential:    Lab Results   Component Value Date/Time    WBC 9.0 02/26/2025 08:23 AM    RBC 3.49 02/26/2025 08:23 AM    HGB 10.4 02/26/2025 08:23 AM    HCT 34.0 02/26/2025 08:23 AM     02/26/2025 08:23 AM    MCV 97.4 02/26/2025 08:23 AM    MCH 29.8 02/26/2025 08:23 AM    MCHC 30.6 02/26/2025 08:23 AM    RDW 16.2 02/26/2025 08:23 AM    NRBC 0.0 03/10/2023 08:12 AM    LYMPHOPCT 13 02/24/2025 05:18 PM    MONOPCT 5 02/24/2025 05:18 PM    MYELOPCT 1 02/24/2025 05:18  PM    MYELOPCT 0.9 02/01/2023 09:10 AM    EOSPCT 1 02/24/2025 05:18 PM    BASOPCT 2 02/24/2025 05:18 PM    MONOSABS 0.51 02/24/2025 05:18 PM    LYMPHSABS 1.26 02/24/2025 05:18 PM    EOSABS 0.08 02/24/2025 05:18 PM    BASOSABS 0.17 02/24/2025 05:18 PM     CMP:    Lab Results   Component Value Date/Time     02/26/2025 08:23 AM    K 4.2 02/26/2025 08:23 AM    K 4.6 05/24/2023 11:30 AM     02/26/2025 08:23 AM    CO2 16 02/26/2025 08:23 AM    BUN 48 02/26/2025 08:23 AM    CREATININE 2.6 02/26/2025 08:23 AM    GFRAA >60 01/12/2019 04:00 AM    LABGLOM 18 02/26/2025 08:23 AM    LABGLOM 70 04/18/2024 09:02 PM    GLUCOSE 77 02/26/2025 08:23 AM    GLUCOSE 85 04/02/2012 08:30 AM    CALCIUM 7.9 02/26/2025 08:23 AM    BILITOT 0.2 02/19/2025 10:44 PM    ALKPHOS 81 02/19/2025 10:44 PM    AST 20 02/19/2025 10:44 PM    ALT 13 02/19/2025 10:44 PM     Warfarin PT/INR:    Lab Results   Component Value Date    INR 0.9 05/24/2023    INR 1.1 02/13/2023    INR 1.2 05/28/2017    PROTIME 10.2 05/24/2023    PROTIME 11.8 02/13/2023    PROTIME 13.2 (H) 05/28/2017       Assessment:    Principal Problem:    Compression fracture of L2 lumbar vertebra (HCC)  Resolved Problems:    * No resolved hospital problems. *  Influenza  kizzy    Plan:  [facility] renal to see serial lab        Bryan Rivers,   7:55 AM  2/27/2025

## 2025-02-27 NOTE — PLAN OF CARE
Problem: Discharge Planning  Goal: Discharge to home or other facility with appropriate resources  Outcome: Progressing     Problem: Pain  Goal: Verbalizes/displays adequate comfort level or baseline comfort level  Outcome: Progressing     Problem: Safety - Adult  Goal: Free from fall injury  Outcome: Progressing     Problem: ABCDS Injury Assessment  Goal: Absence of physical injury  Outcome: Progressing     Problem: Skin/Tissue Integrity  Goal: Skin integrity remains intact  Description: 1.  Monitor for areas of redness and/or skin breakdown  2.  Assess vascular access sites hourly  3.  Every 4-6 hours minimum:  Change oxygen saturation probe site  4.  Every 4-6 hours:  If on nasal continuous positive airway pressure, respiratory therapy assess nares and determine need for appliance change or resting period  Outcome: Progressing     Problem: Nutrition Deficit:  Goal: Optimize nutritional status  Outcome: Progressing

## 2025-02-27 NOTE — PROGRESS NOTES
Dr Rivers called and asked about ordering Adbry that she takes for dermatitis( Trial med)that the family brought in. Dr Rivers told this nurse that he will not order at this time and for family to take home.

## 2025-02-27 NOTE — PROGRESS NOTES
2/27/2025 5:40 PM  Haritha RUIZ [facility]  [id number]    Subjective:    Laying in bed  No distress  Has purewick  Urine is yellow  No family     Review of Systems  Constitutional: No fever or chills   Respiratory: negative for cough and hemoptysis  Cardiovascular: negative for chest pain and dyspnea  Gastrointestinal: negative for abdominal pain, diarrhea, nausea and vomiting   : See above  Derm: negative for rash and skin lesion(s)  Neurological: negative for seizures and tremors  Musculoskeletal: Negative    Psychiatric: Negative   All other reviews are negative      Scheduled Meds:   sodium bicarbonate  1,300 mg Oral BID    oseltamivir  30 mg Oral Daily    enoxaparin  30 mg SubCUTAneous Daily    albuterol  2.5 mg Nebulization Q4H While awake    lidocaine  5 mL IntraDERmal Once    atorvastatin  10 mg Oral Nightly    escitalopram  10 mg Oral Nightly    budesonide  1 mg Nebulization BID RT    And    arformoterol tartrate  15 mcg Nebulization BID RT    And    ipratropium  0.5 mg Nebulization Q6H RT    montelukast  10 mg Oral Daily    pantoprazole  40 mg Oral Daily    pregabalin  50 mg Oral BID    sodium chloride flush  5-40 mL IntraVENous 2 times per day       Objective:  Vitals:    02/27/25 1652   BP:    Pulse: (!) 106   Resp: 22   Temp:    SpO2: 96%         Allergies: Percocet [oxycodone-acetaminophen]    General Appearance: alert and oriented to person, place and time and in no acute distress  Skin: no rash or erythema  Head: normocephalic and atraumatic  Pulmonary/Chest: normal air movement, no respiratory distress  Abdomen: soft, non-tender, non-distended  Genitourinary: no meza   Extremities: no cyanosis, clubbing or edema         Labs:     Recent Labs     02/27/25  0813      K 3.9      CO2 16*   BUN 55*   CREATININE 2.8*   GLUCOSE 96   CALCIUM 7.7*       Lab Results   Component Value Date/Time    HGB 9.5 02/27/2025 08:13 AM    HCT 29.8 02/27/2025 08:13 AM       No results found for:

## 2025-02-27 NOTE — PROGRESS NOTES
Ha serve Dr. Dunn . Patients family brought up Adbry (tralokinumab-Idrm).  This is the patient home medication. They wanted to see if she can receive this medication.

## 2025-02-27 NOTE — CARE COORDINATION
2/27/2025social work transition of care planning  Pt plan is to [facility]. Auth expires today. Per attending,no ready today. Sw will follow.  Electronically signed by CHELSEY Wiseman on 2/27/2025 at 7:52 AM

## 2025-02-27 NOTE — PROGRESS NOTES
[facility]  [facility]  Progress Note      Chief Complaint   Patient presents with    Abdominal Pain     Patient from home called for 10/10 epigastric pain reproducible with palpation. EMS gave 50 mcg of fentanyl and 4mg of zofran PTA. Denies pain currently       SUBJECTIVE:    Patient is tolerating medications. No reported adverse drug reactions.  No nausea, vomiting, diarrhea.    Review of systems:  As stated above in the chief complaint, otherwise negative.    Medications:  Scheduled Meds:   sodium bicarbonate  1,300 mg Oral BID    oseltamivir  30 mg Oral Daily    enoxaparin  30 mg SubCUTAneous Daily    albuterol  2.5 mg Nebulization Q4H While awake    lidocaine  5 mL IntraDERmal Once    atorvastatin  10 mg Oral Nightly    escitalopram  10 mg Oral Nightly    budesonide  1 mg Nebulization BID RT    And    arformoterol tartrate  15 mcg Nebulization BID RT    And    ipratropium  0.5 mg Nebulization Q6H RT    montelukast  10 mg Oral Daily    pantoprazole  40 mg Oral Daily    pregabalin  50 mg Oral BID    sodium chloride flush  5-40 mL IntraVENous 2 times per day     Continuous Infusions:   sodium chloride 75 mL/hr at 25 1247    sodium chloride       PRN Meds:guaiFENesin-dextromethorphan, LORazepam, sodium chloride flush, sodium chloride, potassium chloride **OR** potassium alternative oral replacement **OR** potassium chloride, magnesium sulfate, polyethylene glycol, acetaminophen **OR** acetaminophen, HYDROcodone 5 mg - acetaminophen, ondansetron    OBJECTIVE:  BP (!) 93/52   Pulse (!) 102   Temp 97.6 °F (36.4 °C) (Temporal)   Resp 16   Ht 1.524 m (5')   Wt 64.4 kg (142 lb)   SpO2 94%   BMI 27.73 kg/m²   Temp  Av.3 °F (36.3 °C)  Min: 97 °F (36.1 °C)  Max: 97.6 °F (36.4 °C)  Constitutional: The patient is awake, alert, and oriented.   Skin: Warm and dry. No rashes were noted. No jaundice.  HEENT: Eyes show round, and reactive pupils. Moist mucous membranes, no  ulcerations, no thrush.   Neck: Supple to movements. No lymphadenopathy.   Chest: No use of accessory muscles to breathe. Symmetrical expansion. Auscultation reveals no wheezing, crackles, or rhonchi.   Cardiovascular: S1 and S2 are rhythmic and regular. No murmurs appreciated.   Abdomen: Positive bowel sounds to auscultation. Benign to palpation. No masses felt. No hepatosplenomegaly.  Genitourinary: No pain in the lower abdomen  Extremities: No clubbing, no cyanosis, no edema.  Musculoskeletal: No pain in range of motion of any joints  Neurological: Following commands, no focal neurodeficit  Lines: peripheral    Laboratory and Tests Review:  Lab Results   Component Value Date    WBC 6.3 02/27/2025    WBC 9.0 02/26/2025    WBC 9.6 02/24/2025    HGB 9.5 (L) 02/27/2025    HCT 29.8 (L) 02/27/2025    MCV 94.3 02/27/2025     02/27/2025     Lab Results   Component Value Date    NEUTROABS 7.49 (H) 02/24/2025    NEUTROABS 8.50 (H) 02/23/2025    NEUTROABS 8.46 (H) 02/19/2025     No results found for: \"CRPHS\"  Lab Results   Component Value Date    ALT 13 02/19/2025    AST 20 02/19/2025    ALKPHOS 81 02/19/2025    BILITOT 0.2 02/19/2025     Lab Results   Component Value Date/Time     02/27/2025 08:13 AM    K 3.9 02/27/2025 08:13 AM    K 4.6 05/24/2023 11:30 AM     02/27/2025 08:13 AM    CO2 16 02/27/2025 08:13 AM    BUN 55 02/27/2025 08:13 AM    CREATININE 2.8 02/27/2025 08:13 AM    CREATININE 2.6 02/26/2025 08:23 AM    CREATININE 1.0 02/23/2025 11:35 AM    GFRAA >60 01/12/2019 04:00 AM    LABGLOM 17 02/27/2025 08:13 AM    LABGLOM 70 04/18/2024 09:02 PM    GLUCOSE 96 02/27/2025 08:13 AM    GLUCOSE 85 04/02/2012 08:30 AM    CALCIUM 7.7 02/27/2025 08:13 AM    BILITOT 0.2 02/19/2025 10:44 PM    ALKPHOS 81 02/19/2025 10:44 PM    AST 20 02/19/2025 10:44 PM    ALT 13 02/19/2025 10:44 PM     Lab Results   Component Value Date    CRP 21.0 (H) 11/04/2024    CRP 13.8 (H) 03/10/2023    CRP 12.8 (H) 02/21/2023     Lab

## 2025-02-27 NOTE — PROGRESS NOTES
Palliative Care Department  [phone]  Palliative Care Progress Note  Provider Ines Uribe PA-C     Haritha Dominguez  [id number]  Hospital Day: 9  Date of Initial Consult: 02/23/2025  Referring Provider: Bryan Rivers DO   Palliative Medicine was consulted for assistance with: \"declining in health, sleeping 22 hrs per day, refusing to eat\"    HPI:   Haritha Dominguez is a 80 y.o. with a medical history of thoracic and lumbar compression fractures, B12 deficiency, hypertension, chronic back pain, asthma who was admitted on 2/19/2025 from home with a CHIEF COMPLAINT of abdominal pain and low back pain for 1 day.  In the ED, CT scan showing moderate to large hiatal hernia, moderate stool burden, 1.3 cm low attenuating lesion along the pancreatic body/tail, L2 compression fracture.  She was admitted for pain control and further workup.  CT lumbar spine without contrast showing new acute appearing fractures involving superior endplate of L2, stable chronic compression fracture involving L1, bilateral sacral alae fractures of uncertain chronicity, generalized osteopenia, stable alignment of lumbar spine with posterior fusion hardware at L3-S1 and multilevel laminectomy.  Oncology consulted for pancreatic lesion.  They recommended MRI.  Patient refused.  Neurosurgery consulted for L2 compression fracture, recommending brace.  During the course of her hospitalization, patient had further decline with lethargy, refusing to eat, intermittent confusion.  Palliative care consulted for further assistance    2/24: Influenza A positive  2/27: DNR [facility]  ASSESSMENT/PLAN:     Pertinent Hospital Diagnoses     Acute L2 compression fracture  1.3 cm pancreatic body/tail lesion  Influenza A      Palliative Care Encounter / Counseling Regarding Goals of Care  Please see detailed goals of care discussion as below  At this time, Haritha Dominguez, Does Not have capacity for medical decision-making.  Capacity is time limited and

## 2025-02-27 NOTE — CONSULTS
[facility]  Nephrology Consult Note    Patient's Name: Haritha Dominguez    Reason for Consult: KATELYN    Chief Complaint:  Abdominal pain  History Obtained From:  patient, past medical records, and EMR    History of Present Illness:    Haritha Dominguez is a 80 y.o. female with a past medical history of hypertension, hyperlipidemia, anxiety, arthritis, and asthma.  She presented to the ED on 2/19 reportedly for concerns of abdominal pain.  Vital signs on 2/19 includes temperature 99.3, respirations 18, pulse 103, /85, and she was 92% SpO2.  Lab data on 2/19 includes CO2 20, BUN 22, creatinine 1, troponin 18, WBC 12.8 K, and hemoglobin 10.5.  Her UA on 2/20 showed specific gravity 1.025, 30 protein, trace leukocyte esterase, 1+ bacteria.  She had a CT abdomen/pelvis with IV contrast on 2/20 which showed no acute intra-abdominal pelvic process appreciated, moderate-large hiatal hernia, moderate colonic stool burden, 1.3 cm low attenuating lesion along pancreatic body/tail.  She underwent a CT lumbar spine on 2/20 which showed new acute appearing fracture seen involving superior endplate of L2, stable chronic compression fracture involving L1, bilateral sacral ala fractures of uncertain chronicity, generalized osteopenia.  She was seen by neurosurgery.  She was seen by hepatobiliary surgery regarding pancreatic lesion.  She had a chest x-ray on 2/24 which showed increased interstitial markings seen in the perihilar regions to suggest mild interstitial edema with small bilateral pleural effusions.  She was found to be influenza A positive on 2/24. She was seen by urology for concerns of urinary retention.  ID is following the patient.  Nephrology has been consulted to see the patient for concerns of KATELYN. She has a baseline serum creatinine of 0.8-1 mg/dL.  At present, patient was seen and examined.  She is awake, alert, appears in no acute distress.  She appears to be a questionable historian at this time.  She  arrhythmia    Abrasion of skin of left lower leg    Abrasion of skin of right lower leg    Itching    Decreased dorsalis pedis pulse    Compression fracture of L2 lumbar vertebra (HCC)       Diet:    ADULT DIET; Dysphagia - Soft and Bite Sized  ADULT ORAL NUTRITION SUPPLEMENT; Breakfast, Lunch; Standard High Calorie/High Protein Oral Supplement  ADULT ORAL NUTRITION SUPPLEMENT; Dinner; Frozen Oral Supplement    Meds:     oseltamivir  30 mg Oral Daily    enoxaparin  30 mg SubCUTAneous Daily    albuterol  2.5 mg Nebulization Q4H While awake    lidocaine  5 mL IntraDERmal Once    atorvastatin  10 mg Oral Nightly    escitalopram  10 mg Oral Nightly    budesonide  1 mg Nebulization BID RT    And    arformoterol tartrate  15 mcg Nebulization BID RT    And    ipratropium  0.5 mg Nebulization Q6H RT    montelukast  10 mg Oral Daily    pantoprazole  40 mg Oral Daily    pregabalin  50 mg Oral BID    sodium chloride flush  5-40 mL IntraVENous 2 times per day        sodium chloride      sodium chloride         Meds prn:     guaiFENesin-dextromethorphan, LORazepam, sodium chloride flush, sodium chloride, potassium chloride **OR** potassium alternative oral replacement **OR** potassium chloride, magnesium sulfate, polyethylene glycol, acetaminophen **OR** acetaminophen, HYDROcodone 5 mg - acetaminophen, ondansetron    Meds prior to admission:     No current facility-administered medications on file prior to encounter.     Current Outpatient Medications on File Prior to Encounter   Medication Sig Dispense Refill    hydrocortisone (CORTEF) 10 MG tablet Take 1 tablet by mouth 2 times daily      pantoprazole (PROTONIX) 40 MG tablet Take 1 tablet by mouth in the morning and 1 tablet in the evening.      ferrous sulfate (IRON 325) 325 (65 Fe) MG tablet Take 1 tablet by mouth daily (with breakfast)      albuterol sulfate HFA (VENTOLIN HFA) 108 (90 Base) MCG/ACT inhaler Inhale 2 puffs into the lungs every 6 hours as needed for Wheezing    growth  On Tamiflu  Defer to ID    3.  Intermittent hypotension  On IV fluids  Monitor BPs    4.  High anion gap metabolic acidosis  Likely with KATELYN  Anion gap 19/CO2 16 on 2/27  Lactic acid 1.1/beta-hydroxybutyrate 0.71 on 2/27  Start sodium HCO3 1300 mg oral twice daily   Monitor labs    5.  Anemia  Hemoglobin 9.5 g/dL today  Check iron studies, B12, folate, SPEP, UPEP  Transfuse for hemoglobin<7-as per primary service  Monitor H&H    6.  Hypocalcemia  Suspect with hypoalbuminemia  Check ionized calcium in a.m.  Monitor labs    7.  Pancreatic lesion  CT ABD 2/20-1.3 cm low attenuating lesion along pancreatic body/tail  Defer to hepatobiliary surgery    8.  Compression fracture  CT lumbar spine 2/20-new acute appearing fx seen involving superior endplate of L2, stable chronic compression fx involving L1, bilateral sacral ala fx of uncertain chronicity, generalized osteopenia  Defer to neurosurgery      Patient seen and examined all key components of the physical performed independently , case discussed with NP, all pertinent labs and radiologic tests personally reviewed agree with above.      MD Ines Grady, APRN - CNP

## 2025-02-28 ENCOUNTER — APPOINTMENT (OUTPATIENT)
Dept: GENERAL RADIOLOGY | Age: 80
End: 2025-02-28
Payer: MEDICARE

## 2025-02-28 LAB
ALBUMIN SERPL-MCNC: 2.2 G/DL (ref 3.5–4.7)
ALBUMIN SERPL-MCNC: 3 G/DL (ref 3.5–5.2)
ALP SERPL-CCNC: 78 U/L (ref 35–104)
ALPHA1 GLOB SERPL ELPH-MCNC: 0.5 G/DL (ref 0.2–0.4)
ALPHA2 GLOB SERPL ELPH-MCNC: 1.3 G/DL (ref 0.5–1)
ALT SERPL-CCNC: 22 U/L (ref 0–32)
ANION GAP SERPL CALCULATED.3IONS-SCNC: 18 MMOL/L (ref 7–16)
AST SERPL-CCNC: 47 U/L (ref 0–31)
B-GLOBULIN SERPL ELPH-MCNC: 0.9 G/DL (ref 0.8–1.3)
BILIRUB SERPL-MCNC: 0.2 MG/DL (ref 0–1.2)
BUN SERPL-MCNC: 45 MG/DL (ref 6–23)
CA-I BLD-SCNC: 1.15 MMOL/L (ref 1.15–1.33)
CALCIUM SERPL-MCNC: 8.4 MG/DL (ref 8.6–10.2)
CHLORIDE SERPL-SCNC: 105 MMOL/L (ref 98–107)
CO2 SERPL-SCNC: 20 MMOL/L (ref 22–29)
CREAT SERPL-MCNC: 1.6 MG/DL (ref 0.5–1)
ERYTHROCYTE [DISTWIDTH] IN BLOOD BY AUTOMATED COUNT: 15.9 % (ref 11.5–15)
FERRITIN SERPL-MCNC: 442 NG/ML
FOLATE SERPL-MCNC: 17.9 NG/ML (ref 4.8–24.2)
GAMMA GLOB SERPL ELPH-MCNC: 0.8 G/DL (ref 0.7–1.6)
GFR, ESTIMATED: 33 ML/MIN/1.73M2
GLUCOSE SERPL-MCNC: 102 MG/DL (ref 74–99)
HCT VFR BLD AUTO: 30.6 % (ref 34–48)
HGB BLD-MCNC: 9.8 G/DL (ref 11.5–15.5)
IRON SATN MFR SERPL: 8 % (ref 15–50)
IRON SERPL-MCNC: 16 UG/DL (ref 37–145)
MAGNESIUM SERPL-MCNC: 1.9 MG/DL (ref 1.6–2.6)
MCH RBC QN AUTO: 30.1 PG (ref 26–35)
MCHC RBC AUTO-ENTMCNC: 32 G/DL (ref 32–34.5)
MCV RBC AUTO: 93.9 FL (ref 80–99.9)
P E INTERPRETATION, U: NORMAL
PATHOLOGIST: ABNORMAL
PATHOLOGIST: NORMAL
PHOSPHATE SERPL-MCNC: 2.6 MG/DL (ref 2.5–4.5)
PLATELET # BLD AUTO: 250 K/UL (ref 130–450)
PMV BLD AUTO: 12.5 FL (ref 7–12)
POTASSIUM SERPL-SCNC: 3.5 MMOL/L (ref 3.5–5)
PROT PATTERN SERPL ELPH-IMP: ABNORMAL
PROT SERPL-MCNC: 5.6 G/DL (ref 6.4–8.3)
PROT SERPL-MCNC: 6.5 G/DL (ref 6.4–8.3)
RBC # BLD AUTO: 3.26 M/UL (ref 3.5–5.5)
SODIUM SERPL-SCNC: 143 MMOL/L (ref 132–146)
SPECIMEN TYPE: NORMAL
TIBC SERPL-MCNC: 198 UG/DL (ref 250–450)
VIT B12 SERPL-MCNC: 1002 PG/ML (ref 211–946)
WBC OTHER # BLD: 6.1 K/UL (ref 4.5–11.5)

## 2025-02-28 PROCEDURE — 84155 ASSAY OF PROTEIN SERUM: CPT

## 2025-02-28 PROCEDURE — 36415 COLL VENOUS BLD VENIPUNCTURE: CPT

## 2025-02-28 PROCEDURE — 2700000000 HC OXYGEN THERAPY PER DAY

## 2025-02-28 PROCEDURE — 83540 ASSAY OF IRON: CPT

## 2025-02-28 PROCEDURE — 94640 AIRWAY INHALATION TREATMENT: CPT

## 2025-02-28 PROCEDURE — 6370000000 HC RX 637 (ALT 250 FOR IP): Performed by: INTERNAL MEDICINE

## 2025-02-28 PROCEDURE — 84100 ASSAY OF PHOSPHORUS: CPT

## 2025-02-28 PROCEDURE — 6370000000 HC RX 637 (ALT 250 FOR IP)

## 2025-02-28 PROCEDURE — 97530 THERAPEUTIC ACTIVITIES: CPT

## 2025-02-28 PROCEDURE — 6360000002 HC RX W HCPCS: Performed by: INTERNAL MEDICINE

## 2025-02-28 PROCEDURE — 82746 ASSAY OF FOLIC ACID SERUM: CPT

## 2025-02-28 PROCEDURE — 1200000000 HC SEMI PRIVATE

## 2025-02-28 PROCEDURE — 82607 VITAMIN B-12: CPT

## 2025-02-28 PROCEDURE — 83550 IRON BINDING TEST: CPT

## 2025-02-28 PROCEDURE — 84165 PROTEIN E-PHORESIS SERUM: CPT

## 2025-02-28 PROCEDURE — 80053 COMPREHEN METABOLIC PANEL: CPT

## 2025-02-28 PROCEDURE — 71045 X-RAY EXAM CHEST 1 VIEW: CPT

## 2025-02-28 PROCEDURE — 97535 SELF CARE MNGMENT TRAINING: CPT

## 2025-02-28 PROCEDURE — 83735 ASSAY OF MAGNESIUM: CPT

## 2025-02-28 PROCEDURE — 85027 COMPLETE CBC AUTOMATED: CPT

## 2025-02-28 PROCEDURE — 82728 ASSAY OF FERRITIN: CPT

## 2025-02-28 PROCEDURE — 2500000003 HC RX 250 WO HCPCS: Performed by: INTERNAL MEDICINE

## 2025-02-28 PROCEDURE — 82330 ASSAY OF CALCIUM: CPT

## 2025-02-28 RX ORDER — IPRATROPIUM BROMIDE AND ALBUTEROL SULFATE 2.5; .5 MG/3ML; MG/3ML
1 SOLUTION RESPIRATORY (INHALATION)
Status: DISCONTINUED | OUTPATIENT
Start: 2025-02-28 | End: 2025-03-14 | Stop reason: HOSPADM

## 2025-02-28 RX ADMIN — GUAIFENESIN SYRUP AND DEXTROMETHORPHAN 10 ML: 100; 10 SYRUP ORAL at 14:30

## 2025-02-28 RX ADMIN — ACETAMINOPHEN 650 MG: 325 TABLET ORAL at 14:30

## 2025-02-28 RX ADMIN — ARFORMOTEROL TARTRATE 15 MCG: 15 SOLUTION RESPIRATORY (INHALATION) at 21:22

## 2025-02-28 RX ADMIN — ALBUTEROL SULFATE 2.5 MG: 2.5 SOLUTION RESPIRATORY (INHALATION) at 01:19

## 2025-02-28 RX ADMIN — PREGABALIN 50 MG: 50 CAPSULE ORAL at 09:25

## 2025-02-28 RX ADMIN — ENOXAPARIN SODIUM 30 MG: 100 INJECTION SUBCUTANEOUS at 09:26

## 2025-02-28 RX ADMIN — BUDESONIDE 1000 MCG: 0.5 INHALANT RESPIRATORY (INHALATION) at 09:50

## 2025-02-28 RX ADMIN — ALBUTEROL SULFATE 2.5 MG: 2.5 SOLUTION RESPIRATORY (INHALATION) at 09:49

## 2025-02-28 RX ADMIN — LORAZEPAM 1 MG: 1 TABLET ORAL at 21:07

## 2025-02-28 RX ADMIN — MONTELUKAST 10 MG: 10 TABLET, FILM COATED ORAL at 09:26

## 2025-02-28 RX ADMIN — IPRATROPIUM BROMIDE AND ALBUTEROL SULFATE 1 DOSE: 2.5; .5 SOLUTION RESPIRATORY (INHALATION) at 21:22

## 2025-02-28 RX ADMIN — SODIUM CHLORIDE, PRESERVATIVE FREE 10 ML: 5 INJECTION INTRAVENOUS at 21:09

## 2025-02-28 RX ADMIN — OSELTAMIVIR PHOSPHATE 30 MG: 30 CAPSULE ORAL at 09:26

## 2025-02-28 RX ADMIN — ARFORMOTEROL TARTRATE 15 MCG: 15 SOLUTION RESPIRATORY (INHALATION) at 09:50

## 2025-02-28 RX ADMIN — ATORVASTATIN CALCIUM 10 MG: 20 TABLET, FILM COATED ORAL at 21:07

## 2025-02-28 RX ADMIN — SODIUM BICARBONATE 1300 MG: 650 TABLET ORAL at 09:25

## 2025-02-28 RX ADMIN — PANTOPRAZOLE SODIUM 40 MG: 40 TABLET, DELAYED RELEASE ORAL at 09:25

## 2025-02-28 RX ADMIN — SODIUM BICARBONATE 1300 MG: 650 TABLET ORAL at 21:07

## 2025-02-28 RX ADMIN — PREGABALIN 50 MG: 50 CAPSULE ORAL at 21:07

## 2025-02-28 RX ADMIN — ACETAMINOPHEN 650 MG: 325 TABLET ORAL at 21:07

## 2025-02-28 RX ADMIN — IPRATROPIUM BROMIDE 0.5 MG: 0.5 SOLUTION RESPIRATORY (INHALATION) at 01:19

## 2025-02-28 RX ADMIN — BUDESONIDE 1000 MCG: 0.5 INHALANT RESPIRATORY (INHALATION) at 21:22

## 2025-02-28 RX ADMIN — ESCITALOPRAM OXALATE 10 MG: 10 TABLET ORAL at 21:07

## 2025-02-28 RX ADMIN — IPRATROPIUM BROMIDE 0.5 MG: 0.5 SOLUTION RESPIRATORY (INHALATION) at 09:50

## 2025-02-28 RX ADMIN — IPRATROPIUM BROMIDE AND ALBUTEROL SULFATE 1 DOSE: 2.5; .5 SOLUTION RESPIRATORY (INHALATION) at 17:27

## 2025-02-28 ASSESSMENT — PAIN DESCRIPTION - LOCATION: LOCATION: BACK

## 2025-02-28 ASSESSMENT — PAIN DESCRIPTION - PAIN TYPE: TYPE: SURGICAL PAIN

## 2025-02-28 ASSESSMENT — PAIN DESCRIPTION - ONSET: ONSET: ON-GOING

## 2025-02-28 ASSESSMENT — PAIN DESCRIPTION - FREQUENCY: FREQUENCY: CONTINUOUS

## 2025-02-28 ASSESSMENT — PAIN DESCRIPTION - DESCRIPTORS: DESCRIPTORS: ACHING;THROBBING;DISCOMFORT

## 2025-02-28 ASSESSMENT — PAIN DESCRIPTION - ORIENTATION: ORIENTATION: LOWER

## 2025-02-28 ASSESSMENT — PAIN - FUNCTIONAL ASSESSMENT: PAIN_FUNCTIONAL_ASSESSMENT: ACTIVITIES ARE NOT PREVENTED

## 2025-02-28 ASSESSMENT — PAIN SCALES - GENERAL
PAINLEVEL_OUTOF10: 5
PAINLEVEL_OUTOF10: 0

## 2025-02-28 NOTE — PROGRESS NOTES
[facility]  Nephrology Progress Note    Patient's Name: Haritha Dominguez    History of Present Illness from 2/27 consult note:    \"Haritha Dominguez is a 80 y.o. female with a past medical history of hypertension, hyperlipidemia, anxiety, arthritis, and asthma.  She presented to the ED on 2/19 reportedly for concerns of abdominal pain.  Vital signs on 2/19 includes temperature 99.3, respirations 18, pulse 103, /85, and she was 92% SpO2.  Lab data on 2/19 includes CO2 20, BUN 22, creatinine 1, troponin 18, WBC 12.8 K, and hemoglobin 10.5.  Her UA on 2/20 showed specific gravity 1.025, 30 protein, trace leukocyte esterase, 1+ bacteria.  She had a CT abdomen/pelvis with IV contrast on 2/20 which showed no acute intra-abdominal pelvic process appreciated, moderate-large hiatal hernia, moderate colonic stool burden, 1.3 cm low attenuating lesion along pancreatic body/tail.  She underwent a CT lumbar spine on 2/20 which showed new acute appearing fracture seen involving superior endplate of L2, stable chronic compression fracture involving L1, bilateral sacral ala fractures of uncertain chronicity, generalized osteopenia.  She was seen by neurosurgery.  She was seen by hepatobiliary surgery regarding pancreatic lesion.  She had a chest x-ray on 2/24 which showed increased interstitial markings seen in the perihilar regions to suggest mild interstitial edema with small bilateral pleural effusions.  She was found to be influenza A positive on 2/24. She was seen by urology for concerns of urinary retention.  ID is following the patient.  Nephrology has been consulted to see the patient for concerns of KATELYN. She has a baseline serum creatinine of 0.8-1 mg/dL.  At present, patient was seen and examined.  She is awake, alert, appears in no acute distress.  She appears to be a questionable historian at this time.  She notes that her appetite is okay.  She denies any dizziness.  Visitors at  needed  Avoid nephrotoxins/NSAIDs  Strict I&O, daily weights  On IV fluids  Monitor labs    2.  Fever/influenza A  Influenza A (+) 2/24  Temperature noted at 102.7 on 2/25  Blood cultures no growth  On Tamiflu  Defer to ID    3.  Intermittent hypotension  BP appears improved  On IV fluids  Monitor BPs    4.  High anion gap metabolic acidosis  Likely with KATELYN  Anion gap 19/CO2 16 on 2/27--> CO2 20 today  Lactic acid 1.1/beta-hydroxybutyrate 0.71 on 2/27  sodium HCO3 1300 mg oral twice daily   Monitor labs    5.  Anemia  Hemoglobin 9.8 g/dL today  Iron studies 2/2025: Ferritin 442, iron 16, TIBC 198, folate 17.9, B12 1002, iron sat 8%  SPEP/UPEP-no monoclonal protein  Transfuse for hemoglobin<7-as per primary service  Monitor H&H    6.  Hypocalcemia  Suspect with hypoalbuminemia  Ionized calcium 1.15-WNL  Monitor labs    7.  Pancreatic lesion  CT ABD 2/20-1.3 cm low attenuating lesion along pancreatic body/tail  Defer to hepatobiliary surgery    8.  Compression fracture  CT lumbar spine 2/20-new acute appearing fx seen involving superior endplate of L2, stable chronic compression fx involving L1, bilateral sacral ala fx of uncertain chronicity, generalized osteopenia  Defer to neurosurgery    Ines Gauthier, APRN - CNP    Patient seen and examined all key components of the physical performed independently , case discussed with NP, all pertinent labs and radiologic tests personally reviewed agree with above.      Sumit Tian MD  `

## 2025-02-28 NOTE — PROGRESS NOTES
[facility]  [facility]  Progress Note      Chief Complaint   Patient presents with    Abdominal Pain     Patient from home called for 10/10 epigastric pain reproducible with palpation. EMS gave 50 mcg of fentanyl and 4mg of zofran PTA. Denies pain currently       SUBJECTIVE:    Patient is tolerating medications. No reported adverse drug reactions.  No nausea, vomiting, diarrhea. Reports cough that gags her. Feeling about the same.     Review of systems:  As stated above in the chief complaint, otherwise negative.    Medications:  Scheduled Meds:   sodium bicarbonate  1,300 mg Oral BID    enoxaparin  30 mg SubCUTAneous Daily    albuterol  2.5 mg Nebulization Q4H While awake    lidocaine  5 mL IntraDERmal Once    atorvastatin  10 mg Oral Nightly    escitalopram  10 mg Oral Nightly    budesonide  1 mg Nebulization BID RT    And    arformoterol tartrate  15 mcg Nebulization BID RT    And    ipratropium  0.5 mg Nebulization Q6H RT    montelukast  10 mg Oral Daily    pantoprazole  40 mg Oral Daily    pregabalin  50 mg Oral BID    sodium chloride flush  5-40 mL IntraVENous 2 times per day     Continuous Infusions:   sodium chloride 75 mL/hr at 25 0719    sodium chloride       PRN Meds:guaiFENesin-dextromethorphan, LORazepam, sodium chloride flush, sodium chloride, potassium chloride **OR** potassium alternative oral replacement **OR** potassium chloride, magnesium sulfate, polyethylene glycol, acetaminophen **OR** acetaminophen, HYDROcodone 5 mg - acetaminophen, ondansetron    OBJECTIVE:  /65   Pulse (!) 107   Temp 97.6 °F (36.4 °C) (Temporal)   Resp 20   Ht 1.524 m (5')   Wt 64.4 kg (142 lb)   SpO2 95%   BMI 27.73 kg/m²   Temp  Av.7 °F (36.5 °C)  Min: 97.6 °F (36.4 °C)  Max: 97.8 °F (36.6 °C)  Constitutional: The patient is awake, alert, and oriented. Ill appearing  Skin: Warm and dry. No rashes were noted. No jaundice.  HEENT:  Moist mucous membranes, no ulcerations,  Results   Component Value Date    SEDRATE 72 (H) 11/04/2024    SEDRATE 61 (H) 03/10/2023    SEDRATE 26 (H) 02/21/2023     Radiology:      Microbiology:   Lab Results   Component Value Date/Time    BC 5 Days no growth 03/09/2023 08:30 PM    BC 5 Days no growth 02/20/2023 05:43 PM    BC 5 Days no growth 02/02/2023 03:35 PM    ORG GNR oxidase positive 03/09/2023 08:19 PM    ORG Candida albicans 02/20/2023 06:30 PM    ORG Staphylococcus coagulase-negative 01/31/2023 11:25 PM     Lab Results   Component Value Date/Time    BLOODCULT2 5 Days no growth 03/09/2023 09:45 PM    BLOODCULT2 5 Days no growth 02/20/2023 05:43 PM    BLOODCULT2 5 Days no growth 02/02/2023 03:35 PM    ORG GNR oxidase positive 03/09/2023 08:19 PM    ORG Candida albicans 02/20/2023 06:30 PM    ORG Staphylococcus coagulase-negative 01/31/2023 11:25 PM     WOUND/ABSCESS   Date Value Ref Range Status   04/30/2020 Growth not present  Final   09/26/2017   Final    Moderate growth  This isolate is presumed to be resistant based on the  detection of inducible Clindamycin resistance.  Clindamycin  may still be effective in some patients.       No results found for: \"RESPSMEAR\"      Component Value Date/Time    MPNEUMO Not Detected 02/24/2025 0700     No results found for: \"CULTRESP\"  No results found for: \"CXCATHTIP\"  Body Fluid Culture, Sterile   Date Value Ref Range Status   09/30/2017 Neisseria gonorrhoeae not isolated (A)  Final   09/30/2017 One colony  Final     No results found for: \"CXSURG\"  Urine Culture, Routine   Date Value Ref Range Status   05/24/2023 <10,000 CFU/mL  Mixed gram positive organisms    Final   03/09/2023 >100,000 CFU/ml  Final   02/20/2023 >100,000 CFU/ml  Final     MRSA Culture Only   Date Value Ref Range Status   03/10/2023 Methicillin resistant Staph aureus not isolated  Final   02/21/2023 Methicillin resistant Staph aureus not isolated  Final   02/01/2023 Methicillin resistant Staph aureus not isolated  Final

## 2025-02-28 NOTE — PROGRESS NOTES
Hospital Medicine    Subjective:  pt alert conversive      Current Facility-Administered Medications:     0.9 % sodium chloride infusion, , IntraVENous, Continuous, Bryan Rivers DO, Last Rate: 75 mL/hr at 02/27/25 1247, New Bag at 02/27/25 1247    sodium bicarbonate tablet 1,300 mg, 1,300 mg, Oral, BID, Ines Gauthier, APRN - CNP    [COMPLETED] oseltamivir (TAMIFLU) capsule 75 mg, 75 mg, Oral, Once, 75 mg at 02/24/25 1316 **FOLLOWED BY** oseltamivir (TAMIFLU) capsule 30 mg, 30 mg, Oral, Daily, Bryan Rivers DO, 30 mg at 02/27/25 1445    enoxaparin Sodium (LOVENOX) injection 30 mg, 30 mg, SubCUTAneous, Daily, Bryan Rivers DO, 30 mg at 02/27/25 1017    albuterol (PROVENTIL) (2.5 MG/3ML) 0.083% nebulizer solution 2.5 mg, 2.5 mg, Nebulization, Q4H While awake, Bryan Rivers DO, 2.5 mg at 02/28/25 0119    guaiFENesin-dextromethorphan (ROBITUSSIN DM) 100-10 MG/5ML syrup 10 mL, 10 mL, Oral, Q6H PRN, Bryan Rivers DO, 10 mL at 02/26/25 2331    LORazepam (ATIVAN) tablet 1 mg, 1 mg, Oral, TID PRN, Bryan Rivers DO, 1 mg at 02/26/25 2330    lidocaine 1 % injection 5 mL, 5 mL, IntraDERmal, Once, Oliver Waters MD    atorvastatin (LIPITOR) tablet 10 mg, 10 mg, Oral, Nightly, Bryan Rivers DO, 10 mg at 02/27/25 2139    escitalopram (LEXAPRO) tablet 10 mg, 10 mg, Oral, Nightly, Bryan Rivers DO, 10 mg at 02/27/25 2139    budesonide (PULMICORT) nebulizer suspension 1,000 mcg, 1 mg, Nebulization, BID RT, 1,000 mcg at 02/27/25 0937 **AND** arformoterol tartrate (BROVANA) nebulizer solution 15 mcg, 15 mcg, Nebulization, BID RT, 15 mcg at 02/27/25 0937 **AND** ipratropium (ATROVENT) 0.02 % nebulizer solution 0.5 mg, 0.5 mg, Nebulization, Q6H RT, Bryan Rivers, , 0.5 mg at 02/28/25 0119    montelukast (SINGULAIR) tablet 10 mg, 10 mg, Oral, Daily, Bryan Rivers DO, 10 mg at 02/27/25 1017    pantoprazole (PROTONIX) tablet 40 mg, 40 mg, Oral, Daily, Bryan Rivers, , 40 mg

## 2025-02-28 NOTE — CARE COORDINATION
2/28/2025social work transition of care planning  Pt plan is to [facility],once medically stable. Will need updated therapy for new auth.  Electronically signed by CHELSEY Wiseman on 2/28/2025 at 9:43 AM    Addendum: Sw set up will call ambulance with pas [phone].  Electronically signed by CHELSEY Wiseman on 2/28/2025 at 1:55 PM

## 2025-02-28 NOTE — PLAN OF CARE
Problem: Discharge Planning  Goal: Discharge to home or other facility with appropriate resources  2/27/2025 2221 by Connie Solares, RN  Outcome: Progressing     Problem: Pain  Goal: Verbalizes/displays adequate comfort level or baseline comfort level  2/27/2025 2221 by Connie Solares, RN  Outcome: Progressing     Problem: Safety - Adult  Goal: Free from fall injury  2/27/2025 2221 by Connie Solares, RN  Outcome: Progressing

## 2025-02-28 NOTE — PROGRESS NOTES
Occupational Therapy  OT BEDSIDE TREATMENT NOTE   [facility]  [address], [place]      Date:2025  Patient Name: Haritha Dominguez  MRN: [id number]  : 1945  Room: [address] [place] [address]     Evaluating OT: Matt Soriano OTR/L; [id number]        Referring Provider: Bryan Rivers DO     Specific Provider Orders/Date: OT Eval and Treat 25 1300        Diagnosis: Pt c/o LBP worsening ~1 day PTA. CT scan ID'd L2 compression fx.     Surgery: None this admission.     Pertinent Medical History:  has a past medical history of Abrasion of skin of left lower leg, Abrasion of skin of right lower leg, Anxiety, Arthritis, Asthma, Claustrophobia, Decreased dorsalis pedis pulse, Dermatitis, Fracture, GERD (gastroesophageal reflux disease), Hiatal hernia, History of blood transfusion, HTN (hypertension), Hyperlipidemia, Itching, On aspirin at home, Pancreatic cyst, Pneumonia, Sleep apnea, SOB (shortness of breath), and Tachycardia.      Recommended Adaptive Equipment: TBD      Precautions:  Fall Risk, spinal precautions, soft TLSO (\"Wear TLSO when greater than 45 degrees\" Per NS note 25), +alarms, limited insight into deficits, monitor BP (hypotensive), flexed posture      Assessment of current deficits    [x] Functional mobility            [x]ADLs           [x] Strength                  [x]Cognition    [x] Functional transfers          [x] IADLs         [x] Safety Awareness   [x]Endurance    [x] Fine Coordination                         [x] Balance      [] Vision/perception   []Sensation      []Gross Motor Coordination             [x] ROM           [] Delirium                   [] Motor Control      OT PLAN OF CARE   OT POC based on physician orders, patient diagnosis and results of clinical assessment     Frequency/Duration 1-3 days/wk for 2 weeks PRN   Specific OT Treatment Interventions to include:   * Instruction/training on adapted ADL techniques  Supervision     Dynamic:Supervision    Activity Tolerance fair  w/ light activity P+  good  w/ light to mod activity   Visual/  Perceptual Glasses: Yes.  WFL.       Safety fair   poor good   during ADL completion following spinal precautions   Vitals BP 89/46, 112 HR s/p activity.   RN made aware. Pt asymptomatic throughout session.     SPO2 97% on 4L          Hand Dominance Right    AROM (PROM) Strength Additional Info:  Goal: (PRN)   RUE  Shoulder flexion ~45 degrees, elbow flexion WFL. Impaired digit flexion.   Pt reports h/o arthritis. Shoulder 2+/5, distally 3+/5 grossly tested fair   and fair  FMC/dexterity noted during ADL tasks Improve overall RUE strength WFL for participation in functional tasks         LUE Shoulder flexion ~45 degrees, elbow flexion WFL. Impaired digit flexion.   Pt reports h/o arthritis. Shoulder 2+/5, distally 3+/5 grossly tested fair   and fair  FMC/dexterity noted during ADL tasks Improve overall LUE strength WFL for participation in functional tasks         Hearing: WFL   Sensation: No c/o numbness or tingling  Tone: WFL  Edema: Unremarkable    Comments: Collaboration with nursing pt ok for therapy/call light in reach/bed alarm on/ TLSO donned for session/ all spinal precautions maintained.  Pt SPO2 97% on 4L     Pt has made slow progress towards set goals.     Continue with current plan of care    Treatment Time In:830            Treatment Time Out: 854           Treatment Charges: Mins Units   Ther Ex  70089     Manual Therapy 88499     Thera Activities 25356 15 1   ADL/Home Mgt 51266 9 1   Neuro Re-ed 33364     Group Therapy      Orthotic manage/training  34110     Non-Billable Time     Total Timed Treatment 24 2     True HOYT/ABHINAV 23577

## 2025-02-28 NOTE — PROGRESS NOTES
Physical Therapy Treatment     Name: Haritha RUIZ Dominguez  : 1945  MRN: [id number]      Date of Service: 2025    Evaluating PT:  Bryan Monterroso, PT [id number]    Referring provider/PT Order:  PT Eval and Treat   25 1300  PT eval and treat  Start:  25 1300,   End:  25 1300,   ONE TIME,   Standing Count:  1 Occurrences,   R         Bryan Rivers, DO     Room #:  5423/5423-A  Diagnosis:  Lumbar compression fracture, closed, initial encounter ([facility]) [S32.000A]  Abdominal pain, unspecified abdominal location [R10.9]  Acute midline low back pain without sciatica [M54.50]  Compression fracture of L2 vertebra, initial encounter ([facility]) [S32.020A]  PMHx/PSHx:     has a past medical history of Abrasion of skin of left lower leg, Abrasion of skin of right lower leg, Anxiety, Arthritis, Asthma, Claustrophobia, Decreased dorsalis pedis pulse, Dermatitis, Fracture, GERD (gastroesophageal reflux disease), Hiatal hernia, History of blood transfusion, HTN (hypertension), Hyperlipidemia, Itching, On aspirin at home, Pancreatic cyst, Pneumonia, Sleep apnea, SOB (shortness of breath), and Tachycardia.    has a past surgical history that includes Cholecystectomy; Cataract removal (Bilateral, 2013); back surgery (2014); joint replacement (2016); Total knee arthroplasty (Right); other surgical history (2017); Spinal fixation surgery with implant (); Upper gastrointestinal endoscopy (07/10/2017); and Stimulator Surgery (Right, 2023).     Procedure/Surgery:  none  Precautions:  Falls, FWB (full weight bearing) Bilateral LE flexed posture kyphotic, , Soft TLSO, Droplet Influenza  Equipment Needs: Patient has needed equipment ,    SUBJECTIVE:    ???Patient states she will D/C to her Sister's home where she can be on the first level. Her [place] with 3 FELIZ. Patient ambulated independently, with wheeled walker  PTA. Equipment owned: Wheelchair, Cane, and Wheeled Walker ??     Can benefit  in bed upon PT entry and agreeable to participate. Improved alertness level this date. TLSO donned with HOB elevated and support of bed. Transferred to EOB with log roll technique with assist for trunk and BLE adhering to precautions,  Once sitting EOB demonstrated posterolateral lean requiring assist to correct. Completed sit<>stand transfer with lift assist. STS completed with increased assist only able to toelrate side steps. Returned to bed with HOB elevated patient declined to sit up in the chair.     Treatment:  Patient practiced and was instructed in the following treatment:    Bed mobility training - pt given verbal and tactile cues to facilitate proper sequencing and safety during rolling and supine>sit as well as provided with physical assistance to complete task   Sitting EOB for >10 minutes for upright tolerance, postural awareness and BLE ROM  Transfer training - pt was given verbal and tactile cues to facilitate proper hand placement, technique and safety during sit to stand and stand to sit as well as provided with physical assistance.  Skilled positioning - Pt placed in the chair with pillows utilized to facilitate upright posture, joint and skin integrity, and interaction with environment.         PLAN:    Patient is making slow progress towards established goals.  Will continue with current POC.      Time in  0830  Time out  0855    Total Treatment Time  23 minutes     CPT codes:  [] Gait training 29476 0 minutes  [] Manual therapy 30512 0 minutes  [x] Therapeutic activities 55888 23 minutes  [] Therapeutic exercises 23307 0 minutes  [] Neuromuscular reeducation 04656 0 minutes    Bryan Monterroso, PT [id number]

## 2025-03-01 LAB
ALBUMIN SERPL-MCNC: 2.7 G/DL (ref 3.5–5.2)
ALP SERPL-CCNC: 71 U/L (ref 35–104)
ALT SERPL-CCNC: 18 U/L (ref 0–32)
ANION GAP SERPL CALCULATED.3IONS-SCNC: 15 MMOL/L (ref 7–16)
AST SERPL-CCNC: 31 U/L (ref 0–31)
BILIRUB SERPL-MCNC: 0.3 MG/DL (ref 0–1.2)
BUN SERPL-MCNC: 31 MG/DL (ref 6–23)
CA-I BLD-SCNC: 1.18 MMOL/L (ref 1.15–1.33)
CALCIUM SERPL-MCNC: 8.7 MG/DL (ref 8.6–10.2)
CHLORIDE SERPL-SCNC: 107 MMOL/L (ref 98–107)
CO2 SERPL-SCNC: 23 MMOL/L (ref 22–29)
CREAT SERPL-MCNC: 1 MG/DL (ref 0.5–1)
ERYTHROCYTE [DISTWIDTH] IN BLOOD BY AUTOMATED COUNT: 15.7 % (ref 11.5–15)
GFR, ESTIMATED: 61 ML/MIN/1.73M2
GLUCOSE SERPL-MCNC: 89 MG/DL (ref 74–99)
HCT VFR BLD AUTO: 28.8 % (ref 34–48)
HGB BLD-MCNC: 9.3 G/DL (ref 11.5–15.5)
MAGNESIUM SERPL-MCNC: 1.8 MG/DL (ref 1.6–2.6)
MCH RBC QN AUTO: 30.1 PG (ref 26–35)
MCHC RBC AUTO-ENTMCNC: 32.3 G/DL (ref 32–34.5)
MCV RBC AUTO: 93.2 FL (ref 80–99.9)
MICROORGANISM SPEC CULT: NORMAL
MICROORGANISM SPEC CULT: NORMAL
PHOSPHATE SERPL-MCNC: 2.7 MG/DL (ref 2.5–4.5)
PLATELET # BLD AUTO: 334 K/UL (ref 130–450)
PMV BLD AUTO: 12.1 FL (ref 7–12)
POTASSIUM SERPL-SCNC: 3.1 MMOL/L (ref 3.5–5)
PROT SERPL-MCNC: 6 G/DL (ref 6.4–8.3)
RBC # BLD AUTO: 3.09 M/UL (ref 3.5–5.5)
SERVICE CMNT-IMP: NORMAL
SERVICE CMNT-IMP: NORMAL
SODIUM SERPL-SCNC: 145 MMOL/L (ref 132–146)
SPECIMEN DESCRIPTION: NORMAL
SPECIMEN DESCRIPTION: NORMAL
WBC OTHER # BLD: 8 K/UL (ref 4.5–11.5)

## 2025-03-01 PROCEDURE — 1200000000 HC SEMI PRIVATE

## 2025-03-01 PROCEDURE — 80053 COMPREHEN METABOLIC PANEL: CPT

## 2025-03-01 PROCEDURE — 6360000002 HC RX W HCPCS: Performed by: INTERNAL MEDICINE

## 2025-03-01 PROCEDURE — 6370000000 HC RX 637 (ALT 250 FOR IP)

## 2025-03-01 PROCEDURE — 6370000000 HC RX 637 (ALT 250 FOR IP): Performed by: INTERNAL MEDICINE

## 2025-03-01 PROCEDURE — 6370000000 HC RX 637 (ALT 250 FOR IP): Performed by: STUDENT IN AN ORGANIZED HEALTH CARE EDUCATION/TRAINING PROGRAM

## 2025-03-01 PROCEDURE — 94640 AIRWAY INHALATION TREATMENT: CPT

## 2025-03-01 PROCEDURE — 2700000000 HC OXYGEN THERAPY PER DAY

## 2025-03-01 PROCEDURE — 85027 COMPLETE CBC AUTOMATED: CPT

## 2025-03-01 PROCEDURE — 6370000000 HC RX 637 (ALT 250 FOR IP): Performed by: NURSE PRACTITIONER

## 2025-03-01 PROCEDURE — 83735 ASSAY OF MAGNESIUM: CPT

## 2025-03-01 PROCEDURE — 36415 COLL VENOUS BLD VENIPUNCTURE: CPT

## 2025-03-01 PROCEDURE — 84100 ASSAY OF PHOSPHORUS: CPT

## 2025-03-01 PROCEDURE — 82330 ASSAY OF CALCIUM: CPT

## 2025-03-01 RX ORDER — HYDROCODONE BITARTRATE AND ACETAMINOPHEN 5; 325 MG/1; MG/1
1 TABLET ORAL EVERY 6 HOURS PRN
Qty: 12 TABLET | Refills: 0
Start: 2025-03-01 | End: 2025-03-04

## 2025-03-01 RX ORDER — GUAIFENESIN/DEXTROMETHORPHAN 100-10MG/5
10 SYRUP ORAL EVERY 6 HOURS PRN
COMMUNITY
Start: 2025-03-01 | End: 2025-03-11

## 2025-03-01 RX ORDER — ALBUTEROL SULFATE 0.83 MG/ML
2.5 SOLUTION RESPIRATORY (INHALATION) 4 TIMES DAILY PRN
Qty: 120 EACH | Refills: 0
Start: 2025-03-01

## 2025-03-01 RX ORDER — SODIUM BICARBONATE 650 MG/1
1300 TABLET ORAL 2 TIMES DAILY
Qty: 120 TABLET | Refills: 0
Start: 2025-03-01

## 2025-03-01 RX ORDER — LORAZEPAM 1 MG/1
1 TABLET ORAL 3 TIMES DAILY PRN
Qty: 30 TABLET | Refills: 0
Start: 2025-03-01 | End: 2025-03-31

## 2025-03-01 RX ORDER — POTASSIUM CHLORIDE 1500 MG/1
40 TABLET, EXTENDED RELEASE ORAL ONCE
Status: DISCONTINUED | OUTPATIENT
Start: 2025-03-01 | End: 2025-03-01

## 2025-03-01 RX ORDER — ACETAMINOPHEN 325 MG/1
650 TABLET ORAL EVERY 6 HOURS PRN
Status: ON HOLD | COMMUNITY
Start: 2025-03-01 | End: 2025-03-18 | Stop reason: HOSPADM

## 2025-03-01 RX ADMIN — PANTOPRAZOLE SODIUM 40 MG: 40 TABLET, DELAYED RELEASE ORAL at 09:40

## 2025-03-01 RX ADMIN — MONTELUKAST 10 MG: 10 TABLET, FILM COATED ORAL at 09:40

## 2025-03-01 RX ADMIN — POTASSIUM BICARBONATE 40 MEQ: 782 TABLET, EFFERVESCENT ORAL at 13:37

## 2025-03-01 RX ADMIN — ARFORMOTEROL TARTRATE 15 MCG: 15 SOLUTION RESPIRATORY (INHALATION) at 08:12

## 2025-03-01 RX ADMIN — BUDESONIDE 1000 MCG: 0.5 INHALANT RESPIRATORY (INHALATION) at 20:28

## 2025-03-01 RX ADMIN — LORAZEPAM 1 MG: 1 TABLET ORAL at 21:43

## 2025-03-01 RX ADMIN — ENOXAPARIN SODIUM 30 MG: 100 INJECTION SUBCUTANEOUS at 09:40

## 2025-03-01 RX ADMIN — IPRATROPIUM BROMIDE AND ALBUTEROL SULFATE 1 DOSE: 2.5; .5 SOLUTION RESPIRATORY (INHALATION) at 17:19

## 2025-03-01 RX ADMIN — IPRATROPIUM BROMIDE AND ALBUTEROL SULFATE 1 DOSE: 2.5; .5 SOLUTION RESPIRATORY (INHALATION) at 08:12

## 2025-03-01 RX ADMIN — LORAZEPAM 1 MG: 1 TABLET ORAL at 09:39

## 2025-03-01 RX ADMIN — HYDROCODONE BITARTRATE AND ACETAMINOPHEN 1 TABLET: 5; 325 TABLET ORAL at 09:39

## 2025-03-01 RX ADMIN — PREGABALIN 50 MG: 50 CAPSULE ORAL at 09:40

## 2025-03-01 RX ADMIN — SODIUM BICARBONATE 1300 MG: 650 TABLET ORAL at 09:40

## 2025-03-01 RX ADMIN — ATORVASTATIN CALCIUM 10 MG: 20 TABLET, FILM COATED ORAL at 21:43

## 2025-03-01 RX ADMIN — ESCITALOPRAM OXALATE 10 MG: 10 TABLET ORAL at 21:43

## 2025-03-01 RX ADMIN — BUDESONIDE 1000 MCG: 0.5 INHALANT RESPIRATORY (INHALATION) at 08:12

## 2025-03-01 RX ADMIN — ARFORMOTEROL TARTRATE 15 MCG: 15 SOLUTION RESPIRATORY (INHALATION) at 20:28

## 2025-03-01 RX ADMIN — GUAIFENESIN SYRUP AND DEXTROMETHORPHAN 10 ML: 100; 10 SYRUP ORAL at 09:39

## 2025-03-01 RX ADMIN — PREGABALIN 50 MG: 50 CAPSULE ORAL at 21:43

## 2025-03-01 RX ADMIN — ACETAMINOPHEN 650 MG: 325 TABLET ORAL at 21:42

## 2025-03-01 RX ADMIN — SODIUM BICARBONATE 1300 MG: 650 TABLET ORAL at 21:43

## 2025-03-01 RX ADMIN — IPRATROPIUM BROMIDE AND ALBUTEROL SULFATE 1 DOSE: 2.5; .5 SOLUTION RESPIRATORY (INHALATION) at 20:28

## 2025-03-01 ASSESSMENT — PAIN SCALES - GENERAL
PAINLEVEL_OUTOF10: 7
PAINLEVEL_OUTOF10: 0
PAINLEVEL_OUTOF10: 0
PAINLEVEL_OUTOF10: 3

## 2025-03-01 ASSESSMENT — PAIN DESCRIPTION - ORIENTATION
ORIENTATION: LOWER
ORIENTATION: LOWER

## 2025-03-01 ASSESSMENT — PAIN SCALES - WONG BAKER: WONGBAKER_NUMERICALRESPONSE: NO HURT

## 2025-03-01 ASSESSMENT — PAIN DESCRIPTION - LOCATION
LOCATION: BACK
LOCATION: BACK

## 2025-03-01 ASSESSMENT — PAIN DESCRIPTION - PAIN TYPE: TYPE: ACUTE PAIN

## 2025-03-01 ASSESSMENT — PAIN DESCRIPTION - FREQUENCY: FREQUENCY: INTERMITTENT

## 2025-03-01 ASSESSMENT — PAIN DESCRIPTION - DESCRIPTORS
DESCRIPTORS: ACHING;DISCOMFORT;SORE
DESCRIPTORS: SPASM;SHARP;SHOOTING

## 2025-03-01 ASSESSMENT — PAIN DESCRIPTION - ONSET: ONSET: ON-GOING

## 2025-03-01 NOTE — PROGRESS NOTES
[facility]  Nephrology Progress Note    Patient's Name: Haritha Dominguez    History of Present Illness from 2/27 consult note:    \"Haritha Dominguez is a 80 y.o. female with a past medical history of hypertension, hyperlipidemia, anxiety, arthritis, and asthma.  She presented to the ED on 2/19 reportedly for concerns of abdominal pain.  Vital signs on 2/19 includes temperature 99.3, respirations 18, pulse 103, /85, and she was 92% SpO2.  Lab data on 2/19 includes CO2 20, BUN 22, creatinine 1, troponin 18, WBC 12.8 K, and hemoglobin 10.5.  Her UA on 2/20 showed specific gravity 1.025, 30 protein, trace leukocyte esterase, 1+ bacteria.  She had a CT abdomen/pelvis with IV contrast on 2/20 which showed no acute intra-abdominal pelvic process appreciated, moderate-large hiatal hernia, moderate colonic stool burden, 1.3 cm low attenuating lesion along pancreatic body/tail.  She underwent a CT lumbar spine on 2/20 which showed new acute appearing fracture seen involving superior endplate of L2, stable chronic compression fracture involving L1, bilateral sacral ala fractures of uncertain chronicity, generalized osteopenia.  She was seen by neurosurgery.  She was seen by hepatobiliary surgery regarding pancreatic lesion.  She had a chest x-ray on 2/24 which showed increased interstitial markings seen in the perihilar regions to suggest mild interstitial edema with small bilateral pleural effusions.  She was found to be influenza A positive on 2/24. She was seen by urology for concerns of urinary retention.  ID is following the patient.  Nephrology has been consulted to see the patient for concerns of KATELYN. She has a baseline serum creatinine of 0.8-1 mg/dL.  At present, patient was seen and examined.  She is awake, alert, appears in no acute distress.  She appears to be a questionable historian at this time.  She notes that her appetite is okay.  She denies any dizziness.  Visitors at  chronic compression fx involving L1, bilateral sacral ala fx of uncertain chronicity, generalized osteopenia  Defer to neurosurgery    9.  Hypokalemia-  Potassium this morning 3.1 mmol/L  Will supplement and follow in the    STEVE Mueller - CNP    Patient seen and examined all key components of the physical performed independently , case discussed with NP, all pertinent labs and radiologic tests personally reviewed agree with above.    KATELYN resolved, Cr peaked at 2.8, now 1    Updated daughter at bedside;     Sumit Tian MD        `

## 2025-03-01 NOTE — CARE COORDINATION
CM note.  Discharge order noted.  Plan is [facility].  Left VM message with Humaira from [employer] to check if auth has been obtained.      1025:  left VM with Felisa from [employer] to check on status of auth.      1035:  Spoke with Felisa,  auth  yesterday on .  Will need new insurance precert to admit to them.  Left VM with therapy department to see patient early on Monday for precert.  Omid Morin RN [phone].

## 2025-03-01 NOTE — PROGRESS NOTES
Patient ripped out IV from left arm and stated \"she doesn't want bag running in arm any more\". Patient also refusing new IV site

## 2025-03-01 NOTE — PROGRESS NOTES
[facility]  [facility]  Progress Note      Chief Complaint   Patient presents with    Abdominal Pain     Patient from home called for 10/10 epigastric pain reproducible with palpation. EMS gave 50 mcg of fentanyl and 4mg of zofran PTA. Denies pain currently       SUBJECTIVE:    Patient is tolerating medications. No reported adverse drug reactions.  No nausea, vomiting, diarrhea. . Feeling better today    Review of systems:  As stated above in the chief complaint, otherwise negative.    Medications:  Scheduled Meds:   ipratropium 0.5 mg-albuterol 2.5 mg  1 Dose Inhalation Q4H WA RT    sodium bicarbonate  1,300 mg Oral BID    enoxaparin  30 mg SubCUTAneous Daily    lidocaine  5 mL IntraDERmal Once    atorvastatin  10 mg Oral Nightly    escitalopram  10 mg Oral Nightly    budesonide  1 mg Nebulization BID RT    And    arformoterol tartrate  15 mcg Nebulization BID RT    montelukast  10 mg Oral Daily    pantoprazole  40 mg Oral Daily    pregabalin  50 mg Oral BID    sodium chloride flush  5-40 mL IntraVENous 2 times per day     Continuous Infusions:   sodium chloride 75 mL/hr at 25 0719    sodium chloride       PRN Meds:guaiFENesin-dextromethorphan, LORazepam, sodium chloride flush, sodium chloride, potassium chloride **OR** potassium alternative oral replacement **OR** potassium chloride, magnesium sulfate, polyethylene glycol, acetaminophen **OR** acetaminophen, HYDROcodone 5 mg - acetaminophen, ondansetron    OBJECTIVE:  /64   Pulse 99   Temp 97.8 °F (36.6 °C) (Temporal)   Resp 18   Ht 1.524 m (5')   Wt 64.4 kg (142 lb)   SpO2 98%   BMI 27.73 kg/m²   Temp  Av °F (36.7 °C)  Min: 97.8 °F (36.6 °C)  Max: 98.1 °F (36.7 °C)  Constitutional: The patient is awake, alert, and oriented. Ill appearing  Skin: Warm and dry. No rashes were noted. No jaundice.  HEENT:  Moist mucous membranes, no ulcerations, no thrush.   Neck: Supple to movements. No lymphadenopathy.   Chest:

## 2025-03-01 NOTE — PROGRESS NOTES
Hospital Medicine    Subjective:  pt alert conversive pt refusing ivf      Current Facility-Administered Medications:     ipratropium 0.5 mg-albuterol 2.5 mg (DUONEB) nebulizer solution 1 Dose, 1 Dose, Inhalation, Q4H WA RT, Bryan Rivers DO, 1 Dose at 03/01/25 0812    0.9 % sodium chloride infusion, , IntraVENous, Continuous, Bryan Rivers DO, Last Rate: 75 mL/hr at 02/28/25 0719, Restarted at 02/28/25 0719    sodium bicarbonate tablet 1,300 mg, 1,300 mg, Oral, BID, Esmail, Ines Bender, APRN - CNP, 1,300 mg at 02/28/25 2107    enoxaparin Sodium (LOVENOX) injection 30 mg, 30 mg, SubCUTAneous, Daily, Bryan Rivers DO, 30 mg at 02/28/25 0926    guaiFENesin-dextromethorphan (ROBITUSSIN DM) 100-10 MG/5ML syrup 10 mL, 10 mL, Oral, Q6H PRN, Bryan Rivers DO, 10 mL at 02/28/25 1430    LORazepam (ATIVAN) tablet 1 mg, 1 mg, Oral, TID PRN, Bryan Rivers DO, 1 mg at 02/28/25 2107    lidocaine 1 % injection 5 mL, 5 mL, IntraDERmal, Once, Oliver Waters MD    atorvastatin (LIPITOR) tablet 10 mg, 10 mg, Oral, Nightly, Bryan Rivers DO, 10 mg at 02/28/25 2107    escitalopram (LEXAPRO) tablet 10 mg, 10 mg, Oral, Nightly, Bryan Rivers DO, 10 mg at 02/28/25 2107    budesonide (PULMICORT) nebulizer suspension 1,000 mcg, 1 mg, Nebulization, BID RT, 1,000 mcg at 03/01/25 0812 **AND** arformoterol tartrate (BROVANA) nebulizer solution 15 mcg, 15 mcg, Nebulization, BID RT, 15 mcg at 03/01/25 0812 **AND** [DISCONTINUED] ipratropium (ATROVENT) 0.02 % nebulizer solution 0.5 mg, 0.5 mg, Nebulization, Q6H RT, Bryan Rivers, , 0.5 mg at 02/28/25 0950    montelukast (SINGULAIR) tablet 10 mg, 10 mg, Oral, Daily, Bryan Rivers DO, 10 mg at 02/28/25 0926    pantoprazole (PROTONIX) tablet 40 mg, 40 mg, Oral, Daily, Bryan Rivers DO, 40 mg at 02/28/25 0925    pregabalin (LYRICA) capsule 50 mg, 50 mg, Oral, BID, Bryan Rivers DO, 50 mg at 02/28/25 2107    sodium chloride flush 0.9 %  05:18 PM    MYELOPCT 0.9 02/01/2023 09:10 AM    EOSPCT 1 02/24/2025 05:18 PM    BASOPCT 2 02/24/2025 05:18 PM    MONOSABS 0.51 02/24/2025 05:18 PM    LYMPHSABS 1.26 02/24/2025 05:18 PM    EOSABS 0.08 02/24/2025 05:18 PM    BASOSABS 0.17 02/24/2025 05:18 PM     CMP:    Lab Results   Component Value Date/Time     02/28/2025 04:26 AM    K 3.5 02/28/2025 04:26 AM    K 4.6 05/24/2023 11:30 AM     02/28/2025 04:26 AM    CO2 20 02/28/2025 04:26 AM    BUN 45 02/28/2025 04:26 AM    CREATININE 1.6 02/28/2025 04:26 AM    GFRAA >60 01/12/2019 04:00 AM    LABGLOM 33 02/28/2025 04:26 AM    LABGLOM 70 04/18/2024 09:02 PM    GLUCOSE 102 02/28/2025 04:26 AM    GLUCOSE 85 04/02/2012 08:30 AM    CALCIUM 8.4 02/28/2025 04:26 AM    BILITOT 0.2 02/28/2025 04:26 AM    ALKPHOS 78 02/28/2025 04:26 AM    AST 47 02/28/2025 04:26 AM    ALT 22 02/28/2025 04:26 AM     Warfarin PT/INR:    Lab Results   Component Value Date    INR 0.9 05/24/2023    INR 1.1 02/13/2023    INR 1.2 05/28/2017    PROTIME 10.2 05/24/2023    PROTIME 11.8 02/13/2023    PROTIME 13.2 (H) 05/28/2017       Assessment:    Principal Problem:    Compression fracture of L2 lumbar vertebra (HCC)  Active Problems:    Goals of care, counseling/discussion    Palliative care encounter  Resolved Problems:    * No resolved hospital problems. *  kizzy    Plan:  PT REFUSING IVF dc to rehab        Bryan Rivers DO  8:44 AM  3/1/2025

## 2025-03-02 ENCOUNTER — APPOINTMENT (OUTPATIENT)
Dept: GENERAL RADIOLOGY | Age: 80
End: 2025-03-02
Payer: MEDICARE

## 2025-03-02 LAB
ALBUMIN SERPL-MCNC: 2.8 G/DL (ref 3.5–5.2)
ALBUMIN SERPL-MCNC: 3 G/DL (ref 3.5–5.2)
ALP SERPL-CCNC: 83 U/L (ref 35–104)
ALP SERPL-CCNC: 90 U/L (ref 35–104)
ALT SERPL-CCNC: 15 U/L (ref 0–32)
ALT SERPL-CCNC: 17 U/L (ref 0–32)
ANION GAP SERPL CALCULATED.3IONS-SCNC: 11 MMOL/L (ref 7–16)
ANION GAP SERPL CALCULATED.3IONS-SCNC: 18 MMOL/L (ref 7–16)
AST SERPL-CCNC: 22 U/L (ref 0–31)
AST SERPL-CCNC: 27 U/L (ref 0–31)
B PARAP IS1001 DNA NPH QL NAA+NON-PROBE: NOT DETECTED
B PERT DNA SPEC QL NAA+PROBE: NOT DETECTED
B.E.: 0.2 MMOL/L (ref -3–3)
BASOPHILS # BLD: 0.26 K/UL (ref 0–0.2)
BASOPHILS NFR BLD: 2 % (ref 0–2)
BILIRUB DIRECT SERPL-MCNC: <0.2 MG/DL (ref 0–0.3)
BILIRUB INDIRECT SERPL-MCNC: ABNORMAL MG/DL (ref 0–1)
BILIRUB SERPL-MCNC: 0.3 MG/DL (ref 0–1.2)
BILIRUB SERPL-MCNC: 0.5 MG/DL (ref 0–1.2)
BUN SERPL-MCNC: 16 MG/DL (ref 6–23)
BUN SERPL-MCNC: 19 MG/DL (ref 6–23)
C PNEUM DNA NPH QL NAA+NON-PROBE: NOT DETECTED
CA-I BLD-SCNC: 1.19 MMOL/L (ref 1.15–1.33)
CALCIUM SERPL-MCNC: 8.5 MG/DL (ref 8.6–10.2)
CALCIUM SERPL-MCNC: 8.7 MG/DL (ref 8.6–10.2)
CHLORIDE SERPL-SCNC: 104 MMOL/L (ref 98–107)
CHLORIDE SERPL-SCNC: 107 MMOL/L (ref 98–107)
CO2 SERPL-SCNC: 21 MMOL/L (ref 22–29)
CO2 SERPL-SCNC: 26 MMOL/L (ref 22–29)
COHB: 0.4 % (ref 0–1.5)
CREAT SERPL-MCNC: 0.8 MG/DL (ref 0.5–1)
CREAT SERPL-MCNC: 0.9 MG/DL (ref 0.5–1)
CRITICAL: ABNORMAL
DATE ANALYZED: ABNORMAL
DATE OF COLLECTION: ABNORMAL
EOSINOPHIL # BLD: 0.65 K/UL (ref 0.05–0.5)
EOSINOPHILS RELATIVE PERCENT: 4 % (ref 0–6)
ERYTHROCYTE [DISTWIDTH] IN BLOOD BY AUTOMATED COUNT: 15.7 % (ref 11.5–15)
ERYTHROCYTE [DISTWIDTH] IN BLOOD BY AUTOMATED COUNT: 15.8 % (ref 11.5–15)
FLUAV H1 2009 PAN RNA NPH NAA+NON-PROBE: DETECTED
FLUAV RNA NPH QL NAA+NON-PROBE: DETECTED
FLUBV RNA NPH QL NAA+NON-PROBE: NOT DETECTED
GFR, ESTIMATED: 69 ML/MIN/1.73M2
GFR, ESTIMATED: 79 ML/MIN/1.73M2
GLUCOSE BLD-MCNC: 103 MG/DL (ref 74–99)
GLUCOSE SERPL-MCNC: 89 MG/DL (ref 74–99)
GLUCOSE SERPL-MCNC: 99 MG/DL (ref 74–99)
HADV DNA NPH QL NAA+NON-PROBE: NOT DETECTED
HCO3: 22.7 MMOL/L (ref 22–26)
HCOV 229E RNA NPH QL NAA+NON-PROBE: NOT DETECTED
HCOV HKU1 RNA NPH QL NAA+NON-PROBE: NOT DETECTED
HCOV NL63 RNA NPH QL NAA+NON-PROBE: NOT DETECTED
HCOV OC43 RNA NPH QL NAA+NON-PROBE: NOT DETECTED
HCT VFR BLD AUTO: 32.5 % (ref 34–48)
HCT VFR BLD AUTO: 33.3 % (ref 34–48)
HGB BLD-MCNC: 10.2 G/DL (ref 11.5–15.5)
HGB BLD-MCNC: 10.5 G/DL (ref 11.5–15.5)
HHB: 16.8 % (ref 0–5)
HMPV RNA NPH QL NAA+NON-PROBE: NOT DETECTED
HPIV1 RNA NPH QL NAA+NON-PROBE: NOT DETECTED
HPIV2 RNA NPH QL NAA+NON-PROBE: NOT DETECTED
HPIV3 RNA NPH QL NAA+NON-PROBE: NOT DETECTED
HPIV4 RNA NPH QL NAA+NON-PROBE: NOT DETECTED
LAB: ABNORMAL
LACTATE BLDV-SCNC: 1.2 MMOL/L (ref 0.5–2.2)
LYMPHOCYTES NFR BLD: 1.94 K/UL (ref 1.5–4)
LYMPHOCYTES RELATIVE PERCENT: 13 % (ref 20–42)
Lab: 1820
M PNEUMO DNA NPH QL NAA+NON-PROBE: NOT DETECTED
MAGNESIUM SERPL-MCNC: 1.6 MG/DL (ref 1.6–2.6)
MAGNESIUM SERPL-MCNC: 1.6 MG/DL (ref 1.6–2.6)
MCH RBC QN AUTO: 29.4 PG (ref 26–35)
MCH RBC QN AUTO: 29.4 PG (ref 26–35)
MCHC RBC AUTO-ENTMCNC: 31.4 G/DL (ref 32–34.5)
MCHC RBC AUTO-ENTMCNC: 31.5 G/DL (ref 32–34.5)
MCV RBC AUTO: 93.3 FL (ref 80–99.9)
MCV RBC AUTO: 93.7 FL (ref 80–99.9)
METHB: 0.4 % (ref 0–1.5)
MODE: ABNORMAL
MONOCYTES NFR BLD: 26 % (ref 2–12)
MONOCYTES NFR BLD: 3.89 K/UL (ref 0.1–0.95)
MYELOCYTES ABSOLUTE COUNT: 0.52 K/UL
MYELOCYTES: 4 %
NEUTROPHILS NFR BLD: 50 % (ref 43–80)
NEUTS SEG NFR BLD: 7.51 K/UL (ref 1.8–7.3)
O2 SATURATION: 83.1 % (ref 92–98.5)
O2HB: 82.4 % (ref 94–97)
OPERATOR ID: ABNORMAL
PATIENT TEMP: 37 C
PCO2: 29.8 MMHG (ref 35–45)
PH BLOOD GAS: 7.5 (ref 7.35–7.45)
PHOSPHATE SERPL-MCNC: 2.9 MG/DL (ref 2.5–4.5)
PHOSPHATE SERPL-MCNC: 3 MG/DL (ref 2.5–4.5)
PLATELET # BLD AUTO: 486 K/UL (ref 130–450)
PLATELET # BLD AUTO: 518 K/UL (ref 130–450)
PMV BLD AUTO: 11.6 FL (ref 7–12)
PMV BLD AUTO: 11.8 FL (ref 7–12)
PO2: 44.1 MMHG (ref 75–100)
POTASSIUM SERPL-SCNC: 3.22 MMOL/L (ref 3.5–5)
POTASSIUM SERPL-SCNC: 3.4 MMOL/L (ref 3.5–5)
POTASSIUM SERPL-SCNC: 3.6 MMOL/L (ref 3.5–5)
PROCALCITONIN SERPL-MCNC: 0.53 NG/ML (ref 0–0.08)
PROMYELOCYTES ABSOLUTE COUNT: 0.13 K/UL
PROMYELOCYTES: 1 %
PROT SERPL-MCNC: 6.5 G/DL (ref 6.4–8.3)
PROT SERPL-MCNC: 6.5 G/DL (ref 6.4–8.3)
RBC # BLD AUTO: 3.47 M/UL (ref 3.5–5.5)
RBC # BLD AUTO: 3.57 M/UL (ref 3.5–5.5)
RBC # BLD: ABNORMAL 10*6/UL
RSV RNA NPH QL NAA+NON-PROBE: NOT DETECTED
RV+EV RNA NPH QL NAA+NON-PROBE: NOT DETECTED
SARS-COV-2 RNA NPH QL NAA+NON-PROBE: NOT DETECTED
SODIUM SERPL-SCNC: 143 MMOL/L (ref 132–146)
SODIUM SERPL-SCNC: 144 MMOL/L (ref 132–146)
SOURCE, BLOOD GAS: ABNORMAL
SPECIMEN DESCRIPTION: ABNORMAL
THB: 11.5 G/DL (ref 11.5–16.5)
TIME ANALYZED: 1826
WBC OTHER # BLD: 11.4 K/UL (ref 4.5–11.5)
WBC OTHER # BLD: 14.9 K/UL (ref 4.5–11.5)

## 2025-03-02 PROCEDURE — 6370000000 HC RX 637 (ALT 250 FOR IP): Performed by: INTERNAL MEDICINE

## 2025-03-02 PROCEDURE — 6360000002 HC RX W HCPCS: Performed by: INTERNAL MEDICINE

## 2025-03-02 PROCEDURE — 6370000000 HC RX 637 (ALT 250 FOR IP): Performed by: STUDENT IN AN ORGANIZED HEALTH CARE EDUCATION/TRAINING PROGRAM

## 2025-03-02 PROCEDURE — 93005 ELECTROCARDIOGRAM TRACING: CPT | Performed by: INTERNAL MEDICINE

## 2025-03-02 PROCEDURE — 2500000003 HC RX 250 WO HCPCS: Performed by: INTERNAL MEDICINE

## 2025-03-02 PROCEDURE — 71045 X-RAY EXAM CHEST 1 VIEW: CPT

## 2025-03-02 PROCEDURE — 82962 GLUCOSE BLOOD TEST: CPT

## 2025-03-02 PROCEDURE — 83605 ASSAY OF LACTIC ACID: CPT

## 2025-03-02 PROCEDURE — 94640 AIRWAY INHALATION TREATMENT: CPT

## 2025-03-02 PROCEDURE — 0202U NFCT DS 22 TRGT SARS-COV-2: CPT

## 2025-03-02 PROCEDURE — 80053 COMPREHEN METABOLIC PANEL: CPT

## 2025-03-02 PROCEDURE — 82805 BLOOD GASES W/O2 SATURATION: CPT

## 2025-03-02 PROCEDURE — 84100 ASSAY OF PHOSPHORUS: CPT

## 2025-03-02 PROCEDURE — 84145 PROCALCITONIN (PCT): CPT

## 2025-03-02 PROCEDURE — 85027 COMPLETE CBC AUTOMATED: CPT

## 2025-03-02 PROCEDURE — 84132 ASSAY OF SERUM POTASSIUM: CPT

## 2025-03-02 PROCEDURE — 82248 BILIRUBIN DIRECT: CPT

## 2025-03-02 PROCEDURE — 2140000000 HC CCU INTERMEDIATE R&B

## 2025-03-02 PROCEDURE — 85025 COMPLETE CBC W/AUTO DIFF WBC: CPT

## 2025-03-02 PROCEDURE — 2580000003 HC RX 258

## 2025-03-02 PROCEDURE — 6360000002 HC RX W HCPCS: Performed by: EMERGENCY MEDICINE

## 2025-03-02 PROCEDURE — 82330 ASSAY OF CALCIUM: CPT

## 2025-03-02 PROCEDURE — 36415 COLL VENOUS BLD VENIPUNCTURE: CPT

## 2025-03-02 PROCEDURE — 83735 ASSAY OF MAGNESIUM: CPT

## 2025-03-02 PROCEDURE — 2700000000 HC OXYGEN THERAPY PER DAY

## 2025-03-02 PROCEDURE — 6370000000 HC RX 637 (ALT 250 FOR IP): Performed by: NURSE PRACTITIONER

## 2025-03-02 PROCEDURE — 6360000002 HC RX W HCPCS

## 2025-03-02 PROCEDURE — 74018 RADEX ABDOMEN 1 VIEW: CPT

## 2025-03-02 PROCEDURE — 5A09557 ASSISTANCE WITH RESPIRATORY VENTILATION, GREATER THAN 96 CONSECUTIVE HOURS, CONTINUOUS POSITIVE AIRWAY PRESSURE: ICD-10-PCS | Performed by: INTERNAL MEDICINE

## 2025-03-02 PROCEDURE — 2580000003 HC RX 258: Performed by: NURSE PRACTITIONER

## 2025-03-02 RX ORDER — GUAIFENESIN/DEXTROMETHORPHAN 100-10MG/5
10 SYRUP ORAL EVERY 6 HOURS
Status: DISCONTINUED | OUTPATIENT
Start: 2025-03-02 | End: 2025-03-07

## 2025-03-02 RX ORDER — 0.9 % SODIUM CHLORIDE 0.9 %
1000 INTRAVENOUS SOLUTION INTRAVENOUS ONCE
Status: DISCONTINUED | OUTPATIENT
Start: 2025-03-02 | End: 2025-03-14 | Stop reason: HOSPADM

## 2025-03-02 RX ORDER — SODIUM CHLORIDE 9 MG/ML
INJECTION, SOLUTION INTRAVENOUS CONTINUOUS
Status: DISCONTINUED | OUTPATIENT
Start: 2025-03-02 | End: 2025-03-03

## 2025-03-02 RX ADMIN — ARFORMOTEROL TARTRATE 15 MCG: 15 SOLUTION RESPIRATORY (INHALATION) at 19:19

## 2025-03-02 RX ADMIN — PREGABALIN 50 MG: 50 CAPSULE ORAL at 16:02

## 2025-03-02 RX ADMIN — ENOXAPARIN SODIUM 30 MG: 100 INJECTION SUBCUTANEOUS at 09:16

## 2025-03-02 RX ADMIN — ARFORMOTEROL TARTRATE 15 MCG: 15 SOLUTION RESPIRATORY (INHALATION) at 08:22

## 2025-03-02 RX ADMIN — ACETAMINOPHEN 650 MG: 325 TABLET ORAL at 17:52

## 2025-03-02 RX ADMIN — IPRATROPIUM BROMIDE AND ALBUTEROL SULFATE 1 DOSE: 2.5; .5 SOLUTION RESPIRATORY (INHALATION) at 19:20

## 2025-03-02 RX ADMIN — PIPERACILLIN AND TAZOBACTAM 4500 MG: 4; .5 INJECTION, POWDER, FOR SOLUTION INTRAVENOUS at 23:30

## 2025-03-02 RX ADMIN — BUDESONIDE 1000 MCG: 0.5 INHALANT RESPIRATORY (INHALATION) at 08:22

## 2025-03-02 RX ADMIN — ONDANSETRON 4 MG: 2 INJECTION, SOLUTION INTRAMUSCULAR; INTRAVENOUS at 09:15

## 2025-03-02 RX ADMIN — IPRATROPIUM BROMIDE AND ALBUTEROL SULFATE 1 DOSE: 2.5; .5 SOLUTION RESPIRATORY (INHALATION) at 08:22

## 2025-03-02 RX ADMIN — SODIUM CHLORIDE, PRESERVATIVE FREE 10 ML: 5 INJECTION INTRAVENOUS at 23:22

## 2025-03-02 RX ADMIN — VANCOMYCIN HYDROCHLORIDE 1250 MG: 1.25 INJECTION, POWDER, LYOPHILIZED, FOR SOLUTION INTRAVENOUS at 23:25

## 2025-03-02 RX ADMIN — IPRATROPIUM BROMIDE AND ALBUTEROL SULFATE 1 DOSE: 2.5; .5 SOLUTION RESPIRATORY (INHALATION) at 13:24

## 2025-03-02 RX ADMIN — HYDROCODONE BITARTRATE AND ACETAMINOPHEN 1 TABLET: 5; 325 TABLET ORAL at 16:01

## 2025-03-02 RX ADMIN — PANTOPRAZOLE SODIUM 40 MG: 40 TABLET, DELAYED RELEASE ORAL at 16:03

## 2025-03-02 RX ADMIN — SODIUM CHLORIDE: 9 INJECTION, SOLUTION INTRAVENOUS at 11:00

## 2025-03-02 RX ADMIN — BUDESONIDE 1000 MCG: 0.5 INHALANT RESPIRATORY (INHALATION) at 19:20

## 2025-03-02 RX ADMIN — MONTELUKAST 10 MG: 10 TABLET, FILM COATED ORAL at 16:02

## 2025-03-02 ASSESSMENT — PAIN - FUNCTIONAL ASSESSMENT: PAIN_FUNCTIONAL_ASSESSMENT: PREVENTS OR INTERFERES SOME ACTIVE ACTIVITIES AND ADLS

## 2025-03-02 ASSESSMENT — PAIN SCALES - GENERAL
PAINLEVEL_OUTOF10: 7
PAINLEVEL_OUTOF10: 0

## 2025-03-02 ASSESSMENT — PAIN DESCRIPTION - DESCRIPTORS: DESCRIPTORS: SHARP;SHOOTING;SORE

## 2025-03-02 ASSESSMENT — PAIN DESCRIPTION - LOCATION: LOCATION: BACK

## 2025-03-02 ASSESSMENT — PAIN DESCRIPTION - ORIENTATION: ORIENTATION: LOWER
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
## 2025-03-02 NOTE — PROGRESS NOTES
[facility]  [facility]  Progress Note      Chief Complaint   Patient presents with    Abdominal Pain     Patient from home called for 10/10 epigastric pain reproducible with palpation. EMS gave 50 mcg of fentanyl and 4mg of zofran PTA. Denies pain currently       SUBJECTIVE:    Patient is tolerating medications. No reported adverse drug reactions.  No nausea, diarrhea. Had emesis. She feels due to cough.  RN reports possible difficulty with swallowing.  Review of systems:  As stated above in the chief complaint, otherwise negative.    Medications:  Scheduled Meds:   ipratropium 0.5 mg-albuterol 2.5 mg  1 Dose Inhalation Q4H WA RT    sodium bicarbonate  1,300 mg Oral BID    enoxaparin  30 mg SubCUTAneous Daily    lidocaine  5 mL IntraDERmal Once    atorvastatin  10 mg Oral Nightly    escitalopram  10 mg Oral Nightly    budesonide  1 mg Nebulization BID RT    And    arformoterol tartrate  15 mcg Nebulization BID RT    montelukast  10 mg Oral Daily    pantoprazole  40 mg Oral Daily    pregabalin  50 mg Oral BID    sodium chloride flush  5-40 mL IntraVENous 2 times per day     Continuous Infusions:   sodium chloride       PRN Meds:guaiFENesin-dextromethorphan, LORazepam, sodium chloride flush, sodium chloride, potassium chloride **OR** potassium alternative oral replacement **OR** potassium chloride, magnesium sulfate, polyethylene glycol, acetaminophen **OR** acetaminophen, HYDROcodone 5 mg - acetaminophen, ondansetron    OBJECTIVE:  /63   Pulse (!) 115   Temp 97 °F (36.1 °C) (Temporal)   Resp 16   Ht 1.524 m (5')   Wt 64.4 kg (142 lb)   SpO2 95%   BMI 27.73 kg/m²   Temp  Av.4 °F (36.3 °C)  Min: 97 °F (36.1 °C)  Max: 97.8 °F (36.6 °C)  Constitutional: The patient is awake, alert, and oriented. Ill appearing  Skin: Warm and dry. No rashes were noted. No jaundice.  HEENT:  Moist mucous membranes, no ulcerations, no thrush.   Neck: Supple to movements. No lymphadenopathy.  compression fracture  Low attenuating lesion in the pancreatic body or tail  Urinary retention  KATELYN-improving     Plan:    CRP 25 ESR 72  Chest x-ray with no infiltrate, UA has no pyuria but has 1+ bacteria  Fever likely due to influenza A infection  Completed  Tamiflu  Urology following-? Bladder lesion vs debris per renal ultrasound  Seen by oncology for pancreatic lesion-to be worked up outpatient  Check swallow eval  Made cough med around the clock for now  Monitor resp status  Blood cultures no growth so far  ID will continue to follow    STEVE Ríos - CNP  7:37 AM  3/2/2025    Pt seen and examined. Above discussed agree with advanced practice nurse. Labs, cultures, and radiographs reviewed.  Face to Face encounter occurred. Changes made as necessary.     DOROTHY ANDUJAR MD

## 2025-03-02 NOTE — PROGRESS NOTES
[facility] Medicine    Subjective:  pt alert conversive      Current Facility-Administered Medications:     guaiFENesin-dextromethorphan (ROBITUSSIN DM) 100-10 MG/5ML syrup 10 mL, 10 mL, Oral, Q6H, Daniella Anderson APRN - CNP    ipratropium 0.5 mg-albuterol 2.5 mg (DUONEB) nebulizer solution 1 Dose, 1 Dose, Inhalation, Q4H WA RT, Bryan Rivers DO, 1 Dose at 03/02/25 0822    sodium bicarbonate tablet 1,300 mg, 1,300 mg, Oral, BID, Ines Gauthier APRN - CNP, 1,300 mg at 03/01/25 2143    enoxaparin Sodium (LOVENOX) injection 30 mg, 30 mg, SubCUTAneous, Daily, Bryan Rivers DO, 30 mg at 03/01/25 0940    LORazepam (ATIVAN) tablet 1 mg, 1 mg, Oral, TID PRN, Bryan Rivers DO, 1 mg at 03/01/25 2143    lidocaine 1 % injection 5 mL, 5 mL, IntraDERmal, Once, Oliver Waters MD    atorvastatin (LIPITOR) tablet 10 mg, 10 mg, Oral, Nightly, Bryan Rivers DO, 10 mg at 03/01/25 2143    escitalopram (LEXAPRO) tablet 10 mg, 10 mg, Oral, Nightly, Bryan Rivers DO, 10 mg at 03/01/25 2143    budesonide (PULMICORT) nebulizer suspension 1,000 mcg, 1 mg, Nebulization, BID RT, 1,000 mcg at 03/02/25 0822 **AND** arformoterol tartrate (BROVANA) nebulizer solution 15 mcg, 15 mcg, Nebulization, BID RT, 15 mcg at 03/02/25 0822 **AND** [DISCONTINUED] ipratropium (ATROVENT) 0.02 % nebulizer solution 0.5 mg, 0.5 mg, Nebulization, Q6H RT, Bryan Rivers DO, 0.5 mg at 02/28/25 0950    montelukast (SINGULAIR) tablet 10 mg, 10 mg, Oral, Daily, Bryan Rivers DO, 10 mg at 03/01/25 0940    pantoprazole (PROTONIX) tablet 40 mg, 40 mg, Oral, Daily, Bryan Rivers DO, 40 mg at 03/01/25 0940    pregabalin (LYRICA) capsule 50 mg, 50 mg, Oral, BID, Bryan Rivers DO, 50 mg at 03/01/25 2143    sodium chloride flush 0.9 % injection 5-40 mL, 5-40 mL, IntraVENous, 2 times per day, Bryan Rivers DO, 10 mL at 02/28/25 2109    sodium chloride flush 0.9 % injection 5-40 mL, 5-40 mL, IntraVENous, PRN, Tita,  Bryan LAZO,     0.9 % sodium chloride infusion, , IntraVENous, PRN, Bryan Rivers,     potassium chloride (KLOR-CON M) extended release tablet 40 mEq, 40 mEq, Oral, PRN **OR** potassium bicarb-citric acid (EFFER-K) effervescent tablet 40 mEq, 40 mEq, Oral, PRN **OR** potassium chloride 10 mEq/100 mL IVPB (Peripheral Line), 10 mEq, IntraVENous, PRN, Bryan Rivers DO    magnesium sulfate 2000 mg in 50 mL IVPB premix, 2,000 mg, IntraVENous, PRN, Bryan Rivers,     polyethylene glycol (GLYCOLAX) packet 17 g, 17 g, Oral, Daily PRN, Bryan Rivers, , 17 g at 02/23/25 0842    acetaminophen (TYLENOL) tablet 650 mg, 650 mg, Oral, Q6H PRN, 650 mg at 03/01/25 2142 **OR** acetaminophen (TYLENOL) suppository 650 mg, 650 mg, Rectal, Q6H PRN, Bryan Rivers DO    HYDROcodone-acetaminophen (NORCO) 5-325 MG per tablet 1 tablet, 1 tablet, Oral, Q4H PRN, Monica Hansen PA, 1 tablet at 03/01/25 0939    ondansetron (ZOFRAN) injection 4 mg, 4 mg, IntraVENous, Q6H PRN, Oliver Waters MD, 4 mg at 02/19/25 2317    Objective:    /83   Pulse (!) 116   Temp 97.1 °F (36.2 °C) (Temporal)   Resp 16   Ht 1.524 m (5')   Wt 64.4 kg (142 lb)   SpO2 94%   BMI 27.73 kg/m²     Heart:  reg tachy  Lungs:  rhonchi  Abd: + bs soft nontender  Extrem:  w/o edema    CBC with Differential:    Lab Results   Component Value Date/Time    WBC 11.4 03/02/2025 07:12 AM    RBC 3.57 03/02/2025 07:12 AM    HGB 10.5 03/02/2025 07:12 AM    HCT 33.3 03/02/2025 07:12 AM     03/02/2025 07:12 AM    MCV 93.3 03/02/2025 07:12 AM    MCH 29.4 03/02/2025 07:12 AM    MCHC 31.5 03/02/2025 07:12 AM    RDW 15.8 03/02/2025 07:12 AM    NRBC 0.0 03/10/2023 08:12 AM    LYMPHOPCT 13 02/24/2025 05:18 PM    MONOPCT 5 02/24/2025 05:18 PM    MYELOPCT 1 02/24/2025 05:18 PM    MYELOPCT 0.9 02/01/2023 09:10 AM    EOSPCT 1 02/24/2025 05:18 PM    BASOPCT 2 02/24/2025 05:18 PM    MONOSABS 0.51 02/24/2025 05:18 PM    LYMPHSABS 1.26 02/24/2025 05:18

## 2025-03-02 NOTE — PROGRESS NOTES
Pharmacy Consultation Note  (Antibiotic Dosing and Monitoring)    Initial consult date: 3/2/2025  Consulting physician/provider: Dr. Porras  Drug: Vancomycin  Indication: Pneumonia    Age/  Gender Height Weight IBW  Allergy Information   80 y.o./female 152.4 cm (5') 64.4 kg (142 lb)     Ideal body weight: 45.5 kg (100 lb 4.9 oz)  Adjusted ideal body weight: 53.1 kg (116 lb 15.8 oz)   Percocet [oxycodone-acetaminophen]      Renal Function:  Recent Labs     02/28/25  0426 03/01/25  0705 03/02/25  0712   BUN 45* 31* 19   CREATININE 1.6* 1.0 0.8       Intake/Output Summary (Last 24 hours) at 3/2/2025 1856  Last data filed at 3/2/2025 1725  Gross per 24 hour   Intake 360 ml   Output 1325 ml   Net -965 ml       Vancomycin Monitoring:  Trough:  No results for input(s): \"VANCOTROUGH\" in the last 72 hours.  Random:  No results for input(s): \"VANCORANDOM\" in the last 72 hours.    No results for input(s): \"BLOODCULT2\" in the last 72 hours.     Historical Cultures:  Organism   Date Value Ref Range Status   03/09/2023 GNR oxidase positive (A)  Final     No results for input(s): \"BC\" in the last 72 hours.    Vancomycin Administration Times:  Recent vancomycin administrations        No vancomycin IV orders with administrations found.            Orders not given:            vancomycin (VANCOCIN) 1,250 mg in sodium chloride 0.9 % 250 mL IVPB ([id number])                      Assessment:  Patient is a 80 y.o. female who has been initiated on vancomycin for Pneumonia  Estimated Creatinine Clearance: 49 mL/min (based on SCr of 0.8 mg/dL).  To dose vancomycin, pharmacy will be utilizing [employer] calculation software for goal AUC/ANURAG 400-600 mg/L-hr    Plan:  Vancomycin 1250 mg q24h (Predicted AUC/ANURAG = 477, Tr = 13.5)  Will check vancomycin levels when appropriate  Will continue to monitor renal function   Pharmacy to follow    Thank you for this consult,    Eleazar Yuen, [facility] 3/2/2025 6:56 PM   Pharmacy Ext [phone]

## 2025-03-02 NOTE — PROGRESS NOTES
Antibiotic Extended Infusion Policy     This patient is on medication that requires renal, weight, and/or indication dose adjustment.      Date Body Weight IBW  Adjusted BW SCr  CrCl Dialysis status BMI   3/2/2025 64.4 kg (142 lb) Ideal body weight: 45.5 kg (100 lb 4.9 oz)  Adjusted ideal body weight: 53.1 kg (116 lb 15.8 oz) Serum creatinine: 0.8 mg/dL 03/02/25 0712  Estimated creatinine clearance: 49 mL/min N/a Body mass index is 27.73 kg/m².       Pharmacy has dose-adjusted the following medication(s):    Ordered Medication: Zosyn 3375mg q8H     Order Changed/converted to: Zosyn 4500mg q8h    These changes were made per protocol according to the [facility]   Automatic Extended Infusion Dose Adjustment Policy.     *Please note this dose may need readjusted if patient's condition changes.    Please contact pharmacy with any questions regarding these changes.    Scarlett Cano RPH  3/2/2025  6:41 PM

## 2025-03-02 NOTE — SIGNIFICANT EVENT
Rapid Response Team Note  Date of event: 3/2/2025   Time of event: 1807  Haritha Dominguez 80 y.o. year old female   YOB: 1945   Admit date:  2/19/2025   Location: [address] [place] [address]-A   Witnessed? : [x]Yes  [] No  Monitored? : []Yes  [x] No  Code status: [] Full  [x] DNR-CCA  []DNR-CC  ______________________________________________________________________  Reason for RRT:    [] RR < 8     [] RR > 28   [] SpO2 <90%   [] HR < 40 bpm   [x] HR > 130 bpm  [] SBP < 90 mmHg    [x] SpO2 <90%   [] LOC   [] Seizures    [] Significant Bleeding Event    [] Other:     Subjective:   RRT called for acute change in condition- increasing oxygen demands, tachycardia, & fever. Patient admitted with abdominal & back pain, found to have L2 compression fracture and pancreatic lesion. Course complicated by influenza A s/p tamiflu, KATELYN, and intermittent hypotension.   Prior to RRT patient requiring 4L NC. On arrival patient saturating 88% on 6L. ABG obtained showed saturation at 83%, she was then placed on NRB with improvement to saturation. Patient also tachycardic to 130. EKG obtained showed sinus tachycardia. Per nursing some concern with swallowing, SLP evaluation pending at this time.     Objective:   Vital signs: Temp: 100.8/BP: 114/99/RR: 22/HR: 134  Initial Condition:  Conscious   [] Yes  [] No     Breathing [] Yes  [] No     Pulse  [] Yes  [] No    Airway:   [x] Open/ Clear     Intervention: [x] None  [] Pooled secretions     [] Suctioned  [] Stridor      [] Intubation    Lungs:   [x] Symmetrical chest rise/ CTABL Intervention: [] None  [] Use of accessory muscles    [] NIV (CPAP/BiPAP)  [] Cyanosis      [x] Nasal Oxygen/Mask  [] Wheezing       [x] ABG             [x] CXR  [] Other:     Circulation:   Rhythm:  [x] Sinus [] Other:   Intervention: [] None            [] IV Access  [] Peripheral              [] Central            [x] EKG            [] Cardioversion            [] Defibrillation     Capillary Refill:  [] > 2 seconds  [] < 2 seconds      Diagnostic Test:  EKG:    Sinus tachycardia  ABG:      Lab Results   Component Value Date/Time    PH 7.499 03/02/2025 06:20 PM    PCO2 29.8 03/02/2025 06:20 PM    PO2 44.1 03/02/2025 06:20 PM    HCO3 22.7 03/02/2025 06:20 PM    O2SAT 83.1 03/02/2025 06:20 PM         Infusion(s):   [x] Normal Saline -2L bolus        [] Lactate Ringers      [] Other:     Imaging:   [x] CXR:  Vascular congestion            Laboratory Tests:   CBC, BMP, Mg, Phos, lactic, procal pending       Teams Assessment and Plan:  Sinus tachycardia  Acute on chronic respiratory failure  Fever    Concern for aspiration pneumonia vs new viral infection. Patient previously w/ influenza A and has completed tamiflu. New respiratory viral panel sent along w/ [facility]. Will give 2L normal saline bolus. Will cover for possible hospital acquired pneumonia w/ vancomycin + zosyn. Continue patient on NRB and wean as tolerated.   ?  ?  ?  Disposition:  [] No transfer   [x] Transfer to monitor floor  [] Transfer to: [] MICU [] NICU [] CCU [] SICU    Patient’s family updated: [] Yes  [x] No   Discussed with:  [x] Critical Care Intensivist: Dr. James      [] Primary Care Provider: Olayinka Reyes DO      [x] Other: attempted to contact Dr. Rivers via perfect serve but unable. Per nursing staff he is aware of RRT. ?    Jocelynn Porras MD MD PGY-3  3/2/2025 6:47 PM  Attending Physician:Bryan Rivers DO

## 2025-03-02 NOTE — CARE COORDINATION
CM update: Pts authorization for Adrian Escamilla  on . Pt will require a new authorization to be started on Monday. Pt unable to DC over the weekend. Therapy asked to see pt Monday am. FEDE OSORION, RN  Case Management   [phone]

## 2025-03-02 NOTE — PROGRESS NOTES
[facility]  Nephrology Progress Note    Patient's Name: Haritha Dominguez    History of Present Illness from 2/27 consult note:    \"Haritha Dominguez is a 80 y.o. female with a past medical history of hypertension, hyperlipidemia, anxiety, arthritis, and asthma.  She presented to the ED on 2/19 reportedly for concerns of abdominal pain.  Vital signs on 2/19 includes temperature 99.3, respirations 18, pulse 103, /85, and she was 92% SpO2.  Lab data on 2/19 includes CO2 20, BUN 22, creatinine 1, troponin 18, WBC 12.8 K, and hemoglobin 10.5.  Her UA on 2/20 showed specific gravity 1.025, 30 protein, trace leukocyte esterase, 1+ bacteria.  She had a CT abdomen/pelvis with IV contrast on 2/20 which showed no acute intra-abdominal pelvic process appreciated, moderate-large hiatal hernia, moderate colonic stool burden, 1.3 cm low attenuating lesion along pancreatic body/tail.  She underwent a CT lumbar spine on 2/20 which showed new acute appearing fracture seen involving superior endplate of L2, stable chronic compression fracture involving L1, bilateral sacral ala fractures of uncertain chronicity, generalized osteopenia.  She was seen by neurosurgery.  She was seen by hepatobiliary surgery regarding pancreatic lesion.  She had a chest x-ray on 2/24 which showed increased interstitial markings seen in the perihilar regions to suggest mild interstitial edema with small bilateral pleural effusions.  She was found to be influenza A positive on 2/24. She was seen by urology for concerns of urinary retention.  ID is following the patient.  Nephrology has been consulted to see the patient for concerns of KATELYN. She has a baseline serum creatinine of 0.8-1 mg/dL.  At present, patient was seen and examined.  She is awake, alert, appears in no acute distress.  She appears to be a questionable historian at this time.  She notes that her appetite is okay.  She denies any dizziness.  Visitors at

## 2025-03-03 ENCOUNTER — TELEPHONE (OUTPATIENT)
Dept: HEMATOLOGY | Age: 80
End: 2025-03-03

## 2025-03-03 LAB
ALBUMIN SERPL-MCNC: 2.5 G/DL (ref 3.5–5.2)
ALP SERPL-CCNC: 79 U/L (ref 35–104)
ALT SERPL-CCNC: 11 U/L (ref 0–32)
ANION GAP SERPL CALCULATED.3IONS-SCNC: 16 MMOL/L (ref 7–16)
ANION GAP SERPL CALCULATED.3IONS-SCNC: 16 MMOL/L (ref 7–16)
AST SERPL-CCNC: 17 U/L (ref 0–31)
BILIRUB SERPL-MCNC: 0.5 MG/DL (ref 0–1.2)
BUN SERPL-MCNC: 18 MG/DL (ref 6–23)
BUN SERPL-MCNC: 22 MG/DL (ref 6–23)
CALCIUM SERPL-MCNC: 8.2 MG/DL (ref 8.6–10.2)
CALCIUM SERPL-MCNC: 8.3 MG/DL (ref 8.6–10.2)
CHLORIDE SERPL-SCNC: 110 MMOL/L (ref 98–107)
CHLORIDE SERPL-SCNC: 110 MMOL/L (ref 98–107)
CO2 SERPL-SCNC: 22 MMOL/L (ref 22–29)
CO2 SERPL-SCNC: 23 MMOL/L (ref 22–29)
CREAT SERPL-MCNC: 0.9 MG/DL (ref 0.5–1)
CREAT SERPL-MCNC: 1.1 MG/DL (ref 0.5–1)
DATE LAST DOSE: NORMAL
ERYTHROCYTE [DISTWIDTH] IN BLOOD BY AUTOMATED COUNT: 15.9 % (ref 11.5–15)
GFR, ESTIMATED: 52 ML/MIN/1.73M2
GFR, ESTIMATED: 63 ML/MIN/1.73M2
GLUCOSE SERPL-MCNC: 67 MG/DL (ref 74–99)
GLUCOSE SERPL-MCNC: 84 MG/DL (ref 74–99)
HCT VFR BLD AUTO: 32.9 % (ref 34–48)
HGB BLD-MCNC: 10.1 G/DL (ref 11.5–15.5)
MAGNESIUM SERPL-MCNC: 1.3 MG/DL (ref 1.6–2.6)
MCH RBC QN AUTO: 29.4 PG (ref 26–35)
MCHC RBC AUTO-ENTMCNC: 30.7 G/DL (ref 32–34.5)
MCV RBC AUTO: 95.9 FL (ref 80–99.9)
PHOSPHATE SERPL-MCNC: 3.2 MG/DL (ref 2.5–4.5)
PLATELET # BLD AUTO: 521 K/UL (ref 130–450)
PMV BLD AUTO: 11.5 FL (ref 7–12)
POTASSIUM SERPL-SCNC: 3.2 MMOL/L (ref 3.5–5)
POTASSIUM SERPL-SCNC: 3.6 MMOL/L (ref 3.5–5)
PROT SERPL-MCNC: 6.1 G/DL (ref 6.4–8.3)
RBC # BLD AUTO: 3.43 M/UL (ref 3.5–5.5)
SODIUM SERPL-SCNC: 148 MMOL/L (ref 132–146)
SODIUM SERPL-SCNC: 149 MMOL/L (ref 132–146)
TME LAST DOSE: NORMAL H
VANCOMYCIN DOSE: NORMAL MG
VANCOMYCIN SERPL-MCNC: 19.1 UG/ML (ref 5–40)
WBC OTHER # BLD: 15.3 K/UL (ref 4.5–11.5)

## 2025-03-03 PROCEDURE — 6360000002 HC RX W HCPCS: Performed by: INTERNAL MEDICINE

## 2025-03-03 PROCEDURE — 36415 COLL VENOUS BLD VENIPUNCTURE: CPT

## 2025-03-03 PROCEDURE — 97530 THERAPEUTIC ACTIVITIES: CPT

## 2025-03-03 PROCEDURE — 6370000000 HC RX 637 (ALT 250 FOR IP): Performed by: INTERNAL MEDICINE

## 2025-03-03 PROCEDURE — 94660 CPAP INITIATION&MGMT: CPT

## 2025-03-03 PROCEDURE — 84100 ASSAY OF PHOSPHORUS: CPT

## 2025-03-03 PROCEDURE — 80202 ASSAY OF VANCOMYCIN: CPT

## 2025-03-03 PROCEDURE — 2140000000 HC CCU INTERMEDIATE R&B

## 2025-03-03 PROCEDURE — 80048 BASIC METABOLIC PNL TOTAL CA: CPT

## 2025-03-03 PROCEDURE — 6360000002 HC RX W HCPCS

## 2025-03-03 PROCEDURE — 2500000003 HC RX 250 WO HCPCS

## 2025-03-03 PROCEDURE — 80053 COMPREHEN METABOLIC PANEL: CPT

## 2025-03-03 PROCEDURE — 6360000002 HC RX W HCPCS: Performed by: EMERGENCY MEDICINE

## 2025-03-03 PROCEDURE — 2700000000 HC OXYGEN THERAPY PER DAY

## 2025-03-03 PROCEDURE — 85027 COMPLETE CBC AUTOMATED: CPT

## 2025-03-03 PROCEDURE — 83735 ASSAY OF MAGNESIUM: CPT

## 2025-03-03 PROCEDURE — 94640 AIRWAY INHALATION TREATMENT: CPT

## 2025-03-03 PROCEDURE — 2580000003 HC RX 258

## 2025-03-03 PROCEDURE — 2500000003 HC RX 250 WO HCPCS: Performed by: INTERNAL MEDICINE

## 2025-03-03 PROCEDURE — 87081 CULTURE SCREEN ONLY: CPT

## 2025-03-03 PROCEDURE — 92610 EVALUATE SWALLOWING FUNCTION: CPT

## 2025-03-03 RX ORDER — LORAZEPAM 2 MG/ML
0.5 INJECTION INTRAMUSCULAR EVERY 6 HOURS PRN
Status: DISCONTINUED | OUTPATIENT
Start: 2025-03-03 | End: 2025-03-14 | Stop reason: HOSPADM

## 2025-03-03 RX ORDER — BUMETANIDE 0.25 MG/ML
2 INJECTION, SOLUTION INTRAMUSCULAR; INTRAVENOUS ONCE
Status: COMPLETED | OUTPATIENT
Start: 2025-03-03 | End: 2025-03-03

## 2025-03-03 RX ORDER — MAGNESIUM SULFATE IN WATER 40 MG/ML
2000 INJECTION, SOLUTION INTRAVENOUS ONCE
Status: COMPLETED | OUTPATIENT
Start: 2025-03-03 | End: 2025-03-03

## 2025-03-03 RX ORDER — BUMETANIDE 0.25 MG/ML
1 INJECTION, SOLUTION INTRAMUSCULAR; INTRAVENOUS 2 TIMES DAILY
Status: DISCONTINUED | OUTPATIENT
Start: 2025-03-03 | End: 2025-03-05

## 2025-03-03 RX ORDER — POTASSIUM CHLORIDE 7.45 MG/ML
10 INJECTION INTRAVENOUS ONCE
Status: COMPLETED | OUTPATIENT
Start: 2025-03-03 | End: 2025-03-03

## 2025-03-03 RX ADMIN — PIPERACILLIN AND TAZOBACTAM 4500 MG: 4; .5 INJECTION, POWDER, FOR SOLUTION INTRAVENOUS at 04:58

## 2025-03-03 RX ADMIN — PIPERACILLIN AND TAZOBACTAM 4500 MG: 4; .5 INJECTION, POWDER, FOR SOLUTION INTRAVENOUS at 13:37

## 2025-03-03 RX ADMIN — ACETAMINOPHEN 650 MG: 650 SUPPOSITORY RECTAL at 09:00

## 2025-03-03 RX ADMIN — POTASSIUM CHLORIDE 10 MEQ: 7.46 INJECTION, SOLUTION INTRAVENOUS at 21:32

## 2025-03-03 RX ADMIN — LORAZEPAM 0.5 MG: 2 INJECTION INTRAMUSCULAR; INTRAVENOUS at 22:11

## 2025-03-03 RX ADMIN — BUDESONIDE 1000 MCG: 0.5 INHALANT RESPIRATORY (INHALATION) at 08:42

## 2025-03-03 RX ADMIN — ENOXAPARIN SODIUM 30 MG: 100 INJECTION SUBCUTANEOUS at 13:38

## 2025-03-03 RX ADMIN — BUMETANIDE 1 MG: 0.25 INJECTION INTRAMUSCULAR; INTRAVENOUS at 17:46

## 2025-03-03 RX ADMIN — WATER 40 MG: 1 INJECTION INTRAMUSCULAR; INTRAVENOUS; SUBCUTANEOUS at 13:30

## 2025-03-03 RX ADMIN — IPRATROPIUM BROMIDE AND ALBUTEROL SULFATE 1 DOSE: 2.5; .5 SOLUTION RESPIRATORY (INHALATION) at 08:43

## 2025-03-03 RX ADMIN — IPRATROPIUM BROMIDE AND ALBUTEROL SULFATE 1 DOSE: 2.5; .5 SOLUTION RESPIRATORY (INHALATION) at 20:40

## 2025-03-03 RX ADMIN — MAGNESIUM SULFATE HEPTAHYDRATE 2000 MG: 40 INJECTION, SOLUTION INTRAVENOUS at 19:39

## 2025-03-03 RX ADMIN — ARFORMOTEROL TARTRATE 15 MCG: 15 SOLUTION RESPIRATORY (INHALATION) at 20:40

## 2025-03-03 RX ADMIN — SODIUM CHLORIDE, PRESERVATIVE FREE 10 ML: 5 INJECTION INTRAVENOUS at 13:31

## 2025-03-03 RX ADMIN — IPRATROPIUM BROMIDE AND ALBUTEROL SULFATE 1 DOSE: 2.5; .5 SOLUTION RESPIRATORY (INHALATION) at 12:41

## 2025-03-03 RX ADMIN — ARFORMOTEROL TARTRATE 15 MCG: 15 SOLUTION RESPIRATORY (INHALATION) at 08:43

## 2025-03-03 RX ADMIN — WATER 40 MG: 1 INJECTION INTRAMUSCULAR; INTRAVENOUS; SUBCUTANEOUS at 23:32

## 2025-03-03 RX ADMIN — IPRATROPIUM BROMIDE AND ALBUTEROL SULFATE 1 DOSE: 2.5; .5 SOLUTION RESPIRATORY (INHALATION) at 15:35

## 2025-03-03 RX ADMIN — BUMETANIDE 2 MG: 0.25 INJECTION INTRAMUSCULAR; INTRAVENOUS at 13:30

## 2025-03-03 RX ADMIN — PIPERACILLIN AND TAZOBACTAM 4500 MG: 4; .5 INJECTION, POWDER, FOR SOLUTION INTRAVENOUS at 23:38

## 2025-03-03 RX ADMIN — ONDANSETRON 4 MG: 2 INJECTION, SOLUTION INTRAMUSCULAR; INTRAVENOUS at 10:17

## 2025-03-03 RX ADMIN — BUDESONIDE 1000 MCG: 0.5 INHALANT RESPIRATORY (INHALATION) at 20:40

## 2025-03-03 ASSESSMENT — PAIN SCALES - GENERAL
PAINLEVEL_OUTOF10: 2
PAINLEVEL_OUTOF10: 0
PAINLEVEL_OUTOF10: 10
PAINLEVEL_OUTOF10: 8

## 2025-03-03 ASSESSMENT — PAIN DESCRIPTION - LOCATION
LOCATION: BACK
LOCATION: GENERALIZED
LOCATION: BACK
LOCATION: GENERALIZED

## 2025-03-03 ASSESSMENT — PAIN DESCRIPTION - DESCRIPTORS
DESCRIPTORS: SORE
DESCRIPTORS: SORE

## 2025-03-03 ASSESSMENT — PAIN DESCRIPTION - FREQUENCY
FREQUENCY: INTERMITTENT
FREQUENCY: INTERMITTENT

## 2025-03-03 ASSESSMENT — PAIN DESCRIPTION - ORIENTATION
ORIENTATION: LOWER
ORIENTATION: LOWER

## 2025-03-03 ASSESSMENT — PAIN DESCRIPTION - ONSET
ONSET: ON-GOING
ONSET: ON-GOING

## 2025-03-03 NOTE — CARE COORDINATION
3/3:  Update CM Note:  Pt presented to the ER for worsening back pain from home.  Pt is on 10L/NC at 96%, Iv Solumedrol, Iv Zosyn til 3/8, Iv Fluids & SQ Lovenox.  Pt is in ISO-Droplet for Influenza.  Pulmonary, Urology, ID & NS.  Pt's dc plan is to [facility].  Will need to resubmit for pre-cert.  [place],PASRR & Ambulance completed placed in envelope in soft chart.  Pt was placed on will call with PAS.  Sw/NUNO will continue to follow.  Electronically signed by Malika Chatterjee RN on 3/3/2025 at 12:57 PM

## 2025-03-03 NOTE — PROGRESS NOTES
Physical Therapy Treatment     Name: Haritha RUIZ [facility]  : 1945  MRN: [id number]      Date of Service: 3/3/2025    Evaluating PT:  Bryan Monterroso, PT [id number]    Referring provider/PT Order:  PT Eval and Treat   25 1300  PT eval and treat  Start:  25 1300,   End:  25 1300,   ONE TIME,   Standing Count:  1 Occurrences,   R         Bryan Rivers, DO     Room #:  6422/6422-B  Diagnosis:  Lumbar compression fracture, closed, initial encounter ([facility]) [S32.000A]  Abdominal pain, unspecified abdominal location [R10.9]  Acute midline low back pain without sciatica [M54.50]  Compression fracture of L2 vertebra, initial encounter ([facility]) [S32.020A]  PMHx/PSHx:     has a past medical history of Abrasion of skin of left lower leg, Abrasion of skin of right lower leg, Anxiety, Arthritis, Asthma, Claustrophobia, Decreased dorsalis pedis pulse, Dermatitis, Fracture, GERD (gastroesophageal reflux disease), Hiatal hernia, History of blood transfusion, HTN (hypertension), Hyperlipidemia, Itching, On aspirin at home, Pancreatic cyst, Pneumonia, Sleep apnea, SOB (shortness of breath), and Tachycardia.    has a past surgical history that includes Cholecystectomy; Cataract removal (Bilateral, 2013); back surgery (2014); joint replacement (2016); Total knee arthroplasty (Right); other surgical history (2017); Spinal fixation surgery with implant (); Upper gastrointestinal endoscopy (07/10/2017); and Stimulator Surgery (Right, 2023).     Procedure/Surgery:  none  Precautions:  Falls, FWB (full weight bearing) Bilateral LE flexed posture kyphotic, , Soft TLSO, Droplet Influenza  Equipment Needs: Patient has needed equipment ,    SUBJECTIVE:    ???Patient states she will D/C to her Sister's home where she can be on the first level. Her [place] home with 3 FELIZ. Patient ambulated independently, with wheeled walker  PTA. Equipment owned: Wheelchair, Cane, and Wheeled Walker ??     Can benefit  from Rehab program due to deconditioning.     OBJECTIVE:   Initial Evaluation  Date: 2/21/25 Treatment Date: 3/3/25 Short Term/ Long Term   Goals   AM-PAC 6 Clicks 14/24 7/24    Was pt agreeable to Eval/treatment? Yes  yes    Does pt have pain? Yes 20/10 post ambulation Back pain    Bed Mobility  Rolling: Mod   Supine to sit:   Max    Sit to supine: Max  limited by pain returning to bed.   Scooting: Mod   Rolling: Max   Supine to sit:   NT  Sit to supine: NT  Scooting: NT Rolling: Min  Supine to sit: Min  Sit to supine: Min  Scooting: Min   Transfers Sit to stand: Mod  to Wheeled Walker  Stand to sit: Min  Stand pivot: Min with Wheeled Walker Sit to stand: NT  Stand to sit: NT  Stand pivot: NT  Sit to stand: SBA to Wheeled Walker  Stand to sit: SBA   Stand pivot: SBA with Wheeled Walker   Ambulation    40 feet with Wheeled Walker   Min slow rate kyphotic posture. Patient did state brace offered support.  NT 50+ feet with Wheeled Walker and SBA    Stair negotiation: ascended and descended  NT NT 4 steps with Min      Strength/ROM:   See OT note for BUE ROM and strength  RLE grossly 3+/5  LLE grossly 3+/5  RLE AROM WFL  LLE AROM WFL    Balance:     Static Sitting: NT  Dynamic Sitting: NT  Static Standing: NT  Dynamic Standing: NT      Pt is A & O x 2-3 grossly  Sensation:  NT  Edema:  none noted    Vitals:  HR: 131-138bpm  SPO2 on 10L: 90-92%    Patient education  Pt educated on role of PT, safety during mobility    Patient response to education:   Pt verbalized understanding Pt demonstrated skill Pt requires further education in this area   yes yes yes     ASSESSMENT:    Comments:  pt semi-supine in bed upon entry and agreeable to PT treatment. Pt limited this date d/t sustained tachycardia. Rolling completed with heavy assist. Pt repositioned in bed for comfort.    All needs met and call light in reach. All lines remained intact.     Treatment:  Patient practiced and was instructed in the following treatment:

## 2025-03-03 NOTE — PROGRESS NOTES
Hospital Medicine    Subjective:  pt lethargic but responsive pt with acute resp failure with hypoxia yesterday      Current Facility-Administered Medications:     guaiFENesin-dextromethorphan (ROBITUSSIN DM) 100-10 MG/5ML syrup 10 mL, 10 mL, Oral, Q6H, Daniella Anderson APRN - CNP    0.9 % sodium chloride infusion, , IntraVENous, Continuous, April Randall APRN - CNP, Last Rate: 50 mL/hr at 03/02/25 1100, New Bag at 03/02/25 1100    sodium chloride 0.9 % bolus 1,000 mL, 1,000 mL, IntraVENous, Once, Jocelynn Porras MD    [COMPLETED] piperacillin-tazobactam (ZOSYN) 4,500 mg in sodium chloride 0.9 % 100 mL IVPB ([id number]), 4,500 mg, IntraVENous, Once, Stopped at 03/03/25 0010 **FOLLOWED BY** piperacillin-tazobactam (ZOSYN) 4,500 mg in sodium chloride 0.9 % 100 mL IVPB ([id number]), 4,500 mg, IntraVENous, Q8H, Jocelynn Porras MD, Last Rate: 25 mL/hr at 03/03/25 0458, 4,500 mg at 03/03/25 0458    sodium chloride 0.9 % bolus 1,000 mL, 1,000 mL, IntraVENous, Once, Natanael Martinez MD    vancomycin (VANCOCIN) 1,250 mg in sodium chloride 0.9 % 250 mL IVPB ([id number]), 1,250 mg, IntraVENous, Q24H, Jocelynn Porras MD    ipratropium 0.5 mg-albuterol 2.5 mg (DUONEB) nebulizer solution 1 Dose, 1 Dose, Inhalation, Q4H WA RT, Bryan Rivers DO, 1 Dose at 03/02/25 1920    enoxaparin Sodium (LOVENOX) injection 30 mg, 30 mg, SubCUTAneous, Daily, Bryan Rivers DO, 30 mg at 03/02/25 0916    LORazepam (ATIVAN) tablet 1 mg, 1 mg, Oral, TID PRN, Bryan Rivers DO, 1 mg at 03/01/25 2143    lidocaine 1 % injection 5 mL, 5 mL, IntraDERmal, Once, Oliver Waters MD    atorvastatin (LIPITOR) tablet 10 mg, 10 mg, Oral, Nightly, Bryan Rivers, , 10 mg at 03/01/25 2143    escitalopram (LEXAPRO) tablet 10 mg, 10 mg, Oral, Nightly, Bryan Rivers, , 10 mg at 03/01/25 2143    budesonide (PULMICORT) nebulizer suspension 1,000 mcg, 1 mg, Nebulization, BID RT, 1,000 mcg at 03/02/25 1920 **AND** arformoterol tartrate  (BROVANA) nebulizer solution 15 mcg, 15 mcg, Nebulization, BID RT, 15 mcg at 03/02/25 1919 **AND** [DISCONTINUED] ipratropium (ATROVENT) 0.02 % nebulizer solution 0.5 mg, 0.5 mg, Nebulization, Q6H RT, Bryan Rivers, , 0.5 mg at 02/28/25 0950    montelukast (SINGULAIR) tablet 10 mg, 10 mg, Oral, Daily, Bryan Rivers DO, 10 mg at 03/02/25 1602    pantoprazole (PROTONIX) tablet 40 mg, 40 mg, Oral, Daily, Bryan Rivers DO, 40 mg at 03/02/25 1603    pregabalin (LYRICA) capsule 50 mg, 50 mg, Oral, BID, Bryan Rivers DO, 50 mg at 03/02/25 1602    sodium chloride flush 0.9 % injection 5-40 mL, 5-40 mL, IntraVENous, 2 times per day, Bryan Rivers DO, 10 mL at 03/02/25 2322    sodium chloride flush 0.9 % injection 5-40 mL, 5-40 mL, IntraVENous, PRN, Bryan Rivers DO    0.9 % sodium chloride infusion, , IntraVENous, PRN, Bryan Rivers,     potassium chloride (KLOR-CON M) extended release tablet 40 mEq, 40 mEq, Oral, PRN **OR** potassium bicarb-citric acid (EFFER-K) effervescent tablet 40 mEq, 40 mEq, Oral, PRN **OR** potassium chloride 10 mEq/100 mL IVPB (Peripheral Line), 10 mEq, IntraVENous, PRN, Bryan Rivers,     magnesium sulfate 2000 mg in 50 mL IVPB premix, 2,000 mg, IntraVENous, PRN, Bryan Rivers, DO    polyethylene glycol (GLYCOLAX) packet 17 g, 17 g, Oral, Daily PRN, Bryan Rivers DO, 17 g at 02/23/25 0842    acetaminophen (TYLENOL) tablet 650 mg, 650 mg, Oral, Q6H PRN, 650 mg at 03/02/25 1752 **OR** acetaminophen (TYLENOL) suppository 650 mg, 650 mg, Rectal, Q6H PRN, Bryan Rivers, DO    HYDROcodone-acetaminophen (NORCO) 5-325 MG per tablet 1 tablet, 1 tablet, Oral, Q4H PRN, Monica Hansen PA, 1 tablet at 03/02/25 1601    ondansetron (ZOFRAN) injection 4 mg, 4 mg, IntraVENous, Q6H PRN, Oliver Waters MD, 4 mg at 03/02/25 0915    Objective:    /67   Pulse (!) 131   Temp 98.1 °F (36.7 °C) (Temporal)   Resp 20   Ht 1.524 m (5')   Wt 64.4 kg (142  lb)   SpO2 96%   BMI 27.73 kg/m²     Heart:  reg tachy  Lungs:  rhonchi  Abd: + bs soft nontender  Extrem:  min edema legs    CBC with Differential:    Lab Results   Component Value Date/Time    WBC 14.9 03/02/2025 06:15 PM    RBC 3.47 03/02/2025 06:15 PM    HGB 10.2 03/02/2025 06:15 PM    HCT 32.5 03/02/2025 06:15 PM     03/02/2025 06:15 PM    MCV 93.7 03/02/2025 06:15 PM    MCH 29.4 03/02/2025 06:15 PM    MCHC 31.4 03/02/2025 06:15 PM    RDW 15.7 03/02/2025 06:15 PM    NRBC 0.0 03/10/2023 08:12 AM    LYMPHOPCT 13 03/02/2025 06:15 PM    PROMYELOPCT 1 03/02/2025 06:15 PM    MONOPCT 26 03/02/2025 06:15 PM    MYELOPCT 4 03/02/2025 06:15 PM    MYELOPCT 0.9 02/01/2023 09:10 AM    EOSPCT 4 03/02/2025 06:15 PM    BASOPCT 2 03/02/2025 06:15 PM    MONOSABS 3.89 03/02/2025 06:15 PM    LYMPHSABS 1.94 03/02/2025 06:15 PM    EOSABS 0.65 03/02/2025 06:15 PM    BASOSABS 0.26 03/02/2025 06:15 PM     CMP:    Lab Results   Component Value Date/Time     03/02/2025 06:15 PM    K 3.22 03/02/2025 06:20 PM    K 4.6 05/24/2023 11:30 AM     03/02/2025 06:15 PM    CO2 21 03/02/2025 06:15 PM    BUN 16 03/02/2025 06:15 PM    CREATININE 0.9 03/02/2025 06:15 PM    GFRAA >60 01/12/2019 04:00 AM    LABGLOM 69 03/02/2025 06:15 PM    LABGLOM 70 04/18/2024 09:02 PM    GLUCOSE 99 03/02/2025 06:15 PM    GLUCOSE 85 04/02/2012 08:30 AM    CALCIUM 8.5 03/02/2025 06:15 PM    BILITOT 0.5 03/02/2025 06:15 PM    ALKPHOS 83 03/02/2025 06:15 PM    AST 22 03/02/2025 06:15 PM    ALT 15 03/02/2025 06:15 PM     Warfarin PT/INR:    Lab Results   Component Value Date    INR 0.9 05/24/2023    INR 1.1 02/13/2023    INR 1.2 05/28/2017    PROTIME 10.2 05/24/2023    PROTIME 11.8 02/13/2023    PROTIME 13.2 (H) 05/28/2017       Assessment:    Principal Problem:    Compression fracture of L2 lumbar vertebra (HCC)  Active Problems:    Goals of care, counseling/discussion    Palliative care encounter  Resolved Problems:    * No resolved hospital problems.

## 2025-03-03 NOTE — PROGRESS NOTES
[facility]  Nephrology Progress Note    Patient's Name: Haritha Dominguez    History of Present Illness from 2/27 consult note:    \"Haritha Dominguez is a 80 y.o. female with a past medical history of hypertension, hyperlipidemia, anxiety, arthritis, and asthma.  She presented to the ED on 2/19 reportedly for concerns of abdominal pain.  Vital signs on 2/19 includes temperature 99.3, respirations 18, pulse 103, /85, and she was 92% SpO2.  Lab data on 2/19 includes CO2 20, BUN 22, creatinine 1, troponin 18, WBC 12.8 K, and hemoglobin 10.5.  Her UA on 2/20 showed specific gravity 1.025, 30 protein, trace leukocyte esterase, 1+ bacteria.  She had a CT abdomen/pelvis with IV contrast on 2/20 which showed no acute intra-abdominal pelvic process appreciated, moderate-large hiatal hernia, moderate colonic stool burden, 1.3 cm low attenuating lesion along pancreatic body/tail.  She underwent a CT lumbar spine on 2/20 which showed new acute appearing fracture seen involving superior endplate of L2, stable chronic compression fracture involving L1, bilateral sacral ala fractures of uncertain chronicity, generalized osteopenia.  She was seen by neurosurgery.  She was seen by hepatobiliary surgery regarding pancreatic lesion.  She had a chest x-ray on 2/24 which showed increased interstitial markings seen in the perihilar regions to suggest mild interstitial edema with small bilateral pleural effusions.  She was found to be influenza A positive on 2/24. She was seen by urology for concerns of urinary retention.  ID is following the patient.  Nephrology has been consulted to see the patient for concerns of KATELYN. She has a baseline serum creatinine of 0.8-1 mg/dL.  At present, patient was seen and examined.  She is awake, alert, appears in no acute distress.  She appears to be a questionable historian at this time.  She notes that her appetite is okay.  She denies any dizziness.  Visitors at  (VENTOLIN HFA) 108 (90 Base) MCG/ACT inhaler Inhale 2 puffs into the lungs every 6 hours as needed for Wheezing      polyethylene glycol (GLYCOLAX) 17 g packet Take 1 packet by mouth daily as needed for Constipation      vitamin C (ASCORBIC ACID) 500 MG tablet Take 1 tablet by mouth daily      vitamin D (CHOLECALCIFEROL) 50 MCG (2000 UT) TABS tablet Take 1 tablet by mouth in the morning and at bedtime      Calcium Magnesium Zinc 333-133-5 MG TABS Take 1 tablet by mouth in the morning and at bedtime      montelukast (SINGULAIR) 10 MG tablet TAKE 1 TABLET DAILY 90 tablet 1    aspirin 81 MG EC tablet Take 1 tablet by mouth daily      fluticasone-umeclidin-vilant (TRELEGY ELLIPTA) 200-62.5-25 MCG/ACT AEPB inhaler Inhale 1 puff into the lungs daily 180 each 2    pregabalin (LYRICA) 50 MG capsule Take 1 capsule by mouth 2 times daily.      escitalopram (LEXAPRO) 10 MG tablet Take 1 tablet by mouth nightly      atorvastatin (LIPITOR) 10 MG tablet Take 1 tablet by mouth nightly         Allergies:    Percocet [oxycodone-acetaminophen]    Social History:     reports that she has never smoked. She has never used smokeless tobacco. She reports that she does not drink alcohol and does not use drugs.    Family History:         Problem Relation Age of Onset    Stroke Mother     Heart Disease Mother     Hypertension Mother     Other Mother         CHOLECYSTECTOMY; OSTEOPENIA    Heart Disease Father     Hypertension Father     Diabetes Father     Arthritis Father     Asthma Daughter      Physical Exam:      Patient Vitals for the past 24 hrs:   BP Temp Temp src Pulse Resp SpO2   03/03/25 0400 122/67 98.1 °F (36.7 °C) Temporal (!) 131 20 96 %   03/03/25 0014 134/73 97.7 °F (36.5 °C) Temporal (!) 128 22 90 %   03/02/25 2029 105/65 97.5 °F (36.4 °C) Temporal (!) 121 27 97 %   03/02/25 2000 -- -- -- (!) 124 -- --   03/02/25 1919 -- -- -- (!) 126 (!) 33 (!) 89 %   03/02/25 1831 95/61 -- -- (!) 128 22 97 %   03/02/25 1828 -- (!) 100.8 °F    Date Value Ref Range Status   02/28/2025 442 ng/mL Final     Comment:           FERRITIN Reference Ranges:  Adult Males   20 - 60 years:    30 - 400 ng/mL  Adult females 17 - 60 years:    13 - 150 ng/mL  Adults greater than 60 years:   no established reference range  Pediatrics:  no established reference range       Iron   Date Value Ref Range Status   02/28/2025 16 (L) 37 - 145 ug/dL Final     TIBC   Date Value Ref Range Status   02/28/2025 198 (L) 250 - 450 ug/dL Final       Vitamin B-12   Date Value Ref Range Status   02/28/2025 1002 (H) 211 - 946 pg/mL Final       Folate   Date Value Ref Range Status   02/28/2025 17.9 4.8 - 24.2 ng/mL Final       Lab Results   Component Value Date/Time    COLORU Yellow 02/20/2025 12:11 AM    NITRU NEGATIVE 02/20/2025 12:11 AM    GLUCOSEU NEGATIVE 02/20/2025 12:11 AM    GLUCOSEU NEGATIVE 04/01/2012 03:16 PM    KETUA NEGATIVE 02/20/2025 12:11 AM    UROBILINOGEN 0.2 02/20/2025 12:11 AM    BILIRUBINUR NEGATIVE 02/20/2025 12:11 AM       Lab Results   Component Value Date/Time    PROTEIN 6.1 (L) 03/03/2025 06:24 AM       No components found for: \"URIC\"    No results found for: \"LIPIDPAN\"    Assessment and Plans:    KATELYN stage III-resolved  KATELYN presumed likely in the setting of decreased effective renal perfusion with intermittent hypotension; patient has also reportedly been having urinary retention  Patient s/p CT with contrast 2/20  Baseline serum creatinine 0.8-1 mg/dL  Creatinine peaked at 2.8 mg/dL on 2/27--> 0.9 mg/dL today   CT ABD 2/20-no hydro  retroperitoneal US 2/27-normal US of the kidneys  Urine protein creatinine ratio 0.57; FENa 0.37%  Bladder scan as needed  Avoid nephrotoxins/NSAIDs  Strict I&O, daily weights  stop IV fluids  Trial Bumex 1 mg IV twice daily   Monitor labs    2.  Fever/influenza A  Influenza A (+) 2/24  Temperature noted at 102.7 on 2/25  Blood cultures no growth  s/p Tamiflu  Defer to ID    3.  Intermittent hypotension  BP appears improved  On IV

## 2025-03-03 NOTE — CONSULTS
Nathan Kurtz M.D.,[facility]  Jack Rolle D.O., TRU., [facility]  Vasquez Licona M.D.  Dee Albert M.D.   Wallace Decker D.O.  Bryan Traylor M.D.       Patient:  Haritha Dominguez 80 y.o. female MRN: [id number]           PULMONARY CONSULTATION    Reason for Consultation: acute resp failure with hypoxia  Referring Physician: Bryan Rivers DO    Communication with the referring physician will be sent via the electronic medical record.    Chief Complaint: Abdominal pain    CODE STATUS: DNR-CCA    SUBJECTIVE:  HPI:  Haritha Dominguez is a 80 y.o. female we are asked to evaluate for acute respiratory failure with hypoxia.    Patient is known to our practice and follows with Dr. Wallace Decker for moderate persistent asthma and obstructive sleep apnea.  At baseline she is on room air.  She takes Trelegy daily, and albuterol nebulizer as needed.  She does have a CPAP with 7 cm of water through [place].    Haritha presented to the hospital on 2/19 with abdominal pain and lower back pain.  Workup revealed an L2 compression fraction and a 1.3 cm lesion in the pancreas.  She was seen by neurosurgery for the compression fracture which was recommended wearing a brace.  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  Haritha had tested positive for influenza A on 2/24 and was started on Tamiflu.  She has been requiring supplemental oxygen however she went into respiratory distress yesterday and an RRT was called.  She is being followed by infectious disease however Zosyn and vancomycin was started yesterday during the RRT.  A chest x-ray was done yesterday showing no acute process.    Haritha was seen today lying in bed ill-appearing on 10 L high flow nasal cannula.  She does have wheezes on exam along with rhonchi.      Past Medical History:   Diagnosis Date    Abrasion of skin of left lower leg 11/04/2024    Abrasion of skin of right lower leg  maybe eluding to truncal obesity as the cause of restriction.    DLCO is normal when corrected for alveolar volume illustrating normal parenchymal gas transfer.       Imaging personally reviewed:  CXR 3/2/2025:  FINDINGS:  The lungs are without acute focal process.  There is no effusion or  pneumothorax. The cardiomediastinal silhouette is without acute process. The  osseous structures are without acute process.     IMPRESSION:  No acute process.      Echo 3/20/2023:  Summary   Ejection fraction is visually estimated at 65-70%.   No regional wall motion abnormalities seen.   Normal right ventricle structure and function.   Mild mitral regurgitation is present.   Physiologic and/or trace tricuspid regurgitation.   RVSP is 28 mmHg.   No evidence for hemodynamically significant pericardial effusion.    Labs:  Lab Results   Component Value Date/Time    WBC 15.3 03/03/2025 06:24 AM    RBC 3.43 03/03/2025 06:24 AM    HGB 10.1 03/03/2025 06:24 AM    HCT 32.9 03/03/2025 06:24 AM    MCV 95.9 03/03/2025 06:24 AM    MCH 29.4 03/03/2025 06:24 AM    MCHC 30.7 03/03/2025 06:24 AM    RDW 15.9 03/03/2025 06:24 AM     03/03/2025 06:24 AM    MPV 11.5 03/03/2025 06:24 AM     Lab Results   Component Value Date/Time     03/03/2025 06:24 AM    K 3.2 03/03/2025 06:24 AM    K 4.6 05/24/2023 11:30 AM     03/03/2025 06:24 AM    CO2 22 03/03/2025 06:24 AM    BUN 18 03/03/2025 06:24 AM    CREATININE 0.9 03/03/2025 06:24 AM    CALCIUM 8.3 03/03/2025 06:24 AM    GFRAA >60 01/12/2019 04:00 AM    LABGLOM 63 03/03/2025 06:24 AM    LABGLOM 70 04/18/2024 09:02 PM     Lab Results   Component Value Date/Time    PROTIME 10.2 05/24/2023 11:30 AM    PROTIME 11.7 04/01/2012 03:16 PM    INR 0.9 05/24/2023 11:30 AM     Recent Labs     03/02/25  1815   PROCAL 0.53*       Micro:  Component  Ref Range & Units    Specimen Description .NASOPHARYNGEAL SWAB   Adenovirus PCR  Not Detected Not Detected   Coronavirus 229E PCR  Not Detected Not

## 2025-03-03 NOTE — PLAN OF CARE
Problem: Discharge Planning  Goal: Discharge to home or other facility with appropriate resources  3/3/2025 1513 by Monica Mead RN  Outcome: Progressing  3/3/2025 0550 by Stan Hager RN  Outcome: Progressing  Flowsheets (Taken 3/2/2025 2000)  Discharge to home or other facility with appropriate resources:   Identify barriers to discharge with patient and caregiver   Identify discharge learning needs (meds, wound care, etc)     Problem: Pain  Goal: Verbalizes/displays adequate comfort level or baseline comfort level  3/3/2025 1513 by Monica Mead RN  Outcome: Progressing  Flowsheets (Taken 3/3/2025 0650 by Stan Hager RN)  Verbalizes/displays adequate comfort level or baseline comfort level:   Encourage patient to monitor pain and request assistance   Assess pain using appropriate pain scale  3/3/2025 0550 by Stan Hager RN  Outcome: Progressing  Flowsheets (Taken 3/3/2025 0550)  Verbalizes/displays adequate comfort level or baseline comfort level:   Encourage patient to monitor pain and request assistance   Assess pain using appropriate pain scale     Problem: Safety - Adult  Goal: Free from fall injury  3/3/2025 1513 by Monica Mead RN  Outcome: Progressing  3/3/2025 0550 by Stan Hager RN  Outcome: Progressing     Problem: ABCDS Injury Assessment  Goal: Absence of physical injury  3/3/2025 1513 by Monica Mead RN  Outcome: Progressing  3/3/2025 0550 by Stan Hager RN  Outcome: Progressing     Problem: Skin/Tissue Integrity  Goal: Skin integrity remains intact  Description: 1.  Monitor for areas of redness and/or skin breakdown  2.  Assess vascular access sites hourly  3.  Every 4-6 hours minimum:  Change oxygen saturation probe site  4.  Every 4-6 hours:  If on nasal continuous positive airway pressure, respiratory therapy assess nares and determine need for appliance change or resting period  3/3/2025 1513 by Monica Mead RN  Outcome: Progressing  3/3/2025 0550 by

## 2025-03-03 NOTE — PLAN OF CARE
Problem: Discharge Planning  Goal: Discharge to home or other facility with appropriate resources  3/3/2025 1513 by Monica Mead RN  Outcome: Progressing  3/3/2025 1513 by Monica Mead RN  Outcome: Progressing  3/3/2025 0550 by Stan Hager RN  Outcome: Progressing  Flowsheets (Taken 3/2/2025 2000)  Discharge to home or other facility with appropriate resources:   Identify barriers to discharge with patient and caregiver   Identify discharge learning needs (meds, wound care, etc)     Problem: Pain  Goal: Verbalizes/displays adequate comfort level or baseline comfort level  3/3/2025 1513 by Monica Mead RN  Outcome: Progressing  3/3/2025 1513 by Monica Mead RN  Outcome: Progressing  Flowsheets (Taken 3/3/2025 0650 by Stan Hager RN)  Verbalizes/displays adequate comfort level or baseline comfort level:   Encourage patient to monitor pain and request assistance   Assess pain using appropriate pain scale  3/3/2025 0550 by Stan Hager RN  Outcome: Progressing  Flowsheets (Taken 3/3/2025 0550)  Verbalizes/displays adequate comfort level or baseline comfort level:   Encourage patient to monitor pain and request assistance   Assess pain using appropriate pain scale     Problem: Safety - Adult  Goal: Free from fall injury  3/3/2025 1513 by Monica Mead RN  Outcome: Progressing  3/3/2025 1513 by Monica Mead RN  Outcome: Progressing  3/3/2025 0550 by Stan Hager RN  Outcome: Progressing     Problem: ABCDS Injury Assessment  Goal: Absence of physical injury  3/3/2025 1513 by Monica Mead RN  Outcome: Progressing  3/3/2025 1513 by Monica Mead RN  Outcome: Progressing  3/3/2025 0550 by Stan Hager RN  Outcome: Progressing     Problem: Skin/Tissue Integrity  Goal: Skin integrity remains intact  Description: 1.  Monitor for areas of redness and/or skin breakdown  2.  Assess vascular access sites hourly  3.  Every 4-6 hours minimum:  Change oxygen saturation probe  and symptoms of infection   Monitor lab/diagnostic results     Problem: Metabolic/Fluid and Electrolytes - Adult  Goal: Electrolytes maintained within normal limits  3/3/2025 1513 by Monica Mead RN  Outcome: Progressing  3/3/2025 1513 by Monica Mead RN  Outcome: Progressing  3/3/2025 0550 by Stan Hager RN  Outcome: Progressing  Goal: Glucose maintained within prescribed range  3/3/2025 1513 by Monica Mead RN  Outcome: Progressing  3/3/2025 1513 by Monica Mead RN  Outcome: Progressing  3/3/2025 0550 by Stan Hager RN  Outcome: Progressing

## 2025-03-03 NOTE — ACP (ADVANCE CARE PLANNING)
Advance Care Planning   Healthcare Decision Maker:    Primary Decision Maker: Chantelle Stover - Child - [phone]    Secondary Decision Maker: Aurora Rey - Brother/Sister - [phone]    Secondary Decision Maker: Gomez Dominguez - Child - [phone]    Secondary Decision Maker: Dominguez,Gomez - Other - [phone]    Click here to complete Healthcare Decision Makers including selection of the Healthcare Decision Maker Relationship (ie \"Primary\").  Today we documented Decision Maker(s) consistent with Legal Next of Kin hierarchy.       Electronically signed by Malika Chatterjee RN on 3/3/2025 at 12:59 PM

## 2025-03-03 NOTE — PROGRESS NOTES
Occupational Therapy  OT BEDSIDE TREATMENT NOTE   [facility]  [address] [place], [place]      Date:3/3/2025  Patient Name: Haritha Dominguez  MRN: [id number]  : 1945  Room: [address] [place] [address]     Evaluating OT: Matt Soriano OTR/L; [id number]        Referring Provider: Bryan Rivers DO     Specific Provider Orders/Date: OT Eval and Treat 25 1300        Diagnosis: Pt c/o LBP worsening ~1 day PTA. CT scan ID'd L2 compression fx.     Surgery: None this admission.     Pertinent Medical History:  has a past medical history of Abrasion of skin of left lower leg, Abrasion of skin of right lower leg, Anxiety, Arthritis, Asthma, Claustrophobia, Decreased dorsalis pedis pulse, Dermatitis, Fracture, GERD (gastroesophageal reflux disease), Hiatal hernia, History of blood transfusion, HTN (hypertension), Hyperlipidemia, Itching, On aspirin at home, Pancreatic cyst, Pneumonia, Sleep apnea, SOB (shortness of breath), and Tachycardia.      Recommended Adaptive Equipment: TBD      Precautions:  Fall Risk, spinal precautions, soft TLSO (\"Wear TLSO when greater than 45 degrees\" Per NS note 25), +alarms, limited insight into deficits, monitor BP (hypotensive), flexed posture      Assessment of current deficits    [x] Functional mobility            [x]ADLs           [x] Strength                  [x]Cognition    [x] Functional transfers          [x] IADLs         [x] Safety Awareness   [x]Endurance    [x] Fine Coordination                         [x] Balance      [] Vision/perception   []Sensation      []Gross Motor Coordination             [x] ROM           [] Delirium                   [] Motor Control      OT PLAN OF CARE   OT POC based on physician orders, patient diagnosis and results of clinical assessment     Frequency/Duration 1-3 days/wk for 2 weeks PRN   Specific OT Treatment Interventions to include:   * Instruction/training on adapted ADL techniques  and AE recommendations to increase functional independence within precautions       * Training on energy conservation strategies, correct breathing pattern and techniques to improve independence/tolerance for self-care routine  * Functional transfer/mobility training/DME recommendations for increased independence, safety, and fall prevention  * Patient/Family education to increase follow through with safety techniques and functional independence  * Recommendation of environmental modifications for increased safety with functional transfers/mobility and ADLs  * Cognitive retraining/development of therapeutic activities to improve problem solving, judgement, memory, and attention for increased safety/participation in ADL/IADL tasks  * Therapeutic exercise to improve motor endurance, ROM, and functional strength for ADLs/functional transfers  * Therapeutic activities to facilitate/challenge dynamic balance, stand tolerance for increased safety and independence with ADLs  * Therapeutic activities to facilitate gross/fine motor skills for increased independence with ADLs  * Positioning to improve skin integrity, interaction with environment and functional independence     Home Living: Pt reports will d/c to sister's 2 story home w/ bed & bath on 2nd floor (stair lift available). Pt lives in a 1 story home w/ 3 steps & 0 handrails to enter.   Bathroom setup: Tub/shower  Equipment owned: ww, cane, rollator, tub bench     Prior Level of Function: mod I with ADLs;  mod I with IADLs. Use of ww for functional mobility.   Driving: No  Occupation: None reported     Pain Level: Pt c/o back pain this date, did not rate; therapist facilitated repositioning techniques.  Cognition: A&O: 4/4; Follows 1-2 step directions. Pleasant & cooperative. Limited insight into deficits.              Memory:  good               Sequencing:  fair +              Problem solving:  fair               Judgement/safety: fair                 Functional  made aware. Pt asymptomatic throughout session.     SPO2 90-92% on 10L  -138bpm        Hand Dominance Right    AROM (PROM) Strength Additional Info:  Goal: (PRN)   RUE  Shoulder flexion ~45 degrees, elbow flexion WFL. Impaired digit flexion.   Pt reports h/o arthritis. Shoulder 2+/5, distally 3+/5 grossly tested fair   and fair  FMC/dexterity noted during ADL tasks Improve overall RUE strength WFL for participation in functional tasks         LUE Shoulder flexion ~45 degrees, elbow flexion WFL. Impaired digit flexion.   Pt reports h/o arthritis. Shoulder 2+/5, distally 3+/5 grossly tested fair   and fair  FMC/dexterity noted during ADL tasks Improve overall LUE strength WFL for participation in functional tasks         Hearing: WFL   Sensation: No c/o numbness or tingling  Tone: WFL  Edema: Unremarkable      Comments: Upon arrival pt supine in bed. Facilitation of ADLs, bed mobility, functional rolling with focus on safety, body mechanics, and spinal precautions. Pt educated on techniques to increase independence and safety during ADLs and bed mobility. At end of session, patient semi supine in bed positioned to comfort, call light within reach.     Pt has made slow progress towards set goals.     Continue with current plan of care    Treatment Time In:1159            Treatment Time Out: 1209             Treatment Charges: Mins Units   Ther Ex  14570     Manual Therapy 47009     Thera Activities 10107 10 1   ADL/Home Mgt [id number]     Neuro Re-ed 23603     Group Therapy      Orthotic manage/training  08989     Non-Billable Time     Total Timed Treatment 10 1     [id number]

## 2025-03-03 NOTE — PROGRESS NOTES
Patients RR 24 and BP 99/61 , . Call out to Dr Rivers. RRT called for respiratory distress. Chest xray done and bolus given. Patient transferred to [facility] , sister Aurora Richards notified.

## 2025-03-03 NOTE — PROGRESS NOTES
Vancomycin has been discontinued   Clinical Pharmacy to sign-off  Physician to re-consult pharmacy if future dosing is needed    Thank you for the consult,  Laly Levy, PharmD, [facility], BCPS 3/3/2025 11:48 AM      Pharmacy Consultation Note  (Antibiotic Dosing and Monitoring)    Initial consult date: 3/3/2025  Consulting physician/provider: Dr. Porras  Drug: Vancomycin  Indication: Pneumonia    Age/  Gender Height Weight IBW  Allergy Information   80 y.o./female 152.4 cm (5') 64.4 kg (142 lb)     Ideal body weight: 45.5 kg (100 lb 4.9 oz)  Adjusted ideal body weight: 53.1 kg (116 lb 15.8 oz)   Percocet [oxycodone-acetaminophen]      Renal Function:  Recent Labs     03/02/25  0712 03/02/25  1815 03/03/25  0624   BUN 19 16 18   CREATININE 0.8 0.9 0.9       Intake/Output Summary (Last 24 hours) at 3/3/2025 0929  Last data filed at 3/2/2025 1725  Gross per 24 hour   Intake --   Output 700 ml   Net -700 ml       Vancomycin Monitoring:  Trough:  No results for input(s): \"VANCOTROUGH\" in the last 72 hours.  Random:    Recent Labs     03/03/25  0624   VANCORANDOM 19.1       No results for input(s): \"BLOODCULT2\" in the last 72 hours.     Historical Cultures:  Organism   Date Value Ref Range Status   03/09/2023 GNR oxidase positive (A)  Final     No results for input(s): \"BC\" in the last 72 hours.    Vancomycin Administration Times:  Recent vancomycin administrations                     vancomycin (VANCOCIN) 1,250 mg in sodium chloride 0.9 % 250 mL IVPB ([id number]) (mg) 1,250 mg New Bag 03/02/25 5070                      Assessment:  Patient is a 80 y.o. female who has been initiated on vancomycin for Pneumonia  Estimated Creatinine Clearance: 43 mL/min (based on SCr of 0.9 mg/dL).  To dose vancomycin, pharmacy will be utilizing [employer] calculation software for goal AUC/ANURAG 400-600 mg/L-hr    Plan:  Continue vancomycin 1250 mg q24h (Predicted AUC/ANURAG = 582, Tr = 14.7)  Will check vancomycin levels when appropriate  Will

## 2025-03-03 NOTE — PROGRESS NOTES
SPEECH/LANGUAGE PATHOLOGY  CLINICAL ASSESSMENT OF SWALLOWING FUNCTION   and PLAN OF CARE      PATIENT NAME:  Haritha Dominguez  (female)     MRN:  [id number]    :  1945  (80 y.o.)  STATUS:  Inpatient: Room 6422/6422-B    TODAY'S DATE:  3/3/2025  ORDER DATE, DESCRIPTION AND REFERRING PROVIDER: 25 [facility]    SLP swallowing-dysphagia evaluation and treatment  Start:  25,   End:  25,   ONE TIME,   Standing Count:  1 Occurrences,   R       Tita, Bryan LAZO DO  REASON FOR REFERRAL: patient unable to drink water or take meds without choking    EVALUATING THERAPIST: Funmi Crystal, SLP                 RESULTS:    DYSPHAGIA DIAGNOSIS:   Clinical indicators of moderate oropharyngeal phase dysphagia       DIET RECOMMENDATIONS:  NPO until MBSS can be completed        FEEDING RECOMMENDATIONS:     Assistance level:  Not applicable      Compensatory strategies recommended: Thorough oral care to prevent colonization of oral bacteria       Discussed recommendations with:  Patient  and charge nurse in person    SPEECH THERAPY  PLAN OF CARE   The dysphagia POC is established based on physician order, dysphagia diagnosis and results of clinical assessment     Will make further recommendations/changes to POC once MBSS is completed.    Conditions Requiring Skilled Therapeutic Intervention for dysphagia:    Patient is performing below functional baseline d/t  current acute condition, respiratory compromise, multiple medications, and/or increased dependency upon caregivers.    Specific dysphagia interventions to include:     MBSS to fully assess oropharyngeal swallow function and to assist in determining the least restrictive PO diet to maintain adequate nutrition/hydration     Specific instructions for next treatment:  MBSS to be completed  Patient Treatment Goals:    Short Term Goals:  Pt will participate in MBSS to fully assess oropharyngeal swallow function and to assist in determining the least  fracture of T12 vertebra (HCC)    Failed spinal cord stimulator    Secondary adrenal insufficiency    Complicated UTI (urinary tract infection)    Gastroesophageal reflux disease    Hyperlipidemia    Cardiac arrhythmia    Abrasion of skin of left lower leg    Abrasion of skin of right lower leg    Itching    Decreased dorsalis pedis pulse    Compression fracture of L2 lumbar vertebra (HCC)    Goals of care, counseling/discussion    Palliative care encounter         CPT code:  21034  bedside swallow david Crystal M.S., CCC-SLP  Speech-Language Pathologist  SP. [id number]

## 2025-03-03 NOTE — TELEPHONE ENCOUNTER
Patients daughter called in to cancel her appt for 3/6/25 stating her mother is currently in the hospital. She did not want to reschedule at this time she is going to talk to her mom after discharge about rescheduling this appt.    Electronically signed by Akosua Jordan RN on 3/3/2025 at 10:20 AM

## 2025-03-03 NOTE — PROGRESS NOTES
[facility]  [facility]  Progress Note      Chief Complaint   Patient presents with    Abdominal Pain     Patient from home called for 10/10 epigastric pain reproducible with palpation. EMS gave 50 mcg of fentanyl and 4mg of zofran PTA. Denies pain currently       SUBJECTIVE:    Patient is tolerating medications. No reported adverse drug reactions.  No nausea, diarrhea. Transferred to a higher level of care due to RRT. She had fever and hypoxia. T max 100.8. She was put on vancomycin and zosyn. She had emesis yesterday am.   Review of systems:  As stated above in the chief complaint, otherwise negative.    Medications:  Scheduled Meds:   guaiFENesin-dextromethorphan  10 mL Oral Q6H    sodium chloride  1,000 mL IntraVENous Once    piperacillin-tazobactam  4,500 mg IntraVENous Q8H    sodium chloride  1,000 mL IntraVENous Once    vancomycin  1,250 mg IntraVENous Q24H    ipratropium 0.5 mg-albuterol 2.5 mg  1 Dose Inhalation Q4H WA RT    enoxaparin  30 mg SubCUTAneous Daily    lidocaine  5 mL IntraDERmal Once    atorvastatin  10 mg Oral Nightly    escitalopram  10 mg Oral Nightly    budesonide  1 mg Nebulization BID RT    And    arformoterol tartrate  15 mcg Nebulization BID RT    montelukast  10 mg Oral Daily    pantoprazole  40 mg Oral Daily    pregabalin  50 mg Oral BID    sodium chloride flush  5-40 mL IntraVENous 2 times per day     Continuous Infusions:   sodium chloride 50 mL/hr at 25 1100    sodium chloride       PRN Meds:LORazepam, sodium chloride flush, sodium chloride, potassium chloride **OR** potassium alternative oral replacement **OR** potassium chloride, magnesium sulfate, polyethylene glycol, acetaminophen **OR** acetaminophen, HYDROcodone 5 mg - acetaminophen, ondansetron    OBJECTIVE:  /67   Pulse (!) 131   Temp 98.1 °F (36.7 °C) (Temporal)   Resp 20   Ht 1.524 m (5')   Wt 64.4 kg (142 lb)   SpO2 96%   BMI 27.73 kg/m²   Temp  Av.3 °F (37.4 °C)  Min:

## 2025-03-04 ENCOUNTER — APPOINTMENT (OUTPATIENT)
Dept: CT IMAGING | Age: 80
End: 2025-03-04
Payer: MEDICARE

## 2025-03-04 ENCOUNTER — APPOINTMENT (OUTPATIENT)
Dept: GENERAL RADIOLOGY | Age: 80
End: 2025-03-04
Payer: MEDICARE

## 2025-03-04 LAB
ALBUMIN SERPL-MCNC: 2.7 G/DL (ref 3.5–5.2)
ALP SERPL-CCNC: 90 U/L (ref 35–104)
ALT SERPL-CCNC: 11 U/L (ref 0–32)
ANION GAP SERPL CALCULATED.3IONS-SCNC: 25 MMOL/L (ref 7–16)
AST SERPL-CCNC: 17 U/L (ref 0–31)
BILIRUB SERPL-MCNC: 0.5 MG/DL (ref 0–1.2)
BUN SERPL-MCNC: 37 MG/DL (ref 6–23)
CALCIUM SERPL-MCNC: 8.2 MG/DL (ref 8.6–10.2)
CHLORIDE SERPL-SCNC: 108 MMOL/L (ref 98–107)
CO2 SERPL-SCNC: 18 MMOL/L (ref 22–29)
CREAT SERPL-MCNC: 1.3 MG/DL (ref 0.5–1)
ERYTHROCYTE [DISTWIDTH] IN BLOOD BY AUTOMATED COUNT: 15.9 % (ref 11.5–15)
GFR, ESTIMATED: 42 ML/MIN/1.73M2
GLUCOSE SERPL-MCNC: 154 MG/DL (ref 74–99)
HCT VFR BLD AUTO: 35 % (ref 34–48)
HGB BLD-MCNC: 11 G/DL (ref 11.5–15.5)
MAGNESIUM SERPL-MCNC: 2.1 MG/DL (ref 1.6–2.6)
MCH RBC QN AUTO: 29.7 PG (ref 26–35)
MCHC RBC AUTO-ENTMCNC: 31.4 G/DL (ref 32–34.5)
MCV RBC AUTO: 94.6 FL (ref 80–99.9)
PHOSPHATE SERPL-MCNC: 5.5 MG/DL (ref 2.5–4.5)
PLATELET # BLD AUTO: 564 K/UL (ref 130–450)
PMV BLD AUTO: 11.5 FL (ref 7–12)
POTASSIUM SERPL-SCNC: 3.6 MMOL/L (ref 3.5–5)
PROT SERPL-MCNC: 6.8 G/DL (ref 6.4–8.3)
RBC # BLD AUTO: 3.7 M/UL (ref 3.5–5.5)
SODIUM SERPL-SCNC: 151 MMOL/L (ref 132–146)
WBC OTHER # BLD: 20.5 K/UL (ref 4.5–11.5)

## 2025-03-04 PROCEDURE — 94640 AIRWAY INHALATION TREATMENT: CPT

## 2025-03-04 PROCEDURE — 2140000000 HC CCU INTERMEDIATE R&B

## 2025-03-04 PROCEDURE — 2500000003 HC RX 250 WO HCPCS

## 2025-03-04 PROCEDURE — 2500000003 HC RX 250 WO HCPCS: Performed by: INTERNAL MEDICINE

## 2025-03-04 PROCEDURE — 87040 BLOOD CULTURE FOR BACTERIA: CPT

## 2025-03-04 PROCEDURE — 71275 CT ANGIOGRAPHY CHEST: CPT

## 2025-03-04 PROCEDURE — 92611 MOTION FLUOROSCOPY/SWALLOW: CPT

## 2025-03-04 PROCEDURE — 80053 COMPREHEN METABOLIC PANEL: CPT

## 2025-03-04 PROCEDURE — 2580000003 HC RX 258

## 2025-03-04 PROCEDURE — 83735 ASSAY OF MAGNESIUM: CPT

## 2025-03-04 PROCEDURE — 6360000002 HC RX W HCPCS

## 2025-03-04 PROCEDURE — 6360000002 HC RX W HCPCS: Performed by: INTERNAL MEDICINE

## 2025-03-04 PROCEDURE — 36415 COLL VENOUS BLD VENIPUNCTURE: CPT

## 2025-03-04 PROCEDURE — 6360000002 HC RX W HCPCS: Performed by: EMERGENCY MEDICINE

## 2025-03-04 PROCEDURE — 74230 X-RAY XM SWLNG FUNCJ C+: CPT

## 2025-03-04 PROCEDURE — 84100 ASSAY OF PHOSPHORUS: CPT

## 2025-03-04 PROCEDURE — 2700000000 HC OXYGEN THERAPY PER DAY

## 2025-03-04 PROCEDURE — 99222 1ST HOSP IP/OBS MODERATE 55: CPT | Performed by: STUDENT IN AN ORGANIZED HEALTH CARE EDUCATION/TRAINING PROGRAM

## 2025-03-04 PROCEDURE — 74018 RADEX ABDOMEN 1 VIEW: CPT

## 2025-03-04 PROCEDURE — 99232 SBSQ HOSP IP/OBS MODERATE 35: CPT | Performed by: PHYSICIAN ASSISTANT

## 2025-03-04 PROCEDURE — 94660 CPAP INITIATION&MGMT: CPT

## 2025-03-04 PROCEDURE — 6370000000 HC RX 637 (ALT 250 FOR IP): Performed by: INTERNAL MEDICINE

## 2025-03-04 PROCEDURE — 85027 COMPLETE CBC AUTOMATED: CPT

## 2025-03-04 PROCEDURE — 6360000004 HC RX CONTRAST MEDICATION: Performed by: RADIOLOGY

## 2025-03-04 RX ORDER — SODIUM BICARBONATE 650 MG/1
650 TABLET ORAL 2 TIMES DAILY
Status: DISCONTINUED | OUTPATIENT
Start: 2025-03-04 | End: 2025-03-12

## 2025-03-04 RX ORDER — IOPAMIDOL 755 MG/ML
70 INJECTION, SOLUTION INTRAVASCULAR
Status: COMPLETED | OUTPATIENT
Start: 2025-03-04 | End: 2025-03-04

## 2025-03-04 RX ADMIN — LORAZEPAM 0.5 MG: 2 INJECTION INTRAMUSCULAR; INTRAVENOUS at 15:42

## 2025-03-04 RX ADMIN — PIPERACILLIN AND TAZOBACTAM 4500 MG: 4; .5 INJECTION, POWDER, FOR SOLUTION INTRAVENOUS at 15:22

## 2025-03-04 RX ADMIN — IPRATROPIUM BROMIDE AND ALBUTEROL SULFATE 1 DOSE: 2.5; .5 SOLUTION RESPIRATORY (INHALATION) at 20:33

## 2025-03-04 RX ADMIN — PIPERACILLIN AND TAZOBACTAM 4500 MG: 4; .5 INJECTION, POWDER, FOR SOLUTION INTRAVENOUS at 23:07

## 2025-03-04 RX ADMIN — IPRATROPIUM BROMIDE AND ALBUTEROL SULFATE 1 DOSE: 2.5; .5 SOLUTION RESPIRATORY (INHALATION) at 12:47

## 2025-03-04 RX ADMIN — ONDANSETRON 4 MG: 2 INJECTION, SOLUTION INTRAMUSCULAR; INTRAVENOUS at 15:43

## 2025-03-04 RX ADMIN — SODIUM CHLORIDE, PRESERVATIVE FREE 10 ML: 5 INJECTION INTRAVENOUS at 08:13

## 2025-03-04 RX ADMIN — WATER 40 MG: 1 INJECTION INTRAMUSCULAR; INTRAVENOUS; SUBCUTANEOUS at 15:19

## 2025-03-04 RX ADMIN — BUDESONIDE 1000 MCG: 0.5 INHALANT RESPIRATORY (INHALATION) at 08:21

## 2025-03-04 RX ADMIN — SODIUM CHLORIDE, PRESERVATIVE FREE 10 ML: 5 INJECTION INTRAVENOUS at 23:08

## 2025-03-04 RX ADMIN — ARFORMOTEROL TARTRATE 15 MCG: 15 SOLUTION RESPIRATORY (INHALATION) at 08:22

## 2025-03-04 RX ADMIN — PIPERACILLIN AND TAZOBACTAM 4500 MG: 4; .5 INJECTION, POWDER, FOR SOLUTION INTRAVENOUS at 06:30

## 2025-03-04 RX ADMIN — ARFORMOTEROL TARTRATE 15 MCG: 15 SOLUTION RESPIRATORY (INHALATION) at 20:34

## 2025-03-04 RX ADMIN — WATER 40 MG: 1 INJECTION INTRAMUSCULAR; INTRAVENOUS; SUBCUTANEOUS at 23:08

## 2025-03-04 RX ADMIN — IPRATROPIUM BROMIDE AND ALBUTEROL SULFATE 1 DOSE: 2.5; .5 SOLUTION RESPIRATORY (INHALATION) at 08:21

## 2025-03-04 RX ADMIN — BUDESONIDE 1000 MCG: 0.5 INHALANT RESPIRATORY (INHALATION) at 20:33

## 2025-03-04 RX ADMIN — IOPAMIDOL 70 ML: 755 INJECTION, SOLUTION INTRAVENOUS at 10:41

## 2025-03-04 RX ADMIN — ENOXAPARIN SODIUM 30 MG: 100 INJECTION SUBCUTANEOUS at 08:13

## 2025-03-04 NOTE — PROGRESS NOTES
Patient is in pain 10/10 and very anxious, and complete NPO, no IV pain and anti-anxious med in MAR, Dr Rivers is notified via answering service. Atvian 0.5 IV PRN Q 6hr ordered and Dr Rivers does not recommend and IV pain med at this time d/t depress Resp drive.

## 2025-03-04 NOTE — PLAN OF CARE
Problem: Discharge Planning  Goal: Discharge to home or other facility with appropriate resources  3/4/2025 0840 by Monica Mead RN  Outcome: Progressing  3/4/2025 0334 by Maximus Steward RN  Outcome: Progressing  Flowsheets (Taken 3/3/2025 1955)  Discharge to home or other facility with appropriate resources: Identify barriers to discharge with patient and caregiver     Problem: Pain  Goal: Verbalizes/displays adequate comfort level or baseline comfort level  3/4/2025 0840 by Monica Mead RN  Outcome: Progressing  3/4/2025 0334 by Maximus Steward RN  Outcome: Progressing     Problem: Safety - Adult  Goal: Free from fall injury  3/4/2025 0840 by Monica Mead RN  Outcome: Progressing  Flowsheets (Taken 3/4/2025 0836)  Free From Fall Injury: Instruct family/caregiver on patient safety  3/4/2025 0334 by Maximus Steward RN  Outcome: Progressing  Flowsheets (Taken 3/4/2025 0333)  Free From Fall Injury: Instruct family/caregiver on patient safety     Problem: ABCDS Injury Assessment  Goal: Absence of physical injury  3/4/2025 0840 by Monica Mead RN  Outcome: Progressing  Flowsheets (Taken 3/4/2025 0836)  Absence of Physical Injury: Implement safety measures based on patient assessment  3/4/2025 0334 by Maximus Steward RN  Outcome: Progressing  Flowsheets (Taken 3/4/2025 0333)  Absence of Physical Injury: Implement safety measures based on patient assessment     Problem: Skin/Tissue Integrity  Goal: Skin integrity remains intact  Description: 1.  Monitor for areas of redness and/or skin breakdown  2.  Assess vascular access sites hourly  3.  Every 4-6 hours minimum:  Change oxygen saturation probe site  4.  Every 4-6 hours:  If on nasal continuous positive airway pressure, respiratory therapy assess nares and determine need for appliance change or resting period  3/4/2025 0840 by Monica Mead RN  Outcome: Progressing  Flowsheets (Taken 3/4/2025 0836)  Skin Integrity Remains Intact:

## 2025-03-04 NOTE — PROGRESS NOTES
[facility]  [facility]  Progress Note      Chief Complaint   Patient presents with    Abdominal Pain     Patient from home called for 10/10 epigastric pain reproducible with palpation. EMS gave 50 mcg of fentanyl and 4mg of zofran PTA. Denies pain currently       SUBJECTIVE:    Patient is tolerating medications. No reported adverse drug reactions.  No nausea, diarrhea.  Fever has improved.   Review of systems:  As stated above in the chief complaint, otherwise negative.    Medications:  Scheduled Meds:   methylPREDNISolone  40 mg IntraVENous Q12H    bumetanide  1 mg IntraVENous BID    guaiFENesin-dextromethorphan  10 mL Oral Q6H    sodium chloride  1,000 mL IntraVENous Once    piperacillin-tazobactam  4,500 mg IntraVENous Q8H    sodium chloride  1,000 mL IntraVENous Once    ipratropium 0.5 mg-albuterol 2.5 mg  1 Dose Inhalation Q4H WA RT    enoxaparin  30 mg SubCUTAneous Daily    lidocaine  5 mL IntraDERmal Once    atorvastatin  10 mg Oral Nightly    escitalopram  10 mg Oral Nightly    budesonide  1 mg Nebulization BID RT    And    arformoterol tartrate  15 mcg Nebulization BID RT    montelukast  10 mg Oral Daily    pantoprazole  40 mg Oral Daily    pregabalin  50 mg Oral BID    sodium chloride flush  5-40 mL IntraVENous 2 times per day     Continuous Infusions:   sodium chloride       PRN Meds:LORazepam, LORazepam, sodium chloride flush, sodium chloride, potassium chloride **OR** potassium alternative oral replacement **OR** potassium chloride, magnesium sulfate, polyethylene glycol, acetaminophen **OR** acetaminophen, HYDROcodone 5 mg - acetaminophen, ondansetron    OBJECTIVE:  /72   Pulse (!) 114   Temp 97.5 °F (36.4 °C)   Resp 20   Ht 1.524 m (5')   Wt 64.7 kg (142 lb 10.2 oz)   SpO2 97%   BMI 27.86 kg/m²   Temp  Av.9 °F (37.2 °C)  Min: 97.5 °F (36.4 °C)  Max: 102.8 °F (39.3 °C)  Constitutional: The patient is awake, alert, and oriented. Ill appearing  Skin: Warm  and dry. No rashes were noted. No jaundice.  HEENT:  Moist mucous membranes, no ulcerations, no thrush.   Neck: Supple to movements. No lymphadenopathy.   Chest: No use of accessory muscles to breathe. Symmetrical expansion. Auscultation reveals wheezing, no crackles, + rhonchi on 10 liters  Cardiovascular: S1 and S2 are rhythmic and regular. No murmurs appreciated.   Abdomen: Positive bowel sounds to auscultation. Benign to palpation. No masses felt. No hepatosplenomegaly.  Genitourinary: No pain in the lower abdomen  Extremities: No clubbing, no cyanosis, no edema.  Musculoskeletal: No pain in range of motion of any joints  Neurological: Following commands, no focal neurodeficit  Lines: peripheral    Laboratory and Tests Review:  Lab Results   Component Value Date    WBC 20.5 (H) 03/04/2025    WBC 15.3 (H) 03/03/2025    WBC 14.9 (H) 03/02/2025    HGB 11.0 (L) 03/04/2025    HCT 35.0 03/04/2025    MCV 94.6 03/04/2025     (H) 03/04/2025     Lab Results   Component Value Date    NEUTROABS 7.51 (H) 03/02/2025    NEUTROABS 7.49 (H) 02/24/2025    NEUTROABS 8.50 (H) 02/23/2025     No results found for: \"CRPHS\"  Lab Results   Component Value Date    ALT 11 03/04/2025    AST 17 03/04/2025    ALKPHOS 90 03/04/2025    BILITOT 0.5 03/04/2025     Lab Results   Component Value Date/Time     03/04/2025 06:25 AM    K 3.6 03/04/2025 06:25 AM    K 4.6 05/24/2023 11:30 AM     03/04/2025 06:25 AM    CO2 18 03/04/2025 06:25 AM    BUN 37 03/04/2025 06:25 AM    CREATININE 1.3 03/04/2025 06:25 AM    CREATININE 1.1 03/03/2025 07:42 PM    CREATININE 0.9 03/03/2025 06:24 AM    GFRAA >60 01/12/2019 04:00 AM    LABGLOM 42 03/04/2025 06:25 AM    LABGLOM 70 04/18/2024 09:02 PM    GLUCOSE 154 03/04/2025 06:25 AM    GLUCOSE 85 04/02/2012 08:30 AM    CALCIUM 8.2 03/04/2025 06:25 AM    BILITOT 0.5 03/04/2025 06:25 AM    ALKPHOS 90 03/04/2025 06:25 AM    AST 17 03/04/2025 06:25 AM    ALT 11 03/04/2025 06:25 AM     Lab Results

## 2025-03-04 NOTE — CARE COORDINATION
Update CM Note:  Pt presented to the ER for worsening back pain from home.  Pt is on 9L/NC at 99%, Iv Bumex, Iv Solumedrol, Iv Zosyn til 3/7 & SQ Lovenox.  Pt is in ISO-Droplet for Influenza.  Pulmonary, Urology, ID & NS.  Pt had MBS & now strict NPO due to esophageal motility deficit.  Pt's dc plan is to [facility].  Will need to resubmit for pre-cert.  [facility],PASRR & Ambulance completed placed in envelope in soft chart.  Pt was placed on will call with PAS.  Sw/CM will continue to follow.  Electronically signed by Malika Chatterjee RN on 3/4/2025 at 2:53 PM

## 2025-03-04 NOTE — PROGRESS NOTES
[facility]    Subjective:  pt alert conversive today temp 103 degrees yesterday      Current Facility-Administered Medications:     methylPREDNISolone sodium succ (SOLU-MEDROL) 40 mg in sterile water 1 mL injection, 40 mg, IntraVENous, Q12H, Alysa Kidd, APRN - CNP, 40 mg at 03/03/25 2332    bumetanide (BUMEX) injection 1 mg, 1 mg, IntraVENous, BID, Ines Gauthier APRN - CNP, 1 mg at 03/03/25 1746    LORazepam (ATIVAN) injection 0.5 mg, 0.5 mg, IntraVENous, Q6H PRN, Bryan Rivers, , 0.5 mg at 03/03/25 2211    guaiFENesin-dextromethorphan (ROBITUSSIN DM) 100-10 MG/5ML syrup 10 mL, 10 mL, Oral, Q6H, Daniella Anderson APRN - CNP    sodium chloride 0.9 % bolus 1,000 mL, 1,000 mL, IntraVENous, Once, Jocelynn Porras MD    [COMPLETED] piperacillin-tazobactam (ZOSYN) 4,500 mg in sodium chloride 0.9 % 100 mL IVPB ([id number]), 4,500 mg, IntraVENous, Once, Stopped at 03/03/25 0010 **FOLLOWED BY** piperacillin-tazobactam (ZOSYN) 4,500 mg in sodium chloride 0.9 % 100 mL IVPB ([id number]), 4,500 mg, IntraVENous, Q8H, Jocelynn Porras MD, Last Rate: 25 mL/hr at 03/04/25 0630, 4,500 mg at 03/04/25 0630    sodium chloride 0.9 % bolus 1,000 mL, 1,000 mL, IntraVENous, Once, Natanael Martinez MD    ipratropium 0.5 mg-albuterol 2.5 mg (DUONEB) nebulizer solution 1 Dose, 1 Dose, Inhalation, Q4H WA RT, Bryan Rivers DO, 1 Dose at 03/03/25 2040    enoxaparin Sodium (LOVENOX) injection 30 mg, 30 mg, SubCUTAneous, Daily, Bryan Rivers DO, 30 mg at 03/03/25 1338    LORazepam (ATIVAN) tablet 1 mg, 1 mg, Oral, TID PRN, Bryan Rivers, DO, 1 mg at 03/01/25 2143    lidocaine 1 % injection 5 mL, 5 mL, IntraDERmal, Once, Oliver Waters MD    atorvastatin (LIPITOR) tablet 10 mg, 10 mg, Oral, Nightly, Bryan Rivers, , 10 mg at 03/01/25 2143    escitalopram (LEXAPRO) tablet 10 mg, 10 mg, Oral, Nightly, Bryan Rivers, , 10 mg at 03/01/25 2143    budesonide (PULMICORT) nebulizer suspension 1,000 mcg, 1

## 2025-03-04 NOTE — PROGRESS NOTES
Comprehensive Nutrition Assessment    Type and Reason for Visit:  Reassess    Nutrition Recommendations/Plan:   Pt remains NPO ; will provide nutrition recs as appropriate w/ diet advancement  If pt unable to tolerate oral intake, may need to consider nutrition support to meet nutritional needs; pt w/ ongoing poor oral intake this adm.  Will continue to monitor; await diet advancement/nutrition progression.     Malnutrition Assessment:  Malnutrition Status:  At risk for malnutrition (02/26/25 1525)    Context:  Acute Illness     Findings of the 6 clinical characteristics of malnutrition:  Energy Intake:  50% or less of estimated energy requirements for 5 or more days  Weight Loss:  Unable to assess (d/t lack of wt hx per EMR)     Body Fat Loss:  Unable to assess (pt in iso)     Muscle Mass Loss:  Unable to assess (pt in iso)    Fluid Accumulation:  No fluid accumulation     Strength:  Not Performed    Nutrition Assessment:    pt adm d/t abd pain/compression frx; PMhx of HTN, Pancreatic cyst, GERD,claustrophobia; pt refused MRI; pt w/ poor appetite/oral intake this adm and lethargy; influenza noted- pt remains in isolation, high temps noted this adm; refusing IVF this adm; s/p RRT 3/2 r/t resp failure; possible difficulty swallowing noted w/ emesis this adm, pt s/p SLP eval 3/3 who recommend NPO until MBSS; s/p MBSS/SLP eval 3/4, recommend NPO until cleared by physician to advance & if oral diet initiated, SLP rec pureed solids/nectar thick liquids ; SLP rec GI consult to consider esophagram d/t emesis with intake during MBSS & emesis reported this adm after intake prior to NPO order; SLP state pt would benefit from repeat MBSS at a later date to fully assess chewing/swallowing function; pt currently NPO- will provide nutrition recs as appropriate w/ diet advancement; will monitor.    Nutrition Related Findings:    +I/O; alert; oriented to person (oriented/disoriented at times); active BS; generalized edema;  Edema    Discharge Planning:    Too soon to determine     Bonita Perry RD, LD  Contact: [phone]

## 2025-03-04 NOTE — PROGRESS NOTES
Attempted patient on CPAP 7. Patient did not want to continue to wear it due to not being bale to take off by herself. RT explained that she could put her call light on and someone would be in to help her with her needs for water and help. Patient says they take too long to answer call light and she does not want CPAP on tonight. RT educated on importance, still patient is refusing. CPAP on standby in room if she changes her mind.

## 2025-03-04 NOTE — PLAN OF CARE
Problem: Discharge Planning  Goal: Discharge to home or other facility with appropriate resources  3/4/2025 0334 by Maximus Steward RN  Outcome: Progressing  3/3/2025 1513 by Monica Mead RN  Outcome: Progressing  3/3/2025 1513 by Monica Mead RN  Outcome: Progressing     Problem: Pain  Goal: Verbalizes/displays adequate comfort level or baseline comfort level  3/4/2025 0334 by Maximus Steward RN  Outcome: Progressing  3/3/2025 1513 by Monica Mead RN  Outcome: Progressing  3/3/2025 1513 by Monica Mead RN  Outcome: Progressing  Flowsheets (Taken 3/3/2025 0650 by Stan Hager RN)  Verbalizes/displays adequate comfort level or baseline comfort level:   Encourage patient to monitor pain and request assistance   Assess pain using appropriate pain scale     Problem: Safety - Adult  Goal: Free from fall injury  3/4/2025 0334 by Maximus Steward RN  Outcome: Progressing  3/3/2025 1513 by Monica Mead RN  Outcome: Progressing  3/3/2025 1513 by Monica Mead RN  Outcome: Progressing     Problem: ABCDS Injury Assessment  Goal: Absence of physical injury  3/4/2025 0334 by Maximus Steward RN  Outcome: Progressing  3/3/2025 1513 by Monica Mead RN  Outcome: Progressing  3/3/2025 1513 by Monica Mead RN  Outcome: Progressing     Problem: Skin/Tissue Integrity  Goal: Skin integrity remains intact  Description: 1.  Monitor for areas of redness and/or skin breakdown  2.  Assess vascular access sites hourly  3.  Every 4-6 hours minimum:  Change oxygen saturation probe site  4.  Every 4-6 hours:  If on nasal continuous positive airway pressure, respiratory therapy assess nares and determine need for appliance change or resting period  3/4/2025 0334 by Maximus Steward RN  Outcome: Progressing  3/3/2025 1513 by Monica Mead RN  Outcome: Progressing  3/3/2025 1513 by Monica Mead RN  Outcome: Progressing     Problem: Nutrition Deficit:  Goal: Optimize nutritional

## 2025-03-04 NOTE — PROGRESS NOTES
Spoke with Dr. Tian regarding pt morning labs, pt NPO, and ordered bumex. Per Dr. Tian, hold am dose of bumex and will evaluate pt on rounds

## 2025-03-04 NOTE — PROGRESS NOTES
SPEECH/LANGUAGE PATHOLOGY  VIDEOFLUOROSCOPIC STUDY OF SWALLOWING (MBS)   and PLAN OF CARE    PATIENT NAME:  Haritha Dominguez  (female)     MRN:  [id number]    :  1945  (80 y.o.)  STATUS:  Inpatient: Room 6422/6422-B    TODAY'S DATE:  3/4/2025  ORDER DATE, DESCRIPTION AND REFERRING PROVIDER: Alysa Kidd, APRN - CNP  FL MODIFIED BARIUM SWALLOW W VIDEO :  3/3/25  REASON FOR REFERRAL: Dysphagia  EVALUATING THERAPIST: Funmi Crystal, SLP      RESULTS:      DYSPHAGIA DIAGNOSIS:  Clinical indicators of mild-moderate oropharyngeal phase dysphagia for consistencies administered.     Esophageal motility deficit suspected.     DIET RECOMMENDATIONS:   NPO until cleared by physician to advance     GI consult is recommended, consider esophagram d/t emesis with intake during MBSS. RN also reported patient was vomiting after intake prior to initial NPO recommendation.      If PO diet is initiated, recommend pureed consistency solids and nectar consistency liquids. Patient would benefit from repeat MBSS at a later date to fully assess toleration of solids and further assess use of strategies in order to tolerate a least restrictive diet.     FEEDING RECOMMENDATIONS:    Assistance level:  Not applicable     Compensatory strategies recommended: Fully alert for all PO, Thorough oral care to prevent colonization of oral bacteria, Upright in bed/ chair as tolerated  Double swallow  Encourage oral clearing of bolus before next bite/sip is taken  Slow rate of intake   SINGLE cup sips  SMALL bites     Discussed recommendations with:  Patient  and patient nurse in person    Laryngeal Penetration and Aspiration:  Penetration WITH aspiration was observed in today's study with  thin liquid    SPEECH THERAPY  PLAN OF CARE   The dysphagia POC is established based on physician order and dysphagia diagnosis    Skilled SLP intervention for dysphagia management on acute care up to 5 x per week until goals met, pt plateaus in function and/or  swallow for thin liquid due to  delayed laryngeal closure . Aspiration was mild and occurred inconsistently .an absent cough/throat clear was noted    PENETRATION-ASPIRATION SCALE (PAS):  THIN 8 = Material enters the airway, passes below the vocal folds, and no effort is made to eject   MILDLY THICK 1 = Material does not enter the airway  MODERATELY THICK item not administered  PUREE 1 = Material does not enter the airway  HARD SOLID item not administered       COMPENSATORY STRATEGIES    Compensatory strategies were not attempted      STRUCTURAL/FUNCTIONAL ANOMALIES   No structural/functional anomalies were noted    CERVICAL ESOPHAGEAL STAGE :     The cervical esophagus appeared adequate          ___________    Cognition:   Within functional limits for this exam    Oral Peripheral Examination   Generalized oral weakness    Current Respiratory Status   9 liters O2 via high flow nasal cannula     Parameters of Speech Production  Respiration:  Adequate for speech production  Quality:   Within functional limits  Intensity: Within functional limits    Pain: No pain reported.    EDUCATION:   The Speech Language Pathologist (SLP) completed education regarding results of evaluation and that intervention is warranted at this time.  Learner: Patient  Education: Reviewed results and recommendations of this evaluation  Evaluation of Education:  Verbalizes understanding    This plan may be re-evaluated and revised as warranted.        Evaluation Time includes thorough review of current medical information, gathering information on past medical history/social history and prior level of function, completion of standardized testing/informal observation of tasks, assessment of data and education on plan of care and goals.    [x]The admitting diagnosis and active problem list, have been reviewed prior to initiation of this evaluation.    CPT Code: 46058  dysphagia study    ACTIVE PROBLEM LIST:   Patient Active Problem List

## 2025-03-04 NOTE — PROGRESS NOTES
Physician Progress Note      PATIENT:               CARLITO CEDENO  CSN #:                  [id number]  :                       1945  ADMIT DATE:       2025 10:29 PM  DISCH DATE:  RESPONDING  PROVIDER #:        Bryan Rivers DO          QUERY TEXT:    Documentation reflects Sepsis in  progress note, not mentioned further.    If possible, please document in the progress notes and discharge summary if   Sepsis was:    The medical record reflects the following:  Risk Factors: Influenza + , 3/2. 3/2 RRT noting concern for aspiration   pneumonia vs new viral infection,  Clinical Indicators:  \"Sepsis\". WBC 12.8 (), 9.9 (), 9.6 (), 9   (), 6.3 (), 6.1 (), 8 (3/1), 11.4 (3/2), 14.9 (3/2), 15.3 (3/3).   Lactic Acid 1.5 () - 1.6 () - 1.1 () -1.2 (3/2). Lactic Acid   Sepsis 1.1 ().  Procalcitonin 5.78 (), 0.53 (3/2). Temp on admit   99.3-97.8-97.7. Then 102.9 oral (), 96 temporal (), 102.7 Axillary   (), 100.8 (3/2), 102.6 (3/3), 102.8 (3/3). HR 100s. Documented sinus   tachycardia, acute on chronic respiratory failure, KATELYN.  Treatment: imaging, tamiflu, labs, vitals  Options provided:  -- Sepsis confirmed, POA  -- Sepsis confirmed, not POA  -- Sepsis was ruled out  -- Other - I will add my own diagnosis  -- Disagree - Not applicable / Not valid  -- Disagree - Clinically unable to determine / Unknown  -- Refer to Clinical Documentation Reviewer    PROVIDER RESPONSE TEXT:    Sepsis confirmed, POA.    Query created by: Whitney Barakat on 3/3/2025 1:48 PM      Electronically signed by:  Bryan Rivers DO 3/4/2025 2:29 PM

## 2025-03-04 NOTE — PROGRESS NOTES
Palliative Care Department  [phone]  Palliative Care Progress Note  Provider Ines Uribe PA-C     Haritha Dominguez  [id number]  Hospital Day: 14  Date of Initial Consult: 02/23/2025  Referring Provider: Bryan Rivers DO   Palliative Medicine was consulted for assistance with: \"declining in health, sleeping 22 hrs per day, refusing to eat\"    HPI:   Haritha Dominguez is a 80 y.o. with a medical history of thoracic and lumbar compression fractures, B12 deficiency, hypertension, chronic back pain, asthma who was admitted on 2/19/2025 from home with a CHIEF COMPLAINT of abdominal pain and low back pain for 1 day.  In the ED, CT scan showing moderate to large hiatal hernia, moderate stool burden, 1.3 cm low attenuating lesion along the pancreatic body/tail, L2 compression fracture.  She was admitted for pain control and further workup.  CT lumbar spine without contrast showing new acute appearing fractures involving superior endplate of L2, stable chronic compression fracture involving L1, bilateral sacral alae fractures of uncertain chronicity, generalized osteopenia, stable alignment of lumbar spine with posterior fusion hardware at L3-S1 and multilevel laminectomy.  Oncology consulted for pancreatic lesion.  They recommended MRI.  Patient refused.  Neurosurgery consulted for L2 compression fracture, recommending brace.  During the course of her hospitalization, patient had further decline with lethargy, refusing to eat, intermittent confusion.  Palliative care consulted for further assistance    2/24: Influenza A positive  2/27: DNR CCA/DNI  3/2: RRT for respiratory failure  ASSESSMENT/PLAN:     Pertinent Hospital Diagnoses     Acute hypoxic respiratory failure  Influenza A    Palliative Care Encounter / Counseling Regarding Goals of Care  Please see detailed goals of care discussion as below  At this time, Haritha Dominguez, Does Not have capacity for medical decision-making.  Capacity is time limited and

## 2025-03-05 ENCOUNTER — APPOINTMENT (OUTPATIENT)
Dept: GENERAL RADIOLOGY | Age: 80
End: 2025-03-05
Payer: MEDICARE

## 2025-03-05 PROBLEM — R13.19 ESOPHAGEAL DYSPHAGIA: Status: ACTIVE | Noted: 2025-03-05

## 2025-03-05 PROBLEM — R13.10 DYSPHAGIA: Status: ACTIVE | Noted: 2025-02-19

## 2025-03-05 PROBLEM — K86.9 PANCREATIC LESION: Status: ACTIVE | Noted: 2017-05-27

## 2025-03-05 LAB
ALBUMIN SERPL-MCNC: 2.6 G/DL (ref 3.5–5.2)
ALP SERPL-CCNC: 81 U/L (ref 35–104)
ALT SERPL-CCNC: 8 U/L (ref 0–32)
ANION GAP SERPL CALCULATED.3IONS-SCNC: 19 MMOL/L (ref 7–16)
ANION GAP SERPL CALCULATED.3IONS-SCNC: 20 MMOL/L (ref 7–16)
AST SERPL-CCNC: 14 U/L (ref 0–31)
BILIRUB SERPL-MCNC: 0.4 MG/DL (ref 0–1.2)
BNP SERPL-MCNC: 608 PG/ML (ref 0–450)
BUN SERPL-MCNC: 48 MG/DL (ref 6–23)
BUN SERPL-MCNC: 51 MG/DL (ref 6–23)
CALCIUM SERPL-MCNC: 8.3 MG/DL (ref 8.6–10.2)
CALCIUM SERPL-MCNC: 8.4 MG/DL (ref 8.6–10.2)
CHLORIDE SERPL-SCNC: 114 MMOL/L (ref 98–107)
CHLORIDE SERPL-SCNC: 115 MMOL/L (ref 98–107)
CO2 SERPL-SCNC: 19 MMOL/L (ref 22–29)
CO2 SERPL-SCNC: 21 MMOL/L (ref 22–29)
CREAT SERPL-MCNC: 1 MG/DL (ref 0.5–1)
CREAT SERPL-MCNC: 1.2 MG/DL (ref 0.5–1)
ERYTHROCYTE [DISTWIDTH] IN BLOOD BY AUTOMATED COUNT: 15.9 % (ref 11.5–15)
GFR, ESTIMATED: 46 ML/MIN/1.73M2
GFR, ESTIMATED: 57 ML/MIN/1.73M2
GLUCOSE SERPL-MCNC: 164 MG/DL (ref 74–99)
GLUCOSE SERPL-MCNC: 212 MG/DL (ref 74–99)
HCT VFR BLD AUTO: 30.2 % (ref 34–48)
HGB BLD-MCNC: 9.7 G/DL (ref 11.5–15.5)
MAGNESIUM SERPL-MCNC: 1.9 MG/DL (ref 1.6–2.6)
MCH RBC QN AUTO: 29.6 PG (ref 26–35)
MCHC RBC AUTO-ENTMCNC: 32.1 G/DL (ref 32–34.5)
MCV RBC AUTO: 92.1 FL (ref 80–99.9)
MICROORGANISM SPEC CULT: NORMAL
PHOSPHATE SERPL-MCNC: 4 MG/DL (ref 2.5–4.5)
PLATELET # BLD AUTO: 545 K/UL (ref 130–450)
PMV BLD AUTO: 11.5 FL (ref 7–12)
POTASSIUM SERPL-SCNC: 2.9 MMOL/L (ref 3.5–5)
POTASSIUM SERPL-SCNC: 3.7 MMOL/L (ref 3.5–5)
PROT SERPL-MCNC: 6.6 G/DL (ref 6.4–8.3)
RBC # BLD AUTO: 3.28 M/UL (ref 3.5–5.5)
SERVICE CMNT-IMP: NORMAL
SODIUM SERPL-SCNC: 152 MMOL/L (ref 132–146)
SODIUM SERPL-SCNC: 156 MMOL/L (ref 132–146)
SPECIMEN DESCRIPTION: NORMAL
WBC OTHER # BLD: 19.5 K/UL (ref 4.5–11.5)

## 2025-03-05 PROCEDURE — 2580000003 HC RX 258

## 2025-03-05 PROCEDURE — 94660 CPAP INITIATION&MGMT: CPT

## 2025-03-05 PROCEDURE — 2580000003 HC RX 258: Performed by: STUDENT IN AN ORGANIZED HEALTH CARE EDUCATION/TRAINING PROGRAM

## 2025-03-05 PROCEDURE — 2500000003 HC RX 250 WO HCPCS: Performed by: INTERNAL MEDICINE

## 2025-03-05 PROCEDURE — 94640 AIRWAY INHALATION TREATMENT: CPT

## 2025-03-05 PROCEDURE — 97530 THERAPEUTIC ACTIVITIES: CPT

## 2025-03-05 PROCEDURE — 6360000002 HC RX W HCPCS

## 2025-03-05 PROCEDURE — 99232 SBSQ HOSP IP/OBS MODERATE 35: CPT | Performed by: PHYSICIAN ASSISTANT

## 2025-03-05 PROCEDURE — 2500000003 HC RX 250 WO HCPCS

## 2025-03-05 PROCEDURE — 6370000000 HC RX 637 (ALT 250 FOR IP): Performed by: INTERNAL MEDICINE

## 2025-03-05 PROCEDURE — 2140000000 HC CCU INTERMEDIATE R&B

## 2025-03-05 PROCEDURE — 80053 COMPREHEN METABOLIC PANEL: CPT

## 2025-03-05 PROCEDURE — 6360000002 HC RX W HCPCS: Performed by: EMERGENCY MEDICINE

## 2025-03-05 PROCEDURE — 6360000002 HC RX W HCPCS: Performed by: INTERNAL MEDICINE

## 2025-03-05 PROCEDURE — 2500000003 HC RX 250 WO HCPCS: Performed by: STUDENT IN AN ORGANIZED HEALTH CARE EDUCATION/TRAINING PROGRAM

## 2025-03-05 PROCEDURE — 36415 COLL VENOUS BLD VENIPUNCTURE: CPT

## 2025-03-05 PROCEDURE — 74220 X-RAY XM ESOPHAGUS 1CNTRST: CPT

## 2025-03-05 PROCEDURE — 83880 ASSAY OF NATRIURETIC PEPTIDE: CPT

## 2025-03-05 PROCEDURE — 80048 BASIC METABOLIC PNL TOTAL CA: CPT

## 2025-03-05 PROCEDURE — 2700000000 HC OXYGEN THERAPY PER DAY

## 2025-03-05 PROCEDURE — 85027 COMPLETE CBC AUTOMATED: CPT

## 2025-03-05 PROCEDURE — 83735 ASSAY OF MAGNESIUM: CPT

## 2025-03-05 PROCEDURE — 84100 ASSAY OF PHOSPHORUS: CPT

## 2025-03-05 PROCEDURE — 6360000002 HC RX W HCPCS: Performed by: STUDENT IN AN ORGANIZED HEALTH CARE EDUCATION/TRAINING PROGRAM

## 2025-03-05 PROCEDURE — 97535 SELF CARE MNGMENT TRAINING: CPT

## 2025-03-05 RX ORDER — DEXTROSE MONOHYDRATE 50 MG/ML
INJECTION, SOLUTION INTRAVENOUS CONTINUOUS
Status: DISCONTINUED | OUTPATIENT
Start: 2025-03-05 | End: 2025-03-12

## 2025-03-05 RX ADMIN — SODIUM CHLORIDE, PRESERVATIVE FREE 10 ML: 5 INJECTION INTRAVENOUS at 12:07

## 2025-03-05 RX ADMIN — SODIUM CHLORIDE, PRESERVATIVE FREE 10 ML: 5 INJECTION INTRAVENOUS at 22:50

## 2025-03-05 RX ADMIN — POTASSIUM CHLORIDE 10 MEQ: 7.46 INJECTION, SOLUTION INTRAVENOUS at 18:39

## 2025-03-05 RX ADMIN — ARFORMOTEROL TARTRATE 15 MCG: 15 SOLUTION RESPIRATORY (INHALATION) at 22:00

## 2025-03-05 RX ADMIN — WATER 40 MG: 1 INJECTION INTRAMUSCULAR; INTRAVENOUS; SUBCUTANEOUS at 12:06

## 2025-03-05 RX ADMIN — PIPERACILLIN AND TAZOBACTAM 4500 MG: 4; .5 INJECTION, POWDER, FOR SOLUTION INTRAVENOUS at 22:59

## 2025-03-05 RX ADMIN — IPRATROPIUM BROMIDE AND ALBUTEROL SULFATE 1 DOSE: 2.5; .5 SOLUTION RESPIRATORY (INHALATION) at 14:25

## 2025-03-05 RX ADMIN — POTASSIUM CHLORIDE 10 MEQ: 7.46 INJECTION, SOLUTION INTRAVENOUS at 17:27

## 2025-03-05 RX ADMIN — PIPERACILLIN AND TAZOBACTAM 4500 MG: 4; .5 INJECTION, POWDER, FOR SOLUTION INTRAVENOUS at 16:11

## 2025-03-05 RX ADMIN — POTASSIUM CHLORIDE 10 MEQ: 7.46 INJECTION, SOLUTION INTRAVENOUS at 16:13

## 2025-03-05 RX ADMIN — POTASSIUM CHLORIDE 10 MEQ: 7.46 INJECTION, SOLUTION INTRAVENOUS at 15:09

## 2025-03-05 RX ADMIN — IPRATROPIUM BROMIDE AND ALBUTEROL SULFATE 1 DOSE: 2.5; .5 SOLUTION RESPIRATORY (INHALATION) at 17:55

## 2025-03-05 RX ADMIN — IPRATROPIUM BROMIDE AND ALBUTEROL SULFATE 1 DOSE: 2.5; .5 SOLUTION RESPIRATORY (INHALATION) at 22:00

## 2025-03-05 RX ADMIN — BUDESONIDE 1000 MCG: 0.5 INHALANT RESPIRATORY (INHALATION) at 22:00

## 2025-03-05 RX ADMIN — DEXTROSE MONOHYDRATE: 50 INJECTION, SOLUTION INTRAVENOUS at 16:33

## 2025-03-05 RX ADMIN — ONDANSETRON 4 MG: 2 INJECTION, SOLUTION INTRAMUSCULAR; INTRAVENOUS at 12:06

## 2025-03-05 RX ADMIN — LORAZEPAM 0.5 MG: 2 INJECTION INTRAMUSCULAR; INTRAVENOUS at 12:06

## 2025-03-05 RX ADMIN — ENOXAPARIN SODIUM 30 MG: 100 INJECTION SUBCUTANEOUS at 12:07

## 2025-03-05 RX ADMIN — POTASSIUM CHLORIDE 10 MEQ: 7.46 INJECTION, SOLUTION INTRAVENOUS at 13:41

## 2025-03-05 RX ADMIN — POTASSIUM CHLORIDE 10 MEQ: 7.46 INJECTION, SOLUTION INTRAVENOUS at 20:07

## 2025-03-05 RX ADMIN — SODIUM CHLORIDE, PRESERVATIVE FREE 40 MG: 5 INJECTION INTRAVENOUS at 12:06

## 2025-03-05 RX ADMIN — PIPERACILLIN AND TAZOBACTAM 4500 MG: 4; .5 INJECTION, POWDER, FOR SOLUTION INTRAVENOUS at 06:48

## 2025-03-05 RX ADMIN — SODIUM CHLORIDE, PRESERVATIVE FREE 40 MG: 5 INJECTION INTRAVENOUS at 22:41

## 2025-03-05 NOTE — PROGRESS NOTES
Patient has residual barium seen on KUB and is unable to have ct exam at this time.Barium will need cleared out and a KUB confirming before ct can be schedule.

## 2025-03-05 NOTE — PROGRESS NOTES
[facility]  Nephrology Progress Note    Patient's Name: Haritha Dominguez    History of Present Illness from 2/27 consult note:    \"Haritha Dominguez is a 80 y.o. female with a past medical history of hypertension, hyperlipidemia, anxiety, arthritis, and asthma.  She presented to the ED on 2/19 reportedly for concerns of abdominal pain.  Vital signs on 2/19 includes temperature 99.3, respirations 18, pulse 103, /85, and she was 92% SpO2.  Lab data on 2/19 includes CO2 20, BUN 22, creatinine 1, troponin 18, WBC 12.8 K, and hemoglobin 10.5.  Her UA on 2/20 showed specific gravity 1.025, 30 protein, trace leukocyte esterase, 1+ bacteria.  She had a CT abdomen/pelvis with IV contrast on 2/20 which showed no acute intra-abdominal pelvic process appreciated, moderate-large hiatal hernia, moderate colonic stool burden, 1.3 cm low attenuating lesion along pancreatic body/tail.  She underwent a CT lumbar spine on 2/20 which showed new acute appearing fracture seen involving superior endplate of L2, stable chronic compression fracture involving L1, bilateral sacral ala fractures of uncertain chronicity, generalized osteopenia.  She was seen by neurosurgery.  She was seen by hepatobiliary surgery regarding pancreatic lesion.  She had a chest x-ray on 2/24 which showed increased interstitial markings seen in the perihilar regions to suggest mild interstitial edema with small bilateral pleural effusions.  She was found to be influenza A positive on 2/24. She was seen by urology for concerns of urinary retention.  ID is following the patient.  Nephrology has been consulted to see the patient for concerns of KATELYN. She has a baseline serum creatinine of 0.8-1 mg/dL.  At present, patient was seen and examined.  She is awake, alert, appears in no acute distress.  She appears to be a questionable historian at this time.  She notes that her appetite is okay.  She denies any dizziness.  Visitors at  infection)    Gastroesophageal reflux disease    Hyperlipidemia    Cardiac arrhythmia    Abrasion of skin of left lower leg    Abrasion of skin of right lower leg    Itching    Decreased dorsalis pedis pulse    Compression fracture of L2 lumbar vertebra (HCC)    Goals of care, counseling/discussion    Palliative care encounter    Esophageal dysphagia    Dysphagia       Diet:    Diet NPO  Diet NPO Exceptions are: Sips of Water with Meds    Meds:     pantoprazole (PROTONIX) 40 mg in sodium chloride (PF) 0.9 % 10 mL injection  40 mg IntraVENous Q12H    sodium bicarbonate  650 mg Oral BID    methylPREDNISolone  40 mg IntraVENous Q12H    guaiFENesin-dextromethorphan  10 mL Oral Q6H    sodium chloride  1,000 mL IntraVENous Once    piperacillin-tazobactam  4,500 mg IntraVENous Q8H    sodium chloride  1,000 mL IntraVENous Once    ipratropium 0.5 mg-albuterol 2.5 mg  1 Dose Inhalation Q4H WA RT    enoxaparin  30 mg SubCUTAneous Daily    lidocaine  5 mL IntraDERmal Once    atorvastatin  10 mg Oral Nightly    escitalopram  10 mg Oral Nightly    budesonide  1 mg Nebulization BID RT    And    arformoterol tartrate  15 mcg Nebulization BID RT    montelukast  10 mg Oral Daily    [Held by provider] pantoprazole  40 mg Oral Daily    pregabalin  50 mg Oral BID    sodium chloride flush  5-40 mL IntraVENous 2 times per day        sodium chloride         Meds prn:     LORazepam, LORazepam, sodium chloride flush, sodium chloride, potassium chloride **OR** potassium alternative oral replacement **OR** potassium chloride, magnesium sulfate, polyethylene glycol, acetaminophen **OR** acetaminophen, HYDROcodone 5 mg - acetaminophen, ondansetron    Meds prior to admission:     No current facility-administered medications on file prior to encounter.     Current Outpatient Medications on File Prior to Encounter   Medication Sig Dispense Refill    Tralokinumab-ldrm (ADBRY) 300 MG/2ML SOAJ Inject 300 mg into the skin every 14 days       pantoprazole (PROTONIX) 40 MG tablet Take 1 tablet by mouth in the morning and 1 tablet in the evening.      ferrous sulfate (IRON 325) 325 (65 Fe) MG tablet Take 1 tablet by mouth daily (with breakfast)      albuterol sulfate HFA (VENTOLIN HFA) 108 (90 Base) MCG/ACT inhaler Inhale 2 puffs into the lungs every 6 hours as needed for Wheezing      polyethylene glycol (GLYCOLAX) 17 g packet Take 1 packet by mouth daily as needed for Constipation      vitamin C (ASCORBIC ACID) 500 MG tablet Take 1 tablet by mouth daily      vitamin D (CHOLECALCIFEROL) 50 MCG (2000 UT) TABS tablet Take 1 tablet by mouth in the morning and at bedtime      Calcium Magnesium Zinc 333-133-5 MG TABS Take 1 tablet by mouth in the morning and at bedtime      montelukast (SINGULAIR) 10 MG tablet TAKE 1 TABLET DAILY 90 tablet 1    aspirin 81 MG EC tablet Take 1 tablet by mouth daily      fluticasone-umeclidin-vilant (TRELEGY ELLIPTA) 200-62.5-25 MCG/ACT AEPB inhaler Inhale 1 puff into the lungs daily 180 each 2    pregabalin (LYRICA) 50 MG capsule Take 1 capsule by mouth 2 times daily.      escitalopram (LEXAPRO) 10 MG tablet Take 1 tablet by mouth nightly      atorvastatin (LIPITOR) 10 MG tablet Take 1 tablet by mouth nightly         Allergies:    Percocet [oxycodone-acetaminophen]    Social History:     reports that she has never smoked. She has never used smokeless tobacco. She reports that she does not drink alcohol and does not use drugs.    Family History:         Problem Relation Age of Onset    Stroke Mother     Heart Disease Mother     Hypertension Mother     Other Mother         CHOLECYSTECTOMY; OSTEOPENIA    Heart Disease Father     Hypertension Father     Diabetes Father     Arthritis Father     Asthma Daughter      Physical Exam:      Patient Vitals for the past 24 hrs:   BP Temp Temp src Pulse Resp SpO2   03/05/25 1107 (!) 147/98 97.5 °F (36.4 °C) Temporal (!) 110 17 94 %   03/05/25 0806 (!) 150/86 97.8 °F (36.6 °C) Temporal (!) 109

## 2025-03-05 NOTE — PLAN OF CARE
Problem: Discharge Planning  Goal: Discharge to home or other facility with appropriate resources  Outcome: Progressing     Problem: Pain  Goal: Verbalizes/displays adequate comfort level or baseline comfort level  Outcome: Progressing     Problem: Safety - Adult  Goal: Free from fall injury  Outcome: Progressing     Problem: ABCDS Injury Assessment  Goal: Absence of physical injury  Outcome: Progressing     Problem: Skin/Tissue Integrity  Goal: Skin integrity remains intact  Description: 1.  Monitor for areas of redness and/or skin breakdown  2.  Assess vascular access sites hourly  3.  Every 4-6 hours minimum:  Change oxygen saturation probe site  4.  Every 4-6 hours:  If on nasal continuous positive airway pressure, respiratory therapy assess nares and determine need for appliance change or resting period  Outcome: Progressing    Problem: Nutrition Deficit:  Goal: Optimize nutritional status  Outcome: Progressing  Flowsheets (Taken 3/4/2025 1313 by Bonita Perry, RD, LD)  Nutrient intake appropriate for improving, restoring, or maintaining nutritional needs: Assess nutritional status and recommend course of action     Problem: Neurosensory - Adult  Goal: Achieves maximal functionality and self care  Outcome: Progressing     Problem: Musculoskeletal - Adult  Goal: Maintain proper alignment of affected body part  Outcome: Progressing

## 2025-03-05 NOTE — PROGRESS NOTES
Palliative Care Department  [phone]  Palliative Care Progress Note  Provider Ines Uribe PA-C     Haritha Dominguez  [id number]  Hospital Day: 15  Date of Initial Consult: 02/23/2025  Referring Provider: Bryan Rivers DO   Palliative Medicine was consulted for assistance with: \"declining in health, sleeping 22 hrs per day, refusing to eat\"    HPI:   Haritha Dominguez is a 80 y.o. with a medical history of thoracic and lumbar compression fractures, B12 deficiency, hypertension, chronic back pain, asthma who was admitted on 2/19/2025 from home with a CHIEF COMPLAINT of abdominal pain and low back pain for 1 day.  In the ED, CT scan showing moderate to large hiatal hernia, moderate stool burden, 1.3 cm low attenuating lesion along the pancreatic body/tail, L2 compression fracture.  She was admitted for pain control and further workup.  CT lumbar spine without contrast showing new acute appearing fractures involving superior endplate of L2, stable chronic compression fracture involving L1, bilateral sacral alae fractures of uncertain chronicity, generalized osteopenia, stable alignment of lumbar spine with posterior fusion hardware at L3-S1 and multilevel laminectomy.  Oncology consulted for pancreatic lesion.  They recommended MRI.  Patient refused.  Neurosurgery consulted for L2 compression fracture, recommending brace.  During the course of her hospitalization, patient had further decline with lethargy, refusing to eat, intermittent confusion.  Palliative care consulted for further assistance    2/24: Influenza A positive  2/27: DNR CCA/DNI  3/2: RRT for respiratory failure  ASSESSMENT/PLAN:     Pertinent Hospital Diagnoses     Acute hypoxic respiratory failure  Influenza A    Palliative Care Encounter / Counseling Regarding Goals of Care  Please see detailed goals of care discussion as below  At this time, Haritha Dominguez, Does Not have capacity for medical decision-making.  Capacity is time limited and

## 2025-03-05 NOTE — PROGRESS NOTES
[facility]  [facility]  Progress Note      Chief Complaint   Patient presents with    Abdominal Pain     Patient from home called for 10/10 epigastric pain reproducible with palpation. EMS gave 50 mcg of fentanyl and 4mg of zofran PTA. Denies pain currently       SUBJECTIVE:    Patient is tolerating medications. No reported adverse drug reactions.  No nausea, diarrhea.  Fever has improved.   Review of systems:  As stated above in the chief complaint, otherwise negative.    Medications:  Scheduled Meds:   pantoprazole (PROTONIX) 40 mg in sodium chloride (PF) 0.9 % 10 mL injection  40 mg IntraVENous Q12H    sodium bicarbonate  650 mg Oral BID    methylPREDNISolone  40 mg IntraVENous Q12H    guaiFENesin-dextromethorphan  10 mL Oral Q6H    sodium chloride  1,000 mL IntraVENous Once    piperacillin-tazobactam  4,500 mg IntraVENous Q8H    sodium chloride  1,000 mL IntraVENous Once    ipratropium 0.5 mg-albuterol 2.5 mg  1 Dose Inhalation Q4H WA RT    enoxaparin  30 mg SubCUTAneous Daily    lidocaine  5 mL IntraDERmal Once    atorvastatin  10 mg Oral Nightly    escitalopram  10 mg Oral Nightly    budesonide  1 mg Nebulization BID RT    And    arformoterol tartrate  15 mcg Nebulization BID RT    montelukast  10 mg Oral Daily    [Held by provider] pantoprazole  40 mg Oral Daily    pregabalin  50 mg Oral BID    sodium chloride flush  5-40 mL IntraVENous 2 times per day     Continuous Infusions:   sodium chloride       PRN Meds:LORazepam, LORazepam, sodium chloride flush, sodium chloride, potassium chloride **OR** potassium alternative oral replacement **OR** potassium chloride, magnesium sulfate, polyethylene glycol, acetaminophen **OR** acetaminophen, HYDROcodone 5 mg - acetaminophen, ondansetron    OBJECTIVE:  BP (!) 147/98   Pulse (!) 110   Temp 97.5 °F (36.4 °C) (Temporal)   Resp 17   Ht 1.524 m (5')   Wt 64.7 kg (142 lb 10.2 oz)   SpO2 94%   BMI 27.86 kg/m²   Temp  Av.9 °F (36.6  °C)  Min: 97.5 °F (36.4 °C)  Max: 98.4 °F (36.9 °C)  Constitutional: The patient is awake, alert, and oriented. Ill appearing  Skin: Warm and dry. No rashes were noted. No jaundice.  HEENT:  Moist mucous membranes, no ulcerations, no thrush.   Neck: Supple to movements. No lymphadenopathy.   Chest: No use of accessory muscles to breathe. Symmetrical expansion. Auscultation reveals wheezing, no crackles, + rhonchi on 10 liters  Cardiovascular: S1 and S2 are rhythmic and regular. No murmurs appreciated.   Abdomen: Positive bowel sounds to auscultation. Benign to palpation. No masses felt. No hepatosplenomegaly.  Genitourinary: No pain in the lower abdomen  Extremities: No clubbing, no cyanosis, no edema.  Musculoskeletal: No pain in range of motion of any joints  Neurological: Following commands, no focal neurodeficit  Lines: peripheral    Laboratory and Tests Review:  Lab Results   Component Value Date    WBC 19.5 (H) 03/05/2025    WBC 20.5 (H) 03/04/2025    WBC 15.3 (H) 03/03/2025    HGB 9.7 (L) 03/05/2025    HCT 30.2 (L) 03/05/2025    MCV 92.1 03/05/2025     (H) 03/05/2025     Lab Results   Component Value Date    NEUTROABS 7.51 (H) 03/02/2025    NEUTROABS 7.49 (H) 02/24/2025    NEUTROABS 8.50 (H) 02/23/2025     No results found for: \"CRPHS\"  Lab Results   Component Value Date    ALT 8 03/05/2025    AST 14 03/05/2025    ALKPHOS 81 03/05/2025    BILITOT 0.4 03/05/2025     Lab Results   Component Value Date/Time     03/05/2025 04:47 AM    K 2.9 03/05/2025 04:47 AM    K 4.6 05/24/2023 11:30 AM     03/05/2025 04:47 AM    CO2 21 03/05/2025 04:47 AM    BUN 51 03/05/2025 04:47 AM    CREATININE 1.2 03/05/2025 04:47 AM    CREATININE 1.3 03/04/2025 06:25 AM    CREATININE 1.1 03/03/2025 07:42 PM    GFRAA >60 01/12/2019 04:00 AM    LABGLOM 46 03/05/2025 04:47 AM    LABGLOM 70 04/18/2024 09:02 PM    GLUCOSE 164 03/05/2025 04:47 AM    GLUCOSE 85 04/02/2012 08:30 AM    CALCIUM 8.4 03/05/2025 04:47 AM    BILITOT  0.4 03/05/2025 04:47 AM    ALKPHOS 81 03/05/2025 04:47 AM    AST 14 03/05/2025 04:47 AM    ALT 8 03/05/2025 04:47 AM     Lab Results   Component Value Date    CRP 21.0 (H) 11/04/2024    CRP 13.8 (H) 03/10/2023    CRP 12.8 (H) 02/21/2023     Lab Results   Component Value Date    SEDRATE 72 (H) 11/04/2024    SEDRATE 61 (H) 03/10/2023    SEDRATE 26 (H) 02/21/2023     Radiology:      Microbiology:   Lab Results   Component Value Date/Time    BC 5 Days no growth 03/09/2023 08:30 PM    BC 5 Days no growth 02/20/2023 05:43 PM    BC 5 Days no growth 02/02/2023 03:35 PM    ORG GNR oxidase positive 03/09/2023 08:19 PM    ORG Candida albicans 02/20/2023 06:30 PM    ORG Staphylococcus coagulase-negative 01/31/2023 11:25 PM     Lab Results   Component Value Date/Time    BLOODCULT2 5 Days no growth 03/09/2023 09:45 PM    BLOODCULT2 5 Days no growth 02/20/2023 05:43 PM    BLOODCULT2 5 Days no growth 02/02/2023 03:35 PM    ORG GNR oxidase positive 03/09/2023 08:19 PM    ORG Candida albicans 02/20/2023 06:30 PM    ORG Staphylococcus coagulase-negative 01/31/2023 11:25 PM     WOUND/ABSCESS   Date Value Ref Range Status   04/30/2020 Growth not present  Final   09/26/2017   Final    Moderate growth  This isolate is presumed to be resistant based on the  detection of inducible Clindamycin resistance.  Clindamycin  may still be effective in some patients.       No results found for: \"RESPSMEAR\"      Component Value Date/Time    MPNEUMO Not Detected 03/02/2025 2125     No results found for: \"CULTRESP\"  No results found for: \"CXCATHTIP\"  Body Fluid Culture, Sterile   Date Value Ref Range Status   09/30/2017 Neisseria gonorrhoeae not isolated (A)  Final   09/30/2017 One colony  Final     No results found for: \"CXSURG\"  Urine Culture, Routine   Date Value Ref Range Status   05/24/2023 <10,000 CFU/mL  Mixed gram positive organisms    Final   03/09/2023 >100,000 CFU/ml  Final   02/20/2023 >100,000 CFU/ml  Final     MRSA Culture Only   Date

## 2025-03-05 NOTE — CONSULTS
Gastroenterology, Hepatology, &  Advanced Endoscopy    Consult Note      Reason for Consult: Dysphagia    HPI:   Haritha Dominguez is a 80 y.o. female w/ PMH of  has a past medical history of Abrasion of skin of left lower leg, Abrasion of skin of right lower leg, Anxiety, Arthritis, Asthma, Claustrophobia, Decreased dorsalis pedis pulse, Dermatitis, Fracture, GERD (gastroesophageal reflux disease), Hiatal hernia, History of blood transfusion, HTN (hypertension), Hyperlipidemia, Itching, On aspirin at home, Pancreatic cyst, Pneumonia, Sleep apnea, SOB (shortness of breath), and Tachycardia. who presented to the ED with abdominal pain and low back pain. She has since developed worsening respiratory status with concern for aspiration. She reports immediate vomiting and regurgitation when attempting to eat which has been happening all through her hospital stay. She had an attempted Barium Esophagram today which also resulted in her having an immediate emesis episode. She is currently on 10L NC. She reports being hungry and is asking for a diet. Pulmonary imaging is consistent with atelectatic changes.         Recent Labs     03/02/25  1815 03/03/25  0624 03/04/25  0625 03/05/25  0447   ALT 15 11 11 8   AST 22 17 17 14   ALKPHOS 83 79 90 81   BILITOT 0.5 0.5 0.5 0.4   BILIDIR <0.2  --   --   --      Lab Results   Component Value Date    WBC 19.5 (H) 03/05/2025    HGB 9.7 (L) 03/05/2025    HCT 30.2 (L) 03/05/2025     (H) 03/05/2025     (H) 03/05/2025    K 2.9 (L) 03/05/2025     (H) 03/05/2025    CREATININE 1.2 (H) 03/05/2025    BUN 51 (H) 03/05/2025    CO2 21 (L) 03/05/2025    FOLATE 17.9 02/28/2025    [id number] 1002 (H) 02/28/2025    GLUCOSE 164 (H) 03/05/2025    INR 0.9 05/24/2023    PROTIME 10.2 05/24/2023    TSH 2.06 08/22/2023    LABA1C 7.8 01/17/2024     Lab Results   Component Value Date    INR 0.9 05/24/2023    INR 1.1 02/13/2023    INR 1.2 05/28/2017    PROTIME 10.2 05/24/2023    PROTIME 11.8  IVPB premix  2,000 mg IntraVENous PRN Bryan Rivers,         polyethylene glycol (GLYCOLAX) packet 17 g  17 g Oral Daily PRN Bryan Rivers DO   17 g at 02/23/25 0842    acetaminophen (TYLENOL) tablet 650 mg  650 mg Oral Q6H PRN Bryan Rivers DO   650 mg at 03/02/25 1752    Or    acetaminophen (TYLENOL) suppository 650 mg  650 mg Rectal Q6H PRN Bryan Rivers DO   650 mg at 03/03/25 0900    HYDROcodone-acetaminophen (NORCO) 5-325 MG per tablet 1 tablet  1 tablet Oral Q4H PRN Monica Hansen PA   1 tablet at 03/02/25 1601    ondansetron (ZOFRAN) injection 4 mg  4 mg IntraVENous Q6H PRN Oliver Waters MD   4 mg at 03/04/25 1543         REVIEW OF SYSTEMS:   General: Patient denies n/v/f/c or weight loss.  HEENT: Patient denies persistent postnasal drip, scleral icterus, drooling, persistent bleeding from nose/mouth.  Resp: Patient denies SOB, wheezing, productive cough.  Cardio: Patient denies CP, palpitations, significant edema  GI: As above.  Derm: Patient denies jaundice/rashes.   Misc: Patient denies diffuse/irregular joint swelling or myalgias.      PHYSICAL EXAMINATION:   Vitals:    03/04/25 1606 03/04/25 2040 03/04/25 2327 03/05/25 0342   BP: 111/73 129/72 135/72 (!) 146/93   Pulse: (!) 120 (!) 120 (!) 118 (!) 117   Resp: 21 16 24 14   Temp: 97.9 °F (36.6 °C) 98.4 °F (36.9 °C) 97.7 °F (36.5 °C) 98.1 °F (36.7 °C)   TempSrc: Axillary Temporal Temporal Temporal   SpO2: 93% 92% 93% 92%   Weight:       Height:         General: Overall well-appearing, NAD  HEENT: PERRLA, EOMI, Anicteric sclera, MMM, no rhinorrhea  Cards: RRR, no LE edema  Resp: Breathing comfortably, no use of accessory muscles, no audible wheezing. Breaths are notably short with poor volumes.   Abdomen: soft, non-tender, non-distended  Extremities: Moves all extremities, no effusions or bruising.  Skin: No rashes or jaundice  Neuro: A&O x 3, CN grossly intact, non-focal exam    ASSESSMENT:     80y/F who presented to the ED

## 2025-03-05 NOTE — CARE COORDINATION
3/5 Update CM Note:  Pt presented to the ER for worsening back pain from home.  Pt is on 9L/NC at 92%, Iv Protonix, Iv Ativan PRN, Iv Solumedrol, Iv Zosyn til 3/7 & SQ Lovenox.  Pt is in ISO-Droplet for Influenza.  GI consulted.  Pt is NPO & plan for EGD on Friday - possible MRI/MRCP ip vs op.  [facility]'s dc plan [facility] to [facility].  Will need to resubmit for pre-cert.  Will see if PT/OT can see the pt on Friday to possible start pre-cert or on Sunday to start pre-cert on Monday.  [facility],PASRR & Ambulance completed placed in envelope in soft chart.  Pt was placed on will call with PAS.  Sw/CM will continue to follow.  Electronically signed by Malika Chatterjee RN on 3/5/2025 at 10:49 AM

## 2025-03-05 NOTE — PROGRESS NOTES
Occupational Therapy  OT BEDSIDE TREATMENT NOTE   [facility]  [address], [place]      Date:3/5/2025  Patient Name: Haritha Dominguez  MRN: [id number]  : 1945  Room: [address] [place] [address]     Evaluating OT: Matt Soriano OTR/L; [id number]        Referring Provider: Bryan Rivers DO     Specific Provider Orders/Date: OT Eval and Treat 25 1300        Diagnosis: Pt c/o LBP worsening ~1 day PTA. CT scan ID'd L2 compression fx.     Surgery: None this admission.     Pertinent Medical History:  has a past medical history of Abrasion of skin of left lower leg, Abrasion of skin of right lower leg, Anxiety, Arthritis, Asthma, Claustrophobia, Decreased dorsalis pedis pulse, Dermatitis, Fracture, GERD (gastroesophageal reflux disease), Hiatal hernia, History of blood transfusion, HTN (hypertension), Hyperlipidemia, Itching, On aspirin at home, Pancreatic cyst, Pneumonia, Sleep apnea, SOB (shortness of breath), and Tachycardia.      Recommended Adaptive Equipment: TBD      Precautions:  Fall Risk, spinal precautions, soft TLSO (\"Wear TLSO when greater than 45 degrees\" Per NS note 25), +alarms, limited insight into deficits, monitor BP (hypotensive), flexed posture      Assessment of current deficits    [x] Functional mobility            [x]ADLs           [x] Strength                  [x]Cognition    [x] Functional transfers          [x] IADLs         [x] Safety Awareness   [x]Endurance    [x] Fine Coordination                         [x] Balance      [] Vision/perception   []Sensation      []Gross Motor Coordination             [x] ROM           [] Delirium                   [] Motor Control      OT PLAN OF CARE   OT POC based on physician orders, patient diagnosis and results of clinical assessment     Frequency/Duration 1-3 days/wk for 2 weeks PRN   Specific OT Treatment Interventions to include:   * Instruction/training on adapted ADL techniques  Status  Date: 2/21/25 Treatment Status  Date:3/5/25 STGs = LTGs  Time frame: 10-14 days   Feeding Minimal Assist  NPO Independent    Grooming Moderate Assist seated EOB  Mod A  To wash face sitting EOB Stand by Assist    UB Dressing Maximal Assist   To don/doff TLSO Max A  To dof/don gown supine in bed  Dependent  To don TLSO in supine Stand by Assist    LB Dressing Dependent   To don socks at bed-level Dependent  To don socks bed level Moderate Assist    Bathing Dependent  Simulated seated EOB  Dependent  Simulated tasks sitting EOB Moderate Assist    Toileting Dependent   Dependent  For hygiene after episode of incontinence bed level Moderate Assist    Bed Mobility  Log Roll: Moderate Assist   Supine to sit: Maximal Assist   Sit to supine: Maximal Assist  Log Roll: Max Assist   Supine to sit: Maximal Assistx2   Sit to supine: Maximal Assistx2  Supine to sit: Minimal Assist   Sit to supine: Minimal Assist    Functional Transfers Sit to stand:Moderate Assist   Stand to sit:Minimal Assist   Stand pivot: Minimal Assist w/ ww  Commode: NT  Max Ax2  For sit to stand from EOB for 2 reps Sit to stand: Supervision   Stand to sit: Supervision   Stand pivot:  Supervision   Commode:  Supervision    Functional Mobility Minimal Assist   Use of ww to<>from room doorway  max Ax2  Arm and arm assist to take approx 2 side steps to HOB Supervision w/ use of Appropriate AD   Balance Sitting:     Static:  Stand by Assist     Dynamic:Stand by Assist      Standing:    Static:  Minimal Assist w/ ww    Dynamic:Minimal Assist w/ ww Sitting:     Static:  Min A    Dynamic:Mod A      Standing:    Static:  max Ax2    Dynamic:NT  Sitting:     Static:  Independent     Dynamic:Independent      Standing:    Static:  Supervision     Dynamic:Supervision    Activity Tolerance fair  w/ light activity P  Pt limited by increased HR and O2 requirements  -141bpm  O2 86-91% on 8L  Increased time for recovery between tasks good  w/ light to mod      Pt has made slow progress towards set goals.     Continue with current plan of care    Treatment Time In:1107           Treatment Time Out: 1137             Treatment Charges: Mins Units   Ther Ex  05541     Manual Therapy 98424     Thera Activities 02172 15 1   ADL/Home Mgt 40905 15 1   Neuro Re-ed 31203     Group Therapy      Orthotic manage/training  53867     Non-Billable Time     Total Timed Treatment 30 2     [facility] HOYT/L [id number]

## 2025-03-05 NOTE — PROGRESS NOTES
Nathan Kurtz M.D.,[facility]  Jack Rolle D.O., TRU., [facility]  Ari Licona M.D.  Dee Albert M.D.   Wallace Decker D.O.  Bryan Traylor M.D.         Daily Pulmonary Progress Note    Patient:  Haritha Dominguez 80 y.o. female MRN: [id number]            Synopsis     We are following patient for respiratory failure    \"CC\" abdominal pain    Code status: LIMITED CODE  Intubation/Re-intubation at the time of arrest No   Defibrillation/Cardioversion No   Chest Compressions No   Resuscitative Medications No   Other DNR DNI       Subjective   HPI:  Haritha Dominguez is a 80 y.o. female we are asked to evaluate for acute respiratory failure with hypoxia.     Patient is known to our practice and follows with Dr. Wallace Decker for moderate persistent asthma and obstructive sleep apnea.  At baseline she is on room air.  She takes Trelegy daily, and albuterol nebulizer as needed.  She does have a CPAP with 7 cm of water through [place].     Haritha presented to the hospital on 2/19 with abdominal pain and lower back pain.  Workup revealed an L2 compression fraction and a 1.3 cm lesion in the pancreas.  She was seen by neurosurgery for the compression fracture which was recommended wearing a brace.  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  Haritha had tested positive for influenza A on 2/24 and was started on Tamiflu.  She has been requiring supplemental oxygen however she went into respiratory distress yesterday and an RRT was called.  She is being followed by infectious disease however Zosyn and vancomycin was started yesterday during the RRT.  A chest x-ray was done yesterday showing no acute process.     Haritha was seen today lying in bed ill-appearing on 10 L high flow nasal cannula.  She does have wheezes on exam along with rhonchi.    3/4/25: Patient was seen and examined lying in bed.  She continues to ask for water.   Detected   Parainfluenza 4 PCR  Not Detected Not Detected   Resp Syncytial Virus PCR  Not Detected Not Detected   Bordetella parapertussis by PCR  Not Detected Not Detected   B Pertussis by PCR  Not Detected Not Detected   Chlamydia pneumoniae By PCR  Not Detected Not Detected   Mycoplasma pneumo by PCR  Not Detected Not Detected   Comment: Performed by multiplexed nucleic acid assay.   Resulting Agency [facility]              Specimen Collected: 03/02/25 21:25 EST Last Resulted: 03/02/25 22:56 EST         Assessment:    Acute hypoxic respiratory failure  Influenza A  Moderate persistent asthma  CORRINE on CPAP 7  L2 compression fracture  Pancreatic lesion  Dysphagia       Plan:   Wean oxygen as tolerated to maintain SpO2 greater than 92%, currently on 8 L  CPAP of 7 at night and as needed.  Home device at the bedside  Nebulized bronchodilators-Brovana Pulmicort twice a day with DuoNebs every 4 hours while awake.  May resume Trelegy upon discharge  CTA chest reviewed - bibasilar atelectasis and esophageal stricture  Repeat CXR in AM  Solu-Medrol daily-taper as symptoms improve  Continue Singulair daily  Antibiotic management per infectious disease-Zosyn  Completed Tamiflu  Diuretic therapy management per nephrology-off diuretics  MBSS completed indicating possible esophageal motility deficit. Keep NPO  GI consult pending  Esophogram  reviewed -dysfunctional swallowing with recurrent aspiration into trachea  DVT/PE prophylaxis-Lovenox      This plan of care was reviewed in collaboration with Dr. Decker    Electronically signed by STEVE Mccall - CNP on 3/5/2025 at 2:58 PM      I personally saw, examined, and cared for the patient. I performed the substantive portion of the visit. Labs, medications, radiographs reviewed. I agree with history exam and plans detailed in NP note.        Wallace Decker, DO

## 2025-03-05 NOTE — PROGRESS NOTES
Hospital Medicine    Subjective:  pt alert responsive      Current Facility-Administered Medications:     pantoprazole (PROTONIX) 40 mg in sodium chloride (PF) 0.9 % 10 mL injection, 40 mg, IntraVENous, Daily, Bryan Rivers DO    sodium bicarbonate tablet 650 mg, 650 mg, Oral, BID, Ines Gauthier APRN - CNP    methylPREDNISolone sodium succ (SOLU-MEDROL) 40 mg in sterile water 1 mL injection, 40 mg, IntraVENous, Q12H, Alysa Kidd APRN - CNP, 40 mg at 03/04/25 2308    LORazepam (ATIVAN) injection 0.5 mg, 0.5 mg, IntraVENous, Q6H PRN, Bryan Rivers DO, 0.5 mg at 03/04/25 1542    guaiFENesin-dextromethorphan (ROBITUSSIN DM) 100-10 MG/5ML syrup 10 mL, 10 mL, Oral, Q6H, Daniella Anderson APRN - CNP    sodium chloride 0.9 % bolus 1,000 mL, 1,000 mL, IntraVENous, Once, Jocelynn Porras MD    [COMPLETED] piperacillin-tazobactam (ZOSYN) 4,500 mg in sodium chloride 0.9 % 100 mL IVPB ([id number]), 4,500 mg, IntraVENous, Once, Stopped at 03/03/25 0010 **FOLLOWED BY** piperacillin-tazobactam (ZOSYN) 4,500 mg in sodium chloride 0.9 % 100 mL IVPB ([id number]), 4,500 mg, IntraVENous, Q8H, Jocelynn Porras MD, Last Rate: 25 mL/hr at 03/05/25 0648, 4,500 mg at 03/05/25 0648    sodium chloride 0.9 % bolus 1,000 mL, 1,000 mL, IntraVENous, Once, Natanael Martinez MD    ipratropium 0.5 mg-albuterol 2.5 mg (DUONEB) nebulizer solution 1 Dose, 1 Dose, Inhalation, Q4H WA RT, Bryan Rivers DO, 1 Dose at 03/04/25 2033    enoxaparin Sodium (LOVENOX) injection 30 mg, 30 mg, SubCUTAneous, Daily, Bryan Rivers DO, 30 mg at 03/04/25 0813    LORazepam (ATIVAN) tablet 1 mg, 1 mg, Oral, TID PRN, Bryan Rivers, DO, 1 mg at 03/01/25 2143    lidocaine 1 % injection 5 mL, 5 mL, IntraDERmal, Once, Oliver Waters MD    atorvastatin (LIPITOR) tablet 10 mg, 10 mg, Oral, Nightly, Bryan Rivers, , 10 mg at 03/01/25 2143    escitalopram (LEXAPRO) tablet 10 mg, 10 mg, Oral, Nightly, Bryan Rivers, , 10 mg at        Assessment:    Principal Problem:    Compression fracture of L2 lumbar vertebra (HCC)  Active Problems:    Goals of care, counseling/discussion    Palliative care encounter  Resolved Problems:    * No resolved hospital problems. *      Plan:  Esophagram npo for now needs ivf replace k will discuss with renal        Bryan Rivers DO  8:33 AM  3/5/2025

## 2025-03-06 ENCOUNTER — APPOINTMENT (OUTPATIENT)
Dept: GENERAL RADIOLOGY | Age: 80
End: 2025-03-06
Payer: MEDICARE

## 2025-03-06 LAB
ALBUMIN SERPL-MCNC: 2.5 G/DL (ref 3.5–5.2)
ALP SERPL-CCNC: 66 U/L (ref 35–104)
ALT SERPL-CCNC: 9 U/L (ref 0–32)
ANION GAP SERPL CALCULATED.3IONS-SCNC: 15 MMOL/L (ref 7–16)
ANION GAP SERPL CALCULATED.3IONS-SCNC: 16 MMOL/L (ref 7–16)
AST SERPL-CCNC: 14 U/L (ref 0–31)
BILIRUB SERPL-MCNC: 0.4 MG/DL (ref 0–1.2)
BUN SERPL-MCNC: 31 MG/DL (ref 6–23)
BUN SERPL-MCNC: 39 MG/DL (ref 6–23)
CALCIUM SERPL-MCNC: 8 MG/DL (ref 8.6–10.2)
CALCIUM SERPL-MCNC: 8.5 MG/DL (ref 8.6–10.2)
CHLORIDE SERPL-SCNC: 107 MMOL/L (ref 98–107)
CHLORIDE SERPL-SCNC: 113 MMOL/L (ref 98–107)
CO2 SERPL-SCNC: 21 MMOL/L (ref 22–29)
CO2 SERPL-SCNC: 22 MMOL/L (ref 22–29)
CREAT SERPL-MCNC: 0.7 MG/DL (ref 0.5–1)
CREAT SERPL-MCNC: 0.9 MG/DL (ref 0.5–1)
ERYTHROCYTE [DISTWIDTH] IN BLOOD BY AUTOMATED COUNT: 15.9 % (ref 11.5–15)
GFR, ESTIMATED: 66 ML/MIN/1.73M2
GFR, ESTIMATED: 84 ML/MIN/1.73M2
GLUCOSE SERPL-MCNC: 250 MG/DL (ref 74–99)
GLUCOSE SERPL-MCNC: 253 MG/DL (ref 74–99)
HCT VFR BLD AUTO: 29.3 % (ref 34–48)
HGB BLD-MCNC: 9.2 G/DL (ref 11.5–15.5)
MAGNESIUM SERPL-MCNC: 1.9 MG/DL (ref 1.6–2.6)
MCH RBC QN AUTO: 29.6 PG (ref 26–35)
MCHC RBC AUTO-ENTMCNC: 31.4 G/DL (ref 32–34.5)
MCV RBC AUTO: 94.2 FL (ref 80–99.9)
PHOSPHATE SERPL-MCNC: 2.4 MG/DL (ref 2.5–4.5)
PLATELET # BLD AUTO: 460 K/UL (ref 130–450)
PMV BLD AUTO: 11.7 FL (ref 7–12)
POTASSIUM SERPL-SCNC: 3.7 MMOL/L (ref 3.5–5)
POTASSIUM SERPL-SCNC: 3.7 MMOL/L (ref 3.5–5)
PROT SERPL-MCNC: 6.3 G/DL (ref 6.4–8.3)
RBC # BLD AUTO: 3.11 M/UL (ref 3.5–5.5)
SODIUM SERPL-SCNC: 144 MMOL/L (ref 132–146)
SODIUM SERPL-SCNC: 150 MMOL/L (ref 132–146)
WBC OTHER # BLD: 12.2 K/UL (ref 4.5–11.5)

## 2025-03-06 PROCEDURE — 83735 ASSAY OF MAGNESIUM: CPT

## 2025-03-06 PROCEDURE — 99232 SBSQ HOSP IP/OBS MODERATE 35: CPT | Performed by: PHYSICIAN ASSISTANT

## 2025-03-06 PROCEDURE — 2140000000 HC CCU INTERMEDIATE R&B

## 2025-03-06 PROCEDURE — 80053 COMPREHEN METABOLIC PANEL: CPT

## 2025-03-06 PROCEDURE — 36415 COLL VENOUS BLD VENIPUNCTURE: CPT

## 2025-03-06 PROCEDURE — 2500000003 HC RX 250 WO HCPCS: Performed by: INTERNAL MEDICINE

## 2025-03-06 PROCEDURE — 2580000003 HC RX 258: Performed by: STUDENT IN AN ORGANIZED HEALTH CARE EDUCATION/TRAINING PROGRAM

## 2025-03-06 PROCEDURE — 2500000003 HC RX 250 WO HCPCS

## 2025-03-06 PROCEDURE — 85027 COMPLETE CBC AUTOMATED: CPT

## 2025-03-06 PROCEDURE — 6370000000 HC RX 637 (ALT 250 FOR IP): Performed by: INTERNAL MEDICINE

## 2025-03-06 PROCEDURE — 84100 ASSAY OF PHOSPHORUS: CPT

## 2025-03-06 PROCEDURE — 6360000002 HC RX W HCPCS: Performed by: STUDENT IN AN ORGANIZED HEALTH CARE EDUCATION/TRAINING PROGRAM

## 2025-03-06 PROCEDURE — 94640 AIRWAY INHALATION TREATMENT: CPT

## 2025-03-06 PROCEDURE — 80048 BASIC METABOLIC PNL TOTAL CA: CPT

## 2025-03-06 PROCEDURE — 2700000000 HC OXYGEN THERAPY PER DAY

## 2025-03-06 PROCEDURE — 2580000003 HC RX 258

## 2025-03-06 PROCEDURE — 6360000002 HC RX W HCPCS: Performed by: INTERNAL MEDICINE

## 2025-03-06 PROCEDURE — 6360000002 HC RX W HCPCS

## 2025-03-06 PROCEDURE — 74018 RADEX ABDOMEN 1 VIEW: CPT

## 2025-03-06 PROCEDURE — 71045 X-RAY EXAM CHEST 1 VIEW: CPT

## 2025-03-06 RX ADMIN — IPRATROPIUM BROMIDE AND ALBUTEROL SULFATE 1 DOSE: 2.5; .5 SOLUTION RESPIRATORY (INHALATION) at 20:15

## 2025-03-06 RX ADMIN — ENOXAPARIN SODIUM 30 MG: 100 INJECTION SUBCUTANEOUS at 10:28

## 2025-03-06 RX ADMIN — SODIUM CHLORIDE, PRESERVATIVE FREE 10 ML: 5 INJECTION INTRAVENOUS at 22:38

## 2025-03-06 RX ADMIN — BUDESONIDE 1000 MCG: 0.5 INHALANT RESPIRATORY (INHALATION) at 08:51

## 2025-03-06 RX ADMIN — SODIUM CHLORIDE, PRESERVATIVE FREE 40 MG: 5 INJECTION INTRAVENOUS at 22:38

## 2025-03-06 RX ADMIN — IPRATROPIUM BROMIDE AND ALBUTEROL SULFATE 1 DOSE: 2.5; .5 SOLUTION RESPIRATORY (INHALATION) at 16:24

## 2025-03-06 RX ADMIN — DEXTROSE MONOHYDRATE: 50 INJECTION, SOLUTION INTRAVENOUS at 03:06

## 2025-03-06 RX ADMIN — LORAZEPAM 0.5 MG: 2 INJECTION INTRAMUSCULAR; INTRAVENOUS at 22:38

## 2025-03-06 RX ADMIN — SODIUM CHLORIDE, PRESERVATIVE FREE 40 MG: 5 INJECTION INTRAVENOUS at 10:29

## 2025-03-06 RX ADMIN — PIPERACILLIN AND TAZOBACTAM 4500 MG: 4; .5 INJECTION, POWDER, FOR SOLUTION INTRAVENOUS at 06:27

## 2025-03-06 RX ADMIN — WATER 40 MG: 1 INJECTION INTRAMUSCULAR; INTRAVENOUS; SUBCUTANEOUS at 10:29

## 2025-03-06 RX ADMIN — PIPERACILLIN AND TAZOBACTAM 4500 MG: 4; .5 INJECTION, POWDER, FOR SOLUTION INTRAVENOUS at 15:27

## 2025-03-06 RX ADMIN — ARFORMOTEROL TARTRATE 15 MCG: 15 SOLUTION RESPIRATORY (INHALATION) at 08:50

## 2025-03-06 RX ADMIN — PIPERACILLIN AND TAZOBACTAM 4500 MG: 4; .5 INJECTION, POWDER, FOR SOLUTION INTRAVENOUS at 22:42

## 2025-03-06 RX ADMIN — POTASSIUM PHOSPHATE, MONOBASIC AND POTASSIUM PHOSPHATE, DIBASIC 10 MMOL: 224; 236 INJECTION, SOLUTION, CONCENTRATE INTRAVENOUS at 12:04

## 2025-03-06 RX ADMIN — DEXTROSE MONOHYDRATE: 50 INJECTION, SOLUTION INTRAVENOUS at 13:22

## 2025-03-06 RX ADMIN — IPRATROPIUM BROMIDE AND ALBUTEROL SULFATE 1 DOSE: 2.5; .5 SOLUTION RESPIRATORY (INHALATION) at 12:43

## 2025-03-06 RX ADMIN — ARFORMOTEROL TARTRATE 15 MCG: 15 SOLUTION RESPIRATORY (INHALATION) at 20:15

## 2025-03-06 RX ADMIN — IPRATROPIUM BROMIDE AND ALBUTEROL SULFATE 1 DOSE: 2.5; .5 SOLUTION RESPIRATORY (INHALATION) at 08:50

## 2025-03-06 RX ADMIN — BUDESONIDE 1000 MCG: 0.5 INHALANT RESPIRATORY (INHALATION) at 20:15

## 2025-03-06 RX ADMIN — LORAZEPAM 0.5 MG: 2 INJECTION INTRAMUSCULAR; INTRAVENOUS at 15:33

## 2025-03-06 ASSESSMENT — PAIN SCALES - GENERAL: PAINLEVEL_OUTOF10: 0

## 2025-03-06 NOTE — PLAN OF CARE
Problem: Discharge Planning  Goal: Discharge to home or other facility with appropriate resources  Outcome: Progressing     Problem: Pain  Goal: Verbalizes/displays adequate comfort level or baseline comfort level  Outcome: Progressing     Problem: Safety - Adult  Goal: Free from fall injury  Outcome: Progressing     Problem: ABCDS Injury Assessment  Goal: Absence of physical injury  Outcome: Progressing     Problem: Skin/Tissue Integrity  Goal: Skin integrity remains intact  Description: 1.  Monitor for areas of redness and/or skin breakdown  2.  Assess vascular access sites hourly  3.  Every 4-6 hours minimum:  Change oxygen saturation probe site  4.  Every 4-6 hours:  If on nasal continuous positive airway pressure, respiratory therapy assess nares and determine need for appliance change or resting period  Outcome: Progressing     Problem: Nutrition Deficit:  Goal: Optimize nutritional status  Outcome: Progressing     Problem: Neurosensory - Adult  Goal: Achieves maximal functionality and self care  Outcome: Progressing     Problem: Musculoskeletal - Adult  Goal: Maintain proper alignment of affected body part  Outcome: Progressing     Problem: Infection - Adult  Goal: Absence of infection at discharge  Outcome: Progressing     Problem: Metabolic/Fluid and Electrolytes - Adult  Goal: Electrolytes maintained within normal limits  Outcome: Progressing  Goal: Glucose maintained within prescribed range  Outcome: Progressing  Goal: Hemodynamic stability and optimal renal function maintained  Outcome: Progressing     Problem: Respiratory - Adult  Goal: Achieves optimal ventilation and oxygenation  Outcome: Progressing     Problem: Cardiovascular - Adult  Goal: Absence of cardiac dysrhythmias or at baseline  Outcome: Progressing     Problem: Skin/Tissue Integrity - Adult  Goal: Skin integrity remains intact  Description: 1.  Monitor for areas of redness and/or skin breakdown  2.  Assess vascular access sites

## 2025-03-06 NOTE — CARE COORDINATION
Care Coordination- Influenza A  The patient was admitted  from home with worsening back pain. This am the patient is on 6 liters n/c currently.  The patient is to be npo after mn for egd tomorrow and possible Mrcp. The patient is on Iv Solumedrol 40 mg iv q24 hrs. Her plan is to go to [facility]. Will need a precert. Pt [facility] 7/24, ot [facility] 9/24. Per Dr Ross continue iv zosyn 4500 q8 hrs. Have asked Hum[facility] to start the precert today 3/6/25.

## 2025-03-06 NOTE — PROGRESS NOTES
[facility]  [facility]  Progress Note      Chief Complaint   Patient presents with    Abdominal Pain     Patient from home called for 10/10 epigastric pain reproducible with palpation. EMS gave 50 mcg of fentanyl and 4mg of zofran PTA. Denies pain currently       SUBJECTIVE:    Patient is tolerating medications. No reported adverse drug reactions.  No nausea, diarrhea.  Fever has improved. Sitting up in bed. No complaints or concerns. Looks better today  Review of systems:  As stated above in the chief complaint, otherwise negative.    Medications:  Scheduled Meds:   potassium phosphate IVPB (PERIPHERAL LINE)  10 mmol IntraVENous Once    pantoprazole (PROTONIX) 40 mg in sodium chloride (PF) 0.9 % 10 mL injection  40 mg IntraVENous Q12H    methylPREDNISolone  40 mg IntraVENous Daily    sodium bicarbonate  650 mg Oral BID    guaiFENesin-dextromethorphan  10 mL Oral Q6H    sodium chloride  1,000 mL IntraVENous Once    piperacillin-tazobactam  4,500 mg IntraVENous Q8H    sodium chloride  1,000 mL IntraVENous Once    ipratropium 0.5 mg-albuterol 2.5 mg  1 Dose Inhalation Q4H WA RT    enoxaparin  30 mg SubCUTAneous Daily    lidocaine  5 mL IntraDERmal Once    atorvastatin  10 mg Oral Nightly    escitalopram  10 mg Oral Nightly    budesonide  1 mg Nebulization BID RT    And    arformoterol tartrate  15 mcg Nebulization BID RT    montelukast  10 mg Oral Daily    [Held by provider] pantoprazole  40 mg Oral Daily    pregabalin  50 mg Oral BID    sodium chloride flush  5-40 mL IntraVENous 2 times per day     Continuous Infusions:   dextrose 100 mL/hr at 03/06/25 0306    sodium chloride       PRN Meds:LORazepam, LORazepam, sodium chloride flush, sodium chloride, potassium chloride **OR** potassium alternative oral replacement **OR** potassium chloride, magnesium sulfate, polyethylene glycol, acetaminophen **OR** acetaminophen, HYDROcodone 5 mg - acetaminophen, ondansetron    OBJECTIVE:  /81   CFU/mL  Mixed gram positive organisms    Final   03/09/2023 >100,000 CFU/ml  Final   02/20/2023 >100,000 CFU/ml  Final     MRSA Culture Only   Date Value Ref Range Status   03/10/2023 Methicillin resistant Staph aureus not isolated  Final   02/21/2023 Methicillin resistant Staph aureus not isolated  Final   02/01/2023 Methicillin resistant Staph aureus not isolated  Final   Check urine for strep and legionella-pending  Mrsa nares-pending  Blood cultures 2/24 neg final    Assessment:  Influenza A infection  Fever-improved  L2 compression fracture  Low attenuating lesion in the pancreatic body or tail  Urinary retention  KATELYN  Acute resp failure  Rule out aspiration  Leukocytosis-increased  Esophagogram consistent with tracheal aspiration     Plan:    continue zosyn  day 4/7  Completed  Tamiflu  Urology following-? Bladder lesion vs debris per renal ultrasound  Seen by oncology for pancreatic lesion-to be worked up outpatient  Check swallow eval  Made cough med around the clock for now  Pulmonary following  CTA chest is consistent with bilateral lower lobe atelectatic changes  Check ct abdomen and pelvis-not done yet  Procal .53  blood cx 3/4 neg thus far  Nephrology following-diuresis  ID will continue to follow    STEVE Ríos - CNP  8:57 AM  3/6/2025

## 2025-03-06 NOTE — PROGRESS NOTES
Hospital Medicine    Subjective:  pt lethargic but responsive      Current Facility-Administered Medications:     pantoprazole (PROTONIX) 40 mg in sodium chloride (PF) 0.9 % 10 mL injection, 40 mg, IntraVENous, Q12H, James Spivey MD, 40 mg at 03/05/25 2241    methylPREDNISolone sodium succ (SOLU-MEDROL) 40 mg in sterile water 1 mL injection, 40 mg, IntraVENous, Daily, Alysa Kidd APRN - CNP    dextrose 5 % solution, , IntraVENous, Continuous, Ines Gauthier APRN - CNP, Last Rate: 100 mL/hr at 03/06/25 0306, New Bag at 03/06/25 0306    sodium bicarbonate tablet 650 mg, 650 mg, Oral, BID, Ines Gauthier APRN - CNP    LORazepam (ATIVAN) injection 0.5 mg, 0.5 mg, IntraVENous, Q6H PRN, Bryan Rivers DO, 0.5 mg at 03/05/25 1206    guaiFENesin-dextromethorphan (ROBITUSSIN DM) 100-10 MG/5ML syrup 10 mL, 10 mL, Oral, Q6H, Daniella Anderson APRN - CNP    sodium chloride 0.9 % bolus 1,000 mL, 1,000 mL, IntraVENous, Once, Jocelynn Porras MD    [COMPLETED] piperacillin-tazobactam (ZOSYN) 4,500 mg in sodium chloride 0.9 % 100 mL IVPB ([id number]), 4,500 mg, IntraVENous, Once, Stopped at 03/03/25 0010 **FOLLOWED BY** piperacillin-tazobactam (ZOSYN) 4,500 mg in sodium chloride 0.9 % 100 mL IVPB ([id number]), 4,500 mg, IntraVENous, Q8H, Jocelynn Porras MD, Last Rate: 25 mL/hr at 03/06/25 0627, 4,500 mg at 03/06/25 0627    sodium chloride 0.9 % bolus 1,000 mL, 1,000 mL, IntraVENous, Once, Natanael Martinez MD    ipratropium 0.5 mg-albuterol 2.5 mg (DUONEB) nebulizer solution 1 Dose, 1 Dose, Inhalation, Q4H WA RT, Bryan Rivers DO, 1 Dose at 03/05/25 2200    enoxaparin Sodium (LOVENOX) injection 30 mg, 30 mg, SubCUTAneous, Daily, Bryan Rivers DO, 30 mg at 03/05/25 1207    LORazepam (ATIVAN) tablet 1 mg, 1 mg, Oral, TID PRN, Bryan Rivers DO, 1 mg at 03/01/25 2143    lidocaine 1 % injection 5 mL, 5 mL, IntraDERmal, Once, Oliver Waters MD    atorvastatin (LIPITOR) tablet 10 mg, 10 mg, Oral,

## 2025-03-06 NOTE — PROGRESS NOTES
Palliative Care Department  [phone]  Palliative Care Progress Note  Provider Ines Uribe PA-C     Haritha Dominguez  [id number]  Hospital Day: 16  Date of Initial Consult: 02/23/2025  Referring Provider: Bryan Rivers DO   Palliative Medicine was consulted for assistance with: \"declining in health, sleeping 22 hrs per day, refusing to eat\"    HPI:   Haritha Dominguez is a 80 y.o. with a medical history of thoracic and lumbar compression fractures, B12 deficiency, hypertension, chronic back pain, asthma who was admitted on 2/19/2025 from home with a CHIEF COMPLAINT of abdominal pain and low back pain for 1 day.  In the ED, CT scan showing moderate to large hiatal hernia, moderate stool burden, 1.3 cm low attenuating lesion along the pancreatic body/tail, L2 compression fracture.  She was admitted for pain control and further workup.  CT lumbar spine without contrast showing new acute appearing fractures involving superior endplate of L2, stable chronic compression fracture involving L1, bilateral sacral alae fractures of uncertain chronicity, generalized osteopenia, stable alignment of lumbar spine with posterior fusion hardware at L3-S1 and multilevel laminectomy.  Oncology consulted for pancreatic lesion.  They recommended MRI.  Patient refused.  Neurosurgery consulted for L2 compression fracture, recommending brace.  During the course of her hospitalization, patient had further decline with lethargy, refusing to eat, intermittent confusion.  Palliative care consulted for further assistance    2/24: Influenza A positive  2/27: DNR CCA/DNI  3/2: RRT for respiratory failure  3/5: Esophagram showing esophageal dysmotility with aspiration into the trachea, prominent hiatal hernia  ASSESSMENT/PLAN:     Pertinent Hospital Diagnoses     Acute hypoxic respiratory failure  Influenza A    Palliative Care Encounter / Counseling Regarding Goals of Care  Please see detailed goals of care discussion as below  At this  time, Haritha Dominguez, Does Not have capacity for medical decision-making.  Capacity is time limited and situation/question specific  Outcome of goals of care meeting:   DNR/DNI  Continue current level of care  Code status DNR/DNI  Advanced Directives: no POA or living will in [facility]  Surrogate/Legal NOK:  Chantelle Stover, daughter, [phone]  Gomez Dominguez, son, [phone]  Aurora Rey, sister, [phone]      Spiritual assessment: no spiritual distress identified  Bereavement and grief: to be determined  Referrals to: none today  SUBJECTIVE:     Current medical issues leading to Palliative Medicine involvement include   Active Hospital Problems    Diagnosis Date Noted    Esophageal dysphagia [R13.19] 03/05/2025    Goals of care, counseling/discussion [Z71.89] 02/27/2025    Palliative care encounter [Z51.5] 02/27/2025    Compression fracture of L2 lumbar vertebra (HCC) [S32.020A] 02/20/2025    Pancreatic lesion [K86.9] 05/27/2017       Details of Conversation:      Patient seen at bedside.  She is on 6 L nasal cannula.  She is resting comfortably 6 L nasal cannula.  She is resting comfortably without signs of distress.  Chart reviewed and discussed with bedside RN.  Patient has not been complaining of pain.  She has had some anxiety for which she has as needed Ativan ordered.  I called her daughter Chantelle to provide an update.  We reviewed the patient's recent tests including lab work and the esophagram.  We discussed that EGD is planned for tomorrow. Solumedrol taper started by pulmonology.  Patient remains NPO.  All questions were answered in detail.  CODE STATUS DNR/DNI.  Palliative care will continue to follow for family support.    OBJECTIVE:   Prognosis: Guarded    Physical Exam:  /83   Pulse 97   Temp 97.3 °F (36.3 °C) (Temporal)   Resp 16   Ht 1.524 m (5')   Wt 64 kg (141 lb 1.5 oz)   SpO2 99%   BMI 27.56 kg/m²   Constitutional:  Elderly, thin, NAD, awake, alert  Eyes: no scleral  icterus, normal lids, no discharge  ENMT:  Normocephalic, atraumatic, mucosa moist, EOMI  Lungs:  6L high flow nasal cannula. rhonchi  Heart::  regular rate  Ext:  Moving all extremities  Skin:  Scabbed circular lesions scattered on extremities. Dry skin  Psych: non-anxious affect  Neuro: Waxing and waning confusion.  Alert and oriented.    Objective data reviewed: labs, images, records, medication use, vitals, and chart    Discussed patient and the plan of care with the other IDT members: Patient and family and  interdisciplinary team    Time/Communication  Greater than 50% of time spent, total 35 minutes in counseling and coordination of care at the bedside regarding goals of care, symptom management, diagnosis and prognosis, and see above.    Thank you for allowing Palliative Medicine to participate in the care of Haritha Dominguez.

## 2025-03-06 NOTE — PROCEDURES
PROCEDURE NOTE  Date: 3/6/2025   Name: Haritha Dominguez  YOB: 1945    Procedures  Patient will need another KUB before coming to CT, residual barium seen on last KUB

## 2025-03-06 NOTE — PROGRESS NOTES
Nathan Kurtz M.D.,[facility]  Jack Rolle D.O., TRU., [facility]  Ari Licona M.D.  Dee Albert M.D.   Wallace Decker D.O.  Bryan Traylor M.D.         Daily Pulmonary Progress Note    Patient:  Haritha Dominguez 80 y.o. female MRN: [id number]            Synopsis     We are following patient for respiratory failure    \"CC\" abdominal pain    Code status: LIMITED CODE  Intubation/Re-intubation at the time of arrest No   Defibrillation/Cardioversion No   Chest Compressions No   Resuscitative Medications No   Other DNR DNI       Subjective   HPI:  Haritha Dominguez is a 80 y.o. female we are asked to evaluate for acute respiratory failure with hypoxia.     Patient is known to our practice and follows with Dr. Wallace Decker for moderate persistent asthma and obstructive sleep apnea.  At baseline she is on room air.  She takes Trelegy daily, and albuterol nebulizer as needed.  She does have a CPAP with 7 cm of water through [place].     Haritha presented to the hospital on 2/19 with abdominal pain and lower back pain.  Workup revealed an L2 compression fraction and a 1.3 cm lesion in the pancreas.  She was seen by neurosurgery for the compression fracture which was recommended wearing a brace.  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  Haritha had tested positive for influenza A on 2/24 and was started on Tamiflu.  She has been requiring supplemental oxygen however she went into respiratory distress yesterday and an RRT was called.  She is being followed by infectious disease however Zosyn and vancomycin was started yesterday during the RRT.  A chest x-ray was done yesterday showing no acute process.     Haritha was seen today lying in bed ill-appearing on 10 L high flow nasal cannula.  She does have wheezes on exam along with rhonchi.    3/4/25: Patient was seen and examined lying in bed.  She continues to ask for water.   daily  Antibiotic management per infectious disease-Zosyn  Completed Tamiflu  Diuretic therapy management per nephrology-off diuretics  MBSS completed indicating possible esophageal motility deficit. Keep NPO  GI following, for EGD  Esophogram  reviewed -dysfunctional swallowing with recurrent aspiration into trachea  DVT/PE prophylaxis-Lovenox      This plan of care was reviewed in collaboration with Dr. Decker    Electronically signed by STEVE Mccall CNP on 3/6/2025 at 12:22 PM      I personally saw, examined, and cared for the patient. I performed the substantive portion of the visit. Labs, medications, radiographs reviewed. I agree with history exam and plans detailed in NP note.        Wallace Decker, DO

## 2025-03-07 LAB
ALBUMIN SERPL-MCNC: 2.6 G/DL (ref 3.5–5.2)
ALP SERPL-CCNC: 60 U/L (ref 35–104)
ALT SERPL-CCNC: 8 U/L (ref 0–32)
ANION GAP SERPL CALCULATED.3IONS-SCNC: 13 MMOL/L (ref 7–16)
AST SERPL-CCNC: 11 U/L (ref 0–31)
BILIRUB SERPL-MCNC: 0.3 MG/DL (ref 0–1.2)
BUN SERPL-MCNC: 21 MG/DL (ref 6–23)
CALCIUM SERPL-MCNC: 8 MG/DL (ref 8.6–10.2)
CHLORIDE SERPL-SCNC: 106 MMOL/L (ref 98–107)
CO2 SERPL-SCNC: 24 MMOL/L (ref 22–29)
CREAT SERPL-MCNC: 0.7 MG/DL (ref 0.5–1)
EKG ATRIAL RATE: 137 BPM
EKG P AXIS: 38 DEGREES
EKG P-R INTERVAL: 136 MS
EKG Q-T INTERVAL: 292 MS
EKG QRS DURATION: 76 MS
EKG QTC CALCULATION (BAZETT): 440 MS
EKG R AXIS: 5 DEGREES
EKG T AXIS: 44 DEGREES
EKG VENTRICULAR RATE: 137 BPM
ERYTHROCYTE [DISTWIDTH] IN BLOOD BY AUTOMATED COUNT: 15.5 % (ref 11.5–15)
GFR, ESTIMATED: 89 ML/MIN/1.73M2
GLUCOSE SERPL-MCNC: 263 MG/DL (ref 74–99)
HCT VFR BLD AUTO: 28 % (ref 34–48)
HGB BLD-MCNC: 8.9 G/DL (ref 11.5–15.5)
MAGNESIUM SERPL-MCNC: 1.6 MG/DL (ref 1.6–2.6)
MCH RBC QN AUTO: 29.7 PG (ref 26–35)
MCHC RBC AUTO-ENTMCNC: 31.8 G/DL (ref 32–34.5)
MCV RBC AUTO: 93.3 FL (ref 80–99.9)
PHOSPHATE SERPL-MCNC: 2.1 MG/DL (ref 2.5–4.5)
PLATELET # BLD AUTO: 410 K/UL (ref 130–450)
PMV BLD AUTO: 11.8 FL (ref 7–12)
POTASSIUM SERPL-SCNC: 3.1 MMOL/L (ref 3.5–5)
PROT SERPL-MCNC: 5.6 G/DL (ref 6.4–8.3)
RBC # BLD AUTO: 3 M/UL (ref 3.5–5.5)
SODIUM SERPL-SCNC: 143 MMOL/L (ref 132–146)
WBC OTHER # BLD: 10.2 K/UL (ref 4.5–11.5)

## 2025-03-07 PROCEDURE — 2580000003 HC RX 258

## 2025-03-07 PROCEDURE — 36415 COLL VENOUS BLD VENIPUNCTURE: CPT

## 2025-03-07 PROCEDURE — 6360000002 HC RX W HCPCS

## 2025-03-07 PROCEDURE — 83735 ASSAY OF MAGNESIUM: CPT

## 2025-03-07 PROCEDURE — 6370000000 HC RX 637 (ALT 250 FOR IP): Performed by: INTERNAL MEDICINE

## 2025-03-07 PROCEDURE — 93010 ELECTROCARDIOGRAM REPORT: CPT | Performed by: INTERNAL MEDICINE

## 2025-03-07 PROCEDURE — 2700000000 HC OXYGEN THERAPY PER DAY

## 2025-03-07 PROCEDURE — 84100 ASSAY OF PHOSPHORUS: CPT

## 2025-03-07 PROCEDURE — 94640 AIRWAY INHALATION TREATMENT: CPT

## 2025-03-07 PROCEDURE — 80053 COMPREHEN METABOLIC PANEL: CPT

## 2025-03-07 PROCEDURE — 6360000002 HC RX W HCPCS: Performed by: STUDENT IN AN ORGANIZED HEALTH CARE EDUCATION/TRAINING PROGRAM

## 2025-03-07 PROCEDURE — 85027 COMPLETE CBC AUTOMATED: CPT

## 2025-03-07 PROCEDURE — 97530 THERAPEUTIC ACTIVITIES: CPT

## 2025-03-07 PROCEDURE — 2580000003 HC RX 258: Performed by: INTERNAL MEDICINE

## 2025-03-07 PROCEDURE — 2140000000 HC CCU INTERMEDIATE R&B

## 2025-03-07 PROCEDURE — 2500000003 HC RX 250 WO HCPCS: Performed by: INTERNAL MEDICINE

## 2025-03-07 PROCEDURE — 6360000002 HC RX W HCPCS: Performed by: INTERNAL MEDICINE

## 2025-03-07 PROCEDURE — 2500000003 HC RX 250 WO HCPCS

## 2025-03-07 PROCEDURE — 99232 SBSQ HOSP IP/OBS MODERATE 35: CPT | Performed by: STUDENT IN AN ORGANIZED HEALTH CARE EDUCATION/TRAINING PROGRAM

## 2025-03-07 PROCEDURE — 99233 SBSQ HOSP IP/OBS HIGH 50: CPT | Performed by: PHYSICIAN ASSISTANT

## 2025-03-07 PROCEDURE — 6360000002 HC RX W HCPCS: Performed by: EMERGENCY MEDICINE

## 2025-03-07 PROCEDURE — 97535 SELF CARE MNGMENT TRAINING: CPT

## 2025-03-07 PROCEDURE — 2580000003 HC RX 258: Performed by: STUDENT IN AN ORGANIZED HEALTH CARE EDUCATION/TRAINING PROGRAM

## 2025-03-07 RX ORDER — POTASSIUM CHLORIDE 7.45 MG/ML
10 INJECTION INTRAVENOUS
Status: COMPLETED | OUTPATIENT
Start: 2025-03-07 | End: 2025-03-07

## 2025-03-07 RX ORDER — ENOXAPARIN SODIUM 100 MG/ML
40 INJECTION SUBCUTANEOUS DAILY
Status: DISCONTINUED | OUTPATIENT
Start: 2025-03-07 | End: 2025-03-14 | Stop reason: HOSPADM

## 2025-03-07 RX ORDER — GUAIFENESIN/DEXTROMETHORPHAN 100-10MG/5
10 SYRUP ORAL EVERY 6 HOURS PRN
Status: DISCONTINUED | OUTPATIENT
Start: 2025-03-07 | End: 2025-03-14 | Stop reason: HOSPADM

## 2025-03-07 RX ADMIN — SODIUM CHLORIDE, PRESERVATIVE FREE 10 ML: 5 INJECTION INTRAVENOUS at 11:02

## 2025-03-07 RX ADMIN — PIPERACILLIN AND TAZOBACTAM 4500 MG: 4; .5 INJECTION, POWDER, FOR SOLUTION INTRAVENOUS at 06:14

## 2025-03-07 RX ADMIN — POTASSIUM PHOSPHATE, MONOBASIC AND POTASSIUM PHOSPHATE, DIBASIC 30 MMOL: 224; 236 INJECTION, SOLUTION, CONCENTRATE INTRAVENOUS at 12:49

## 2025-03-07 RX ADMIN — SODIUM CHLORIDE, PRESERVATIVE FREE 40 MG: 5 INJECTION INTRAVENOUS at 11:02

## 2025-03-07 RX ADMIN — MAGNESIUM SULFATE HEPTAHYDRATE 2000 MG: 40 INJECTION, SOLUTION INTRAVENOUS at 09:21

## 2025-03-07 RX ADMIN — SODIUM CHLORIDE, PRESERVATIVE FREE 10 ML: 5 INJECTION INTRAVENOUS at 22:14

## 2025-03-07 RX ADMIN — ARFORMOTEROL TARTRATE 15 MCG: 15 SOLUTION RESPIRATORY (INHALATION) at 20:55

## 2025-03-07 RX ADMIN — DEXTROSE MONOHYDRATE: 50 INJECTION, SOLUTION INTRAVENOUS at 00:16

## 2025-03-07 RX ADMIN — LORAZEPAM 0.5 MG: 2 INJECTION INTRAMUSCULAR; INTRAVENOUS at 22:13

## 2025-03-07 RX ADMIN — DEXTROSE MONOHYDRATE: 50 INJECTION, SOLUTION INTRAVENOUS at 09:40

## 2025-03-07 RX ADMIN — WATER 40 MG: 1 INJECTION INTRAMUSCULAR; INTRAVENOUS; SUBCUTANEOUS at 09:22

## 2025-03-07 RX ADMIN — SODIUM CHLORIDE, PRESERVATIVE FREE 40 MG: 5 INJECTION INTRAVENOUS at 23:37

## 2025-03-07 RX ADMIN — BUDESONIDE 1000 MCG: 0.5 INHALANT RESPIRATORY (INHALATION) at 20:55

## 2025-03-07 RX ADMIN — IPRATROPIUM BROMIDE AND ALBUTEROL SULFATE 1 DOSE: 2.5; .5 SOLUTION RESPIRATORY (INHALATION) at 11:36

## 2025-03-07 RX ADMIN — ARFORMOTEROL TARTRATE 15 MCG: 15 SOLUTION RESPIRATORY (INHALATION) at 07:36

## 2025-03-07 RX ADMIN — POTASSIUM CHLORIDE 10 MEQ: 7.46 INJECTION, SOLUTION INTRAVENOUS at 12:47

## 2025-03-07 RX ADMIN — IPRATROPIUM BROMIDE AND ALBUTEROL SULFATE 1 DOSE: 2.5; .5 SOLUTION RESPIRATORY (INHALATION) at 17:26

## 2025-03-07 RX ADMIN — ENOXAPARIN SODIUM 40 MG: 100 INJECTION SUBCUTANEOUS at 22:13

## 2025-03-07 RX ADMIN — BUDESONIDE 1000 MCG: 0.5 INHALANT RESPIRATORY (INHALATION) at 07:36

## 2025-03-07 RX ADMIN — IPRATROPIUM BROMIDE AND ALBUTEROL SULFATE 1 DOSE: 2.5; .5 SOLUTION RESPIRATORY (INHALATION) at 20:55

## 2025-03-07 RX ADMIN — PIPERACILLIN AND TAZOBACTAM 4500 MG: 4; .5 INJECTION, POWDER, FOR SOLUTION INTRAVENOUS at 17:01

## 2025-03-07 RX ADMIN — POTASSIUM CHLORIDE 10 MEQ: 7.46 INJECTION, SOLUTION INTRAVENOUS at 13:40

## 2025-03-07 RX ADMIN — IPRATROPIUM BROMIDE AND ALBUTEROL SULFATE 1 DOSE: 2.5; .5 SOLUTION RESPIRATORY (INHALATION) at 07:36

## 2025-03-07 RX ADMIN — LORAZEPAM 0.5 MG: 2 INJECTION INTRAMUSCULAR; INTRAVENOUS at 15:28

## 2025-03-07 RX ADMIN — ONDANSETRON 4 MG: 2 INJECTION, SOLUTION INTRAMUSCULAR; INTRAVENOUS at 09:26

## 2025-03-07 ASSESSMENT — PAIN SCALES - GENERAL
PAINLEVEL_OUTOF10: 0
PAINLEVEL_OUTOF10: 0

## 2025-03-07 ASSESSMENT — PAIN SCALES - WONG BAKER: WONGBAKER_NUMERICALRESPONSE: NO HURT

## 2025-03-07 NOTE — PROGRESS NOTES
Palliative Care Department  [phone]  Palliative Care Progress Note  Provider Ines Uribe PA-C     Haritha Dominguez  [id number]  Hospital Day: 17  Date of Initial Consult: 02/23/2025  Referring Provider: Bryan Rivers DO   Palliative Medicine was consulted for assistance with: \"declining in health, sleeping 22 hrs per day, refusing to eat\"    HPI:   Haritha Dominguez is a 80 y.o. with a medical history of thoracic and lumbar compression fractures, B12 deficiency, hypertension, chronic back pain, asthma who was admitted on 2/19/2025 from home with a CHIEF COMPLAINT of abdominal pain and low back pain for 1 day.  In the ED, CT scan showing moderate to large hiatal hernia, moderate stool burden, 1.3 cm low attenuating lesion along the pancreatic body/tail, L2 compression fracture.  She was admitted for pain control and further workup.  CT lumbar spine without contrast showing new acute appearing fractures involving superior endplate of L2, stable chronic compression fracture involving L1, bilateral sacral alae fractures of uncertain chronicity, generalized osteopenia, stable alignment of lumbar spine with posterior fusion hardware at L3-S1 and multilevel laminectomy.  Oncology consulted for pancreatic lesion.  They recommended MRI.  Patient refused.  Neurosurgery consulted for L2 compression fracture, recommending brace.  During the course of her hospitalization, patient had further decline with lethargy, refusing to eat, intermittent confusion.  Palliative care consulted for further assistance    2/24: Influenza A positive  2/27: DNR CCA/DNI  3/2: RRT for respiratory failure  3/5: Esophagram showing esophageal dysmotility with aspiration into the trachea, prominent hiatal hernia  ASSESSMENT/PLAN:     Pertinent Hospital Diagnoses     Acute hypoxic respiratory failure  Influenza A    Palliative Care Encounter / Counseling Regarding Goals of Care  Please see detailed goals of care discussion as below  At this

## 2025-03-07 NOTE — CONSULTS
Comprehensive Nutrition Assessment    Type and Reason for Visit:  Reassess, Consult, Wound    Nutrition Recommendations/Plan:   Continue NPO  Will provide nutrition recommendations as appropriate w/ POC/nutrition progression, pt w/ ongoing NPO status- awaiting EGD today 3/7. If pt unable to consume nutrition orally, may need to consider nutrition support to meet nutritional needs.  Will continue to monitor.     Malnutrition Assessment:  Malnutrition Status:  At risk for malnutrition (02/26/25 1525)    Context:  Acute Illness     Findings of the 6 clinical characteristics of malnutrition:  Energy Intake:  50% or less of estimated energy requirements for 5 or more days  Weight Loss:  Unable to assess (d/t lack of wt hx per EMR)     Body Fat Loss:  Unable to assess (pt in iso)     Muscle Mass Loss:  Unable to assess (pt in iso)    Fluid Accumulation:  No fluid accumulation     Strength:  Not Performed    Nutrition Assessment:    pt adm d/t abd pain/compression frx; PMhx of HTN, Pancreatic cyst, GERD,claustrophobia; pt refused MRI; pt w/ poor appetite/oral intake this adm and lethargy; influenza noted- pt remains in isolation, high temps noted this adm; refusing IVF this adm; s/p RRT 3/2 r/t resp failure; possible difficulty swallowing noted w/ emesis this adm, pt s/p SLP eval 3/3 who recommend NPO until MBSS; s/p MBSS/SLP eval 3/4, recommend NPO until cleared by physician to advance & if oral diet initiated, SLP rec pureed solids/nectar thick liquids ; s/p esophagram, dysfunctional swallowing w/ recurrent aspiration into trachea noted; hiatal hernia/esophageal dismotility deficit noted; pancreatic tail lesion noted; pt NPO awaiting EGD today 3/7, if pt unable to meet nutritional needs orally, may need to consider nutrition support to meet nutritional needs; will continue to monitor & provide nutrition recs as appropriate w/ POC/diet advancement.    Nutrition Related Findings:    hypokalemia; hyperglycemia; A&OX4;

## 2025-03-07 NOTE — CARE COORDINATION
3/7:  Update CM Note:  Pt presented to the ER for worsening back pain from home.  Pt is on 5L/NC at 98%, Iv Fluids, Iv Solu-medrol, Iv Protonix, Iv Ativan PRN,Iv Zosyn til 3/7 & SQ Lovenox.  Pt is in ISO-Droplet for Influenza.  GI consulted.  Pt is for EGD today.  Will also need KUB done for residual barium.  Pt's dc plan is to [facility].  SW started pre-cert on 3/6 & obtained pre-cert good unitl 3/11 at 1150pm.  [place],PASRR & Ambulance completed placed in envelope in soft chart.  Pt was placed on will call with PAS.  Hui/NUNO will continue to follow.  Electronically signed by Malika Chatterjee RN on 3/7/2025 at 2:45 PM

## 2025-03-07 NOTE — PROGRESS NOTES
DVT Prophylaxis Adjustment Policy (DVT Prophylaxis)     This patient is on DVT Prophylaxis medication that requires a dose adjustment      Date Body Weight IBW  Adjusted BW SCr  CrCl Dialysis status   3/7/2025 64 kg (141 lb 1.5 oz) Ideal body weight: 45.5 kg (100 lb 4.9 oz)  Adjusted ideal body weight: 52.9 kg (116 lb 10 oz) Serum creatinine: 0.7 mg/dL 03/06/25 1612  Estimated creatinine clearance: 55 mL/min N/a       Pharmacy has dose-adjusted the DVT Prophylaxis regimen to match   the recommendations from the following table        Ordered Medication:Lovenox 30mg daily    Order Changed/converted to: Lovenox 40mg daily      These changes were made per protocol according to the [facility] Pharmacist   Review for Appropriate Use and Automatic Dose Adjustments of   Subcutaneous Anticoagulants Policy     *Please note this dose may need readjusted if patient's condition changes.    Please contact pharmacy with any questions regarding these changes.    Rosendo Aggarwal RPH  3/7/2025  8:06 AM

## 2025-03-07 NOTE — PROGRESS NOTES
Occupational Therapy  OT BEDSIDE TREATMENT NOTE   [facility]  [address] [place], [place]      Date:3/7/2025  Patient Name: Haritha Dominguez  MRN: [id number]  : 1945  Room: [address] [place] [address]     Evaluating OT: Matt Soriano OTR/L; [id number]        Referring Provider: Bryan Rivers DO     Specific Provider Orders/Date: OT Eval and Treat 25 1300        Diagnosis: Pt c/o LBP worsening ~1 day PTA. CT scan ID'd L2 compression fx.     Surgery: None this admission.     Pertinent Medical History:  has a past medical history of Abrasion of skin of left lower leg, Abrasion of skin of right lower leg, Anxiety, Arthritis, Asthma, Claustrophobia, Decreased dorsalis pedis pulse, Dermatitis, Fracture, GERD (gastroesophageal reflux disease), Hiatal hernia, History of blood transfusion, HTN (hypertension), Hyperlipidemia, Itching, On aspirin at home, Pancreatic cyst, Pneumonia, Sleep apnea, SOB (shortness of breath), and Tachycardia.      Recommended Adaptive Equipment: TBD      Precautions:  Fall Risk, spinal precautions, soft TLSO (\"Wear TLSO when greater than 45 degrees\" Per NS note 25), +alarms, limited insight into deficits, monitor BP (hypotensive), flexed posture      Assessment of current deficits    [x] Functional mobility            [x]ADLs           [x] Strength                  [x]Cognition    [x] Functional transfers          [x] IADLs         [x] Safety Awareness   [x]Endurance    [x] Fine Coordination                         [x] Balance      [] Vision/perception   []Sensation      []Gross Motor Coordination             [x] ROM           [] Delirium                   [] Motor Control      OT PLAN OF CARE   OT POC based on physician orders, patient diagnosis and results of clinical assessment     Frequency/Duration 1-3 days/wk for 2 weeks PRN   Specific OT Treatment Interventions to include:   * Instruction/training on adapted ADL techniques  with current plan of care    Treatment Time In:0931          Treatment Time Out: 1009            Treatment Charges: Mins Units   Ther Ex  50631     Manual Therapy 80487     Thera Activities 17263 23 2   ADL/Home Mgt 47076 15 1   Neuro Re-ed 10926     Group Therapy      Orthotic manage/training  61450     Non-Billable Time     Total Timed Treatment 38 3     [facility] HOYT/L [id number]

## 2025-03-07 NOTE — PLAN OF CARE
Problem: Discharge Planning  Goal: Discharge to home or other facility with appropriate resources  3/7/2025 0736 by Elsa Alfaro, RN  Outcome: Progressing     Problem: Pain  Goal: Verbalizes/displays adequate comfort level or baseline comfort level  3/7/2025 0736 by Elsa Alfaro, RN  Outcome: Progressing     Problem: Safety - Adult  Goal: Free from fall injury  3/7/2025 0736 by Elsa Alfaro, RN  Outcome: Progressing     Problem: ABCDS Injury Assessment  Goal: Absence of physical injury  3/7/2025 0736 by Elsa Alfaro, RN  Outcome: Progressing     Problem: Skin/Tissue Integrity  Goal: Skin integrity remains intact  Description: 1.  Monitor for areas of redness and/or skin breakdown  2.  Assess vascular access sites hourly  3.  Every 4-6 hours minimum:  Change oxygen saturation probe site  4.  Every 4-6 hours:  If on nasal continuous positive airway pressure, respiratory therapy assess nares and determine need for appliance change or resting period  3/7/2025 0736 by Elsa Alfaro, RN  Outcome: Progressing     Problem: Nutrition Deficit:  Goal: Optimize nutritional status  3/7/2025 0736 by Elsa Alfaro, RN  Outcome: Progressing

## 2025-03-07 NOTE — PROGRESS NOTES
Call placed to Endo to see time for procedure. States they are behind and will call back to let me know.

## 2025-03-07 NOTE — PROGRESS NOTES
[facility]  [facility]  Progress Note      Chief Complaint   Patient presents with    Abdominal Pain     Patient from home called for 10/10 epigastric pain reproducible with palpation. EMS gave 50 mcg of fentanyl and 4mg of zofran PTA. Denies pain currently       SUBJECTIVE:    Patient is tolerating medications. No reported adverse drug reactions.  No nausea, diarrhea. . Sitting up in bed. No complaints or concerns. For EGD today  .Review of systems:  As stated above in the chief complaint, otherwise negative.    Medications:  Scheduled Meds:   enoxaparin  40 mg SubCUTAneous Daily    potassium phosphate IVPB (PERIPHERAL LINE)  30 mmol IntraVENous Once    potassium chloride  10 mEq IntraVENous Q1H    pantoprazole (PROTONIX) 40 mg in sodium chloride (PF) 0.9 % 10 mL injection  40 mg IntraVENous Q12H    methylPREDNISolone  40 mg IntraVENous Daily    sodium bicarbonate  650 mg Oral BID    guaiFENesin-dextromethorphan  10 mL Oral Q6H    sodium chloride  1,000 mL IntraVENous Once    piperacillin-tazobactam  4,500 mg IntraVENous Q8H    sodium chloride  1,000 mL IntraVENous Once    ipratropium 0.5 mg-albuterol 2.5 mg  1 Dose Inhalation Q4H WA RT    lidocaine  5 mL IntraDERmal Once    atorvastatin  10 mg Oral Nightly    escitalopram  10 mg Oral Nightly    budesonide  1 mg Nebulization BID RT    And    arformoterol tartrate  15 mcg Nebulization BID RT    montelukast  10 mg Oral Daily    [Held by provider] pantoprazole  40 mg Oral Daily    pregabalin  50 mg Oral BID    sodium chloride flush  5-40 mL IntraVENous 2 times per day     Continuous Infusions:   dextrose 100 mL/hr at 03/07/25 0940    sodium chloride       PRN Meds:LORazepam, LORazepam, sodium chloride flush, sodium chloride, potassium chloride **OR** potassium alternative oral replacement **OR** potassium chloride, magnesium sulfate, polyethylene glycol, acetaminophen **OR** acetaminophen, HYDROcodone 5 mg - acetaminophen,  ondansetron    OBJECTIVE:  /87   Pulse 60   Temp 97.8 °F (36.6 °C) (Oral)   Resp 15   Ht 1.524 m (5')   Wt 64 kg (141 lb 1.5 oz)   SpO2 95%   BMI 27.56 kg/m²   Temp  Av.4 °F (36.3 °C)  Min: 97 °F (36.1 °C)  Max: 97.8 °F (36.6 °C)  Constitutional: The patient is awake, alert, and oriented.   Skin: Warm and dry. No rashes were noted. No jaundice.  HEENT:  Moist mucous membranes, no ulcerations, no thrush.   Neck: Supple to movements. No lymphadenopathy.   Chest: No use of accessory muscles to breathe. Symmetrical expansion. Auscultation reveals wheezing, no crackles, + coarse on the right  Cardiovascular: S1 and S2 are rhythmic and regular. No murmurs appreciated.   Abdomen: Positive bowel sounds to auscultation. Benign to palpation. No masses felt. No hepatosplenomegaly.  Genitourinary: No pain in the lower abdomen  Extremities: No clubbing, no cyanosis, no edema.  Musculoskeletal: No pain in range of motion of any joints  Neurological: Following commands, no focal neurodeficit  Lines: peripheral    Laboratory and Tests Review:  Lab Results   Component Value Date    WBC 10.2 2025    WBC 12.2 (H) 2025    WBC 19.5 (H) 2025    HGB 8.9 (L) 2025    HCT 28.0 (L) 2025    MCV 93.3 2025     2025     Lab Results   Component Value Date    NEUTROABS 7.51 (H) 2025    NEUTROABS 7.49 (H) 2025    NEUTROABS 8.50 (H) 2025     No results found for: \"CRPHS\"  Lab Results   Component Value Date    ALT 8 2025    AST 11 2025    ALKPHOS 60 2025    BILITOT 0.3 2025     Lab Results   Component Value Date/Time     2025 06:35 AM    K 3.1 2025 06:35 AM    K 4.6 2023 11:30 AM     2025 06:35 AM    CO2 24 2025 06:35 AM    BUN 21 2025 06:35 AM    CREATININE 0.7 2025 06:35 AM    CREATININE 0.7 2025 04:12 PM    CREATININE 0.9 2025 04:58 AM    GFRAA >60 2019 04:00 AM

## 2025-03-07 NOTE — PROGRESS NOTES
[facility]    Subjective:  pt alert conversive currently npo sched for egd today      Current Facility-Administered Medications:     pantoprazole (PROTONIX) 40 mg in sodium chloride (PF) 0.9 % 10 mL injection, 40 mg, IntraVENous, Q12H, James Spivey MD, 40 mg at 03/06/25 2238    methylPREDNISolone sodium succ (SOLU-MEDROL) 40 mg in sterile water 1 mL injection, 40 mg, IntraVENous, Daily, Alysa Kidd APRN - CNP, 40 mg at 03/06/25 1029    dextrose 5 % solution, , IntraVENous, Continuous, Ines Gauthier APRN - CNP, Last Rate: 100 mL/hr at 03/07/25 0016, New Bag at 03/07/25 0016    sodium bicarbonate tablet 650 mg, 650 mg, Oral, BID, Ines Gauthier APRN - CNP    LORazepam (ATIVAN) injection 0.5 mg, 0.5 mg, IntraVENous, Q6H PRN, Bryan Rivers DO, 0.5 mg at 03/06/25 2238    guaiFENesin-dextromethorphan (ROBITUSSIN DM) 100-10 MG/5ML syrup 10 mL, 10 mL, Oral, Q6H, Daniella Anderson APRN - CNP    sodium chloride 0.9 % bolus 1,000 mL, 1,000 mL, IntraVENous, Once, Jocelynn Porras MD    [COMPLETED] piperacillin-tazobactam (ZOSYN) 4,500 mg in sodium chloride 0.9 % 100 mL IVPB ([id number]), 4,500 mg, IntraVENous, Once, Stopped at 03/03/25 0010 **FOLLOWED BY** piperacillin-tazobactam (ZOSYN) 4,500 mg in sodium chloride 0.9 % 100 mL IVPB ([id number]), 4,500 mg, IntraVENous, Q8H, Jocelynn Porras MD, Last Rate: 25 mL/hr at 03/07/25 0614, 4,500 mg at 03/07/25 0614    sodium chloride 0.9 % bolus 1,000 mL, 1,000 mL, IntraVENous, Once, Natanael Martinez MD    ipratropium 0.5 mg-albuterol 2.5 mg (DUONEB) nebulizer solution 1 Dose, 1 Dose, Inhalation, Q4H WA RT, Bryan Rivers, , 1 Dose at 03/06/25 2015    enoxaparin Sodium (LOVENOX) injection 30 mg, 30 mg, SubCUTAneous, Daily, Bryan Rivers, , 30 mg at 03/06/25 1028    LORazepam (ATIVAN) tablet 1 mg, 1 mg, Oral, TID PRN, Bryan Rivers, , 1 mg at 03/01/25 2143    lidocaine 1 % injection 5 mL, 5 mL, IntraDERmal, Once, Oliver Waters MD     03/06/2025 04:58 AM     Warfarin PT/INR:    Lab Results   Component Value Date    INR 0.9 05/24/2023    INR 1.1 02/13/2023    INR 1.2 05/28/2017    PROTIME 10.2 05/24/2023    PROTIME 11.8 02/13/2023    PROTIME 13.2 (H) 05/28/2017       Assessment:    Principal Problem:    Compression fracture of L2 lumbar vertebra (HCC)  Active Problems:    Pancreatic lesion    Goals of care, counseling/discussion    Palliative care encounter    Esophageal dysphagia  Resolved Problems:    * No resolved hospital problems. *      Plan:  Egd today / await am lab / eventual swallow reeval once egd completed / spoke with pts daughter cindy in detail re case last evening        Bryan Rivers,   7:32 AM  3/7/2025

## 2025-03-07 NOTE — PROGRESS NOTES
Nathan Kurtz M.D.,[facility]  Jack Rolle D.O., TRU., [facility]  Ari Licona M.D.  Dee Albert M.D.   Wallace Decker D.O.  Bryan Traylor M.D.         Daily Pulmonary Progress Note    Patient:  Haritha Dominguez 80 y.o. female MRN: [id number]            Synopsis     We are following patient for respiratory failure    \"CC\" abdominal pain    Code status: LIMITED CODE  Intubation/Re-intubation at the time of arrest No   Defibrillation/Cardioversion No   Chest Compressions No   Resuscitative Medications No   Other DNR DNI       Subjective   HPI:  Haritha Dominguez is a 80 y.o. female we are asked to evaluate for acute respiratory failure with hypoxia.     Patient is known to our practice and follows with Dr. Wallace Decker for moderate persistent asthma and obstructive sleep apnea.  At baseline she is on room air.  She takes Trelegy daily, and albuterol nebulizer as needed.  She does have a CPAP with 7 cm of water through [place].     Haritha presented to the hospital on 2/19 with abdominal pain and lower back pain.  Workup revealed an L2 compression fraction and a 1.3 cm lesion in the pancreas.  She was seen by neurosurgery for the compression fracture which was recommended wearing a brace.  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  Haritha had tested positive for influenza A on 2/24 and was started on Tamiflu.  She has been requiring supplemental oxygen however she went into respiratory distress yesterday and an RRT was called.  She is being followed by infectious disease however Zosyn and vancomycin was started yesterday during the RRT.  A chest x-ray was done yesterday showing no acute process.     Haritha was seen today lying in bed ill-appearing on 10 L high flow nasal cannula.  She does have wheezes on exam along with rhonchi.    3/4/25: Patient was seen and examined lying in bed.  She continues to ask for water.   mucoid  impaction.      CXR 3/2/2025:  FINDINGS:  The lungs are without acute focal process.  There is no effusion or  pneumothorax. The cardiomediastinal silhouette is without acute process. The  osseous structures are without acute process.     IMPRESSION:  No acute process.       Echo 3/20/2023:  Summary   Ejection fraction is visually estimated at 65-70%.   No regional wall motion abnormalities seen.   Normal right ventricle structure and function.   Mild mitral regurgitation is present.   Physiologic and/or trace tricuspid regurgitation.   RVSP is 28 mmHg.   No evidence for hemodynamically significant pericardial effusion.    MBSS 3/4/25:  RESULTS:                    DYSPHAGIA DIAGNOSIS:  Clinical indicators of mild-moderate oropharyngeal phase dysphagia for consistencies administered.      Esophageal motility deficit suspected.      DIET RECOMMENDATIONS:   NPO until cleared by physician to advance      GI consult is recommended, consider esophagram d/t emesis with intake during MBSS. RN also reported patient was vomiting after intake prior to initial NPO recommendation.     Labs:  Lab Results   Component Value Date/Time    WBC 10.2 03/07/2025 06:35 AM    RBC 3.00 03/07/2025 06:35 AM    HGB 8.9 03/07/2025 06:35 AM    HCT 28.0 03/07/2025 06:35 AM    MCV 93.3 03/07/2025 06:35 AM    MCH 29.7 03/07/2025 06:35 AM    MCHC 31.8 03/07/2025 06:35 AM    RDW 15.5 03/07/2025 06:35 AM     03/07/2025 06:35 AM    MPV 11.8 03/07/2025 06:35 AM     Lab Results   Component Value Date/Time     03/07/2025 06:35 AM    K 3.1 03/07/2025 06:35 AM    K 4.6 05/24/2023 11:30 AM     03/07/2025 06:35 AM    CO2 24 03/07/2025 06:35 AM    BUN 21 03/07/2025 06:35 AM    CREATININE 0.7 03/07/2025 06:35 AM    CALCIUM 8.0 03/07/2025 06:35 AM    GFRAA >60 01/12/2019 04:00 AM    LABGLOM 89 03/07/2025 06:35 AM    LABGLOM 70 04/18/2024 09:02 PM     Lab Results   Component Value Date/Time    PROTIME 10.2 05/24/2023 11:30 AM    PROTIME  daily-taper as symptoms improve  Continue Singulair daily  Antibiotic management per infectious disease-last dose of Zosyn today  Completed Tamiflu  Diuretic therapy management per nephrology-off diuretics  MBSS completed indicating possible esophageal motility deficit. Keep NPO  GI following, for EGD today  Esophogram  reviewed -dysfunctional swallowing with recurrent aspiration into trachea  DVT/PE prophylaxis-Lovenox      This plan of care was reviewed in collaboration with Dr. Decker    Electronically signed by STEVE Ruiz on 3/7/2025 at 10:59 AM    I personally saw, examined, and cared for the patient. I performed the substantive portion of the visit. Labs, medications, radiographs reviewed. I agree with history exam and plans detailed in NP note.        Wallace Decker, DO

## 2025-03-07 NOTE — PROGRESS NOTES
[facility]  Nephrology Progress Note    Patient's Name: Haritha Dominguez    History of Present Illness from 2/27 consult note:    \"Haritha Dominguez is a 80 y.o. female with a past medical history of hypertension, hyperlipidemia, anxiety, arthritis, and asthma.  She presented to the ED on 2/19 reportedly for concerns of abdominal pain.  Vital signs on 2/19 includes temperature 99.3, respirations 18, pulse 103, /85, and she was 92% SpO2.  Lab data on 2/19 includes CO2 20, BUN 22, creatinine 1, troponin 18, WBC 12.8 K, and hemoglobin 10.5.  Her UA on 2/20 showed specific gravity 1.025, 30 protein, trace leukocyte esterase, 1+ bacteria.  She had a CT abdomen/pelvis with IV contrast on 2/20 which showed no acute intra-abdominal pelvic process appreciated, moderate-large hiatal hernia, moderate colonic stool burden, 1.3 cm low attenuating lesion along pancreatic body/tail.  She underwent a CT lumbar spine on 2/20 which showed new acute appearing fracture seen involving superior endplate of L2, stable chronic compression fracture involving L1, bilateral sacral ala fractures of uncertain chronicity, generalized osteopenia.  She was seen by neurosurgery.  She was seen by hepatobiliary surgery regarding pancreatic lesion.  She had a chest x-ray on 2/24 which showed increased interstitial markings seen in the perihilar regions to suggest mild interstitial edema with small bilateral pleural effusions.  She was found to be influenza A positive on 2/24. She was seen by urology for concerns of urinary retention.  ID is following the patient.  Nephrology has been consulted to see the patient for concerns of KATELYN. She has a baseline serum creatinine of 0.8-1 mg/dL.  At present, patient was seen and examined.  She is awake, alert, appears in no acute distress.  She appears to be a questionable historian at this time.  She notes that her appetite is okay.  She denies any dizziness.  Visitors at  agree with above.      Sumit Tian MD

## 2025-03-07 NOTE — PROGRESS NOTES
Physical Therapy Treatment     Name: Haritha RUIZ [facility]  : 1945  MRN: [id number]      Date of Service: 3/7/2025    Evaluating PT:  Bryan Monterroso, PT [id number]    Referring provider/PT Order:  PT Eval and Treat   25 1300  PT eval and treat  Start:  25 1300,   End:  25 1300,   ONE TIME,   Standing Count:  1 Occurrences,   R         Bryan Rivers, DO     Room #:  6422/6422-B  Diagnosis:  Lumbar compression fracture, closed, initial encounter ([facility]) [S32.000A]  Abdominal pain, unspecified abdominal location [R10.9]  Acute midline low back pain without sciatica [M54.50]  Compression fracture of L2 vertebra, initial encounter ([facility]) [S32.020A]  PMHx/PSHx:     has a past medical history of Abrasion of skin of left lower leg, Abrasion of skin of right lower leg, Anxiety, Arthritis, Asthma, Claustrophobia, Decreased dorsalis pedis pulse, Dermatitis, Fracture, GERD (gastroesophageal reflux disease), Hiatal hernia, History of blood transfusion, HTN (hypertension), Hyperlipidemia, Itching, On aspirin at home, Pancreatic cyst, Pneumonia, Sleep apnea, SOB (shortness of breath), and Tachycardia.    has a past surgical history that includes Cholecystectomy; Cataract removal (Bilateral, 2013); back surgery (2014); joint replacement (2016); Total knee arthroplasty (Right); other surgical history (2017); Spinal fixation surgery with implant (); Upper gastrointestinal endoscopy (07/10/2017); and Stimulator Surgery (Right, 2023).     Procedure/Surgery:  none  Precautions:  Falls, FWB (full weight bearing) Bilateral LE flexed posture kyphotic, , Soft TLSO, Droplet Influenza  Equipment Needs: Patient has needed equipment ,    SUBJECTIVE:    ???Patient states she will D/C to her Sister's home where she can be on the first level. Her [place] with 3 FELIZ. Patient ambulated independently, with wheeled walker  PTA. Equipment owned: Wheelchair, Cane, and Wheeled Walker ??     Can benefit  from Rehab program due to deconditioning.     OBJECTIVE:   Initial Evaluation  Date: 2/21/25 Treatment Date: 3/7/25 Short Term/ Long Term   Goals   AM-PAC 6 Clicks 14/24 8/24    Was pt agreeable to Eval/treatment? Yes  yes    Does pt have pain? Yes 20/10 post ambulation Back pain    Bed Mobility  Rolling: Mod   Supine to sit:   Max    Sit to supine: Max  limited by pain returning to bed.   Scooting: Mod   Rolling: Max   Supine to sit:   MaxA x2  Sit to supine: MaxA x2  Scooting: MaxA  Rolling: Min  Supine to sit: Min  Sit to supine: Min  Scooting: Min   Transfers Sit to stand: Mod  to Wheeled Walker  Stand to sit: Min  Stand pivot: Min with Wheeled Walker Sit to stand: MaxA x2  Stand to sit: MaxA x2  Stand pivot: NT  Sit to stand: SBA to Wheeled Walker  Stand to sit: SBA   Stand pivot: SBA with Wheeled Walker   Ambulation    40 feet with Wheeled Walker   Min slow rate kyphotic posture. Patient did state brace offered support.  Side steps with no AD MaxA x2 50+ feet with Wheeled Walker and SBA    Stair negotiation: ascended and descended  NT NT 4 steps with Min      Strength/ROM:   See OT note for BUE ROM and strength  RLE grossly 3+/5  LLE grossly 3+/5  RLE AROM WFL  LLE AROM WFL    Balance:     Static Sitting: Brianna  Dynamic Sitting: Brianna  Static Standing: MaxA x2  Dynamic Standing: MaxA x2      Pt is A & O x 4  Sensation:  NT  Edema:  none noted    Vitals:  SPO2 on 5L: 91-93%      Patient education  Pt educated on role of PT, safety during mobility    Patient response to education:   Pt verbalized understanding Pt demonstrated skill Pt requires further education in this area   yes yes yes     ASSESSMENT:    Comments:  pt semi-supine in bed upon entry and agreeable to PT treatment. Rolling completed for TLSO application and hygiene assist prior to further mobility. Heavy assist of two required for all mobility. Pt sat EOB for approximately 20 minutes during session with light assist for sitting balance. Pt able to

## 2025-03-07 NOTE — PROGRESS NOTES
Pt had residual barium on KUB from yesterday.  Pt will need another KUB to follow up on residual barium.

## 2025-03-07 NOTE — PROGRESS NOTES
Spoke to Endoscopy states they will not be able to get to her tonight and will reschedule for Monday

## 2025-03-07 NOTE — FLOWSHEET NOTE
Inpatient Wound Care (Initial consult) 6422B    Admit Date: 2/19/2025 10:29 PM    Reason for consult:  Heels    Significant history:  Per H&P    CHIEF COMPLAINT:  Abdominal pain.     HISTORY OF PRESENT ILLNESS:  The patient is a 80-year-old  female who presented to the emergency room complaining of abdominal pain x1 day, also with complaints of low back pain.  Diagnostic evaluation in the emergency room revealed CT abdomen; moderate-to-large hiatal hernia, moderate colonic stool burden, 1.3 cm low attenuating lesion along the pancreatic body/tail, L2 compression fracture on CT abdomen.  The patient was admitted for further evaluation and treatment.  Findings:     03/07/25 1400   Skin Integumentary    Skin Integrity   (dry flaky)   Location BLE   Skin Integrity Site 2   Skin Integrity Location 2 Rash;Redness   Location 2 Back   Wound 03/05/25 Heel Left   Date First Assessed/Time First Assessed: 03/05/25 1600   Present on Original Admission: No  Location: Heel  Wound Location Orientation: Left   Wound Image    Wound Etiology Deep tissue/Injury   Dressing/Treatment Open to air   Wound Length (cm) 1.5 cm   Wound Width (cm) 2.3 cm   Wound Surface Area (cm^2) 3.45 cm^2   Change in Wound Size % (l*w) -105.36   Wound Assessment Purple/maroon   Drainage Amount None (dry)   Odor None   Kanwal-wound Assessment Dry/flaky;Non-blanchable erythema       **Informed Consent**    The patient has given verbal consent to have photos taken of wound and inserted into their chart as part of their permanent medical record for purposes of documentation, treatment management and/or medical review.   All Images taken on 3/7/25 of patient name: Haritha Dominguez were transmitted and stored on secured Epic  Site located within Media Folder Tab by a registered Epic-Ha[employer] Mobile Application Device.        Impression:  Left heel: DTI    Plan: Barrier cream  Medix heel boots  TAPS  Low air loss module  Patient will need continued

## 2025-03-07 NOTE — PROGRESS NOTES
Call placed to Dr. Rivers's answering service, states Dr. Howe is covering. Message sent regarding EDG being cancelled, and pt being NPO x4 days

## 2025-03-07 NOTE — PLAN OF CARE
Problem: Discharge Planning  Goal: Discharge to home or other facility with appropriate resources  3/7/2025 0136 by Tricia Champion, RN  Outcome: Progressing  Flowsheets (Taken 3/6/2025 1945)  Discharge to home or other facility with appropriate resources:   Identify barriers to discharge with patient and caregiver   Arrange for needed discharge resources and transportation as appropriate   Identify discharge learning needs (meds, wound care, etc)     Problem: Skin/Tissue Integrity  Goal: Skin integrity remains intact  Description: 1.  Monitor for areas of redness and/or skin breakdown  2.  Assess vascular access sites hourly  3.  Every 4-6 hours minimum:  Change oxygen saturation probe site  4.  Every 4-6 hours:  If on nasal continuous positive airway pressure, respiratory therapy assess nares and determine need for appliance change or resting period  3/7/2025 0136 by Tricia Champion, RN  Outcome: Progressing  Flowsheets  Taken 3/7/2025 0045  Skin Integrity Remains Intact: Monitor for areas of redness and/or skin breakdown  Taken 3/6/2025 1945  Skin Integrity Remains Intact: Monitor for areas of redness and/or skin breakdown

## 2025-03-08 LAB
ALBUMIN SERPL-MCNC: 2.5 G/DL (ref 3.5–5.2)
ALP SERPL-CCNC: 61 U/L (ref 35–104)
ALT SERPL-CCNC: 7 U/L (ref 0–32)
ANION GAP SERPL CALCULATED.3IONS-SCNC: 14 MMOL/L (ref 7–16)
AST SERPL-CCNC: 10 U/L (ref 0–31)
BILIRUB SERPL-MCNC: 0.3 MG/DL (ref 0–1.2)
BUN SERPL-MCNC: 15 MG/DL (ref 6–23)
CALCIUM SERPL-MCNC: 7.9 MG/DL (ref 8.6–10.2)
CHLORIDE SERPL-SCNC: 109 MMOL/L (ref 98–107)
CO2 SERPL-SCNC: 21 MMOL/L (ref 22–29)
CREAT SERPL-MCNC: 0.7 MG/DL (ref 0.5–1)
ERYTHROCYTE [DISTWIDTH] IN BLOOD BY AUTOMATED COUNT: 15.2 % (ref 11.5–15)
GFR, ESTIMATED: >90 ML/MIN/1.73M2
GLUCOSE BLD-MCNC: 117 MG/DL (ref 74–99)
GLUCOSE SERPL-MCNC: 119 MG/DL (ref 74–99)
HCT VFR BLD AUTO: 29.7 % (ref 34–48)
HGB BLD-MCNC: 9.3 G/DL (ref 11.5–15.5)
MAGNESIUM SERPL-MCNC: 2 MG/DL (ref 1.6–2.6)
MCH RBC QN AUTO: 29.3 PG (ref 26–35)
MCHC RBC AUTO-ENTMCNC: 31.3 G/DL (ref 32–34.5)
MCV RBC AUTO: 93.7 FL (ref 80–99.9)
PHOSPHATE SERPL-MCNC: 4.1 MG/DL (ref 2.5–4.5)
PLATELET # BLD AUTO: 416 K/UL (ref 130–450)
PMV BLD AUTO: 12 FL (ref 7–12)
POTASSIUM SERPL-SCNC: 3.6 MMOL/L (ref 3.5–5)
PROT SERPL-MCNC: 5.7 G/DL (ref 6.4–8.3)
RBC # BLD AUTO: 3.17 M/UL (ref 3.5–5.5)
SODIUM SERPL-SCNC: 144 MMOL/L (ref 132–146)
WBC OTHER # BLD: 12.2 K/UL (ref 4.5–11.5)

## 2025-03-08 PROCEDURE — 2500000003 HC RX 250 WO HCPCS

## 2025-03-08 PROCEDURE — 6370000000 HC RX 637 (ALT 250 FOR IP): Performed by: INTERNAL MEDICINE

## 2025-03-08 PROCEDURE — 84100 ASSAY OF PHOSPHORUS: CPT

## 2025-03-08 PROCEDURE — 94640 AIRWAY INHALATION TREATMENT: CPT

## 2025-03-08 PROCEDURE — 6360000002 HC RX W HCPCS: Performed by: INTERNAL MEDICINE

## 2025-03-08 PROCEDURE — 2580000003 HC RX 258: Performed by: STUDENT IN AN ORGANIZED HEALTH CARE EDUCATION/TRAINING PROGRAM

## 2025-03-08 PROCEDURE — 2500000003 HC RX 250 WO HCPCS: Performed by: STUDENT IN AN ORGANIZED HEALTH CARE EDUCATION/TRAINING PROGRAM

## 2025-03-08 PROCEDURE — 2500000003 HC RX 250 WO HCPCS: Performed by: INTERNAL MEDICINE

## 2025-03-08 PROCEDURE — 80053 COMPREHEN METABOLIC PANEL: CPT

## 2025-03-08 PROCEDURE — 2700000000 HC OXYGEN THERAPY PER DAY

## 2025-03-08 PROCEDURE — 6360000002 HC RX W HCPCS: Performed by: STUDENT IN AN ORGANIZED HEALTH CARE EDUCATION/TRAINING PROGRAM

## 2025-03-08 PROCEDURE — 82962 GLUCOSE BLOOD TEST: CPT

## 2025-03-08 PROCEDURE — 2140000000 HC CCU INTERMEDIATE R&B

## 2025-03-08 PROCEDURE — 6370000000 HC RX 637 (ALT 250 FOR IP)

## 2025-03-08 PROCEDURE — 85027 COMPLETE CBC AUTOMATED: CPT

## 2025-03-08 PROCEDURE — 2580000003 HC RX 258

## 2025-03-08 PROCEDURE — 36415 COLL VENOUS BLD VENIPUNCTURE: CPT

## 2025-03-08 PROCEDURE — 6360000002 HC RX W HCPCS

## 2025-03-08 PROCEDURE — 83735 ASSAY OF MAGNESIUM: CPT

## 2025-03-08 RX ADMIN — SODIUM CHLORIDE, PRESERVATIVE FREE 10 ML: 5 INJECTION INTRAVENOUS at 08:40

## 2025-03-08 RX ADMIN — PREGABALIN 50 MG: 50 CAPSULE ORAL at 08:49

## 2025-03-08 RX ADMIN — SODIUM CHLORIDE, PRESERVATIVE FREE 40 MG: 5 INJECTION INTRAVENOUS at 11:28

## 2025-03-08 RX ADMIN — IPRATROPIUM BROMIDE AND ALBUTEROL SULFATE 1 DOSE: 2.5; .5 SOLUTION RESPIRATORY (INHALATION) at 08:14

## 2025-03-08 RX ADMIN — ARFORMOTEROL TARTRATE 15 MCG: 15 SOLUTION RESPIRATORY (INHALATION) at 20:23

## 2025-03-08 RX ADMIN — MONTELUKAST 10 MG: 10 TABLET, FILM COATED ORAL at 08:49

## 2025-03-08 RX ADMIN — SODIUM BICARBONATE 650 MG: 650 TABLET ORAL at 08:48

## 2025-03-08 RX ADMIN — ARFORMOTEROL TARTRATE 15 MCG: 15 SOLUTION RESPIRATORY (INHALATION) at 08:14

## 2025-03-08 RX ADMIN — DEXTROSE MONOHYDRATE: 50 INJECTION, SOLUTION INTRAVENOUS at 19:37

## 2025-03-08 RX ADMIN — IPRATROPIUM BROMIDE AND ALBUTEROL SULFATE 1 DOSE: 2.5; .5 SOLUTION RESPIRATORY (INHALATION) at 20:23

## 2025-03-08 RX ADMIN — LORAZEPAM 0.5 MG: 2 INJECTION INTRAMUSCULAR; INTRAVENOUS at 15:47

## 2025-03-08 RX ADMIN — BUDESONIDE 1000 MCG: 0.5 INHALANT RESPIRATORY (INHALATION) at 08:14

## 2025-03-08 RX ADMIN — BUDESONIDE 1000 MCG: 0.5 INHALANT RESPIRATORY (INHALATION) at 20:23

## 2025-03-08 RX ADMIN — WATER 20 MG: 1 INJECTION INTRAMUSCULAR; INTRAVENOUS; SUBCUTANEOUS at 08:39

## 2025-03-08 RX ADMIN — SODIUM CHLORIDE, PRESERVATIVE FREE 10 ML: 5 INJECTION INTRAVENOUS at 21:48

## 2025-03-08 RX ADMIN — LORAZEPAM 0.5 MG: 2 INJECTION INTRAMUSCULAR; INTRAVENOUS at 21:48

## 2025-03-08 RX ADMIN — LORAZEPAM 0.5 MG: 2 INJECTION INTRAMUSCULAR; INTRAVENOUS at 08:39

## 2025-03-08 RX ADMIN — SODIUM CHLORIDE, PRESERVATIVE FREE 40 MG: 5 INJECTION INTRAVENOUS at 21:48

## 2025-03-08 RX ADMIN — ENOXAPARIN SODIUM 40 MG: 100 INJECTION SUBCUTANEOUS at 21:46

## 2025-03-08 RX ADMIN — IPRATROPIUM BROMIDE AND ALBUTEROL SULFATE 1 DOSE: 2.5; .5 SOLUTION RESPIRATORY (INHALATION) at 12:11

## 2025-03-08 ASSESSMENT — PAIN SCALES - GENERAL: PAINLEVEL_OUTOF10: 0

## 2025-03-08 NOTE — PLAN OF CARE
Problem: Discharge Planning  Goal: Discharge to home or other facility with appropriate resources  Outcome: Not Progressing     Problem: Pain  Goal: Verbalizes/displays adequate comfort level or baseline comfort level  Outcome: Not Progressing     Problem: Safety - Adult  Goal: Free from fall injury  Outcome: Not Progressing     Problem: ABCDS Injury Assessment  Goal: Absence of physical injury  Outcome: Not Progressing     Problem: Skin/Tissue Integrity  Goal: Skin integrity remains intact  Description: 1.  Monitor for areas of redness and/or skin breakdown  2.  Assess vascular access sites hourly  3.  Every 4-6 hours minimum:  Change oxygen saturation probe site  4.  Every 4-6 hours:  If on nasal continuous positive airway pressure, respiratory therapy assess nares and determine need for appliance change or resting period  Outcome: Not Progressing  Flowsheets (Taken 3/8/2025 0134)  Skin Integrity Remains Intact: Monitor for areas of redness and/or skin breakdown     Problem: Nutrition Deficit:  Goal: Optimize nutritional status  Outcome: Not Progressing     Problem: Neurosensory - Adult  Goal: Achieves maximal functionality and self care  Outcome: Not Progressing     Problem: Musculoskeletal - Adult  Goal: Maintain proper alignment of affected body part  Outcome: Not Progressing     Problem: Infection - Adult  Goal: Absence of infection at discharge  Outcome: Not Progressing  Flowsheets (Taken 3/8/2025 0134)  Absence of infection at discharge:   Assess and monitor for signs and symptoms of infection   Monitor lab/diagnostic results     Problem: Metabolic/Fluid and Electrolytes - Adult  Goal: Electrolytes maintained within normal limits  Outcome: Not Progressing  Goal: Glucose maintained within prescribed range  Outcome: Not Progressing  Goal: Hemodynamic stability and optimal renal function maintained  Outcome: Not Progressing     Problem: Respiratory - Adult  Goal: Achieves optimal ventilation and  nutritional status  Outcome: Not Progressing     Problem: Neurosensory - Adult  Goal: Achieves maximal functionality and self care  Outcome: Not Progressing     Problem: Musculoskeletal - Adult  Goal: Maintain proper alignment of affected body part  Outcome: Not Progressing     Problem: Infection - Adult  Goal: Absence of infection at discharge  Outcome: Not Progressing  Flowsheets (Taken 3/8/2025 0134)  Absence of infection at discharge:   Assess and monitor for signs and symptoms of infection   Monitor lab/diagnostic results     Problem: Metabolic/Fluid and Electrolytes - Adult  Goal: Electrolytes maintained within normal limits  Outcome: Not Progressing  Goal: Glucose maintained within prescribed range  Outcome: Not Progressing  Goal: Hemodynamic stability and optimal renal function maintained  Outcome: Not Progressing     Problem: Respiratory - Adult  Goal: Achieves optimal ventilation and oxygenation  Outcome: Not Progressing     Problem: Cardiovascular - Adult  Goal: Absence of cardiac dysrhythmias or at baseline  Outcome: Not Progressing     Problem: Skin/Tissue Integrity - Adult  Goal: Skin integrity remains intact  Description: 1.  Monitor for areas of redness and/or skin breakdown  2.  Assess vascular access sites hourly  3.  Every 4-6 hours minimum:  Change oxygen saturation probe site  4.  Every 4-6 hours:  If on nasal continuous positive airway pressure, respiratory therapy assess nares and determine need for appliance change or resting period  Outcome: Not Progressing  Flowsheets (Taken 3/8/2025 0134)  Skin Integrity Remains Intact: Monitor for areas of redness and/or skin breakdown     Problem: Genitourinary - Adult  Goal: Absence of urinary retention  Outcome: Not Progressing     Problem: Hematologic - Adult  Goal: Maintains hematologic stability  Outcome: Not Progressing

## 2025-03-08 NOTE — PROGRESS NOTES
Unable to pass NG, patient unable to swallow and asked me to not put it in. Resistance in bilateral nares.

## 2025-03-08 NOTE — PROGRESS NOTES
[facility]  [facility]  Progress Note      Chief Complaint   Patient presents with    Abdominal Pain     Patient from home called for 10/10 epigastric pain reproducible with palpation. EMS gave 50 mcg of fentanyl and 4mg of zofran PTA. Denies pain currently       SUBJECTIVE:    Patient is tolerating medications. No reported adverse drug reactions.  No nausea, diarrhea. . Sitting up in bed. No complaints or concerns. For EGD today  .Review of systems:  As stated above in the chief complaint, otherwise negative.    Medications:  Scheduled Meds:   methylPREDNISolone  20 mg IntraVENous Daily    enoxaparin  40 mg SubCUTAneous Daily    pantoprazole (PROTONIX) 40 mg in sodium chloride (PF) 0.9 % 10 mL injection  40 mg IntraVENous Q12H    sodium bicarbonate  650 mg Oral BID    sodium chloride  1,000 mL IntraVENous Once    sodium chloride  1,000 mL IntraVENous Once    ipratropium 0.5 mg-albuterol 2.5 mg  1 Dose Inhalation Q4H WA RT    lidocaine  5 mL IntraDERmal Once    atorvastatin  10 mg Oral Nightly    escitalopram  10 mg Oral Nightly    budesonide  1 mg Nebulization BID RT    And    arformoterol tartrate  15 mcg Nebulization BID RT    montelukast  10 mg Oral Daily    [Held by provider] pantoprazole  40 mg Oral Daily    pregabalin  50 mg Oral BID    sodium chloride flush  5-40 mL IntraVENous 2 times per day     Continuous Infusions:   dextrose 50 mL/hr at 25 1509    sodium chloride       PRN Meds:guaiFENesin-dextromethorphan, LORazepam, LORazepam, sodium chloride flush, sodium chloride, potassium chloride **OR** potassium alternative oral replacement **OR** potassium chloride, magnesium sulfate, polyethylene glycol, acetaminophen **OR** acetaminophen, HYDROcodone 5 mg - acetaminophen, ondansetron    OBJECTIVE:  /73   Pulse 90   Temp 97.1 °F (36.2 °C) (Temporal)   Resp 29   Ht 1.524 m (5')   Wt 62.7 kg (138 lb 3.7 oz)   SpO2 96%   BMI 27.00 kg/m²   Temp  Av.4 °F (36.3  °C)  Min: 97.1 °F (36.2 °C)  Max: 98.1 °F (36.7 °C)  Constitutional: The patient is awake, alert, and oriented.   Skin: Warm and dry. No rashes were noted. No jaundice.  HEENT:  Moist mucous membranes, no ulcerations, no thrush.   Neck: Supple to movements. No lymphadenopathy.   Chest: No use of accessory muscles to breathe. Symmetrical expansion. Auscultation reveals wheezing, no crackles, + coarse on the right  Cardiovascular: S1 and S2 are rhythmic and regular. No murmurs appreciated.   Abdomen: Positive bowel sounds to auscultation. Benign to palpation. No masses felt. No hepatosplenomegaly.  Genitourinary: No pain in the lower abdomen  Extremities: No clubbing, no cyanosis, no edema.  Musculoskeletal: No pain in range of motion of any joints  Neurological: Following commands, no focal neurodeficit  Lines: peripheral    Laboratory and Tests Review:  Lab Results   Component Value Date    WBC 12.2 (H) 03/08/2025    WBC 10.2 03/07/2025    WBC 12.2 (H) 03/06/2025    HGB 9.3 (L) 03/08/2025    HCT 29.7 (L) 03/08/2025    MCV 93.7 03/08/2025     03/08/2025     Lab Results   Component Value Date    NEUTROABS 7.51 (H) 03/02/2025    NEUTROABS 7.49 (H) 02/24/2025    NEUTROABS 8.50 (H) 02/23/2025     No results found for: \"CRPHS\"  Lab Results   Component Value Date    ALT 7 03/08/2025    AST 10 03/08/2025    ALKPHOS 61 03/08/2025    BILITOT 0.3 03/08/2025     Lab Results   Component Value Date/Time     03/08/2025 04:23 AM    K 3.6 03/08/2025 04:23 AM    K 4.6 05/24/2023 11:30 AM     03/08/2025 04:23 AM    CO2 21 03/08/2025 04:23 AM    BUN 15 03/08/2025 04:23 AM    CREATININE 0.7 03/08/2025 04:23 AM    CREATININE 0.7 03/07/2025 06:35 AM    CREATININE 0.7 03/06/2025 04:12 PM    GFRAA >60 01/12/2019 04:00 AM    LABGLOM >90 03/08/2025 04:23 AM    LABGLOM 70 04/18/2024 09:02 PM    GLUCOSE 119 03/08/2025 04:23 AM    GLUCOSE 85 04/02/2012 08:30 AM    CALCIUM 7.9 03/08/2025 04:23 AM    BILITOT 0.3 03/08/2025 04:23    03/10/2023 Methicillin resistant Staph aureus not isolated  Final   02/21/2023 Methicillin resistant Staph aureus not isolated  Final   02/01/2023 Methicillin resistant Staph aureus not isolated  Final   Check urine for strep and legionella-pending  Mrsa nares-pending  Blood cultures 2/24 neg final    Assessment:  Influenza A infection  Fever-improved  L2 compression fracture  Low attenuating lesion in the pancreatic body or tail  Urinary retention  KATELYN  Acute resp failure  Rule out aspiration  Leukocytosis-increased  Esophagogram consistent with tracheal aspiration     Plan:    continue zosyn  day 6/7  Completed  Tamiflu  Urology following-? Bladder lesion vs debris per renal ultrasound  Seen by oncology for pancreatic lesion-to be worked up outpatient  Check swallow eval-showed aspiration  For egd today  Made cough med prn again  Pulmonary following  CTA chest is consistent with bilateral lower lobe atelectatic changes  Check ct abdomen and pelvis-not done yet  Procal .53  blood cx 3/4 neg thus far  Nephrology following-diuresis  ID will continue to follow    Vern Ross MD  2:24 PM  3/8/2025

## 2025-03-08 NOTE — PROGRESS NOTES
Gastroenterology, Hepatology, &  Advanced Endoscopy    Progress Note      HPI:   Patient with improvement in respiratory status though remains on HFNC at 5L. She remains NPO. We were unable to do EGD today.     Lab Results   Component Value Date    INR 0.9 05/24/2023    INR 1.1 02/13/2023    INR 1.2 05/28/2017    PROTIME 10.2 05/24/2023    PROTIME 11.8 02/13/2023    PROTIME 13.2 (H) 05/28/2017         ALT   Date Value Ref Range Status   03/08/2025 7 0 - 32 U/L Final   03/07/2025 8 0 - 32 U/L Final   03/06/2025 9 0 - 32 U/L Final     AST   Date Value Ref Range Status   03/08/2025 10 0 - 31 U/L Final   03/07/2025 11 0 - 31 U/L Final   03/06/2025 14 0 - 31 U/L Final     Alkaline Phosphatase   Date Value Ref Range Status   03/08/2025 61 35 - 104 U/L Final   03/07/2025 60 35 - 104 U/L Final   03/06/2025 66 35 - 104 U/L Final     Total Bilirubin   Date Value Ref Range Status   03/08/2025 0.3 0.0 - 1.2 mg/dL Final   03/07/2025 0.3 0.0 - 1.2 mg/dL Final   03/06/2025 0.4 0.0 - 1.2 mg/dL Final     Bilirubin, Direct   Date Value Ref Range Status   03/02/2025 <0.2 0.0 - 0.3 mg/dL Final   04/01/2012 0.2 0.0 - 0.2 mg/dL Final   01/01/2011 <0.1 0.0 - 0.2 mg/dL Final      Lab Results   Component Value Date    WBC 12.2 (H) 03/08/2025    HGB 9.3 (L) 03/08/2025    HCT 29.7 (L) 03/08/2025     03/08/2025     03/08/2025    K 3.6 03/08/2025     (H) 03/08/2025    CREATININE 0.7 03/08/2025    BUN 15 03/08/2025    CO2 21 (L) 03/08/2025    FOLATE 17.9 02/28/2025    [id number] 1002 (H) 02/28/2025    GLUCOSE 119 (H) 03/08/2025    INR 0.9 05/24/2023    PROTIME 10.2 05/24/2023    TSH 2.06 08/22/2023    LABA1C 7.8 01/17/2024        CEA   Date Value Ref Range Status   05/28/2017 5.3 (H) 0.0 - 5.2 ng/mL Final     Sed Rate, Automated   Date Value Ref Range Status   11/04/2024 72 (H) 0 - 20 mm/Hr Final   03/10/2023 61 (H) 0 - 20 mm/Hr Final   02/21/2023 26 (H) 0 - 20 mm/Hr Final   02/01/2023 42 (H) 0 - 20 mm/Hr Final     Lipase     Or    acetaminophen (TYLENOL) suppository 650 mg  650 mg Rectal Q6H PRN Bryan Rivers, DO   650 mg at 03/03/25 0900    HYDROcodone-acetaminophen (NORCO) 5-325 MG per tablet 1 tablet  1 tablet Oral Q4H PRN Monica Hansen PA   1 tablet at 03/02/25 1601    ondansetron (ZOFRAN) injection 4 mg  4 mg IntraVENous Q6H PRN Oliver Waters MD   4 mg at 03/07/25 0926       OBJECTIVE      Physical    VITALS:  /82   Pulse 92   Temp 97.1 °F (36.2 °C) (Temporal)   Resp 14   Ht 1.524 m (5')   Wt 62.7 kg (138 lb 3.7 oz)   SpO2 97%   BMI 27.00 kg/m²   Physical Exam:  General: Overall well-appearing, NAD  HEENT: PERRLA, EOMI, Anicteric sclera, MMM, no rhinorrhea  Cards: RRR, no LE edema  Resp: Breathing comfortably, good air movement, no use of accessory muscles, no audible wheezing  Abdomen: soft, NT, ND.   Extremities: Moves all extremities, no effusions or bruising.  Skin: No rashes or jaundice  Neuro: A&O x 3, CN grossly intact, non-focal exam       ASSESSMENT:      80y/F who presented to the ED with abdominal pain and back pain and who now has worsening respiratory status to the point of requiring 10LNC. She is also now reporting regurgitation with PO intake which was appreciated during MBS and Esophagram.     She has had slow improvement in respiratory status.      PLAN:   Regurgitation:  - NPO with IVF.  - PPI BID.  - Known large hiatal hernia on imaging.   - Okay to attempt careful nursing bedside swallow evaluation now that she has improved respiratory status.  - Will plan for EGD Monday.     2. Pancreatic Tail Lesion:  - Appreciated on admission CT.  - MRI/MRCP ordered.      I will follow.     Thank you for including us in the care of this patient. Please do not hesitate to contact us with any additional questions or concerns.     James Spivey MD  Gastroenterology/Hepatology  Advanced Endoscopy

## 2025-03-08 NOTE — PROGRESS NOTES
Reached out to Dr. Spivey per Dr. Howe for the patient to get an NG tube so we can give nutrition.

## 2025-03-08 NOTE — PROGRESS NOTES
Progress Note  3/8/2025 2:50 PM  Subjective:   Admit Date: 2/19/2025  PCP: Olayinka Reyes DO  Interval History: Patient examined in no distress , sleepy     Diet: Diet NPO Exceptions are: Sips of Water with Meds    Data:   Scheduled Meds:   methylPREDNISolone  20 mg IntraVENous Daily    enoxaparin  40 mg SubCUTAneous Daily    pantoprazole (PROTONIX) 40 mg in sodium chloride (PF) 0.9 % 10 mL injection  40 mg IntraVENous Q12H    sodium bicarbonate  650 mg Oral BID    sodium chloride  1,000 mL IntraVENous Once    sodium chloride  1,000 mL IntraVENous Once    ipratropium 0.5 mg-albuterol 2.5 mg  1 Dose Inhalation Q4H WA RT    lidocaine  5 mL IntraDERmal Once    atorvastatin  10 mg Oral Nightly    escitalopram  10 mg Oral Nightly    budesonide  1 mg Nebulization BID RT    And    arformoterol tartrate  15 mcg Nebulization BID RT    montelukast  10 mg Oral Daily    [Held by provider] pantoprazole  40 mg Oral Daily    pregabalin  50 mg Oral BID    sodium chloride flush  5-40 mL IntraVENous 2 times per day     Continuous Infusions:   dextrose 50 mL/hr at 03/07/25 1509    sodium chloride       PRN Meds:guaiFENesin-dextromethorphan, LORazepam, LORazepam, sodium chloride flush, sodium chloride, potassium chloride **OR** potassium alternative oral replacement **OR** potassium chloride, magnesium sulfate, polyethylene glycol, acetaminophen **OR** acetaminophen, HYDROcodone 5 mg - acetaminophen, ondansetron  I/O last 3 completed shifts:  In: 1009.6 [I.V.:721.6; IV Piggyback:287.9]  Out: 2900 [Urine:2900]  I/O this shift:  In: 0   Out: 200 [Urine:200]    Intake/Output Summary (Last 24 hours) at 3/8/2025 1450  Last data filed at 3/8/2025 0807  Gross per 24 hour   Intake 610 ml   Output 1650 ml   Net -1040 ml     CBC:   Recent Labs     03/06/25  0458 03/07/25  0635 03/08/25  0423   WBC 12.2* 10.2 12.2*   HGB 9.2* 8.9* 9.3*   * 410 416     BMP:    Recent Labs     03/06/25  1612 03/07/25  0635 03/08/25  0423    143 144   K  Compression fracture  CT lumbar spine 2/20-new acute appearing fx seen involving superior endplate of L2, stable chronic compression fx involving L1, bilateral sacral ala fx of uncertain chronicity, generalized osteopenia  Defer to neurosurgery     9.  Hypernatremia-resolved  Sodium 156 on 3/5--> 143 today  IVF - D5   Strict I&O  Monitor labs     10.  Regurgitation  Esophagram 3/5-dysfunctional swallowing with recurrent aspiration into the trachea  For possible EGD 3/7  Defer to GI    Kwan Brenner MD

## 2025-03-08 NOTE — PROGRESS NOTES
Nathan Kurtz M.D.,[facility]  Jack Rolle D.O., TRU., [facility]  Ari Licona M.D.  Dee Albert M.D.   Wallace Decker D.O.  Bryan Traylor M.D.         Daily Pulmonary Progress Note    Patient:  Haritha Dominguez 80 y.o. female MRN: [id number]            Synopsis     We are following patient for respiratory failure    \"CC\" abdominal pain    Code status: LIMITED CODE  Intubation/Re-intubation at the time of arrest No   Defibrillation/Cardioversion No   Chest Compressions No   Resuscitative Medications No   Other DNR DNI       Subjective   HPI:  Haritha Dominguez is a 80 y.o. female we are asked to evaluate for acute respiratory failure with hypoxia.     Patient is known to our practice and follows with Dr. Wallace Decker for moderate persistent asthma and obstructive sleep apnea.  At baseline she is on room air.  She takes Trelegy daily, and albuterol nebulizer as needed.  She does have a CPAP with 7 cm of water through [place].     Haritha presented to the hospital on 2/19 with abdominal pain and lower back pain.  Workup revealed an L2 compression fraction and a 1.3 cm lesion in the pancreas.  She was seen by neurosurgery for the compression fracture which was recommended wearing a brace.  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  Haritha had tested positive for influenza A on 2/24 and was started on Tamiflu.  She has been requiring supplemental oxygen however she went into respiratory distress yesterday and an RRT was called.  She is being followed by infectious disease however Zosyn and vancomycin was started yesterday during the RRT.  A chest x-ray was done yesterday showing no acute process.     Haritha was seen today lying in bed ill-appearing on 10 L high flow nasal cannula.  She does have wheezes on exam along with rhonchi.    3/4/25: Patient was seen and examined lying in bed.  She continues to ask for water.   04:23 AM    BUN 15 03/08/2025 04:23 AM    CREATININE 0.7 03/08/2025 04:23 AM    CALCIUM 7.9 03/08/2025 04:23 AM    GFRAA >60 01/12/2019 04:00 AM    LABGLOM >90 03/08/2025 04:23 AM    LABGLOM 70 04/18/2024 09:02 PM     Lab Results   Component Value Date/Time    PROTIME 10.2 05/24/2023 11:30 AM    PROTIME 11.7 04/01/2012 03:16 PM    INR 0.9 05/24/2023 11:30 AM       Micro:  Component  Ref Range & Units    Specimen Description .NASOPHARYNGEAL SWAB   Adenovirus PCR  Not Detected Not Detected   Coronavirus 229E PCR  Not Detected Not Detected   Coronavirus HKU1 PCR  Not Detected Not Detected   Coronavirus NL63 PCR  Not Detected Not Detected   Coronavirus OC43 PCR  Not Detected Not Detected   SARS-CoV-2, PCR  Not Detected Not Detected   Human Metapneumovirus PCR  Not Detected Not Detected   Rhino/Enterovirus PCR  Not Detected Not Detected   Influenza A by PCR  Not Detected DETECTED Abnormal    Influenza A H1 (2009) PCR  Not Detected DETECTED Abnormal    Influenza B by PCR  Not Detected Not Detected   Parainfluenza 1 PCR  Not Detected Not Detected   Parainfluenza 2 PCR  Not Detected Not Detected   Parainfluenza 3 PCR  Not Detected Not Detected   Parainfluenza 4 PCR  Not Detected Not Detected   Resp Syncytial Virus PCR  Not Detected Not Detected   Bordetella parapertussis by PCR  Not Detected Not Detected   B Pertussis by PCR  Not Detected Not Detected   Chlamydia pneumoniae By PCR  Not Detected Not Detected   Mycoplasma pneumo by PCR  Not Detected Not Detected   Comment: Performed by multiplexed nucleic acid assay.   Resulting Agency [facility]              Specimen Collected: 03/02/25 21:25 EST Last Resulted: 03/02/25 22:56 EST         Assessment:    Acute hypoxic respiratory failure  Influenza A  Moderate persistent asthma  CORRINE on CPAP 7  L2 compression fracture  Pancreatic lesion  Dysphagia       Plan:   Wean oxygen as tolerated to maintain SpO2 greater than 92%, currently on 5 L  CPAP of 7 at

## 2025-03-08 NOTE — PROGRESS NOTES
Spoke with daughter from [place] , let her know she did not tolerate the NG at this time , let the dr know that she would like n update with the plan of care

## 2025-03-08 NOTE — PLAN OF CARE
Problem: Discharge Planning  Goal: Discharge to home or other facility with appropriate resources  3/8/2025 1356 by Bryan Krishna RN  Outcome: Progressing  3/8/2025 0135 by Coreen Novak RN  Outcome: Not Progressing  Flowsheets (Taken 3/7/2025 1945)  Discharge to home or other facility with appropriate resources: Identify barriers to discharge with patient and caregiver     Problem: Pain  Goal: Verbalizes/displays adequate comfort level or baseline comfort level  3/8/2025 1356 by Bryan Krishna RN  Outcome: Progressing  3/8/2025 0135 by Coreen Novak RN  Outcome: Not Progressing  Flowsheets (Taken 3/7/2025 2022)  Verbalizes/displays adequate comfort level or baseline comfort level:   Encourage patient to monitor pain and request assistance   Assess pain using appropriate pain scale   Administer analgesics based on type and severity of pain and evaluate response     Problem: Safety - Adult  Goal: Free from fall injury  3/8/2025 1356 by Bryan Krishna RN  Outcome: Progressing  3/8/2025 0135 by Coreen Novak RN  Outcome: Not Progressing     Problem: ABCDS Injury Assessment  Goal: Absence of physical injury  3/8/2025 1356 by Bryan Krishna RN  Outcome: Progressing  3/8/2025 0135 by Coreen Novak RN  Outcome: Not Progressing     Problem: Skin/Tissue Integrity  Goal: Skin integrity remains intact  Description: 1.  Monitor for areas of redness and/or skin breakdown  2.  Assess vascular access sites hourly  3.  Every 4-6 hours minimum:  Change oxygen saturation probe site  4.  Every 4-6 hours:  If on nasal continuous positive airway pressure, respiratory therapy assess nares and determine need for appliance change or resting period  3/8/2025 1356 by Bryan Krishna RN  Outcome: Progressing  3/8/2025 0135 by Coreen Novak RN  Outcome: Not Progressing  Flowsheets  Taken 3/8/2025 0134  Skin Integrity Remains Intact: Monitor for areas of redness and/or skin breakdown  Taken 3/7/2025 1945  Skin  Glucose maintained within prescribed range  3/8/2025 1356 by Bryan Krishna RN  Outcome: Progressing  3/8/2025 0135 by Coreen Novak RN  Outcome: Not Progressing  Flowsheets (Taken 3/7/2025 1945)  Glucose maintained within prescribed range:   Monitor blood glucose as ordered   Assess for signs and symptoms of hyperglycemia and hypoglycemia  Goal: Hemodynamic stability and optimal renal function maintained  3/8/2025 1356 by Bryan Krishna RN  Outcome: Progressing  3/8/2025 0135 by Coreen Novak RN  Outcome: Not Progressing  Flowsheets (Taken 3/7/2025 1945)  Hemodynamic stability and optimal renal function maintained:   Monitor labs and assess for signs and symptoms of volume excess or deficit   Monitor intake, output and patient weight     Problem: Respiratory - Adult  Goal: Achieves optimal ventilation and oxygenation  3/8/2025 1356 by Bryan Krishna RN  Outcome: Progressing  3/8/2025 0135 by Coreen Novak RN  Outcome: Not Progressing  Flowsheets (Taken 3/7/2025 1945)  Achieves optimal ventilation and oxygenation:   Assess for changes in respiratory status   Assess for changes in mentation and behavior   Position to facilitate oxygenation and minimize respiratory effort   Oxygen supplementation based on oxygen saturation or arterial blood gases     Problem: Cardiovascular - Adult  Goal: Absence of cardiac dysrhythmias or at baseline  3/8/2025 1356 by Bryan Krishna RN  Outcome: Progressing  3/8/2025 0135 by Coreen Novak RN  Outcome: Not Progressing  Flowsheets (Taken 3/7/2025 1945)  Absence of cardiac dysrhythmias or at baseline: Monitor cardiac rate and rhythm     Problem: Skin/Tissue Integrity - Adult  Goal: Skin integrity remains intact  Description: 1.  Monitor for areas of redness and/or skin breakdown  2.  Assess vascular access sites hourly  3.  Every 4-6 hours minimum:  Change oxygen saturation probe site  4.  Every 4-6 hours:  If on nasal continuous positive airway pressure, respiratory

## 2025-03-08 NOTE — PROGRESS NOTES
Spoke to  ,   Will be doing her EGD Monday to assess things. We can also trial a bedside swallow evaluation now that her oxygen is improved to see if she is able to tolerate swallowing anything. No need for NG at this time. Thanks!

## 2025-03-08 NOTE — PROGRESS NOTES
MRI screening needs to be completed.    Please make sure your patient can lay flat onto their back. (not kyphotic/SOB/contracted, etc) -if not patient cannot come down to MRI.    If it is stated on screening that they need medicated for claustrophobia/pain/holding still -have the doctor put med orders in.    If patient is on a IV drip that they cannot come off of for MRI- please inform MRI so we can coordinate with the RN that will come down to switch to MRI safe pump.    Thank you from your MRI team.

## 2025-03-08 NOTE — PROGRESS NOTES
[facility] Medicine    Subjective: Patient is awake denies new complaints  Denies new issues      Current Facility-Administered Medications:     methylPREDNISolone sodium succ (SOLU-MEDROL) 20 mg in sterile water 0.5 mL injection, 20 mg, IntraVENous, Daily, Alysa Kidd APRN - CNP, 20 mg at 03/08/25 0839    enoxaparin (LOVENOX) injection 40 mg, 40 mg, SubCUTAneous, Daily, Bryan Rivers DO, 40 mg at 03/07/25 2213    guaiFENesin-dextromethorphan (ROBITUSSIN DM) 100-10 MG/5ML syrup 10 mL, 10 mL, Oral, Q6H PRN, Daniella Anderson APRN - CNP    pantoprazole (PROTONIX) 40 mg in sodium chloride (PF) 0.9 % 10 mL injection, 40 mg, IntraVENous, Q12H, James Spivey MD, 40 mg at 03/08/25 1128    dextrose 5 % solution, , IntraVENous, Continuous, Ines Gauthier APRN - CNP, Last Rate: 50 mL/hr at 03/07/25 1509, Rate Change at 03/07/25 1509    sodium bicarbonate tablet 650 mg, 650 mg, Oral, BID, Ines Gauthier APRN - CNP, 650 mg at 03/08/25 0848    LORazepam (ATIVAN) injection 0.5 mg, 0.5 mg, IntraVENous, Q6H PRN, Bryan Rivers DO, 0.5 mg at 03/08/25 0839    sodium chloride 0.9 % bolus 1,000 mL, 1,000 mL, IntraVENous, Once, Jocelynn Porras MD    sodium chloride 0.9 % bolus 1,000 mL, 1,000 mL, IntraVENous, Once, Natanael aMrtinez MD    ipratropium 0.5 mg-albuterol 2.5 mg (DUONEB) nebulizer solution 1 Dose, 1 Dose, Inhalation, Q4H WA RT, Bryan Rivers DO, 1 Dose at 03/08/25 1211    LORazepam (ATIVAN) tablet 1 mg, 1 mg, Oral, TID PRN, Bryan Rivers DO, 1 mg at 03/01/25 2143    lidocaine 1 % injection 5 mL, 5 mL, IntraDERmal, Once, Oliver Waters MD    atorvastatin (LIPITOR) tablet 10 mg, 10 mg, Oral, Nightly, Bryan Rivers DO, 10 mg at 03/01/25 2143    escitalopram (LEXAPRO) tablet 10 mg, 10 mg, Oral, Nightly, Bryan Rivers DO, 10 mg at 03/01/25 2143    budesonide (PULMICORT) nebulizer suspension 1,000 mcg, 1 mg, Nebulization, BID RT, 1,000 mcg at 03/08/25 0814 **AND** arformoterol  tartrate (BROVANA) nebulizer solution 15 mcg, 15 mcg, Nebulization, BID RT, 15 mcg at 03/08/25 0814 **AND** [DISCONTINUED] ipratropium (ATROVENT) 0.02 % nebulizer solution 0.5 mg, 0.5 mg, Nebulization, Q6H RT, Bryan Rivers DO, 0.5 mg at 02/28/25 0950    montelukast (SINGULAIR) tablet 10 mg, 10 mg, Oral, Daily, Bryan Rivers DO, 10 mg at 03/08/25 0849    [Held by provider] pantoprazole (PROTONIX) tablet 40 mg, 40 mg, Oral, Daily, Bryan Rivers DO, 40 mg at 03/02/25 1603    pregabalin (LYRICA) capsule 50 mg, 50 mg, Oral, BID, Bryan Rivers DO, 50 mg at 03/08/25 0849    sodium chloride flush 0.9 % injection 5-40 mL, 5-40 mL, IntraVENous, 2 times per day, Bryan Rivers DO, 10 mL at 03/08/25 0840    sodium chloride flush 0.9 % injection 5-40 mL, 5-40 mL, IntraVENous, PRN, Bryan Rivers DO    0.9 % sodium chloride infusion, , IntraVENous, PRN, Bryan Rivers,     potassium chloride (KLOR-CON M) extended release tablet 40 mEq, 40 mEq, Oral, PRN **OR** potassium bicarb-citric acid (EFFER-K) effervescent tablet 40 mEq, 40 mEq, Oral, PRN **OR** potassium chloride 10 mEq/100 mL IVPB (Peripheral Line), 10 mEq, IntraVENous, PRN, Bryan Rivers DO, Last Rate: 100 mL/hr at 03/05/25 2007, 10 mEq at 03/05/25 2007    magnesium sulfate 2000 mg in 50 mL IVPB premix, 2,000 mg, IntraVENous, PRN, Bryan Rivers DO, Stopped at 03/07/25 1121    polyethylene glycol (GLYCOLAX) packet 17 g, 17 g, Oral, Daily PRN, Bryan Rivers DO, 17 g at 02/23/25 0842    acetaminophen (TYLENOL) tablet 650 mg, 650 mg, Oral, Q6H PRN, 650 mg at 03/02/25 1752 **OR** acetaminophen (TYLENOL) suppository 650 mg, 650 mg, Rectal, Q6H PRN, Bryan Rivers DO, 650 mg at 03/03/25 0900    HYDROcodone-acetaminophen (NORCO) 5-325 MG per tablet 1 tablet, 1 tablet, Oral, Q4H PRN, Monica Hansen PA, 1 tablet at 03/02/25 1601    ondansetron (ZOFRAN) injection 4 mg, 4 mg, IntraVENous, Q6H PRN, Oliver Waters MD, 4 mg at

## 2025-03-08 NOTE — PROGRESS NOTES
Nutrition Note    Consult received for TF recs. Chart reviewed. Noted unsuccessful NG attempt. Plan is for EGD on Monday and possible bedside swallow evaluation now that pt. is with improved respiratory status. Recommend to continue NPO.Pt most recently assessed by RD on 3/7 (see RD note for full assessment).  Will follow-up as planned.  Please reconsult if RD needed prior to follow-up.    Electronically signed by Venu Pugh RD on 3/8/25 at 10:22 AM EST    Contact: ext [phone]

## 2025-03-09 LAB
ALBUMIN SERPL-MCNC: 2.4 G/DL (ref 3.5–5.2)
ALP SERPL-CCNC: 64 U/L (ref 35–104)
ALT SERPL-CCNC: 7 U/L (ref 0–32)
ANION GAP SERPL CALCULATED.3IONS-SCNC: 14 MMOL/L (ref 7–16)
AST SERPL-CCNC: 16 U/L (ref 0–31)
BILIRUB SERPL-MCNC: 0.3 MG/DL (ref 0–1.2)
BUN SERPL-MCNC: 12 MG/DL (ref 6–23)
CALCIUM SERPL-MCNC: 8 MG/DL (ref 8.6–10.2)
CHLORIDE SERPL-SCNC: 108 MMOL/L (ref 98–107)
CO2 SERPL-SCNC: 22 MMOL/L (ref 22–29)
CREAT SERPL-MCNC: 0.6 MG/DL (ref 0.5–1)
ERYTHROCYTE [DISTWIDTH] IN BLOOD BY AUTOMATED COUNT: 15.4 % (ref 11.5–15)
GFR, ESTIMATED: >90 ML/MIN/1.73M2
GLUCOSE SERPL-MCNC: 82 MG/DL (ref 74–99)
HCT VFR BLD AUTO: 30.9 % (ref 34–48)
HGB BLD-MCNC: 9.5 G/DL (ref 11.5–15.5)
MAGNESIUM SERPL-MCNC: 1.7 MG/DL (ref 1.6–2.6)
MCH RBC QN AUTO: 28.8 PG (ref 26–35)
MCHC RBC AUTO-ENTMCNC: 30.7 G/DL (ref 32–34.5)
MCV RBC AUTO: 93.6 FL (ref 80–99.9)
MICROORGANISM SPEC CULT: NORMAL
MICROORGANISM SPEC CULT: NORMAL
PHOSPHATE SERPL-MCNC: 2.2 MG/DL (ref 2.5–4.5)
PLATELET # BLD AUTO: 418 K/UL (ref 130–450)
PMV BLD AUTO: 12.2 FL (ref 7–12)
POTASSIUM SERPL-SCNC: 3.5 MMOL/L (ref 3.5–5)
PROT SERPL-MCNC: 5.5 G/DL (ref 6.4–8.3)
RBC # BLD AUTO: 3.3 M/UL (ref 3.5–5.5)
SERVICE CMNT-IMP: NORMAL
SERVICE CMNT-IMP: NORMAL
SODIUM SERPL-SCNC: 144 MMOL/L (ref 132–146)
SPECIMEN DESCRIPTION: NORMAL
SPECIMEN DESCRIPTION: NORMAL
WBC OTHER # BLD: 13.9 K/UL (ref 4.5–11.5)

## 2025-03-09 PROCEDURE — 2500000003 HC RX 250 WO HCPCS: Performed by: INTERNAL MEDICINE

## 2025-03-09 PROCEDURE — 6360000002 HC RX W HCPCS

## 2025-03-09 PROCEDURE — 97530 THERAPEUTIC ACTIVITIES: CPT

## 2025-03-09 PROCEDURE — 97535 SELF CARE MNGMENT TRAINING: CPT

## 2025-03-09 PROCEDURE — 2140000000 HC CCU INTERMEDIATE R&B

## 2025-03-09 PROCEDURE — 83735 ASSAY OF MAGNESIUM: CPT

## 2025-03-09 PROCEDURE — 84100 ASSAY OF PHOSPHORUS: CPT

## 2025-03-09 PROCEDURE — 6360000002 HC RX W HCPCS: Performed by: STUDENT IN AN ORGANIZED HEALTH CARE EDUCATION/TRAINING PROGRAM

## 2025-03-09 PROCEDURE — 2700000000 HC OXYGEN THERAPY PER DAY

## 2025-03-09 PROCEDURE — 6360000002 HC RX W HCPCS: Performed by: INTERNAL MEDICINE

## 2025-03-09 PROCEDURE — 94640 AIRWAY INHALATION TREATMENT: CPT

## 2025-03-09 PROCEDURE — 85027 COMPLETE CBC AUTOMATED: CPT

## 2025-03-09 PROCEDURE — 2580000003 HC RX 258: Performed by: STUDENT IN AN ORGANIZED HEALTH CARE EDUCATION/TRAINING PROGRAM

## 2025-03-09 PROCEDURE — 36415 COLL VENOUS BLD VENIPUNCTURE: CPT

## 2025-03-09 PROCEDURE — 80053 COMPREHEN METABOLIC PANEL: CPT

## 2025-03-09 PROCEDURE — 2500000003 HC RX 250 WO HCPCS

## 2025-03-09 PROCEDURE — 6370000000 HC RX 637 (ALT 250 FOR IP): Performed by: INTERNAL MEDICINE

## 2025-03-09 RX ADMIN — SODIUM CHLORIDE, PRESERVATIVE FREE 10 ML: 5 INJECTION INTRAVENOUS at 09:30

## 2025-03-09 RX ADMIN — LORAZEPAM 0.5 MG: 2 INJECTION INTRAMUSCULAR; INTRAVENOUS at 21:46

## 2025-03-09 RX ADMIN — WATER 20 MG: 1 INJECTION INTRAMUSCULAR; INTRAVENOUS; SUBCUTANEOUS at 09:24

## 2025-03-09 RX ADMIN — ENOXAPARIN SODIUM 40 MG: 100 INJECTION SUBCUTANEOUS at 21:47

## 2025-03-09 RX ADMIN — LORAZEPAM 0.5 MG: 2 INJECTION INTRAMUSCULAR; INTRAVENOUS at 09:21

## 2025-03-09 RX ADMIN — ARFORMOTEROL TARTRATE 15 MCG: 15 SOLUTION RESPIRATORY (INHALATION) at 05:50

## 2025-03-09 RX ADMIN — BUDESONIDE 1000 MCG: 0.5 INHALANT RESPIRATORY (INHALATION) at 20:34

## 2025-03-09 RX ADMIN — IPRATROPIUM BROMIDE AND ALBUTEROL SULFATE 1 DOSE: 2.5; .5 SOLUTION RESPIRATORY (INHALATION) at 05:50

## 2025-03-09 RX ADMIN — SODIUM CHLORIDE, PRESERVATIVE FREE 40 MG: 5 INJECTION INTRAVENOUS at 09:26

## 2025-03-09 RX ADMIN — ARFORMOTEROL TARTRATE 15 MCG: 15 SOLUTION RESPIRATORY (INHALATION) at 20:34

## 2025-03-09 RX ADMIN — IPRATROPIUM BROMIDE AND ALBUTEROL SULFATE 1 DOSE: 2.5; .5 SOLUTION RESPIRATORY (INHALATION) at 20:34

## 2025-03-09 RX ADMIN — IPRATROPIUM BROMIDE AND ALBUTEROL SULFATE 1 DOSE: 2.5; .5 SOLUTION RESPIRATORY (INHALATION) at 12:22

## 2025-03-09 RX ADMIN — SODIUM CHLORIDE, PRESERVATIVE FREE 10 ML: 5 INJECTION INTRAVENOUS at 21:47

## 2025-03-09 RX ADMIN — SODIUM CHLORIDE, PRESERVATIVE FREE 40 MG: 5 INJECTION INTRAVENOUS at 21:53

## 2025-03-09 RX ADMIN — IPRATROPIUM BROMIDE AND ALBUTEROL SULFATE 1 DOSE: 2.5; .5 SOLUTION RESPIRATORY (INHALATION) at 15:52

## 2025-03-09 RX ADMIN — BUDESONIDE 1000 MCG: 0.5 INHALANT RESPIRATORY (INHALATION) at 05:50

## 2025-03-09 NOTE — PLAN OF CARE
Problem: Discharge Planning  Goal: Discharge to home or other facility with appropriate resources  3/9/2025 0348 by Jc Santillan RN  Outcome: Progressing  Flowsheets (Taken 3/8/2025 2059 by Joselin Paz RN)  Discharge to home or other facility with appropriate resources: Identify barriers to discharge with patient and caregiver  3/8/2025 1356 by Bryan Krishna RN  Outcome: Progressing     Problem: Pain  Goal: Verbalizes/displays adequate comfort level or baseline comfort level  3/9/2025 0348 by Jc Santillan RN  Outcome: Progressing  Flowsheets (Taken 3/8/2025 1911 by Joselin Paz, RN)  Verbalizes/displays adequate comfort level or baseline comfort level:   Encourage patient to monitor pain and request assistance   Assess pain using appropriate pain scale   Administer analgesics based on type and severity of pain and evaluate response   Implement non-pharmacological measures as appropriate and evaluate response   Consider cultural and social influences on pain and pain management  3/8/2025 1356 by Bryan Krishna RN  Outcome: Progressing     Problem: Safety - Adult  Goal: Free from fall injury  3/9/2025 0348 by Jc Santillan RN  Outcome: Progressing  3/8/2025 1356 by Bryan Krishna RN  Outcome: Progressing     Problem: ABCDS Injury Assessment  Goal: Absence of physical injury  3/9/2025 0348 by Jc Santillan RN  Outcome: Progressing  3/8/2025 1356 by Bryan Krishna RN  Outcome: Progressing     Problem: Skin/Tissue Integrity  Goal: Skin integrity remains intact  Description: 1.  Monitor for areas of redness and/or skin breakdown  2.  Assess vascular access sites hourly  3.  Every 4-6 hours minimum:  Change oxygen saturation probe site  4.  Every 4-6 hours:  If on nasal continuous positive airway pressure, respiratory therapy assess nares and determine need for appliance change or resting period  3/9/2025 0348 by Jc Santillan RN  Outcome: Progressing  Flowsheets (Taken 3/8/2025 2059 by  Progressing     Problem: Skin/Tissue Integrity - Adult  Goal: Skin integrity remains intact  Description: 1.  Monitor for areas of redness and/or skin breakdown  2.  Assess vascular access sites hourly  3.  Every 4-6 hours minimum:  Change oxygen saturation probe site  4.  Every 4-6 hours:  If on nasal continuous positive airway pressure, respiratory therapy assess nares and determine need for appliance change or resting period  3/9/2025 0348 by Jc Santillan RN  Outcome: Progressing  Flowsheets (Taken 3/8/2025 2059 by Joselin Paz RN)  Skin Integrity Remains Intact: Monitor for areas of redness and/or skin breakdown  3/8/2025 1356 by Bryan Krishna RN  Outcome: Progressing     Problem: Genitourinary - Adult  Goal: Absence of urinary retention  3/9/2025 0348 by Jc Santillan RN  Outcome: Progressing  3/8/2025 1356 by Bryan Krishna RN  Outcome: Progressing     Problem: Hematologic - Adult  Goal: Maintains hematologic stability  3/9/2025 0348 by Jc Santillan RN  Outcome: Progressing  Flowsheets (Taken 3/8/2025 2059 by Joselin Paz RN)  Maintains hematologic stability: Assess for signs and symptoms of bleeding or hemorrhage  3/8/2025 1356 by Bryan Krishna RN  Outcome: Progressing

## 2025-03-09 NOTE — PROGRESS NOTES
Per Dr. Spivey, MRI is canceled, a EUS may be done with the EGD scheduled for Monday, ok to hold on abdominal CT scan.

## 2025-03-09 NOTE — PROGRESS NOTES
[facility] Medicine    Subjective: Patient is awake denies new complaints  Denies new issues      Current Facility-Administered Medications:     methylPREDNISolone sodium succ (SOLU-MEDROL) 20 mg in sterile water 0.5 mL injection, 20 mg, IntraVENous, Daily, Alysa Kidd APRN - CNP, 20 mg at 03/09/25 0924    enoxaparin (LOVENOX) injection 40 mg, 40 mg, SubCUTAneous, Daily, Bryan Rivers DO, 40 mg at 03/08/25 2146    guaiFENesin-dextromethorphan (ROBITUSSIN DM) 100-10 MG/5ML syrup 10 mL, 10 mL, Oral, Q6H PRN, Daniella Anderson APRN - CNP    pantoprazole (PROTONIX) 40 mg in sodium chloride (PF) 0.9 % 10 mL injection, 40 mg, IntraVENous, Q12H, James Spivey MD, 40 mg at 03/09/25 0926    dextrose 5 % solution, , IntraVENous, Continuous, Ines Gauthier APRN - CNP, Last Rate: 50 mL/hr at 03/08/25 2200, Rate Verify at 03/08/25 2200    sodium bicarbonate tablet 650 mg, 650 mg, Oral, BID, Ines Gauthier APRN - CNP, 650 mg at 03/08/25 0848    LORazepam (ATIVAN) injection 0.5 mg, 0.5 mg, IntraVENous, Q6H PRN, Bryan Rivers DO, 0.5 mg at 03/09/25 0921    sodium chloride 0.9 % bolus 1,000 mL, 1,000 mL, IntraVENous, Once, Jocelynn Porras MD    sodium chloride 0.9 % bolus 1,000 mL, 1,000 mL, IntraVENous, Once, Natanael Martinez MD    ipratropium 0.5 mg-albuterol 2.5 mg (DUONEB) nebulizer solution 1 Dose, 1 Dose, Inhalation, Q4H WA RT, Bryan Rivers DO, 1 Dose at 03/09/25 0550    LORazepam (ATIVAN) tablet 1 mg, 1 mg, Oral, TID PRN, Bryan Rivers DO, 1 mg at 03/01/25 2143    lidocaine 1 % injection 5 mL, 5 mL, IntraDERmal, Once, Oliver Waters MD    atorvastatin (LIPITOR) tablet 10 mg, 10 mg, Oral, Nightly, Bryan Rivers DO, 10 mg at 03/01/25 2143    escitalopram (LEXAPRO) tablet 10 mg, 10 mg, Oral, Nightly, Bryan Rivers DO, 10 mg at 03/01/25 2143    budesonide (PULMICORT) nebulizer suspension 1,000 mcg, 1 mg, Nebulization, BID RT, 1,000 mcg at 03/09/25 0550 **AND** arformoterol  03/07/25 0926    Objective:    /85   Pulse 100   Temp 97.1 °F (36.2 °C) (Temporal)   Resp 12   Ht 1.524 m (5')   Wt 62.7 kg (138 lb 3.7 oz)   SpO2 95%   BMI 27.00 kg/m²     Heart:  reg  Lungs:  rhonchi  Abd: + bs nondistended  Extrem:  min edema legs    CBC with Differential:    Lab Results   Component Value Date/Time    WBC 13.9 03/09/2025 06:13 AM    RBC 3.30 03/09/2025 06:13 AM    HGB 9.5 03/09/2025 06:13 AM    HCT 30.9 03/09/2025 06:13 AM     03/09/2025 06:13 AM    MCV 93.6 03/09/2025 06:13 AM    MCH 28.8 03/09/2025 06:13 AM    MCHC 30.7 03/09/2025 06:13 AM    RDW 15.4 03/09/2025 06:13 AM    NRBC 0.0 03/10/2023 08:12 AM    LYMPHOPCT 13 03/02/2025 06:15 PM    PROMYELOPCT 1 03/02/2025 06:15 PM    MONOPCT 26 03/02/2025 06:15 PM    MYELOPCT 4 03/02/2025 06:15 PM    MYELOPCT 0.9 02/01/2023 09:10 AM    EOSPCT 4 03/02/2025 06:15 PM    BASOPCT 2 03/02/2025 06:15 PM    MONOSABS 3.89 03/02/2025 06:15 PM    LYMPHSABS 1.94 03/02/2025 06:15 PM    EOSABS 0.65 03/02/2025 06:15 PM    BASOSABS 0.26 03/02/2025 06:15 PM     CMP:    Lab Results   Component Value Date/Time     03/09/2025 06:13 AM    K 3.5 03/09/2025 06:13 AM    K 4.6 05/24/2023 11:30 AM     03/09/2025 06:13 AM    CO2 22 03/09/2025 06:13 AM    BUN 12 03/09/2025 06:13 AM    CREATININE 0.6 03/09/2025 06:13 AM    GFRAA >60 01/12/2019 04:00 AM    LABGLOM >90 03/09/2025 06:13 AM    LABGLOM 70 04/18/2024 09:02 PM    GLUCOSE 82 03/09/2025 06:13 AM    GLUCOSE 85 04/02/2012 08:30 AM    CALCIUM 8.0 03/09/2025 06:13 AM    BILITOT 0.3 03/09/2025 06:13 AM    ALKPHOS 64 03/09/2025 06:13 AM    AST 16 03/09/2025 06:13 AM    ALT 7 03/09/2025 06:13 AM     Warfarin PT/INR:    Lab Results   Component Value Date    INR 0.9 05/24/2023    INR 1.1 02/13/2023    INR 1.2 05/28/2017    PROTIME 10.2 05/24/2023    PROTIME 11.8 02/13/2023    PROTIME 13.2 (H) 05/28/2017       Assessment:    Principal Problem:    Compression fracture of L2 lumbar vertebra

## 2025-03-09 NOTE — PROGRESS NOTES
Occupational Therapy  OT BEDSIDE TREATMENT NOTE   [facility]  [address], [place]      Date:3/9/2025  Patient Name: Haritha Dominguez  MRN: [id number]  : 1945  Room: [address] [place] [address]     Evaluating OT: Matt Soriano OTR/L; [id number]        Referring Provider: Bryan Rivers DO     Specific Provider Orders/Date: OT Eval and Treat 25 1300        Diagnosis: Pt c/o LBP worsening ~1 day PTA. CT scan ID'd L2 compression fx.     Surgery: None this admission.     Pertinent Medical History:  has a past medical history of Abrasion of skin of left lower leg, Abrasion of skin of right lower leg, Anxiety, Arthritis, Asthma, Claustrophobia, Decreased dorsalis pedis pulse, Dermatitis, Fracture, GERD (gastroesophageal reflux disease), Hiatal hernia, History of blood transfusion, HTN (hypertension), Hyperlipidemia, Itching, On aspirin at home, Pancreatic cyst, Pneumonia, Sleep apnea, SOB (shortness of breath), and Tachycardia.      Recommended Adaptive Equipment: TBD      Precautions:  Fall Risk, spinal precautions, soft TLSO (\"Wear TLSO when greater than 45 degrees\" Per NS note 25), +alarms, limited insight into deficits, monitor BP (hypotensive), flexed posture      Assessment of current deficits    [x] Functional mobility            [x]ADLs           [x] Strength                  [x]Cognition    [x] Functional transfers          [x] IADLs         [x] Safety Awareness   [x]Endurance    [x] Fine Coordination                         [x] Balance      [] Vision/perception   []Sensation      []Gross Motor Coordination             [x] ROM           [] Delirium                   [] Motor Control      OT PLAN OF CARE   OT POC based on physician orders, patient diagnosis and results of clinical assessment     Frequency/Duration 1-3 days/wk for 2 weeks PRN   Specific OT Treatment Interventions to include:   * Instruction/training on adapted ADL techniques  throughout ADLs.   Mobility-  Instruction/training on safety and improved independence with bed mobility/functional transfers  and functional mobility.   Sitting/Standing Balance/Tolerance:  to increase balance and activity tolerance during ADLs and facilitate proper posture and positioning.   Activity tolerance- Instruction/training on energy conservation/work simplification for completion of ADLs:.     Neuromuscular Reeducation to facilitate balance/righting reactions for increased function with ADLs tasks:    Skilled positioning/alignment-  Therapist facilitated proper positioning/alignment throughout session to maintain skin/joint integrity & proper body mechanics.   Skilled monitoring of O2 sats- To maximize safe participation throughout functional activities.     Pt has made fair progress towards set goals.   Continue with current plan of care      Treatment Time In:1146            Treatment Time Out: 1206                Treatment Charges: Mins Units   Ther Ex  50695     Manual Therapy 49297     Thera Activities 25483     ADL/Home Mgt [id number] 20 1   Neuro Re-ed 15521     Group Therapy      Orthotic manage/training  26615     Non-Billable Time     Total Timed Treatment 20 1         [place] HOYT/ABHINAV [id number]

## 2025-03-09 NOTE — PROGRESS NOTES
Pt has a history of refusing MRI's d/t severe claustrophobia. 2/20/25 MRCP ordered by Dr Trammell was cancelled. Also, patient has a spinal cord stimulator that she may not be using. If it cannot be charged, turned on and placed into MRI mode an MRI cannot be done. However, patient is likely going to continue refusing MRCP. Spoke with Rn, he will document and inform Dr Spivey.

## 2025-03-09 NOTE — PROGRESS NOTES
Physical Therapy   Treatment     Name: Haritha RUIZ [facility]  : 1945  MRN: [id number]      Date of Service: 3/9/2025    Evaluating PT:  Bryan Monterroso, PT [id number]    Referring provider/PT Order:  PT Eval and Treat   25 1300  PT eval and treat  Start:  25 1300,   End:  25 1300,   ONE TIME,   Standing Count:  1 Occurrences,   R         Bryan Rivers, DO     Room #:  6422/6422-B  Diagnosis:  Lumbar compression fracture, closed, initial encounter ([facility]) [S32.000A]  Abdominal pain, unspecified abdominal location [R10.9]  Acute midline low back pain without sciatica [M54.50]  Compression fracture of L2 vertebra, initial encounter ([facility]) [S32.020A]  PMHx/PSHx:     has a past medical history of Abrasion of skin of left lower leg, Abrasion of skin of right lower leg, Anxiety, Arthritis, Asthma, Claustrophobia, Decreased dorsalis pedis pulse, Dermatitis, Fracture, GERD (gastroesophageal reflux disease), Hiatal hernia, History of blood transfusion, HTN (hypertension), Hyperlipidemia, Itching, On aspirin at home, Pancreatic cyst, Pneumonia, Sleep apnea, SOB (shortness of breath), and Tachycardia.    has a past surgical history that includes Cholecystectomy; Cataract removal (Bilateral, 2013); back surgery (2014); joint replacement (2016); Total knee arthroplasty (Right); other surgical history (2017); Spinal fixation surgery with implant (); Upper gastrointestinal endoscopy (07/10/2017); and Stimulator Surgery (Right, 2023).     Procedure/Surgery:  none  Precautions:  Falls, FWB (full weight bearing) Bilateral LE flexed posture kyphotic, , Soft TLSO, Droplet Influenza  Equipment Needs: Patient has needed equipment ,    SUBJECTIVE:    ???Patient states she will D/C to her Sister's home where she can be on the first level. Her home [place] home with 3 FEL[place]. Patient ambulated independently, with wheeled walker  PTA. Equipment owned: Wheelchair, Cane, and Wheeled Walker ??     Can  and was able to ambulate laterally. Limited by endurance this date. Patient would benefit from continued skilled PT to maximize functional mobility independence.   Bed alarm activated    Treatment:  Patient practiced and was instructed in the following treatment:    Bed mobility- verbal cues to facilitate independence  Functional transfers-Verbal cues for proper positioning and sequencing to perform transfers safely with maximum independence.   Gait training-Verbal cues for proper positioning and sequencing using assistive device to maximize functional mobility independence.  Therapeutic activity- assist to void on bed pan/ pericare    PLAN:    Patient is making good progress towards established goals.  Will continue with current POC.      Time in  1215  Time out  1239    Total Treatment Time  24 minutes     CPT codes:  [] Gait training 41634 - minutes  [] Manual therapy 31286 - minutes  [x] Therapeutic activities 80998 24 minutes  [] Therapeutic exercises 40116 - minutes  [] Neuromuscular reeducation 19668 - minutes    Cecil Reddy, PT, DPT  [id number]

## 2025-03-09 NOTE — PROGRESS NOTES
Progress Note  3/9/2025 12:07 PM  Subjective:   Admit Date: 2/19/2025  PCP: Olayinka Reyes DO  Interval History: patient examined doing well feels ok     Diet: Diet NPO Exceptions are: Sips of Water with Meds    Data:   Scheduled Meds:   methylPREDNISolone  20 mg IntraVENous Daily    enoxaparin  40 mg SubCUTAneous Daily    pantoprazole (PROTONIX) 40 mg in sodium chloride (PF) 0.9 % 10 mL injection  40 mg IntraVENous Q12H    sodium bicarbonate  650 mg Oral BID    sodium chloride  1,000 mL IntraVENous Once    sodium chloride  1,000 mL IntraVENous Once    ipratropium 0.5 mg-albuterol 2.5 mg  1 Dose Inhalation Q4H WA RT    lidocaine  5 mL IntraDERmal Once    atorvastatin  10 mg Oral Nightly    escitalopram  10 mg Oral Nightly    budesonide  1 mg Nebulization BID RT    And    arformoterol tartrate  15 mcg Nebulization BID RT    montelukast  10 mg Oral Daily    [Held by provider] pantoprazole  40 mg Oral Daily    pregabalin  50 mg Oral BID    sodium chloride flush  5-40 mL IntraVENous 2 times per day     Continuous Infusions:   dextrose 50 mL/hr at 03/08/25 2200    sodium chloride       PRN Meds:guaiFENesin-dextromethorphan, LORazepam, LORazepam, sodium chloride flush, sodium chloride, potassium chloride **OR** potassium alternative oral replacement **OR** potassium chloride, magnesium sulfate, polyethylene glycol, acetaminophen **OR** acetaminophen, HYDROcodone 5 mg - acetaminophen, ondansetron  I/O last 3 completed shifts:  In: 210 [I.V.:210]  Out: 2100 [Urine:2100]  I/O this shift:  In: 0   Out: 250 [Urine:250]    Intake/Output Summary (Last 24 hours) at 3/9/2025 1207  Last data filed at 3/9/2025 1125  Gross per 24 hour   Intake 200 ml   Output 1200 ml   Net -1000 ml     CBC:   Recent Labs     03/07/25  0635 03/08/25  0423 03/09/25  0613   WBC 10.2 12.2* 13.9*   HGB 8.9* 9.3* 9.5*    416 418     BMP:    Recent Labs     03/07/25  0635 03/08/25  0423 03/09/25  0613    144 144   K 3.1* 3.6 3.5    109*

## 2025-03-09 NOTE — PROGRESS NOTES
[facility]  [facility]  Progress Note      Chief Complaint   Patient presents with    Abdominal Pain     Patient from home called for 10/10 epigastric pain reproducible with palpation. EMS gave 50 mcg of fentanyl and 4mg of zofran PTA. Denies pain currently       SUBJECTIVE:    Patient is tolerating medications. No reported adverse drug reactions.  No nausea, diarrhea. . Sitting up in bed. No complaints or concerns. For EGD today  .Review of systems:  As stated above in the chief complaint, otherwise negative.    Medications:  Scheduled Meds:   potassium & sodium phosphates  1 packet Oral BID    methylPREDNISolone  20 mg IntraVENous Daily    enoxaparin  40 mg SubCUTAneous Daily    pantoprazole (PROTONIX) 40 mg in sodium chloride (PF) 0.9 % 10 mL injection  40 mg IntraVENous Q12H    sodium bicarbonate  650 mg Oral BID    sodium chloride  1,000 mL IntraVENous Once    sodium chloride  1,000 mL IntraVENous Once    ipratropium 0.5 mg-albuterol 2.5 mg  1 Dose Inhalation Q4H WA RT    lidocaine  5 mL IntraDERmal Once    atorvastatin  10 mg Oral Nightly    escitalopram  10 mg Oral Nightly    budesonide  1 mg Nebulization BID RT    And    arformoterol tartrate  15 mcg Nebulization BID RT    montelukast  10 mg Oral Daily    [Held by provider] pantoprazole  40 mg Oral Daily    pregabalin  50 mg Oral BID    sodium chloride flush  5-40 mL IntraVENous 2 times per day     Continuous Infusions:   dextrose 50 mL/hr at 03/08/25 2200    sodium chloride       PRN Meds:guaiFENesin-dextromethorphan, LORazepam, LORazepam, sodium chloride flush, sodium chloride, potassium chloride **OR** potassium alternative oral replacement **OR** potassium chloride, magnesium sulfate, polyethylene glycol, acetaminophen **OR** acetaminophen, HYDROcodone 5 mg - acetaminophen, ondansetron    OBJECTIVE:  /85   Pulse 100   Temp 97.1 °F (36.2 °C) (Temporal)   Resp 12   Ht 1.524 m (5')   Wt 62.7 kg (138 lb 3.7 oz)    Final     [facility] Culture Only   Date Value Ref Range Status   03/10/2023 Methicillin resistant Staph aureus not isolated  Final   02/21/2023 Methicillin resistant Staph aureus not isolated  Final   02/01/2023 Methicillin resistant Staph aureus not isolated  Final   Check urine for strep and legionella-pending  Mrsa nares-pending  Blood cultures 2/24 neg final    Assessment:  Influenza A infection  Fever-improved  L2 compression fracture  Low attenuating lesion in the pancreatic body or tail  Urinary retention  KATELYN  Acute resp failure  Rule out aspiration  Leukocytosis-increased  Esophagogram consistent with tracheal aspiration     Plan:    continue zosyn  day 7/7  Completed  Tamiflu  Urology following-? Bladder lesion vs debris per renal ultrasound  Seen by oncology for pancreatic lesion-to be worked up outpatient  Check swallow eval-showed aspiration  For egd today  Made cough med prn again  Pulmonary following  CTA chest is consistent with bilateral lower lobe atelectatic changes  Check ct abdomen and pelvis-not done yet  Procal .53  blood cx 3/4 neg thus far  Nephrology following-diuresis  ID will continue to follow    Vern Ross MD  1:20 PM  3/9/2025

## 2025-03-09 NOTE — PLAN OF CARE
Problem: Discharge Planning  Goal: Discharge to home or other facility with appropriate resources  3/9/2025 1105 by Bryan Krishna RN  Outcome: Progressing  3/9/2025 0348 by Jc Santillan RN  Outcome: Progressing  Flowsheets (Taken 3/8/2025 2059 by Joselin Paz RN)  Discharge to home or other facility with appropriate resources: Identify barriers to discharge with patient and caregiver     Problem: Pain  Goal: Verbalizes/displays adequate comfort level or baseline comfort level  3/9/2025 1105 by Bryan Krishna RN  Outcome: Progressing  3/9/2025 0348 by Jc Santillan RN  Outcome: Progressing  Flowsheets (Taken 3/8/2025 1911 by Joselin Paz RN)  Verbalizes/displays adequate comfort level or baseline comfort level:   Encourage patient to monitor pain and request assistance   Assess pain using appropriate pain scale   Administer analgesics based on type and severity of pain and evaluate response   Implement non-pharmacological measures as appropriate and evaluate response   Consider cultural and social influences on pain and pain management     Problem: Safety - Adult  Goal: Free from fall injury  3/9/2025 1105 by Bryan Krishna RN  Outcome: Progressing  3/9/2025 0348 by Jc Santillan RN  Outcome: Progressing     Problem: ABCDS Injury Assessment  Goal: Absence of physical injury  3/9/2025 1105 by Bryan Krishna RN  Outcome: Progressing  3/9/2025 0348 by Jc Santillan RN  Outcome: Progressing     Problem: Skin/Tissue Integrity  Goal: Skin integrity remains intact  Description: 1.  Monitor for areas of redness and/or skin breakdown  2.  Assess vascular access sites hourly  3.  Every 4-6 hours minimum:  Change oxygen saturation probe site  4.  Every 4-6 hours:  If on nasal continuous positive airway pressure, respiratory therapy assess nares and determine need for appliance change or resting period  3/9/2025 1105 by Bryan Krishna RN  Outcome: Progressing  3/9/2025 0348 by Jc Santillan  Progressing     Problem: Skin/Tissue Integrity - Adult  Goal: Skin integrity remains intact  Description: 1.  Monitor for areas of redness and/or skin breakdown  2.  Assess vascular access sites hourly  3.  Every 4-6 hours minimum:  Change oxygen saturation probe site  4.  Every 4-6 hours:  If on nasal continuous positive airway pressure, respiratory therapy assess nares and determine need for appliance change or resting period  3/9/2025 1105 by Bryan Krishna RN  Outcome: Progressing  3/9/2025 0348 by Jc Santillan RN  Outcome: Progressing  Flowsheets (Taken 3/8/2025 2059 by Joselin Paz RN)  Skin Integrity Remains Intact: Monitor for areas of redness and/or skin breakdown     Problem: Genitourinary - Adult  Goal: Absence of urinary retention  3/9/2025 1105 by Bryan Krishna RN  Outcome: Progressing  3/9/2025 0348 by Jc Santillan RN  Outcome: Progressing     Problem: Hematologic - Adult  Goal: Maintains hematologic stability  3/9/2025 1105 by Bryan Krishna RN  Outcome: Progressing  3/9/2025 0348 by Jc Santillan RN  Outcome: Progressing  Flowsheets (Taken 3/8/2025 2059 by Joselin Paz RN)  Maintains hematologic stability: Assess for signs and symptoms of bleeding or hemorrhage

## 2025-03-10 ENCOUNTER — ANESTHESIA (OUTPATIENT)
Dept: ENDOSCOPY | Age: 80
End: 2025-03-10
Payer: MEDICARE

## 2025-03-10 ENCOUNTER — APPOINTMENT (OUTPATIENT)
Dept: GENERAL RADIOLOGY | Age: 80
End: 2025-03-10
Payer: MEDICARE

## 2025-03-10 ENCOUNTER — ANESTHESIA EVENT (OUTPATIENT)
Dept: ENDOSCOPY | Age: 80
End: 2025-03-10
Payer: MEDICARE

## 2025-03-10 PROBLEM — E43 SEVERE PROTEIN-CALORIE MALNUTRITION: Status: ACTIVE | Noted: 2025-03-10

## 2025-03-10 LAB
ALBUMIN SERPL-MCNC: 2.7 G/DL (ref 3.5–5.2)
ALP SERPL-CCNC: 71 U/L (ref 35–104)
ALT SERPL-CCNC: 7 U/L (ref 0–32)
ANION GAP SERPL CALCULATED.3IONS-SCNC: 17 MMOL/L (ref 7–16)
AST SERPL-CCNC: 14 U/L (ref 0–31)
BILIRUB SERPL-MCNC: 0.3 MG/DL (ref 0–1.2)
BUN SERPL-MCNC: 12 MG/DL (ref 6–23)
CALCIUM SERPL-MCNC: 8.7 MG/DL (ref 8.6–10.2)
CHLORIDE SERPL-SCNC: 107 MMOL/L (ref 98–107)
CO2 SERPL-SCNC: 20 MMOL/L (ref 22–29)
CREAT SERPL-MCNC: 0.6 MG/DL (ref 0.5–1)
ERYTHROCYTE [DISTWIDTH] IN BLOOD BY AUTOMATED COUNT: 14.9 % (ref 11.5–15)
GFR, ESTIMATED: >90 ML/MIN/1.73M2
GLUCOSE SERPL-MCNC: 107 MG/DL (ref 74–99)
HCT VFR BLD AUTO: 32.2 % (ref 34–48)
HGB BLD-MCNC: 10.2 G/DL (ref 11.5–15.5)
MAGNESIUM SERPL-MCNC: 1.9 MG/DL (ref 1.6–2.6)
MCH RBC QN AUTO: 29.6 PG (ref 26–35)
MCHC RBC AUTO-ENTMCNC: 31.7 G/DL (ref 32–34.5)
MCV RBC AUTO: 93.3 FL (ref 80–99.9)
PHOSPHATE SERPL-MCNC: 2.6 MG/DL (ref 2.5–4.5)
PLATELET # BLD AUTO: 408 K/UL (ref 130–450)
PMV BLD AUTO: 12.3 FL (ref 7–12)
POTASSIUM SERPL-SCNC: 3.7 MMOL/L (ref 3.5–5)
PROT SERPL-MCNC: 5.9 G/DL (ref 6.4–8.3)
RBC # BLD AUTO: 3.45 M/UL (ref 3.5–5.5)
SODIUM SERPL-SCNC: 144 MMOL/L (ref 132–146)
WBC OTHER # BLD: 15.7 K/UL (ref 4.5–11.5)

## 2025-03-10 PROCEDURE — 0DH68UZ INSERTION OF FEEDING DEVICE INTO STOMACH, VIA NATURAL OR ARTIFICIAL OPENING ENDOSCOPIC: ICD-10-PCS | Performed by: STUDENT IN AN ORGANIZED HEALTH CARE EDUCATION/TRAINING PROGRAM

## 2025-03-10 PROCEDURE — 2709999900 HC NON-CHARGEABLE SUPPLY: Performed by: STUDENT IN AN ORGANIZED HEALTH CARE EDUCATION/TRAINING PROGRAM

## 2025-03-10 PROCEDURE — 6360000002 HC RX W HCPCS: Performed by: INTERNAL MEDICINE

## 2025-03-10 PROCEDURE — 2500000003 HC RX 250 WO HCPCS: Performed by: INTERNAL MEDICINE

## 2025-03-10 PROCEDURE — 74018 RADEX ABDOMEN 1 VIEW: CPT

## 2025-03-10 PROCEDURE — 7100000001 HC PACU RECOVERY - ADDTL 15 MIN: Performed by: STUDENT IN AN ORGANIZED HEALTH CARE EDUCATION/TRAINING PROGRAM

## 2025-03-10 PROCEDURE — 2580000003 HC RX 258: Performed by: STUDENT IN AN ORGANIZED HEALTH CARE EDUCATION/TRAINING PROGRAM

## 2025-03-10 PROCEDURE — 43241 EGD TUBE/CATH INSERTION: CPT | Performed by: STUDENT IN AN ORGANIZED HEALTH CARE EDUCATION/TRAINING PROGRAM

## 2025-03-10 PROCEDURE — 3700000001 HC ADD 15 MINUTES (ANESTHESIA): Performed by: STUDENT IN AN ORGANIZED HEALTH CARE EDUCATION/TRAINING PROGRAM

## 2025-03-10 PROCEDURE — 2580000003 HC RX 258

## 2025-03-10 PROCEDURE — 3609020800 HC EGD W/EUS FNA: Performed by: STUDENT IN AN ORGANIZED HEALTH CARE EDUCATION/TRAINING PROGRAM

## 2025-03-10 PROCEDURE — 3609012700 HC EGD DILATION SAVORY: Performed by: STUDENT IN AN ORGANIZED HEALTH CARE EDUCATION/TRAINING PROGRAM

## 2025-03-10 PROCEDURE — 6360000002 HC RX W HCPCS

## 2025-03-10 PROCEDURE — 2500000003 HC RX 250 WO HCPCS

## 2025-03-10 PROCEDURE — 6370000000 HC RX 637 (ALT 250 FOR IP): Performed by: INTERNAL MEDICINE

## 2025-03-10 PROCEDURE — 43248 EGD GUIDE WIRE INSERTION: CPT | Performed by: STUDENT IN AN ORGANIZED HEALTH CARE EDUCATION/TRAINING PROGRAM

## 2025-03-10 PROCEDURE — 84100 ASSAY OF PHOSPHORUS: CPT

## 2025-03-10 PROCEDURE — 94640 AIRWAY INHALATION TREATMENT: CPT

## 2025-03-10 PROCEDURE — 2700000000 HC OXYGEN THERAPY PER DAY

## 2025-03-10 PROCEDURE — 6360000002 HC RX W HCPCS: Performed by: STUDENT IN AN ORGANIZED HEALTH CARE EDUCATION/TRAINING PROGRAM

## 2025-03-10 PROCEDURE — 80053 COMPREHEN METABOLIC PANEL: CPT

## 2025-03-10 PROCEDURE — 3700000000 HC ANESTHESIA ATTENDED CARE: Performed by: STUDENT IN AN ORGANIZED HEALTH CARE EDUCATION/TRAINING PROGRAM

## 2025-03-10 PROCEDURE — 36415 COLL VENOUS BLD VENIPUNCTURE: CPT

## 2025-03-10 PROCEDURE — C1753 CATH, INTRAVAS ULTRASOUND: HCPCS | Performed by: STUDENT IN AN ORGANIZED HEALTH CARE EDUCATION/TRAINING PROGRAM

## 2025-03-10 PROCEDURE — 85027 COMPLETE CBC AUTOMATED: CPT

## 2025-03-10 PROCEDURE — 0D728ZZ DILATION OF MIDDLE ESOPHAGUS, VIA NATURAL OR ARTIFICIAL OPENING ENDOSCOPIC: ICD-10-PCS | Performed by: STUDENT IN AN ORGANIZED HEALTH CARE EDUCATION/TRAINING PROGRAM

## 2025-03-10 PROCEDURE — 2140000000 HC CCU INTERMEDIATE R&B

## 2025-03-10 PROCEDURE — 99232 SBSQ HOSP IP/OBS MODERATE 35: CPT | Performed by: PHYSICIAN ASSISTANT

## 2025-03-10 PROCEDURE — 6370000000 HC RX 637 (ALT 250 FOR IP)

## 2025-03-10 PROCEDURE — 83735 ASSAY OF MAGNESIUM: CPT

## 2025-03-10 PROCEDURE — 3609017100 HC EGD: Performed by: STUDENT IN AN ORGANIZED HEALTH CARE EDUCATION/TRAINING PROGRAM

## 2025-03-10 PROCEDURE — C1769 GUIDE WIRE: HCPCS | Performed by: STUDENT IN AN ORGANIZED HEALTH CARE EDUCATION/TRAINING PROGRAM

## 2025-03-10 PROCEDURE — 7100000000 HC PACU RECOVERY - FIRST 15 MIN: Performed by: STUDENT IN AN ORGANIZED HEALTH CARE EDUCATION/TRAINING PROGRAM

## 2025-03-10 RX ORDER — PROPOFOL 10 MG/ML
INJECTION, EMULSION INTRAVENOUS
Status: DISCONTINUED | OUTPATIENT
Start: 2025-03-10 | End: 2025-03-10 | Stop reason: SDUPTHER

## 2025-03-10 RX ORDER — NALOXONE HYDROCHLORIDE 0.4 MG/ML
INJECTION, SOLUTION INTRAMUSCULAR; INTRAVENOUS; SUBCUTANEOUS PRN
Status: DISCONTINUED | OUTPATIENT
Start: 2025-03-10 | End: 2025-03-14 | Stop reason: HOSPADM

## 2025-03-10 RX ORDER — SODIUM CHLORIDE 0.9 % (FLUSH) 0.9 %
5-40 SYRINGE (ML) INJECTION PRN
Status: DISCONTINUED | OUTPATIENT
Start: 2025-03-10 | End: 2025-03-14 | Stop reason: HOSPADM

## 2025-03-10 RX ORDER — SODIUM CHLORIDE 0.9 % (FLUSH) 0.9 %
5-40 SYRINGE (ML) INJECTION EVERY 12 HOURS SCHEDULED
Status: DISCONTINUED | OUTPATIENT
Start: 2025-03-10 | End: 2025-03-14 | Stop reason: HOSPADM

## 2025-03-10 RX ORDER — SODIUM CHLORIDE 9 MG/ML
INJECTION, SOLUTION INTRAVENOUS
Status: DISCONTINUED | OUTPATIENT
Start: 2025-03-10 | End: 2025-03-10 | Stop reason: SDUPTHER

## 2025-03-10 RX ORDER — SODIUM CHLORIDE 9 MG/ML
INJECTION, SOLUTION INTRAVENOUS PRN
Status: DISCONTINUED | OUTPATIENT
Start: 2025-03-10 | End: 2025-03-14 | Stop reason: HOSPADM

## 2025-03-10 RX ORDER — LABETALOL HYDROCHLORIDE 5 MG/ML
INJECTION, SOLUTION INTRAVENOUS
Status: DISCONTINUED | OUTPATIENT
Start: 2025-03-10 | End: 2025-03-10 | Stop reason: SDUPTHER

## 2025-03-10 RX ORDER — PHENYLEPHRINE HCL IN 0.9% NACL 1 MG/10 ML
SYRINGE (ML) INTRAVENOUS
Status: DISCONTINUED | OUTPATIENT
Start: 2025-03-10 | End: 2025-03-10 | Stop reason: SDUPTHER

## 2025-03-10 RX ADMIN — SODIUM CHLORIDE: 9 INJECTION, SOLUTION INTRAVENOUS at 15:03

## 2025-03-10 RX ADMIN — IPRATROPIUM BROMIDE AND ALBUTEROL SULFATE 1 DOSE: 2.5; .5 SOLUTION RESPIRATORY (INHALATION) at 19:47

## 2025-03-10 RX ADMIN — PREGABALIN 50 MG: 50 CAPSULE ORAL at 13:15

## 2025-03-10 RX ADMIN — ENOXAPARIN SODIUM 40 MG: 100 INJECTION SUBCUTANEOUS at 20:37

## 2025-03-10 RX ADMIN — IPRATROPIUM BROMIDE AND ALBUTEROL SULFATE 1 DOSE: 2.5; .5 SOLUTION RESPIRATORY (INHALATION) at 08:55

## 2025-03-10 RX ADMIN — ARFORMOTEROL TARTRATE 15 MCG: 15 SOLUTION RESPIRATORY (INHALATION) at 19:47

## 2025-03-10 RX ADMIN — SODIUM CHLORIDE, PRESERVATIVE FREE 10 ML: 5 INJECTION INTRAVENOUS at 08:43

## 2025-03-10 RX ADMIN — Medication 100 MCG: at 15:29

## 2025-03-10 RX ADMIN — Medication 100 MCG: at 15:50

## 2025-03-10 RX ADMIN — BUDESONIDE 1000 MCG: 0.5 INHALANT RESPIRATORY (INHALATION) at 08:57

## 2025-03-10 RX ADMIN — MONTELUKAST 10 MG: 10 TABLET, FILM COATED ORAL at 13:14

## 2025-03-10 RX ADMIN — IPRATROPIUM BROMIDE AND ALBUTEROL SULFATE 1 DOSE: 2.5; .5 SOLUTION RESPIRATORY (INHALATION) at 13:07

## 2025-03-10 RX ADMIN — PROPOFOL 40 MG: 10 INJECTION, EMULSION INTRAVENOUS at 15:08

## 2025-03-10 RX ADMIN — SODIUM CHLORIDE, PRESERVATIVE FREE 40 MG: 5 INJECTION INTRAVENOUS at 22:43

## 2025-03-10 RX ADMIN — LABETALOL HYDROCHLORIDE 5 MG: 5 INJECTION INTRAVENOUS at 15:43

## 2025-03-10 RX ADMIN — PROPOFOL 80 MCG/KG/MIN: 10 INJECTION, EMULSION INTRAVENOUS at 15:09

## 2025-03-10 RX ADMIN — LORAZEPAM 0.5 MG: 2 INJECTION INTRAMUSCULAR; INTRAVENOUS at 10:08

## 2025-03-10 RX ADMIN — PROPOFOL 30 MG: 10 INJECTION, EMULSION INTRAVENOUS at 15:14

## 2025-03-10 RX ADMIN — SODIUM CHLORIDE, PRESERVATIVE FREE 40 MG: 5 INJECTION INTRAVENOUS at 13:14

## 2025-03-10 RX ADMIN — PROPOFOL 10 MG: 10 INJECTION, EMULSION INTRAVENOUS at 15:34

## 2025-03-10 RX ADMIN — WATER 20 MG: 1 INJECTION INTRAMUSCULAR; INTRAVENOUS; SUBCUTANEOUS at 08:43

## 2025-03-10 RX ADMIN — LORAZEPAM 0.5 MG: 2 INJECTION INTRAMUSCULAR; INTRAVENOUS at 18:43

## 2025-03-10 RX ADMIN — DEXTROSE MONOHYDRATE: 50 INJECTION, SOLUTION INTRAVENOUS at 18:44

## 2025-03-10 RX ADMIN — PROPOFOL 20 MG: 10 INJECTION, EMULSION INTRAVENOUS at 15:50

## 2025-03-10 RX ADMIN — BUDESONIDE 1000 MCG: 0.5 INHALANT RESPIRATORY (INHALATION) at 19:46

## 2025-03-10 RX ADMIN — ARFORMOTEROL TARTRATE 15 MCG: 15 SOLUTION RESPIRATORY (INHALATION) at 08:56

## 2025-03-10 ASSESSMENT — PAIN - FUNCTIONAL ASSESSMENT
PAIN_FUNCTIONAL_ASSESSMENT: NONE - DENIES PAIN
PAIN_FUNCTIONAL_ASSESSMENT: NONE - DENIES PAIN

## 2025-03-10 ASSESSMENT — ENCOUNTER SYMPTOMS: SHORTNESS OF BREATH: 0

## 2025-03-10 NOTE — PROGRESS NOTES
Hospital Medicine    Subjective: Patient is awake denies new complaints  Breathing is okay      Current Facility-Administered Medications:     potassium & sodium phosphates (PHOS-NAK) 280-160-250 MG packet 250 mg, 1 packet, Oral, BID, Kwan Brenner MD    methylPREDNISolone sodium succ (SOLU-MEDROL) 20 mg in sterile water 0.5 mL injection, 20 mg, IntraVENous, Daily, Alysa Kidd APRN - CNP, 20 mg at 03/10/25 0843    enoxaparin (LOVENOX) injection 40 mg, 40 mg, SubCUTAneous, Daily, Bryan Rivers DO, 40 mg at 03/09/25 2147    guaiFENesin-dextromethorphan (ROBITUSSIN DM) 100-10 MG/5ML syrup 10 mL, 10 mL, Oral, Q6H PRN, Daniella Anderson APRN - CNP    pantoprazole (PROTONIX) 40 mg in sodium chloride (PF) 0.9 % 10 mL injection, 40 mg, IntraVENous, Q12H, James Spivey MD, 40 mg at 03/09/25 2153    dextrose 5 % solution, , IntraVENous, Continuous, Ines Gauthier APRN - CNP, Last Rate: 50 mL/hr at 03/08/25 2200, Rate Verify at 03/08/25 2200    sodium bicarbonate tablet 650 mg, 650 mg, Oral, BID, Ines Gauthier APRN - CNP, 650 mg at 03/08/25 0848    LORazepam (ATIVAN) injection 0.5 mg, 0.5 mg, IntraVENous, Q6H PRN, Bryan Rivers DO, 0.5 mg at 03/10/25 1008    sodium chloride 0.9 % bolus 1,000 mL, 1,000 mL, IntraVENous, Once, Jocelynn Porras MD    sodium chloride 0.9 % bolus 1,000 mL, 1,000 mL, IntraVENous, Once, Natanael Martinez MD    ipratropium 0.5 mg-albuterol 2.5 mg (DUONEB) nebulizer solution 1 Dose, 1 Dose, Inhalation, Q4H WA RT, Bryan Rivers DO, 1 Dose at 03/10/25 0855    LORazepam (ATIVAN) tablet 1 mg, 1 mg, Oral, TID PRN, Bryan Rivers, , 1 mg at 03/01/25 2143    lidocaine 1 % injection 5 mL, 5 mL, IntraDERmal, Once, Oliver Waters MD    atorvastatin (LIPITOR) tablet 10 mg, 10 mg, Oral, Nightly, Bryan Rivers DO, 10 mg at 03/01/25 2143    escitalopram (LEXAPRO) tablet 10 mg, 10 mg, Oral, Nightly, Bryan Rivers DO, 10 mg at 03/01/25 2143    budesonide  PROTIME 13.2 (H) 05/28/2017       Assessment:    Principal Problem:    Compression fracture of L2 lumbar vertebra (HCC)  Active Problems:    Pancreatic lesion    Goals of care, counseling/discussion    Palliative care encounter    Esophageal dysphagia  Resolved Problems:    * No resolved hospital problems. *      Plan:    For EGD today  Continue IV fluids        Amaury Howe MD  1:01 PM  3/10/2025

## 2025-03-10 NOTE — PROGRESS NOTES
Spiritual Health History and Assessment/Progress Note  [facility] Lori Sharpe    (P) Follow-up,  ,  ,      Name: Haritha Dominguez MRN: [id number]    Age: 80 y.o.     Sex: female   Language: English   Mu-ism: Jainism   Compression fracture of L2 lumbar vertebra (HCC)     Date: 3/10/2025                           Spiritual Assessment began in [facility]        Referral/Consult From: (P) Palliative Care, Rounding   Encounter Overview/Reason: (P) Follow-up  Service Provided For: (P) Patient    Meliza, Belief, Meaning:   Patient identifies as spiritual, is connected with a meliza tradition or spiritual practice, and has beliefs or practices that help with coping during difficult times  Family/Friends No family/friends present      Importance and Influence:  Patient has spiritual/personal beliefs that influence decisions regarding their health  Family/Friends No family/friends present    Community:  Patient is connected with a spiritual community and feels well-supported. Support system includes: Children  Family/Friends No family/friends present    Assessment and Plan of Care:     Patient Interventions include: Facilitated expression of thoughts and feelings  Family/Friends Interventions include: No family/friends present    Patient Plan of Care: Other: The  will follow as needed.  Family/Friends Plan of Care: No family/friends present    Electronically signed by Chaplain Handy on 3/10/2025 at 7:35 PM

## 2025-03-10 NOTE — PLAN OF CARE
Problem: Discharge Planning  Goal: Discharge to home or other facility with appropriate resources  Outcome: Progressing  Flowsheets (Taken 3/10/2025 0010)  Discharge to home or other facility with appropriate resources:   Identify barriers to discharge with patient and caregiver   Arrange for needed discharge resources and transportation as appropriate     Problem: Pain  Goal: Verbalizes/displays adequate comfort level or baseline comfort level  Outcome: Progressing  Flowsheets (Taken 3/10/2025 0000)  Verbalizes/displays adequate comfort level or baseline comfort level:   Encourage patient to monitor pain and request assistance   Assess pain using appropriate pain scale   Administer analgesics based on type and severity of pain and evaluate response     Problem: Safety - Adult  Goal: Free from fall injury  Outcome: Progressing  Flowsheets (Taken 3/10/2025 0235)  Free From Fall Injury: Instruct family/caregiver on patient safety     Problem: ABCDS Injury Assessment  Goal: Absence of physical injury  Outcome: Progressing  Flowsheets (Taken 3/10/2025 0235)  Absence of Physical Injury: Implement safety measures based on patient assessment     Problem: Skin/Tissue Integrity  Goal: Skin integrity remains intact  Description: 1.  Monitor for areas of redness and/or skin breakdown  2.  Assess vascular access sites hourly  3.  Every 4-6 hours minimum:  Change oxygen saturation probe site  4.  Every 4-6 hours:  If on nasal continuous positive airway pressure, respiratory therapy assess nares and determine need for appliance change or resting period  Outcome: Progressing  Flowsheets (Taken 3/10/2025 0235)  Skin Integrity Remains Intact: Monitor for areas of redness and/or skin breakdown     Problem: Nutrition Deficit:  Goal: Optimize nutritional status  Outcome: Progressing     Problem: Neurosensory - Adult  Goal: Achieves maximal functionality and self care  Outcome: Progressing  Flowsheets (Taken 3/10/2025 0010)  Achieves

## 2025-03-10 NOTE — PROGRESS NOTES
Comprehensive Nutrition Assessment    Type and Reason for Visit:  Reassess    Nutrition Recommendations/Plan:   Continue NPO, pt anticipating EUS/EGD today 3/10; pt w/ prolonged NPO status- if pt unable to consume nutrition orally, would recommend nutrition support to meet nutritional needs.   Will continue to monitor; will provide nutrition recs as appropriate w/ nutrition progression/diet advancement.     Malnutrition Assessment:  Malnutrition Status:  Severe malnutrition (03/10/25 1412)    Context:  Acute Illness     Findings of the 6 clinical characteristics of malnutrition:  Energy Intake:  50% or less of estimated energy requirements for 5 or more days  Weight Loss:  Greater than 2% over 1 week (~2.8% in ~1 week)     Body Fat Loss:  Unable to assess (pt in iso)     Muscle Mass Loss:  Unable to assess (pt in iso)    Fluid Accumulation:  No fluid accumulation     Strength:  Not Performed    Nutrition Assessment:    pt adm d/t abd pain/compression frx; PMhx of HTN, Pancreatic cyst, GERD,claustrophobia; pt refused MRI; pt w/ poor appetite/oral intake this adm and lethargy; influenza noted- pt remains in isolation, high temps noted this adm; refusing IVF this adm; s/p RRT 3/2 r/t resp failure; possible difficulty swallowing noted w/ emesis this adm, pt s/p SLP eval 3/3 who recommend NPO until MBSS; s/p MBSS/SLP eval 3/4, recommend NPO until cleared by physician to advance & if oral diet initiated, SLP rec pureed solids/nectar thick liquids ; s/p esophagram, dysfunctional swallowing w/ recurrent aspiration into trachea noted; hiatal hernia/esophageal dismotility deficit noted; pancreatic tail lesion noted; pt s/p unsuccessful NGT placement, remains NPO- pt anticipating EUS/EGD today 3/10; pt w/ ongoing NPO status this adm, if pt unable to consume nutrition orally, would recommend nutrition support to meet nutritional needs; await POC; pt meets criteria for severe malnutrition; will monitor and provide nutrition

## 2025-03-10 NOTE — BRIEF OP NOTE
Brief Postoperative Note      Patient: Haritha Dominguez  YOB: 1945  MRN: [id number]    Date of Procedure: 3/10/2025    Pre-Op Diagnosis Codes:      * Dysphagia [R13.10]    Post-Op Diagnosis: Same        Procedure(s):  ESOPHAGOGASTRODUODENOSCOPY  ESOPHAGOGASTRODUODENOSCOPY ENDOSCOPIC ULTRASOUND FINE NEEDLE ASPIRATION  ESOPHAGOGASTRODUODENOSCOPY DILATION SAVORY    Surgeon(s):  James Spivey MD    Assistant:  * No surgical staff found *    Anesthesia: Monitor Anesthesia Care    Estimated Blood Loss (mL): less than 50     Complications: None    Specimens:   * No specimens in log *    Implants:  * No implants in log *      Drains:   NG/OG/NJ/NE Tube Nasogastric 16 fr Right nostril (Active)       [REMOVED] External Urinary Catheter (Removed)   Site Assessment Clean;Intact 03/09/25 0807   Placement Replaced 03/08/25 2200   Securement Method Other (Comment) 03/08/25 2200   Catheter Care Catheter/Wick replaced 03/08/25 2200   Perineal Care Yes 03/08/25 2200   Suction 40 mmgHg continuous 03/08/25 2200   Urine Color Yellow 03/09/25 0807   Urine Appearance Clear 03/09/25 0807   Urine Odor Malodorous 03/06/25 2000   Output (mL) 250 mL 03/09/25 0807       Findings:    Normal esophagus.  Large hiatal hernia.  Normal gastric mucosa.  Normal duodenal mucosa.  Mucosal tear appreciated within hiatal hernia with scope passage.  Savory dilation of proximal/middle esophagus to 57Fr with adequate response.  EUS attempted but given degree of tearing and difficult views in the setting of hiatal hernia, aborted.  Endoscopically placed NG tube placement successful.     Electronically signed by James Spivey MD on 3/10/2025 at 4:46 PM

## 2025-03-10 NOTE — PROGRESS NOTES
Speech Language Pathology  NAME:  Haritha Dominguez  :  1945  DATE: 3/10/2025  [facility]    Pt unavailable at 1347 for dysphagia: Per RN, patient cont NPO per SLP recs and for EGD this date. Will await new MBSS orders once cleared by GI and continue to follow.          Will re-attempt as appropriate.       Thank You    Funmi Crystal M.S., CCC-SLP  Speech-Language Pathologist  SP. [phone]

## 2025-03-10 NOTE — PROGRESS NOTES
NG tube placed via endoscopy by Dr Spivey. Consulted nutrition for TF recommendations.     19:05 checked for abd XR results; order is still active/not completed. Called XR to make sure they were aware of post NG placement order; no answer. Reordered XR stat.

## 2025-03-10 NOTE — ANESTHESIA POSTPROCEDURE EVALUATION
Department of Anesthesiology  Postprocedure Note    Patient: Haritha Dominguez  MRN: [id number]  YOB: 1945  Date of evaluation: 3/10/2025    Procedure Summary       Date: 03/10/25 Room / Location: [place] [address] / [facility]    Anesthesia Start: 1503 Anesthesia Stop: 1622    Procedures:       ESOPHAGOGASTRODUODENOSCOPY      ESOPHAGOGASTRODUODENOSCOPY ENDOSCOPIC ULTRASOUND FINE NEEDLE ASPIRATION      ESOPHAGOGASTRODUODENOSCOPY DILATION SAVORY Diagnosis:       Dysphagia      (Dysphagia [R13.10])    Surgeons: James Spivey MD Responsible Provider: True Mcgovern DO    Anesthesia Type: MAC ASA Status: 3            Anesthesia Type: No value filed.    Jamie Phase I: Jamie Score: 9    Jamie Phase II: Jamie Score: 10    Anesthesia Post Evaluation    Patient location during evaluation: PACU  Patient participation: complete - patient participated  Level of consciousness: awake and alert  Pain score: 1  Airway patency: patent  Nausea & Vomiting: no nausea and no vomiting  Cardiovascular status: hemodynamically stable  Respiratory status: acceptable  Hydration status: euvolemic  Pain management: adequate    No notable events documented.

## 2025-03-10 NOTE — ANESTHESIA PRE PROCEDURE
Department of Anesthesiology  Preprocedure Note       Name:  Haritha Dominguez   Age:  80 y.o.  :  1945                                          MRN:  [id number]         Date:  3/10/2025      Surgeon: Surgeon(s):  James Spivey MD    Procedure: Procedure(s):  ESOPHAGOGASTRODUODENOSCOPY    Medications prior to admission:   Prior to Admission medications    Medication Sig Start Date End Date Taking? Authorizing Provider   acetaminophen (TYLENOL) 325 MG tablet Take 2 tablets by mouth every 6 hours as needed for Pain (mild) 3/1/25  Yes Bryan Rivers, DO   sodium bicarbonate 650 MG tablet Take 2 tablets by mouth 2 times daily 3/1/25  Yes Bryan Rivers, DO   LORazepam (ATIVAN) 1 MG tablet Take 1 tablet by mouth 3 times daily as needed for Anxiety for up to 30 days. Max Daily Amount: 3 mg 3/1/25 3/31/25 Yes Bryan Rivers, DO   albuterol (PROVENTIL) (2.5 MG/3ML) 0.083% nebulizer solution Take 3 mLs by nebulization 4 times daily as needed for Wheezing Please bill under Medicare Part B DX: Asthma J45.5 3/1/25  Yes Bryan Rivers, DO   guaiFENesin-dextromethorphan (ROBITUSSIN DM) 100-10 MG/5ML syrup Take 10 mLs by mouth every 6 hours as needed for Cough 3/1/25 3/11/25 Yes Bryan Rivers, DO   Tralokinumab-ldrm (ADBRY) 300 MG/2ML SOAJ Inject 300 mg into the skin every 14 days   Yes Jessica Sullivan MD   montelukast (SINGULAIR) 10 MG tablet TAKE 1 TABLET DAILY 24  Yes Wallace Decker, DO   fluticasone-umeclidin-vilant (TRELEGY ELLIPTA) 200-62.5-25 MCG/ACT AEPB inhaler Inhale 1 puff into the lungs daily 3/28/24  Yes Wallace Decker, DO   pantoprazole (PROTONIX) 40 MG tablet Take 1 tablet by mouth in the morning and 1 tablet in the evening.    Jessica Sullivan MD   ferrous sulfate (IRON 325) 325 (65 Fe) MG tablet Take 1 tablet by mouth daily (with breakfast)    Jessica Sullivan MD   albuterol sulfate HFA (VENTOLIN HFA) 108 (90 Base) MCG/ACT inhaler Inhale 2 puffs into the lungs every 6

## 2025-03-10 NOTE — PROGRESS NOTES
[facility]  [place]  Progress Note      Chief Complaint   Patient presents with    Abdominal Pain     Patient from home called for 10/10 epigastric pain reproducible with palpation. EMS gave 50 mcg of fentanyl and 4mg of zofran PTA. Denies pain currently       SUBJECTIVE:    Patient is tolerating medications. No reported adverse drug reactions.  No nausea, diarrhea. . Sitting up in bed. No complaints or concerns. For EGD today  .Review of systems:  As stated above in the chief complaint, otherwise negative.    Medications:  Scheduled Meds:   potassium & sodium phosphates  1 packet Oral BID    methylPREDNISolone  20 mg IntraVENous Daily    enoxaparin  40 mg SubCUTAneous Daily    pantoprazole (PROTONIX) 40 mg in sodium chloride (PF) 0.9 % 10 mL injection  40 mg IntraVENous Q12H    sodium bicarbonate  650 mg Oral BID    sodium chloride  1,000 mL IntraVENous Once    sodium chloride  1,000 mL IntraVENous Once    ipratropium 0.5 mg-albuterol 2.5 mg  1 Dose Inhalation Q4H WA RT    lidocaine  5 mL IntraDERmal Once    atorvastatin  10 mg Oral Nightly    escitalopram  10 mg Oral Nightly    budesonide  1 mg Nebulization BID RT    And    arformoterol tartrate  15 mcg Nebulization BID RT    montelukast  10 mg Oral Daily    [Held by provider] pantoprazole  40 mg Oral Daily    pregabalin  50 mg Oral BID    sodium chloride flush  5-40 mL IntraVENous 2 times per day     Continuous Infusions:   dextrose 50 mL/hr at 03/08/25 2200    sodium chloride       PRN Meds:guaiFENesin-dextromethorphan, LORazepam, LORazepam, sodium chloride flush, sodium chloride, potassium chloride **OR** potassium alternative oral replacement **OR** potassium chloride, magnesium sulfate, polyethylene glycol, acetaminophen **OR** acetaminophen, HYDROcodone 5 mg - acetaminophen, ondansetron    OBJECTIVE:  /62   Pulse (!) 103   Temp 97.8 °F (36.6 °C) (Temporal)   Resp 26   Ht 1.524 m (5')   Wt 63.7 kg (140 lb 6.9 oz)    CALCIUM 8.7 03/10/2025 04:38 AM    BILITOT 0.3 03/10/2025 04:38 AM    ALKPHOS 71 03/10/2025 04:38 AM    AST 14 03/10/2025 04:38 AM    ALT 7 03/10/2025 04:38 AM     Lab Results   Component Value Date    CRP 21.0 (H) 11/04/2024    CRP 13.8 (H) 03/10/2023    CRP 12.8 (H) 02/21/2023     Lab Results   Component Value Date    SEDRATE 72 (H) 11/04/2024    SEDRATE 61 (H) 03/10/2023    SEDRATE 26 (H) 02/21/2023     Radiology:      Microbiology:   Lab Results   Component Value Date/Time    BC 5 Days no growth 03/09/2023 08:30 PM    BC 5 Days no growth 02/20/2023 05:43 PM    BC 5 Days no growth 02/02/2023 03:35 PM    ORG GNR oxidase positive 03/09/2023 08:19 PM    ORG Candida albicans 02/20/2023 06:30 PM    ORG Staphylococcus coagulase-negative 01/31/2023 11:25 PM     Lab Results   Component Value Date/Time    BLOODCULT2 5 Days no growth 03/09/2023 09:45 PM    BLOODCULT2 5 Days no growth 02/20/2023 05:43 PM    BLOODCULT2 5 Days no growth 02/02/2023 03:35 PM    ORG GNR oxidase positive 03/09/2023 08:19 PM    ORG Candida albicans 02/20/2023 06:30 PM    ORG Staphylococcus coagulase-negative 01/31/2023 11:25 PM     WOUND/ABSCESS   Date Value Ref Range Status   04/30/2020 Growth not present  Final   09/26/2017   Final    Moderate growth  This isolate is presumed to be resistant based on the  detection of inducible Clindamycin resistance.  Clindamycin  may still be effective in some patients.       No results found for: \"RESPSMEAR\"      Component Value Date/Time    MPNEUMO Not Detected 03/02/2025 2125     No results found for: \"CULTRESP\"  No results found for: \"CXCATHTIP\"  Body Fluid Culture, Sterile   Date Value Ref Range Status   09/30/2017 Neisseria gonorrhoeae not isolated (A)  Final   09/30/2017 One colony  Final     No results found for: \"CXSURG\"  Urine Culture, Routine   Date Value Ref Range Status   05/24/2023 <10,000 CFU/mL  Mixed gram positive organisms    Final   03/09/2023 >100,000 CFU/ml  Final   02/20/2023 >100,000

## 2025-03-10 NOTE — PROGRESS NOTES
Palliative Care Department  [phone]  Palliative Care Progress Note  Provider Ines Uribe PA-C     Haritha Dominguez  [id number]  Hospital Day: 20  Date of Initial Consult: 02/23/2025  Referring Provider: Bryan Rivers DO   Palliative Medicine was consulted for assistance with: \"declining in health, sleeping 22 hrs per day, refusing to eat\"    HPI:   Haritha Dominguez is a 80 y.o. with a medical history of thoracic and lumbar compression fractures, B12 deficiency, hypertension, chronic back pain, asthma who was admitted on 2/19/2025 from home with a CHIEF COMPLAINT of abdominal pain and low back pain for 1 day.  In the ED, CT scan showing moderate to large hiatal hernia, moderate stool burden, 1.3 cm low attenuating lesion along the pancreatic body/tail, L2 compression fracture.  She was admitted for pain control and further workup.  CT lumbar spine without contrast showing new acute appearing fractures involving superior endplate of L2, stable chronic compression fracture involving L1, bilateral sacral alae fractures of uncertain chronicity, generalized osteopenia, stable alignment of lumbar spine with posterior fusion hardware at L3-S1 and multilevel laminectomy.  Oncology consulted for pancreatic lesion.  They recommended MRI.  Patient refused.  Neurosurgery consulted for L2 compression fracture, recommending brace.  During the course of her hospitalization, patient had further decline with lethargy, refusing to eat, intermittent confusion.  Palliative care consulted for further assistance    2/24: Influenza A positive  2/27: DNR CCA/DNI  3/2: RRT for respiratory failure  3/5: Esophagram showing esophageal dysmotility with aspiration into the trachea, prominent hiatal hernia  3/10: for EGD and NGT placement  ASSESSMENT/PLAN:     Pertinent Hospital Diagnoses     Acute hypoxic respiratory failure  Influenza A    Palliative Care Encounter / Counseling Regarding Goals of Care  Please see detailed goals of  lids, no discharge  ENMT:  Normocephalic, atraumatic, mucosa moist, EOMI  Lungs:  2L high flow nasal cannula. rhonchi  Heart::  regular rate  Ext:  Moving all extremities  Skin:  Scabbed circular lesions scattered on extremities. Dry skin  Psych: non-anxious affect  Neuro: Alert and oriented.    Objective data reviewed: labs, images, records, medication use, vitals, and chart    Discussed patient and the plan of care with the other IDT members: Patient and family and  interdisciplinary team    Time/Communication  Greater than 50% of time spent, total 35 minutes in counseling and coordination of care at the bedside regarding goals of care, symptom management, diagnosis and prognosis, and see above.    Thank you for allowing Palliative Medicine to participate in the care of Haritha A Pugh.

## 2025-03-11 ENCOUNTER — APPOINTMENT (OUTPATIENT)
Dept: GENERAL RADIOLOGY | Age: 80
End: 2025-03-11
Payer: MEDICARE

## 2025-03-11 LAB
ALBUMIN SERPL-MCNC: 2.8 G/DL (ref 3.5–5.2)
ALP SERPL-CCNC: 73 U/L (ref 35–104)
ALT SERPL-CCNC: 7 U/L (ref 0–32)
ANION GAP SERPL CALCULATED.3IONS-SCNC: 14 MMOL/L (ref 7–16)
AST SERPL-CCNC: 12 U/L (ref 0–31)
BILIRUB SERPL-MCNC: 0.4 MG/DL (ref 0–1.2)
BUN SERPL-MCNC: 12 MG/DL (ref 6–23)
CALCIUM SERPL-MCNC: 8.2 MG/DL (ref 8.6–10.2)
CHLORIDE SERPL-SCNC: 106 MMOL/L (ref 98–107)
CO2 SERPL-SCNC: 20 MMOL/L (ref 22–29)
CREAT SERPL-MCNC: 0.6 MG/DL (ref 0.5–1)
ERYTHROCYTE [DISTWIDTH] IN BLOOD BY AUTOMATED COUNT: 15.3 % (ref 11.5–15)
GFR, ESTIMATED: >90 ML/MIN/1.73M2
GLUCOSE SERPL-MCNC: 90 MG/DL (ref 74–99)
HCT VFR BLD AUTO: 31.7 % (ref 34–48)
HGB BLD-MCNC: 10 G/DL (ref 11.5–15.5)
MAGNESIUM SERPL-MCNC: 1.7 MG/DL (ref 1.6–2.6)
MCH RBC QN AUTO: 30 PG (ref 26–35)
MCHC RBC AUTO-ENTMCNC: 31.5 G/DL (ref 32–34.5)
MCV RBC AUTO: 95.2 FL (ref 80–99.9)
PHOSPHATE SERPL-MCNC: 2.4 MG/DL (ref 2.5–4.5)
PLATELET # BLD AUTO: 406 K/UL (ref 130–450)
PMV BLD AUTO: 12.3 FL (ref 7–12)
POTASSIUM SERPL-SCNC: 3.4 MMOL/L (ref 3.5–5)
PROT SERPL-MCNC: 5.5 G/DL (ref 6.4–8.3)
RBC # BLD AUTO: 3.33 M/UL (ref 3.5–5.5)
SODIUM SERPL-SCNC: 140 MMOL/L (ref 132–146)
WBC OTHER # BLD: 16 K/UL (ref 4.5–11.5)

## 2025-03-11 PROCEDURE — 6360000002 HC RX W HCPCS: Performed by: INTERNAL MEDICINE

## 2025-03-11 PROCEDURE — 2580000003 HC RX 258

## 2025-03-11 PROCEDURE — 2500000003 HC RX 250 WO HCPCS: Performed by: ANESTHESIOLOGY

## 2025-03-11 PROCEDURE — 2500000003 HC RX 250 WO HCPCS

## 2025-03-11 PROCEDURE — 6360000002 HC RX W HCPCS: Performed by: STUDENT IN AN ORGANIZED HEALTH CARE EDUCATION/TRAINING PROGRAM

## 2025-03-11 PROCEDURE — 6370000000 HC RX 637 (ALT 250 FOR IP): Performed by: INTERNAL MEDICINE

## 2025-03-11 PROCEDURE — 97535 SELF CARE MNGMENT TRAINING: CPT

## 2025-03-11 PROCEDURE — 80053 COMPREHEN METABOLIC PANEL: CPT

## 2025-03-11 PROCEDURE — 84100 ASSAY OF PHOSPHORUS: CPT

## 2025-03-11 PROCEDURE — 83735 ASSAY OF MAGNESIUM: CPT

## 2025-03-11 PROCEDURE — 2580000003 HC RX 258: Performed by: STUDENT IN AN ORGANIZED HEALTH CARE EDUCATION/TRAINING PROGRAM

## 2025-03-11 PROCEDURE — 36415 COLL VENOUS BLD VENIPUNCTURE: CPT

## 2025-03-11 PROCEDURE — 2140000000 HC CCU INTERMEDIATE R&B

## 2025-03-11 PROCEDURE — 97530 THERAPEUTIC ACTIVITIES: CPT

## 2025-03-11 PROCEDURE — 6370000000 HC RX 637 (ALT 250 FOR IP)

## 2025-03-11 PROCEDURE — 94640 AIRWAY INHALATION TREATMENT: CPT

## 2025-03-11 PROCEDURE — 6360000002 HC RX W HCPCS

## 2025-03-11 PROCEDURE — 2700000000 HC OXYGEN THERAPY PER DAY

## 2025-03-11 PROCEDURE — 99232 SBSQ HOSP IP/OBS MODERATE 35: CPT | Performed by: NURSE PRACTITIONER

## 2025-03-11 PROCEDURE — 85027 COMPLETE CBC AUTOMATED: CPT

## 2025-03-11 PROCEDURE — 71045 X-RAY EXAM CHEST 1 VIEW: CPT

## 2025-03-11 PROCEDURE — 99233 SBSQ HOSP IP/OBS HIGH 50: CPT | Performed by: PHYSICIAN ASSISTANT

## 2025-03-11 RX ADMIN — ESCITALOPRAM OXALATE 10 MG: 10 TABLET ORAL at 20:59

## 2025-03-11 RX ADMIN — LORAZEPAM 0.5 MG: 2 INJECTION INTRAMUSCULAR; INTRAVENOUS at 10:45

## 2025-03-11 RX ADMIN — BUDESONIDE 1000 MCG: 0.5 INHALANT RESPIRATORY (INHALATION) at 07:27

## 2025-03-11 RX ADMIN — ARFORMOTEROL TARTRATE 15 MCG: 15 SOLUTION RESPIRATORY (INHALATION) at 07:27

## 2025-03-11 RX ADMIN — BUDESONIDE 1000 MCG: 0.5 INHALANT RESPIRATORY (INHALATION) at 20:17

## 2025-03-11 RX ADMIN — PREGABALIN 50 MG: 50 CAPSULE ORAL at 20:59

## 2025-03-11 RX ADMIN — IPRATROPIUM BROMIDE AND ALBUTEROL SULFATE 1 DOSE: 2.5; .5 SOLUTION RESPIRATORY (INHALATION) at 11:49

## 2025-03-11 RX ADMIN — SODIUM BICARBONATE 650 MG: 650 TABLET ORAL at 09:04

## 2025-03-11 RX ADMIN — ENOXAPARIN SODIUM 40 MG: 100 INJECTION SUBCUTANEOUS at 20:58

## 2025-03-11 RX ADMIN — MONTELUKAST 10 MG: 10 TABLET, FILM COATED ORAL at 09:04

## 2025-03-11 RX ADMIN — POTASSIUM BICARBONATE 40 MEQ: 782 TABLET, EFFERVESCENT ORAL at 09:04

## 2025-03-11 RX ADMIN — SODIUM CHLORIDE, PRESERVATIVE FREE 10 ML: 5 INJECTION INTRAVENOUS at 20:59

## 2025-03-11 RX ADMIN — SODIUM CHLORIDE, PRESERVATIVE FREE 40 MG: 5 INJECTION INTRAVENOUS at 22:53

## 2025-03-11 RX ADMIN — SODIUM CHLORIDE, PRESERVATIVE FREE 40 MG: 5 INJECTION INTRAVENOUS at 10:45

## 2025-03-11 RX ADMIN — POTASSIUM PHOSPHATE, MONOBASIC AND POTASSIUM PHOSPHATE, DIBASIC 10 MMOL: 224; 236 INJECTION, SOLUTION, CONCENTRATE INTRAVENOUS at 11:11

## 2025-03-11 RX ADMIN — PREGABALIN 50 MG: 50 CAPSULE ORAL at 09:04

## 2025-03-11 RX ADMIN — IPRATROPIUM BROMIDE AND ALBUTEROL SULFATE 1 DOSE: 2.5; .5 SOLUTION RESPIRATORY (INHALATION) at 07:27

## 2025-03-11 RX ADMIN — ARFORMOTEROL TARTRATE 15 MCG: 15 SOLUTION RESPIRATORY (INHALATION) at 20:17

## 2025-03-11 RX ADMIN — SODIUM BICARBONATE 650 MG: 650 TABLET ORAL at 20:58

## 2025-03-11 RX ADMIN — WATER 20 MG: 1 INJECTION INTRAMUSCULAR; INTRAVENOUS; SUBCUTANEOUS at 09:04

## 2025-03-11 RX ADMIN — ATORVASTATIN CALCIUM 10 MG: 20 TABLET, FILM COATED ORAL at 20:58

## 2025-03-11 RX ADMIN — IPRATROPIUM BROMIDE AND ALBUTEROL SULFATE 1 DOSE: 2.5; .5 SOLUTION RESPIRATORY (INHALATION) at 20:17

## 2025-03-11 RX ADMIN — LORAZEPAM 0.5 MG: 2 INJECTION INTRAMUSCULAR; INTRAVENOUS at 20:57

## 2025-03-11 RX ADMIN — SODIUM CHLORIDE, PRESERVATIVE FREE 10 ML: 5 INJECTION INTRAVENOUS at 09:04

## 2025-03-11 NOTE — PROGRESS NOTES
SPEECH LANGUAGE PATHOLOGY  DAILY PROGRESS NOTE        PATIENT NAME:  Haritha Dominguez      :  1945          TODAY'S DATE:  3/11/2025 ROOM:  6414/6414-A    Order received, chart reviewed. Recommend repeat MBSS to further assess oropharyngeal function and determine a LRD diet. This will require a new physician order. Will await new order and continue to follow.       Funmi Crystal M.S., CCC-SLP  Speech-Language Pathologist  SP. [phone]

## 2025-03-11 NOTE — PROGRESS NOTES
Occupational Therapy  OT BEDSIDE TREATMENT NOTE   [facility]  [address], [place]      Date:3/11/2025  Patient Name: Haritha Dominguez  MRN: [id number]  : 1945  Room: [address] [place] [address]     Evaluating OT: aMtt Soriano OTR/L; [id number]        Referring Provider: Bryan Rivers DO     Specific Provider Orders/Date: OT Eval and Treat 25 1300        Diagnosis: Pt c/o LBP worsening ~1 day PTA. CT scan ID'd L2 compression fx.     Surgery: None this admission.     Pertinent Medical History:  has a past medical history of Abrasion of skin of left lower leg, Abrasion of skin of right lower leg, Anxiety, Arthritis, Asthma, Claustrophobia, Decreased dorsalis pedis pulse, Dermatitis, Fracture, GERD (gastroesophageal reflux disease), Hiatal hernia, History of blood transfusion, HTN (hypertension), Hyperlipidemia, Itching, On aspirin at home, Pancreatic cyst, Pneumonia, Sleep apnea, SOB (shortness of breath), and Tachycardia.      Recommended Adaptive Equipment: TBD      Precautions:  Fall Risk, spinal precautions, soft TLSO (\"Wear TLSO when greater than 45 degrees\" Per NS note 25), +alarms, limited insight into deficits, monitor BP (hypotensive), flexed posture, NGT      Assessment of current deficits    [x] Functional mobility            [x]ADLs           [x] Strength                  [x]Cognition    [x] Functional transfers          [x] IADLs         [x] Safety Awareness   [x]Endurance    [x] Fine Coordination                         [x] Balance      [] Vision/perception   []Sensation      []Gross Motor Coordination             [x] ROM           [] Delirium                   [] Motor Control      OT PLAN OF CARE   OT POC based on physician orders, patient diagnosis and results of clinical assessment     Frequency/Duration 1-3 days/wk for 2 weeks PRN   Specific OT Treatment Interventions to include:   * Instruction/training on adapted ADL    Visual/  Perceptual Glasses: Yes.  WFL.       Safety fair   fair- good   during ADL completion following spinal precautions   Vitals BP 89/46, 112 HR s/p activity.   RN made aware. Pt asymptomatic throughout session.            Hand Dominance Right    AROM (PROM) Strength Additional Info:  Goal: (PRN)   RUE  Shoulder flexion ~45 degrees, elbow flexion WFL. Impaired digit flexion.   Pt reports h/o arthritis. Shoulder 2+/5, distally 3+/5 grossly tested fair   and fair  FMC/dexterity noted during ADL tasks Improve overall RUE strength WFL for participation in functional tasks         LUE Shoulder flexion ~45 degrees, elbow flexion WFL. Impaired digit flexion.   Pt reports h/o arthritis. Shoulder 2+/5, distally 3+/5 grossly tested fair   and fair  FMC/dexterity noted during ADL tasks Improve overall LUE strength WFL for participation in functional tasks         Hearing: WFL   Sensation: No c/o numbness or tingling  Tone: WFL  Edema: Unremarkable      Comments: Upon arrival pt supine in bed. ADL retraining to increase independence in dressing and grooming tasks, balance and trf training to increase participation in functional mobility and standing aspects of ADLs with increased safety. Pt educated on modified techniques to increase independence and safety during ADLs, bed mobility, and functional transfers while adhering to spinal precautions. Pt would benefit from continued skilled OT to increase safety and independence with completion of ADL/IADL tasks for functional independence and quality of life.     At end of session pt left seated in bedside chair, call light within reach, RN notified.     Pt has made slow progress towards set goals.     Continue with current plan of care    Treatment Time In:1127            Treatment Time Out: 1150             Treatment Charges: Mins Units   Ther Ex  [id number]     Manual Therapy 91322     Thera Activities [id number] 15 1   ADL/Home Mgt [id number] 8 1   Neuro Re-ed [id number]     Group

## 2025-03-11 NOTE — PROGRESS NOTES
[facility] Medicine    Subjective: Patient is awake denies new complaints  Breathing is okay      Current Facility-Administered Medications:     naloxone 0.4 mg in 10 mL sodium chloride syringe, , IntraVENous, PRN, True Mcgovern DO    sodium chloride flush 0.9 % injection 5-40 mL, 5-40 mL, IntraVENous, 2 times per day, True Mcgovern DO, 10 mL at 03/11/25 0904    sodium chloride flush 0.9 % injection 5-40 mL, 5-40 mL, IntraVENous, PRN, True Mcgovern DO    0.9 % sodium chloride infusion, , IntraVENous, PRN, True Mcgovern DO    methylPREDNISolone sodium succ (SOLU-MEDROL) 20 mg in sterile water 0.5 mL injection, 20 mg, IntraVENous, Daily, Kenji Hale APRN - CNP, 20 mg at 03/11/25 0904    enoxaparin (LOVENOX) injection 40 mg, 40 mg, SubCUTAneous, Daily, Bryan Rivers DO, 40 mg at 03/10/25 2037    guaiFENesin-dextromethorphan (ROBITUSSIN DM) 100-10 MG/5ML syrup 10 mL, 10 mL, Oral, Q6H PRN, Daniella Anderson APRN - CNP    pantoprazole (PROTONIX) 40 mg in sodium chloride (PF) 0.9 % 10 mL injection, 40 mg, IntraVENous, Q12H, James Spivey MD, 40 mg at 03/11/25 1045    dextrose 5 % solution, , IntraVENous, Continuous, Ines Gauthier APRN - CNP, Last Rate: 50 mL/hr at 03/10/25 1844, New Bag at 03/10/25 1844    sodium bicarbonate tablet 650 mg, 650 mg, Oral, BID, Ines Gauthier APRN - CNP, 650 mg at 03/11/25 0904    LORazepam (ATIVAN) injection 0.5 mg, 0.5 mg, IntraVENous, Q6H PRN, Bryan Rivers DO, 0.5 mg at 03/11/25 1045    sodium chloride 0.9 % bolus 1,000 mL, 1,000 mL, IntraVENous, Once, Jocelynn Porras MD    sodium chloride 0.9 % bolus 1,000 mL, 1,000 mL, IntraVENous, Once, Natanael Martinez MD    ipratropium 0.5 mg-albuterol 2.5 mg (DUONEB) nebulizer solution 1 Dose, 1 Dose, Inhalation, Q4H WA RT, Bryan Rivers, , 1 Dose at 03/11/25 1144    LORazepam (ATIVAN) tablet 1 mg, 1 mg, Oral, TID PRN, Bryan Rivers, , 1 mg at 03/01/25 9945     03/11/2025 04:43 AM    BILITOT 0.4 03/11/2025 04:43 AM    ALKPHOS 73 03/11/2025 04:43 AM    AST 12 03/11/2025 04:43 AM    ALT 7 03/11/2025 04:43 AM     Warfarin PT/INR:    Lab Results   Component Value Date    INR 0.9 05/24/2023    INR 1.1 02/13/2023    INR 1.2 05/28/2017    PROTIME 10.2 05/24/2023    PROTIME 11.8 02/13/2023    PROTIME 13.2 (H) 05/28/2017       Assessment:    Principal Problem:    Compression fracture of L2 lumbar vertebra (HCC)  Active Problems:    Pancreatic lesion    Goals of care, counseling/discussion    Palliative care encounter    Esophageal dysphagia    Severe protein-calorie malnutrition  Resolved Problems:    * No resolved hospital problems. *      Plan:  EGD results noted patient was noted to have large hiatal hernia  Mucosal tear was noted within the hiatal hernia with scope passage  Patient had dilatation of proximal/middle esophagus  EUS was aborted  Currently has NG in  Increase activity as able          Amaury Howe MD  12:50 PM  3/11/2025

## 2025-03-11 NOTE — PROGRESS NOTES
Gastroenterology, Hepatology, &  Advanced Endoscopy    Progress Note      HPI:   Patient with improvement in respiratory status. She remains NPO, awaiting dietary for TF recommendations. EGD performed 3/10/24. NG placed.    Lab Results   Component Value Date    INR 0.9 05/24/2023    INR 1.1 02/13/2023    INR 1.2 05/28/2017    PROTIME 10.2 05/24/2023    PROTIME 11.8 02/13/2023    PROTIME 13.2 (H) 05/28/2017         ALT   Date Value Ref Range Status   03/11/2025 7 0 - 32 U/L Final   03/10/2025 7 0 - 32 U/L Final   03/09/2025 7 0 - 32 U/L Final     AST   Date Value Ref Range Status   03/11/2025 12 0 - 31 U/L Final   03/10/2025 14 0 - 31 U/L Final   03/09/2025 16 0 - 31 U/L Final     Alkaline Phosphatase   Date Value Ref Range Status   03/11/2025 73 35 - 104 U/L Final   03/10/2025 71 35 - 104 U/L Final   03/09/2025 64 35 - 104 U/L Final     Total Bilirubin   Date Value Ref Range Status   03/11/2025 0.4 0.0 - 1.2 mg/dL Final   03/10/2025 0.3 0.0 - 1.2 mg/dL Final   03/09/2025 0.3 0.0 - 1.2 mg/dL Final     Bilirubin, Direct   Date Value Ref Range Status   03/02/2025 <0.2 0.0 - 0.3 mg/dL Final   04/01/2012 0.2 0.0 - 0.2 mg/dL Final   01/01/2011 <0.1 0.0 - 0.2 mg/dL Final      Lab Results   Component Value Date    WBC 16.0 (H) 03/11/2025    HGB 10.0 (L) 03/11/2025    HCT 31.7 (L) 03/11/2025     03/11/2025     03/11/2025    K 3.4 (L) 03/11/2025     03/11/2025    CREATININE 0.6 03/11/2025    BUN 12 03/11/2025    CO2 20 (L) 03/11/2025    FOLATE 17.9 02/28/2025    NONZKBHB24 1002 (H) 02/28/2025    GLUCOSE 90 03/11/2025    INR 0.9 05/24/2023    PROTIME 10.2 05/24/2023    TSH 2.06 08/22/2023    LABA1C 7.8 01/17/2024        CEA   Date Value Ref Range Status   05/28/2017 5.3 (H) 0.0 - 5.2 ng/mL Final     Sed Rate, Automated   Date Value Ref Range Status   11/04/2024 72 (H) 0 - 20 mm/Hr Final   03/10/2023 61 (H) 0 - 20 mm/Hr Final   02/21/2023 26 (H) 0 - 20 mm/Hr Final   02/01/2023 42 (H) 0 - 20 mm/Hr Final  bicarb-citric acid (EFFER-K) effervescent tablet 40 mEq  40 mEq Oral PRN Bryan Rivers, DO   40 mEq at 03/11/25 0904    Or    potassium chloride 10 mEq/100 mL IVPB (Peripheral Line)  10 mEq IntraVENous PRN Bryan Rivers,  mL/hr at 03/05/25 2007 10 mEq at 03/05/25 2007    magnesium sulfate 2000 mg in 50 mL IVPB premix  2,000 mg IntraVENous PRN Bryan Rivers, DO   Stopped at 03/07/25 1121    polyethylene glycol (GLYCOLAX) packet 17 g  17 g Oral Daily PRN Bryan Rivers, DO   17 g at 02/23/25 0842    acetaminophen (TYLENOL) tablet 650 mg  650 mg Oral Q6H PRN Bryan Rivers, DO   650 mg at 03/02/25 1752    Or    acetaminophen (TYLENOL) suppository 650 mg  650 mg Rectal Q6H PRN Bryan Rivers, DO   650 mg at 03/03/25 0900    HYDROcodone-acetaminophen (NORCO) 5-325 MG per tablet 1 tablet  1 tablet Oral Q4H PRN Monica Hansen PA   1 tablet at 03/02/25 1601    ondansetron (ZOFRAN) injection 4 mg  4 mg IntraVENous Q6H PRN Oliver Waters MD   4 mg at 03/07/25 0926       OBJECTIVE      Physical    VITALS:  /66   Pulse 98   Temp 98.1 °F (36.7 °C) (Oral)   Resp 28   Ht 1.524 m (5')   Wt 67.4 kg (148 lb 9.4 oz)   SpO2 93%   BMI 29.02 kg/m²   Physical Exam:  General: Overall well-appearing, NAD  HEENT: PERRLA, EOMI, Anicteric sclera, MMM, no rhinorrhea  Cards: RRR, no LE edema  Resp: Breathing comfortably, good air movement, no use of accessory muscles, no audible wheezing  Abdomen: soft, NT, ND.   Extremities: Moves all extremities, no effusions or bruising.  Skin: No rashes or jaundice  Neuro: A&O x 3, CN grossly intact, non-focal exam     EGD Findings: 3/11/25   Normal esophagus.  Large hiatal hernia.  Normal gastric mucosa.  Normal duodenal mucosa.  Mucosal tear appreciated within hiatal hernia with scope passage.  Savory dilation of proximal/middle esophagus to 57Fr with adequate response.  EUS attempted but given degree of tearing and difficult views in the setting of hiatal  hernia, aborted.  Endoscopically placed NG tube placement successful.     ASSESSMENT:    80y/F who presented to the ED with abdominal pain and back pain and who now has worsening respiratory status to the point of requiring 10LNC. She is also now reporting regurgitation with PO intake which was appreciated during MBS and Esophagram.     She has had slow improvement in respiratory status.      PLAN:   Regurgitation:  - NG placed, dietary consulted for TF orders  - PPI BID.  - Known large hiatal hernia on imaging and EGD  - Await FNB results       2. Pancreatic Tail Lesion:  - Appreciated on admission CT.       We will follow.     Thank you for including us in the care of this patient. Please do not hesitate to contact us with any additional questions or concerns.     Discussed with James Spivey MD  Gastroenterology/Hepatology  Advanced Endoscopy

## 2025-03-11 NOTE — PROGRESS NOTES
Nathan Kurtz M.D.,[facility]  Jack Rolle D.O., TRU., [facility]  Ari Licona M.D.  Dee Albert M.D.   Wallace Decker D.O.  Bryan Traylor M.D.         Daily Pulmonary Progress Note    Patient:  Haritha RUIZ Dominguez 80 y.o. female MRN: [id number]            Synopsis     We are following patient for respiratory failure    \"CC\" abdominal pain    Code status: LIMITED CODE  Intubation/Re-intubation at the time of arrest No   Defibrillation/Cardioversion No   Chest Compressions No   Resuscitative Medications No   Other DNR DNI       Subjective   Seen and examined. Feeling ok with breathing.  Sitting at the side of bed with TLSO brace on with physical therapy.  Oxygen down to 2 liters NC. No fevers. Completed Zosyn and Tamiflu. EGD yesterday. Mucosal tear hiatal hernia. EUS not completed. NG tube placed.  Await new MBSS once cleared by GI, speech continues to follow.    Review of Systems:  Constitutional: Weakness, fatigue  Skin: Denies pigmentation, dark lesions, and rashes   HEENT: Denies hearing loss, tinnitus, ear drainage, epistaxis, sore throat, and hoarseness.  Cardiovascular: Denies palpitations, chest pain, and chest pressure.  Respiratory: Shortness of breath  Gastrointestinal: Abdominal pain  Genitourinary: Denies dysuria, frequency, urgency or hematuria  Musculoskeletal: Back pain  Neurological: Denies dizziness, vertigo, headache, and focal weakness  Psychological: Denies anxiety and depression  Endocrine: Denies heat intolerance and cold intolerance      24-hour events:  No new events    Objective   OBJECTIVE:   /61   Pulse 95   Temp 97.9 °F (36.6 °C) (Temporal)   Resp 21   Ht 1.524 m (5')   Wt 67.4 kg (148 lb 9.4 oz)   SpO2 92%   BMI 29.02 kg/m²   SpO2 Readings from Last 1 Encounters:   03/11/25 92%        I/O:    Intake/Output Summary (Last 24 hours) at 3/11/2025 0900  Last data filed at 3/11/2025 0437  Gross per 24 hour   Intake 1595 ml   Output 1050 ml   Net 545 ml  PM     Lab Results   Component Value Date/Time    PROTIME 10.2 05/24/2023 11:30 AM    PROTIME 11.7 04/01/2012 03:16 PM    INR 0.9 05/24/2023 11:30 AM       Micro:  Respiratory panel positive influenza A     Assessment:    Acute hypoxic respiratory failure  Influenza A  Moderate persistent asthma  CORRINE on CPAP 7  L2 compression fracture  Pancreatic lesion  Dysphagia   Hiatal hernia.  EGD 3/10/2025 with small tear EUS attempted by GI, but not performed.  Placement of NG tube      Plan:   Wean oxygen as tolerated to maintain SpO2 greater than 92%, currently on 1 L  CPAP of 7 at night and as needed.  Home device at the bedside  Nebulized bronchodilators-Brovana Pulmicort twice a day with DuoNebs every 4 hours while awake.  May resume Trelegy upon discharge  CTA chest reviewed - bibasilar atelectasis and esophageal stricture  Repeat chest x-ray 3/11/2025  Solu-Medrol taper written  Continue Singulair daily  Antibiotic management per infectious disease-completed zosyn 7 days  Completed Tamiflu 5 days  Diuretic therapy management per nephrology-off diuretics  Will need repeat MBSS when cleared by GI.  Management of NG tube per GI service, s/p  EGD 3/10 IV fluids 50 cc/h while NPO  Esophogram  reviewed -dysfunctional swallowing with recurrent aspiration into trachea  TLSO brace when up out of bed  DVT/PE prophylaxis-Lovenox, Protonix    This plan of care was reviewed in collaboration with Dr. Decker  Electronically signed by STEVE Page - CNP on 3/11/2025 at 9:00 AM    I personally saw, examined, and cared for the patient. I performed the substantive portion of the visit. Labs, medications, radiographs reviewed. I agree with history exam and plans detailed in NP note.        Wallace Decker, DO

## 2025-03-11 NOTE — CONSULTS
Nutrition Assessment    Type and Reason for Visit:  Consult, Nutrition support (TF recs)    Nutrition Recommendations/Plan:   TF rec provided; recommend,    Standard w/ Fiber (Jevity 1.5) @ 35ml/hr + 1 pro mod to provide 840ml TV, 1260kcals, 54g pro (w/ 1 pro mod is 1364kcals, 80g pro), 638ml free water.    Flush rec if needed; recommend flush of 125ml q 4= 750ml water (1388ml total water).     Start TF at trickle rate (10cc/hr) and advance slowly to goal, monitor for refeeding syndrome. Noted low K/low phos. Replace/monitor Lytes PRN.    See full nutrition assessment via RD note on 3/10/25.     Malnutrition Assessment:  Malnutrition Status:  Severe malnutrition (03/11/25 1321)    Context:  Acute Illness     Findings of the 6 clinical characteristics of malnutrition:  Energy Intake:  50% or less of estimated energy requirements for 5 or more days  Weight Loss:  Greater than 2% over 1 week (~2.8% in ~1 week)     Body Fat Loss:  Mild body fat loss Orbital   Muscle Mass Loss:  Mild muscle mass loss Temples (temporalis), Clavicles (pectoralis & deltoids)  Fluid Accumulation:  No fluid accumulation     Strength:  Not Performed    Nutrition Assessment:    pt adm d/t abd pain/compression frx; PMhx of HTN, Pancreatic cyst, GERD,claustrophobia; pt refused MRI; pt w/ poor appetite/oral intake this adm and lethargy; influenza noted- pt remains in isolation, high temps noted this adm; refusing IVF this adm; s/p RRT 3/2 r/t resp failure; possible difficulty swallowing noted w/ emesis this adm, pt s/p SLP eval 3/3 who recommend NPO until MBSS; s/p MBSS/SLP eval 3/4, recommend NPO until cleared by physician to advance & if oral diet initiated, SLP rec pureed solids/nectar thick liquids ; s/p esophagram, dysfunctional swallowing w/ recurrent aspiration into trachea noted; hiatal hernia/esophageal dismotility deficit noted; pancreatic tail lesion noted; pt s/p EUS/EGD 3/10- shows LG hiatal hernia; NGT placed for nutrition  support 3/10; pt meets criteria for severe malnutrition; TF recs provided; will continue to monitor.    Nutrition Related Findings:    low K/ low phos;  Na WNL; mild muscle/fat wasting; pt w/ NGT Wound Type: Deep Tissue Injury       Current Nutrition Intake & Therapies:    Average Meal Intake: NPO  Average Supplements Intake: NPO  ADULT TUBE FEEDING; Nasogastric; Standard with Fiber; Continuous; 10; Yes; 10; Q 4 hours; 35; 125; Q 4 hours; Protein; 1 Dose; Daily  ADULT DIET; Dysphagia - Minced and Moist; Mildly Thick (Nectar)    Anthropometric Measures:  Height: 152.4 cm (5')  Ideal Body Weight (IBW): 100 lbs (45 kg)    Admission Body Weight: 64.7 kg (142 lb 10.2 oz) (3/4-BS/first measured)  Current Body Weight: 62.7 kg (138 lb 3.7 oz) (3/8-BS/dry wt, note wt of 63.7kg via BS on 3/10. UTO measured CBW at pt in chair at bedside at time of visit.), 138.2 % IBW. Weight Source: Bed scale  Current BMI (kg/m2): 27  Usual Body Weight: 64.7 kg (142 lb 10.2 oz) (3/4-BS; note lack of other wt hx per EMR to assess, pt w/ about >2% wt loss in ~1 week.)     % Weight Change (Calculated): -3.1  Weight Adjustment For: No Adjustment                 BMI Categories: Overweight (BMI 25.0-29.9)      Nutrition Interventions:   Food and/or Nutrient Delivery: Start Tube Feeding (Recommend Standard w/ Fiber (Jevity 1.5) @ 35ml/hr + 1 pro mod to provide 840ml TV, 1260kcals, 54g pro (w/ 1 pro mod is 1364kcals, 80g pro), 638ml free water; flush rec of 125ml q 4= 750ml water (1388ml total water).)      Bonita Perry RD, LD  Contact: [phone]

## 2025-03-11 NOTE — PROGRESS NOTES
[facility]  [facility]  Progress Note      Chief Complaint   Patient presents with    Abdominal Pain     Patient from home called for 10/10 epigastric pain reproducible with palpation. EMS gave 50 mcg of fentanyl and 4mg of zofran PTA. Denies pain currently       SUBJECTIVE:    Patient is tolerating medications. No reported adverse drug reactions.  No nausea, diarrhea.  Sitting up in chair.  Wants to go back to bed.   .Review of systems:  As stated above in the chief complaint, otherwise negative.    Medications:  Scheduled Meds:   sodium chloride flush  5-40 mL IntraVENous 2 times per day    potassium & sodium phosphates  1 packet Oral BID    methylPREDNISolone  20 mg IntraVENous Daily    enoxaparin  40 mg SubCUTAneous Daily    pantoprazole (PROTONIX) 40 mg in sodium chloride (PF) 0.9 % 10 mL injection  40 mg IntraVENous Q12H    sodium bicarbonate  650 mg Oral BID    sodium chloride  1,000 mL IntraVENous Once    sodium chloride  1,000 mL IntraVENous Once    ipratropium 0.5 mg-albuterol 2.5 mg  1 Dose Inhalation Q4H WA RT    lidocaine  5 mL IntraDERmal Once    atorvastatin  10 mg Oral Nightly    escitalopram  10 mg Oral Nightly    budesonide  1 mg Nebulization BID RT    And    arformoterol tartrate  15 mcg Nebulization BID RT    montelukast  10 mg Oral Daily    [Held by provider] pantoprazole  40 mg Oral Daily    pregabalin  50 mg Oral BID    sodium chloride flush  5-40 mL IntraVENous 2 times per day     Continuous Infusions:   sodium chloride      dextrose 50 mL/hr at 03/10/25 1844    sodium chloride       PRN Meds:naloxone 0.4 mg in 10 mL sodium chloride syringe, sodium chloride flush, sodium chloride, guaiFENesin-dextromethorphan, LORazepam, LORazepam, sodium chloride flush, sodium chloride, potassium chloride **OR** potassium alternative oral replacement **OR** potassium chloride, magnesium sulfate, polyethylene glycol, acetaminophen **OR** acetaminophen, HYDROcodone 5 mg -

## 2025-03-11 NOTE — PROGRESS NOTES
Palliative Care Department  [phone]  Palliative Care Progress Note  Provider Ines Uribe PA-C     Haritha Dominguez  [id number]  Hospital Day: 21  Date of Initial Consult: 02/23/2025  Referring Provider: Bryan Rivers DO   Palliative Medicine was consulted for assistance with: \"declining in health, sleeping 22 hrs per day, refusing to eat\"    HPI:   Haritha Dominguez is a 80 y.o. with a medical history of thoracic and lumbar compression fractures, B12 deficiency, hypertension, chronic back pain, asthma who was admitted on 2/19/2025 from home with a CHIEF COMPLAINT of abdominal pain and low back pain for 1 day.  In the ED, CT scan showing moderate to large hiatal hernia, moderate stool burden, 1.3 cm low attenuating lesion along the pancreatic body/tail, L2 compression fracture.  She was admitted for pain control and further workup.  CT lumbar spine without contrast showing new acute appearing fractures involving superior endplate of L2, stable chronic compression fracture involving L1, bilateral sacral alae fractures of uncertain chronicity, generalized osteopenia, stable alignment of lumbar spine with posterior fusion hardware at L3-S1 and multilevel laminectomy.  Oncology consulted for pancreatic lesion.  They recommended MRI.  Patient refused.  Neurosurgery consulted for L2 compression fracture, recommending brace.  During the course of her hospitalization, patient had further decline with lethargy, refusing to eat, intermittent confusion.  Palliative care consulted for further assistance    2/24: Influenza A positive  2/27: DNR CCA/DNI  3/2: RRT for respiratory failure  3/5: Esophagram showing esophageal dysmotility with aspiration into the trachea, prominent hiatal hernia  3/10: for EGD and NGT placement  ASSESSMENT/PLAN:     Pertinent Hospital Diagnoses     Acute hypoxic respiratory failure  Influenza A    Palliative Care Encounter / Counseling Regarding Goals of Care  Please see detailed goals of  family support.    OBJECTIVE:   Prognosis: Guarded    Physical Exam:  /66   Pulse (!) 105   Temp 98.1 °F (36.7 °C) (Oral)   Resp 15   Ht 1.524 m (5')   Wt 67.4 kg (148 lb 9.4 oz)   SpO2 93%   BMI 29.02 kg/m²   Constitutional:  Elderly, thin, NAD, awake, alert  Eyes: no scleral icterus, normal lids, no discharge  ENMT:  Normocephalic, atraumatic, NG tube  Lungs:  2L high flow nasal cannula. rhonchi  Heart::  regular rate  Ext:  Moving all extremities  Skin:  Scabbed circular lesions scattered on extremities. Dry skin  Psych: non-anxious affect  Neuro: Alert and oriented.    Objective data reviewed: labs, images, records, medication use, vitals, and chart    Discussed patient and the plan of care with the other IDT members: Patient and family and  interdisciplinary team    Time/Communication  Greater than 50% of time spent, total 50 minutes in counseling and coordination of care at the bedside regarding goals of care, symptom management, diagnosis and prognosis, and see above.    Thank you for allowing Palliative Medicine to participate in the care of Haritha Dominguez.

## 2025-03-11 NOTE — CARE COORDINATION
3/11:  Update CM Note:  Pt presented to the ER for worsening back pain from home.  Pt is on 2L/NC at 98%, Iv Solu-medrol, Iv Protonix, Iv Ativan PRN, Iv Protonix,, Iv Fluids & SQ Lovenox.  Pt is in ISO-Droplet for Influenza.  GI consulted.  Pt had a NG placed yesterday with EUS/EGD.  Pending Dietitian for tube feeding.  Pt's dc plan is to [facility].  Pt pre-cert  & will need to submit.   Will need to PT/OT to see for pre-cert.  [place], PAS[facility] & Ambulance completed placed in envelope in soft chart.  Pt was placed on will call with PAS.  Sw/CM will continue to follow.  Electronically signed by Malika Chatterjee RN on 3/11/2025 at 10:06 AM

## 2025-03-11 NOTE — PLAN OF CARE
Problem: Discharge Planning  Goal: Discharge to home or other facility with appropriate resources  Outcome: Progressing     Problem: Pain  Goal: Verbalizes/displays adequate comfort level or baseline comfort level  Outcome: Progressing  Flowsheets  Taken 3/11/2025 0030  Verbalizes/displays adequate comfort level or baseline comfort level:   Encourage patient to monitor pain and request assistance   Assess pain using appropriate pain scale   Administer analgesics based on type and severity of pain and evaluate response  Taken 3/10/2025 1930  Verbalizes/displays adequate comfort level or baseline comfort level:   Encourage patient to monitor pain and request assistance   Assess pain using appropriate pain scale   Administer analgesics based on type and severity of pain and evaluate response     Problem: Safety - Adult  Goal: Free from fall injury  Outcome: Progressing  Flowsheets (Taken 3/11/2025 0030)  Free From Fall Injury: Instruct family/caregiver on patient safety     Problem: ABCDS Injury Assessment  Goal: Absence of physical injury  Outcome: Progressing  Flowsheets (Taken 3/11/2025 0030)  Absence of Physical Injury: Implement safety measures based on patient assessment     Problem: Skin/Tissue Integrity  Goal: Skin integrity remains intact  Description: 1.  Monitor for areas of redness and/or skin breakdown  2.  Assess vascular access sites hourly  3.  Every 4-6 hours minimum:  Change oxygen saturation probe site  4.  Every 4-6 hours:  If on nasal continuous positive airway pressure, respiratory therapy assess nares and determine need for appliance change or resting period  Outcome: Progressing  Flowsheets (Taken 3/11/2025 0030)  Skin Integrity Remains Intact: Monitor for areas of redness and/or skin breakdown     Problem: Nutrition Deficit:  Goal: Optimize nutritional status  Outcome: Progressing  Flowsheets (Taken 3/10/2025 1404 by Bonita Perry, RD, LD)  Nutrient intake appropriate for improving,

## 2025-03-11 NOTE — PROGRESS NOTES
[facility]  Nephrology Progress Note    Patient's Name: Haritha Dominguez    History of Present Illness from 2/27 consult note:    \"Haritha Dominguez is a 80 y.o. female with a past medical history of hypertension, hyperlipidemia, anxiety, arthritis, and asthma.  She presented to the ED on 2/19 reportedly for concerns of abdominal pain.  Vital signs on 2/19 includes temperature 99.3, respirations 18, pulse 103, /85, and she was 92% SpO2.  Lab data on 2/19 includes CO2 20, BUN 22, creatinine 1, troponin 18, WBC 12.8 K, and hemoglobin 10.5.  Her UA on 2/20 showed specific gravity 1.025, 30 protein, trace leukocyte esterase, 1+ bacteria.  She had a CT abdomen/pelvis with IV contrast on 2/20 which showed no acute intra-abdominal pelvic process appreciated, moderate-large hiatal hernia, moderate colonic stool burden, 1.3 cm low attenuating lesion along pancreatic body/tail.  She underwent a CT lumbar spine on 2/20 which showed new acute appearing fracture seen involving superior endplate of L2, stable chronic compression fracture involving L1, bilateral sacral ala fractures of uncertain chronicity, generalized osteopenia.  She was seen by neurosurgery.  She was seen by hepatobiliary surgery regarding pancreatic lesion.  She had a chest x-ray on 2/24 which showed increased interstitial markings seen in the perihilar regions to suggest mild interstitial edema with small bilateral pleural effusions.  She was found to be influenza A positive on 2/24. She was seen by urology for concerns of urinary retention.  ID is following the patient.  Nephrology has been consulted to see the patient for concerns of KATELYN. She has a baseline serum creatinine of 0.8-1 mg/dL.  At present, patient was seen and examined.  She is awake, alert, appears in no acute distress.  She appears to be a questionable historian at this time.  She notes that her appetite is okay.  She denies any dizziness.  Visitors at  10 MG tablet Take 1 tablet by mouth nightly         Allergies:    Percocet [oxycodone-acetaminophen]    Social History:     reports that she has never smoked. She has never used smokeless tobacco. She reports that she does not drink alcohol and does not use drugs.    Family History:         Problem Relation Age of Onset    Stroke Mother     Heart Disease Mother     Hypertension Mother     Other Mother         CHOLECYSTECTOMY; OSTEOPENIA    Heart Disease Father     Hypertension Father     Diabetes Father     Arthritis Father     Asthma Daughter      Physical Exam:      Patient Vitals for the past 24 hrs:   BP Temp Temp src Pulse Resp SpO2 Height Weight   03/11/25 0823 101/61 97.9 °F (36.6 °C) Temporal 95 21 92 % -- --   03/11/25 0727 -- -- -- 93 18 98 % -- --   03/11/25 0436 -- -- -- -- -- -- -- 67.4 kg (148 lb 9.4 oz)   03/11/25 0430 100/64 97.2 °F (36.2 °C) Temporal 96 15 94 % -- --   03/11/25 0406 103/70 97.3 °F (36.3 °C) Temporal 87 23 95 % -- 69.8 kg (153 lb 14.1 oz)   03/11/25 0030 127/70 96.8 °F (36 °C) Axillary 92 20 99 % -- --   03/10/25 1949 -- -- -- -- -- 98 % -- --   03/10/25 1945 -- -- -- 92 -- -- -- --   03/10/25 1930 127/69 97.1 °F (36.2 °C) Temporal 94 21 93 % -- --   03/10/25 1745 114/73 98.4 °F (36.9 °C) Axillary 90 20 96 % -- --   03/10/25 1643 (!) 110/56 -- -- 89 18 98 % -- --   03/10/25 1634 (!) 100/58 -- -- 87 20 98 % -- --   03/10/25 1631 (!) 106/58 -- -- 85 20 96 % -- --   03/10/25 1622 (!) 95/54 -- -- 91 20 95 % -- --   03/10/25 1404 -- -- -- -- -- -- 1.524 m (5') --   03/10/25 1307 -- -- -- (!) 103 26 97 % -- --   03/10/25 1050 119/62 97.8 °F (36.6 °C) Temporal 98 15 98 % -- --         Intake/Output Summary (Last 24 hours) at 3/11/2025 0919  Last data filed at 3/11/2025 0437  Gross per 24 hour   Intake 1595 ml   Output 1050 ml   Net 545 ml       General: Awake, alert, no acute distress  Neck: No JVD noted  Lungs: clear bilaterally upper, diminished to the bases bilaterally.  Unlabored  CV:  hepatobiliary surgery    8.  Compression fracture  CT lumbar spine 2/20-new acute appearing fx seen involving superior endplate of L2, stable chronic compression fx involving L1, bilateral sacral ala fx of uncertain chronicity, generalized osteopenia  Defer to neurosurgery    9.  Hypernatremia-resolved  Sodium 156 on 3/5--> 140 today  IVF - D5   Strict I&O  Monitor labs    10.  Regurgitation  Esophagram 3/5-dysfunctional swallowing with recurrent aspiration into the trachea  S/p EGD 3/10-large hiatal hernia, mucosal tear appreciated within hiatal hernia dilation of proximal/middle esophagus  Defer to GI    Meri Gauthier, APRN - CNP  Pt seen and examined on rounds with meri np  Agree with above  Chart reviewed  Hypernatremia resolved  On d5  Follow na  Bob Haro MD

## 2025-03-11 NOTE — PROGRESS NOTES
Physical Therapy   Treatment     Name: Haritha RUIZ Dominguez  : 1945  MRN: [id number]      Date of Service: 3/11/2025    Evaluating PT:  Bryan Monterroso, PT [id number]    Referring provider/PT Order:  PT Eval and Treat   25 1300  PT eval and treat  Start:  25 1300,   End:  25 1300,   ONE TIME,   Standing Count:  1 Occurrences,   R         Bryan Rivers, DO     Room #:  6414/6414-A  Diagnosis:  Lumbar compression fracture, closed, initial encounter ([facility]) [S32.000A]  Abdominal pain, unspecified abdominal location [R10.9]  Acute midline low back pain without sciatica [M54.50]  Compression fracture of L2 vertebra, initial encounter ([facility]) [S32.020A]  PMHx/PSHx:     has a past medical history of Abrasion of skin of left lower leg, Abrasion of skin of right lower leg, Anxiety, Arthritis, Asthma, Claustrophobia, Decreased dorsalis pedis pulse, Dermatitis, Fracture, GERD (gastroesophageal reflux disease), Hiatal hernia, History of blood transfusion, HTN (hypertension), Hyperlipidemia, Itching, On aspirin at home, Pancreatic cyst, Pneumonia, Sleep apnea, SOB (shortness of breath), and Tachycardia.    has a past surgical history that includes Cholecystectomy; Cataract removal (Bilateral, 2013); back surgery (2014); joint replacement (2016); Total knee arthroplasty (Right); other surgical history (2017); Spinal fixation surgery with implant (); Upper gastrointestinal endoscopy (07/10/2017); Stimulator Surgery (Right, 2023); Upper gastrointestinal endoscopy (N/A, 3/10/2025); Upper gastrointestinal endoscopy (N/A, 3/10/2025); and Upper gastrointestinal endoscopy (3/10/2025).     Procedure/Surgery:  none  Precautions:  Falls, FWB (full weight bearing) Bilateral LE flexed posture kyphotic, , Soft TLSO, Droplet Influenza, NGT  Equipment Needs: Patient has needed equipment ,    SUBJECTIVE:    ???Patient states she will D/C to her Sister's home where she can be on the first level. Her home

## 2025-03-12 ENCOUNTER — APPOINTMENT (OUTPATIENT)
Dept: GENERAL RADIOLOGY | Age: 80
End: 2025-03-12
Payer: MEDICARE

## 2025-03-12 LAB
ALBUMIN SERPL-MCNC: 2.6 G/DL (ref 3.5–5.2)
ALP SERPL-CCNC: 80 U/L (ref 35–104)
ALT SERPL-CCNC: 8 U/L (ref 0–32)
ANION GAP SERPL CALCULATED.3IONS-SCNC: 16 MMOL/L (ref 7–16)
AST SERPL-CCNC: 16 U/L (ref 0–31)
BILIRUB SERPL-MCNC: 0.3 MG/DL (ref 0–1.2)
BUN SERPL-MCNC: 13 MG/DL (ref 6–23)
CALCIUM SERPL-MCNC: 8 MG/DL (ref 8.6–10.2)
CHLORIDE SERPL-SCNC: 105 MMOL/L (ref 98–107)
CO2 SERPL-SCNC: 16 MMOL/L (ref 22–29)
CREAT SERPL-MCNC: 0.6 MG/DL (ref 0.5–1)
ERYTHROCYTE [DISTWIDTH] IN BLOOD BY AUTOMATED COUNT: 15.5 % (ref 11.5–15)
GFR, ESTIMATED: >90 ML/MIN/1.73M2
GLUCOSE SERPL-MCNC: 110 MG/DL (ref 74–99)
HCT VFR BLD AUTO: 34.4 % (ref 34–48)
HGB BLD-MCNC: 10.4 G/DL (ref 11.5–15.5)
MAGNESIUM SERPL-MCNC: 1.7 MG/DL (ref 1.6–2.6)
MCH RBC QN AUTO: 30.1 PG (ref 26–35)
MCHC RBC AUTO-ENTMCNC: 30.2 G/DL (ref 32–34.5)
MCV RBC AUTO: 99.7 FL (ref 80–99.9)
PHOSPHATE SERPL-MCNC: 2.4 MG/DL (ref 2.5–4.5)
PLATELET # BLD AUTO: 300 K/UL (ref 130–450)
PMV BLD AUTO: 12.8 FL (ref 7–12)
POTASSIUM SERPL-SCNC: 3.8 MMOL/L (ref 3.5–5)
PROCALCITONIN SERPL-MCNC: 0.14 NG/ML (ref 0–0.08)
PROT SERPL-MCNC: 5.5 G/DL (ref 6.4–8.3)
RBC # BLD AUTO: 3.45 M/UL (ref 3.5–5.5)
SODIUM SERPL-SCNC: 137 MMOL/L (ref 132–146)
WBC OTHER # BLD: 13.4 K/UL (ref 4.5–11.5)

## 2025-03-12 PROCEDURE — 80053 COMPREHEN METABOLIC PANEL: CPT

## 2025-03-12 PROCEDURE — 84100 ASSAY OF PHOSPHORUS: CPT

## 2025-03-12 PROCEDURE — 2500000003 HC RX 250 WO HCPCS: Performed by: ANESTHESIOLOGY

## 2025-03-12 PROCEDURE — 6360000002 HC RX W HCPCS: Performed by: INTERNAL MEDICINE

## 2025-03-12 PROCEDURE — 2500000003 HC RX 250 WO HCPCS

## 2025-03-12 PROCEDURE — 97535 SELF CARE MNGMENT TRAINING: CPT

## 2025-03-12 PROCEDURE — 97530 THERAPEUTIC ACTIVITIES: CPT

## 2025-03-12 PROCEDURE — 6370000000 HC RX 637 (ALT 250 FOR IP)

## 2025-03-12 PROCEDURE — 99232 SBSQ HOSP IP/OBS MODERATE 35: CPT | Performed by: NURSE PRACTITIONER

## 2025-03-12 PROCEDURE — 2580000003 HC RX 258

## 2025-03-12 PROCEDURE — 74230 X-RAY XM SWLNG FUNCJ C+: CPT

## 2025-03-12 PROCEDURE — 92526 ORAL FUNCTION THERAPY: CPT | Performed by: SPEECH-LANGUAGE PATHOLOGIST

## 2025-03-12 PROCEDURE — 2140000000 HC CCU INTERMEDIATE R&B

## 2025-03-12 PROCEDURE — 83735 ASSAY OF MAGNESIUM: CPT

## 2025-03-12 PROCEDURE — 2500000003 HC RX 250 WO HCPCS: Performed by: NURSE PRACTITIONER

## 2025-03-12 PROCEDURE — 2580000003 HC RX 258: Performed by: STUDENT IN AN ORGANIZED HEALTH CARE EDUCATION/TRAINING PROGRAM

## 2025-03-12 PROCEDURE — 36415 COLL VENOUS BLD VENIPUNCTURE: CPT

## 2025-03-12 PROCEDURE — 92611 MOTION FLUOROSCOPY/SWALLOW: CPT | Performed by: SPEECH-LANGUAGE PATHOLOGIST

## 2025-03-12 PROCEDURE — 6360000002 HC RX W HCPCS: Performed by: STUDENT IN AN ORGANIZED HEALTH CARE EDUCATION/TRAINING PROGRAM

## 2025-03-12 PROCEDURE — 2500000003 HC RX 250 WO HCPCS: Performed by: PHYSICIAN ASSISTANT

## 2025-03-12 PROCEDURE — 6360000002 HC RX W HCPCS: Performed by: NURSE PRACTITIONER

## 2025-03-12 PROCEDURE — 85027 COMPLETE CBC AUTOMATED: CPT

## 2025-03-12 PROCEDURE — 94640 AIRWAY INHALATION TREATMENT: CPT

## 2025-03-12 PROCEDURE — 6370000000 HC RX 637 (ALT 250 FOR IP): Performed by: INTERNAL MEDICINE

## 2025-03-12 PROCEDURE — 84145 PROCALCITONIN (PCT): CPT

## 2025-03-12 RX ORDER — SODIUM BICARBONATE 650 MG/1
1300 TABLET ORAL 2 TIMES DAILY
Status: DISCONTINUED | OUTPATIENT
Start: 2025-03-12 | End: 2025-03-14

## 2025-03-12 RX ADMIN — IPRATROPIUM BROMIDE AND ALBUTEROL SULFATE 1 DOSE: 2.5; .5 SOLUTION RESPIRATORY (INHALATION) at 16:00

## 2025-03-12 RX ADMIN — BARIUM SULFATE 15 ML: 400 SUSPENSION ORAL at 11:26

## 2025-03-12 RX ADMIN — ESCITALOPRAM OXALATE 10 MG: 10 TABLET ORAL at 20:18

## 2025-03-12 RX ADMIN — IPRATROPIUM BROMIDE AND ALBUTEROL SULFATE 1 DOSE: 2.5; .5 SOLUTION RESPIRATORY (INHALATION) at 19:26

## 2025-03-12 RX ADMIN — ARFORMOTEROL TARTRATE 15 MCG: 15 SOLUTION RESPIRATORY (INHALATION) at 19:26

## 2025-03-12 RX ADMIN — BUDESONIDE 1000 MCG: 0.5 INHALANT RESPIRATORY (INHALATION) at 19:26

## 2025-03-12 RX ADMIN — BUDESONIDE 1000 MCG: 0.5 INHALANT RESPIRATORY (INHALATION) at 07:44

## 2025-03-12 RX ADMIN — SODIUM BICARBONATE 1300 MG: 650 TABLET ORAL at 20:18

## 2025-03-12 RX ADMIN — SODIUM BICARBONATE 650 MG: 650 TABLET ORAL at 08:32

## 2025-03-12 RX ADMIN — IPRATROPIUM BROMIDE AND ALBUTEROL SULFATE 1 DOSE: 2.5; .5 SOLUTION RESPIRATORY (INHALATION) at 07:44

## 2025-03-12 RX ADMIN — PREGABALIN 50 MG: 50 CAPSULE ORAL at 20:18

## 2025-03-12 RX ADMIN — PREGABALIN 50 MG: 50 CAPSULE ORAL at 08:32

## 2025-03-12 RX ADMIN — SODIUM CHLORIDE, PRESERVATIVE FREE 10 ML: 5 INJECTION INTRAVENOUS at 20:18

## 2025-03-12 RX ADMIN — WATER 20 MG: 1 INJECTION INTRAMUSCULAR; INTRAVENOUS; SUBCUTANEOUS at 08:33

## 2025-03-12 RX ADMIN — LORAZEPAM 0.5 MG: 2 INJECTION INTRAMUSCULAR; INTRAVENOUS at 10:15

## 2025-03-12 RX ADMIN — SODIUM PHOSPHATE, MONOBASIC, MONOHYDRATE AND SODIUM PHOSPHATE, DIBASIC, ANHYDROUS 10 MMOL: 142; 276 INJECTION, SOLUTION INTRAVENOUS at 12:28

## 2025-03-12 RX ADMIN — MONTELUKAST 10 MG: 10 TABLET, FILM COATED ORAL at 08:32

## 2025-03-12 RX ADMIN — ENOXAPARIN SODIUM 40 MG: 100 INJECTION SUBCUTANEOUS at 20:17

## 2025-03-12 RX ADMIN — ARFORMOTEROL TARTRATE 15 MCG: 15 SOLUTION RESPIRATORY (INHALATION) at 07:44

## 2025-03-12 RX ADMIN — ATORVASTATIN CALCIUM 10 MG: 20 TABLET, FILM COATED ORAL at 20:18

## 2025-03-12 RX ADMIN — BARIUM SULFATE 15 ML: 0.81 POWDER, FOR SUSPENSION ORAL at 11:26

## 2025-03-12 RX ADMIN — SODIUM CHLORIDE, PRESERVATIVE FREE 40 MG: 5 INJECTION INTRAVENOUS at 10:16

## 2025-03-12 RX ADMIN — SODIUM CHLORIDE, PRESERVATIVE FREE 40 MG: 5 INJECTION INTRAVENOUS at 22:44

## 2025-03-12 RX ADMIN — BARIUM SULFATE 15 ML: 400 PASTE ORAL at 11:26

## 2025-03-12 NOTE — PROGRESS NOTES
Hospital Medicine    Subjective: Patient is awake denies new complaints  Breathing is okay      Current Facility-Administered Medications:     sodium bicarbonate tablet 1,300 mg, 1,300 mg, Oral, BID, Ines Gauthier APRN - CNP    sodium phosphate 10 mmol in sodium chloride 0.9 % 250 mL IVPB, 10 mmol, IntraVENous, Once, Ines Gauthier APRN - CNP    naloxone 0.4 mg in 10 mL sodium chloride syringe, , IntraVENous, PRN, True Mcgovern DO    sodium chloride flush 0.9 % injection 5-40 mL, 5-40 mL, IntraVENous, 2 times per day, True Mcgovern DO, 10 mL at 03/11/25 2059    sodium chloride flush 0.9 % injection 5-40 mL, 5-40 mL, IntraVENous, PRN, True Mcgovern DO    0.9 % sodium chloride infusion, , IntraVENous, PRN, True Mcgovern DO    methylPREDNISolone sodium succ (SOLU-MEDROL) 20 mg in sterile water 0.5 mL injection, 20 mg, IntraVENous, Daily, Kenji Hale APRN - CNP, 20 mg at 03/12/25 0833    enoxaparin (LOVENOX) injection 40 mg, 40 mg, SubCUTAneous, Daily, Bryan Rivers DO, 40 mg at 03/11/25 2058    guaiFENesin-dextromethorphan (ROBITUSSIN DM) 100-10 MG/5ML syrup 10 mL, 10 mL, Oral, Q6H PRN, Daniella Anderson APRN - CNP    pantoprazole (PROTONIX) 40 mg in sodium chloride (PF) 0.9 % 10 mL injection, 40 mg, IntraVENous, Q12H, James Spivey MD, 40 mg at 03/12/25 1016    LORazepam (ATIVAN) injection 0.5 mg, 0.5 mg, IntraVENous, Q6H PRN, Bryan Rivers DO, 0.5 mg at 03/12/25 1015    sodium chloride 0.9 % bolus 1,000 mL, 1,000 mL, IntraVENous, Once, Jocelynn Porras MD    sodium chloride 0.9 % bolus 1,000 mL, 1,000 mL, IntraVENous, Once, Natanael Martinez MD    ipratropium 0.5 mg-albuterol 2.5 mg (DUONEB) nebulizer solution 1 Dose, 1 Dose, Inhalation, Q4H WA RT, Bryan Rivers, , 1 Dose at 03/12/25 0744    LORazepam (ATIVAN) tablet 1 mg, 1 mg, Oral, TID PRN, Bryan Rivers DO, 1 mg at 03/01/25 2143    lidocaine 1 % injection 5 mL, 5 mL,

## 2025-03-12 NOTE — PROGRESS NOTES
SPEECH/LANGUAGE PATHOLOGY  VIDEOFLUOROSCOPIC STUDY OF SWALLOWING (MBS)   and PLAN OF CARE    PATIENT NAME:  Haritha Dominguez  (female)     MRN:  [id number]    :  1945  (80 y.o.)  STATUS:  Inpatient: Room 6414/6414-A    TODAY'S DATE:  3/12/2025  ORDER DATE, DESCRIPTION AND REFERRING PROVIDER:  Dr. Spivey : [place] MODIFIED BARIUM SWALLOW W VIDEO :  3/11/25  REASON FOR REFERRAL: Assess oropharyngeal swallow function   EVALUATING THERAPIST: Joselin Currie, SLP      RESULTS:      Underwent EGD with Dr Spivey 3/11/25. Results/ recommendations below and discussed with radiology PA.     EGD Findings: 3/11/25   Normal esophagus.  Large hiatal hernia.  Normal gastric mucosa.  Normal duodenal mucosa.  Mucosal tear appreciated within hiatal hernia with scope passage.  Savory dilation of proximal/middle esophagus to 57Fr with adequate response.  EUS attempted but given degree of tearing and difficult views in the setting of hiatal hernia, aborted.  Endoscopically placed NG tube placement successful.        DYSPHAGIA DIAGNOSIS:  Clinical indicators of mild  oropharyngeal phase dysphagia     DIET RECOMMENDATIONS:  Minced and moist consistency solids (IDDSI level 5) with  nectar consistency (mildly thick - IDDSI level 2) liquids    FEEDING RECOMMENDATIONS:    Assistance level:  No assistance needed     Compensatory strategies recommended: Fully alert for all PO, Thorough oral care to prevent colonization of oral bacteria, Upright in bed/ chair as tolerated  Slow rate of intake   SINGLE cup sips  SINGLE straw sips   SMALL bites   Reflux/esophageal motility deficit strategies  (including upright for 45-60 minutes after meals, small/multiple meals per day)     Discussed recommendations with:  Patient  and  floor nurse (patient nurse and charge nurse unavailable)    Laryngeal Penetration and Aspiration:  Penetration WITH aspiration was observed in today's study with  thin liquid    SPEECH THERAPY  PLAN OF CARE   The dysphagia POC is  liquid and nectar thick liquid   Solids:  Pureed  and Soft and bite sized      Method of Intake:   cup, straw, spoon  Self fed      Position:   Sitting in bed with head elevated above 75 degrees    INSTRUMENTAL ASSESSMENT:    ORAL PREP/ ORAL PHASE:    Dentition:  missing teeth  Decreased mastication due to:  poor/missing dentition       PHARYNGEAL PHASE:     ONSET TIME       Onset time of the pharyngeal swallow was adequate       PHARYNGEAL RESIDUALS        Vallecula/Pharyngeal Wall           Reduced tongue base retraction resulting in residuals in vallecula and/or posterior pharyngeal wall for pureed foods which mostly cleared with spontaneous double swallow and liquid chaser       Pyriform Sinuses      No significant residuals were noted in the pyriform sinuses     LARYNGEAL PENETRATION   Laryngeal penetration occurred prior to aspiration.  Further details under aspiration section.    ASPIRATION  Aspiration occurred DURING the swallow for thin liquid due to  delayed laryngeal closure . Aspiration was mild and occurred consistently .an absent cough/throat clear was noted    PENETRATION-ASPIRATION SCALE (PAS):  THIN 6 = Material enters the airway, passes below the vocal folds, and is ejected into the larynx or out of the airway   MILDLY THICK 2 = Material enters the airway, remains above vocal folds, and is ejected from the airway   MODERATELY THICK item not administered  PUREE 1 = Material does not enter the airway  HARD SOLID 1 = Material does not enter the airway       COMPENSATORY STRATEGIES    Compensatory strategies that were beneficial included      Fully alert for all PO, Thorough oral care to prevent colonization of oral bacteria, Upright in bed/ chair as tolerated  Slow rate of intake   SINGLE cup sips  SINGLE straw sips   SMALL bites   Reflux/esophageal motility deficit strategies  (including upright for 45-60 minutes after meals, small/multiple meals per day)      STRUCTURAL/FUNCTIONAL ANOMALIES   No  tract infection)    Gastroesophageal reflux disease    Hyperlipidemia    Cardiac arrhythmia    Abrasion of skin of left lower leg    Abrasion of skin of right lower leg    Itching    Decreased dorsalis pedis pulse    Compression fracture of L2 lumbar vertebra (HCC)    Goals of care, counseling/discussion    Palliative care encounter    Esophageal dysphagia    Dysphagia    Severe protein-calorie malnutrition       INTERVENTION  CPT Code: 93206  dysphagia tx    Speech Pathologist (SLP) completed education with the patient/family regarding procedure of Modified Barium Swallow Study prior to exam and then type of swallowing impairment following completion of MBSS. Reviewed current solid/liquid consistency diet recommendations --   Minced and moist consistency solids (IDDSI level 5) with  nectar consistency (mildly thick - IDDSI level 2) liquids and discussed compensatory strategies (Fully alert for all PO, Thorough oral care to prevent colonization of oral bacteria, Upright in bed/ chair as tolerated  Slow rate of intake   SINGLE cup sips  SINGLE straw sips   SMALL bites   Reflux/esophageal motility deficit strategies  (including upright for 45-60 minutes after meals, small/multiple meals per day)) to ensure safe PO intake. Images from MBSS reviewed with patient/ family and education provided. Reviewed aspiration precautions. Encouraged patient and/or family to engage SLP in unstructured Q&A session relative to identified deficit areas; indicated understanding of all information provided via satisfactory verbal response.

## 2025-03-12 NOTE — PLAN OF CARE
Problem: Discharge Planning  Goal: Discharge to home or other facility with appropriate resources  3/11/2025 2352 by Bev Nicolas RN  Outcome: Progressing  3/11/2025 1240 by Aurora Chen RN  Outcome: Progressing     Problem: Pain  Goal: Verbalizes/displays adequate comfort level or baseline comfort level  3/11/2025 2352 by Bev Nicolas RN  Outcome: Progressing  3/11/2025 1240 by Aurora Chen RN  Outcome: Progressing     Problem: Safety - Adult  Goal: Free from fall injury  3/11/2025 2352 by Bev Nicolas RN  Outcome: Progressing  3/11/2025 1240 by Aurora Chen RN  Outcome: Progressing     Problem: ABCDS Injury Assessment  Goal: Absence of physical injury  3/11/2025 2352 by Bev Nicolas RN  Outcome: Progressing  3/11/2025 1240 by Aurora Chen RN  Outcome: Progressing     Problem: Skin/Tissue Integrity  Goal: Skin integrity remains intact  Description: 1.  Monitor for areas of redness and/or skin breakdown  2.  Assess vascular access sites hourly  3.  Every 4-6 hours minimum:  Change oxygen saturation probe site  4.  Every 4-6 hours:  If on nasal continuous positive airway pressure, respiratory therapy assess nares and determine need for appliance change or resting period  3/11/2025 2352 by Bev Nicolas RN  Outcome: Progressing  3/11/2025 1240 by Aurora Chen RN  Outcome: Progressing     Problem: Nutrition Deficit:  Goal: Optimize nutritional status  3/11/2025 2352 by Bev Nicolas RN  Outcome: Progressing  3/11/2025 1240 by Aurora Chen RN  Outcome: Progressing     Problem: Neurosensory - Adult  Goal: Achieves maximal functionality and self care  3/11/2025 2352 by Bev Nicolas RN  Outcome: Progressing  3/11/2025 1240 by Aurora Chen RN  Outcome: Progressing     Problem: Musculoskeletal - Adult  Goal: Maintain proper alignment of affected body part  3/11/2025 2352 by Bev Nicolas RN  Outcome: Progressing  3/11/2025 1240 by Aurora Chen RN  Outcome: Progressing     Problem:  hematologic stability  3/11/2025 2352 by Bev Nicolas, RN  Outcome: Progressing  3/11/2025 1240 by Aurora Chen, RN  Outcome: Progressing

## 2025-03-12 NOTE — PROGRESS NOTES
Spiritual Health History and Assessment/Progress Note  [facility] [place]    (P) Follow-up, Spiritual/Emotional Needs,  ,  ,      Name: Haritha Dominguez MRN: [id number]    Age: 80 y.o.     Sex: female   Language: English   Worship: Buddhist   Compression fracture of L2 lumbar vertebra (HCC)     Date: 3/11/2025                           Spiritual Assessment continued in [facility]        Referral/Consult From: (P) Palliative Care   Encounter Overview/Reason: (P) Follow-up, Spiritual/Emotional Needs  Service Provided For: (P) Patient    Meliza, Belief, Meaning:   Patient identifies as spiritual, is connected with a meliza tradition or spiritual practice, and has beliefs or practices that help with coping during difficult times  Family/Friends No family/friends present      Importance and Influence:  Patient has spiritual/personal beliefs that influence decisions regarding their health  Family/Friends No family/friends present    Community:  Patient is connected with a spiritual community and feels well-supported. Support system includes: Children and Meliza Community  Family/Friends No family/friends present    Assessment and Plan of Care:     Patient Interventions include: Facilitated expression of thoughts and feelings, Explored spiritual coping/struggle/distress, Engaged in theological reflection, and Other: We sung some songs.  Family/Friends Interventions include: No family/friends present    Patient Plan of Care: Other: The  will continue to follow.  Family/Friends Plan of Care: No family/friends present    Electronically signed by Chaplain Handy on 3/11/2025 at 8:09 PM

## 2025-03-12 NOTE — CARE COORDINATION
Patient remains hospitalized. After being admitted for back pain. S/P Aborted EGD for mucosal tear within hiatal hernia.3/10.NG inserted for tube feeds. . IV solumedrol, IV protonix continued. PT 10/24  OT 10/24 Patient plans to discharge to [facility]. [facility] their liaison notified to restart precert. Per Humaira she is aware of N/G and can accept with a 2 week plan. GI informed. Per GI will need speech to evaluate post dilatation  [place], [facility] & Ambulance completed placed in envelope in soft chart.  Pt was placed on will call with PAS.  Sw/CM will continue to follow.

## 2025-03-12 NOTE — PROGRESS NOTES
bolus 1,000 mL  1,000 mL IntraVENous Once Jocelynn Porras MD        sodium chloride 0.9 % bolus 1,000 mL  1,000 mL IntraVENous Once Natanael Martinez MD        ipratropium 0.5 mg-albuterol 2.5 mg (DUONEB) nebulizer solution 1 Dose  1 Dose Inhalation Q4H WA RT Bryan Rivers, DO   1 Dose at 03/12/25 0744    LORazepam (ATIVAN) tablet 1 mg  1 mg Oral TID PRN Bryan Rivers DO   1 mg at 03/01/25 2143    lidocaine 1 % injection 5 mL  5 mL IntraDERmal Once Oliver Waters MD        atorvastatin (LIPITOR) tablet 10 mg  10 mg Oral Nightly Bryan Rivers DO   10 mg at 03/11/25 2058    escitalopram (LEXAPRO) tablet 10 mg  10 mg Oral Nightly Bryan Rivers, DO   10 mg at 03/11/25 2059    budesonide (PULMICORT) nebulizer suspension 1,000 mcg  1 mg Nebulization BID RT Bryan Rivers DO   1,000 mcg at 03/12/25 0744    And    arformoterol tartrate (BROVANA) nebulizer solution 15 mcg  15 mcg Nebulization BID RT Bryan Rivers DO   15 mcg at 03/12/25 0744    montelukast (SINGULAIR) tablet 10 mg  10 mg Oral Daily Bryan Rivers, DO   10 mg at 03/12/25 0832    [Held by provider] pantoprazole (PROTONIX) tablet 40 mg  40 mg Oral Daily Bryan Rivers DO   40 mg at 03/02/25 1603    pregabalin (LYRICA) capsule 50 mg  50 mg Oral BID Bryan Rivers DO   50 mg at 03/12/25 0832    sodium chloride flush 0.9 % injection 5-40 mL  5-40 mL IntraVENous 2 times per day Bryan Rivers DO   10 mL at 03/10/25 0843    sodium chloride flush 0.9 % injection 5-40 mL  5-40 mL IntraVENous PRN Bryan Rivers DO        0.9 % sodium chloride infusion   IntraVENous PRN Bryan Rivers DO        potassium chloride (KLOR-CON M) extended release tablet 40 mEq  40 mEq Oral PRN Bryan Rivers DO        Or    potassium bicarb-citric acid (EFFER-K) effervescent tablet 40 mEq  40 mEq Oral PRN Bryan Rivers DO   40 mEq at 03/11/25 0904    Or    potassium chloride 10 mEq/100 mL IVPB (Peripheral Line)  10 mEq  IntraVENous PRN Bryan Rivers,  mL/hr at 03/05/25 2007 10 mEq at 03/05/25 2007    magnesium sulfate 2000 mg in 50 mL IVPB premix  2,000 mg IntraVENous PRN Bryan Rivers, DO   Stopped at 03/07/25 1121    polyethylene glycol (GLYCOLAX) packet 17 g  17 g Oral Daily PRN Bryan Rivers, DO   17 g at 02/23/25 0842    acetaminophen (TYLENOL) tablet 650 mg  650 mg Oral Q6H PRN Bryan Rivers, DO   650 mg at 03/02/25 1752    Or    acetaminophen (TYLENOL) suppository 650 mg  650 mg Rectal Q6H PRN Bryan Rivers, DO   650 mg at 03/03/25 0900    HYDROcodone-acetaminophen (NORCO) 5-325 MG per tablet 1 tablet  1 tablet Oral Q4H PRN Monica Hansen PA   1 tablet at 03/02/25 1601    ondansetron (ZOFRAN) injection 4 mg  4 mg IntraVENous Q6H PRN Oliver Waters MD   4 mg at 03/07/25 0926       OBJECTIVE      Physical    VITALS:  BP (!) 101/51   Pulse 94   Temp 98.1 °F (36.7 °C) (Temporal)   Resp 19   Ht 1.524 m (5')   Wt 64.6 kg (142 lb 6.7 oz)   SpO2 95%   BMI 27.81 kg/m²   Physical Exam:  General: Overall well-appearing, NAD  HEENT: PERRLA, EOMI, Anicteric sclera, MMM, no rhinorrhea  Cards: RRR, no LE edema  Resp: Breathing comfortably, good air movement, no use of accessory muscles, no audible wheezing  Abdomen: soft, NT, ND.   Extremities: Moves all extremities, no effusions or bruising.  Skin: No rashes or jaundice  Neuro: A&O x 3, CN grossly intact, non-focal exam     EGD Findings: 3/11/25   Normal esophagus.  Large hiatal hernia.  Normal gastric mucosa.  Normal duodenal mucosa.  Mucosal tear appreciated within hiatal hernia with scope passage.  Savory dilation of proximal/middle esophagus to 57Fr with adequate response.  EUS attempted but given degree of tearing and difficult views in the setting of hiatal hernia, aborted.  Endoscopically placed NG tube placement successful.     ASSESSMENT:    80y/F who presented to the ED with abdominal pain and back pain and who now has worsening respiratory

## 2025-03-12 NOTE — PROGRESS NOTES
[facility]  Nephrology Progress Note    Patient's Name: Haritha Dominguez    History of Present Illness from 2/27 consult note:    \"Haritha Dominguez is a 80 y.o. female with a past medical history of hypertension, hyperlipidemia, anxiety, arthritis, and asthma.  She presented to the ED on 2/19 reportedly for concerns of abdominal pain.  Vital signs on 2/19 includes temperature 99.3, respirations 18, pulse 103, /85, and she was 92% SpO2.  Lab data on 2/19 includes CO2 20, BUN 22, creatinine 1, troponin 18, WBC 12.8 K, and hemoglobin 10.5.  Her UA on 2/20 showed specific gravity 1.025, 30 protein, trace leukocyte esterase, 1+ bacteria.  She had a CT abdomen/pelvis with IV contrast on 2/20 which showed no acute intra-abdominal pelvic process appreciated, moderate-large hiatal hernia, moderate colonic stool burden, 1.3 cm low attenuating lesion along pancreatic body/tail.  She underwent a CT lumbar spine on 2/20 which showed new acute appearing fracture seen involving superior endplate of L2, stable chronic compression fracture involving L1, bilateral sacral ala fractures of uncertain chronicity, generalized osteopenia.  She was seen by neurosurgery.  She was seen by hepatobiliary surgery regarding pancreatic lesion.  She had a chest x-ray on 2/24 which showed increased interstitial markings seen in the perihilar regions to suggest mild interstitial edema with small bilateral pleural effusions.  She was found to be influenza A positive on 2/24. She was seen by urology for concerns of urinary retention.  ID is following the patient.  Nephrology has been consulted to see the patient for concerns of KATELYN. She has a baseline serum creatinine of 0.8-1 mg/dL.  At present, patient was seen and examined.  She is awake, alert, appears in no acute distress.  She appears to be a questionable historian at this time.  She notes that her appetite is okay.  She denies any dizziness.  Visitors at        Allergies:    Percocet [oxycodone-acetaminophen]    Social History:     reports that she has never smoked. She has never used smokeless tobacco. She reports that she does not drink alcohol and does not use drugs.    Family History:         Problem Relation Age of Onset    Stroke Mother     Heart Disease Mother     Hypertension Mother     Other Mother         CHOLECYSTECTOMY; OSTEOPENIA    Heart Disease Father     Hypertension Father     Diabetes Father     Arthritis Father     Asthma Daughter      Physical Exam:      Patient Vitals for the past 24 hrs:   BP Temp Temp src Pulse Resp SpO2 Height Weight   03/12/25 0716 (!) 101/51 98.1 °F (36.7 °C) Temporal 94 19 95 % -- --   03/12/25 0354 (!) 99/59 97.8 °F (36.6 °C) Temporal (!) 104 18 95 % -- 64.6 kg (142 lb 6.7 oz)   03/11/25 2353 96/66 97.1 °F (36.2 °C) Temporal (!) 104 20 95 % -- --   03/11/25 2105 96/67 97.5 °F (36.4 °C) Temporal 88 -- 98 % -- --   03/11/25 2017 -- -- -- -- -- 93 % -- --   03/11/25 1312 -- -- -- -- -- -- 1.524 m (5') --   03/11/25 1149 -- -- -- (!) 105 15 93 % -- --   03/11/25 1114 109/66 98.1 °F (36.7 °C) Oral 98 28 93 % -- --         Intake/Output Summary (Last 24 hours) at 3/12/2025 0936  Last data filed at 3/12/2025 0632  Gross per 24 hour   Intake 676 ml   Output 800 ml   Net -124 ml       General: Awake, alert, no acute distress  Neck: No JVD noted  Lungs: Crackles bilaterally upper, diminished to the bases bilaterally.  Unlabored  CV: Regular rate and rhythm.  No rub  Abd: Soft, nontender, nondistended.  Active bowel sounds  Skin: Warm and dry.  No rash on exposed extremities  Ext: No edema   Neuro: Awake, answers questions appropriately    Data:    Recent Labs     03/10/25  0438 03/11/25  0443 03/12/25  0507   WBC 15.7* 16.0* 13.4*   HGB 10.2* 10.0* 10.4*   HCT 32.2* 31.7* 34.4   MCV 93.3 95.2 99.7    406 300       Recent Labs     03/10/25  0438 03/11/25  0443 03/12/25  0507    140 137   K 3.7 3.4* 3.8    106 105

## 2025-03-12 NOTE — PROGRESS NOTES
[facility]  [facility]  Progress Note      Chief Complaint   Patient presents with    Abdominal Pain     Patient from home called for 10/10 epigastric pain reproducible with palpation. EMS gave 50 mcg of fentanyl and 4mg of zofran PTA. Denies pain currently       SUBJECTIVE:    Patient is tolerating medications. No reported adverse drug reactions.  No nausea, diarrhea.  Sitting up in chair.  Wants to go back to bed.   .Review of systems:  As stated above in the chief complaint, otherwise negative.    Medications:  Scheduled Meds:   sodium bicarbonate  1,300 mg Oral BID    sodium chloride flush  5-40 mL IntraVENous 2 times per day    methylPREDNISolone  20 mg IntraVENous Daily    enoxaparin  40 mg SubCUTAneous Daily    pantoprazole (PROTONIX) 40 mg in sodium chloride (PF) 0.9 % 10 mL injection  40 mg IntraVENous Q12H    sodium chloride  1,000 mL IntraVENous Once    sodium chloride  1,000 mL IntraVENous Once    ipratropium 0.5 mg-albuterol 2.5 mg  1 Dose Inhalation Q4H WA RT    lidocaine  5 mL IntraDERmal Once    atorvastatin  10 mg Oral Nightly    escitalopram  10 mg Oral Nightly    budesonide  1 mg Nebulization BID RT    And    arformoterol tartrate  15 mcg Nebulization BID RT    montelukast  10 mg Oral Daily    [Held by provider] pantoprazole  40 mg Oral Daily    pregabalin  50 mg Oral BID    sodium chloride flush  5-40 mL IntraVENous 2 times per day     Continuous Infusions:   sodium chloride      sodium chloride       PRN Meds:naloxone 0.4 mg in 10 mL sodium chloride syringe, sodium chloride flush, sodium chloride, guaiFENesin-dextromethorphan, LORazepam, LORazepam, sodium chloride flush, sodium chloride, potassium chloride **OR** potassium alternative oral replacement **OR** potassium chloride, magnesium sulfate, polyethylene glycol, acetaminophen **OR** acetaminophen, HYDROcodone 5 mg - acetaminophen, ondansetron    OBJECTIVE:  BP 93/71   Pulse 69   Temp 97.5 °F (36.4 °C) (Oral)    Resp 18   Ht 1.524 m (5')   Wt 64.6 kg (142 lb 6.7 oz)   SpO2 (!) 89%   BMI 27.81 kg/m²   Temp  Av.6 °F (36.4 °C)  Min: 97.1 °F (36.2 °C)  Max: 98.1 °F (36.7 °C)  Constitutional: The patient is awake, alert, and oriented.   Skin: Warm and dry. No rashes were noted. No jaundice.  HEENT:  Moist mucous membranes, no ulcerations, no thrush.   Neck: Supple to movements. No lymphadenopathy.   Chest: No use of accessory muscles to breathe. Symmetrical expansion. Auscultation reveals wheezing, no crackles, + coarse bases  Cardiovascular: S1 and S2 are rhythmic and regular. No murmurs appreciated.   Abdomen: Positive bowel sounds to auscultation. Benign to palpation. No masses felt. No hepatosplenomegaly.nn/g  Genitourinary: No pain in the lower abdomen  Extremities: No clubbing, no cyanosis, no edema.  Musculoskeletal: No pain in range of motion of any joints tlso brace  Neurological: Following commands, no focal neurodeficit  Lines: peripheral    Laboratory and Tests Review:  Lab Results   Component Value Date    WBC 13.4 (H) 2025    WBC 16.0 (H) 2025    WBC 15.7 (H) 03/10/2025    HGB 10.4 (L) 2025    HCT 34.4 2025    MCV 99.7 2025     2025     Lab Results   Component Value Date    NEUTROABS 7.51 (H) 2025    NEUTROABS 7.49 (H) 2025    NEUTROABS 8.50 (H) 2025     No results found for: \"CRPHS\"  Lab Results   Component Value Date    ALT 8 2025    AST 16 2025    ALKPHOS 80 2025    BILITOT 0.3 2025     Lab Results   Component Value Date/Time     2025 05:07 AM    K 3.8 2025 05:07 AM    K 4.6 2023 11:30 AM     2025 05:07 AM    CO2 16 2025 05:07 AM    BUN 13 2025 05:07 AM    CREATININE 0.6 2025 05:07 AM    CREATININE 0.6 2025 04:43 AM    CREATININE 0.6 03/10/2025 04:38 AM    GFRAA >60 2019 04:00 AM    LABGLOM >90 2025 05:07 AM    LABGLOM 70 2024 09:02 PM

## 2025-03-12 NOTE — PROGRESS NOTES
Occupational Therapy  OT BEDSIDE TREATMENT NOTE   [facility]  [address], [place]      Date:3/12/2025  Patient Name: Haritha Dominguez  MRN: [id number]  : 1945  Room: [address] [place] [address]     Evaluating OT: Matt Soriano OTR/L; [id number]        Referring Provider: Bryan Rivers DO     Specific Provider Orders/Date: OT Eval and Treat 25 1300        Diagnosis: Pt c/o LBP worsening ~1 day PTA. CT scan ID'd L2 compression fx.     Surgery: None this admission.     Pertinent Medical History:  has a past medical history of Abrasion of skin of left lower leg, Abrasion of skin of right lower leg, Anxiety, Arthritis, Asthma, Claustrophobia, Decreased dorsalis pedis pulse, Dermatitis, Fracture, GERD (gastroesophageal reflux disease), Hiatal hernia, History of blood transfusion, HTN (hypertension), Hyperlipidemia, Itching, On aspirin at home, Pancreatic cyst, Pneumonia, Sleep apnea, SOB (shortness of breath), and Tachycardia.      Recommended Adaptive Equipment: TBD      Precautions:  Fall Risk, spinal precautions, soft TLSO (\"Wear TLSO when greater than 45 degrees\" Per NS note 25), +alarms, limited insight into deficits, monitor BP (hypotensive), flexed posture, NGT      Assessment of current deficits    [x] Functional mobility            [x]ADLs           [x] Strength                  [x]Cognition    [x] Functional transfers          [x] IADLs         [x] Safety Awareness   [x]Endurance    [x] Fine Coordination                         [x] Balance      [] Vision/perception   []Sensation      []Gross Motor Coordination             [x] ROM           [] Delirium                   [] Motor Control      OT PLAN OF CARE   OT POC based on physician orders, patient diagnosis and results of clinical assessment     Frequency/Duration 1-3 days/wk for 2 weeks PRN   Specific OT Treatment Interventions to include:   * Instruction/training on adapted ADL  Status  Date: 2/21/25 Treatment Status  Date:3/12/25 STGs = LTGs  Time frame: 10-14 days   Feeding Minimal Assist  Set up  Pt on modified diet Independent    Grooming Moderate Assist seated EOB  Min A  To brush hair and wash face sitting in chair Stand by Assist    UB Dressing Maximal Assist   To don/doff TLSO Max A  To don gown supine in bed  Dependent  To don TLSO in supine Stand by Assist    LB Dressing Dependent   To don socks at bed-level Dependent  To don socks bed level   Moderate Assist    Bathing Dependent  Simulated seated EOB Max A  Simulated tasks sitting EOB Moderate Assist    Toileting Dependent   Dependent  For posterior hygiene Due to stand balance/tolerance deficits Moderate Assist    Bed Mobility  Log Roll: Moderate Assist   Supine to sit: Maximal Assist   Sit to supine: Maximal Assist  Log Roll: Max Assist   Supine to sit: Maximal Assist   Sit to supine: Maximal Assistx2  Vc for body mechanics Supine to sit: Minimal Assist   Sit to supine: Minimal Assist    Functional Transfers Sit to stand:Moderate Assist   Stand to sit:Minimal Assist   Stand pivot: Minimal Assist w/ ww  Commode: NT  Mod A x2  For sit to stand from EOB and arm chair with ww. Vc for safe hand placement for controlled descent/ascent Sit to stand: Supervision   Stand to sit: Supervision   Stand pivot:  Supervision   Commode:  Supervision    Functional Mobility Minimal Assist   Use of ww to<>from room doorway  mod A  to take approx 6-8 steps fwd with ww for 2 reps with close chair follow for safety. Intermittent seated rest break for recovery Supervision w/ use of Appropriate AD   Balance Sitting:     Static:  Stand by Assist     Dynamic:Stand by Assist      Standing:    Static:  Minimal Assist w/ ww    Dynamic:Minimal Assist w/ ww Sitting:     Static:  SBA    Dynamic:Min A        Standing:    Static:  min A with ww    Dynamic:mod A with ww Sitting:     Static:  Independent     Dynamic:Independent      Standing:    Static:

## 2025-03-12 NOTE — PROGRESS NOTES
Physical Therapy   Treatment     Name: Haritha RUIZ [facility]  : 1945  MRN: [id number]      Date of Service: 3/12/2025    Evaluating PT:  Bryan Monterroso, PT [id number]    Referring provider/PT Order:  PT Eval and Treat   25 1300  PT eval and treat  Start:  25 1300,   End:  25 1300,   ONE TIME,   Standing Count:  1 Occurrences,   R         Bryan Rivers, DO     Room #:  6414/6414-A  Diagnosis:  Lumbar compression fracture, closed, initial encounter ([facility]) [S32.000A]  Abdominal pain, unspecified abdominal location [R10.9]  Acute midline low back pain without sciatica [M54.50]  Compression fracture of L2 vertebra, initial encounter ([facility]) [S32.020A]  PMHx/PSHx:     has a past medical history of Abrasion of skin of left lower leg, Abrasion of skin of right lower leg, Anxiety, Arthritis, Asthma, Claustrophobia, Decreased dorsalis pedis pulse, Dermatitis, Fracture, GERD (gastroesophageal reflux disease), Hiatal hernia, History of blood transfusion, HTN (hypertension), Hyperlipidemia, Itching, On aspirin at home, Pancreatic cyst, Pneumonia, Sleep apnea, SOB (shortness of breath), and Tachycardia.    has a past surgical history that includes Cholecystectomy; Cataract removal (Bilateral, 2013); back surgery (2014); joint replacement (2016); Total knee arthroplasty (Right); other surgical history (2017); Spinal fixation surgery with implant (); Upper gastrointestinal endoscopy (07/10/2017); Stimulator Surgery (Right, 2023); Upper gastrointestinal endoscopy (N/A, 3/10/2025); Upper gastrointestinal endoscopy (N/A, 3/10/2025); and Upper gastrointestinal endoscopy (3/10/2025).     Procedure/Surgery:  none  Precautions:  Falls, FWB (full weight bearing) Bilateral LE flexed posture kyphotic, , Soft TLSO,  NGT  Equipment Needs: Patient has needed equipment ,    SUBJECTIVE:    ???Patient states she will D/C to her Sister's home where she can be on the first level. Her [place] 3  minutes  [] Therapeutic exercises 31311 - minutes  [] Neuromuscular reeducation 33595 - minutes    Sisi Leslie PT, DPT  [id number]

## 2025-03-13 LAB
ALBUMIN SERPL-MCNC: 2.8 G/DL (ref 3.5–5.2)
ALP SERPL-CCNC: 80 U/L (ref 35–104)
ALT SERPL-CCNC: 7 U/L (ref 0–32)
ANION GAP SERPL CALCULATED.3IONS-SCNC: 14 MMOL/L (ref 7–16)
AST SERPL-CCNC: 12 U/L (ref 0–31)
BILIRUB SERPL-MCNC: 0.3 MG/DL (ref 0–1.2)
BUN SERPL-MCNC: 13 MG/DL (ref 6–23)
CALCIUM SERPL-MCNC: 8.3 MG/DL (ref 8.6–10.2)
CHLORIDE SERPL-SCNC: 104 MMOL/L (ref 98–107)
CO2 SERPL-SCNC: 23 MMOL/L (ref 22–29)
CREAT SERPL-MCNC: 0.7 MG/DL (ref 0.5–1)
ERYTHROCYTE [DISTWIDTH] IN BLOOD BY AUTOMATED COUNT: 15.3 % (ref 11.5–15)
GFR, ESTIMATED: 89 ML/MIN/1.73M2
GLUCOSE SERPL-MCNC: 79 MG/DL (ref 74–99)
HCT VFR BLD AUTO: 32.7 % (ref 34–48)
HGB BLD-MCNC: 10.1 G/DL (ref 11.5–15.5)
MAGNESIUM SERPL-MCNC: 2 MG/DL (ref 1.6–2.6)
MCH RBC QN AUTO: 29.7 PG (ref 26–35)
MCHC RBC AUTO-ENTMCNC: 30.9 G/DL (ref 32–34.5)
MCV RBC AUTO: 96.2 FL (ref 80–99.9)
PHOSPHATE SERPL-MCNC: 3 MG/DL (ref 2.5–4.5)
PLATELET, FLUORESCENCE: 331 K/UL (ref 130–450)
PMV BLD AUTO: 13.7 FL (ref 7–12)
POTASSIUM SERPL-SCNC: 3.5 MMOL/L (ref 3.5–5)
PROT SERPL-MCNC: 5.8 G/DL (ref 6.4–8.3)
RBC # BLD AUTO: 3.4 M/UL (ref 3.5–5.5)
SODIUM SERPL-SCNC: 141 MMOL/L (ref 132–146)
WBC OTHER # BLD: 11.7 K/UL (ref 4.5–11.5)

## 2025-03-13 PROCEDURE — 85027 COMPLETE CBC AUTOMATED: CPT

## 2025-03-13 PROCEDURE — 94640 AIRWAY INHALATION TREATMENT: CPT

## 2025-03-13 PROCEDURE — 2700000000 HC OXYGEN THERAPY PER DAY

## 2025-03-13 PROCEDURE — 6370000000 HC RX 637 (ALT 250 FOR IP): Performed by: INTERNAL MEDICINE

## 2025-03-13 PROCEDURE — 6360000002 HC RX W HCPCS: Performed by: INTERNAL MEDICINE

## 2025-03-13 PROCEDURE — 6370000000 HC RX 637 (ALT 250 FOR IP)

## 2025-03-13 PROCEDURE — 2500000003 HC RX 250 WO HCPCS: Performed by: NURSE PRACTITIONER

## 2025-03-13 PROCEDURE — 6360000002 HC RX W HCPCS: Performed by: NURSE PRACTITIONER

## 2025-03-13 PROCEDURE — 2500000003 HC RX 250 WO HCPCS: Performed by: ANESTHESIOLOGY

## 2025-03-13 PROCEDURE — 2140000000 HC CCU INTERMEDIATE R&B

## 2025-03-13 PROCEDURE — 6360000002 HC RX W HCPCS: Performed by: STUDENT IN AN ORGANIZED HEALTH CARE EDUCATION/TRAINING PROGRAM

## 2025-03-13 PROCEDURE — 2580000003 HC RX 258: Performed by: STUDENT IN AN ORGANIZED HEALTH CARE EDUCATION/TRAINING PROGRAM

## 2025-03-13 PROCEDURE — 92526 ORAL FUNCTION THERAPY: CPT

## 2025-03-13 PROCEDURE — 97530 THERAPEUTIC ACTIVITIES: CPT

## 2025-03-13 PROCEDURE — 36415 COLL VENOUS BLD VENIPUNCTURE: CPT

## 2025-03-13 PROCEDURE — 99232 SBSQ HOSP IP/OBS MODERATE 35: CPT | Performed by: NURSE PRACTITIONER

## 2025-03-13 PROCEDURE — 99232 SBSQ HOSP IP/OBS MODERATE 35: CPT | Performed by: PHYSICIAN ASSISTANT

## 2025-03-13 PROCEDURE — 6370000000 HC RX 637 (ALT 250 FOR IP): Performed by: NURSE PRACTITIONER

## 2025-03-13 PROCEDURE — 97535 SELF CARE MNGMENT TRAINING: CPT

## 2025-03-13 PROCEDURE — 83735 ASSAY OF MAGNESIUM: CPT

## 2025-03-13 PROCEDURE — 84100 ASSAY OF PHOSPHORUS: CPT

## 2025-03-13 PROCEDURE — 80053 COMPREHEN METABOLIC PANEL: CPT

## 2025-03-13 RX ORDER — PANTOPRAZOLE SODIUM 40 MG/1
40 TABLET, DELAYED RELEASE ORAL
Status: DISCONTINUED | OUTPATIENT
Start: 2025-03-13 | End: 2025-03-14 | Stop reason: HOSPADM

## 2025-03-13 RX ADMIN — SODIUM BICARBONATE 1300 MG: 650 TABLET ORAL at 08:48

## 2025-03-13 RX ADMIN — ARFORMOTEROL TARTRATE 15 MCG: 15 SOLUTION RESPIRATORY (INHALATION) at 21:19

## 2025-03-13 RX ADMIN — SODIUM BICARBONATE 1300 MG: 650 TABLET ORAL at 20:50

## 2025-03-13 RX ADMIN — WATER 20 MG: 1 INJECTION INTRAMUSCULAR; INTRAVENOUS; SUBCUTANEOUS at 08:48

## 2025-03-13 RX ADMIN — IPRATROPIUM BROMIDE AND ALBUTEROL SULFATE 1 DOSE: 2.5; .5 SOLUTION RESPIRATORY (INHALATION) at 21:19

## 2025-03-13 RX ADMIN — IPRATROPIUM BROMIDE AND ALBUTEROL SULFATE 1 DOSE: 2.5; .5 SOLUTION RESPIRATORY (INHALATION) at 15:37

## 2025-03-13 RX ADMIN — IPRATROPIUM BROMIDE AND ALBUTEROL SULFATE 1 DOSE: 2.5; .5 SOLUTION RESPIRATORY (INHALATION) at 12:36

## 2025-03-13 RX ADMIN — PANTOPRAZOLE SODIUM 40 MG: 40 TABLET, DELAYED RELEASE ORAL at 16:26

## 2025-03-13 RX ADMIN — IPRATROPIUM BROMIDE AND ALBUTEROL SULFATE 1 DOSE: 2.5; .5 SOLUTION RESPIRATORY (INHALATION) at 08:25

## 2025-03-13 RX ADMIN — BUDESONIDE 1000 MCG: 0.5 INHALANT RESPIRATORY (INHALATION) at 08:25

## 2025-03-13 RX ADMIN — ARFORMOTEROL TARTRATE 15 MCG: 15 SOLUTION RESPIRATORY (INHALATION) at 08:25

## 2025-03-13 RX ADMIN — PREGABALIN 50 MG: 50 CAPSULE ORAL at 08:48

## 2025-03-13 RX ADMIN — ATORVASTATIN CALCIUM 10 MG: 20 TABLET, FILM COATED ORAL at 20:49

## 2025-03-13 RX ADMIN — PREGABALIN 50 MG: 50 CAPSULE ORAL at 20:49

## 2025-03-13 RX ADMIN — ENOXAPARIN SODIUM 40 MG: 100 INJECTION SUBCUTANEOUS at 20:49

## 2025-03-13 RX ADMIN — BUDESONIDE 1000 MCG: 0.5 INHALANT RESPIRATORY (INHALATION) at 21:19

## 2025-03-13 RX ADMIN — MONTELUKAST 10 MG: 10 TABLET, FILM COATED ORAL at 08:48

## 2025-03-13 RX ADMIN — ESCITALOPRAM OXALATE 10 MG: 10 TABLET ORAL at 20:49

## 2025-03-13 RX ADMIN — SODIUM CHLORIDE, PRESERVATIVE FREE 10 ML: 5 INJECTION INTRAVENOUS at 20:49

## 2025-03-13 RX ADMIN — SODIUM CHLORIDE, PRESERVATIVE FREE 10 ML: 5 INJECTION INTRAVENOUS at 08:48

## 2025-03-13 RX ADMIN — SODIUM CHLORIDE, PRESERVATIVE FREE 40 MG: 5 INJECTION INTRAVENOUS at 12:37

## 2025-03-13 NOTE — PROGRESS NOTES
Nathan Kurtz M.D.,[facility]  Jack Rolle D.O., TRU., [facility]  Ari Licona M.D.  Dee Albert M.D.   aWllace Decker D.O.  Bryan Traylor M.D.         Daily Pulmonary Progress Note    Patient:  Haritha RUIZ Dominguez 80 y.o. female MRN: [id number]            Synopsis     We are following patient for respiratory failure    \"CC\" abdominal pain    Code status: LIMITED CODE  Intubation/Re-intubation at the time of arrest No   Defibrillation/Cardioversion No   Chest Compressions No   Resuscitative Medications No   Other DNR DNI       Subjective   Seen and examined. Feeling ok with breathing.  NG tube in place.  Requires TLSO brace when out of bed.  Oxygen weaned off.  Hypoventilating, shallow respirations.  Will add EZ Pap and incentive spirometer, prior chest imaging with basilar atelectasis.  Video swallow completed yesterday.  Physical therapy eval poor effort AM-PAC 10/24.  Speech recommending minced and moist solids with nectar consistency mildly thick liquids.               Compensatory strategies recommended: Fully alert for all PO, Thorough oral care to prevent colonization of oral bacteria, Upright in bed/ chair as tolerated  Slow rate of intake   SINGLE cup sips  SINGLE straw sips   SMALL bites    Review of Systems:  Constitutional: Weakness, fatigue  Skin: Denies pigmentation, dark lesions, and rashes   HEENT: Denies hearing loss, tinnitus, ear drainage, epistaxis, sore throat, and hoarseness.  Cardiovascular: Denies palpitations, chest pain, and chest pressure.  Respiratory: Shortness of breath  Gastrointestinal: Abdominal pain  Genitourinary: Denies dysuria, frequency, urgency or hematuria  Musculoskeletal: Back pain  Neurological: Denies dizziness, vertigo, headache, and focal weakness  Psychological: Denies anxiety and depression  Endocrine: Denies heat intolerance and cold intolerance      24-hour events:  No new events    Objective   OBJECTIVE:   /78   Pulse (!) 103   Temp 98 °F (36.7  sitting upright as tolerated.  Small bites, small sips, slow rate of intake.  Esophogram  reviewed -dysfunctional swallowing with recurrent aspiration into trachea  TLSO brace when up out of bed  DVT/PE prophylaxis-Lovenox, Protonix  Doing well from a pulmonary perspective   This plan of care was reviewed in collaboration with Dr. Decker  Electronically signed by STEVE Page - CNP on 3/13/2025 at 1:08 PM        I personally saw, examined, and cared for the patient. I performed the substantive portion of the visit. Labs, medications, radiographs reviewed. I agree with history exam and plans detailed in NP note.      Completed tamiflu and abx.  Increase mobility as able, IS, EZPAP.    Improved from pulm standpoint. Will sign off at this time. Reconsult if needed.    Wallace Decker, DO

## 2025-03-13 NOTE — PROGRESS NOTES
Left message with Deanna Gardiner for Parrott Bigger to follow up with surgeon Physical Therapy   Treatment     Name: Haritha RUIZ [facility]  : 1945  MRN: [id number]      Date of Service: 3/13/2025    Evaluating PT:  Bryan Monterroso, PT [id number]    Referring provider/PT Order:  PT Eval and Treat   25 1300  PT eval and treat  Start:  25 1300,   End:  25 1300,   ONE TIME,   Standing Count:  1 Occurrences,   R         Bryan Rivers, DO     Room #:  6414/6414-A  Diagnosis:  Lumbar compression fracture, closed, initial encounter ([facility]) [S32.000A]  Abdominal pain, unspecified abdominal location [R10.9]  Acute midline low back pain without sciatica [M54.50]  Compression fracture of L2 vertebra, initial encounter ([facility]) [S32.020A]  PMHx/PSHx:     has a past medical history of Abrasion of skin of left lower leg, Abrasion of skin of right lower leg, Anxiety, Arthritis, Asthma, Claustrophobia, Decreased dorsalis pedis pulse, Dermatitis, Fracture, GERD (gastroesophageal reflux disease), Hiatal hernia, History of blood transfusion, HTN (hypertension), Hyperlipidemia, Itching, On aspirin at home, Pancreatic cyst, Pneumonia, Sleep apnea, SOB (shortness of breath), and Tachycardia.    has a past surgical history that includes Cholecystectomy; Cataract removal (Bilateral, 2013); back surgery (2014); joint replacement (2016); Total knee arthroplasty (Right); other surgical history (2017); Spinal fixation surgery with implant (); Upper gastrointestinal endoscopy (07/10/2017); Stimulator Surgery (Right, 2023); Upper gastrointestinal endoscopy (N/A, 3/10/2025); Upper gastrointestinal endoscopy (N/A, 3/10/2025); and Upper gastrointestinal endoscopy (3/10/2025).     Procedure/Surgery:  none  Precautions:  Falls, FWB (full weight bearing) Bilateral LE flexed posture kyphotic, , Soft TLSO,  Equipment Needs: Patient has needed equipment ,    SUBJECTIVE:    ???Patient states she will D/C to her Sister's home where she can be on the first level. Her home [place] home with 3 FELIZ.  entry and agreeable to PT treatment. Pt able to complete rolling for TLSO application prior to further mobility. Supine to sit required heavy assist for trunk and BLEs. Pt sat EOB for approximately 8 minutes during session with no assist for sitting balance. Pt able to stand and pivot to chair with WW and balance assist. Poor eccentric control from stand to sit into chair that improved on subsequent bouts. Short bouts of ambulation completed with WW and chair follow. Flexed posture present with all standing activity with minimal short term improvements with cues. SOB and fatigue limited ambulation distance. Pt remained in chair at end of session. RN aware of pt performance and positioning in chair.    All needs met and call light in reach. All lines remained intact.     Treatment:  Patient practiced and was instructed in the following treatment:    Bed Mobility: VCs provided for sequencing and safety during mobility. Manual assist provided for completion of task.  Transfer Training: Verbal and tactile cueing provided for sequencing and safety during mobility. Manual assist provided for completion of task  Therapeutic Activities: pt sat EOB for approximately 8 minutes during session with no assist for static and dynamic sitting balance.   Gait Training: Ambulation with WW + chair follow and verbal cues for proper technique and safety. Manual assist provided for completion of task     PLAN:    Patient is making good progress towards established goals.  Will continue with current POC.      Time in  1436  Time out  1516    Total Treatment Time  40 minutes     CPT codes:  [] Gait training 13714 - minutes  [] Manual therapy 64819 - minutes  [x] Therapeutic activities 67524 40 minutes  [] Therapeutic exercises 05413 - minutes  [] Neuromuscular reeducation 68541 - minutes    Sisi Leslie PT, DPT  [id number]

## 2025-03-13 NOTE — PROGRESS NOTES
Hospital Medicine    Subjective: Patient is awake denies new complaints  Breathing is okay      Current Facility-Administered Medications:     sodium bicarbonate tablet 1,300 mg, 1,300 mg, Oral, BID, Ines Gauthier APRN - CNP, 1,300 mg at 03/13/25 0848    naloxone 0.4 mg in 10 mL sodium chloride syringe, , IntraVENous, PRN, True Mcgovern,     sodium chloride flush 0.9 % injection 5-40 mL, 5-40 mL, IntraVENous, 2 times per day, True Mcgovern DO, 10 mL at 03/13/25 0848    sodium chloride flush 0.9 % injection 5-40 mL, 5-40 mL, IntraVENous, PRN, True Mcgovern DO    0.9 % sodium chloride infusion, , IntraVENous, PRN, True Mcgovern DO    methylPREDNISolone sodium succ (SOLU-MEDROL) 20 mg in sterile water 0.5 mL injection, 20 mg, IntraVENous, Daily, Kenji Hale APRN - CNP, 20 mg at 03/13/25 0848    enoxaparin (LOVENOX) injection 40 mg, 40 mg, SubCUTAneous, Daily, Bryan Rivers DO, 40 mg at 03/12/25 2017    guaiFENesin-dextromethorphan (ROBITUSSIN DM) 100-10 MG/5ML syrup 10 mL, 10 mL, Oral, Q6H PRN, Daniella Anderson APRN - CNP    pantoprazole (PROTONIX) 40 mg in sodium chloride (PF) 0.9 % 10 mL injection, 40 mg, IntraVENous, Q12H, James Spivey MD, 40 mg at 03/12/25 2244    LORazepam (ATIVAN) injection 0.5 mg, 0.5 mg, IntraVENous, Q6H PRN, Bryan Rivers DO, 0.5 mg at 03/12/25 1015    sodium chloride 0.9 % bolus 1,000 mL, 1,000 mL, IntraVENous, Once, Jocelynn Porras MD    sodium chloride 0.9 % bolus 1,000 mL, 1,000 mL, IntraVENous, Once, Natanael Martinez MD    ipratropium 0.5 mg-albuterol 2.5 mg (DUONEB) nebulizer solution 1 Dose, 1 Dose, Inhalation, Q4H WA RT, Bryan Rivers, DO, 1 Dose at 03/13/25 0825    LORazepam (ATIVAN) tablet 1 mg, 1 mg, Oral, TID PRN, Bryan Rivers, , 1 mg at 03/01/25 2143    lidocaine 1 % injection 5 mL, 5 mL, IntraDERmal, Once, Oliver Waters MD    atorvastatin (LIPITOR) tablet 10 mg, 10 mg, Oral, Nightly,  04:28 AM     Warfarin PT/INR:    Lab Results   Component Value Date    INR 0.9 05/24/2023    INR 1.1 02/13/2023    INR 1.2 05/28/2017    PROTIME 10.2 05/24/2023    PROTIME 11.8 02/13/2023    PROTIME 13.2 (H) 05/28/2017       Assessment:    Principal Problem:    Compression fracture of L2 lumbar vertebra (HCC)  Active Problems:    Pancreatic lesion    Goals of care, counseling/discussion    Palliative care encounter    Esophageal dysphagia    Severe protein-calorie malnutrition  Resolved Problems:    * No resolved hospital problems. *      Plan:  EGD results noted patient was noted to have large hiatal hernia  Mucosal tear was noted within the hiatal hernia with scope passage  Patient had dilatation of proximal/middle esophagus  EUS was aborted  Currently has NG in  Speech following  Brace for L2 compression fracture  Increase activity as able      Amaury Howe MD  11:54 AM  3/13/2025

## 2025-03-13 NOTE — PROGRESS NOTES
Gastroenterology, Hepatology, &  Advanced Endoscopy    Progress Note      HPI:   Patient with improvement in respiratory status. EGD performed 3/10/24. NG placed. Diet advanced, tolerating well. NG clamped    Lab Results   Component Value Date    INR 0.9 05/24/2023    INR 1.1 02/13/2023    INR 1.2 05/28/2017    PROTIME 10.2 05/24/2023    PROTIME 11.8 02/13/2023    PROTIME 13.2 (H) 05/28/2017         ALT   Date Value Ref Range Status   03/13/2025 7 0 - 32 U/L Final   03/12/2025 8 0 - 32 U/L Final   03/11/2025 7 0 - 32 U/L Final     AST   Date Value Ref Range Status   03/13/2025 12 0 - 31 U/L Final   03/12/2025 16 0 - 31 U/L Final   03/11/2025 12 0 - 31 U/L Final     Alkaline Phosphatase   Date Value Ref Range Status   03/13/2025 80 35 - 104 U/L Final   03/12/2025 80 35 - 104 U/L Final   03/11/2025 73 35 - 104 U/L Final     Total Bilirubin   Date Value Ref Range Status   03/13/2025 0.3 0.0 - 1.2 mg/dL Final   03/12/2025 0.3 0.0 - 1.2 mg/dL Final   03/11/2025 0.4 0.0 - 1.2 mg/dL Final     Bilirubin, Direct   Date Value Ref Range Status   03/02/2025 <0.2 0.0 - 0.3 mg/dL Final   04/01/2012 0.2 0.0 - 0.2 mg/dL Final   01/01/2011 <0.1 0.0 - 0.2 mg/dL Final      Lab Results   Component Value Date    WBC 11.7 (H) 03/13/2025    HGB 10.1 (L) 03/13/2025    HCT 32.7 (L) 03/13/2025     03/12/2025     03/13/2025    K 3.5 03/13/2025     03/13/2025    CREATININE 0.7 03/13/2025    BUN 13 03/13/2025    CO2 23 03/13/2025    FOLATE 17.9 02/28/2025    KEUSVVVO06 1002 (H) 02/28/2025    GLUCOSE 79 03/13/2025    INR 0.9 05/24/2023    PROTIME 10.2 05/24/2023    TSH 2.06 08/22/2023    LABA1C 7.8 01/17/2024        CEA   Date Value Ref Range Status   05/28/2017 5.3 (H) 0.0 - 5.2 ng/mL Final     Sed Rate, Automated   Date Value Ref Range Status   11/04/2024 72 (H) 0 - 20 mm/Hr Final   03/10/2023 61 (H) 0 - 20 mm/Hr Final   02/21/2023 26 (H) 0 - 20 mm/Hr Final   02/01/2023 42 (H) 0 - 20 mm/Hr Final     Lipase   Date Value Ref  Natanael Martinez MD        ipratropium 0.5 mg-albuterol 2.5 mg (DUONEB) nebulizer solution 1 Dose  1 Dose Inhalation Q4H WA RT Bryan Rivers DO   1 Dose at 03/13/25 0825    LORazepam (ATIVAN) tablet 1 mg  1 mg Oral TID PRN Bryan Rivers, DO   1 mg at 03/01/25 2143    lidocaine 1 % injection 5 mL  5 mL IntraDERmal Once Oliver Waters MD        atorvastatin (LIPITOR) tablet 10 mg  10 mg Oral Nightly Bryan Rivers DO   10 mg at 03/12/25 2018    escitalopram (LEXAPRO) tablet 10 mg  10 mg Oral Nightly Bryan Rivers DO   10 mg at 03/12/25 2018    budesonide (PULMICORT) nebulizer suspension 1,000 mcg  1 mg Nebulization BID RT Bryan Rivers DO   1,000 mcg at 03/13/25 0825    And    arformoterol tartrate (BROVANA) nebulizer solution 15 mcg  15 mcg Nebulization BID RT Bryan Rivers DO   15 mcg at 03/13/25 0825    montelukast (SINGULAIR) tablet 10 mg  10 mg Oral Daily Bryan Rivers DO   10 mg at 03/12/25 0832    [Held by provider] pantoprazole (PROTONIX) tablet 40 mg  40 mg Oral Daily Bryan Rivers, DO   40 mg at 03/02/25 1603    pregabalin (LYRICA) capsule 50 mg  50 mg Oral BID Bryan Rivers DO   50 mg at 03/12/25 2018    sodium chloride flush 0.9 % injection 5-40 mL  5-40 mL IntraVENous 2 times per day Bryan Rivers DO   10 mL at 03/10/25 0843    sodium chloride flush 0.9 % injection 5-40 mL  5-40 mL IntraVENous PRN Bryan Rivers DO        0.9 % sodium chloride infusion   IntraVENous PRN Bryan Rivers DO        potassium chloride (KLOR-CON M) extended release tablet 40 mEq  40 mEq Oral PRN Bryan Rivers DO        Or    potassium bicarb-citric acid (EFFER-K) effervescent tablet 40 mEq  40 mEq Oral PRN Bryan Rivers DO   40 mEq at 03/11/25 0904    Or    potassium chloride 10 mEq/100 mL IVPB (Peripheral Line)  10 mEq IntraVENous PRN Bryan Rivers  mL/hr at 03/05/25 2007 10 mEq at 03/05/25 2007    magnesium sulfate 2000 mg in 50 mL IVPB  premix  2,000 mg IntraVENous PRN Bryan Rivers, DO   Stopped at 03/07/25 1121    polyethylene glycol (GLYCOLAX) packet 17 g  17 g Oral Daily PRN Bryan Rivers, DO   17 g at 02/23/25 0842    acetaminophen (TYLENOL) tablet 650 mg  650 mg Oral Q6H PRN Bryan Rivers, DO   650 mg at 03/02/25 1752    Or    acetaminophen (TYLENOL) suppository 650 mg  650 mg Rectal Q6H PRN Bryan Rivers, DO   650 mg at 03/03/25 0900    HYDROcodone-acetaminophen (NORCO) 5-325 MG per tablet 1 tablet  1 tablet Oral Q4H PRN Monica Hansen PA   1 tablet at 03/02/25 1601    ondansetron (ZOFRAN) injection 4 mg  4 mg IntraVENous Q6H PRN Oliver Waters MD   4 mg at 03/07/25 0926       OBJECTIVE      Physical    VITALS:  /60   Pulse 94   Temp 98 °F (36.7 °C) (Temporal)   Resp 18   Ht 1.524 m (5')   Wt 63 kg (138 lb 14.2 oz)   SpO2 95%   BMI 27.13 kg/m²   Physical Exam:  General: Overall well-appearing, NAD  HEENT: PERRLA, EOMI, Anicteric sclera, MMM, no rhinorrhea  Cards: RRR, no LE edema  Resp: Breathing comfortably, good air movement, no use of accessory muscles, no audible wheezing  Abdomen: soft, NT, ND.   Extremities: Moves all extremities, no effusions or bruising.  Skin: No rashes or jaundice  Neuro: A&O x 3, CN grossly intact, non-focal exam     EGD Findings: 3/11/25   Normal esophagus.  Large hiatal hernia.  Normal gastric mucosa.  Normal duodenal mucosa.  Mucosal tear appreciated within hiatal hernia with scope passage.  Savory dilation of proximal/middle esophagus to 57Fr with adequate response.  EUS attempted but given degree of tearing and difficult views in the setting of hiatal hernia, aborted.  Endoscopically placed NG tube placement successful.     ASSESSMENT:    80y/F who presented to the ED with abdominal pain and back pain and who now has worsening respiratory status to the point of requiring 10LNC. She is also now reporting regurgitation with PO intake which was appreciated during MBS and

## 2025-03-13 NOTE — PROGRESS NOTES
Occupational Therapy  OT BEDSIDE TREATMENT NOTE   [facility]  [address], [place]      Date:3/13/2025  Patient Name: Haritha Dominguez  MRN: [id number]  : 1945  Room: [address] [place] [address]     Evaluating OT: Matt Soriano OTR/L; [id number]        Referring Provider: Bryan Rivers DO     Specific Provider Orders/Date: OT Eval and Treat 25 1300        Diagnosis: Pt c/o LBP worsening ~1 day PTA. CT scan ID'd L2 compression fx.     Surgery: None this admission.     Pertinent Medical History:  has a past medical history of Abrasion of skin of left lower leg, Abrasion of skin of right lower leg, Anxiety, Arthritis, Asthma, Claustrophobia, Decreased dorsalis pedis pulse, Dermatitis, Fracture, GERD (gastroesophageal reflux disease), Hiatal hernia, History of blood transfusion, HTN (hypertension), Hyperlipidemia, Itching, On aspirin at home, Pancreatic cyst, Pneumonia, Sleep apnea, SOB (shortness of breath), and Tachycardia.      Recommended Adaptive Equipment: TBD      Precautions:  Fall Risk, spinal precautions, soft TLSO (\"Wear TLSO when greater than 45 degrees\" Per NS note 25), +alarms, limited insight into deficits, monitor BP (hypotensive), flexed posture, NGT      Assessment of current deficits    [x] Functional mobility            [x]ADLs           [x] Strength                  [x]Cognition    [x] Functional transfers          [x] IADLs         [x] Safety Awareness   [x]Endurance    [x] Fine Coordination                         [x] Balance      [] Vision/perception   []Sensation      []Gross Motor Coordination             [x] ROM           [] Delirium                   [] Motor Control      OT PLAN OF CARE   OT POC based on physician orders, patient diagnosis and results of clinical assessment     Frequency/Duration 1-3 days/wk for 2 weeks PRN   Specific OT Treatment Interventions to include:   * Instruction/training on adapted ADL  safety and independence with completion of ADL/IADL tasks for functional independence and quality of life.     At end of session pt left sitting up in chair, call light within reach, RN notified.     Pt has made slow progress towards set goals.     Continue with current plan of care    Treatment Time In:1437            Treatment Time Out: 1515             Treatment Charges: Mins Units   Ther Ex  [id number]     Manual Therapy 24749     Thera Activities [id number] 23 2   ADL/Home Mgt 59189 15 1   Neuro Re-ed [id number]     Group Therapy      Orthotic manage/training  53692     Non-Billable Time     Total Timed Treatment 38 3     Myra Rulf HOYT/L [id number]   Matt Soriano OTR/L; [id number]

## 2025-03-13 NOTE — PROGRESS NOTES
Palliative Care Department  [phone]  Palliative Care Progress Note  Provider Ines Uribe PA-C     Haritha Dominguez  [id number]  Hospital Day: 23  Date of Initial Consult: 02/23/2025  Referring Provider: Bryan Rivers DO   Palliative Medicine was consulted for assistance with: \"declining in health, sleeping 22 hrs per day, refusing to eat\"    HPI:   Haritha Dominguez is a 80 y.o. with a medical history of thoracic and lumbar compression fractures, B12 deficiency, hypertension, chronic back pain, asthma who was admitted on 2/19/2025 from home with a CHIEF COMPLAINT of abdominal pain and low back pain for 1 day.  In the ED, CT scan showing moderate to large hiatal hernia, moderate stool burden, 1.3 cm low attenuating lesion along the pancreatic body/tail, L2 compression fracture.  She was admitted for pain control and further workup.  CT lumbar spine without contrast showing new acute appearing fractures involving superior endplate of L2, stable chronic compression fracture involving L1, bilateral sacral alae fractures of uncertain chronicity, generalized osteopenia, stable alignment of lumbar spine with posterior fusion hardware at L3-S1 and multilevel laminectomy.  Oncology consulted for pancreatic lesion.  They recommended MRI.  Patient refused.  Neurosurgery consulted for L2 compression fracture, recommending brace.  During the course of her hospitalization, patient had further decline with lethargy, refusing to eat, intermittent confusion.  Palliative care consulted for further assistance    2/24: Influenza A positive  2/27: DNR CCA/DNI  3/2: RRT for respiratory failure  3/5: Esophagram showing esophageal dysmotility with aspiration into the trachea, prominent hiatal hernia  3/10: for EGD and NGT placement  ASSESSMENT/PLAN:     Pertinent Hospital Diagnoses     Acute hypoxic respiratory failure  Influenza A    Palliative Care Encounter / Counseling Regarding Goals of Care  Please see detailed goals of  care discussion as below  At this time, Haritha Dominguez, Does Not have capacity for medical decision-making.  Capacity is time limited and situation/question specific  Outcome of goals of care meeting:   DNR/DNI  Continue current level of care  Code status DNR/DNI  Advanced Directives: no POA or living will in [facility]  Surrogate/Legal NOK:  Chantelle Stover, daughter, [phone]  Gomez Dominguez, son, [phone]  Aurora Rey, sister, [phone]      Spiritual assessment: no spiritual distress identified  Bereavement and grief: to be determined  Referrals to: none today  SUBJECTIVE:     Current medical issues leading to Palliative Medicine involvement include   Active Hospital Problems    Diagnosis Date Noted    Severe protein-calorie malnutrition [E43] 03/10/2025    Esophageal dysphagia [R13.19] 03/05/2025    Goals of care, counseling/discussion [Z71.89] 02/27/2025    Palliative care encounter [Z51.5] 02/27/2025    Compression fracture of L2 lumbar vertebra (HCC) [S32.020A] 02/20/2025    Pancreatic lesion [K86.9] 05/27/2017       Details of Conversation:      Patient seen at bedside.  On room air. NGT removed.  Patient tolerating minced diet.  Working with speech therapy.  States her anxiety is well-controlled with her lorazepam.  She denies any pain.  She is anxious for discharge.  Patient has been working with physical therapy.  Awaiting precert for Adrian Escamilla.  Called her daughter Chantelle and provided an update.  All questions answered.  Palliative care will continue to follow per family request.  CODE STATUS DNR/DNI.  Goals aligned with current level of care.    OBJECTIVE:   Prognosis: Guarded    Physical Exam:  /78   Pulse (!) 103   Temp 98 °F (36.7 °C) (Temporal)   Resp 18   Ht 1.524 m (5')   Wt 63 kg (138 lb 14.2 oz)   SpO2 91%   BMI 27.13 kg/m²   Constitutional:  Elderly, thin, NAD, awake, alert  Eyes: no scleral icterus, normal lids, no discharge  ENMT:  Normocephalic,  atraumatic  Lungs: Room air.  Nonlabored  Heart::  regular rate  Ext:  Moving all extremities  Skin:  Scabbed circular lesions scattered on extremities. Dry skin  Psych: non-anxious affect  Neuro: Alert and oriented.    Objective data reviewed: labs, images, records, medication use, vitals, and chart    Discussed patient and the plan of care with the other IDT members: Patient and family and  interdisciplinary team    Time/Communication  Greater than 50% of time spent, total 35 minutes in counseling and coordination of care at the bedside regarding goals of care, symptom management, diagnosis and prognosis, and see above.    Thank you for allowing Palliative Medicine to participate in the care of Haritha Dominguez.

## 2025-03-13 NOTE — PROGRESS NOTES
Comprehensive Nutrition Assessment    Type and Reason for Visit:  Reassess    Nutrition Recommendations/Plan:   Continue current diet per SLP/MBSS- nectar thick liquids noted, limited ONS options while on thickened liquids; start magic cup BID to promote oral intake  Recommend TF to meet 100% of est needs and continue to monitor oral intake/until oral intake >75% at most meals ; TF rec provided, recommend,      Standard w/ Fiber (Jevity 1.5) @ 35ml/hr + 1 pro mod to provide 840ml TV, 1260kcals, 54g pro (w/ 1 pro mod is 1364kcals, 80g pro), 638ml free water.    Flush rec of 125ml q 4= 750ml water (1388ml total water).      Will continue to monitor.         Malnutrition Assessment:  Malnutrition Status:  Severe malnutrition (03/11/25 1321)    Context:  Acute Illness     Findings of the 6 clinical characteristics of malnutrition:  Energy Intake:  50% or less of estimated energy requirements for 5 or more days  Weight Loss:  Greater than 2% over 1 week (~2.8% in ~1 week)     Body Fat Loss:  Mild body fat loss Orbital   Muscle Mass Loss:  Mild muscle mass loss Temples (temporalis), Clavicles (pectoralis & deltoids)  Fluid Accumulation:  No fluid accumulation     Strength:  Not Performed    Nutrition Assessment:    pt adm d/t abd pain/compression frx; PMhx of HTN, Pancreatic cyst, GERD,claustrophobia; pt refused MRI; pt w/ poor appetite/oral intake this adm and lethargy; s/p esophagram, dysfunctional swallowing w/ recurrent aspiration into trachea noted; hiatal hernia/esophageal dismotility deficit noted; pancreatic tail lesion noted; pt s/p EUS/EGD 3/10- shows LG hiatal hernia; NGT placed for nutrition support 3/10; pt meets criteria for severe malnutrition; pt s/p MBSS 3/12, SLP rec minced/moist solids and nectar thick liquids- will start ONS to promote oral intake; recommend TF to meet 100% of pt's est needs at this time until oral intake mostly >75% at meals; TF rec provided; will continue to monitor.    Nutrition  or injury related to swallowing difficulty as evidenced by criteria as identified in malnutrition assessment    Nutrition Interventions:   Food and/or Nutrient Delivery: Continue Current Diet, Start Oral Nutrition Supplement, Start Tube Feeding (Recommend Standard w/ Fiber (Jevity 1.5) @ 35ml/hr + 1 pro mod to provide 840ml TV, 1260kcals, 54g pro (w/ 1 pro mod is 1364kcals, 80g pro), 638ml free water; flush rec of 125ml q 4= 750ml water (1388ml total water). Start magic cup BID.)  Nutrition Education/Counseling: Education/Counseling not indicated  Coordination of Nutrition Care: Continue to monitor while inpatient       Goals:  Goals: Meet at least 75% of estimated needs  Type of Goal: New goal  Previous Goal Met: Goal(s) Achieved (TF was running this adm, pt also ordered oral diet)    Nutrition Monitoring and Evaluation:   Behavioral-Environmental Outcomes: None Identified  Food/Nutrient Intake Outcomes: Diet Advancement/Tolerance, Food and Nutrient Intake, Supplement Intake, Enteral Nutrition Intake/Tolerance  Physical Signs/Symptoms Outcomes: Biochemical Data, Nutrition Focused Physical Findings, Skin, Chewing or Swallowing, Weight, GI Status, Fluid Status or Edema, Hemodynamic Status    Discharge Planning:    Too soon to determine     Bonita Perry RD, LD  Contact: [phone]

## 2025-03-13 NOTE — PROGRESS NOTES
[facility]  Nephrology Progress Note    Patient's Name: Haritha Dominguez    History of Present Illness from 2/27 consult note:    \"Haritha Dominguez is a 80 y.o. female with a past medical history of hypertension, hyperlipidemia, anxiety, arthritis, and asthma.  She presented to the ED on 2/19 reportedly for concerns of abdominal pain.  Vital signs on 2/19 includes temperature 99.3, respirations 18, pulse 103, /85, and she was 92% SpO2.  Lab data on 2/19 includes CO2 20, BUN 22, creatinine 1, troponin 18, WBC 12.8 K, and hemoglobin 10.5.  Her UA on 2/20 showed specific gravity 1.025, 30 protein, trace leukocyte esterase, 1+ bacteria.  She had a CT abdomen/pelvis with IV contrast on 2/20 which showed no acute intra-abdominal pelvic process appreciated, moderate-large hiatal hernia, moderate colonic stool burden, 1.3 cm low attenuating lesion along pancreatic body/tail.  She underwent a CT lumbar spine on 2/20 which showed new acute appearing fracture seen involving superior endplate of L2, stable chronic compression fracture involving L1, bilateral sacral ala fractures of uncertain chronicity, generalized osteopenia.  She was seen by neurosurgery.  She was seen by hepatobiliary surgery regarding pancreatic lesion.  She had a chest x-ray on 2/24 which showed increased interstitial markings seen in the perihilar regions to suggest mild interstitial edema with small bilateral pleural effusions.  She was found to be influenza A positive on 2/24. She was seen by urology for concerns of urinary retention.  ID is following the patient.  Nephrology has been consulted to see the patient for concerns of KATELYN. She has a baseline serum creatinine of 0.8-1 mg/dL.  At present, patient was seen and examined.  She is awake, alert, appears in no acute distress.  She appears to be a questionable historian at this time.  She notes that her appetite is okay.  She denies any dizziness.  Visitors at  ROSITA performed by James Spivey MD at [facility]       Patient Active Problem List   Diagnosis    Mixed hyperlipidemia    HTN (hypertension), benign    Asthma    Vitamin B12 deficiency    Pancreatic lesion    Anxiety    Chronic back pain    Compression fracture of lumbar spine, non-traumatic, initial encounter ([facility])    Compression fracture of T12 vertebra ([facility])    Failed spinal cord stimulator    Secondary adrenal insufficiency    Complicated UTI (urinary tract infection)    Gastroesophageal reflux disease    Hyperlipidemia    Cardiac arrhythmia    Abrasion of skin of left lower leg    Abrasion of skin of right lower leg    Itching    Decreased dorsalis pedis pulse    Compression fracture of L2 lumbar vertebra ([facility])    Goals of care, counseling/discussion    Palliative care encounter    Esophageal dysphagia    Dysphagia    Severe protein-calorie malnutrition       Diet:    ADULT TUBE FEEDING; Nasogastric; Standard with Fiber; Continuous; 10; Yes; 10; Q 4 hours; 35; 125; Q 4 hours; Protein; 1 Dose; Daily  ADULT DIET; Dysphagia - Minced and Moist; Mildly Thick (Nectar)    Meds:     sodium bicarbonate  1,300 mg Oral BID    sodium chloride flush  5-40 mL IntraVENous 2 times per day    methylPREDNISolone  20 mg IntraVENous Daily    enoxaparin  40 mg SubCUTAneous Daily    pantoprazole (PROTONIX) 40 mg in sodium chloride (PF) 0.9 % 10 mL injection  40 mg IntraVENous Q12H    sodium chloride  1,000 mL IntraVENous Once    sodium chloride  1,000 mL IntraVENous Once    ipratropium 0.5 mg-albuterol 2.5 mg  1 Dose Inhalation Q4H WA RT    lidocaine  5 mL IntraDERmal Once    atorvastatin  10 mg Oral Nightly    escitalopram  10 mg Oral Nightly    budesonide  1 mg Nebulization BID RT    And    arformoterol tartrate  15 mcg Nebulization BID RT    montelukast  10 mg Oral Daily    [Held by provider] pantoprazole  40 mg Oral Daily    pregabalin  50 mg Oral BID    sodium chloride flush  5-40 mL IntraVENous 2 times per day

## 2025-03-13 NOTE — PROGRESS NOTES
SPEECH LANGUAGE PATHOLOGY  DAILY PROGRESS NOTE        PATIENT NAME:  Haritha Dominguez      :  1945          TODAY'S DATE:  3/13/2025 ROOM:  14/6414-A    Patient seen for f/u for dysphagia mgmt. RN cleared patient for tx and PO intake. Patient alert and oriented to place and self. Patient agreeable to minced and moist consistency solids and nectar thick liquids via cup. Patient oral stage exhibited min oral stasis after 1st swl that cleared w/ 2nd swl and/or liquid wash, fair bolus formation;manipulation, adequate mastication time, and fair AP tongue propulsion. Patient pharyngeal stage exhibited no overt clinical indicators of pen/aspiration. Patient utilized small bites/sips, slow rate, and was upright for intake. SLP reviewed reflux/esophageal motility deficit strategy for upright positioning at least 45-60 minutes after meals. Patient verbalized understanding. Will cont w/ POC. Patient to continue w/ minced and moist consistency solids and nectar thick liquids as tolerated w/ recommended strategies.     CPT code(s) 95731  dysphagia tx  Total minutes :  15 minutes     Funmi Crystal M.S., CCC-SLP  Speech-Language Pathologist  SP. [id number]

## 2025-03-14 VITALS
BODY MASS INDEX: 28.48 KG/M2 | WEIGHT: 145.06 LBS | TEMPERATURE: 98.2 F | RESPIRATION RATE: 17 BRPM | DIASTOLIC BLOOD PRESSURE: 52 MMHG | HEART RATE: 97 BPM | HEIGHT: 60 IN | OXYGEN SATURATION: 94 % | SYSTOLIC BLOOD PRESSURE: 97 MMHG

## 2025-03-14 LAB
ANION GAP SERPL CALCULATED.3IONS-SCNC: 13 MMOL/L (ref 7–16)
BUN SERPL-MCNC: 14 MG/DL (ref 6–23)
CALCIUM SERPL-MCNC: 7.9 MG/DL (ref 8.6–10.2)
CHLORIDE SERPL-SCNC: 103 MMOL/L (ref 98–107)
CO2 SERPL-SCNC: 23 MMOL/L (ref 22–29)
CREAT SERPL-MCNC: 0.6 MG/DL (ref 0.5–1)
ERYTHROCYTE [DISTWIDTH] IN BLOOD BY AUTOMATED COUNT: 15.4 % (ref 11.5–15)
GFR, ESTIMATED: >90 ML/MIN/1.73M2
GLUCOSE SERPL-MCNC: 70 MG/DL (ref 74–99)
HCT VFR BLD AUTO: 28.8 % (ref 34–48)
HGB BLD-MCNC: 9.1 G/DL (ref 11.5–15.5)
MCH RBC QN AUTO: 29.5 PG (ref 26–35)
MCHC RBC AUTO-ENTMCNC: 31.6 G/DL (ref 32–34.5)
MCV RBC AUTO: 93.5 FL (ref 80–99.9)
PLATELET # BLD AUTO: 311 K/UL (ref 130–450)
PMV BLD AUTO: 13 FL (ref 7–12)
POTASSIUM SERPL-SCNC: 3.4 MMOL/L (ref 3.5–5)
RBC # BLD AUTO: 3.08 M/UL (ref 3.5–5.5)
SODIUM SERPL-SCNC: 139 MMOL/L (ref 132–146)
WBC OTHER # BLD: 12.2 K/UL (ref 4.5–11.5)

## 2025-03-14 PROCEDURE — 2500000003 HC RX 250 WO HCPCS: Performed by: INTERNAL MEDICINE

## 2025-03-14 PROCEDURE — 36415 COLL VENOUS BLD VENIPUNCTURE: CPT

## 2025-03-14 PROCEDURE — 6360000002 HC RX W HCPCS: Performed by: INTERNAL MEDICINE

## 2025-03-14 PROCEDURE — 6360000002 HC RX W HCPCS: Performed by: NURSE PRACTITIONER

## 2025-03-14 PROCEDURE — 94640 AIRWAY INHALATION TREATMENT: CPT

## 2025-03-14 PROCEDURE — 6370000000 HC RX 637 (ALT 250 FOR IP)

## 2025-03-14 PROCEDURE — 6370000000 HC RX 637 (ALT 250 FOR IP): Performed by: NURSE PRACTITIONER

## 2025-03-14 PROCEDURE — 99232 SBSQ HOSP IP/OBS MODERATE 35: CPT | Performed by: NURSE PRACTITIONER

## 2025-03-14 PROCEDURE — 80048 BASIC METABOLIC PNL TOTAL CA: CPT

## 2025-03-14 PROCEDURE — 85027 COMPLETE CBC AUTOMATED: CPT

## 2025-03-14 PROCEDURE — 6370000000 HC RX 637 (ALT 250 FOR IP): Performed by: INTERNAL MEDICINE

## 2025-03-14 PROCEDURE — 2500000003 HC RX 250 WO HCPCS: Performed by: NURSE PRACTITIONER

## 2025-03-14 RX ORDER — SODIUM BICARBONATE 650 MG/1
650 TABLET ORAL 2 TIMES DAILY
Status: DISCONTINUED | OUTPATIENT
Start: 2025-03-14 | End: 2025-03-14 | Stop reason: HOSPADM

## 2025-03-14 RX ADMIN — PREGABALIN 50 MG: 50 CAPSULE ORAL at 10:49

## 2025-03-14 RX ADMIN — IPRATROPIUM BROMIDE AND ALBUTEROL SULFATE 1 DOSE: 2.5; .5 SOLUTION RESPIRATORY (INHALATION) at 08:36

## 2025-03-14 RX ADMIN — ARFORMOTEROL TARTRATE 15 MCG: 15 SOLUTION RESPIRATORY (INHALATION) at 08:36

## 2025-03-14 RX ADMIN — BUDESONIDE 1000 MCG: 0.5 INHALANT RESPIRATORY (INHALATION) at 08:36

## 2025-03-14 RX ADMIN — MONTELUKAST 10 MG: 10 TABLET, FILM COATED ORAL at 10:49

## 2025-03-14 RX ADMIN — IPRATROPIUM BROMIDE AND ALBUTEROL SULFATE 1 DOSE: 2.5; .5 SOLUTION RESPIRATORY (INHALATION) at 11:56

## 2025-03-14 RX ADMIN — PANTOPRAZOLE SODIUM 40 MG: 40 TABLET, DELAYED RELEASE ORAL at 05:51

## 2025-03-14 RX ADMIN — POTASSIUM BICARBONATE 20 MEQ: 782 TABLET, EFFERVESCENT ORAL at 10:53

## 2025-03-14 RX ADMIN — SODIUM CHLORIDE, PRESERVATIVE FREE 10 ML: 5 INJECTION INTRAVENOUS at 10:54

## 2025-03-14 RX ADMIN — LORAZEPAM 1 MG: 1 TABLET ORAL at 10:53

## 2025-03-14 NOTE — CARE COORDINATION
Perfect serve message sent to GI to check discharge; they are okay to discharge from their standpoint.    Electronically signed by CARLEY Boyce on 3/14/2025 at 9:08 AM    detailed exam

## 2025-03-14 NOTE — CARE COORDINATION
3/14:  Update CM Note: CM prefect served Dr Traylor & he is ok with the pt being dc today per Pulmonary standpoint.  Electronically signed by Malika Chatterjee RN on 3/14/2025 at 11:07 AM

## 2025-03-14 NOTE — PROGRESS NOTES
[facility] Medicine    Subjective: NG is out  Patient is eating  She feels better  Current Facility-Administered Medications:     sodium bicarbonate tablet 650 mg, 650 mg, Oral, BID, Ines Gauthier APRN - CNP    pantoprazole (PROTONIX) tablet 40 mg, 40 mg, Oral, BID AC, Perry Sunshine APRN - CNP, 40 mg at 03/14/25 0551    naloxone 0.4 mg in 10 mL sodium chloride syringe, , IntraVENous, PRN, True Mcgovern DO    sodium chloride flush 0.9 % injection 5-40 mL, 5-40 mL, IntraVENous, 2 times per day, True Mcgovern DO, 10 mL at 03/13/25 2049    sodium chloride flush 0.9 % injection 5-40 mL, 5-40 mL, IntraVENous, PRN, True Mcgovern DO    0.9 % sodium chloride infusion, , IntraVENous, PRN, True Mcgovern DO    enoxaparin (LOVENOX) injection 40 mg, 40 mg, SubCUTAneous, Daily, Bryan Rivers DO, 40 mg at 03/13/25 2049    guaiFENesin-dextromethorphan (ROBITUSSIN DM) 100-10 MG/5ML syrup 10 mL, 10 mL, Oral, Q6H PRN, Daniella Anderson APRN - CNP    LORazepam (ATIVAN) injection 0.5 mg, 0.5 mg, IntraVENous, Q6H PRN, Bryan Rivers DO, 0.5 mg at 03/12/25 1015    sodium chloride 0.9 % bolus 1,000 mL, 1,000 mL, IntraVENous, Once, Jocelynn Porras MD    sodium chloride 0.9 % bolus 1,000 mL, 1,000 mL, IntraVENous, Once, Natanael Martinez MD    ipratropium 0.5 mg-albuterol 2.5 mg (DUONEB) nebulizer solution 1 Dose, 1 Dose, Inhalation, Q4H WA RTTita Michael M, DO, 1 Dose at 03/14/25 0836    LORazepam (ATIVAN) tablet 1 mg, 1 mg, Oral, TID PRN, Bryan Rivers DO, 1 mg at 03/14/25 1053    lidocaine 1 % injection 5 mL, 5 mL, IntraDERmal, Once, Oliver Waters MD    atorvastatin (LIPITOR) tablet 10 mg, 10 mg, Oral, Nightly, Bryan Rivers, , 10 mg at 03/13/25 2049    escitalopram (LEXAPRO) tablet 10 mg, 10 mg, Oral, Nightly, Bryan Rivers, , 10 mg at 03/13/25 2049    budesonide (PULMICORT) nebulizer suspension 1,000 mcg, 1 mg, Nebulization, BID RT, 1,000 mcg at  counseling/discussion    Palliative care encounter    Esophageal dysphagia    Severe protein-calorie malnutrition  Resolved Problems:    * No resolved hospital problems. *      Plan:  EGD results noted patient was noted to have large hiatal hernia  Mucosal tear was noted within the hiatal hernia with scope passage  Patient had dilatation of proximal/middle esophagus  EUS was aborted  NG tube has been taken out  Tolerating diet  Discharge to subacute rehab today      Amaury Howe MD  11:22 AM  3/14/2025

## 2025-03-14 NOTE — CARE COORDINATION
3/14:  Update CM Note:  Cm set up transportation with [facility] for today at 1pm today to [facility].  CM advise facility/RN & Family.  Electronically signed by Malika Chatterjee RN on 3/14/2025 at 12:05 PM

## 2025-03-14 NOTE — CARE COORDINATION
3/14:  Update CM Note:  Pt presented to the ER for worsening back pain from home.  Pt is on room air at 93%, Iv Solu-medrol, Iv Ativan PRN & SQ Lovenox.  Pt is in ISO-Droplet for Influenza.   Pt's dc plan is to [facility].  Pt's pre-cert good until 3/15.  Will see if pt is able to dc today.  [facility], [facility] Ambulance completed placed in envelope in soft chart.  Pt was placed on will call with [facility].  Hui/NUNO will continue to follow.  Electronically signed by Malika Chatterjee RN on 3/14/2025 at 7:52 AM

## 2025-03-14 NOTE — PROGRESS NOTES
LATE ENTRY  Patient's family member called and said that the nurse from [facility] told her that the patient needed scripts sent from this hospital for the patient's lyrica, ativan, and norco. Perfect serve sent to Dr. Howe in regards to these scripts and he said that since the patient was already on the lyrica and ativan, he is unable to send scripts for them, as it is under the doctor at the facility's discretion. Shyam also said that the norco can be discontinued. Call made to patient's family member at [phone] and to the nurse (CHAITANYA Barnhart) at [facility] to notify them both of this.  Angelita Dorado RN  3/14/25 0507

## 2025-03-14 NOTE — PROGRESS NOTES
Gastroenterology, Hepatology, &  Advanced Endoscopy    Progress Note      HPI:   Patient with improvement in respiratory status. EGD performed 3/10/24. NG placed. NG removed 3/13/25 Diet advanced, tolerating well.     Lab Results   Component Value Date    INR 0.9 05/24/2023    INR 1.1 02/13/2023    INR 1.2 05/28/2017    PROTIME 10.2 05/24/2023    PROTIME 11.8 02/13/2023    PROTIME 13.2 (H) 05/28/2017         ALT   Date Value Ref Range Status   03/13/2025 7 0 - 32 U/L Final   03/12/2025 8 0 - 32 U/L Final   03/11/2025 7 0 - 32 U/L Final     AST   Date Value Ref Range Status   03/13/2025 12 0 - 31 U/L Final   03/12/2025 16 0 - 31 U/L Final   03/11/2025 12 0 - 31 U/L Final     Alkaline Phosphatase   Date Value Ref Range Status   03/13/2025 80 35 - 104 U/L Final   03/12/2025 80 35 - 104 U/L Final   03/11/2025 73 35 - 104 U/L Final     Total Bilirubin   Date Value Ref Range Status   03/13/2025 0.3 0.0 - 1.2 mg/dL Final   03/12/2025 0.3 0.0 - 1.2 mg/dL Final   03/11/2025 0.4 0.0 - 1.2 mg/dL Final     Bilirubin, Direct   Date Value Ref Range Status   03/02/2025 <0.2 0.0 - 0.3 mg/dL Final   04/01/2012 0.2 0.0 - 0.2 mg/dL Final   01/01/2011 <0.1 0.0 - 0.2 mg/dL Final      Lab Results   Component Value Date    WBC 12.2 (H) 03/14/2025    HGB 9.1 (L) 03/14/2025    HCT 28.8 (L) 03/14/2025     03/14/2025     03/14/2025    K 3.4 (L) 03/14/2025     03/14/2025    CREATININE 0.6 03/14/2025    BUN 14 03/14/2025    CO2 23 03/14/2025    FOLATE 17.9 02/28/2025    [id number] 1002 (H) 02/28/2025    GLUCOSE 70 (L) 03/14/2025    INR 0.9 05/24/2023    PROTIME 10.2 05/24/2023    TSH 2.06 08/22/2023    LABA1C 7.8 01/17/2024        CEA   Date Value Ref Range Status   05/28/2017 5.3 (H) 0.0 - 5.2 ng/mL Final     Sed Rate, Automated   Date Value Ref Range Status   11/04/2024 72 (H) 0 - 20 mm/Hr Final   03/10/2023 61 (H) 0 - 20 mm/Hr Final   02/21/2023 26 (H) 0 - 20 mm/Hr Final   02/01/2023 42 (H) 0 - 20 mm/Hr Final     Lipase  PPI BID.  - Known large hiatal hernia on imaging and EGD  - Await FNB results  - MBS completed,  Minced and moist consistency solids (IDDSI level 5) with  nectar consistency (mildly thick - IDDSI level 2) liquids   - Tolerating diet without issue.        2. Pancreatic Tail Lesion:  - Appreciated on admission CT.       We will follow.     Thank you for including us in the care of this patient. Please do not hesitate to contact us with any additional questions or concerns.     Discussed with James Spivey MD  Gastroenterology/Hepatology  Advanced Endoscopy

## 2025-03-14 NOTE — CARE COORDINATION
3/14:  Update CM Note:  Cm perfect served Dr Haro & they are ok with the pt being dc per their standpoint.  Electronically signed by Malika Chatterjee RN on 3/14/2025 at 9:15 AM

## 2025-03-14 NOTE — PLAN OF CARE
Problem: Discharge Planning  Goal: Discharge to home or other facility with appropriate resources  3/13/2025 2303 by Bev Nicolas RN  Outcome: Progressing  3/13/2025 0955 by Aurora Chen RN  Outcome: Progressing     Problem: Pain  Goal: Verbalizes/displays adequate comfort level or baseline comfort level  3/13/2025 2303 by Bev Nicolas RN  Outcome: Progressing  3/13/2025 0955 by Aurora Chen RN  Outcome: Progressing     Problem: Safety - Adult  Goal: Free from fall injury  3/13/2025 2303 by Bev Nicolas RN  Outcome: Progressing  3/13/2025 0955 by Aurora Chen RN  Outcome: Progressing     Problem: ABCDS Injury Assessment  Goal: Absence of physical injury  3/13/2025 2303 by Bev Nicolas RN  Outcome: Progressing  3/13/2025 0955 by Aurora Chen RN  Outcome: Progressing     Problem: Skin/Tissue Integrity  Goal: Skin integrity remains intact  Description: 1.  Monitor for areas of redness and/or skin breakdown  2.  Assess vascular access sites hourly  3.  Every 4-6 hours minimum:  Change oxygen saturation probe site  4.  Every 4-6 hours:  If on nasal continuous positive airway pressure, respiratory therapy assess nares and determine need for appliance change or resting period  3/13/2025 2303 by Bev Nicolas RN  Outcome: Progressing  3/13/2025 0955 by Aurora Chen RN  Outcome: Progressing     Problem: Nutrition Deficit:  Goal: Optimize nutritional status  3/13/2025 2303 by Bev Nicolas RN  Outcome: Progressing  3/13/2025 0955 by Aurroa Chen RN  Outcome: Progressing     Problem: Neurosensory - Adult  Goal: Achieves maximal functionality and self care  3/13/2025 2303 by Bev Nicolas RN  Outcome: Progressing  3/13/2025 0955 by Aurora Chen RN  Outcome: Progressing     Problem: Musculoskeletal - Adult  Goal: Maintain proper alignment of affected body part  3/13/2025 2303 by Bev Nicolas RN  Outcome: Progressing  3/13/2025 0955 by Aurora Chen RN  Outcome: Progressing     Problem:

## 2025-03-14 NOTE — PLAN OF CARE
Problem: Discharge Planning  Goal: Discharge to home or other facility with appropriate resources  3/14/2025 1213 by Angelita Dorado RN  Outcome: Completed  3/13/2025 2303 by Bev Nicolas RN  Outcome: Progressing     Problem: Pain  Goal: Verbalizes/displays adequate comfort level or baseline comfort level  3/14/2025 1213 by Angelita Dorado RN  Outcome: Completed  3/13/2025 2303 by Bev Nicolas RN  Outcome: Progressing     Problem: Safety - Adult  Goal: Free from fall injury  3/14/2025 1213 by Angelita Dorado RN  Outcome: Completed  3/13/2025 2303 by Bev Nicolas RN  Outcome: Progressing     Problem: ABCDS Injury Assessment  Goal: Absence of physical injury  3/14/2025 1213 by Angelita Dorado RN  Outcome: Completed  3/13/2025 2303 by Bev Nicolas RN  Outcome: Progressing     Problem: Skin/Tissue Integrity  Goal: Skin integrity remains intact  Description: 1.  Monitor for areas of redness and/or skin breakdown  2.  Assess vascular access sites hourly  3.  Every 4-6 hours minimum:  Change oxygen saturation probe site  4.  Every 4-6 hours:  If on nasal continuous positive airway pressure, respiratory therapy assess nares and determine need for appliance change or resting period  3/14/2025 1213 by Angelita Dorado RN  Outcome: Completed  3/13/2025 2303 by Bev Nicolas RN  Outcome: Progressing     Problem: Nutrition Deficit:  Goal: Optimize nutritional status  3/14/2025 1213 by Angelita Dorado RN  Outcome: Completed  3/13/2025 2303 by Bev Nicolas RN  Outcome: Progressing     Problem: Neurosensory - Adult  Goal: Achieves maximal functionality and self care  3/14/2025 1213 by Angelita Dorado RN  Outcome: Completed  3/13/2025 2303 by Bev Nicolas RN  Outcome: Progressing     Problem: Musculoskeletal - Adult  Goal: Maintain proper alignment of affected body part  3/14/2025 1213 by Angelita Dorado RN  Outcome: Completed  3/13/2025 2303 by Bev Nicolas RN  Outcome: Progressing     Problem: Infection -  stability  3/14/2025 1213 by Angelita Dorado, RN  Outcome: Completed  3/13/2025 2303 by Bev Nicolas, RN  Outcome: Progressing

## 2025-03-15 ENCOUNTER — APPOINTMENT (OUTPATIENT)
Dept: CT IMAGING | Age: 80
DRG: 872 | End: 2025-03-15
Payer: MEDICARE

## 2025-03-15 ENCOUNTER — HOSPITAL ENCOUNTER (INPATIENT)
Age: 80
LOS: 5 days | Discharge: NURSING, REHAB OR LONG-TERM CARE | DRG: 872 | End: 2025-03-20
Attending: STUDENT IN AN ORGANIZED HEALTH CARE EDUCATION/TRAINING PROGRAM | Admitting: INTERNAL MEDICINE
Payer: MEDICARE

## 2025-03-15 DIAGNOSIS — K44.9 HIATAL HERNIA: ICD-10-CM

## 2025-03-15 DIAGNOSIS — J98.2 PNEUMOMEDIASTINUM (HCC): Primary | ICD-10-CM

## 2025-03-15 DIAGNOSIS — K86.89 ACUTE PANCREATIC FLUID COLLECTION: ICD-10-CM

## 2025-03-15 DIAGNOSIS — N39.0 URINARY TRACT INFECTION WITH HEMATURIA, SITE UNSPECIFIED: ICD-10-CM

## 2025-03-15 DIAGNOSIS — R31.9 URINARY TRACT INFECTION WITH HEMATURIA, SITE UNSPECIFIED: ICD-10-CM

## 2025-03-15 DIAGNOSIS — N17.9 AKI (ACUTE KIDNEY INJURY): ICD-10-CM

## 2025-03-15 PROBLEM — R65.21 SEPTIC SHOCK (HCC): Chronic | Status: ACTIVE | Noted: 2023-02-20

## 2025-03-15 PROBLEM — Z79.52 LONG TERM (CURRENT) USE OF SYSTEMIC STEROIDS: Status: ACTIVE | Noted: 2025-03-15

## 2025-03-15 PROBLEM — A41.9 SEPTIC SHOCK (HCC): Chronic | Status: ACTIVE | Noted: 2023-02-20

## 2025-03-15 PROBLEM — R62.7 ADULT FAILURE TO THRIVE SYNDROME: Status: ACTIVE | Noted: 2023-02-02

## 2025-03-15 PROBLEM — J98.51 ACUTE MEDIASTINITIS: Chronic | Status: ACTIVE | Noted: 2025-03-15

## 2025-03-15 PROBLEM — K22.3 ESOPHAGEAL PERFORATION: Status: ACTIVE | Noted: 2025-03-15

## 2025-03-15 PROBLEM — L98.9 INFLAMMATORY DERMATOSIS: Status: ACTIVE | Noted: 2025-03-15

## 2025-03-15 PROBLEM — J98.51 ACUTE MEDIASTINITIS: Status: ACTIVE | Noted: 2025-03-15

## 2025-03-15 PROBLEM — N39.46 MIXED INCONTINENCE: Status: ACTIVE | Noted: 2025-03-15

## 2025-03-15 PROBLEM — N81.11 MIDLINE CYSTOCELE: Status: ACTIVE | Noted: 2025-03-15

## 2025-03-15 LAB
ALBUMIN SERPL-MCNC: 2.7 G/DL (ref 3.5–5.2)
ALP SERPL-CCNC: 78 U/L (ref 35–104)
ALT SERPL-CCNC: 9 U/L (ref 0–32)
ANION GAP SERPL CALCULATED.3IONS-SCNC: 11 MMOL/L (ref 7–16)
AST SERPL-CCNC: 14 U/L (ref 0–31)
B.E.: -1.7 MMOL/L (ref -3–3)
BACTERIA URNS QL MICRO: ABNORMAL
BASOPHILS # BLD: 0.16 K/UL (ref 0–0.2)
BASOPHILS NFR BLD: 1 % (ref 0–2)
BILIRUB SERPL-MCNC: 0.6 MG/DL (ref 0–1.2)
BILIRUB UR QL STRIP: NEGATIVE
BNP SERPL-MCNC: 512 PG/ML (ref 0–450)
BUN SERPL-MCNC: 18 MG/DL (ref 6–23)
CALCIUM SERPL-MCNC: 8.1 MG/DL (ref 8.6–10.2)
CHLORIDE SERPL-SCNC: 97 MMOL/L (ref 98–107)
CLARITY UR: CLEAR
CO2 SERPL-SCNC: 26 MMOL/L (ref 22–29)
COHB: 0.3 % (ref 0–1.5)
COLOR UR: YELLOW
CREAT SERPL-MCNC: 1.1 MG/DL (ref 0.5–1)
CRITICAL: ABNORMAL
DATE ANALYZED: ABNORMAL
DATE OF COLLECTION: ABNORMAL
EOSINOPHIL # BLD: 0.44 K/UL (ref 0.05–0.5)
EOSINOPHILS RELATIVE PERCENT: 3 % (ref 0–6)
ERYTHROCYTE [DISTWIDTH] IN BLOOD BY AUTOMATED COUNT: 15.5 % (ref 11.5–15)
GFR, ESTIMATED: 53 ML/MIN/1.73M2
GLUCOSE SERPL-MCNC: 82 MG/DL (ref 74–99)
GLUCOSE UR STRIP-MCNC: NEGATIVE MG/DL
HCO3: 22.1 MMOL/L (ref 22–26)
HCT VFR BLD AUTO: 32.6 % (ref 34–48)
HGB BLD-MCNC: 10.1 G/DL (ref 11.5–15.5)
HGB UR QL STRIP.AUTO: ABNORMAL
HHB: 6.9 % (ref 0–5)
IMM GRANULOCYTES # BLD AUTO: 0.95 K/UL (ref 0–0.58)
IMM GRANULOCYTES NFR BLD: 6 % (ref 0–5)
KETONES UR STRIP-MCNC: NEGATIVE MG/DL
LAB: ABNORMAL
LACTATE BLDV-SCNC: 1.8 MMOL/L (ref 0.5–1.9)
LEUKOCYTE ESTERASE UR QL STRIP: ABNORMAL
LIPASE SERPL-CCNC: 47 U/L (ref 13–60)
LYMPHOCYTES NFR BLD: 2.24 K/UL (ref 1.5–4)
LYMPHOCYTES RELATIVE PERCENT: 13 % (ref 20–42)
Lab: 2205
MCH RBC QN AUTO: 30 PG (ref 26–35)
MCHC RBC AUTO-ENTMCNC: 31 G/DL (ref 32–34.5)
MCV RBC AUTO: 96.7 FL (ref 80–99.9)
METHB: 0.3 % (ref 0–1.5)
MODE: ABNORMAL
MONOCYTES NFR BLD: 15 % (ref 2–12)
MONOCYTES NFR BLD: 2.56 K/UL (ref 0.1–0.95)
NEUTROPHILS NFR BLD: 63 % (ref 43–80)
NEUTS SEG NFR BLD: 10.79 K/UL (ref 1.8–7.3)
NITRITE UR QL STRIP: POSITIVE
O2 CONTENT: 13.3 ML/DL
O2 SATURATION: 93.1 % (ref 92–98.5)
O2HB: 92.5 % (ref 94–97)
OPERATOR ID: 7296
PATIENT TEMP: 37 C
PCO2: 33.9 MMHG (ref 35–45)
PH BLOOD GAS: 7.43 (ref 7.35–7.45)
PH UR STRIP: 5.5 [PH] (ref 5–8)
PLATELET, FLUORESCENCE: 311 K/UL (ref 130–450)
PMV BLD AUTO: 13.2 FL (ref 7–12)
PO2: 67.4 MMHG (ref 75–100)
POTASSIUM SERPL-SCNC: 3.7 MMOL/L (ref 3.5–5)
PROT SERPL-MCNC: 5.8 G/DL (ref 6.4–8.3)
PROT UR STRIP-MCNC: NEGATIVE MG/DL
RBC # BLD AUTO: 3.37 M/UL (ref 3.5–5.5)
RBC #/AREA URNS HPF: ABNORMAL /HPF
SODIUM SERPL-SCNC: 134 MMOL/L (ref 132–146)
SOURCE, BLOOD GAS: ABNORMAL
SP GR UR STRIP: 1.01 (ref 1–1.03)
THB: 10.2 G/DL (ref 11.5–16.5)
TIME ANALYZED: 2208
UROBILINOGEN UR STRIP-ACNC: 1 EU/DL (ref 0–1)
WBC #/AREA URNS HPF: ABNORMAL /HPF
WBC OTHER # BLD: 17.1 K/UL (ref 4.5–11.5)

## 2025-03-15 PROCEDURE — 96366 THER/PROPH/DIAG IV INF ADDON: CPT

## 2025-03-15 PROCEDURE — 82805 BLOOD GASES W/O2 SATURATION: CPT

## 2025-03-15 PROCEDURE — 81001 URINALYSIS AUTO W/SCOPE: CPT

## 2025-03-15 PROCEDURE — 83690 ASSAY OF LIPASE: CPT

## 2025-03-15 PROCEDURE — 99285 EMERGENCY DEPT VISIT HI MDM: CPT

## 2025-03-15 PROCEDURE — 99223 1ST HOSP IP/OBS HIGH 75: CPT | Performed by: STUDENT IN AN ORGANIZED HEALTH CARE EDUCATION/TRAINING PROGRAM

## 2025-03-15 PROCEDURE — 6360000002 HC RX W HCPCS: Performed by: STUDENT IN AN ORGANIZED HEALTH CARE EDUCATION/TRAINING PROGRAM

## 2025-03-15 PROCEDURE — 6370000000 HC RX 637 (ALT 250 FOR IP): Performed by: STUDENT IN AN ORGANIZED HEALTH CARE EDUCATION/TRAINING PROGRAM

## 2025-03-15 PROCEDURE — 80053 COMPREHEN METABOLIC PANEL: CPT

## 2025-03-15 PROCEDURE — 94640 AIRWAY INHALATION TREATMENT: CPT

## 2025-03-15 PROCEDURE — 6360000004 HC RX CONTRAST MEDICATION: Performed by: RADIOLOGY

## 2025-03-15 PROCEDURE — 83880 ASSAY OF NATRIURETIC PEPTIDE: CPT

## 2025-03-15 PROCEDURE — 2500000003 HC RX 250 WO HCPCS: Performed by: STUDENT IN AN ORGANIZED HEALTH CARE EDUCATION/TRAINING PROGRAM

## 2025-03-15 PROCEDURE — 71275 CT ANGIOGRAPHY CHEST: CPT

## 2025-03-15 PROCEDURE — 36556 INSERT NON-TUNNEL CV CATH: CPT

## 2025-03-15 PROCEDURE — 96365 THER/PROPH/DIAG IV INF INIT: CPT

## 2025-03-15 PROCEDURE — 83605 ASSAY OF LACTIC ACID: CPT

## 2025-03-15 PROCEDURE — 3E033XZ INTRODUCTION OF VASOPRESSOR INTO PERIPHERAL VEIN, PERCUTANEOUS APPROACH: ICD-10-PCS | Performed by: INTERNAL MEDICINE

## 2025-03-15 PROCEDURE — 2000000000 HC ICU R&B

## 2025-03-15 PROCEDURE — 87086 URINE CULTURE/COLONY COUNT: CPT

## 2025-03-15 PROCEDURE — 99291 CRITICAL CARE FIRST HOUR: CPT

## 2025-03-15 PROCEDURE — 2500000003 HC RX 250 WO HCPCS

## 2025-03-15 PROCEDURE — 85025 COMPLETE CBC W/AUTO DIFF WBC: CPT

## 2025-03-15 PROCEDURE — 87040 BLOOD CULTURE FOR BACTERIA: CPT

## 2025-03-15 PROCEDURE — 2580000003 HC RX 258: Performed by: STUDENT IN AN ORGANIZED HEALTH CARE EDUCATION/TRAINING PROGRAM

## 2025-03-15 PROCEDURE — 93005 ELECTROCARDIOGRAM TRACING: CPT | Performed by: STUDENT IN AN ORGANIZED HEALTH CARE EDUCATION/TRAINING PROGRAM

## 2025-03-15 PROCEDURE — 6360000002 HC RX W HCPCS

## 2025-03-15 PROCEDURE — 87077 CULTURE AEROBIC IDENTIFY: CPT

## 2025-03-15 PROCEDURE — 74177 CT ABD & PELVIS W/CONTRAST: CPT

## 2025-03-15 PROCEDURE — 96375 TX/PRO/DX INJ NEW DRUG ADDON: CPT

## 2025-03-15 PROCEDURE — 06HY33Z INSERTION OF INFUSION DEVICE INTO LOWER VEIN, PERCUTANEOUS APPROACH: ICD-10-PCS | Performed by: INTERNAL MEDICINE

## 2025-03-15 RX ORDER — 0.9 % SODIUM CHLORIDE 0.9 %
250 INTRAVENOUS SOLUTION INTRAVENOUS ONCE
Status: COMPLETED | OUTPATIENT
Start: 2025-03-15 | End: 2025-03-15

## 2025-03-15 RX ORDER — FLUCONAZOLE 2 MG/ML
400 INJECTION, SOLUTION INTRAVENOUS ONCE
Status: COMPLETED | OUTPATIENT
Start: 2025-03-15 | End: 2025-03-15

## 2025-03-15 RX ORDER — SODIUM CHLORIDE, SODIUM LACTATE, POTASSIUM CHLORIDE, CALCIUM CHLORIDE 600; 310; 30; 20 MG/100ML; MG/100ML; MG/100ML; MG/100ML
INJECTION, SOLUTION INTRAVENOUS CONTINUOUS
Status: DISCONTINUED | OUTPATIENT
Start: 2025-03-15 | End: 2025-03-17

## 2025-03-15 RX ORDER — IPRATROPIUM BROMIDE AND ALBUTEROL SULFATE 2.5; .5 MG/3ML; MG/3ML
1 SOLUTION RESPIRATORY (INHALATION) ONCE
Status: COMPLETED | OUTPATIENT
Start: 2025-03-15 | End: 2025-03-15

## 2025-03-15 RX ORDER — 0.9 % SODIUM CHLORIDE 0.9 %
1000 INTRAVENOUS SOLUTION INTRAVENOUS ONCE
Status: COMPLETED | OUTPATIENT
Start: 2025-03-15 | End: 2025-03-15

## 2025-03-15 RX ORDER — IOPAMIDOL 755 MG/ML
75 INJECTION, SOLUTION INTRAVASCULAR
Status: DISCONTINUED | OUTPATIENT
Start: 2025-03-15 | End: 2025-03-15

## 2025-03-15 RX ORDER — SODIUM CHLORIDE, SODIUM LACTATE, POTASSIUM CHLORIDE, AND CALCIUM CHLORIDE .6; .31; .03; .02 G/100ML; G/100ML; G/100ML; G/100ML
1000 INJECTION, SOLUTION INTRAVENOUS ONCE
Status: COMPLETED | OUTPATIENT
Start: 2025-03-15 | End: 2025-03-15

## 2025-03-15 RX ORDER — ACETAMINOPHEN 500 MG
1000 TABLET ORAL ONCE
Status: COMPLETED | OUTPATIENT
Start: 2025-03-15 | End: 2025-03-15

## 2025-03-15 RX ORDER — IOPAMIDOL 755 MG/ML
10 INJECTION, SOLUTION INTRAVASCULAR
Status: DISCONTINUED | OUTPATIENT
Start: 2025-03-15 | End: 2025-03-15

## 2025-03-15 RX ORDER — IOPAMIDOL 755 MG/ML
75 INJECTION, SOLUTION INTRAVASCULAR
Status: COMPLETED | OUTPATIENT
Start: 2025-03-15 | End: 2025-03-15

## 2025-03-15 RX ADMIN — HYDROCORTISONE SODIUM SUCCINATE 100 MG: 100 INJECTION INTRAMUSCULAR; INTRAVENOUS at 23:21

## 2025-03-15 RX ADMIN — SODIUM CHLORIDE, POTASSIUM CHLORIDE, SODIUM LACTATE AND CALCIUM CHLORIDE: 600; 310; 30; 20 INJECTION, SOLUTION INTRAVENOUS at 19:46

## 2025-03-15 RX ADMIN — SODIUM CHLORIDE 5 MCG/MIN: 9 INJECTION, SOLUTION INTRAVENOUS at 17:53

## 2025-03-15 RX ADMIN — VANCOMYCIN HYDROCHLORIDE 2000 MG: 10 INJECTION, POWDER, LYOPHILIZED, FOR SOLUTION INTRAVENOUS at 21:17

## 2025-03-15 RX ADMIN — SODIUM CHLORIDE, POTASSIUM CHLORIDE, SODIUM LACTATE AND CALCIUM CHLORIDE 1000 ML: 600; 310; 30; 20 INJECTION, SOLUTION INTRAVENOUS at 15:42

## 2025-03-15 RX ADMIN — ACETAMINOPHEN 1000 MG: 500 TABLET ORAL at 15:48

## 2025-03-15 RX ADMIN — SODIUM CHLORIDE, POTASSIUM CHLORIDE, SODIUM LACTATE AND CALCIUM CHLORIDE 1000 ML: 600; 310; 30; 20 INJECTION, SOLUTION INTRAVENOUS at 18:37

## 2025-03-15 RX ADMIN — SODIUM CHLORIDE 1000 ML: 0.9 INJECTION, SOLUTION INTRAVENOUS at 16:05

## 2025-03-15 RX ADMIN — PIPERACILLIN AND TAZOBACTAM 3375 MG: 3; .375 INJECTION, POWDER, LYOPHILIZED, FOR SOLUTION INTRAVENOUS at 18:22

## 2025-03-15 RX ADMIN — IOPAMIDOL 75 ML: 755 INJECTION, SOLUTION INTRAVENOUS at 16:52

## 2025-03-15 RX ADMIN — SODIUM CHLORIDE 250 ML: 0.9 INJECTION, SOLUTION INTRAVENOUS at 17:30

## 2025-03-15 RX ADMIN — FLUCONAZOLE 400 MG: 2 INJECTION, SOLUTION INTRAVENOUS at 19:19

## 2025-03-15 RX ADMIN — IPRATROPIUM BROMIDE AND ALBUTEROL SULFATE 1 DOSE: .5; 2.5 SOLUTION RESPIRATORY (INHALATION) at 16:35

## 2025-03-15 NOTE — CONSULTS
Gastroenterology, Hepatology, &  Advanced Endoscopy    Consult Note    Reason for Consult: Pneumomediastinum, Recent EGD    HPI:   Haritha Dominguez is a 80 y.o. female w/ PMH of  has a past medical history of Abrasion of skin of left lower leg, Abrasion of skin of right lower leg, Anxiety, Arthritis, Asthma, Claustrophobia, Decreased dorsalis pedis pulse, Dermatitis, Fracture, GERD (gastroesophageal reflux disease), Hiatal hernia, History of blood transfusion, HTN (hypertension), Hyperlipidemia, Itching, On aspirin at home, Pancreatic cyst, Pneumonia, Sleep apnea, SOB (shortness of breath), and Tachycardia. who presents to the ED with septic shock.     The patient has a history of likely chronic aspiration and was recently admitted at [facility] for respiratory failure from PNA as well as Influenza which improved with supportive care and antibiotics. Her oxygen requirement was weaned down to 2LNC by the time of discharge.     There was concern for chronic aspiration with the patient and she underwent MBS and Esophagram while inpatient. She was unable to safely swallow contrast with the Esophagram. She was also noted to have a pancreatic cyst on imaging.      She underwent EGD/EUS on 3/10. EGD showed a large hiatal hernia with significant portion of stomach in the thoracic cavity. The stomach was otherwise normal as well as her duodenum. A deep but not penetrating tear was appreciated within the intrathoracic portion of her stomach on scope withdrawal. The tear was closely evaluated and noted to not be full thickness. An EUS was briefly attempted but the echoendoscope needed to be in close proximity to the tear for evaluation and so was the procedure was terminated. A Savory dilation was performed of the upper/middle esophagus with appropriate tearing effect. No perforation was appreciated on endoscopic evaluation following dilation. The tear was also again assessed and noted to not be full thickness. An NG tube was

## 2025-03-15 NOTE — ED PROVIDER NOTES
-     SPINAL FIXATION SURGERY WITH IMPLANT  2013    in [place]    STIMULATOR SURGERY Right 5/31/2023    Spinal cord stimulator battery replacement performed by Anabell Purcell MD at [facility] OR    TOTAL KNEE ARTHROPLASTY Right     UPPER GASTROINTESTINAL ENDOSCOPY  07/10/2017    UPPER GASTROINTESTINAL ENDOSCOPY N/A 3/10/2025    ESOPHAGOGASTRODUODENOSCOPY performed by James Spivey MD at [facility] ENDOSCOPY    UPPER GASTROINTESTINAL ENDOSCOPY N/A 3/10/2025    ESOPHAGOGASTRODUODENOSCOPY ENDOSCOPIC ULTRASOUND FINE NEEDLE ASPIRATION performed by James Spivey MD at [facility] ENDOSCOPY    UPPER GASTROINTESTINAL ENDOSCOPY  3/10/2025    ESOPHAGOGASTRODUODENOSCOPY DILATION SAVORY performed by James Spivey MD at [facility] ENDOSCOPY       CURRENTMEDICATIONS       Previous Medications    ACETAMINOPHEN (TYLENOL) 325 MG TABLET    Take 2 tablets by mouth every 6 hours as needed for Pain (mild)    ALBUTEROL (PROVENTIL) (2.5 MG/3ML) 0.083% NEBULIZER SOLUTION    Take 3 mLs by nebulization 4 times daily as needed for Wheezing Please bill under Medicare Part B DX: Asthma J45.5    ALBUTEROL SULFATE HFA (VENTOLIN HFA) 108 (90 BASE) MCG/ACT INHALER    Inhale 2 puffs into the lungs every 6 hours as needed for Wheezing    ASPIRIN 81 MG EC TABLET    Take 1 tablet by mouth daily    ATORVASTATIN (LIPITOR) 10 MG TABLET    Take 1 tablet by mouth nightly    CALCIUM MAGNESIUM ZINC 333-133-5 MG TABS    Take 1 tablet by mouth in the morning and at bedtime    ESCITALOPRAM (LEXAPRO) 10 MG TABLET    Take 1 tablet by mouth nightly    FERROUS SULFATE (IRON 325) 325 (65 FE) MG TABLET    Take 1 tablet by mouth daily (with breakfast)    FLUTICASONE-UMECLIDIN-VILANT (TRELEGY ELLIPTA) 200-62.5-25 MCG/ACT AEPB INHALER    Inhale 1 puff into the lungs daily    LORAZEPAM (ATIVAN) 1 MG TABLET    Take 1 tablet by mouth 3 times daily as needed for Anxiety for up to 30 days. Max Daily Amount: 3 mg    MONTELUKAST (SINGULAIR) 10 MG TABLET    TAKE 1 TABLET DAILY    PANTOPRAZOLE  (PROTONIX) 40 MG TABLET    Take 1 tablet by mouth in the morning and 1 tablet in the evening.    POLYETHYLENE GLYCOL (GLYCOLAX) 17 G PACKET    Take 1 packet by mouth daily as needed for Constipation    PREGABALIN (LYRICA) 50 MG CAPSULE    Take 1 capsule by mouth 2 times daily.    SODIUM BICARBONATE 650 MG TABLET    Take 2 tablets by mouth 2 times daily    TRALOKINUMAB-LDRM (ADBRY) 300 MG/2ML SOAJ    Inject 300 mg into the skin every 14 days    VITAMIN C (ASCORBIC ACID) 500 MG TABLET    Take 1 tablet by mouth daily    VITAMIN D (CHOLECALCIFEROL) 50 MCG (2000 UT) TABS TABLET    Take 1 tablet by mouth in the morning and at bedtime       ALLERGIES     Percocet [oxycodone-acetaminophen]    FAMILYHISTORY       Family History   Problem Relation Age of Onset    Stroke Mother     Heart Disease Mother     Hypertension Mother     Other Mother         CHOLECYSTECTOMY; OSTEOPENIA    Heart Disease Father     Hypertension Father     Diabetes Father     Arthritis Father     Asthma Daughter         SOCIAL HISTORY       Social History     Tobacco Use    Smoking status: Never    Smokeless tobacco: Never   Vaping Use    Vaping status: Never Used   Substance Use Topics    Alcohol use: No    Drug use: Never       PAST MEDICAL HISTORY/Chronic Conditions Affecting Care    has a past medical history of Abrasion of skin of left lower leg (11/04/2024), Abrasion of skin of right lower leg (11/04/2024), Anxiety, Arthritis, Asthma, Claustrophobia, Decreased dorsalis pedis pulse (11/04/2024), Dermatitis (02/2017), Fracture (02/2017), GERD (gastroesophageal reflux disease), Hiatal hernia, History of blood transfusion, HTN (hypertension) (04/22/2013), Hyperlipidemia, Itching (11/04/2024), On aspirin at home, Pancreatic cyst (05/27/2017), Pneumonia (07/2018), Sleep apnea, SOB (shortness of breath) (04/22/2013), and Tachycardia (04/22/2013).     SCREENINGS        [place] Coma Scale  Eye Opening: Spontaneous  Best Verbal Response: Oriented  Best Motor

## 2025-03-15 NOTE — PROGRESS NOTES
Case was discussed with Dr. Croinn. Chart reviewed. Lengthy conversation with patient's daughter on the phone.    Ms. Haritha Dominguez is a 80y/F w/ history of likely chronic aspiration who was recently admitted at [facility] for respiratory failure from PNA as well as Influenza which improved with supportive care and antibiotics. Her oxygen requirement was weaned down to 2LNC by the time of discharge.    There was concern for chronic aspiration with the patient and she underwent MBS and Esophagram while inpatient. She was unable to safely swallow contrast with the Esophagram. She was also noted to have a pancreatic cyst on imaging.     She underwent EGD/EUS on 3/10. A large hiatal hernia with significant portion of stomach in the thoracic cavity. The stomach was otherwise normal as well as her duodenum. A deep but not penetrating tear was appreciated within the intrathoracic portion of her stomach on scope withdrawal. The tear was closely evaluated and noted to not be full thickness. An EUS was briefly attempted but the echoendoscope needed to be in close proximity to the tear for evaluation and so was the procedure was terminated. A Savory dilation was performed of the upper/middle esophagus with appropriate tearing effect. No perforation was appreciated on endoscopic evaluation following dilation. The tear was also again assessed and noted to not be full thickness. An NG tube was placed until swallowing could be reassessed. This was placed under endoscopic guidance through the hiatal hernia terminating in the antrum.    The patient's swallowing significantly improved following dilation and re-evaluation with Speech Therapy showed that she was able to swallow soft foods much better. The NG tube was removed. A CXR was performed on 3/11 with no free mediastinal air appreciated.     She now presents to the ED with hypotension, fever, and hypoxia consistent with septic shock. CT C/A/P was performed and a small amount of  mediastinal air was appreciated alongside the intrathoracic hiatal hernia as well as subcutaneous air in the R supraclavicular space. No large mediastinal abscess/collection is appreciated. This is most consistent with air leakage through the tear at the time of endoscopy and could be improved from earlier this week. The patient is not alert enough at this time to participate in an Esophagram.    I discussed the case at length with the daughter with many possible diagnostic tests and possibility for different procedures including esophageal stent placement, mediastinal drain placement, and thoracic surgery which would require transfer to [place]. However, there is a strong likelihood that this will be able to be treated conservatively.     PLAN:  - Supportive care today with aggressive fluid resuscitation and vasopressor therapy.  - Continue antibiotics.  - The patient is a DNR/DNI for cardiopulmonary arrest. Decisions are currently being made if she would be okay with intubation for procedures or reversible respiratory illness.    - Would hold on communication with [facility] until Esophagram can be performed as there would be no immediate indication for drain placement or surgical intervention.  - Will reassess mental status in the morning to determine if she is able to participate in esophagram.     Full consult to follow.

## 2025-03-16 ENCOUNTER — APPOINTMENT (OUTPATIENT)
Dept: CT IMAGING | Age: 80
DRG: 872 | End: 2025-03-16
Payer: MEDICARE

## 2025-03-16 LAB
ALBUMIN SERPL-MCNC: 2.2 G/DL (ref 3.5–5.2)
ALP SERPL-CCNC: 77 U/L (ref 35–104)
ALT SERPL-CCNC: 7 U/L (ref 0–32)
ANION GAP SERPL CALCULATED.3IONS-SCNC: 9 MMOL/L (ref 7–16)
AST SERPL-CCNC: 17 U/L (ref 0–31)
BASOPHILS # BLD: 0.1 K/UL (ref 0–0.2)
BASOPHILS NFR BLD: 1 % (ref 0–2)
BILIRUB SERPL-MCNC: 0.4 MG/DL (ref 0–1.2)
BUN SERPL-MCNC: 17 MG/DL (ref 6–23)
CALCIUM SERPL-MCNC: 7.6 MG/DL (ref 8.6–10.2)
CHLORIDE SERPL-SCNC: 107 MMOL/L (ref 98–107)
CO2 SERPL-SCNC: 25 MMOL/L (ref 22–29)
CORTIS SERPL-MCNC: 54 UG/DL (ref 2.7–18.4)
CREAT SERPL-MCNC: 0.8 MG/DL (ref 0.5–1)
CRP SERPL HS-MCNC: 167 MG/L (ref 0–5)
EOSINOPHIL # BLD: 0.03 K/UL (ref 0.05–0.5)
EOSINOPHILS RELATIVE PERCENT: 0 % (ref 0–6)
ERYTHROCYTE [DISTWIDTH] IN BLOOD BY AUTOMATED COUNT: 15.3 % (ref 11.5–15)
ERYTHROCYTE [SEDIMENTATION RATE] IN BLOOD BY WESTERGREN METHOD: 117 MM/HR (ref 0–20)
GFR, ESTIMATED: 71 ML/MIN/1.73M2
GLUCOSE SERPL-MCNC: 143 MG/DL (ref 74–99)
HCT VFR BLD AUTO: 30.1 % (ref 34–48)
HGB BLD-MCNC: 9.3 G/DL (ref 11.5–15.5)
IMM GRANULOCYTES # BLD AUTO: 0.67 K/UL (ref 0–0.58)
IMM GRANULOCYTES NFR BLD: 5 % (ref 0–5)
LACTATE BLDV-SCNC: 1.2 MMOL/L (ref 0.5–2.2)
LYMPHOCYTES NFR BLD: 0.56 K/UL (ref 1.5–4)
LYMPHOCYTES RELATIVE PERCENT: 4 % (ref 20–42)
MAGNESIUM SERPL-MCNC: 1.6 MG/DL (ref 1.6–2.6)
MCH RBC QN AUTO: 29.9 PG (ref 26–35)
MCHC RBC AUTO-ENTMCNC: 30.9 G/DL (ref 32–34.5)
MCV RBC AUTO: 96.8 FL (ref 80–99.9)
MONOCYTES NFR BLD: 0.45 K/UL (ref 0.1–0.95)
MONOCYTES NFR BLD: 3 % (ref 2–12)
NEUTROPHILS NFR BLD: 88 % (ref 43–80)
NEUTS SEG NFR BLD: 12.79 K/UL (ref 1.8–7.3)
PHOSPHATE SERPL-MCNC: 4.6 MG/DL (ref 2.5–4.5)
PLATELET # BLD AUTO: 284 K/UL (ref 130–450)
PMV BLD AUTO: 12.4 FL (ref 7–12)
POTASSIUM SERPL-SCNC: 4.4 MMOL/L (ref 3.5–5)
PROCALCITONIN SERPL-MCNC: 0.22 NG/ML (ref 0–0.08)
PROT SERPL-MCNC: 5.1 G/DL (ref 6.4–8.3)
RBC # BLD AUTO: 3.11 M/UL (ref 3.5–5.5)
RBC # BLD: ABNORMAL 10*6/UL
SODIUM SERPL-SCNC: 141 MMOL/L (ref 132–146)
TROPONIN I SERPL HS-MCNC: 64 NG/L (ref 0–9)
WBC OTHER # BLD: 14.6 K/UL (ref 4.5–11.5)

## 2025-03-16 PROCEDURE — 99232 SBSQ HOSP IP/OBS MODERATE 35: CPT | Performed by: STUDENT IN AN ORGANIZED HEALTH CARE EDUCATION/TRAINING PROGRAM

## 2025-03-16 PROCEDURE — 84145 PROCALCITONIN (PCT): CPT

## 2025-03-16 PROCEDURE — 84484 ASSAY OF TROPONIN QUANT: CPT

## 2025-03-16 PROCEDURE — 2580000003 HC RX 258

## 2025-03-16 PROCEDURE — 84100 ASSAY OF PHOSPHORUS: CPT

## 2025-03-16 PROCEDURE — 2700000000 HC OXYGEN THERAPY PER DAY

## 2025-03-16 PROCEDURE — 85025 COMPLETE CBC W/AUTO DIFF WBC: CPT

## 2025-03-16 PROCEDURE — 87081 CULTURE SCREEN ONLY: CPT

## 2025-03-16 PROCEDURE — 2580000003 HC RX 258: Performed by: STUDENT IN AN ORGANIZED HEALTH CARE EDUCATION/TRAINING PROGRAM

## 2025-03-16 PROCEDURE — 6360000002 HC RX W HCPCS

## 2025-03-16 PROCEDURE — 94640 AIRWAY INHALATION TREATMENT: CPT

## 2025-03-16 PROCEDURE — 6360000004 HC RX CONTRAST MEDICATION: Performed by: RADIOLOGY

## 2025-03-16 PROCEDURE — 83605 ASSAY OF LACTIC ACID: CPT

## 2025-03-16 PROCEDURE — 83735 ASSAY OF MAGNESIUM: CPT

## 2025-03-16 PROCEDURE — 80053 COMPREHEN METABOLIC PANEL: CPT

## 2025-03-16 PROCEDURE — 85652 RBC SED RATE AUTOMATED: CPT

## 2025-03-16 PROCEDURE — 6360000002 HC RX W HCPCS: Performed by: INTERNAL MEDICINE

## 2025-03-16 PROCEDURE — 71260 CT THORAX DX C+: CPT

## 2025-03-16 PROCEDURE — 86140 C-REACTIVE PROTEIN: CPT

## 2025-03-16 PROCEDURE — 2000000000 HC ICU R&B

## 2025-03-16 PROCEDURE — 82533 TOTAL CORTISOL: CPT

## 2025-03-16 RX ORDER — SODIUM CHLORIDE 9 MG/ML
INJECTION, SOLUTION INTRAVENOUS CONTINUOUS
Status: DISCONTINUED | OUTPATIENT
Start: 2025-03-16 | End: 2025-03-16

## 2025-03-16 RX ORDER — PIPERACILLIN SODIUM, TAZOBACTAM SODIUM 4; .5 G/20ML; G/20ML
4500 INJECTION, POWDER, LYOPHILIZED, FOR SOLUTION INTRAVENOUS EVERY 8 HOURS
Status: DISCONTINUED | OUTPATIENT
Start: 2025-03-16 | End: 2025-03-17

## 2025-03-16 RX ORDER — ARFORMOTEROL TARTRATE 15 UG/2ML
15 SOLUTION RESPIRATORY (INHALATION)
Status: DISCONTINUED | OUTPATIENT
Start: 2025-03-16 | End: 2025-03-20 | Stop reason: HOSPADM

## 2025-03-16 RX ORDER — HYDROCORTISONE 10 MG/1
10 TABLET ORAL DAILY
COMMUNITY

## 2025-03-16 RX ORDER — LORAZEPAM 2 MG/ML
0.5 INJECTION INTRAMUSCULAR EVERY 6 HOURS PRN
Status: DISPENSED | OUTPATIENT
Start: 2025-03-16 | End: 2025-03-17

## 2025-03-16 RX ORDER — SODIUM CHLORIDE 9 MG/ML
20 INJECTION, SOLUTION INTRAMUSCULAR; INTRAVENOUS; SUBCUTANEOUS EVERY 8 HOURS
Status: DISCONTINUED | OUTPATIENT
Start: 2025-03-16 | End: 2025-03-17

## 2025-03-16 RX ORDER — HYDROCORTISONE SODIUM SUCCINATE 100 MG/2ML
100 INJECTION INTRAMUSCULAR; INTRAVENOUS EVERY 8 HOURS
Status: DISCONTINUED | OUTPATIENT
Start: 2025-03-16 | End: 2025-03-16

## 2025-03-16 RX ORDER — BUDESONIDE 0.5 MG/2ML
1 INHALANT ORAL
Status: DISCONTINUED | OUTPATIENT
Start: 2025-03-16 | End: 2025-03-20 | Stop reason: HOSPADM

## 2025-03-16 RX ORDER — IOPAMIDOL 755 MG/ML
12 INJECTION, SOLUTION INTRAVASCULAR
Status: COMPLETED | OUTPATIENT
Start: 2025-03-16 | End: 2025-03-16

## 2025-03-16 RX ORDER — SODIUM CHLORIDE, SODIUM LACTATE, POTASSIUM CHLORIDE, AND CALCIUM CHLORIDE .6; .31; .03; .02 G/100ML; G/100ML; G/100ML; G/100ML
1000 INJECTION, SOLUTION INTRAVENOUS ONCE
Status: COMPLETED | OUTPATIENT
Start: 2025-03-16 | End: 2025-03-16

## 2025-03-16 RX ORDER — GUAIFENESIN/DEXTROMETHORPHAN 100-10MG/5
10 SYRUP ORAL EVERY 6 HOURS PRN
COMMUNITY

## 2025-03-16 RX ORDER — MAGNESIUM SULFATE 1 G/100ML
1000 INJECTION INTRAVENOUS ONCE
Status: COMPLETED | OUTPATIENT
Start: 2025-03-16 | End: 2025-03-16

## 2025-03-16 RX ORDER — ALBUTEROL SULFATE 0.83 MG/ML
2.5 SOLUTION RESPIRATORY (INHALATION) EVERY 6 HOURS PRN
Status: DISCONTINUED | OUTPATIENT
Start: 2025-03-16 | End: 2025-03-20 | Stop reason: HOSPADM

## 2025-03-16 RX ORDER — BISACODYL 10 MG
10 SUPPOSITORY, RECTAL RECTAL DAILY PRN
COMMUNITY

## 2025-03-16 RX ORDER — SODIUM CHLORIDE 0.9 % (FLUSH) 0.9 %
5-40 SYRINGE (ML) INJECTION PRN
Status: DISCONTINUED | OUTPATIENT
Start: 2025-03-16 | End: 2025-03-20 | Stop reason: HOSPADM

## 2025-03-16 RX ORDER — SODIUM CHLORIDE 9 MG/ML
INJECTION, SOLUTION INTRAVENOUS PRN
Status: DISCONTINUED | OUTPATIENT
Start: 2025-03-16 | End: 2025-03-20 | Stop reason: HOSPADM

## 2025-03-16 RX ORDER — SODIUM CHLORIDE 0.9 % (FLUSH) 0.9 %
5-40 SYRINGE (ML) INJECTION EVERY 12 HOURS SCHEDULED
Status: DISCONTINUED | OUTPATIENT
Start: 2025-03-17 | End: 2025-03-20 | Stop reason: HOSPADM

## 2025-03-16 RX ORDER — HYDROCORTISONE SODIUM SUCCINATE 100 MG/2ML
50 INJECTION INTRAMUSCULAR; INTRAVENOUS EVERY 8 HOURS
Status: DISCONTINUED | OUTPATIENT
Start: 2025-03-16 | End: 2025-03-17

## 2025-03-16 RX ADMIN — SODIUM CHLORIDE, POTASSIUM CHLORIDE, SODIUM LACTATE AND CALCIUM CHLORIDE: 600; 310; 30; 20 INJECTION, SOLUTION INTRAVENOUS at 23:57

## 2025-03-16 RX ADMIN — LORAZEPAM 0.5 MG: 2 INJECTION INTRAMUSCULAR; INTRAVENOUS at 07:30

## 2025-03-16 RX ADMIN — SODIUM CHLORIDE 40 MG: 9 INJECTION, SOLUTION INTRAMUSCULAR; INTRAVENOUS; SUBCUTANEOUS at 07:48

## 2025-03-16 RX ADMIN — IPRATROPIUM BROMIDE 0.5 MG: 0.5 SOLUTION RESPIRATORY (INHALATION) at 19:43

## 2025-03-16 RX ADMIN — HYDROCORTISONE SODIUM SUCCINATE 50 MG: 100 INJECTION INTRAMUSCULAR; INTRAVENOUS at 14:11

## 2025-03-16 RX ADMIN — PIPERACILLIN AND TAZOBACTAM 4500 MG: 4; .5 INJECTION, POWDER, FOR SOLUTION INTRAVENOUS at 21:34

## 2025-03-16 RX ADMIN — ARFORMOTEROL TARTRATE 15 MCG: 15 SOLUTION RESPIRATORY (INHALATION) at 08:42

## 2025-03-16 RX ADMIN — IPRATROPIUM BROMIDE 0.5 MG: 0.5 SOLUTION RESPIRATORY (INHALATION) at 15:41

## 2025-03-16 RX ADMIN — PIPERACILLIN AND TAZOBACTAM 4500 MG: 4; .5 INJECTION, POWDER, FOR SOLUTION INTRAVENOUS at 06:09

## 2025-03-16 RX ADMIN — HYDROCORTISONE SODIUM SUCCINATE 100 MG: 100 INJECTION INTRAMUSCULAR; INTRAVENOUS at 07:48

## 2025-03-16 RX ADMIN — PIPERACILLIN AND TAZOBACTAM 4500 MG: 4; .5 INJECTION, POWDER, FOR SOLUTION INTRAVENOUS at 12:05

## 2025-03-16 RX ADMIN — LORAZEPAM 0.5 MG: 2 INJECTION INTRAMUSCULAR; INTRAVENOUS at 18:39

## 2025-03-16 RX ADMIN — BUDESONIDE 1000 MCG: 0.5 SUSPENSION RESPIRATORY (INHALATION) at 19:43

## 2025-03-16 RX ADMIN — ARFORMOTEROL TARTRATE 15 MCG: 15 SOLUTION RESPIRATORY (INHALATION) at 19:43

## 2025-03-16 RX ADMIN — IPRATROPIUM BROMIDE 0.5 MG: 0.5 SOLUTION RESPIRATORY (INHALATION) at 12:51

## 2025-03-16 RX ADMIN — SODIUM CHLORIDE 20 ML: 9 INJECTION, SOLUTION INTRAMUSCULAR; INTRAVENOUS; SUBCUTANEOUS at 21:33

## 2025-03-16 RX ADMIN — IOPAMIDOL 12 ML: 755 INJECTION, SOLUTION INTRAVENOUS at 07:04

## 2025-03-16 RX ADMIN — SODIUM CHLORIDE: 0.9 INJECTION, SOLUTION INTRAVENOUS at 02:06

## 2025-03-16 RX ADMIN — BUDESONIDE 1000 MCG: 0.5 SUSPENSION RESPIRATORY (INHALATION) at 08:42

## 2025-03-16 RX ADMIN — IPRATROPIUM BROMIDE 0.5 MG: 0.5 SOLUTION RESPIRATORY (INHALATION) at 08:42

## 2025-03-16 RX ADMIN — SODIUM CHLORIDE 20 ML: 9 INJECTION, SOLUTION INTRAMUSCULAR; INTRAVENOUS; SUBCUTANEOUS at 12:06

## 2025-03-16 RX ADMIN — SODIUM CHLORIDE, SODIUM LACTATE, POTASSIUM CHLORIDE, AND CALCIUM CHLORIDE 1000 ML: .6; .31; .03; .02 INJECTION, SOLUTION INTRAVENOUS at 09:05

## 2025-03-16 RX ADMIN — SODIUM CHLORIDE 20 ML: 9 INJECTION, SOLUTION INTRAMUSCULAR; INTRAVENOUS; SUBCUTANEOUS at 06:09

## 2025-03-16 RX ADMIN — MAGNESIUM SULFATE HEPTAHYDRATE 1000 MG: 1 INJECTION, SOLUTION INTRAVENOUS at 06:13

## 2025-03-16 ASSESSMENT — PAIN SCALES - GENERAL
PAINLEVEL_OUTOF10: 0

## 2025-03-16 NOTE — CONSULTS
Critical Care Admit/Consult Note         Patient - Haritha Dominguez   MRN -  [id number]   [facility] # - [id number]   - 1945      Date of Admission -  3/15/2025  3:17 PM  Date of evaluation -  3/15/2025  10/10   Hospital Day - 0            ADMIT/CONSULT DETAILS     Reason for Admit/Consult   Septic shock    Consulting Service/Physician   Consulting - Herve Cronin DO  Primary Care Physician - Olayinka Reyes DO HPI   The patient is a 80 y.o. female with significant past medical history of anxiety, asthma, dermatitis, GERD, hiatal hernia, HTN, HLD, CORRINE on CPAP, and anemia.  Patient presented emergency department for evaluation of altered mental status as well as fever, abdominal pain and shortness of breath.  Patient from nursing facility.  Of note patient just discharged yesterday 3/14 requiring extended stay at the hospital initially admission on .  Patient had acute hypoxic respiratory failure in the setting of influenza A, worsening respiratory problems concern for aspiration and she completed 7-day course of Zosyn.  EGD 3/10/20/2025 findings of large hiatal hernia, mucosal tear appreciated within hiatal hernia with scope passage, savory dilation of proximal/mid esophagus with adequate response, EUS attempted but but aborted, endoscopically his NG tube.  Ultimately she was able to pass her swallow and NGT was removed.     Today CT findings with mild pneumomediastinum, gas noted adjacent to the distal esophagus and in the anterior mediastinum with subcutaneous emphysema noted in the right chest wall near the base of the neck concerning for esophageal perforation.  GI was contacted at this time recommending supportive care and antibiotic therapy currently the patient is DNR/DNI, holding off on esophagram until tomorrow upon reevaluation.  She also became hypotensive despite adequate fluid resuscitation central line was placed and she was initiated on norepinephrine.  She was given Zosyn, Diflucan and  esophageal tear recent EGD noting mucosal tear within the hiatal hernia  - Afebrile, leukocytosis with white count 17.1, tachycardic and hypotensive, UA positive nitrate, large leuk esterase and white count 10-20  - Check baseline CRP, PCT and ESR, follow blood and urine cultures  - In ED she received 2 g of vancomycin, 3.375 mg of Zosyn, and 400 mg of fluconazole  - Continue Zosyn and follow cultures     Hematology/Oncology   # Normocytic anemia  - Chronic and at baseline, hold iron supplements, monitor daily CBC    Endocrine   # Adrenal insufficiency  - On Cortef 10 mg daily at home  - She already received stress dose steroids in the ED will repeat cortisol level despite this and continue stress dose steroids    Social/Spiritual/DNR/Other   CODE STATUS: Limited code no intubation and no CPR  DVT prophylaxis: SCDs  GI prophylaxis: Protonix        \"Thank you for asking us to see this complex patient.\"     Case discussed with Monae Fowler  Electronically signed by STEVE Cooper CNP on 3/16/2025 at 5:14 AM    Total critical care time caring for this patient with life threatening, unstable organ failure, including direct patient contact, management of life support systems, review of data including imaging and labs, discussions with other team members and physicians at least 45  Min so far today, excluding procedures. Please feel free to call with questions or concerns.     NOTE: This report was transcribed using voice recognition software. Every effort was made to ensure accuracy; however, inadvertent computerized transcription errors may be present.

## 2025-03-16 NOTE — H&P
cyst  Past Surgical History:  EGD with Savory dilation & attempted EUS 3/10/25  Spinal cord stimulator battery replacement 5/31/23  Right SI joint fusion 12/16/16  Back surgery with implant/screws 8/2014  Spinal fixation surgery 2013  Bilateral cataract removal with lens implants 12/6/13  Right TKA, date unknown  Cholecystectomy, date unknown  Social History:  Resides in nursing facility  Never smoker  No alcohol use  Uses CPAP at home  Medications:  Norepinephrine 7mcg/min IV continuous  Lactated Ringers 150mL/hr IV continuous  Vancomycin 2000mg IV once  Zosyn 3.375g IV q8h  Fluconazole 400mg IV daily  Hydrocortisone (Solu-Cortef) 100mg IV q8h  Home medications data not available  Allergies:  Percocet (oxycodone-acetaminophen) - reaction not specified  Retry Claude can make mistakes.   Please double-check responses.     3.7 Sonnet  Choose style        I discussed the patient's case with the Emergency Medicine Resident/physician and the plan of care with the appropriate ancillary medical staff.       REVIEW OF SYSTEMS:  A comprehensive review of systems was negative except for: what is in the HPI      PMH:  Past Medical History:   Diagnosis Date    Abrasion of skin of left lower leg 11/04/2024    Abrasion of skin of right lower leg 11/04/2024    Anxiety     Arthritis     Asthma     Claustrophobia     Decreased dorsalis pedis pulse 11/04/2024    Dermatitis 02/2017    bilateral legs knees to ankle; follows with [facility]    Fracture 02/2017    \"in Spine\" compression T10 per pt; difficulty laying flat    GERD (gastroesophageal reflux disease)     Hiatal hernia     History of blood transfusion     HTN (hypertension) 04/22/2013    Hyperlipidemia     Itching 11/04/2024    On aspirin at home     for age per pcp    Pancreatic cyst 05/27/2017    Pneumonia 07/2018    Sleep apnea     SOB (shortness of breath) 04/22/2013    Tachycardia 04/22/2013    follows with Dr NUPUR Chavez       Surgical History:  Past Surgical  SYSTEM PROVIDED HISTORY: Dysphagia TECHNOLOGIST PROVIDED HISTORY: Reason for exam:->Dysphagia FINDINGS: Silent aspiration of thin liquid barium.  Oral and pharyngeal delay identified.  There is contrast retention within the vallecula.  Laryngeal elevation is reduced.  During the exam the patient started to gag and had emesis.  The exam was terminated early.     Silent aspiration of thin liquid barium. Please see report for further details. Please see separate speech pathology report for full discussion of findings and recommendations.     XR ABDOMEN (KUB) (SINGLE AP VIEW)  Result Date: 3/4/2025  EXAMINATION: ONE SUPINE XRAY VIEW(S) OF THE ABDOMEN 3/4/2025 2:10 pm COMPARISON: None. HISTORY: ORDERING SYSTEM PROVIDED HISTORY: checking for barium TECHNOLOGIST PROVIDED HISTORY: Reason for exam:->checking for barium FINDINGS: Patient had a modified barium swallow evaluation performed in the same day March 4, at 10:51 a.m.. Present study done March 4 13:49 p.m.. There is oral contrast in the area of the hiatal hernia.  There is a moderate-sized hiatal hernia.  There also oral contrast in jejunal and mid small bowel segments.  Presently there is no contrast in the colon. There is contrast in the bladder from previous CTA of the chest performed same day early at 10: 34 a.m.. Patient had previous fusion of the mid lower lumbar spine with intrapedicular screws from L3 through S1 with a perpendicular placed screw for stabilization of the SI joints/sacral wing. Patient has a neural stimulator leads in the thecal space of the lower thoracic spine, the monitor or/pacemaker device overlies the right lower flank area. The bowel pattern is nonspecific. Lower lung bases appear unremarkable. Heart has normal     Oral contrast in a moderate size hiatal hernia, and also in the proximal mid small bowel.     CTA PULMONARY W CONTRAST  Result Date: 3/4/2025  EXAMINATION: CTA OF THE CHEST3/4/2025 10:30 am CTA CHEST WITH CONTRAST TECHNIQUE:

## 2025-03-16 NOTE — PATIENT CARE CONFERENCE
Intensive Care Daily Quality Rounding Checklist      ICU Team Members: Dr. Licona, Resident Dr. Miranda, Bedside nurse Wallace, Charge nurse, Respiratory therapist     ICU Day #: NUMBER: 1    SOFA Score: 8    Intubation Date: N/A    Ventilator Day #: N/A    Central Line Insertion Date: March 15        Day #: NUMBER: 2        Indication: CVCIndication: Administration of vasopressors or vesicant medications     Arterial Line Insertion Date: N/A      Day #: N/A    Temporary Hemodialysis Catheter Insertion Date: N/A      Day # N/A    DVT Prophylaxis: PCD's    GI Prophylaxis: Protonix    Brewer Catheter Insertion Date: N/A       Day #: N/A      Indications: N/A      Continued need (if yes, reason documented and discussed with physician): N/A    Skin Issues/ Wounds and ordered treatment discussed on rounds: Yes, wound care consulted    Goals/ Plans for the Day:  Monitor labs and vitals, treat/replace as needed.  Fluid bolus per surgery.  Consult to infectious disease.  Await esophogram to start diet. Wean steroids.  Wean off pressors.     Reviewed plan and goals for day with patient and/or representative: Yes, with patient.

## 2025-03-16 NOTE — PLAN OF CARE
Problem: Discharge Planning  Goal: Discharge to home or other facility with appropriate resources  Outcome: Progressing     Problem: Pain  Goal: Verbalizes/displays adequate comfort level or baseline comfort level  Outcome: Progressing     Problem: Safety - Adult  Goal: Free from fall injury  Outcome: Progressing     Problem: ABCDS Injury Assessment  Goal: Absence of physical injury  Outcome: Progressing     Problem: Skin/Tissue Integrity  Goal: Skin integrity remains intact  Description: 1.  Monitor for areas of redness and/or skin breakdown  2.  Assess vascular access sites hourly  3.  Every 4-6 hours minimum:  Change oxygen saturation probe site  4.  Every 4-6 hours:  If on nasal continuous positive airway pressure, respiratory therapy assess nares and determine need for appliance change or resting period  Outcome: Progressing  Flowsheets (Taken 3/16/2025 0154 by Bianca Chirinos RN)  Skin Integrity Remains Intact:   Assess vascular access sites hourly   Every 4-6 hours minimum: Change oxygen saturation probe site

## 2025-03-16 NOTE — PROGRESS NOTES
4 Eyes Skin Assessment     NAME:  Haritha Dominguez  YOB: 1945  MEDICAL RECORD NUMBER:  [id number]    The patient is being assessed for  Admission    I agree that at least one RN has performed a thorough Head to Toe Skin Assessment on the patient. ALL assessment sites listed below have been assessed.      Areas assessed by both nurses:    Head, Face, Ears, Shoulders, Back, Chest, Arms, Elbows, Hands, Sacrum. Buttock, Coccyx, Ischium, Legs. Feet and Heels, and Under Medical Devices         Does the Patient have a Wound? Yes wound(s) were present on assessment. LDA wound assessment was Initiated and completed by RN       Ruperto Prevention initiated by RN: Yes  Wound Care Orders initiated by RN: Yes    Pressure Injury (Stage 3,4, Unstageable, DTI, NWPT, and Complex wounds) if present, place Wound referral order by RN under : Yes    New Ostomies, if present place, Ostomy referral order under : No     Nurse 1 eSignature: Electronically signed by Bianca Chirinos RN on 3/16/25 at 1:40 AM EDT    **SHARE this note so that the co-signing nurse can place an eSignature**    Nurse 2 eSignature: Electronically signed by Connie Elias RN on 3/16/25 at 3:02 AM EDT

## 2025-03-16 NOTE — CONSULTS
Infectious Disease Consult Note     Admit Date: 3/15/2025  3:17 PM    Chief complaint: Shortness of breath    Reason for Consult: Mediastinitis    Requesting Physician:  Donnell Nino DO      HISTORY OF PRESENT ILLNESS:    The patient is a 80 y.o. female with significant past medical history of anxiety, asthma, dermatitis, GERD, hiatal hernia, HTN, HLD, CORRINE on CPAP, and anemia.  Patient presented emergency department for evaluation of altered mental status as well as fever, abdominal pain and shortness of breath.  Patient from nursing facility.  Of note patient just discharged 3/14 after requiring extended stay at the hospital initially admission on 2/19.  Patient had acute hypoxic respiratory failure in the setting of influenza A, worsening respiratory problems concern for aspiration and she completed 7-day course of Zosyn.  EGD 3/10/20/2025 findings of large hiatal hernia, mucosal tear appreciated within hiatal hernia with scope passage, dilation of proximal/mid esophagus with adequate response, EUS attempted but but aborted, NG tube placed endoscopically.  Ultimately she was able to pass her swallow and NGT was removed.     On admission CT findings with mild pneumomediastinum, gas noted adjacent to the distal esophagus and in the anterior mediastinum with subcutaneous emphysema noted in the right chest wall near the base of the neck concerning for esophageal perforation.  GI was contacted at this time recommending supportive care and antibiotic therapy currently the patient is DNR/DNI.  She also became hypotensive despite adequate fluid resuscitation central line was placed and she was initiated on norepinephrine.  She was given Zosyn, Diflucan and vancomycin.  History of adrenal insufficiency and she was given 100 mg of Solu-Cortef.      3/16  Patient underwent esophagogram this morning, results are pending.  She was weaned off of her pressors and her hydrocortisone was decreased to 50 mg.  Patient is  H1 (2009) PCR DETECTED     Influenza B by PCR Not Detected     Parainfluenza 1 PCR Not Detected     Parainfluenza 2 PCR Not Detected     Parainfluenza 3 PCR Not Detected     Parainfluenza 4 PCR Not Detected     Resp Syncytial Virus PCR Not Detected     Bordetella parapertussis by PCR Not Detected     B Pertussis by PCR Not Detected     Chlamydia pneumoniae By PCR Not Detected     Mycoplasma pneumo by PCR Not Detected     Comment: Performed by multiplexed nucleic acid assay.                     ASSESSMENT & PLAN    # Sepsis  - 2/2 UTI, also with possible esophageal tear recent EGD noting mucosal tear within the hiatal hernia  - Afebrile, leukocytosis with white count 17.1, tachycardic and hypotensive, UA positive nitrate, large leuk esterase and white count 10-20  - In ED she received 2 g of vancomycin, 3.375 mg of Zosyn, and 400 mg of fluconazole  - Continue Zosyn and follow cultures    - Underwent EGD/EUS on 3/10 at that time a deep but non penetrating tear was appreciated within the intrathoracic portion of the stomach on scope withdrawal.    At that time the tear was evaluated and noted to not be full-thickness. EUS attempted but not performed.  - CTAP with mild pneumomediastinum, gas noted adjacent to the distal esophagus and in the anterior mediastinum with associated subcutaneous emphysema in the right chest wall near the base of the neck    # Influenza A positive on March 3--no need for isolation       # Chronic aspiration  - S/p EGD/EUS on 3/10 with findings of large hiatal hernia with significant portion of stomach in the thoracic cavity, mucosal tear appreciated within hiatal hernia with scope passage, savory dilation of proximal/middle esophagus     # KATELYN  - Mild with creatinine of 1.1 baseline 0.6-0.7  - Likely due to decreased oral intake as well as volume contraction in setting of hypotension        Thank you for consult.      Ronaldo Weinstein MD, FACP  FID  3/16/2025  11:51 AM

## 2025-03-16 NOTE — PROGRESS NOTES
[facility]  Department of Internal Medicine   Internal Medicine Residency   MICU Progress Note    Patient:  Haritha Dominguez 80 y.o. female  MRN: [id number]     Date of Service: 3/16/2025    Allergy: Percocet [oxycodone-acetaminophen]    Overnight Events: No significant overnight events    Subjective     Patient was examined resting comfortably in bed this morning, reported that she was feeling cold, wrapped her in warm blankets.  No other acute complaints or concerns at this time.    ROS: Denies fever, chest pain, shortness of breath, N/V/C/D, dysuria or blood in stool or urine     Objective     VS: BP (!) 85/58   Pulse 92   Temp 97.5 °F (36.4 °C) (Axillary)   Resp 15   Ht 1.524 m (5')   Wt 65.4 kg (144 lb 2.9 oz)   SpO2 97%   BMI 28.16 kg/m²           I & O - 24hr:   Intake/Output Summary (Last 24 hours) at 3/16/2025 0747  Last data filed at 3/16/2025 0619  Gross per 24 hour   Intake 323.54 ml   Output 700 ml   Net -376.46 ml       Sedatives: None    Pressors: None    Oxygen: 4 L nasal cannula    ABG:     Lab Results   Component Value Date/Time    PH 7.432 03/15/2025 10:05 PM    PCO2 33.9 03/15/2025 10:05 PM    PO2 67.4 03/15/2025 10:05 PM    HCO3 22.1 03/15/2025 10:05 PM    BE -1.7 03/15/2025 10:05 PM    THB 10.2 03/15/2025 10:05 PM    O2SAT 93.1 03/15/2025 10:05 PM       Physical Exam:  General Appearance: No acute distress.  Resting comfortably, nasal cannula in place  Neuro: Round symmetric pupil that react to light bilaterally    Cardiovascular: regular rate and rhythm, S1 and S2 heard, no murmurs appreciated   Respiratory: Clear to auscultation bilaterally. No wheezing or crackles appreciated.  Abdomen: Soft, non-tender; bowel sounds normal; no masses, no organomegaly, rebound or guarding  Extremities: Extremities normal, no cyanosis, distal pulses intact, bilateral lower extremity edema.     Lines & Urinary Catheter:   Peripheral IV x 2  CVC       Labs     Basic Labs    Complete Blood

## 2025-03-16 NOTE — H&P
History & Physicial  Haritha RUIZ [facility]  [id number]  1945 03/16/25  Primary Care:  Olayinka Reyes DO  [address] / [facility] [address]        Chief Complaint   Patient presents with    Abdominal Pain    Fever    Shortness of Breath       HPI:  Patient is a 80 year old female who presented to ER due to abdominal pain and upper back pain. She states that she had an EGD on 3/10/25 with Dr. Spivey due to recurrent aspiration. Patient was found to have large hiatal hernia with tear. Yesterday in ER she was found to have pneumomediastinum. GI was consulted from the ER and patient was observed overnight in the ICU on low dose pressers and started on antibiotics. This am patient remains on levophed. She reports that her stomach and back pain have resolved.     Prior to Visit Medications    Medication Sig Taking? Authorizing Provider   bisacodyl (DULCOLAX) 10 MG suppository Place 1 suppository rectally daily as needed for Constipation Yes ProviderJessica MD   guaiFENesin-dextromethorphan (ROBITUSSIN DM) 100-10 MG/5ML syrup Take 10 mLs by mouth every 6 hours as needed for Cough Yes ProviderJessica MD   hydrocortisone (CORTEF) 10 MG tablet Take 1 tablet by mouth daily Yes Jessica Sullivan MD   sodium bicarbonate 650 MG tablet Take 2 tablets by mouth 2 times daily Yes Bryan Rivers, DO   albuterol (PROVENTIL) (2.5 MG/3ML) 0.083% nebulizer solution Take 3 mLs by nebulization 4 times daily as needed for Wheezing Please bill under Medicare Part B DX: Asthma J45.5 Yes Bryan Rivers, DO   pantoprazole (PROTONIX) 40 MG tablet Take 1 tablet by mouth in the morning and 1 tablet in the evening. Yes Jessica Sullivan MD   ferrous sulfate (IRON 325) 325 (65 Fe) MG tablet Take 1 tablet by mouth daily (with breakfast) Yes Jessica Sullivan MD   polyethylene glycol (GLYCOLAX) 17 g packet Take 1 packet by mouth daily as needed for Constipation Yes Jessica Sullivan MD   vitamin C (ASCORBIC ACID) 500  Prophylaxis: PCDs  Code Status: Limited code    Susanne Lake DO      Electronically signed by Susanne Lake DO on 3/16/2025 at 7:57 AM

## 2025-03-16 NOTE — PROGRESS NOTES
Patient admitted from ER 10 to 207, with the following belongings pants, shirt, cell phone, socks, and panties, placed on monitor, patient oriented to room and unit visiting hours.  Patient guide at bedside, reviewed patient rights and responsibilities. MRSA nasal swab obtained.  Bed alarm on.  Call light within reach.

## 2025-03-17 PROBLEM — J98.2 PNEUMOMEDIASTINUM (HCC): Status: ACTIVE | Noted: 2025-03-17

## 2025-03-17 PROBLEM — K44.9 HIATAL HERNIA: Status: ACTIVE | Noted: 2025-03-17

## 2025-03-17 LAB
ALBUMIN SERPL-MCNC: 2.4 G/DL (ref 3.5–5.2)
ALP SERPL-CCNC: 69 U/L (ref 35–104)
ALT SERPL-CCNC: 8 U/L (ref 0–32)
ANION GAP SERPL CALCULATED.3IONS-SCNC: 10 MMOL/L (ref 7–16)
AST SERPL-CCNC: 11 U/L (ref 0–31)
BASOPHILS # BLD: 0.03 K/UL (ref 0–0.2)
BASOPHILS NFR BLD: 0 % (ref 0–2)
BILIRUB SERPL-MCNC: 0.3 MG/DL (ref 0–1.2)
BUN SERPL-MCNC: 13 MG/DL (ref 6–23)
CALCIUM SERPL-MCNC: 7.8 MG/DL (ref 8.6–10.2)
CHLORIDE SERPL-SCNC: 105 MMOL/L (ref 98–107)
CO2 SERPL-SCNC: 23 MMOL/L (ref 22–29)
CREAT SERPL-MCNC: 0.7 MG/DL (ref 0.5–1)
EKG ATRIAL RATE: 105 BPM
EKG P AXIS: 33 DEGREES
EKG P-R INTERVAL: 142 MS
EKG Q-T INTERVAL: 348 MS
EKG QRS DURATION: 68 MS
EKG QTC CALCULATION (BAZETT): 459 MS
EKG R AXIS: 6 DEGREES
EKG T AXIS: 50 DEGREES
EKG VENTRICULAR RATE: 105 BPM
EOSINOPHIL # BLD: 0.01 K/UL (ref 0.05–0.5)
EOSINOPHILS RELATIVE PERCENT: 0 % (ref 0–6)
ERYTHROCYTE [DISTWIDTH] IN BLOOD BY AUTOMATED COUNT: 15.1 % (ref 11.5–15)
FERRITIN SERPL-MCNC: 349 NG/ML
GFR, ESTIMATED: 90 ML/MIN/1.73M2
GLUCOSE SERPL-MCNC: 113 MG/DL (ref 74–99)
HCT VFR BLD AUTO: 25.1 % (ref 34–48)
HGB BLD-MCNC: 7.8 G/DL (ref 11.5–15.5)
IMM GRANULOCYTES # BLD AUTO: 0.2 K/UL (ref 0–0.58)
IMM GRANULOCYTES NFR BLD: 2 % (ref 0–5)
IRON SATN MFR SERPL: 16 % (ref 15–50)
IRON SERPL-MCNC: 25 UG/DL (ref 37–145)
LACTATE BLDV-SCNC: 1.3 MMOL/L (ref 0.5–2.2)
LYMPHOCYTES NFR BLD: 0.71 K/UL (ref 1.5–4)
LYMPHOCYTES RELATIVE PERCENT: 7 % (ref 20–42)
MAGNESIUM SERPL-MCNC: 1.8 MG/DL (ref 1.6–2.6)
MCH RBC QN AUTO: 29.4 PG (ref 26–35)
MCHC RBC AUTO-ENTMCNC: 31.1 G/DL (ref 32–34.5)
MCV RBC AUTO: 94.7 FL (ref 80–99.9)
MICROORGANISM SPEC CULT: NORMAL
MONOCYTES NFR BLD: 0.43 K/UL (ref 0.1–0.95)
MONOCYTES NFR BLD: 4 % (ref 2–12)
NEUTROPHILS NFR BLD: 87 % (ref 43–80)
NEUTS SEG NFR BLD: 8.85 K/UL (ref 1.8–7.3)
PHOSPHATE SERPL-MCNC: 3 MG/DL (ref 2.5–4.5)
PLATELET # BLD AUTO: 251 K/UL (ref 130–450)
PMV BLD AUTO: 12.6 FL (ref 7–12)
POTASSIUM SERPL-SCNC: 3.3 MMOL/L (ref 3.5–5)
PROT SERPL-MCNC: 5.2 G/DL (ref 6.4–8.3)
RBC # BLD AUTO: 2.65 M/UL (ref 3.5–5.5)
SERVICE CMNT-IMP: NORMAL
SODIUM SERPL-SCNC: 138 MMOL/L (ref 132–146)
SPECIMEN DESCRIPTION: NORMAL
TIBC SERPL-MCNC: 155 UG/DL (ref 250–450)
WBC OTHER # BLD: 10.2 K/UL (ref 4.5–11.5)

## 2025-03-17 PROCEDURE — 94640 AIRWAY INHALATION TREATMENT: CPT

## 2025-03-17 PROCEDURE — 85025 COMPLETE CBC W/AUTO DIFF WBC: CPT

## 2025-03-17 PROCEDURE — 83550 IRON BINDING TEST: CPT

## 2025-03-17 PROCEDURE — 6360000002 HC RX W HCPCS

## 2025-03-17 PROCEDURE — 2580000003 HC RX 258: Performed by: STUDENT IN AN ORGANIZED HEALTH CARE EDUCATION/TRAINING PROGRAM

## 2025-03-17 PROCEDURE — 6360000002 HC RX W HCPCS: Performed by: INTERNAL MEDICINE

## 2025-03-17 PROCEDURE — 83735 ASSAY OF MAGNESIUM: CPT

## 2025-03-17 PROCEDURE — 2060000000 HC ICU INTERMEDIATE R&B

## 2025-03-17 PROCEDURE — 99232 SBSQ HOSP IP/OBS MODERATE 35: CPT | Performed by: STUDENT IN AN ORGANIZED HEALTH CARE EDUCATION/TRAINING PROGRAM

## 2025-03-17 PROCEDURE — 82728 ASSAY OF FERRITIN: CPT

## 2025-03-17 PROCEDURE — 93010 ELECTROCARDIOGRAM REPORT: CPT | Performed by: INTERNAL MEDICINE

## 2025-03-17 PROCEDURE — 2580000003 HC RX 258: Performed by: SPECIALIST

## 2025-03-17 PROCEDURE — 80053 COMPREHEN METABOLIC PANEL: CPT

## 2025-03-17 PROCEDURE — 92610 EVALUATE SWALLOWING FUNCTION: CPT

## 2025-03-17 PROCEDURE — 83605 ASSAY OF LACTIC ACID: CPT

## 2025-03-17 PROCEDURE — 6370000000 HC RX 637 (ALT 250 FOR IP): Performed by: INTERNAL MEDICINE

## 2025-03-17 PROCEDURE — 6360000002 HC RX W HCPCS: Performed by: SPECIALIST

## 2025-03-17 PROCEDURE — 2580000003 HC RX 258

## 2025-03-17 PROCEDURE — 83540 ASSAY OF IRON: CPT

## 2025-03-17 PROCEDURE — 2500000003 HC RX 250 WO HCPCS

## 2025-03-17 PROCEDURE — 84100 ASSAY OF PHOSPHORUS: CPT

## 2025-03-17 PROCEDURE — 2580000003 HC RX 258: Performed by: INTERNAL MEDICINE

## 2025-03-17 PROCEDURE — 2500000003 HC RX 250 WO HCPCS: Performed by: INTERNAL MEDICINE

## 2025-03-17 RX ORDER — LORAZEPAM 2 MG/ML
2 INJECTION INTRAMUSCULAR 2 TIMES DAILY PRN
Status: DISCONTINUED | OUTPATIENT
Start: 2025-03-17 | End: 2025-03-20 | Stop reason: HOSPADM

## 2025-03-17 RX ORDER — POTASSIUM CHLORIDE 29.8 MG/ML
20 INJECTION INTRAVENOUS
Status: COMPLETED | OUTPATIENT
Start: 2025-03-17 | End: 2025-03-17

## 2025-03-17 RX ORDER — HYDROCORTISONE SODIUM SUCCINATE 100 MG/2ML
25 INJECTION INTRAMUSCULAR; INTRAVENOUS EVERY 12 HOURS
Status: COMPLETED | OUTPATIENT
Start: 2025-03-17 | End: 2025-03-19

## 2025-03-17 RX ORDER — HYDROCORTISONE SODIUM SUCCINATE 100 MG/2ML
10 INJECTION INTRAMUSCULAR; INTRAVENOUS DAILY
Status: DISCONTINUED | OUTPATIENT
Start: 2025-03-20 | End: 2025-03-20 | Stop reason: HOSPADM

## 2025-03-17 RX ORDER — POTASSIUM CHLORIDE 29.8 MG/ML
20 INJECTION INTRAVENOUS ONCE
Status: COMPLETED | OUTPATIENT
Start: 2025-03-17 | End: 2025-03-17

## 2025-03-17 RX ORDER — SODIUM CHLORIDE, SODIUM LACTATE, POTASSIUM CHLORIDE, CALCIUM CHLORIDE 600; 310; 30; 20 MG/100ML; MG/100ML; MG/100ML; MG/100ML
INJECTION, SOLUTION INTRAVENOUS CONTINUOUS
Status: DISCONTINUED | OUTPATIENT
Start: 2025-03-17 | End: 2025-03-18

## 2025-03-17 RX ORDER — HYDROXYZINE PAMOATE 25 MG/1
25 CAPSULE ORAL 3 TIMES DAILY PRN
Status: DISCONTINUED | OUTPATIENT
Start: 2025-03-17 | End: 2025-03-20 | Stop reason: HOSPADM

## 2025-03-17 RX ORDER — MAGNESIUM SULFATE IN WATER 40 MG/ML
2000 INJECTION, SOLUTION INTRAVENOUS ONCE
Status: COMPLETED | OUTPATIENT
Start: 2025-03-17 | End: 2025-03-17

## 2025-03-17 RX ADMIN — LORAZEPAM 2 MG: 2 INJECTION INTRAMUSCULAR; INTRAVENOUS at 21:20

## 2025-03-17 RX ADMIN — POTASSIUM CHLORIDE 20 MEQ: 29.8 INJECTION, SOLUTION INTRAVENOUS at 06:20

## 2025-03-17 RX ADMIN — SODIUM CHLORIDE, PRESERVATIVE FREE 10 ML: 5 INJECTION INTRAVENOUS at 21:23

## 2025-03-17 RX ADMIN — IPRATROPIUM BROMIDE 0.5 MG: 0.5 SOLUTION RESPIRATORY (INHALATION) at 09:37

## 2025-03-17 RX ADMIN — IPRATROPIUM BROMIDE 0.5 MG: 0.5 SOLUTION RESPIRATORY (INHALATION) at 20:43

## 2025-03-17 RX ADMIN — CEFEPIME 2000 MG: 2 INJECTION, POWDER, FOR SOLUTION INTRAVENOUS at 23:51

## 2025-03-17 RX ADMIN — HYDROCORTISONE SODIUM SUCCINATE 50 MG: 100 INJECTION INTRAMUSCULAR; INTRAVENOUS at 08:44

## 2025-03-17 RX ADMIN — ARFORMOTEROL TARTRATE 15 MCG: 15 SOLUTION RESPIRATORY (INHALATION) at 09:37

## 2025-03-17 RX ADMIN — POTASSIUM CHLORIDE 20 MEQ: 29.8 INJECTION, SOLUTION INTRAVENOUS at 06:55

## 2025-03-17 RX ADMIN — BUDESONIDE 1000 MCG: 0.5 SUSPENSION RESPIRATORY (INHALATION) at 09:37

## 2025-03-17 RX ADMIN — COLLAGENASE SANTYL: 250 OINTMENT TOPICAL at 21:24

## 2025-03-17 RX ADMIN — SODIUM CHLORIDE, POTASSIUM CHLORIDE, SODIUM LACTATE AND CALCIUM CHLORIDE: 600; 310; 30; 20 INJECTION, SOLUTION INTRAVENOUS at 06:54

## 2025-03-17 RX ADMIN — LORAZEPAM 0.5 MG: 2 INJECTION INTRAMUSCULAR; INTRAVENOUS at 00:38

## 2025-03-17 RX ADMIN — MICONAZOLE NITRATE: 2 POWDER TOPICAL at 18:56

## 2025-03-17 RX ADMIN — MICONAZOLE NITRATE: 2 POWDER TOPICAL at 21:20

## 2025-03-17 RX ADMIN — SODIUM CHLORIDE 20 ML: 9 INJECTION, SOLUTION INTRAMUSCULAR; INTRAVENOUS; SUBCUTANEOUS at 06:10

## 2025-03-17 RX ADMIN — SODIUM CHLORIDE, PRESERVATIVE FREE 10 ML: 5 INJECTION INTRAVENOUS at 08:44

## 2025-03-17 RX ADMIN — CEFEPIME 2000 MG: 2 INJECTION, POWDER, FOR SOLUTION INTRAVENOUS at 15:27

## 2025-03-17 RX ADMIN — PETROLATUM: 420 OINTMENT TOPICAL at 12:00

## 2025-03-17 RX ADMIN — MAGNESIUM SULFATE HEPTAHYDRATE 2000 MG: 40 INJECTION, SOLUTION INTRAVENOUS at 11:06

## 2025-03-17 RX ADMIN — ARFORMOTEROL TARTRATE 15 MCG: 15 SOLUTION RESPIRATORY (INHALATION) at 20:43

## 2025-03-17 RX ADMIN — IPRATROPIUM BROMIDE 0.5 MG: 0.5 SOLUTION RESPIRATORY (INHALATION) at 17:15

## 2025-03-17 RX ADMIN — SODIUM CHLORIDE 40 MG: 9 INJECTION, SOLUTION INTRAMUSCULAR; INTRAVENOUS; SUBCUTANEOUS at 21:19

## 2025-03-17 RX ADMIN — HYDROCORTISONE SODIUM SUCCINATE 25 MG: 100 INJECTION INTRAMUSCULAR; INTRAVENOUS at 21:19

## 2025-03-17 RX ADMIN — BUDESONIDE 1000 MCG: 0.5 SUSPENSION RESPIRATORY (INHALATION) at 20:43

## 2025-03-17 RX ADMIN — SODIUM CHLORIDE 40 MG: 9 INJECTION, SOLUTION INTRAMUSCULAR; INTRAVENOUS; SUBCUTANEOUS at 08:44

## 2025-03-17 RX ADMIN — PETROLATUM: 420 OINTMENT TOPICAL at 21:23

## 2025-03-17 RX ADMIN — HYDROCORTISONE SODIUM SUCCINATE 50 MG: 100 INJECTION INTRAMUSCULAR; INTRAVENOUS at 00:38

## 2025-03-17 RX ADMIN — LORAZEPAM 2 MG: 2 INJECTION INTRAMUSCULAR; INTRAVENOUS at 11:00

## 2025-03-17 RX ADMIN — POTASSIUM CHLORIDE 20 MEQ: 29.8 INJECTION, SOLUTION INTRAVENOUS at 11:04

## 2025-03-17 RX ADMIN — PIPERACILLIN AND TAZOBACTAM 4500 MG: 4; .5 INJECTION, POWDER, FOR SOLUTION INTRAVENOUS at 05:30

## 2025-03-17 RX ADMIN — IPRATROPIUM BROMIDE 0.5 MG: 0.5 SOLUTION RESPIRATORY (INHALATION) at 13:12

## 2025-03-17 ASSESSMENT — PAIN SCALES - GENERAL: PAINLEVEL_OUTOF10: 0

## 2025-03-17 NOTE — ACP (ADVANCE CARE PLANNING)
Advance Care Planning   Healthcare Decision Maker:    Primary Decision Maker: Chantelle Stover - Child - [phone]    Secondary Decision Maker: Gomez Dominguez - Child - [phone]    Secondary Decision Maker: Dominguez,Gomez - Other - [phone]    Supplemental (Other) Decision Maker: Aurora Rey - Brother/Sister - [phone]      Gomez is .       Click here to complete Healthcare Decision Makers including selection of the Healthcare Decision Maker Relationship (ie \"Primary\").

## 2025-03-17 NOTE — PATIENT CARE CONFERENCE
Intensive Care Daily Quality Rounding Checklist      ICU Team Members: Dr. Decker, residents, charge nurse, bedside nurse, clinical pharmacist, charge nurse    ICU Day #: NUMBER: 2    SOFA Score: 1    Intubation Date: N/A    Ventilator Day #: N/A    Central Line Insertion Date: March 15        Day #: NUMBER: 3        Indication: CVCIndication: Hemodynamically unstable requiring volume resuscitation--will check for removal today     Arterial Line Insertion Date: N/A      Day #:  N/A    Temporary Hemodialysis Catheter Insertion Date:  N/A      Day #  N/A    DVT Prophylaxis: PCDs    GI Prophylaxis: Protonix    Brewer Catheter Insertion Date:  N/A       Day #:  N/A      Indications:  N/A      Continued need (if yes, reason documented and discussed with physician):  N/A    Skin Issues/ Wounds and ordered treatment discussed on rounds: has wound on left heel and excoriation under breasts--wound care to see    Goals/ Plans for the Day: Daily labs, remove CVC, advance activity as tolerated, Speech therapy evaluated okay for nectar thickened liquids. Ativan ordered PRN. ID following antibiotics changed to cefepime. Transfer to     Reviewed plan and goals for day with patient and/or representative: Yes    **SHARE this note so that the rounding nurse may edit**

## 2025-03-17 NOTE — PROGRESS NOTES
SPEECH/LANGUAGE PATHOLOGY  CLINICAL ASSESSMENT OF SWALLOWING FUNCTION   and PLAN OF CARE      PATIENT NAME:  Haritha Dominguez  (female)     MRN:  [id number]    :  1945  (80 y.o.)  STATUS:  Inpatient: Room 0207/0207-A    TODAY'S DATE:  3/17/2025  ORDER DATE, DESCRIPTION AND REFERRING PROVIDER: Dr. Licona and Dr. Spivey  REASON FOR REFERRAL: Assess swallow function    EVALUATING THERAPIST: Cassy Oleary, SLP                 RESULTS:    DYSPHAGIA DIAGNOSIS:   Clinical indicators of mild  oropharyngeal phase dysphagia     Per chart review, patient with hx of dysphagia. Most recent MBSS completed 3/12/25 revealed mild oropharyngeal phase dysphagia. At that time patient was recommended for minced and moist solids with nectar thick liquids. Upon discussion with patient, patient also reports preference of NTL.       DIET RECOMMENDATIONS:  Continue on liquid diet until cleared by Physician to advance  nectar consistency (mildly thick - IDDSI level 2) liquids     FEEDING RECOMMENDATIONS:     Assistance level:  Set-up is required for all oral intake      Compensatory strategies recommended:Fully alert for all PO, Thorough oral care to prevent colonization of oral bacteria, Upright in bed/ chair as tolerated  Slow rate of intake   SINGLE cup sips  SINGLE straw sips   SMALL bites   Reflux/esophageal motility deficit strategies  (including upright for 45-60 minutes after meals, small/multiple meals per day)      Discussed recommendations with:  patient nurse in person    SPEECH THERAPY  PLAN OF CARE   The dysphagia POC is established based on physician order, dysphagia diagnosis and results of clinical assessment   Skilled SLP intervention for dysphagia management on acute care up to 5 x per week until goals met, pt plateaus in function and/or discharged from hospital                Conditions Requiring Skilled Therapeutic Intervention for dysphagia:     Patient is performing below functional baseline d/t  current acute  left lower leg    Abrasion of skin of right lower leg    Itching    Decreased dorsalis pedis pulse    Compression fracture of L2 lumbar vertebra (HCC)    Goals of care, counseling/discussion    Palliative care encounter    Esophageal dysphagia    Dysphagia    Severe protein-calorie malnutrition    Long term (current) use of systemic steroids    Midline cystocele    Mixed incontinence    Recurrent urinary tract infection    Adult failure to thrive syndrome    Inflammatory dermatosis    Acute mediastinitis    KATELYN (acute kidney injury)    Esophageal perforation    Pneumomediastinum (HCC)    Hiatal hernia         CPT code:  84347  bedside swallow RIANA PaulS. CCC-SLP/L  Speech Language Pathologist  [id number]

## 2025-03-17 NOTE — PROGRESS NOTES
Gastroenterology, Hepatology, &  Advanced Endoscopy    Progress Note      HPI:   Patient's mental status significantly improved this morning. She denies abdominal pain or SOB. She reports feeling significantly improved from admission. Her vasopressor requirement has significantly improved.     Lab Results   Component Value Date    INR 0.9 05/24/2023    INR 1.1 02/13/2023    INR 1.2 05/28/2017    PROTIME 10.2 05/24/2023    PROTIME 11.8 02/13/2023    PROTIME 13.2 (H) 05/28/2017         ALT   Date Value Ref Range Status   03/16/2025 7 0 - 32 U/L Final   03/15/2025 9 0 - 32 U/L Final   03/13/2025 7 0 - 32 U/L Final     AST   Date Value Ref Range Status   03/16/2025 17 0 - 31 U/L Final   03/15/2025 14 0 - 31 U/L Final   03/13/2025 12 0 - 31 U/L Final     Alkaline Phosphatase   Date Value Ref Range Status   03/16/2025 77 35 - 104 U/L Final   03/15/2025 78 35 - 104 U/L Final   03/13/2025 80 35 - 104 U/L Final     Total Bilirubin   Date Value Ref Range Status   03/16/2025 0.4 0.0 - 1.2 mg/dL Final   03/15/2025 0.6 0.0 - 1.2 mg/dL Final   03/13/2025 0.3 0.0 - 1.2 mg/dL Final     Bilirubin, Direct   Date Value Ref Range Status   03/02/2025 <0.2 0.0 - 0.3 mg/dL Final   04/01/2012 0.2 0.0 - 0.2 mg/dL Final   01/01/2011 <0.1 0.0 - 0.2 mg/dL Final      Lab Results   Component Value Date    WBC 10.2 03/17/2025    HGB 7.8 (L) 03/17/2025    HCT 25.1 (L) 03/17/2025     03/17/2025     03/16/2025    K 4.4 03/16/2025     03/16/2025    CREATININE 0.8 03/16/2025    BUN 17 03/16/2025    CO2 25 03/16/2025    FOLATE 17.9 02/28/2025    [id number] 1002 (H) 02/28/2025    GLUCOSE 143 (H) 03/16/2025    INR 0.9 05/24/2023    PROTIME 10.2 05/24/2023    TSH 2.06 08/22/2023    LABA1C 7.8 01/17/2024        CEA   Date Value Ref Range Status   05/28/2017 5.3 (H) 0.0 - 5.2 ng/mL Final     Sed Rate, Automated   Date Value Ref Range Status   03/16/2025 117 (H) 0 - 20 mm/Hr Final   11/04/2024 72 (H) 0 - 20 mm/Hr Final   03/10/2023 61

## 2025-03-17 NOTE — FLOWSHEET NOTE
(cm) 1.5 cm   Wound Width (cm) 3 cm   Wound Depth (cm)   (unable to determine)   Wound Surface Area (cm^2) 4.5 cm^2   Change in Wound Size % (l*w) -167.86   Wound Assessment Pink/red;Purple/maroon   Drainage Amount Small (< 25%)   Drainage Description Serosanguinous   Odor None   Kanwal-wound Assessment Blanchable erythema;Maceration     Bilateral buttocks intact blanching     **Informed Consent**    The patient has given verbal consent to have photos taken of wounds and inserted into their chart as part of their permanent medical record for purposes of documentation, treatment management and/or medical review.   All Images taken on 3/17/25 of patient name: Haritha Dominguez were transmitted and stored on secured Epic  Site located within Media Folder Tab by a registered Epic-[employer] Mobile Application Device.     Plan: Santyl/Opticell/ABD/Kerlix to left heel  Aquaphor to bilateral buttocks  Antifungal powder to breast folds  Comfort glide  Wedges  Heel protectors  Low air loss bed (ICU BED)  Dietary consult  Patient will need continued preventative care    Alondra Delaney RN 3/17/2025 1:33 PM

## 2025-03-17 NOTE — PLAN OF CARE
Problem: Discharge Planning  Goal: Discharge to home or other facility with appropriate resources  3/16/2025 2202 by Nato De La Vega RN  Outcome: Progressing  3/16/2025 0843 by Wallace Whitney RN  Outcome: Progressing     Problem: Pain  Goal: Verbalizes/displays adequate comfort level or baseline comfort level  3/16/2025 2202 by Nato De La Vega RN  Outcome: Progressing  3/16/2025 0843 by Wallace Whitney RN  Outcome: Progressing     Problem: Safety - Adult  Goal: Free from fall injury  3/16/2025 2202 by Nato De La Vega RN  Outcome: Progressing  Flowsheets (Taken 3/16/2025 2000)  Free From Fall Injury:   Based on caregiver fall risk screen, instruct family/caregiver to ask for assistance with transferring infant if caregiver noted to have fall risk factors   Instruct family/caregiver on patient safety  3/16/2025 0843 by Wallace Whitney RN  Outcome: Progressing     Problem: ABCDS Injury Assessment  Goal: Absence of physical injury  3/16/2025 2202 by Nato De La Vega RN  Outcome: Progressing  Flowsheets (Taken 3/16/2025 2000)  Absence of Physical Injury: Implement safety measures based on patient assessment  3/16/2025 0843 by Wallace Whitney RN  Outcome: Progressing     Problem: Skin/Tissue Integrity  Goal: Skin integrity remains intact  Description: 1.  Monitor for areas of redness and/or skin breakdown  2.  Assess vascular access sites hourly  3.  Every 4-6 hours minimum:  Change oxygen saturation probe site  4.  Every 4-6 hours:  If on nasal continuous positive airway pressure, respiratory therapy assess nares and determine need for appliance change or resting period  3/16/2025 2202 by Nato De La Vega RN  Outcome: Progressing  3/16/2025 0843 by Wallace Whitney RN  Outcome: Progressing  Flowsheets (Taken 3/16/2025 0154 by Bianca Chirinos RN)  Skin Integrity Remains Intact:   Assess vascular access sites hourly   Every 4-6 hours minimum: Change oxygen saturation probe site

## 2025-03-17 NOTE — CARE COORDINATION
Social Work discharge planning  Pt is from [facility], where she went 3/14/25. She was readmitted 3/15/25. Per [facility] liaison Rhea, pt will need precert to return. Originally, pt lives alone 1 story with 3 step entry. She has a cane, wc, ww, shower chair at home. She has hx with [facility]. Pt will need PT OT orders when medically appropriate. N17 started in [place]. Transport forms in folder.   Electronically signed by CARLEY Joiner on 3/17/2025 at 3:48 PM

## 2025-03-17 NOTE — PROGRESS NOTES
[facility]  Department of Internal Medicine   Internal Medicine Residency   MICU Progress Note    Patient:  Haritha Dominguez 80 y.o. female  MRN: [id number]     Date of Service: 3/17/2025    Allergy: Percocet [oxycodone-acetaminophen]    Overnight Events: No significant overnight events    Subjective     Patient was examined resting comfortably in bed this morning.  No acute complaints or concerns at this time.    ROS: Denies fever, chest pain, shortness of breath, N/V/C/D, dysuria or blood in stool or urine     Objective     VS: /80   Pulse 92   Temp 98.2 °F (36.8 °C) (Axillary)   Resp 23   Ht 1.524 m (5')   Wt 65.4 kg (144 lb 2.9 oz)   SpO2 96%   BMI 28.16 kg/m²           I & O - 24hr:   Intake/Output Summary (Last 24 hours) at 3/17/2025 1203  Last data filed at 3/16/2025 1700  Gross per 24 hour   Intake --   Output 710 ml   Net -710 ml       Sedatives: None    Pressors: None    Oxygen: None    ABG:     Lab Results   Component Value Date/Time    PH 7.432 03/15/2025 10:05 PM    PCO2 33.9 03/15/2025 10:05 PM    PO2 67.4 03/15/2025 10:05 PM    HCO3 22.1 03/15/2025 10:05 PM    BE -1.7 03/15/2025 10:05 PM    THB 10.2 03/15/2025 10:05 PM    O2SAT 93.1 03/15/2025 10:05 PM       Physical Exam:  General Appearance: No acute distress.  Resting comfortably  Neuro: Round symmetric pupil that react to light bilaterally    Cardiovascular: regular rate and rhythm, S1 and S2 heard, no murmurs appreciated   Respiratory: Clear to auscultation bilaterally. No wheezing or crackles appreciated.  Abdomen: Soft, non-tender; bowel sounds normal; no masses, no organomegaly, rebound or guarding  Extremities: Extremities normal, no cyanosis, distal pulses intact, bilateral lower extremity edema.     Lines & Urinary Catheter:   Peripheral IV x 2  CVC       Labs     Basic Labs    Complete Blood Count:   Recent Labs     03/15/25  1544 03/16/25  0505 03/17/25  0444   WBC 17.1* 14.6* 10.2   HGB 10.1* 9.3* 7.8*   HCT

## 2025-03-17 NOTE — PROGRESS NOTES
Internal Medicine Progress Note    Patient's name: Haritha Dominguez  : 1945  Chief complaints (on day of admission): Abdominal Pain, Fever, and Shortness of Breath  Admission date: 3/15/2025  Date of service: 3/17/2025   Room: [address] ICU  Primary care physician: Olayinka Reyes DO  Reason for visit: Follow-up for esophageal perforation    Subjective  Haritha is seen sitting up in bed awake and alert, in no distress. She denies any shortness of breath or difficulty breathing. She reports that she is \"itchy all over\". She denies any nausea or vomiting. Denies any fever or chills. SLP evaluated this morning and recommending nectar thick liquids -- diet was advanced to clear liquids per GI this AM. No other issues or concerns from nursing.    Review of Systems  Full 10 point review of systems negative unless mentioned above.    Hospital Medications  Current Facility-Administered Medications   Medication Dose Route Frequency Provider Last Rate Last Admin    magnesium sulfate 2000 mg in 50 mL IVPB premix  2,000 mg IntraVENous Once Wallace Decker, DO 25 mL/hr at 25 1106 2,000 mg at 25 1106    collagenase ointment   Topical Daily Donnell Nino, DO        white petrolatum ointment   Topical BID Donnell Nino DO        And    white petrolatum ointment   Topical TID PRN Donnell Nino, DO        pantoprazole (PROTONIX) 40 mg in sodium chloride (PF) 0.9 % 10 mL injection  40 mg IntraVENous Q12H Wallace Decker, DO        hydrocortisone sodium succinate PF (SOLU-CORTEF) injection 25 mg  25 mg IntraVENous Q12H Wallace Decker DO        Followed by    [START ON 3/20/2025] hydrocortisone sodium succinate PF (SOLU-CORTEF) injection 10 mg  10 mg IntraVENous Daily Wallace Decker, DO        LORazepam (ATIVAN) injection 2 mg  2 mg IntraVENous BID PRN Wallace Decker DO   2 mg at 25 1100    lactated ringers infusion   IntraVENous Continuous Wallace Decker DO 75 mL/hr at 25 1107 Rate Change at  leading to difficulty with swallowing  Esophagram noted  SLP evaluated 3/17 -- recommending nectar thick liquids  Diet advanced to clear liquids this AM per GI    Secondary adrenal insufficiency  At home on Cortef 10 mg daily  Continue IV Solu-Cortef -- taper as per critical care    Acute Kidney Injury  Baseline serum creatinine around 0.6-0.7  Suspect pre-renal due to hypotension from sepsis  Serum creatinine peaked at 1.1  Continue IV fluids  Monitor BMP    L2 compression fracture  TLSO brace    Sleep apnea  CPAP as at home    Normocytic Anemia  Hemoglobin trending down since admission and now at 7.8 this AM  Check Fe/Ferritin/TIBC/B12/Folate  Check stool for OB  Monitor H&H  Transfuse to keep hemoglobin >7.0    Follow labs   DVT prophylaxis  Please see orders for further management and care.  Discharge plan: TBD pending clinical course    The pertinent details of this case were discussed with Dr. Nino.    Electronically signed by STEVE Montana CNP on 3/17/2025 at 12:14 PM    Addendum: I have personally participated in a face-to-face history and physical exam on the date of service with the patient. I have discussed the case with the nurse practitioner. I also participated in medical decision making on the date of service and I agree with all of the pertinent clinical information unless indicated in my editing of the note. I have reviewed and edited the note above based on my findings during my history, exam, and decision making on the same day of service.     My additional thoughts:   She was seen and examined in room resting very comfortably  She was having her clear liquid diet  GI input is appreciated  Continue PPI  Antibiotics and antifungals per ID  IV fluid hydration  No discharge today  Speech therapy input is appreciated-diet altered    Electronically signed by Donnell Nino DO on 3/17/2025 at 1:09 PM    I can be reached through [employer].

## 2025-03-17 NOTE — PROGRESS NOTES
Spiritual Health History and Assessment/Progress Note  CAROL  Lori Orlando    Initial Encounter,  ,  ,      Name: Haritha Dominguez MRN: [id number]    Age: 80 y.o.     Sex: female   Language: English   Hinduism: Presybeterian   Esophageal perforation     Date: 3/17/2025                           Spiritual Assessment began in [facility] 2W ICU        Referral/Consult From: Rounding   Encounter Overview/Reason: Initial Encounter  Service Provided For: Patient    Meliza, Belief, Meaning:   Patient has beliefs or practices that help with coping during difficult times  Family/Friends No family/friends present      Importance and Influence:  Patient has spiritual/personal beliefs that influence decisions regarding their health  Family/Friends No family/friends present    Community:  Patient feels well-supported. Support system includes: Children  Family/Friends No family/friends present    Assessment and Plan of Care:     Patient Interventions include: Facilitated expression of thoughts and feelings  Family/Friends Interventions include: No family/friends present    Patient Plan of Care: Spiritual Care available upon further referral  Family/Friends Plan of Care: No family/friends present    Electronically signed by Chaplain Frederick on 3/17/2025 at 7:37 PM

## 2025-03-17 NOTE — PROGRESS NOTES
[facility]  [facility]  Progress Note    SUBJECTIVE:  Chief Complaint   Patient presents with    Abdominal Pain    Fever    Shortness of Breath     The patient still in the ICU.  She has no new complaints.  Denies any dyspnea, chest pain, shortness of breath, abdominal pain, nausea, vomiting or diarrhea.  Denies dysuria.  Tolerating antibiotics.  No fever.    Review of systems:  As stated above in the chief complaint, otherwise negative.    Medications:  Scheduled Meds:   magnesium sulfate  2,000 mg IntraVENous Once    potassium chloride  20 mEq IntraVENous Once    pantoprazole (PROTONIX) 40 mg in sodium chloride (PF) 0.9 % 10 mL injection  40 mg IntraVENous Daily    budesonide  1 mg Nebulization BID RT    And    arformoterol tartrate  15 mcg Nebulization BID RT    And    ipratropium  0.5 mg Nebulization 4x Daily RT    piperacillin-tazobactam  4,500 mg IntraVENous Q8H    And    sodium chloride (PF)  20 mL IntraVENous Q8H    hydrocortisone sodium succinate PF  50 mg IntraVENous Q8H    sodium chloride flush  5-40 mL IntraVENous 2 times per day     Continuous Infusions:   sodium chloride      lactated ringers 150 mL/hr at 25 0654     PRN Meds:albuterol, sodium chloride flush, sodium chloride    OBJECTIVE:  /85   Pulse 100   Temp 98.2 °F (36.8 °C) (Axillary)   Resp 24   Ht 1.524 m (5')   Wt 65.4 kg (144 lb 2.9 oz)   SpO2 99%   BMI 28.16 kg/m²   Temp  Av.1 °F (36.7 °C)  Min: 97.7 °F (36.5 °C)  Max: 98.7 °F (37.1 °C)  Constitutional: The patient is awake, alert, and oriented.  She is lying in bed in the ICU.  She is in no distress  Skin: Warm and dry. No rashes were noted.   HEENT: Round and reactive pupils.  Moist mucous membranes.  No ulcerations or thrush.  Neck: Supple to movements.   Chest: No use of accessory muscles to breathe. Symmetrical expansion.  No wheezing, crackles or rhonchi.  Cardiovascular: S1 and S2 are rhythmic and regular. No murmurs

## 2025-03-18 PROBLEM — E44.0 MODERATE PROTEIN-CALORIE MALNUTRITION: Status: ACTIVE | Noted: 2025-03-18

## 2025-03-18 PROBLEM — E44.0 MODERATE PROTEIN-CALORIE MALNUTRITION: Status: ACTIVE | Noted: 2025-03-10

## 2025-03-18 LAB
ALBUMIN SERPL-MCNC: 2.6 G/DL (ref 3.5–5.2)
ALP SERPL-CCNC: 67 U/L (ref 35–104)
ALT SERPL-CCNC: 7 U/L (ref 0–32)
ANION GAP SERPL CALCULATED.3IONS-SCNC: 12 MMOL/L (ref 7–16)
AST SERPL-CCNC: 11 U/L (ref 0–31)
BASOPHILS # BLD: 0.03 K/UL (ref 0–0.2)
BASOPHILS NFR BLD: 1 % (ref 0–2)
BILIRUB SERPL-MCNC: 0.3 MG/DL (ref 0–1.2)
BUN SERPL-MCNC: 8 MG/DL (ref 6–23)
CALCIUM SERPL-MCNC: 7.9 MG/DL (ref 8.6–10.2)
CHLORIDE SERPL-SCNC: 108 MMOL/L (ref 98–107)
CO2 SERPL-SCNC: 23 MMOL/L (ref 22–29)
CREAT SERPL-MCNC: 0.7 MG/DL (ref 0.5–1)
CRP SERPL HS-MCNC: 54 MG/L (ref 0–5)
EOSINOPHIL # BLD: 0.06 K/UL (ref 0.05–0.5)
EOSINOPHILS RELATIVE PERCENT: 1 % (ref 0–6)
ERYTHROCYTE [DISTWIDTH] IN BLOOD BY AUTOMATED COUNT: 15.1 % (ref 11.5–15)
ERYTHROCYTE [SEDIMENTATION RATE] IN BLOOD BY WESTERGREN METHOD: 71 MM/HR (ref 0–20)
FOLATE SERPL-MCNC: 6.3 NG/ML (ref 4.8–24.2)
GFR, ESTIMATED: >90 ML/MIN/1.73M2
GLUCOSE SERPL-MCNC: 99 MG/DL (ref 74–99)
HCT VFR BLD AUTO: 24.2 % (ref 34–48)
HGB BLD-MCNC: 7.6 G/DL (ref 11.5–15.5)
IMM GRANULOCYTES # BLD AUTO: 0.08 K/UL (ref 0–0.58)
IMM GRANULOCYTES NFR BLD: 1 % (ref 0–5)
LACTATE BLDV-SCNC: 1.7 MMOL/L (ref 0.5–2.2)
LYMPHOCYTES NFR BLD: 1.16 K/UL (ref 1.5–4)
LYMPHOCYTES RELATIVE PERCENT: 18 % (ref 20–42)
MAGNESIUM SERPL-MCNC: 2 MG/DL (ref 1.6–2.6)
MCH RBC QN AUTO: 29.7 PG (ref 26–35)
MCHC RBC AUTO-ENTMCNC: 31.4 G/DL (ref 32–34.5)
MCV RBC AUTO: 94.5 FL (ref 80–99.9)
MONOCYTES NFR BLD: 0.75 K/UL (ref 0.1–0.95)
MONOCYTES NFR BLD: 12 % (ref 2–12)
NEUTROPHILS NFR BLD: 68 % (ref 43–80)
NEUTS SEG NFR BLD: 4.37 K/UL (ref 1.8–7.3)
PHOSPHATE SERPL-MCNC: 2.1 MG/DL (ref 2.5–4.5)
PLATELET # BLD AUTO: 247 K/UL (ref 130–450)
PMV BLD AUTO: 12.7 FL (ref 7–12)
POTASSIUM SERPL-SCNC: 3.1 MMOL/L (ref 3.5–5)
PROT SERPL-MCNC: 5.1 G/DL (ref 6.4–8.3)
RBC # BLD AUTO: 2.56 M/UL (ref 3.5–5.5)
SODIUM SERPL-SCNC: 143 MMOL/L (ref 132–146)
VIT B12 SERPL-MCNC: 518 PG/ML (ref 211–946)
WBC OTHER # BLD: 6.5 K/UL (ref 4.5–11.5)

## 2025-03-18 PROCEDURE — 2580000003 HC RX 258: Performed by: NURSE PRACTITIONER

## 2025-03-18 PROCEDURE — 92526 ORAL FUNCTION THERAPY: CPT

## 2025-03-18 PROCEDURE — 2580000003 HC RX 258: Performed by: SPECIALIST

## 2025-03-18 PROCEDURE — 6360000002 HC RX W HCPCS

## 2025-03-18 PROCEDURE — 80053 COMPREHEN METABOLIC PANEL: CPT

## 2025-03-18 PROCEDURE — 6360000002 HC RX W HCPCS: Performed by: NURSE PRACTITIONER

## 2025-03-18 PROCEDURE — 82607 VITAMIN B-12: CPT

## 2025-03-18 PROCEDURE — 94640 AIRWAY INHALATION TREATMENT: CPT

## 2025-03-18 PROCEDURE — 85025 COMPLETE CBC W/AUTO DIFF WBC: CPT

## 2025-03-18 PROCEDURE — 86140 C-REACTIVE PROTEIN: CPT

## 2025-03-18 PROCEDURE — 85652 RBC SED RATE AUTOMATED: CPT

## 2025-03-18 PROCEDURE — 6360000002 HC RX W HCPCS: Performed by: SPECIALIST

## 2025-03-18 PROCEDURE — 84100 ASSAY OF PHOSPHORUS: CPT

## 2025-03-18 PROCEDURE — 99232 SBSQ HOSP IP/OBS MODERATE 35: CPT | Performed by: STUDENT IN AN ORGANIZED HEALTH CARE EDUCATION/TRAINING PROGRAM

## 2025-03-18 PROCEDURE — 6360000002 HC RX W HCPCS: Performed by: INTERNAL MEDICINE

## 2025-03-18 PROCEDURE — 83735 ASSAY OF MAGNESIUM: CPT

## 2025-03-18 PROCEDURE — 82746 ASSAY OF FOLIC ACID SERUM: CPT

## 2025-03-18 PROCEDURE — 2580000003 HC RX 258: Performed by: INTERNAL MEDICINE

## 2025-03-18 PROCEDURE — 2060000000 HC ICU INTERMEDIATE R&B

## 2025-03-18 PROCEDURE — 83605 ASSAY OF LACTIC ACID: CPT

## 2025-03-18 PROCEDURE — 36592 COLLECT BLOOD FROM PICC: CPT

## 2025-03-18 PROCEDURE — 2500000003 HC RX 250 WO HCPCS

## 2025-03-18 PROCEDURE — 2500000003 HC RX 250 WO HCPCS: Performed by: NURSE PRACTITIONER

## 2025-03-18 RX ORDER — POTASSIUM CHLORIDE 29.8 MG/ML
20 INJECTION INTRAVENOUS
Status: COMPLETED | OUTPATIENT
Start: 2025-03-18 | End: 2025-03-18

## 2025-03-18 RX ADMIN — MICONAZOLE NITRATE: 2 POWDER TOPICAL at 08:42

## 2025-03-18 RX ADMIN — LORAZEPAM 2 MG: 2 INJECTION INTRAMUSCULAR; INTRAVENOUS at 21:07

## 2025-03-18 RX ADMIN — PETROLATUM: 420 OINTMENT TOPICAL at 18:25

## 2025-03-18 RX ADMIN — IPRATROPIUM BROMIDE 0.5 MG: 0.5 SOLUTION RESPIRATORY (INHALATION) at 19:39

## 2025-03-18 RX ADMIN — SODIUM PHOSPHATE, MONOBASIC, MONOHYDRATE 15 MMOL: 276; 142 INJECTION, SOLUTION INTRAVENOUS at 09:11

## 2025-03-18 RX ADMIN — SODIUM CHLORIDE, PRESERVATIVE FREE 10 ML: 5 INJECTION INTRAVENOUS at 21:08

## 2025-03-18 RX ADMIN — CEFEPIME 2000 MG: 2 INJECTION, POWDER, FOR SOLUTION INTRAVENOUS at 11:52

## 2025-03-18 RX ADMIN — POTASSIUM CHLORIDE 20 MEQ: 29.8 INJECTION, SOLUTION INTRAVENOUS at 08:42

## 2025-03-18 RX ADMIN — HYDROCORTISONE SODIUM SUCCINATE 25 MG: 100 INJECTION INTRAMUSCULAR; INTRAVENOUS at 21:06

## 2025-03-18 RX ADMIN — SODIUM CHLORIDE, PRESERVATIVE FREE 10 ML: 5 INJECTION INTRAVENOUS at 08:44

## 2025-03-18 RX ADMIN — BUDESONIDE 1000 MCG: 0.5 SUSPENSION RESPIRATORY (INHALATION) at 19:39

## 2025-03-18 RX ADMIN — HYDROCORTISONE SODIUM SUCCINATE 25 MG: 100 INJECTION INTRAMUSCULAR; INTRAVENOUS at 08:42

## 2025-03-18 RX ADMIN — SODIUM CHLORIDE 40 MG: 9 INJECTION, SOLUTION INTRAMUSCULAR; INTRAVENOUS; SUBCUTANEOUS at 08:42

## 2025-03-18 RX ADMIN — ARFORMOTEROL TARTRATE 15 MCG: 15 SOLUTION RESPIRATORY (INHALATION) at 19:39

## 2025-03-18 RX ADMIN — IPRATROPIUM BROMIDE 0.5 MG: 0.5 SOLUTION RESPIRATORY (INHALATION) at 12:54

## 2025-03-18 RX ADMIN — PETROLATUM: 420 OINTMENT TOPICAL at 08:43

## 2025-03-18 RX ADMIN — SODIUM CHLORIDE, SODIUM LACTATE, POTASSIUM CHLORIDE, AND CALCIUM CHLORIDE: .6; .31; .03; .02 INJECTION, SOLUTION INTRAVENOUS at 06:36

## 2025-03-18 RX ADMIN — IPRATROPIUM BROMIDE 0.5 MG: 0.5 SOLUTION RESPIRATORY (INHALATION) at 07:42

## 2025-03-18 RX ADMIN — MICONAZOLE NITRATE: 2 POWDER TOPICAL at 21:07

## 2025-03-18 RX ADMIN — IPRATROPIUM BROMIDE 0.5 MG: 0.5 SOLUTION RESPIRATORY (INHALATION) at 16:09

## 2025-03-18 RX ADMIN — ARFORMOTEROL TARTRATE 15 MCG: 15 SOLUTION RESPIRATORY (INHALATION) at 07:42

## 2025-03-18 RX ADMIN — BUDESONIDE 1000 MCG: 0.5 SUSPENSION RESPIRATORY (INHALATION) at 07:42

## 2025-03-18 RX ADMIN — COLLAGENASE SANTYL: 250 OINTMENT TOPICAL at 08:43

## 2025-03-18 RX ADMIN — SODIUM CHLORIDE 40 MG: 9 INJECTION, SOLUTION INTRAMUSCULAR; INTRAVENOUS; SUBCUTANEOUS at 21:06

## 2025-03-18 RX ADMIN — LORAZEPAM 2 MG: 2 INJECTION INTRAMUSCULAR; INTRAVENOUS at 08:51

## 2025-03-18 RX ADMIN — POTASSIUM CHLORIDE 20 MEQ: 29.8 INJECTION, SOLUTION INTRAVENOUS at 09:34

## 2025-03-18 ASSESSMENT — PAIN SCALES - GENERAL
PAINLEVEL_OUTOF10: 0

## 2025-03-18 NOTE — PROGRESS NOTES
SPEECH LANGUAGE PATHOLOGY  DAILY PROGRESS NOTE      PATIENT NAME:  Haritha Dominguez      :  1945          TODAY'S DATE:  3/18/2025 ROOM:  0207/0207-A    Current Diet: ADULT DIET; Dysphagia - Soft and Bite Sized; Mildly Thick ([place])  ADULT ORAL NUTRITION SUPPLEMENT; Breakfast; Other Oral Supplement; EXPEDITE CUP  ADULT ORAL NUTRITION SUPPLEMENT; Lunch, Dinner; Frozen Oral Supplement    Patient seen for additional dysphagia tx this date during lunch meal. Patient reported excitement for solid food consistencies. Patient with good mastication and oral clearance of soft solid items. Patient with no overt s/s of aspiration with soft solids or nectar thick liquids. Patient questioned SLP if continued speech therapy will be needed at time of discharge. Discussed with patient that due to current altered diet (need for thickened liquids specifically) patient would benefit from ongoing dysphagia tx with the goal of returning to a regular diet.   It should be noted, silent aspiration can not be ruled out at the bedside, and if silent aspiration is suspected based on clinical correlation an MBSS would be recommended.     Recommendation: cont current diet       CPT code(s) 70273  dysphagia tx  Total minutes :  10 minutes    Cassy Oleary M.S. CCC-SLP/L  Speech Language Pathologist  [id number]

## 2025-03-18 NOTE — PLAN OF CARE
Problem: Discharge Planning  Goal: Discharge to home or other facility with appropriate resources  Outcome: Progressing  Flowsheets (Taken 3/17/2025 2000)  Discharge to home or other facility with appropriate resources: Identify barriers to discharge with patient and caregiver     Problem: Pain  Goal: Verbalizes/displays adequate comfort level or baseline comfort level  Outcome: Progressing  Flowsheets  Taken 3/18/2025 0000  Verbalizes/displays adequate comfort level or baseline comfort level: Assess pain using appropriate pain scale  Taken 3/17/2025 2000  Verbalizes/displays adequate comfort level or baseline comfort level: Assess pain using appropriate pain scale     Problem: Safety - Adult  Goal: Free from fall injury  Outcome: Progressing     Problem: ABCDS Injury Assessment  Goal: Absence of physical injury  Outcome: Progressing     Problem: Skin/Tissue Integrity  Goal: Skin integrity remains intact  Description: 1.  Monitor for areas of redness and/or skin breakdown  2.  Assess vascular access sites hourly  3.  Every 4-6 hours minimum:  Change oxygen saturation probe site  4.  Every 4-6 hours:  If on nasal continuous positive airway pressure, respiratory therapy assess nares and determine need for appliance change or resting period  Outcome: Progressing  Flowsheets (Taken 3/17/2025 2000)  Skin Integrity Remains Intact: Monitor for areas of redness and/or skin breakdown

## 2025-03-18 NOTE — CONSULTS
IBW. Weight Source: Bed scale (3/16)  Current BMI (kg/m2): 28.2  Usual Body Weight: 62.6 kg (137 lb 15.8 oz) (1/28/2025 [facility])     % Weight Change (Calculated): 4.5                    BMI Categories: Overweight (BMI 25.0-29.9)    Estimated Daily Nutrient Needs:  Energy Requirements Based On: Formula ([place] [employer] Jeor)  Weight Used for Energy Requirements: Current  Energy (kcal/day):   Weight Used for Protein Requirements: Ideal  Protein (g/day): 55-65 (1.2-1.4 g/kg)  Method Used for Fluid Requirements: Defer to provider      Nutrition Diagnosis:   Moderate malnutrition, in context of acute illness or injury related to swallowing difficulty as evidenced by criteria as identified in malnutrition assessment    Nutrition Interventions:   Food and/or Nutrient Delivery: Continue Current Diet, Start Oral Nutrition Supplement  Nutrition Education/Counseling: No recommendation at this time  Coordination of Nutrition Care: Continue to monitor while inpatient       Goals:  Goals: PO intake 50% or greater, by next RD assessment  Type of Goal: New goal       Nutrition Monitoring and Evaluation:   Behavioral-Environmental Outcomes: None Identified  Food/Nutrient Intake Outcomes: Food and Nutrient Intake, Supplement Intake  Physical Signs/Symptoms Outcomes: Biochemical Data, GI Status, Fluid Status or Edema, Nutrition Focused Physical Findings, Skin, Weight    Discharge Planning:    Continue Oral Nutrition Supplement     Shannan Samson RD, CNSC, LD  Contact: x [phone]

## 2025-03-18 NOTE — CARE COORDINATION
Social Work discharge planning  Updated with pt, who said plan remains to return to rehab at [facility]. Per snf liaison, pt will need precert to return. Will need PTOT when medically ready for precert to start.  Electronically signed by CARLEY Joiner on 3/18/2025 at 2:15 PM

## 2025-03-18 NOTE — PROGRESS NOTES
10 mg  10 mg IntraVENous Daily Wallace Decker DO        LORazepam (ATIVAN) injection 2 mg  2 mg IntraVENous BID PRN Wallace Decker DO   2 mg at 03/17/25 2120    lactated ringers infusion   IntraVENous Continuous Wallace Decker DO 75 mL/hr at 03/17/25 1107 Rate Change at 03/17/25 1107    ceFEPIme (MAXIPIME) 2,000 mg in sodium chloride 0.9 % 100 mL IVPB  2,000 mg IntraVENous Q12H Riki Laird MD   Stopped at 03/18/25 0411    hydrOXYzine pamoate (VISTARIL) capsule 25 mg  25 mg Oral TID PRN Monica Krishna APRN - CNP        miconazole (MICOTIN) 2 % powder   Topical BID Donnell Nino DO   Given at 03/17/25 2120    budesonide (PULMICORT) nebulizer suspension 1,000 mcg  1 mg Nebulization BID RT Tay Marie APRN - CNP   1,000 mcg at 03/17/25 2043    And    arformoterol tartrate (BROVANA) nebulizer solution 15 mcg  15 mcg Nebulization BID RT Tay Marie APRN - CNP   15 mcg at 03/17/25 2043    And    ipratropium (ATROVENT) 0.02 % nebulizer solution 0.5 mg  0.5 mg Nebulization 4x Daily RT Tay Marie APRN - CNP   0.5 mg at 03/17/25 2043    albuterol (PROVENTIL) (2.5 MG/3ML) 0.083% nebulizer solution 2.5 mg  2.5 mg Nebulization Q6H PRN Tay Marie APRN - CNP        sodium chloride flush 0.9 % injection 5-40 mL  5-40 mL IntraVENous 2 times per day Tay Marie APRN - CNP   10 mL at 03/17/25 2123    sodium chloride flush 0.9 % injection 5-40 mL  5-40 mL IntraVENous PRN Tay Marie APRN - CNP        0.9 % sodium chloride infusion   IntraVENous PRN Tay Marie APRN - CNP           OBJECTIVE      Physical    VITALS:  /62   Pulse 94   Temp 97.6 °F (36.4 °C) (Oral)   Resp 19   Ht 1.524 m (5')   Wt 65.4 kg (144 lb 2.9 oz)   SpO2 95%   BMI 28.16 kg/m²   Physical Exam:  General: Overall well-appearing, NAD  HEENT: PERRLA, EOMI, Anicteric sclera, MMM, no rhinorrhea  Cards: RRR, no LE edema  Resp: Breathing comfortably on room air, good air movement, no use of accessory  Please do not hesitate to contact us with any additional questions or concerns.     James Spivey MD  Gastroenterology/Hepatology  [facility]

## 2025-03-18 NOTE — PLAN OF CARE
Problem: Discharge Planning  Goal: Discharge to home or other facility with appropriate resources  3/18/2025 0824 by Hanh Gates RN  Outcome: Progressing  3/18/2025 0258 by Cinthia Vargas RN  Outcome: Progressing  Flowsheets (Taken 3/17/2025 2000)  Discharge to home or other facility with appropriate resources: Identify barriers to discharge with patient and caregiver     Problem: Pain  Goal: Verbalizes/displays adequate comfort level or baseline comfort level  3/18/2025 0824 by Hanh Gates RN  Outcome: Progressing  3/18/2025 0258 by Cinthia Vargas RN  Outcome: Progressing  Flowsheets  Taken 3/18/2025 0000  Verbalizes/displays adequate comfort level or baseline comfort level: Assess pain using appropriate pain scale  Taken 3/17/2025 2000  Verbalizes/displays adequate comfort level or baseline comfort level: Assess pain using appropriate pain scale     Problem: Safety - Adult  Goal: Free from fall injury  3/18/2025 0824 by Hanh Gates RN  Outcome: Progressing  3/18/2025 0258 by Cinthia Vargas RN  Outcome: Progressing     Problem: ABCDS Injury Assessment  Goal: Absence of physical injury  3/18/2025 0824 by Hanh Gates RN  Outcome: Progressing  3/18/2025 0258 by Cinthia Vargas RN  Outcome: Progressing     Problem: Skin/Tissue Integrity  Goal: Skin integrity remains intact  Description: 1.  Monitor for areas of redness and/or skin breakdown  2.  Assess vascular access sites hourly  3.  Every 4-6 hours minimum:  Change oxygen saturation probe site  4.  Every 4-6 hours:  If on nasal continuous positive airway pressure, respiratory therapy assess nares and determine need for appliance change or resting period  3/18/2025 0824 by Hanh Gates RN  Outcome: Progressing  3/18/2025 0258 by Cinthia Vargas RN  Outcome: Progressing  Flowsheets  Taken 3/18/2025 0258  Skin Integrity Remains Intact: Monitor for areas of redness and/or skin breakdown  Taken 3/17/2025 2000  Skin Integrity Remains

## 2025-03-18 NOTE — PROGRESS NOTES
Internal Medicine Progress Note    Patient's name: Haritha Dominguez  : 1945  Chief complaints (on day of admission): Abdominal Pain, Fever, and Shortness of Breath  Admission date: 3/15/2025  Date of service: 3/18/2025   Room: [address] ICU  Primary care physician: Olayinka Reyes DO  Reason for visit: Follow-up for esophageal perforation    Subjective  Haritha is seen sitting up in bed awake and alert, in no distress.  She reports feeling fairly well this morning.  Denies any shortness of breath or difficulty breathing.  She denies any nausea or vomiting.  Case was discussed with GI this morning and given the okay to advance her diet as tolerated per SLP recommendations.  GI also expressed that if she tolerates her diet she can be discharged home.  No other issues or concerns from nursing.    Review of Systems  Full 10 point review of systems negative unless mentioned above.    Hospital Medications  Current Facility-Administered Medications   Medication Dose Route Frequency Provider Last Rate Last Admin    sodium phosphate 15 mmol in sodium chloride 0.9 % 250 mL IVPB  15 mmol IntraVENous Once Monica Krishna, APRN - CNP 62.5 mL/hr at 25 0911 15 mmol at 25 0911    collagenase ointment   Topical Daily Donnell Nino, DO   Given at 25 0843    white petrolatum ointment   Topical BID Donnell Nino DO   Given at 25 0843    And    white petrolatum ointment   Topical TID PRN Donnell Nino DO        pantoprazole (PROTONIX) 40 mg in sodium chloride (PF) 0.9 % 10 mL injection  40 mg IntraVENous Q12H Wallace Decker DO   40 mg at 25 0842    hydrocortisone sodium succinate PF (SOLU-CORTEF) injection 25 mg  25 mg IntraVENous Q12H Wallace Decker, DO   25 mg at 25 0842    Followed by    [START ON 3/20/2025] hydrocortisone sodium succinate PF (SOLU-CORTEF) injection 10 mg  10 mg IntraVENous Daily Wallace Decker DO        LORazepam (ATIVAN) injection 2 mg  2 mg IntraVENous BID PRN

## 2025-03-18 NOTE — PATIENT CARE CONFERENCE
Intensive Care Daily Quality Rounding Checklist        ICU Team Members:      ICU Day #: NUMBER: 3     SOFA Score:      Intubation Date: N/A     Ventilator Day #: N/A     Central Line Insertion Date: March 15                                                    Day #: NUMBER: 4                                                    Indication: CVCIndication: Hemodynamically unstable requiring volume resuscitation--will check for removal today      Arterial Line Insertion Date: N/A                             Day #:  N/A     Temporary Hemodialysis Catheter Insertion Date:  N/A                             Day #  N/A     DVT Prophylaxis: PCDs    GI Prophylaxis: Protonix     Brewer Catheter Insertion Date:  N/A                                        Day #:  N/A                             Indications:  N/A                             Continued need (if yes, reason documented and discussed with physician):  N/A     Skin Issues/ Wounds and ordered treatment discussed on rounds: has wound on left heel; wound care orders placed  Goals: Transfer to floor

## 2025-03-18 NOTE — PROGRESS NOTES
SPEECH LANGUAGE PATHOLOGY  DAILY PROGRESS NOTE      PATIENT NAME:  Haritha Dominguez      :  1945          TODAY'S DATE:  3/18/2025 ROOM:  0207/0207-A    Current Diet: ADULT DIET; Full Liquid; Mildly Thick (Nectar)    Patient seen for ongoing dysphagia tx. GI cleared patient for solids and patient reports good toleration of current full liquid diet. RN reported occasional coughing with liquids. Patient provided trials of nectar thick liquids and solids this date. Patient with mildly prolonged mastication, but functional oral clearance. No overt s/s of aspiration noted with nectar thick liquids or solids this date. It should be noted, silent aspiration can not be ruled out at the bedside, and if silent aspiration is suspected based on clinical correlation an MBSS would be recommended. Discussed with patient recommendation for soft and bite sized solids with nectar thick liquids.    Recommendation: upgrade diet to soft and bite sized solids with nectar thick liquids       CPT code(s) 48128  dysphagia tx  Total minutes :  15 minutes    Cassy Oleary M.S. CCC-SLP/L  Speech Language Pathologist  [id number]

## 2025-03-18 NOTE — PROGRESS NOTES
Spiritual Health History and Assessment/Progress Note  [facility]     Encounter, Rituals, Rites and Sacraments,  ,  ,      Name: Haritha Dominguez MRN: [id number]    Age: 80 y.o.     Sex: female   Language: English   Pentecostalism: Adventist   Septic shock (HCC)     Date: 3/18/2025                           Spiritual Assessment began in [address] ICU        Referral/Consult From: Rounding   Encounter Overview/Reason:  Encounter, Rituals, Rites and Sacraments  Service Provided For: Patient    Meliza, Belief, Meaning:   Patient is connected with a meliza tradition or spiritual practice  Family/Friends No family/friends present      Importance and Influence:  Patient has no beliefs influential to healthcare decision-making identified during this visit  Family/Friends No family/friends present    Community:  Patient feels well-supported. Support system includes: Extended family  Family/Friends No family/friends present    Assessment and Plan of Care:     Patient Interventions include: Facilitated expression of thoughts and feelings, Affirmed coping skills/support systems, and Provided sacramental/Worship ritual  Family/Friends Interventions include: No family/friends present    Patient Plan of Care: Spiritual Care available upon further referral  Family/Friends Plan of Care: Spiritual Care available upon further referral    Electronically signed by Chaplain Desi on 3/18/2025 at 5:09 PM

## 2025-03-18 NOTE — PROGRESS NOTES
[facility]  [facility]  Progress Note    SUBJECTIVE:  Chief Complaint   Patient presents with    Abdominal Pain    Fever    Shortness of Breath     The patient still in the ICU.  Feels better.  No new complaints.  Tolerating IV antibiotic    Review of systems:  As stated above in the chief complaint, otherwise negative.    Medications:  Scheduled Meds:   sodium phosphate IVPB (CENTRAL line)  15 mmol IntraVENous Once    collagenase   Topical Daily    white petrolatum   Topical BID    pantoprazole (PROTONIX) 40 mg in sodium chloride (PF) 0.9 % 10 mL injection  40 mg IntraVENous Q12H    hydrocortisone sodium succinate PF  25 mg IntraVENous Q12H    Followed by    [START ON 3/20/2025] hydrocortisone sodium succinate PF  10 mg IntraVENous Daily    cefepime  2,000 mg IntraVENous Q12H    miconazole   Topical BID    budesonide  1 mg Nebulization BID RT    And    arformoterol tartrate  15 mcg Nebulization BID RT    And    ipratropium  0.5 mg Nebulization 4x Daily RT    sodium chloride flush  5-40 mL IntraVENous 2 times per day     Continuous Infusions:   lactated ringers 75 mL/hr at 25 0636    sodium chloride       PRN Meds:white petrolatum **AND** white petrolatum, LORazepam, hydrOXYzine pamoate, albuterol, sodium chloride flush, sodium chloride    OBJECTIVE:  BP (!) 100/34   Pulse (!) 119   Temp 97.5 °F (36.4 °C) (Oral)   Resp 25   Ht 1.524 m (5')   Wt 65.4 kg (144 lb 2.9 oz)   SpO2 92%   BMI 28.16 kg/m²   Temp  Av.2 °F (36.8 °C)  Min: 97.5 °F (36.4 °C)  Max: 98.9 °F (37.2 °C)  Constitutional: The patient is awake, alert, and oriented.  She is lying in bed in the ICU.  She is in no distress  Skin: Warm and dry. No rashes were noted.   HEENT: Round and reactive pupils.  Moist mucous membranes.  No ulcerations or thrush.  Neck: Supple to movements.   Chest: No respiratory distress.  No crackles.  Cardiovascular: Heart sounds rhythmic and regular.  Abdomen: Positive bowel

## 2025-03-19 LAB
ANION GAP SERPL CALCULATED.3IONS-SCNC: 11 MMOL/L (ref 7–16)
BASOPHILS # BLD: 0.06 K/UL (ref 0–0.2)
BASOPHILS NFR BLD: 1 % (ref 0–2)
BUN SERPL-MCNC: 7 MG/DL (ref 6–23)
CALCIUM SERPL-MCNC: 8.3 MG/DL (ref 8.6–10.2)
CHLORIDE SERPL-SCNC: 110 MMOL/L (ref 98–107)
CO2 SERPL-SCNC: 23 MMOL/L (ref 22–29)
CREAT SERPL-MCNC: 0.6 MG/DL (ref 0.5–1)
EOSINOPHIL # BLD: 0.18 K/UL (ref 0.05–0.5)
EOSINOPHILS RELATIVE PERCENT: 2 % (ref 0–6)
ERYTHROCYTE [DISTWIDTH] IN BLOOD BY AUTOMATED COUNT: 15.1 % (ref 11.5–15)
GFR, ESTIMATED: >90 ML/MIN/1.73M2
GLUCOSE SERPL-MCNC: 109 MG/DL (ref 74–99)
HCT VFR BLD AUTO: 28.3 % (ref 34–48)
HGB BLD-MCNC: 8.8 G/DL (ref 11.5–15.5)
IMM GRANULOCYTES # BLD AUTO: 0.18 K/UL (ref 0–0.58)
IMM GRANULOCYTES NFR BLD: 2 % (ref 0–5)
LYMPHOCYTES NFR BLD: 1.36 K/UL (ref 1.5–4)
LYMPHOCYTES RELATIVE PERCENT: 16 % (ref 20–42)
MAGNESIUM SERPL-MCNC: 2 MG/DL (ref 1.6–2.6)
MCH RBC QN AUTO: 29.7 PG (ref 26–35)
MCHC RBC AUTO-ENTMCNC: 31.1 G/DL (ref 32–34.5)
MCV RBC AUTO: 95.6 FL (ref 80–99.9)
MICROORGANISM SPEC CULT: ABNORMAL
MICROORGANISM SPEC CULT: ABNORMAL
MONOCYTES NFR BLD: 0.84 K/UL (ref 0.1–0.95)
MONOCYTES NFR BLD: 10 % (ref 2–12)
NEUTROPHILS NFR BLD: 69 % (ref 43–80)
NEUTS SEG NFR BLD: 5.79 K/UL (ref 1.8–7.3)
PHOSPHATE SERPL-MCNC: 2.8 MG/DL (ref 2.5–4.5)
PLATELET # BLD AUTO: 268 K/UL (ref 130–450)
PMV BLD AUTO: 12.3 FL (ref 7–12)
POTASSIUM SERPL-SCNC: 3.4 MMOL/L (ref 3.5–5)
RBC # BLD AUTO: 2.96 M/UL (ref 3.5–5.5)
SERVICE CMNT-IMP: ABNORMAL
SODIUM SERPL-SCNC: 144 MMOL/L (ref 132–146)
SPECIMEN DESCRIPTION: ABNORMAL
WBC OTHER # BLD: 8.4 K/UL (ref 4.5–11.5)

## 2025-03-19 PROCEDURE — 97165 OT EVAL LOW COMPLEX 30 MIN: CPT

## 2025-03-19 PROCEDURE — 80048 BASIC METABOLIC PNL TOTAL CA: CPT

## 2025-03-19 PROCEDURE — 6360000002 HC RX W HCPCS: Performed by: INTERNAL MEDICINE

## 2025-03-19 PROCEDURE — 83735 ASSAY OF MAGNESIUM: CPT

## 2025-03-19 PROCEDURE — 2060000000 HC ICU INTERMEDIATE R&B

## 2025-03-19 PROCEDURE — 6370000000 HC RX 637 (ALT 250 FOR IP): Performed by: SPECIALIST

## 2025-03-19 PROCEDURE — 94640 AIRWAY INHALATION TREATMENT: CPT

## 2025-03-19 PROCEDURE — 6360000002 HC RX W HCPCS: Performed by: SPECIALIST

## 2025-03-19 PROCEDURE — 85025 COMPLETE CBC W/AUTO DIFF WBC: CPT

## 2025-03-19 PROCEDURE — 2580000003 HC RX 258: Performed by: SPECIALIST

## 2025-03-19 PROCEDURE — 2500000003 HC RX 250 WO HCPCS

## 2025-03-19 PROCEDURE — 2580000003 HC RX 258: Performed by: INTERNAL MEDICINE

## 2025-03-19 PROCEDURE — 84100 ASSAY OF PHOSPHORUS: CPT

## 2025-03-19 PROCEDURE — 6360000002 HC RX W HCPCS

## 2025-03-19 PROCEDURE — 92526 ORAL FUNCTION THERAPY: CPT

## 2025-03-19 RX ORDER — CIPROFLOXACIN 500 MG/1
500 TABLET, FILM COATED ORAL EVERY 12 HOURS SCHEDULED
DISCHARGE
Start: 2025-03-19 | End: 2025-03-24

## 2025-03-19 RX ORDER — CIPROFLOXACIN 500 MG/1
500 TABLET, FILM COATED ORAL EVERY 12 HOURS SCHEDULED
Status: DISCONTINUED | OUTPATIENT
Start: 2025-03-19 | End: 2025-03-20 | Stop reason: HOSPADM

## 2025-03-19 RX ADMIN — ARFORMOTEROL TARTRATE 15 MCG: 15 SOLUTION RESPIRATORY (INHALATION) at 08:29

## 2025-03-19 RX ADMIN — CEFEPIME 2000 MG: 2 INJECTION, POWDER, FOR SOLUTION INTRAVENOUS at 00:00

## 2025-03-19 RX ADMIN — SODIUM CHLORIDE, PRESERVATIVE FREE 10 ML: 5 INJECTION INTRAVENOUS at 20:00

## 2025-03-19 RX ADMIN — IPRATROPIUM BROMIDE 0.5 MG: 0.5 SOLUTION RESPIRATORY (INHALATION) at 08:29

## 2025-03-19 RX ADMIN — LORAZEPAM 2 MG: 2 INJECTION INTRAMUSCULAR; INTRAVENOUS at 09:00

## 2025-03-19 RX ADMIN — SODIUM CHLORIDE, PRESERVATIVE FREE 10 ML: 5 INJECTION INTRAVENOUS at 09:01

## 2025-03-19 RX ADMIN — COLLAGENASE SANTYL: 250 OINTMENT TOPICAL at 09:02

## 2025-03-19 RX ADMIN — MICONAZOLE NITRATE: 2 POWDER TOPICAL at 20:01

## 2025-03-19 RX ADMIN — HYDROCORTISONE SODIUM SUCCINATE 25 MG: 100 INJECTION INTRAMUSCULAR; INTRAVENOUS at 09:00

## 2025-03-19 RX ADMIN — SODIUM CHLORIDE 40 MG: 9 INJECTION, SOLUTION INTRAMUSCULAR; INTRAVENOUS; SUBCUTANEOUS at 09:00

## 2025-03-19 RX ADMIN — CIPROFLOXACIN HYDROCHLORIDE 500 MG: 500 TABLET, FILM COATED ORAL at 20:40

## 2025-03-19 RX ADMIN — MICONAZOLE NITRATE: 2 POWDER TOPICAL at 09:01

## 2025-03-19 RX ADMIN — PETROLATUM: 420 OINTMENT TOPICAL at 09:01

## 2025-03-19 RX ADMIN — IPRATROPIUM BROMIDE 0.5 MG: 0.5 SOLUTION RESPIRATORY (INHALATION) at 16:24

## 2025-03-19 RX ADMIN — BUDESONIDE 1000 MCG: 0.5 SUSPENSION RESPIRATORY (INHALATION) at 20:47

## 2025-03-19 RX ADMIN — ARFORMOTEROL TARTRATE 15 MCG: 15 SOLUTION RESPIRATORY (INHALATION) at 20:47

## 2025-03-19 RX ADMIN — PETROLATUM: 420 OINTMENT TOPICAL at 17:51

## 2025-03-19 RX ADMIN — BUDESONIDE 1000 MCG: 0.5 SUSPENSION RESPIRATORY (INHALATION) at 08:29

## 2025-03-19 RX ADMIN — IPRATROPIUM BROMIDE 0.5 MG: 0.5 SOLUTION RESPIRATORY (INHALATION) at 20:47

## 2025-03-19 RX ADMIN — IPRATROPIUM BROMIDE 0.5 MG: 0.5 SOLUTION RESPIRATORY (INHALATION) at 12:51

## 2025-03-19 RX ADMIN — SODIUM CHLORIDE 40 MG: 9 INJECTION, SOLUTION INTRAMUSCULAR; INTRAVENOUS; SUBCUTANEOUS at 20:00

## 2025-03-19 RX ADMIN — LORAZEPAM 2 MG: 2 INJECTION INTRAMUSCULAR; INTRAVENOUS at 21:14

## 2025-03-19 ASSESSMENT — PAIN SCALES - GENERAL
PAINLEVEL_OUTOF10: 0
PAINLEVEL_OUTOF10: 0

## 2025-03-19 NOTE — PROGRESS NOTES
[facility]  [facility]  Progress Note    SUBJECTIVE:  Chief Complaint   Patient presents with    Abdominal Pain    Fever    Shortness of Breath     The patient is in the ICU.  She has been downgraded.  Tolerating antibiotics.    Review of systems:  As stated above in the chief complaint, otherwise negative.    Medications:  Scheduled Meds:   collagenase   Topical Daily    white petrolatum   Topical BID    pantoprazole (PROTONIX) 40 mg in sodium chloride (PF) 0.9 % 10 mL injection  40 mg IntraVENous Q12H    [START ON 3/20/2025] hydrocortisone sodium succinate PF  10 mg IntraVENous Daily    cefepime  2,000 mg IntraVENous Q12H    miconazole   Topical BID    budesonide  1 mg Nebulization BID RT    And    arformoterol tartrate  15 mcg Nebulization BID RT    And    ipratropium  0.5 mg Nebulization 4x Daily RT    sodium chloride flush  5-40 mL IntraVENous 2 times per day     Continuous Infusions:   sodium chloride       PRN Meds:white petrolatum **AND** white petrolatum, LORazepam, hydrOXYzine pamoate, albuterol, sodium chloride flush, sodium chloride    OBJECTIVE:  /73   Pulse (!) 105   Temp 97.2 °F (36.2 °C) (Infrared)   Resp 18   Ht 1.524 m (5')   Wt 65.4 kg (144 lb 2.9 oz)   SpO2 96%   BMI 28.16 kg/m²   Temp  Av.9 °F (36.6 °C)  Min: 97.2 °F (36.2 °C)  Max: 98.5 °F (36.9 °C)  Constitutional: The patient is sitting up in a chair.  She is awake and alert.  No distress.  Skin: Warm and dry. No rashes were noted.   HEENT: Round and reactive pupils.  Moist mucous membranes.  No ulcerations or thrush.  Neck: Supple to movements.   Chest: No respiratory distress.  No crackles.  She is wearing her brace.  Cardiovascular: Heart sounds rhythmic and regular.  Abdomen: Positive bowel sounds to auscultation. Benign to palpation.  Soft and benign to palpation.  Extremities: Minimal edema.  Lines: Peripheral.  External catheter.    Laboratory and Tests:  Lab Results   Component Value

## 2025-03-19 NOTE — PROGRESS NOTES
Occupational Therapy    OCCUPATIONAL THERAPY INITIAL EVALUATION    [facility]   [address], [place]         Date:3/19/2025                                                  Patient Name: Haritha Dominguez    MRN: [id number]    : 1945    Room: [address] [place] [address]      Evaluating OT: Rachana Correa OTR/L   [id number]      Referring Provider:Donnell Nino DO     Specific Provider Orders/Date:OT eval and treat 3/19/2025      Diagnosis:  Pneumomediastinum (HCC) [J98.2]  Hiatal hernia [K44.9]  Esophageal perforation [K22.3]  KATELYN (acute kidney injury) [N17.9]  Urinary tract infection with hematuria, site unspecified [N39.0, R31.9]  Acute pancreatic fluid collection [K86.89]     Pertinent Medical History: back surgery, knee surgery,    Past Medical History:   Diagnosis Date    Abrasion of skin of left lower leg 2024    Abrasion of skin of right lower leg 2024    Anxiety     Arthritis     Asthma     Claustrophobia     Decreased dorsalis pedis pulse 2024    Dermatitis 2017    bilateral legs knees to ankle; follows with [facility]    Fracture 2017    \"in Spine\" compression T10 per pt; difficulty laying flat    GERD (gastroesophageal reflux disease)     Hiatal hernia     History of blood transfusion     HTN (hypertension) 2013    Hyperlipidemia     Itching 2024    On aspirin at home     for age per pcp    Pancreatic cyst 2017    Pneumonia 2018    Sleep apnea     SOB (shortness of breath) 2013    Tachycardia 2013    follows with Dr NUPUR Chavez     Past Surgical History:   Procedure Laterality Date    BACK SURGERY  2014    has screws in back    CATARACT REMOVAL Bilateral 2013    [facility] with lens implants    CHOLECYSTECTOMY      JOINT REPLACEMENT  2016    SI joint fusion Right    OTHER SURGICAL HISTORY  2017    MRI -     SPINAL FIXATION SURGERY WITH IMPLANT      in [place]     STIMULATOR SURGERY Right 5/31/2023    Spinal cord stimulator battery replacement performed by Anabell Purcell MD at [facility]    TOTAL KNEE ARTHROPLASTY Right     UPPER GASTROINTESTINAL ENDOSCOPY  07/10/2017    UPPER GASTROINTESTINAL ENDOSCOPY N/A 3/10/2025    ESOPHAGOGASTRODUODENOSCOPY performed by James Spivey MD at [facility]    UPPER GASTROINTESTINAL ENDOSCOPY N/A 3/10/2025    ESOPHAGOGASTRODUODENOSCOPY ENDOSCOPIC ULTRASOUND FINE NEEDLE ASPIRATION performed by James Spivey MD at [facility]    UPPER GASTROINTESTINAL ENDOSCOPY  3/10/2025    ESOPHAGOGASTRODUODENOSCOPY DILATION SAVORY performed by James Spivey MD at [facility]OSCOPY      Precautions:  Fall Risk, TLSO when up ( L2 compression fx)  spinal precautions     Assessment of current deficits    [x] Functional mobility  [x]ADLs  [x] Strength               [x]Cognition    [x] Functional transfers   [x] IADLs         [x] Safety Awareness   [x]Endurance    [] Fine Coordination              [x] Balance      [] Vision/perception   [x]Sensation     []Gross Motor Coordination  [] ROM  [] Delirium                   [] Motor Control     OT PLAN OF CARE   OT POC based on physician orders, patient diagnosis and results of clinical assessment    Frequency/Duration  2-4 days/wk for 2 - 4weeks PRN   Specific OT Treatment Interventions to include:   ADL retraining/adapted techniques and AE recommendations to increase functional independence within precautions                    Energy conservation techniques to improve tolerance for selfcare routine   Functional transfer/mobility training/DME recommendations for increased independence, safety and fall prevention         Patient/family education to increase safety and functional independence             Environmental modifications for safe mobility and completion of ADLs                             Therapeutic activity to improve functional performance during ADLs.                                         Therapeutic

## 2025-03-19 NOTE — DISCHARGE SUMMARY
There is a well-defined fluid density in the pancreatic body/tail measuring 13 mm in diameter possibly representing a side branch IPMN. GI/Bowel: There is moderate oral contrast material throughout the colon.  No evidence of bowel obstruction.  No acute inflammatory changes.  The appendix appears normal.  No evidence of mesenteric adenopathy or mass. Pelvis: Urinary bladder is normal.  Uterus appears normal.  No free pelvic fluid.  No pelvic or inguinal adenopathy.  Small fat containing bilateral inguinal hernias, slightly larger on the right. Peritoneum/Retroperitoneum: No evidence of retroperitoneal adenopathy or mass.  Aorta is nonaneurysmal.  No pneumoperitoneum. Bones/Soft Tissues: Extensive posterior metallic fusion of L3 through L5. There is an old moderate inferior endplate compression fracture of L1. Degenerative disc disease from T12 through L3.  Evidence of laminectomy from L4 through S1.  Battery pack for neurostimulator device in the right buttock subcutaneous tissues.  There is mild subcutaneous edema adjacent to the right and left hip which could represent minimal third-spacing of fluid.     1. Pneumomediastinum. 2. Small bilateral pleural effusions and mild residual consolidation in the lung bases. 3. Moderate hiatal hernia. 4. No acute intra-abdominal or pelvic process. 5. 13 mm well-defined fluid density in the pancreatic body/tail possibly representing a side branch IPMN. Recommend follow-up MRI in 6 months. 6. Mild subcutaneous edema adjacent to the right and left hip which could represent minimal third-spacing of fluid.     CTA CHEST W CONTRAST  Result Date: 3/15/2025  EXAMINATION: CTA OF THE CHEST 3/15/2025 4:43 pm TECHNIQUE: CTA of the chest was performed after the administration of intravenous contrast.  Multiplanar reformatted images are provided for review.  MIP images are provided for review. Automated exposure control, iterative reconstruction, and/or weight based adjustment of the mA/kV  other medications prescribed for you. Read the directions carefully, and ask your doctor or other care provider to review them with you.                STOP taking these medications      acetaminophen 325 MG tablet  Commonly known as: TYLENOL     Adbry 300 MG/2ML Soaj  Generic drug: Tralokinumab-ldrm               Where to Get Your Medications        Information about where to get these medications is not yet available    Ask your nurse or doctor about these medications  ciprofloxacin 500 MG tablet           INTERNAL MEDICINE INSTRUCTIONS:  Follow-up with primary care physician as directed in discharge paperwork.  Please review results of imaging studies with PCP.  Follow-up with consultants as directed by them.  If recurrence or worsening of symptoms go to the ED or call primary care physician.  Diet: ADULT DIET; Dysphagia - Soft and Bite Sized; Mildly Thick ([place])  ADULT ORAL NUTRITION SUPPLEMENT; Breakfast; Other Oral Supplement; EXPEDITE CUP  ADULT ORAL NUTRITION SUPPLEMENT; Lunch, Dinner; Frozen Oral Supplement    FOLLOW-IP  [place] [address]  [phone]        Olayinka Reyes DO  [address] [place], [place] [id number]  [phone]    Schedule an appointment as soon as possible for a visit in 1 week(s)  for follow-up appointment from this hospital stay    James Spivey MD  [address] [facility] [address] [phone]    Call  for follow-up appointment from this hospital stay      Preparing for this patient's discharge, including paperwork, orders, instructions, and meeting with patient did require greater than 35 minutes.    Electronically signed by STEVE Montana CNP on 3/20/2025 at 2:30 AM

## 2025-03-19 NOTE — PROGRESS NOTES
Gastroenterology, Hepatology, &  Advanced Endoscopy    Progress Note      HPI:   Patient is doing well. She is off of oxygen and vasopressor. She is tolerating a solid food without pain or dysphagia.     Lab Results   Component Value Date    INR 0.9 05/24/2023    INR 1.1 02/13/2023    INR 1.2 05/28/2017    PROTIME 10.2 05/24/2023    PROTIME 11.8 02/13/2023    PROTIME 13.2 (H) 05/28/2017         ALT   Date Value Ref Range Status   03/18/2025 7 0 - 32 U/L Final   03/17/2025 8 0 - 32 U/L Final   03/16/2025 7 0 - 32 U/L Final     AST   Date Value Ref Range Status   03/18/2025 11 0 - 31 U/L Final   03/17/2025 11 0 - 31 U/L Final   03/16/2025 17 0 - 31 U/L Final     Alkaline Phosphatase   Date Value Ref Range Status   03/18/2025 67 35 - 104 U/L Final   03/17/2025 69 35 - 104 U/L Final   03/16/2025 77 35 - 104 U/L Final     Total Bilirubin   Date Value Ref Range Status   03/18/2025 0.3 0.0 - 1.2 mg/dL Final   03/17/2025 0.3 0.0 - 1.2 mg/dL Final   03/16/2025 0.4 0.0 - 1.2 mg/dL Final     Bilirubin, Direct   Date Value Ref Range Status   03/02/2025 <0.2 0.0 - 0.3 mg/dL Final   04/01/2012 0.2 0.0 - 0.2 mg/dL Final   01/01/2011 <0.1 0.0 - 0.2 mg/dL Final      Lab Results   Component Value Date    WBC 8.4 03/19/2025    HGB 8.8 (L) 03/19/2025    HCT 28.3 (L) 03/19/2025     03/19/2025     03/19/2025    K 3.4 (L) 03/19/2025     (H) 03/19/2025    CREATININE 0.6 03/19/2025    BUN 7 03/19/2025    CO2 23 03/19/2025    FOLATE 6.3 03/18/2025    POZXLOXR01 518 03/18/2025    GLUCOSE 109 (H) 03/19/2025    INR 0.9 05/24/2023    PROTIME 10.2 05/24/2023    TSH 2.06 08/22/2023    LABA1C 7.8 01/17/2024        CEA   Date Value Ref Range Status   05/28/2017 5.3 (H) 0.0 - 5.2 ng/mL Final     Sed Rate, Automated   Date Value Ref Range Status   03/18/2025 71 (H) 0 - 20 mm/Hr Final   03/16/2025 117 (H) 0 - 20 mm/Hr Final   11/04/2024 72 (H) 0 - 20 mm/Hr Final     Lipase   Date Value Ref Range Status   03/15/2025 47 13 - 60 U/L

## 2025-03-19 NOTE — CARE COORDINATION
Social Work discharge planning  Received call from Jonathan Nails advising pt may be ready for discharge today.  SW advised her pt needs PTOT first for precert submission and approval before transfer to snf.  Electronically signed by CARLEY Joiner on 3/19/2025 at 10:39 AM     Addendum  PT 10. Notified Rhea with snf of discharge order. She advised she will start precert once OT available. Updated OT.  Electronically signed by CARLEY Joiner on 3/19/2025 at 12:46 PM     Addendum  Notified Rhea with snf OT in chart. She said they will start precert. Pt can not transfer to snf until precert approval obtained.  Electronically signed by CARLEY Joiner on 3/19/2025 at 12:52 PM     Addendum  Corrina has precert approval today per liaison Rhea. Pt to snf between 7-9pm per Physician's. Jonathan Nails notified. Spoke to daughter Chantelle [phone] to notify also.   Electronically signed by CARLEY Joiner on 3/19/2025 at 3:00 PM

## 2025-03-19 NOTE — PROGRESS NOTES
Spiritual Health History and Assessment/Progress Note  [facility]    Spiritual/Emotional Needs,  ,  ,      Name: Haritha Dominguez MRN: [id number]    Age: 80 y.o.     Sex: female   Language: English   Sabianism: Anabaptism   Septic shock (HCC)     Date: 3/19/2025                           Spiritual Assessment began in [facility] ICU        Referral/Consult From: Rounding   Encounter Overview/Reason: Spiritual/Emotional Needs  Service Provided For: Patient    Meliza, Belief, Meaning:   Patient identifies as spiritual, is connected with a meliza tradition or spiritual practice, and has beliefs or practices that help with coping during difficult times  Family/Friends No family/friends present      Importance and Influence:  Patient has spiritual/personal beliefs that influence decisions regarding their health  Family/Friends No family/friends present    Community:  Patient feels well-supported. Support system includes: Children, Meliza Community, and Extended family  Family/Friends No family/friends present    Assessment and Plan of Care:     Patient Interventions include: Facilitated expression of thoughts and feelings, Explored spiritual coping/struggle/distress, Affirmed coping skills/support systems, and Provided sacramental/Advent ritual  Family/Friends Interventions include: No family/friends present    Patient Plan of Care: Spiritual Care available upon further referral  Family/Friends Plan of Care: Spiritual Care available upon further referral    Electronically signed by GORAN Ricardo on 3/19/2025 at 12:03 PM

## 2025-03-19 NOTE — PROGRESS NOTES
Physical Therapy  Facility/Department: [address] ICU  Physical Therapy Initial Assessment    Name: Haritha Dominguez  : 1945  MRN: [id number]  Date of Service: 3/19/2025             Patient Diagnosis(es): The primary encounter diagnosis was Pneumomediastinum (HCC). Diagnoses of KATELYN (acute kidney injury), Acute pancreatic fluid collection, Hiatal hernia, and Urinary tract infection with hematuria, site unspecified were also pertinent to this visit.  Past Medical History:  has a past medical history of Abrasion of skin of left lower leg, Abrasion of skin of right lower leg, Anxiety, Arthritis, Asthma, Claustrophobia, Decreased dorsalis pedis pulse, Dermatitis, Fracture, GERD (gastroesophageal reflux disease), Hiatal hernia, History of blood transfusion, HTN (hypertension), Hyperlipidemia, Itching, On aspirin at home, Pancreatic cyst, Pneumonia, Sleep apnea, SOB (shortness of breath), and Tachycardia.  Past Surgical History:  has a past surgical history that includes Cholecystectomy; Cataract removal (Bilateral, 2013); back surgery (2014); joint replacement (2016); Total knee arthroplasty (Right); other surgical history (2017); Spinal fixation surgery with implant (); Upper gastrointestinal endoscopy (07/10/2017); Stimulator Surgery (Right, 2023); Upper gastrointestinal endoscopy (N/A, 3/10/2025); Upper gastrointestinal endoscopy (N/A, 3/10/2025); and Upper gastrointestinal endoscopy (3/10/2025).         Requires PT Follow-Up: Yes    Evaluating Therapist: Jaqui Laws PT     Referring Provider:      Donnell Nino DO       PT order : PT eval and treat     Room #: 207  DIAGNOSIS: The primary encounter diagnosis was Pneumomediastinum (HCC). Diagnoses of KATELYN (acute kidney injury), Acute pancreatic fluid collection, Hiatal hernia, and Urinary tract infection with hematuria, site unspecified were also pertinent to this visit.  PRECAUTIONS:  falls, TLSO when up ( L2 compression fx) , spinal         Patient response to education:   Pt verbalized understanding Pt demonstrated skill Pt requires further education in this area   x With cues/assist   x       Comments:  Pt left  in chair after session, with call light in reach.      Rehab potential is Good for reaching above PT goals.    Pt’s/ family goals   1. Increase strength and mobility     Patient and or family understand(s) diagnosis, prognosis, and plan of care. -  yes     PLAN  PT care will be provided in accordance with the objectives noted above.  Whenever appropriate, clear delegation orders will be provided for nursing staff.  Exercises and functional mobility practice will be used as well as appropriate assistive devices or modalities to obtain goals. Patient and family education will also be administered as needed.        PLAN OF CARE:    Current Treatment Recommendations     [x] Strengthening to improve independence with functional mobility   [] ROM to improve independence with functional mobility   [x] Balance Training to improve static/dynamic balance and to reduce fall risk  [x] Endurance Training to improve activity tolerance during functional mobility   [x] Transfer Training to improve safety and independence with all functional transfers   [x] Gait Training to improve gait mechanics, endurance and assess need for appropriate assistive device  [] Stair Training in preparation for safe discharge home and/or into the community   [x] Positioning to prevent skin breakdown and contractures  [x] Safety and Education Training   [x] Patient/Caregiver Education   [] HEP  [] Other     Frequency of treatments will be 2-5x/week x 2-10 days.    Time in:  1009   Time out:  1030       Evaluation Time includes thorough review of current medical information, gathering information on past medical history/social history and prior level of function, completion of standardized testing/informal observation of tasks, assessment of data and education on plan of

## 2025-03-19 NOTE — PROGRESS NOTES
Internal Medicine Progress Note    Patient's name: Haritha Dominguez  : 1945  Chief complaints (on day of admission): Abdominal Pain, Fever, and Shortness of Breath  Admission date: 3/15/2025  Date of service: 3/19/2025   Room: [address] ICU  Primary care physician: Olayinka Reyes DO  Reason for visit: Follow-up for esophageal perforation    Subjective  Haritha is seen sitting up in the chair awake and alert, in no distress.  She reports feeling fairly well this morning.  She denies any nausea or vomiting.  Denies any shortness of breath or difficulty breathing.  She reports that she has been eating without any difficulty.  She is asking when she can go to rehab.  No other issues or concerns from nursing    Review of Systems  Full 10 point review of systems negative unless mentioned above.    Hospital Medications  Current Facility-Administered Medications   Medication Dose Route Frequency Provider Last Rate Last Admin    ciprofloxacin (CIPRO) tablet 500 mg  500 mg Oral 2 times per day Riki Laird MD        collagenase ointment   Topical Daily Donnell Nino, DO   Given at 25 0902    white petrolatum ointment   Topical BID Donnell Nino, DO   Given at 25 0901    And    white petrolatum ointment   Topical TID PRN Donnell Nino, DO        pantoprazole (PROTONIX) 40 mg in sodium chloride (PF) 0.9 % 10 mL injection  40 mg IntraVENous Q12H Wallace Decker, DO   40 mg at 25 0900    [START ON 3/20/2025] hydrocortisone sodium succinate PF (SOLU-CORTEF) injection 10 mg  10 mg IntraVENous Daily Wallace Decker, DO        LORazepam (ATIVAN) injection 2 mg  2 mg IntraVENous BID PRN Wallace Decker, DO   2 mg at 25 0900    hydrOXYzine pamoate (VISTARIL) capsule 25 mg  25 mg Oral TID PRN Monica Krishna APRN - CNP        miconazole (MICOTIN) 2 % powder   Topical BID Donnell Nino, DO   Given at 25 09    budesonide (PULMICORT) nebulizer suspension 1,000 mcg  1 mg Nebulization BID  her to go to a skilled nursing facility  PT AM-PAC 10/24    Electronically signed by Donnell Nino DO on 3/19/2025 at 12:13 PM    I can be reached through [employer].

## 2025-03-19 NOTE — DISCHARGE INSTR - COC
Continuity of Care Form    Patient Name: Haritha Dominguez   :  1945  MRN:  [id number]    Admit date:  3/15/2025  Discharge date:  3/19/2025    Code Status Order: Limited   Advance Directives:     Admitting Physician:  No admitting provider for patient encounter.  PCP: Olayinka Reyes DO    Discharging Nurse: Marisol Daimco    Discharging Hospital Unit/Room#: 0207/0207-A  Discharging Unit Phone Number: [phone]    Emergency Contact:   Extended Emergency Contact Information  Primary Emergency Contact: Chantelle Stover  Home Phone: [phone]  Relation: Child  Preferred language: English   needed? No  Secondary Emergency Contact: Aurora Rey  Address: [address] [place] [address]  Home Phone: [phone]  Mobile Phone: [phone]  Relation: Brother/Sister    Past Surgical History:  Past Surgical History:   Procedure Laterality Date    BACK SURGERY  2014    has screws in back    CATARACT REMOVAL Bilateral 2013    [facility] with lens implants    CHOLECYSTECTOMY      JOINT REPLACEMENT  2016    SI joint fusion Right    OTHER SURGICAL HISTORY  2017    MRI -     SPINAL FIXATION SURGERY WITH IMPLANT  2013    in [place]    STIMULATOR SURGERY Right 2023    Spinal cord stimulator battery replacement performed by Anabell Purcell MD at [facility]    TOTAL KNEE ARTHROPLASTY Right     UPPER GASTROINTESTINAL ENDOSCOPY  07/10/2017    UPPER GASTROINTESTINAL ENDOSCOPY N/A 3/10/2025    ESOPHAGOGASTRODUODENOSCOPY performed by James Spivey MD at [facility]    UPPER GASTROINTESTINAL ENDOSCOPY N/A 3/10/2025    ESOPHAGOGASTRODUODENOSCOPY ENDOSCOPIC ULTRASOUND FINE NEEDLE ASPIRATION performed by James Spivey MD at [facility]    UPPER GASTROINTESTINAL ENDOSCOPY  3/10/2025    ESOPHAGOGASTRODUODENOSCOPY DILATION SAVORY performed by James Spivey MD at [facility]       Immunization History:   Immunization History   Administered

## 2025-03-19 NOTE — PROGRESS NOTES
SPEECH LANGUAGE PATHOLOGY  DAILY PROGRESS NOTE      PATIENT NAME:  Haritha Dominguez      :  1945          TODAY'S DATE:  3/19/2025 ROOM:  0207/0207-A    Current Diet: ADULT DIET; Dysphagia - Soft and Bite Sized; Mildly Thick ([place])  ADULT ORAL NUTRITION SUPPLEMENT; Breakfast; Other Oral Supplement; EXPEDITE CUP  ADULT ORAL NUTRITION SUPPLEMENT; Lunch, Dinner; Frozen Oral Supplement    Patient seen for ongoing dysphagia tx. Patient engaged in pharyngeal strengthening exercises. Patient educated on and completed repetitions of the effortful swallow and kenya maneuver. Patient with fair production of [place] maneuver, with rest breaks needed. Patient provided handouts regarding the exercises as well as information regarding nectar thick liquids. SLP demonstrated how to properly thicken liquids to patient. Patient verbalized understanding and all questions voiced were answered at this time.       Recommendation: cont current POC       CPT code(s) 22351  dysphagia tx  Total minutes :  30 minutes    Cassy Oleary M.S. CCC-SLP/L  Speech Language Pathologist  [phone]

## 2025-03-19 NOTE — PLAN OF CARE
Problem: Discharge Planning  Goal: Discharge to home or other facility with appropriate resources  Outcome: Progressing  Flowsheets (Taken 3/18/2025 2000)  Discharge to home or other facility with appropriate resources: Identify barriers to discharge with patient and caregiver     Problem: Pain  Goal: Verbalizes/displays adequate comfort level or baseline comfort level  Outcome: Progressing  Flowsheets  Taken 3/19/2025 0000  Verbalizes/displays adequate comfort level or baseline comfort level: Assess pain using appropriate pain scale  Taken 3/18/2025 2000  Verbalizes/displays adequate comfort level or baseline comfort level: Assess pain using appropriate pain scale     Problem: Safety - Adult  Goal: Free from fall injury  Outcome: Progressing     Problem: ABCDS Injury Assessment  Goal: Absence of physical injury  Outcome: Progressing     Problem: Skin/Tissue Integrity  Goal: Skin integrity remains intact  Description: 1.  Monitor for areas of redness and/or skin breakdown  2.  Assess vascular access sites hourly  3.  Every 4-6 hours minimum:  Change oxygen saturation probe site  4.  Every 4-6 hours:  If on nasal continuous positive airway pressure, respiratory therapy assess nares and determine need for appliance change or resting period  Outcome: Progressing  Flowsheets (Taken 3/18/2025 2000)  Skin Integrity Remains Intact: Monitor for areas of redness and/or skin breakdown

## 2025-03-20 VITALS
SYSTOLIC BLOOD PRESSURE: 135 MMHG | TEMPERATURE: 98.2 F | OXYGEN SATURATION: 92 % | WEIGHT: 144.18 LBS | DIASTOLIC BLOOD PRESSURE: 85 MMHG | HEART RATE: 97 BPM | BODY MASS INDEX: 28.31 KG/M2 | HEIGHT: 60 IN | RESPIRATION RATE: 24 BRPM

## 2025-03-20 NOTE — PROGRESS NOTES
[facility]ician's ambulance here to transport patient to [facility]. Peripheral IVs removed. Patient left with purse, glasses, phone, phone , back brace, shirt, shoes.

## 2025-03-20 NOTE — DISCHARGE INSTR - DIET

## 2025-03-23 DIAGNOSIS — J98.2 PNEUMOMEDIASTINUM (HCC): Primary | ICD-10-CM

## 2025-03-25 ENCOUNTER — TELEPHONE (OUTPATIENT)
Age: 80
End: 2025-03-25

## 2025-03-25 NOTE — TELEPHONE ENCOUNTER
Pt was called to inform her of both appointments per DR. Unable to reach patient, left vm to call back the office. Electronically signed by Melanie Saab MA on 3/25/25 at 8:57 AM EDT

## 2025-03-27 ENCOUNTER — OFFICE VISIT (OUTPATIENT)
Dept: NEUROSURGERY | Age: 80
End: 2025-03-27
Payer: MEDICARE

## 2025-03-27 VITALS
OXYGEN SATURATION: 95 % | HEART RATE: 94 BPM | HEIGHT: 60 IN | SYSTOLIC BLOOD PRESSURE: 111 MMHG | TEMPERATURE: 97.8 F | DIASTOLIC BLOOD PRESSURE: 78 MMHG | BODY MASS INDEX: 28.16 KG/M2

## 2025-03-27 DIAGNOSIS — S32.020D COMPRESSION FRACTURE OF L2 VERTEBRA WITH ROUTINE HEALING, SUBSEQUENT ENCOUNTER: Primary | ICD-10-CM

## 2025-03-27 PROCEDURE — 3074F SYST BP LT 130 MM HG: CPT | Performed by: STUDENT IN AN ORGANIZED HEALTH CARE EDUCATION/TRAINING PROGRAM

## 2025-03-27 PROCEDURE — 99213 OFFICE O/P EST LOW 20 MIN: CPT | Performed by: STUDENT IN AN ORGANIZED HEALTH CARE EDUCATION/TRAINING PROGRAM

## 2025-03-27 PROCEDURE — 99213 OFFICE O/P EST LOW 20 MIN: CPT

## 2025-03-27 PROCEDURE — 3078F DIAST BP <80 MM HG: CPT | Performed by: STUDENT IN AN ORGANIZED HEALTH CARE EDUCATION/TRAINING PROGRAM

## 2025-03-27 PROCEDURE — 1125F AMNT PAIN NOTED PAIN PRSNT: CPT | Performed by: STUDENT IN AN ORGANIZED HEALTH CARE EDUCATION/TRAINING PROGRAM

## 2025-03-27 PROCEDURE — 1159F MED LIST DOCD IN RCRD: CPT | Performed by: STUDENT IN AN ORGANIZED HEALTH CARE EDUCATION/TRAINING PROGRAM

## 2025-03-27 PROCEDURE — 1124F ACP DISCUSS-NO DSCNMKR DOCD: CPT | Performed by: STUDENT IN AN ORGANIZED HEALTH CARE EDUCATION/TRAINING PROGRAM

## 2025-03-27 NOTE — PROGRESS NOTES
Hospital Follow-up     Subjective: Haritha Dominguez is a 80 y.o.  female who was originally seen on 2025 for worsening low back pain. She was found to have suffered a L2 compression fracture. She was placed in a TLSO, she presents today for 1 month follow up.     Patient presents from [facility]. She states she is doing okay. She admits to continued low back pain. No pain down the legs. No numbness or weakness. Brace complaint. XR reviewed.      Physical Exam:              WDWN, no apparent distress              Non-labored breathing               Vitals Stable              Alert and oriented x3              CN 3-12 intact              PERRL              EOMI              HA well              Motor strength symmetric              Sensation to LT intact bilaterally             Imagin2025 XR Lumbar Spine from facility  L2 compression fracture seen, stable.     Assessment: This is a 80 y.o.  female presenting for a 1 month follow-up s/p L2 compression fracture.      Plan:  -Pain control and expectations discussed  -Continue brace and restrictions   -OARRS report reviewed   -Follow-up in neurosurgery clinic in 2 months with repeat XR  -Call or return to neurosurgery office sooner if symptoms worsen or if new issues arise in the interim.    Electronically signed by Monica Hansen PA-C on 3/27/2025 at 5:44 PM

## 2025-04-15 ENCOUNTER — HOSPITAL ENCOUNTER (OUTPATIENT)
Dept: CT IMAGING | Age: 80
Discharge: HOME | End: 2025-04-17
Attending: STUDENT IN AN ORGANIZED HEALTH CARE EDUCATION/TRAINING PROGRAM
Payer: MEDICARE

## 2025-04-15 DIAGNOSIS — J98.2 PNEUMOMEDIASTINUM (HCC): ICD-10-CM

## 2025-04-15 PROCEDURE — 71270 CT THORAX DX C-/C+: CPT

## 2025-04-15 PROCEDURE — 2500000003 HC RX 250 WO HCPCS: Performed by: RADIOLOGY

## 2025-04-15 PROCEDURE — 6360000004 HC RX CONTRAST MEDICATION: Performed by: RADIOLOGY

## 2025-04-15 RX ORDER — IOPAMIDOL 755 MG/ML
75 INJECTION, SOLUTION INTRAVASCULAR
Status: COMPLETED | OUTPATIENT
Start: 2025-04-15 | End: 2025-04-15

## 2025-04-15 RX ORDER — SODIUM CHLORIDE 0.9 % (FLUSH) 0.9 %
10 SYRINGE (ML) INJECTION PRN
Status: DISCONTINUED | OUTPATIENT
Start: 2025-04-15 | End: 2025-04-18 | Stop reason: HOSPADM

## 2025-04-15 RX ADMIN — IOPAMIDOL 75 ML: 755 INJECTION, SOLUTION INTRAVENOUS at 15:18

## 2025-04-15 RX ADMIN — SODIUM CHLORIDE, PRESERVATIVE FREE 10 ML: 5 INJECTION INTRAVENOUS at 15:18

## 2025-04-21 ENCOUNTER — OFFICE VISIT (OUTPATIENT)
Dept: ENDOCRINOLOGY | Age: 80
End: 2025-04-21
Payer: MEDICARE

## 2025-04-21 VITALS
DIASTOLIC BLOOD PRESSURE: 78 MMHG | HEIGHT: 60 IN | SYSTOLIC BLOOD PRESSURE: 130 MMHG | TEMPERATURE: 98.4 F | RESPIRATION RATE: 18 BRPM | HEART RATE: 107 BPM | BODY MASS INDEX: 28.16 KG/M2 | OXYGEN SATURATION: 91 %

## 2025-04-21 DIAGNOSIS — E27.49 SECONDARY ADRENAL INSUFFICIENCY: Primary | ICD-10-CM

## 2025-04-21 DIAGNOSIS — M81.0 OSTEOPOROSIS, UNSPECIFIED OSTEOPOROSIS TYPE, UNSPECIFIED PATHOLOGICAL FRACTURE PRESENCE: ICD-10-CM

## 2025-04-21 DIAGNOSIS — Z79.52 CURRENT CHRONIC USE OF SYSTEMIC STEROIDS: ICD-10-CM

## 2025-04-21 DIAGNOSIS — E03.8 SUBCLINICAL HYPOTHYROIDISM: ICD-10-CM

## 2025-04-21 DIAGNOSIS — E55.9 VITAMIN D DEFICIENCY: ICD-10-CM

## 2025-04-21 DIAGNOSIS — R73.9 ELEVATED BLOOD SUGAR: ICD-10-CM

## 2025-04-21 DIAGNOSIS — L12.9: ICD-10-CM

## 2025-04-21 PROCEDURE — 3078F DIAST BP <80 MM HG: CPT | Performed by: INTERNAL MEDICINE

## 2025-04-21 PROCEDURE — G2211 COMPLEX E/M VISIT ADD ON: HCPCS | Performed by: INTERNAL MEDICINE

## 2025-04-21 PROCEDURE — 1159F MED LIST DOCD IN RCRD: CPT | Performed by: INTERNAL MEDICINE

## 2025-04-21 PROCEDURE — 3075F SYST BP GE 130 - 139MM HG: CPT | Performed by: INTERNAL MEDICINE

## 2025-04-21 PROCEDURE — 99214 OFFICE O/P EST MOD 30 MIN: CPT | Performed by: INTERNAL MEDICINE

## 2025-04-21 PROCEDURE — 1124F ACP DISCUSS-NO DSCNMKR DOCD: CPT | Performed by: INTERNAL MEDICINE

## 2025-04-21 RX ORDER — DULOXETIN HYDROCHLORIDE 30 MG/1
CAPSULE, DELAYED RELEASE ORAL
COMMUNITY
Start: 2025-04-19

## 2025-04-21 RX ORDER — HYDROCORTISONE 10 MG/1
10 TABLET ORAL DAILY
Qty: 40 TABLET | Refills: 11 | Status: SHIPPED | OUTPATIENT
Start: 2025-04-21

## 2025-04-21 NOTE — PROGRESS NOTES
[facility]  [facility] Department of Endocrinology Diabetes and Metabolism   [address] [place] [id number]   Phone: [phone]  Fax: [phone]       Date of Service: 4/21/2025  Primary Care Physician: Olayinka Reyes DO  Consultant physician: Robin Metz MD     Reason for the consultation:  Secondary adrenal insufficiency     History of Present Illness:  The history is provided by the patient. Accuracy of the patient data is excellent.    Haritha Dominguez is a very pleasant 80 y.o. old female with PMH of secondary adrenal insufficiency, asthma, atopic dermatitis, HLD and other listed below seen today for follow up visit   The patient has been troubled by atopic dermatitis for many years and has been on daily steroids therapy for long time. Previous workup was consistent with secondary adrenal insufficiency. Cosyntropin stim test 2/21/2023 confirmed adrenal insufficiency     2/21/23 04:30 2/21/23 13:11   CORTISOL 2.31 (L) 8.74     The patient is currently on hydrocortisone 10 mg in the morning    Past Medical History   Past Medical History:   Diagnosis Date    Abrasion of skin of left lower leg 11/04/2024    Abrasion of skin of right lower leg 11/04/2024    Anxiety     Arthritis     Asthma     Claustrophobia     Decreased dorsalis pedis pulse 11/04/2024    Dermatitis 02/2017    bilateral legs knees to ankle; follows with [facility]    Fracture 02/2017    \"in Spine\" compression T10 per pt; difficulty laying flat    GERD (gastroesophageal reflux disease)     Hiatal hernia     History of blood transfusion     HTN (hypertension) 04/22/2013    Hyperlipidemia     Itching 11/04/2024    On aspirin at home     for age per pcp    Pancreatic cyst 05/27/2017    Pneumonia 07/2018    Sleep apnea     SOB (shortness of breath) 04/22/2013    Tachycardia 04/22/2013    follows with Dr NUPUR Chavez       Past Surgical History   Past Surgical History:   Procedure Laterality Date    BACK SURGERY

## 2025-04-24 PROBLEM — R10.9 ABDOMINAL PAIN: Status: ACTIVE | Noted: 2025-04-24

## 2025-04-25 NOTE — DISCHARGE INSTRUCTIONS
Visit Discharge/Physician Orders    Discharge condition: Stable    Assessment of pain at discharge:Assessed    Anesthetic used: Lido Soln    Discharge to: {[facility] Wound Discharge To:20937}    Left via:{Left Via:20938}    Accompanied by: accompanied by {:[id number]}    ECF/HHA:     Dressing Orders:    Treatment Orders:FOLLOW NUTRITIOUS DIET. CHOOSE FOODS HIGH IN PROTEIN -CHICKEN- FISH-AND EGGS,  CHOOSE FOODS HIGH IN VITAMIN C.   MULTIVITAMIN DAILY.      [facility] followup visit _____________________________  (Please note your next appointment above and if you are unable to keep, kindly give a 24 hour notice. Thank you.)    Physician signature:__________________________      If you experience any of the following, please call the Wound Care Center during business hours:    * Increase in Pain  * Temperature over 101  * Increase in drainage from your wound  * Drainage with a foul odor  * Bleeding  * Increase in swelling  * Need for compression bandage changes due to slippage, breakthrough drainage.    If you need medical attention outside of the business hours of the Wound Care Centers please contact your PCP or go to the nearest emergency room.

## 2025-04-30 ENCOUNTER — HOSPITAL ENCOUNTER (OUTPATIENT)
Dept: WOUND CARE | Age: 80
Discharge: HOME | End: 2025-04-30
Attending: SURGERY | Admitting: SURGERY

## 2025-05-20 ENCOUNTER — HOSPITAL ENCOUNTER (OUTPATIENT)
Dept: GENERAL RADIOLOGY | Age: 80
Discharge: HOME | End: 2025-05-22
Payer: MEDICARE

## 2025-05-20 DIAGNOSIS — S32.020D COMPRESSION FRACTURE OF L2 VERTEBRA WITH ROUTINE HEALING, SUBSEQUENT ENCOUNTER: ICD-10-CM

## 2025-05-20 PROCEDURE — 72100 X-RAY EXAM L-S SPINE 2/3 VWS: CPT

## 2025-05-27 ENCOUNTER — OFFICE VISIT (OUTPATIENT)
Age: 80
End: 2025-05-27
Payer: MEDICARE

## 2025-05-27 VITALS
TEMPERATURE: 98 F | BODY MASS INDEX: 26.5 KG/M2 | SYSTOLIC BLOOD PRESSURE: 136 MMHG | RESPIRATION RATE: 18 BRPM | OXYGEN SATURATION: 98 % | HEART RATE: 68 BPM | DIASTOLIC BLOOD PRESSURE: 86 MMHG | WEIGHT: 135 LBS | HEIGHT: 60 IN

## 2025-05-27 DIAGNOSIS — S32.020D COMPRESSION FRACTURE OF L2 VERTEBRA WITH ROUTINE HEALING, SUBSEQUENT ENCOUNTER: Primary | ICD-10-CM

## 2025-05-27 PROCEDURE — 99213 OFFICE O/P EST LOW 20 MIN: CPT | Performed by: STUDENT IN AN ORGANIZED HEALTH CARE EDUCATION/TRAINING PROGRAM

## 2025-05-27 PROCEDURE — 3079F DIAST BP 80-89 MM HG: CPT | Performed by: STUDENT IN AN ORGANIZED HEALTH CARE EDUCATION/TRAINING PROGRAM

## 2025-05-27 PROCEDURE — 1124F ACP DISCUSS-NO DSCNMKR DOCD: CPT | Performed by: STUDENT IN AN ORGANIZED HEALTH CARE EDUCATION/TRAINING PROGRAM

## 2025-05-27 PROCEDURE — 3075F SYST BP GE 130 - 139MM HG: CPT | Performed by: STUDENT IN AN ORGANIZED HEALTH CARE EDUCATION/TRAINING PROGRAM

## 2025-05-27 RX ORDER — ACETAMINOPHEN 500 MG
1000 TABLET ORAL EVERY 8 HOURS PRN
COMMUNITY
Start: 2025-03-21

## 2025-05-28 ENCOUNTER — OFFICE VISIT (OUTPATIENT)
Dept: GASTROENTEROLOGY | Age: 80
End: 2025-05-28
Payer: MEDICARE

## 2025-05-28 VITALS
HEART RATE: 88 BPM | OXYGEN SATURATION: 98 % | RESPIRATION RATE: 18 BRPM | WEIGHT: 135 LBS | DIASTOLIC BLOOD PRESSURE: 60 MMHG | HEIGHT: 60 IN | BODY MASS INDEX: 26.5 KG/M2 | TEMPERATURE: 97.3 F | SYSTOLIC BLOOD PRESSURE: 110 MMHG

## 2025-05-28 DIAGNOSIS — K44.9 HIATAL HERNIA: Primary | ICD-10-CM

## 2025-05-28 DIAGNOSIS — R13.19 ESOPHAGEAL DYSPHAGIA: ICD-10-CM

## 2025-05-28 PROCEDURE — 3078F DIAST BP <80 MM HG: CPT | Performed by: STUDENT IN AN ORGANIZED HEALTH CARE EDUCATION/TRAINING PROGRAM

## 2025-05-28 PROCEDURE — 99214 OFFICE O/P EST MOD 30 MIN: CPT | Performed by: STUDENT IN AN ORGANIZED HEALTH CARE EDUCATION/TRAINING PROGRAM

## 2025-05-28 PROCEDURE — 1124F ACP DISCUSS-NO DSCNMKR DOCD: CPT | Performed by: STUDENT IN AN ORGANIZED HEALTH CARE EDUCATION/TRAINING PROGRAM

## 2025-05-28 PROCEDURE — 1159F MED LIST DOCD IN RCRD: CPT | Performed by: STUDENT IN AN ORGANIZED HEALTH CARE EDUCATION/TRAINING PROGRAM

## 2025-05-28 PROCEDURE — 3074F SYST BP LT 130 MM HG: CPT | Performed by: STUDENT IN AN ORGANIZED HEALTH CARE EDUCATION/TRAINING PROGRAM

## 2025-05-28 NOTE — PROGRESS NOTES
Hospital Follow-up     Subjective: Haritha Dominguez is a 80 y.o.  female who was originally seen on 2025 for worsening low back pain. She was found to have suffered a L2 compression fracture. She was placed in a TLSO, she presents today for 3 month follow up.     Patient  states she is doing well. She denies any significant low back pain.  No pain down the legs. No numbness or weakness. Brace complaint. XR reviewed.      Physical Exam:              WDWN, no apparent distress              Non-labored breathing               Vitals Stable              Alert and oriented x3              CN 3-12 intact              PERRL              EOMI              HA well              Motor strength symmetric              Sensation to LT intact bilaterally             Imagin2025 XR Lumbar Spine from facility  1. Mild subacute superior endplate compression deformity of L2.  2. Moderate chronic L1 compression deformity.  3. Degenerative changes of the upper lumbar spine.     Assessment: This is a 80 y.o.  female presenting for a 3 month follow-up s/p L2 compression fracture.      Plan:  -Pain control and expectations discussed  -Can discontinue brace and restrictions   -OARRS report reviewed   -Follow-up in neurosurgery clinic prn  -Call or return to neurosurgery office sooner if symptoms worsen or if new issues arise in the interim.    Electronically signed by Monica Hansen PA-C on 2025 at 7:52 PM

## 2025-05-28 NOTE — PROGRESS NOTES
Gastroenterology, Hepatology, &  Advanced Endoscopy    Progress Note        HPI:     Oropharyngeal dysphagia, on thickened.  Small frequent meals, going well.   No esophageal dysphagia.  Completely off oxygen and doing well.         Lab Results   Component Value Date    INR 0.9 05/24/2023    INR 1.1 02/13/2023    INR 1.2 05/28/2017    PROTIME 10.2 05/24/2023    PROTIME 11.8 02/13/2023    PROTIME 13.2 (H) 05/28/2017         ALT   Date Value Ref Range Status   03/18/2025 7 0 - 32 U/L Final   03/17/2025 8 0 - 32 U/L Final   03/16/2025 7 0 - 32 U/L Final     AST   Date Value Ref Range Status   03/18/2025 11 0 - 31 U/L Final   03/17/2025 11 0 - 31 U/L Final   03/16/2025 17 0 - 31 U/L Final     Alkaline Phosphatase   Date Value Ref Range Status   03/18/2025 67 35 - 104 U/L Final   03/17/2025 69 35 - 104 U/L Final   03/16/2025 77 35 - 104 U/L Final     Total Bilirubin   Date Value Ref Range Status   03/18/2025 0.3 0.0 - 1.2 mg/dL Final   03/17/2025 0.3 0.0 - 1.2 mg/dL Final   03/16/2025 0.4 0.0 - 1.2 mg/dL Final     Bilirubin, Direct   Date Value Ref Range Status   03/02/2025 <0.2 0.0 - 0.3 mg/dL Final   04/01/2012 0.2 0.0 - 0.2 mg/dL Final   01/01/2011 <0.1 0.0 - 0.2 mg/dL Final      Lab Results   Component Value Date    WBC 8.4 03/19/2025    HGB 8.8 (L) 03/19/2025    HCT 28.3 (L) 03/19/2025     03/19/2025     03/19/2025    K 3.4 (L) 03/19/2025     (H) 03/19/2025    CREATININE 0.6 03/19/2025    BUN 7 03/19/2025    CO2 23 03/19/2025    FOLATE 6.3 03/18/2025    DNFEOEOY57 518 03/18/2025    GLUCOSE 109 (H) 03/19/2025    INR 0.9 05/24/2023    PROTIME 10.2 05/24/2023    TSH 2.06 08/22/2023    LABA1C 7.8 01/17/2024     Computed MELD 3.0 unavailable. One or more values for this score either were not found within the given timeframe or did not fit some other criterion.  Computed MELD-Na unavailable. One or more values for this score either were not found within the given timeframe or did not fit some other

## 2025-06-02 PROBLEM — K22.3 ESOPHAGEAL PERFORATION: Status: RESOLVED | Noted: 2025-03-15 | Resolved: 2025-06-02

## 2025-06-05 ENCOUNTER — HOSPITAL ENCOUNTER (OUTPATIENT)
Dept: GENERAL RADIOLOGY | Age: 80
Discharge: HOME | End: 2025-06-07
Payer: MEDICARE

## 2025-06-05 DIAGNOSIS — R06.02 SOB (SHORTNESS OF BREATH): ICD-10-CM

## 2025-06-05 PROCEDURE — 71046 X-RAY EXAM CHEST 2 VIEWS: CPT

## 2025-08-14 ENCOUNTER — OFFICE VISIT (OUTPATIENT)
Dept: ENDOCRINOLOGY | Age: 80
End: 2025-08-14
Payer: MEDICARE

## 2025-08-14 VITALS
BODY MASS INDEX: 25.52 KG/M2 | HEIGHT: 60 IN | WEIGHT: 130 LBS | DIASTOLIC BLOOD PRESSURE: 70 MMHG | RESPIRATION RATE: 18 BRPM | SYSTOLIC BLOOD PRESSURE: 106 MMHG | TEMPERATURE: 98.6 F | OXYGEN SATURATION: 96 % | HEART RATE: 94 BPM

## 2025-08-14 DIAGNOSIS — M81.0 OSTEOPOROSIS, UNSPECIFIED OSTEOPOROSIS TYPE, UNSPECIFIED PATHOLOGICAL FRACTURE PRESENCE: ICD-10-CM

## 2025-08-14 DIAGNOSIS — E55.9 VITAMIN D DEFICIENCY: ICD-10-CM

## 2025-08-14 DIAGNOSIS — E27.49 SECONDARY ADRENAL INSUFFICIENCY: Primary | ICD-10-CM

## 2025-08-14 PROCEDURE — 1124F ACP DISCUSS-NO DSCNMKR DOCD: CPT | Performed by: INTERNAL MEDICINE

## 2025-08-14 PROCEDURE — 3074F SYST BP LT 130 MM HG: CPT | Performed by: INTERNAL MEDICINE

## 2025-08-14 PROCEDURE — 3078F DIAST BP <80 MM HG: CPT | Performed by: INTERNAL MEDICINE

## 2025-08-14 PROCEDURE — 99214 OFFICE O/P EST MOD 30 MIN: CPT | Performed by: INTERNAL MEDICINE

## 2025-08-14 PROCEDURE — G2211 COMPLEX E/M VISIT ADD ON: HCPCS | Performed by: INTERNAL MEDICINE

## 2025-08-14 PROCEDURE — 1159F MED LIST DOCD IN RCRD: CPT | Performed by: INTERNAL MEDICINE

## (undated) PROCEDURE — 0D728ZZ DILATION OF MIDDLE ESOPHAGUS, VIA NATURAL OR ARTIFICIAL OPENING ENDOSCOPIC: ICD-10-PCS

## (undated) PROCEDURE — 0DJ08ZZ INSPECTION OF UPPER INTESTINAL TRACT, VIA NATURAL OR ARTIFICIAL OPENING ENDOSCOPIC: ICD-10-PCS

## (undated) DEVICE — TUBING STRL INST 6INX656FT

## (undated) DEVICE — 3M™ IOBAN™ 2 ANTIMICROBIAL INCISE DRAPE 6650EZ: Brand: IOBAN™ 2

## (undated) DEVICE — Device: Brand: BALLOON3

## (undated) DEVICE — SYRINGE 20ML LL S/C 50

## (undated) DEVICE — LIQUIBAND RAPID ADHESIVE 36/CS 0.8ML: Brand: MEDLINE

## (undated) DEVICE — GLOVE ORANGE PI 8   MSG9080

## (undated) DEVICE — BLADE CLP TAPR HD WET DRY CAPABILITY GTT IN CHARGING USE

## (undated) DEVICE — ANTI-REFLUX VALVE: Brand: SALEM SUMP

## (undated) DEVICE — BLADE ES L6IN ELASTOMERIC COAT EXT DURABLE BEND UPTO 90DEG

## (undated) DEVICE — AIR/WATER CLEANING ADAPTER FOR OLYMPUS® GI ENDOSCOPE: Brand: BULLDOG®

## (undated) DEVICE — TUBING SUCT 12FR MAL ALUM SHFT FN CAP VENT UNIV CONN W/ OBT

## (undated) DEVICE — HYPODERMIC SAFETY NEEDLE: Brand: MAGELLAN

## (undated) DEVICE — BITEBLOCK 54FR W/ DENT RIM BLOX

## (undated) DEVICE — GLOVE SURG 8.5 PF POLYMER WHT STRL SIGN LTX ESSENTIAL LTX

## (undated) DEVICE — Device

## (undated) DEVICE — SYRINGE MED 50ML LUERSLIP TIP

## (undated) DEVICE — TUBING, SUCTION, 3/16" X 12', STRAIGHT: Brand: MEDLINE

## (undated) DEVICE — CLEANGUIDE DISPOSABLE MARKED SPRING TIP GUIDEWIRE, 1.86 MM X 210 CM: Brand: CLEANGUIDE

## (undated) DEVICE — TUBES STOMACH 48 IN X 16FR DBL LUMN SUMP SALEM ARGYLE ENFIT

## (undated) DEVICE — LUMBAR LAMINECTOMY: Brand: MEDLINE INDUSTRIES, INC.

## (undated) DEVICE — ENDOSCOPIC KIT 1.1+ OP4 NO CP DE

## (undated) DEVICE — JACKSON TABLE POSITIONER KIT: Brand: MEDLINE INDUSTRIES, INC.

## (undated) DEVICE — RESCUE COMBO FORCEPS

## (undated) DEVICE — DEFENDO AIR WATER SUCTION AND BIOPSY VALVE KIT FOR  OLYMPUS: Brand: DEFENDO AIR/WATER/SUCTION AND BIOPSY VALVE

## (undated) DEVICE — APPLICATOR PREP 26ML 0.7% IOD POVACRYLEX 74% ISO ALC ST

## (undated) DEVICE — 3M(TM) MEDIPORE(TM) +PAD SOFT CLOTH ADHESIVE WOUND DRESSING 3569: Brand: 3M™ MEDIPORE™

## (undated) DEVICE — 5.0MM PRECISION ROUND

## (undated) DEVICE — TEMPLATE SURG STR PEDIATRIC CLMP FLIMB RECON SYS

## (undated) DEVICE — PACK,UNIVERSAL,NO GOWNS: Brand: MEDLINE

## (undated) DEVICE — ELECTRODE PT RET AD L9FT HI MOIST COND ADH HYDRGEL CORDED

## (undated) DEVICE — DRAPE,REIN 53X77,STERILE: Brand: MEDLINE

## (undated) DEVICE — PREMIUM WET SKIN PREP TRAY: Brand: MEDLINE INDUSTRIES, INC.

## (undated) DEVICE — GAUZE,SPONGE,4"X4",8PLY,STRL,LF,10/TRAY: Brand: MEDLINE